# Patient Record
Sex: FEMALE | Race: WHITE | NOT HISPANIC OR LATINO | Employment: OTHER | URBAN - METROPOLITAN AREA
[De-identification: names, ages, dates, MRNs, and addresses within clinical notes are randomized per-mention and may not be internally consistent; named-entity substitution may affect disease eponyms.]

---

## 2017-11-13 ENCOUNTER — CONVERSION ENCOUNTER (OUTPATIENT)
Dept: GENERAL RADIOLOGY | Facility: HOSPITAL | Age: 47
End: 2017-11-13

## 2020-01-31 ENCOUNTER — HOSPITAL ENCOUNTER (OUTPATIENT)
Dept: FAMILY MEDICINE CLINIC | Facility: CLINIC | Age: 50
Discharge: HOME | End: 2020-01-31
Attending: NURSE PRACTITIONER

## 2020-01-31 ENCOUNTER — CONVERSION ENCOUNTER (OUTPATIENT)
Dept: FAMILY MEDICINE CLINIC | Facility: CLINIC | Age: 50
End: 2020-01-31

## 2020-01-31 ENCOUNTER — OFFICE VISIT CONVERTED (OUTPATIENT)
Dept: FAMILY MEDICINE CLINIC | Facility: CLINIC | Age: 50
End: 2020-01-31
Attending: NURSE PRACTITIONER

## 2020-01-31 LAB
25(OH)D3 SERPL-MCNC: 15.1 NG/ML (ref 30–100)
ALBUMIN SERPL-MCNC: 4.2 G/DL (ref 3.5–5)
ALBUMIN/GLOB SERPL: 1.2 {RATIO} (ref 1.4–2.6)
ALP SERPL-CCNC: 90 U/L (ref 53–141)
ALT SERPL-CCNC: 15 U/L (ref 10–40)
ANION GAP SERPL CALC-SCNC: 17 MMOL/L (ref 8–19)
AST SERPL-CCNC: 17 U/L (ref 15–50)
BASOPHILS # BLD AUTO: 0.06 10*3/UL (ref 0–0.2)
BASOPHILS NFR BLD AUTO: 0.7 % (ref 0–3)
BILIRUB SERPL-MCNC: 0.43 MG/DL (ref 0.2–1.3)
BUN SERPL-MCNC: 37 MG/DL (ref 5–25)
BUN/CREAT SERPL: 17 {RATIO} (ref 6–20)
CALCIUM SERPL-MCNC: 10.2 MG/DL (ref 8.7–10.4)
CHLORIDE SERPL-SCNC: 98 MMOL/L (ref 99–111)
CHOLEST SERPL-MCNC: 231 MG/DL (ref 107–200)
CHOLEST/HDLC SERPL: 4.6 {RATIO} (ref 3–6)
CONV ABS IMM GRAN: 0.04 10*3/UL (ref 0–0.2)
CONV CO2: 24 MMOL/L (ref 22–32)
CONV IMMATURE GRAN: 0.5 % (ref 0–1.8)
CONV TOTAL PROTEIN: 7.6 G/DL (ref 6.3–8.2)
CREAT UR-MCNC: 2.2 MG/DL (ref 0.5–0.9)
DEPRECATED RDW RBC AUTO: 43 FL (ref 36.4–46.3)
EOSINOPHIL # BLD AUTO: 0.14 10*3/UL (ref 0–0.7)
EOSINOPHIL # BLD AUTO: 1.7 % (ref 0–7)
ERYTHROCYTE [DISTWIDTH] IN BLOOD BY AUTOMATED COUNT: 13.2 % (ref 11.7–14.4)
EST. AVERAGE GLUCOSE BLD GHB EST-MCNC: 111 MG/DL
GFR SERPLBLD BASED ON 1.73 SQ M-ARVRAT: 25 ML/MIN/{1.73_M2}
GLOBULIN UR ELPH-MCNC: 3.4 G/DL (ref 2–3.5)
GLUCOSE SERPL-MCNC: 107 MG/DL (ref 65–99)
HBA1C MFR BLD: 5.5 % (ref 3.5–5.7)
HCT VFR BLD AUTO: 39 % (ref 37–47)
HDLC SERPL-MCNC: 50 MG/DL (ref 40–60)
HGB BLD-MCNC: 13.1 G/DL (ref 12–16)
LDLC SERPL CALC-MCNC: 153 MG/DL (ref 70–100)
LYMPHOCYTES # BLD AUTO: 1.34 10*3/UL (ref 1–5)
LYMPHOCYTES NFR BLD AUTO: 16 % (ref 20–45)
MCH RBC QN AUTO: 30.1 PG (ref 27–31)
MCHC RBC AUTO-ENTMCNC: 33.6 G/DL (ref 33–37)
MCV RBC AUTO: 89.7 FL (ref 81–99)
MONOCYTES # BLD AUTO: 0.47 10*3/UL (ref 0.2–1.2)
MONOCYTES NFR BLD AUTO: 5.6 % (ref 3–10)
NEUTROPHILS # BLD AUTO: 6.33 10*3/UL (ref 2–8)
NEUTROPHILS NFR BLD AUTO: 75.5 % (ref 30–85)
NRBC CBCN: 0 % (ref 0–0.7)
OSMOLALITY SERPL CALC.SUM OF ELEC: 289 MOSM/KG (ref 273–304)
PLATELET # BLD AUTO: 238 10*3/UL (ref 130–400)
PMV BLD AUTO: 8.4 FL (ref 9.4–12.3)
POTASSIUM SERPL-SCNC: 4.4 MMOL/L (ref 3.5–5.3)
RBC # BLD AUTO: 4.35 10*6/UL (ref 4.2–5.4)
SODIUM SERPL-SCNC: 135 MMOL/L (ref 135–147)
T4 FREE SERPL-MCNC: 1.1 NG/DL (ref 0.9–1.8)
TRIGL SERPL-MCNC: 142 MG/DL (ref 40–150)
TSH SERPL-ACNC: 1.39 M[IU]/L (ref 0.27–4.2)
VLDLC SERPL-MCNC: 28 MG/DL (ref 5–37)
WBC # BLD AUTO: 8.38 10*3/UL (ref 4.8–10.8)

## 2020-02-04 ENCOUNTER — HOSPITAL ENCOUNTER (OUTPATIENT)
Dept: GENERAL RADIOLOGY | Facility: HOSPITAL | Age: 50
Discharge: HOME | End: 2020-02-04
Attending: NURSE PRACTITIONER

## 2020-02-06 ENCOUNTER — HOSPITAL ENCOUNTER (OUTPATIENT)
Dept: GENERAL RADIOLOGY | Facility: HOSPITAL | Age: 50
Discharge: HOME | End: 2020-02-06
Attending: NURSE PRACTITIONER

## 2020-02-10 ENCOUNTER — CONVERSION ENCOUNTER (OUTPATIENT)
Dept: GASTROENTEROLOGY | Facility: CLINIC | Age: 50
End: 2020-02-10
Attending: INTERNAL MEDICINE

## 2020-02-21 ENCOUNTER — HOSPITAL ENCOUNTER (OUTPATIENT)
Dept: GENERAL RADIOLOGY | Facility: HOSPITAL | Age: 50
Discharge: HOME | End: 2020-02-21
Attending: NURSE PRACTITIONER

## 2020-02-28 ENCOUNTER — OFFICE VISIT CONVERTED (OUTPATIENT)
Dept: FAMILY MEDICINE CLINIC | Facility: CLINIC | Age: 50
End: 2020-02-28
Attending: NURSE PRACTITIONER

## 2020-03-04 ENCOUNTER — OFFICE VISIT CONVERTED (OUTPATIENT)
Dept: PULMONOLOGY | Facility: CLINIC | Age: 50
End: 2020-03-04
Attending: INTERNAL MEDICINE

## 2020-03-17 ENCOUNTER — OFFICE VISIT CONVERTED (OUTPATIENT)
Dept: FAMILY MEDICINE CLINIC | Facility: CLINIC | Age: 50
End: 2020-03-17
Attending: NURSE PRACTITIONER

## 2020-04-17 ENCOUNTER — TELEMEDICINE CONVERTED (OUTPATIENT)
Dept: FAMILY MEDICINE CLINIC | Facility: CLINIC | Age: 50
End: 2020-04-17
Attending: NURSE PRACTITIONER

## 2020-08-20 ENCOUNTER — OFFICE VISIT CONVERTED (OUTPATIENT)
Dept: FAMILY MEDICINE CLINIC | Facility: CLINIC | Age: 50
End: 2020-08-20
Attending: NURSE PRACTITIONER

## 2020-10-01 ENCOUNTER — HOSPITAL ENCOUNTER (OUTPATIENT)
Dept: LAB | Facility: HOSPITAL | Age: 50
Discharge: HOME | End: 2020-10-01
Attending: NURSE PRACTITIONER

## 2020-10-01 LAB
APPEARANCE UR: CLEAR
BASOPHILS # BLD AUTO: 0.06 10*3/UL (ref 0–0.2)
BASOPHILS NFR BLD AUTO: 0.8 % (ref 0–3)
BILIRUB UR QL: NEGATIVE
COLOR UR: YELLOW
CONV ABS IMM GRAN: 0.02 10*3/UL (ref 0–0.2)
CONV BACTERIA: NEGATIVE
CONV COLLECTION SOURCE (UA): ABNORMAL
CONV HYALINE CASTS IN URINE MICRO: ABNORMAL /[LPF]
CONV IMMATURE GRAN: 0.3 % (ref 0–1.8)
CONV UROBILINOGEN IN URINE BY AUTOMATED TEST STRIP: 0.2 {EHRLICHU}/DL (ref 0.1–1)
DEPRECATED RDW RBC AUTO: 44.9 FL (ref 36.4–46.3)
EOSINOPHIL # BLD AUTO: 0.19 10*3/UL (ref 0–0.7)
EOSINOPHIL # BLD AUTO: 2.6 % (ref 0–7)
ERYTHROCYTE [DISTWIDTH] IN BLOOD BY AUTOMATED COUNT: 12.9 % (ref 11.7–14.4)
GLUCOSE UR QL: NEGATIVE MG/DL
HCT VFR BLD AUTO: 39 % (ref 37–47)
HGB BLD-MCNC: 12.5 G/DL (ref 12–16)
HGB UR QL STRIP: ABNORMAL
KETONES UR QL STRIP: NEGATIVE MG/DL
LEUKOCYTE ESTERASE UR QL STRIP: NEGATIVE
LYMPHOCYTES # BLD AUTO: 1.62 10*3/UL (ref 1–5)
LYMPHOCYTES NFR BLD AUTO: 22.5 % (ref 20–45)
MCH RBC QN AUTO: 30.5 PG (ref 27–31)
MCHC RBC AUTO-ENTMCNC: 32.1 G/DL (ref 33–37)
MCV RBC AUTO: 95.1 FL (ref 81–99)
MONOCYTES # BLD AUTO: 0.43 10*3/UL (ref 0.2–1.2)
MONOCYTES NFR BLD AUTO: 6 % (ref 3–10)
NEUTROPHILS # BLD AUTO: 4.87 10*3/UL (ref 2–8)
NEUTROPHILS NFR BLD AUTO: 67.8 % (ref 30–85)
NITRITE UR QL STRIP: NEGATIVE
NRBC CBCN: 0 % (ref 0–0.7)
PH UR STRIP.AUTO: 5.5 [PH] (ref 5–8)
PLATELET # BLD AUTO: 233 10*3/UL (ref 130–400)
PMV BLD AUTO: 9.4 FL (ref 9.4–12.3)
PROT UR QL: 100 MG/DL
RBC # BLD AUTO: 4.1 10*6/UL (ref 4.2–5.4)
RBC #/AREA URNS HPF: ABNORMAL /[HPF]
SP GR UR: 1.01 (ref 1–1.03)
SQUAMOUS SPT QL MICRO: ABNORMAL /[HPF]
WBC # BLD AUTO: 7.19 10*3/UL (ref 4.8–10.8)
WBC #/AREA URNS HPF: ABNORMAL /[HPF]

## 2020-10-02 LAB
25(OH)D3 SERPL-MCNC: 28.2 NG/ML (ref 30–100)
ALBUMIN SERPL-MCNC: 4.3 G/DL (ref 3.5–5)
ANION GAP SERPL CALC-SCNC: 19 MMOL/L (ref 8–19)
BUN SERPL-MCNC: 43 MG/DL (ref 5–25)
BUN/CREAT SERPL: 18 {RATIO} (ref 6–20)
CALCIUM SERPL-MCNC: 9.6 MG/DL (ref 8.7–10.4)
CHLORIDE SERPL-SCNC: 102 MMOL/L (ref 99–111)
CONV CO2: 20 MMOL/L (ref 22–32)
CONV CREATININE URINE, RANDOM: 61.2 MG/DL (ref 10–300)
CREAT UR-MCNC: 2.38 MG/DL (ref 0.5–0.9)
GFR SERPLBLD BASED ON 1.73 SQ M-ARVRAT: 23 ML/MIN/{1.73_M2}
GLUCOSE SERPL-MCNC: 112 MG/DL (ref 65–99)
PHOSPHATE SERPL-MCNC: 3.4 MG/DL (ref 2.4–4.5)
POTASSIUM SERPL-SCNC: 4.6 MMOL/L (ref 3.5–5.3)
PROT UR-MCNC: 80.4 MG/DL
SODIUM SERPL-SCNC: 136 MMOL/L (ref 135–147)

## 2020-10-05 LAB — PTH-INTACT SERPL-MCNC: 44 PG/ML (ref 15–65)

## 2020-10-15 ENCOUNTER — HOSPITAL ENCOUNTER (OUTPATIENT)
Dept: URGENT CARE | Facility: CLINIC | Age: 50
Discharge: HOME | End: 2020-10-15
Attending: NURSE PRACTITIONER

## 2020-10-17 LAB — SARS-COV-2 RNA SPEC QL NAA+PROBE: DETECTED

## 2021-01-29 ENCOUNTER — CONVERSION ENCOUNTER (OUTPATIENT)
Dept: FAMILY MEDICINE CLINIC | Facility: CLINIC | Age: 51
End: 2021-01-29

## 2021-01-29 ENCOUNTER — HOSPITAL ENCOUNTER (OUTPATIENT)
Dept: FAMILY MEDICINE CLINIC | Facility: CLINIC | Age: 51
Discharge: HOME | End: 2021-01-29
Attending: NURSE PRACTITIONER

## 2021-01-29 ENCOUNTER — OFFICE VISIT CONVERTED (OUTPATIENT)
Dept: FAMILY MEDICINE CLINIC | Facility: CLINIC | Age: 51
End: 2021-01-29
Attending: NURSE PRACTITIONER

## 2021-01-29 ENCOUNTER — HOSPITAL ENCOUNTER (OUTPATIENT)
Dept: GENERAL RADIOLOGY | Facility: HOSPITAL | Age: 51
Discharge: HOME | End: 2021-01-29
Attending: NURSE PRACTITIONER

## 2021-01-29 LAB
BASOPHILS # BLD AUTO: 0.03 10*3/UL (ref 0–0.2)
BASOPHILS NFR BLD AUTO: 0.4 % (ref 0–3)
CONV ABS IMM GRAN: 0.02 10*3/UL (ref 0–0.2)
CONV IMMATURE GRAN: 0.3 % (ref 0–1.8)
DEPRECATED RDW RBC AUTO: 43.6 FL (ref 36.4–46.3)
EOSINOPHIL # BLD AUTO: 0.12 10*3/UL (ref 0–0.7)
EOSINOPHIL # BLD AUTO: 1.6 % (ref 0–7)
ERYTHROCYTE [DISTWIDTH] IN BLOOD BY AUTOMATED COUNT: 12.7 % (ref 11.7–14.4)
HCT VFR BLD AUTO: 35.4 % (ref 37–47)
HGB BLD-MCNC: 11.5 G/DL (ref 12–16)
LYMPHOCYTES # BLD AUTO: 1.31 10*3/UL (ref 1–5)
LYMPHOCYTES NFR BLD AUTO: 17.5 % (ref 20–45)
MCH RBC QN AUTO: 30.4 PG (ref 27–31)
MCHC RBC AUTO-ENTMCNC: 32.5 G/DL (ref 33–37)
MCV RBC AUTO: 93.7 FL (ref 81–99)
MONOCYTES # BLD AUTO: 0.46 10*3/UL (ref 0.2–1.2)
MONOCYTES NFR BLD AUTO: 6.1 % (ref 3–10)
NEUTROPHILS # BLD AUTO: 5.56 10*3/UL (ref 2–8)
NEUTROPHILS NFR BLD AUTO: 74.1 % (ref 30–85)
NRBC CBCN: 0 % (ref 0–0.7)
PLATELET # BLD AUTO: 220 10*3/UL (ref 130–400)
PMV BLD AUTO: 9.1 FL (ref 9.4–12.3)
RBC # BLD AUTO: 3.78 10*6/UL (ref 4.2–5.4)
WBC # BLD AUTO: 7.5 10*3/UL (ref 4.8–10.8)

## 2021-01-30 LAB
ALBUMIN SERPL-MCNC: 4.2 G/DL (ref 3.5–5)
ALBUMIN/GLOB SERPL: 1.3 {RATIO} (ref 1.4–2.6)
ALP SERPL-CCNC: 87 U/L (ref 53–141)
ALT SERPL-CCNC: 15 U/L (ref 10–40)
ANION GAP SERPL CALC-SCNC: 19 MMOL/L (ref 8–19)
AST SERPL-CCNC: 18 U/L (ref 15–50)
BILIRUB SERPL-MCNC: 0.38 MG/DL (ref 0.2–1.3)
BNP SERPL-MCNC: 280 PG/ML (ref 0–900)
BUN SERPL-MCNC: 35 MG/DL (ref 5–25)
BUN/CREAT SERPL: 13 {RATIO} (ref 6–20)
CALCIUM SERPL-MCNC: 9.8 MG/DL (ref 8.7–10.4)
CHLORIDE SERPL-SCNC: 106 MMOL/L (ref 99–111)
CHOLEST SERPL-MCNC: 119 MG/DL (ref 107–200)
CHOLEST/HDLC SERPL: 2.7 {RATIO} (ref 3–6)
CONV CO2: 19 MMOL/L (ref 22–32)
CONV TOTAL PROTEIN: 7.4 G/DL (ref 6.3–8.2)
CREAT UR-MCNC: 2.61 MG/DL (ref 0.5–0.9)
GFR SERPLBLD BASED ON 1.73 SQ M-ARVRAT: 20 ML/MIN/{1.73_M2}
GLOBULIN UR ELPH-MCNC: 3.2 G/DL (ref 2–3.5)
GLUCOSE SERPL-MCNC: 98 MG/DL (ref 65–99)
HDLC SERPL-MCNC: 44 MG/DL (ref 40–60)
LDLC SERPL CALC-MCNC: 52 MG/DL (ref 70–100)
OSMOLALITY SERPL CALC.SUM OF ELEC: 296 MOSM/KG (ref 273–304)
POTASSIUM SERPL-SCNC: 5 MMOL/L (ref 3.5–5.3)
SODIUM SERPL-SCNC: 139 MMOL/L (ref 135–147)
TRIGL SERPL-MCNC: 115 MG/DL (ref 40–150)
URATE SERPL-MCNC: 8.1 MG/DL (ref 2.5–7.5)
VLDLC SERPL-MCNC: 23 MG/DL (ref 5–37)

## 2021-02-23 ENCOUNTER — CONVERSION ENCOUNTER (OUTPATIENT)
Dept: FAMILY MEDICINE CLINIC | Facility: CLINIC | Age: 51
End: 2021-02-23

## 2021-02-23 ENCOUNTER — HOSPITAL ENCOUNTER (OUTPATIENT)
Dept: FAMILY MEDICINE CLINIC | Facility: CLINIC | Age: 51
Discharge: HOME | End: 2021-02-23
Attending: NURSE PRACTITIONER

## 2021-02-23 ENCOUNTER — OFFICE VISIT CONVERTED (OUTPATIENT)
Dept: FAMILY MEDICINE CLINIC | Facility: CLINIC | Age: 51
End: 2021-02-23
Attending: NURSE PRACTITIONER

## 2021-02-23 LAB
FERRITIN SERPL-MCNC: 92 NG/ML (ref 10–200)
IRON SATN MFR SERPL: 20 % (ref 20–55)
IRON SERPL-MCNC: 60 UG/DL (ref 60–170)
TIBC SERPL-MCNC: 297 UG/DL (ref 245–450)
TRANSFERRIN SERPL-MCNC: 208 MG/DL (ref 250–380)
URATE SERPL-MCNC: 6.4 MG/DL (ref 2.5–7.5)

## 2021-05-07 ENCOUNTER — HOSPITAL ENCOUNTER (OUTPATIENT)
Dept: LAB | Facility: HOSPITAL | Age: 51
Discharge: HOME | End: 2021-05-07
Attending: INTERNAL MEDICINE

## 2021-05-07 LAB
25(OH)D3 SERPL-MCNC: 30 NG/ML (ref 30–100)
ALBUMIN SERPL-MCNC: 4.3 G/DL (ref 3.5–5)
ANION GAP SERPL CALC-SCNC: 19 MMOL/L (ref 8–19)
APPEARANCE UR: CLEAR
BASOPHILS # BLD AUTO: 0.05 10*3/UL (ref 0–0.2)
BASOPHILS NFR BLD AUTO: 0.8 % (ref 0–3)
BILIRUB UR QL: NEGATIVE
BUN SERPL-MCNC: 52 MG/DL (ref 5–25)
BUN/CREAT SERPL: 20 {RATIO} (ref 6–20)
CALCIUM SERPL-MCNC: 9.6 MG/DL (ref 8.7–10.4)
CHLORIDE SERPL-SCNC: 108 MMOL/L (ref 99–111)
COLOR UR: YELLOW
CONV ABS IMM GRAN: 0.03 10*3/UL (ref 0–0.2)
CONV BACTERIA: NEGATIVE
CONV CO2: 18 MMOL/L (ref 22–32)
CONV COLLECTION SOURCE (UA): ABNORMAL
CONV CREATININE URINE, RANDOM: 58 MG/DL (ref 10–300)
CONV HYALINE CASTS IN URINE MICRO: ABNORMAL /[LPF]
CONV IMMATURE GRAN: 0.5 % (ref 0–1.8)
CONV UROBILINOGEN IN URINE BY AUTOMATED TEST STRIP: 0.2 {EHRLICHU}/DL (ref 0.1–1)
CREAT UR-MCNC: 2.59 MG/DL (ref 0.5–0.9)
DEPRECATED RDW RBC AUTO: 46.2 FL (ref 36.4–46.3)
EOSINOPHIL # BLD AUTO: 0.21 10*3/UL (ref 0–0.7)
EOSINOPHIL # BLD AUTO: 3.3 % (ref 0–7)
ERYTHROCYTE [DISTWIDTH] IN BLOOD BY AUTOMATED COUNT: 13.6 % (ref 11.7–14.4)
GFR SERPLBLD BASED ON 1.73 SQ M-ARVRAT: 20 ML/MIN/{1.73_M2}
GLUCOSE SERPL-MCNC: 91 MG/DL (ref 65–99)
GLUCOSE UR QL: NEGATIVE MG/DL
HCT VFR BLD AUTO: 35.4 % (ref 37–47)
HGB BLD-MCNC: 11.5 G/DL (ref 12–16)
HGB UR QL STRIP: NEGATIVE
KETONES UR QL STRIP: NEGATIVE MG/DL
LEUKOCYTE ESTERASE UR QL STRIP: NEGATIVE
LYMPHOCYTES # BLD AUTO: 1.72 10*3/UL (ref 1–5)
LYMPHOCYTES NFR BLD AUTO: 27.2 % (ref 20–45)
MCH RBC QN AUTO: 30.3 PG (ref 27–31)
MCHC RBC AUTO-ENTMCNC: 32.5 G/DL (ref 33–37)
MCV RBC AUTO: 93.2 FL (ref 81–99)
MONOCYTES # BLD AUTO: 0.47 10*3/UL (ref 0.2–1.2)
MONOCYTES NFR BLD AUTO: 7.4 % (ref 3–10)
NEUTROPHILS # BLD AUTO: 3.84 10*3/UL (ref 2–8)
NEUTROPHILS NFR BLD AUTO: 60.8 % (ref 30–85)
NITRITE UR QL STRIP: NEGATIVE
NRBC CBCN: 0 % (ref 0–0.7)
PH UR STRIP.AUTO: 5 [PH] (ref 5–8)
PHOSPHATE SERPL-MCNC: 4.8 MG/DL (ref 2.4–4.5)
PLATELET # BLD AUTO: 216 10*3/UL (ref 130–400)
PMV BLD AUTO: 9.2 FL (ref 9.4–12.3)
POTASSIUM SERPL-SCNC: 5.1 MMOL/L (ref 3.5–5.3)
PROT UR QL: 100 MG/DL
PROT UR-MCNC: 69.6 MG/DL
PTH-INTACT SERPL-MCNC: 20.8 PG/ML (ref 11.1–79.5)
RBC # BLD AUTO: 3.8 10*6/UL (ref 4.2–5.4)
RBC #/AREA URNS HPF: ABNORMAL /[HPF]
SODIUM SERPL-SCNC: 140 MMOL/L (ref 135–147)
SP GR UR: 1.01 (ref 1–1.03)
SQUAMOUS SPT QL MICRO: ABNORMAL /[HPF]
WBC # BLD AUTO: 6.32 10*3/UL (ref 4.8–10.8)
WBC #/AREA URNS HPF: ABNORMAL /[HPF]

## 2021-05-12 NOTE — PROGRESS NOTES
Progress Note      Patient Name: Claire Norris   Patient ID: [id number]   Sex: Female   YOB: 1970    Primary Care Provider: Brian ALCANTARA    Visit Date: April 17, 2020    Provider: MARICRUZ Infante   Location: [facility]   Location Address: [address] [place] [address]  [id number]   Location Phone: [phone]          Chief Complaint  · one month follow up anxiety, CKD, and HTN      History Of Present Illness  Video Conferencing Visit  Claire Norris is a 49 year old /White female who is presenting for evaluation via video conferencing. Verbal consent obtained before beginning visit.   The following staff were present during this visit: Sangeetha Elise MA   Claire Norris is a 49 year old /White female who presents for evaluation and treatment of:      1 month follow-up anxiety, CKD and HTN    Blood pressure reading at home was 180/100 while on phone   Stopped Cymbalta due to increased anxiety while taking   Has been trying meditation and relaxation methods which has been working  Would not like to try any thing else right now  states she is started hobbies such as painting at this time     pt did not keep appointment with nephrology   states she plans to rescheduled, she has not rescheduled at this time  also pt refuses renal US recommended per radiology       Past Medical History  Disease Name Date Onset Notes   Anxiety --  --    Hypertension --  --    Sinus problem --  --          Past Surgical History  Procedure Name Date Notes   Cesarian Section --  1988 and 1989   Cholecystecomy 2011 --    Colonoscopy 2009 --    EGD 2009 --    Tubal ligation 1989 --          Medication List  Name Date Started Instructions   clonidine HCl 0.1 mg oral tablet 02/03/2020 take 1 tablet (0.1 mg) by oral route 3 times per day for 90 days   Coreg 6.25 mg oral tablet 03/17/2020 take 1 tablet (6.25 mg) by oral route 2 times per day with food  for 90 days   Lipitor 20 mg oral tablet 03/17/2020 take 1 tablet (20 mg) by oral route once daily at bedtime for 90 days   losartan 100 mg oral tablet 02/28/2020 take 1 tablet (100 mg) by oral route once daily for 90 days         Allergy List  Allergen Name Date Reaction Notes   diltiazem HCl Feb 28 2020 12:00AM --  --          Family Medical History  Disease Name Relative/Age Notes   Stroke  --    Cancer, Unspecified Father/73   Throat cancer   Diabetes  --          Social History  Finding Status Start/Stop Quantity Notes   Alcohol Never --/-- --  --    Tobacco Never --/-- --  --          Review of Systems  · Constitutional  o Denies  o : fever  · Eyes  o Denies  o : blurred vision, changes in vision  · HENT  o Denies  o : headaches  · Cardiovascular  o Denies  o : chest pain, lower extremity edema  · Respiratory  o Denies  o : cough  · Gastrointestinal  o Denies  o : nausea, vomiting, diarrhea, constipation, abdominal pain  · Genitourinary  o Denies  o : dysuria  · Endocrine  o Admits  o : central obesity  o Denies  o : polyuria, polydipsia      Physical Examination  · Constitutional  o Appearance  o : well-nourished, in no acute distress  · Eyes  o Conjunctivae  o : conjunctivae normal  o Sclerae  o : sclerae white  o Eyelids/Ocular Adnexae  o : eyelid appearance normal, no exudates present  · Neck  o Inspection/Palpation  o : normal appearance, trachea midline  · Respiratory  o Respiratory Effort  o : breathing unlabored  · Cardiovascular  o Peripheral Vascular System  o :   § Extremities  § : no clubbing or edema  · Skin and Subcutaneous Tissue  o General Inspection  o : normal color   · Neurologic  o Mental Status Examination  o :   § Orientation  § : grossly oriented to person, place and time  o Gait and Station  o : normal gait, able to stand without difficulty  · Psychiatric  o Judgement and Insight  o : judgment and insight intact  o Mood and Affect  o : mood normal, affect  appropriate          Assessment  · Anxiety disorder     300.00/F41.9  stable at this time  · Essential hypertension     401.9/I10  uncontrolled will increase coreg to 12.5 mg po bid, follow up in office in 2 weeks, exercise, weight loss, decrease salt intake   · CKD (chronic kidney disease)     585.9/N18.9  refer to nephrologist. avoid all nsaids      Plan  · Orders  o HTN/Lipid Panel (CMP, Lipid) [facility] (97592, 74163) - 401.9/I10 - 04/17/2020  o CBC with Auto Diff [facility] (62592) - 401.9/I10 - 04/17/2020  o Urinalysis with Reflex Microscopy if abnormal ([facility]) (60827) - 401.9/I10 - 04/17/2020  o ACO-39: Current medications updated and reviewed () - - 04/17/2020  o ACO-14: Influenza immunization was not administered for reasons documented () - - 04/17/2020   declined  · Medications  o Coreg 12.5 mg oral tablet   SIG: take 1 tablet (12.5 mg) by oral route 2 times per day with food for 90 days   DISP: (180) tablets with 1 refills  Adjusted on 04/17/2020     · Instructions  o Patient was educated/instructed on their diagnosis, treatment and medications prior to discharge from the clinic today.  · Disposition  o follow up 2 weeks            Electronically Signed by: MARICRUZ Infante -Author on April 17, 2020 11:23:55 AM
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
## 2021-05-14 VITALS
DIASTOLIC BLOOD PRESSURE: 84 MMHG | BODY MASS INDEX: 44.01 KG/M2 | HEART RATE: 69 BPM | OXYGEN SATURATION: 97 % | HEIGHT: 63 IN | WEIGHT: 248.37 LBS | SYSTOLIC BLOOD PRESSURE: 140 MMHG | TEMPERATURE: 97 F

## 2021-05-14 VITALS
SYSTOLIC BLOOD PRESSURE: 138 MMHG | HEART RATE: 81 BPM | OXYGEN SATURATION: 99 % | DIASTOLIC BLOOD PRESSURE: 84 MMHG | HEIGHT: 63 IN | WEIGHT: 258 LBS | TEMPERATURE: 98.9 F | RESPIRATION RATE: 18 BRPM | BODY MASS INDEX: 45.71 KG/M2 | SYSTOLIC BLOOD PRESSURE: 151 MMHG | DIASTOLIC BLOOD PRESSURE: 95 MMHG

## 2021-05-14 VITALS
WEIGHT: 252 LBS | SYSTOLIC BLOOD PRESSURE: 142 MMHG | BODY MASS INDEX: 44.65 KG/M2 | TEMPERATURE: 99.8 F | HEART RATE: 86 BPM | HEIGHT: 63 IN | RESPIRATION RATE: 18 BRPM | OXYGEN SATURATION: 96 % | DIASTOLIC BLOOD PRESSURE: 76 MMHG | SYSTOLIC BLOOD PRESSURE: 145 MMHG | DIASTOLIC BLOOD PRESSURE: 80 MMHG

## 2021-05-14 NOTE — PROGRESS NOTES
Progress Note      Patient Name: Claire Norris   Patient ID: [id number]   Sex: Female   YOB: 1970    Primary Care Provider: Brian ALCANTARA    Visit Date: January 29, 2021    Provider: MARICRUZ Infante   Location: [facility]   Location Address: [address] [place] [address]  [id number]   Location Phone: [phone]          Chief Complaint  · acute visit for feet swelling, and redness      History Of Present Illness  Claire Norris is a 50 year old /White female who presents for evaluation and treatment of:      acute visit for feet swelling, and redness    c/o- bilateral feet swelling for 4 days. right foot only started swelling after she has been sitting long periods of time because her left foot is hurting   also states she started taking turmeric 2 weeks prior  Thought she was consuming too much salt, so she increased water and elevated them, but did not help.   States that the left foot is swollen more than the right. States that they are very painful   States that she has red blotchy areas on left foot, states that the area burns.  pain started in second and third toe first now most of the foot is hurting   states she was walking on hardwood floors barefooted before the pain started    nephrologist dr sheridan     also c/o some shortness of breath in the past 4 days       Past Medical History  Disease Name Date Onset Notes   Anxiety --  --    Hypertension --  --    Sinus problem --  --          Past Surgical History  Procedure Name Date Notes   Cesarian Section --  1988 and 1989   Cholecystecomy 2011 --    Colonoscopy 2009 --    EGD 2009 --    Tubal ligation 1989 --          Medication List  Name Date Started Instructions   clonidine HCl 0.1 mg oral tablet 08/20/2020 take 1 tablet (0.1 mg) by oral route 3 times per day for 90 days   Coreg 12.5 mg oral tablet 08/20/2020 take 1 tablet (12.5 mg) by oral route 2 times per day  "with food for 90 days   fluticasone propionate 50 mcg/actuation nasal spray,suspension 08/20/2020 inhale 2 sprays (100 mcg) in each nostril by intranasal route once daily   Lipitor 20 mg oral tablet 08/20/2020 take 1 tablet (20 mg) by oral route once daily at bedtime for 90 days   losartan 100 mg oral tablet 08/20/2020 take 1 tablet (100 mg) by oral route once daily for 90 days   Zyrtec 10 mg oral tablet 08/20/2020 take 1 tablet by oral route once a day (at bedtime) for 90 days         Allergy List  Allergen Name Date Reaction Notes   diltiazem HCl Feb 28 2020 12:00AM --  --          Family Medical History  Disease Name Relative/Age Notes   Stroke  --    Cancer, Unspecified Father/73   Throat cancer   Diabetes  --          Social History  Finding Status Start/Stop Quantity Notes   Alcohol Never --/-- --  --    Tobacco Never --/-- --  --          Review of Systems  · Constitutional  o Denies  o : fever, chills  · Cardiovascular  o Admits  o : edema (swelling)  o Denies  o : chest Pain  · Respiratory  o Admits  o : shortness of breath  o Denies  o : frequent cough  · Musculoskeletal  o Admits  o : foot pain      Vitals  Date Time BP Position Site L\R Cuff Size HR RR TEMP (F) WT  HT  BMI kg/m2 BSA m2 O2 Sat FR L/min FiO2 HC       02/28/2020 11:35 /100 Sitting    77 - R 19  247lbs 0oz 5'  3\" 43.75 2.23 97 %  21%    03/17/2020 11:39 /86 Sitting    90 - R 19  246lbs 0oz 5'  3\" 43.58 2.23 98 %  21%    08/20/2020 07:23 /84 Sitting    69 - R  97 248lbs 6oz 5'  3\" 44 2.24 97 %      01/29/2021 01:36 /76 Sitting    86 - R 18 99.8 251lbs 16oz 5'  3\" 44.64 2.25 96 %      01/29/2021 02:07 /80 Sitting                       Physical Examination  · Constitutional  o Appearance  o : well-nourished, in no acute distress  · Respiratory  o Respiratory Effort  o : breathing unlabored  o Auscultation of Lungs  o : normal breath sounds throughout inspiration and expiration  · Cardiovascular  o Heart  o : "   § Auscultation of Heart  § : regular rate and rhythm, no mumur  o Peripheral Vascular System  o :   § Extremities  § : trace RLE edema, +2 LLE edema  · Musculoskeletal  o Right Lower Extremity  o :   § Inspection/Palpation  § : second and third metatarsal tender to palpation with swelling and mild erythema  o Left Lower Extremity  o :   § Inspection/Palpation  § : no joint or limb tenderness to palpation, ROM normal  · Skin and Subcutaneous Tissue  o General Inspection  o : pink, warm, dry   · Neurologic  o Gait and Station  o : normal gait, able to stand without difficulty              Assessment  · Essential hypertension     401.9/I10  elevated will recheck manually   · Foot pain, left     729.5/M79.672  will obtain xray and uric acid elevated   · Edema     782.3/R60.9  elevate legs, increase water intake   · Shortness of breath     786.05/R06.02  will obtain BNP       Plan  · Orders  o HTN/Lipid Panel (CMP, Lipid) [facility] (43606, 24603) - 401.9/I10 - 01/29/2021  o CBC with Auto Diff [facility] (89965) - 401.9/I10 - 01/29/2021  o ACO-39: Current medications updated and reviewed (1159F, ) - - 01/29/2021  o BNP (brain natriuretic peptide measurement) (72757) - 782.3/R60.9, 786.05/R06.02 - 01/29/2021  o Uric Acid Serum [facility] (66637) - 729.5/M79.672, 782.3/R60.9 - 01/29/2021  o Xray foot left [facility] ([id number]-PI) - 729.5/M79.672 - 01/29/2021  · Medications  o prednisone 20 mg oral tablet   SIG: take 1 tablet (20 mg) by oral route once daily for 10 days   DISP: (10) Tablet with 0 refills  Prescribed on 01/29/2021     o turmeric 400 mg oral capsule   SIG: take 1 capsule by oral route daily   DISP: (0) Capsule with 0 refills  Discontinued on 01/29/2021     o Medications have been Reconciled  o Transition of Care or Provider Policy  · Instructions  o Patient was educated/instructed on their diagnosis, treatment and medications prior to discharge from the clinic today.  · Disposition  o will contact with diagnostics  results  o FOLLOW UP PENDING RESULTS            Electronically Signed by: MARICRUZ Infante -Author on January 29, 2021 02:07:37 PM

## 2021-05-14 NOTE — PROGRESS NOTES
Progress Note      Patient Name: Claire Norris   Patient ID: [id number]   Sex: Female   YOB: 1970    Primary Care Provider: Brian ALCANTARA    Visit Date: February 23, 2021    Provider: MARICRUZ Infante   Location: [facility]   Location Address: [address] [place] [address]  [id number]   Location Phone: [phone]          Chief Complaint  · 6 month Follow up allergies, CKD, HTN, hyperlipidemia, gout, and impaired fasting glucose       History Of Present Illness  Claire Norris is a 50 year old /White female who presents for evaluation and treatment of:      6 month Follow up allergies, CKD, HTN, hyperlipidemia, gout, and impaired fasting glucose   medication refills  labs due     c/o- states that her pain in her feet from gout has gotten better but her right foot is hurting now, cut out purines in her diet    mammo-2/21/2020  pap- Dr Nguyen 2018., states that she will self schedule with his office    colonoscopy- 2009. DUE, order placed for screening     pt c/o shortness of air with activity, pnd, ear fullness, nasal discharge uncontrolled  taking Flonase and Zyrtec daily as directed  no allergy testing  states she did suffer asthma as a child   BNP normal       Past Medical History  Disease Name Date Onset Notes   Anxiety --  --    Hypertension --  --    Sinus problem --  --          Past Surgical History  Procedure Name Date Notes   Cesarian Section --  1988 and 1989   Cholecystecomy 2011 --    Colonoscopy 2009 --    EGD 2009 --    Tubal ligation 1989 --          Medication List  Name Date Started Instructions   allopurinol 100 mg oral tablet 02/02/2021 take 1 tablet (100 mg) by oral route once daily for 30 days   clonidine HCl 0.1 mg oral tablet 02/22/2021 TAKE 1 TABLET BY MOUTH THREE TIMES DAILY   Coreg 12.5 mg oral tablet 02/23/2021 take 1 tablet (12.5 mg) by oral route 2 times per day with food for 90 days   fluticasone  "propionate 50 mcg/actuation nasal spray,suspension 08/20/2020 inhale 2 sprays (100 mcg) in each nostril by intranasal route once daily   Lipitor 20 mg oral tablet 02/23/2021 take 1 tablet (20 mg) by oral route once daily at bedtime for 90 days   losartan 100 mg oral tablet 02/23/2021 take 1 tablet (100 mg) by oral route once daily for 90 days         Allergy List  Allergen Name Date Reaction Notes   diltiazem HCl Feb 28 2020 12:00AM --  --          Family Medical History  Disease Name Relative/Age Notes   Stroke  --    Cancer, Unspecified Father/73   Throat cancer   Diabetes  --          Social History  Finding Status Start/Stop Quantity Notes   Alcohol Never --/-- --  --    Tobacco Never --/-- --  --          Review of Systems  · Constitutional  o Denies  o : fever, chills  · Eyes  o Denies  o : discharge from eye  · HENT  o Admits  o : nasal discharge, postnasal drainage, ear fullness  o Denies  o : ear pain, sore throat  · Cardiovascular  o Denies  o : chest Pain, palpitations, edema (swelling)  · Respiratory  o Admits  o : frequent cough, shortness of breath  · Gastrointestinal  o Denies  o : nausea, vomiting, changes in bowel habits  · Genitourinary  o Denies  o : dysuria  · Neurologic  o Denies  o : headache, dizziness  · Musculoskeletal  o Admits  o : joint pain  · Endocrine  o Denies  o : polydipsia, polyphagia  · Psychiatric  o Admits  o : anxiety  o Denies  o : SI/HI  · Allergic-Immunologic  o Admits  o : seasonal allergies      Vitals  Date Time BP Position Site L\R Cuff Size HR RR TEMP (F) WT  HT  BMI kg/m2 BSA m2 O2 Sat FR L/min FiO2 HC       03/17/2020 11:39 /86 Sitting    90 - R 19  246lbs 0oz 5'  3\" 43.58 2.23 98 %  21%    08/20/2020 07:23 /84 Sitting    69 - R  97 248lbs 6oz 5'  3\" 44 2.24 97 %      01/29/2021 01:36 /76 Sitting    86 - R 18 99.8 251lbs 16oz 5'  3\" 44.64 2.25 96 %      01/29/2021 02:07 /80 Sitting                 02/23/2021 07:27 /95 Sitting    81 - R 18 " "98.9 258lbs 0oz 5'  3\" 45.7 2.28 99 %      02/23/2021 08:07 /84 Sitting                       Physical Examination  · Constitutional  o Appearance  o : well-nourished, in no acute distress  · Eyes  o Conjunctivae  o : conjunctivae normal  o Sclerae  o : sclerae white  o Pupils and Irises  o : pupils equal and round  o Eyelids/Ocular Adnexae  o : eyelid appearance normal  · Ears, Nose, Mouth and Throat  o Ears  o :   § External Ears  § : external auditory canal appearance within normal limits  § Otoscopic Examination  § : tympanic membrane appearance injected clear fluid bilaterally  o Nose  o :   § External Nose  § : appearance normal  § Intranasal Exam  § : mucosa inflamed, sinuses non tender to palpation  § Nasopharynx  § : clear discharge present  o Throat  o :   § Oropharynx  § : no inflammation or lesions present, tonsils within normal limits, pnd  · Neck  o Inspection/Palpation  o : normal appearance, trachea midline  o Thyroid  o : gland size normal, nontender  · Respiratory  o Respiratory Effort  o : breathing unlabored  o Auscultation of Lungs  o : normal breath sounds throughout inspiration and expiration  · Cardiovascular  o Heart  o :   § Auscultation of Heart  § : regular rate and rhythm, no murmurs  o Peripheral Vascular System  o :   § Carotid Arteries  § : no bruits present  § Extremities  § : no clubbing or edema  · Gastrointestinal  o Abdominal Examination  o : abdomen nontender to palpation, bowel sounds present   · Lymphatic  o Neck  o : no lymphadenopathy present  · Musculoskeletal  o Right Lower Extremity  o :   § Inspection/Palpation  § : dorsal surface 1st 2nd and 3rd metatarsal head tender to palpation, mild swelling   o Left Lower Extremity  o :   § Inspection/Palpation  § : no joint or limb tenderness to palpation, ROM normal  · Skin and Subcutaneous Tissue  o General Inspection  o : no rashes or lesions present, no areas of discoloration  · Neurologic  o Mental Status " Examination  o :   § Orientation  § : grossly oriented to person, place and time  o Gait and Station  o : normal gait, able to stand without difficulty  · Psychiatric  o Judgement and Insight  o : judgment and insight intact  o Mood and Affect  o : mood normal, affect appropriate          Assessment  · Screening for depression     V79.0/Z13.89  · Screening for colon cancer     V76.51/Z12.11  · Allergic rhinitis due to allergen     477.9/J30.9  will add singulair and refer to allergist   · Essential hypertension     401.9/I10  currently controlled, manual recheck   · Hyperlipidemia     272.4/E78.5  stable on statin, LDL below goal, liver enzymes normal   · Impaired fasting glucose     790.21/R73.01  decrease carb intake, exercise daily, weight loss   · CKD (chronic kidney disease)     585.9/N18.9  continue to follow up with nephrology, avoid nsaids   · Gout     274.9/M10.9  will check uric acid level, continue to avoid purines in diet, continue allopurinol at this time       Plan  · Orders  o ACO-18: Positive screen for clinical depression using a standardized tool and a follow-up plan documented () - V79.0/Z13.89 - 02/23/2021  o COLONOSCOPY REFERRAL (COLON) - V76.51/Z12.11 - 02/23/2021  o ALLERGY CONSULTATION (ALLEG) - 477.9/J30.9 - 02/23/2021  o ACO-39: Current medications updated and reviewed (1159F, ) - - 02/23/2021  o Uric Acid Serum [facility] ([id number]) - 274.9/M10.9 - 02/23/2021  · Medications  o Zyrtec 10 mg oral tablet   SIG: take 1 tablet by oral route once a day (at bedtime) for 90 days   DISP: (90) Tablet with 1 refills  Prescribed on 02/23/2021     o prednisone 20 mg oral tablet   SIG: take 3 tabs x3 days then 2 tabs x3 days then take 1 tablet by oral route once daily x3 days   DISP: (18) Tablet with 0 refills  Prescribed on 02/23/2021     o Zyrtec 10 mg oral tablet   SIG: take 1 tablet by oral route once a day (at bedtime) for 90 days   DISP: (90) Tablet with 1 refills  Prescribed on 02/23/2021      o Singulair 10 mg Oral tablet   SIG: take 1 tablet (10 mg) by oral route once daily in the evening for 90 days   DISP: (90) Tablet with 1 refills  Prescribed on 02/23/2021     o Coreg 12.5 mg oral tablet   SIG: take 1 tablet (12.5 mg) by oral route 2 times per day with food for 90 days   DISP: (180) Tablet with 1 refills  Refilled on 02/23/2021     o fluticasone propionate 50 mcg/actuation nasal spray,suspension   SIG: inhale 2 sprays (100 mcg) in each nostril by intranasal route once daily   DISP: (1) Bottle with 5 refills  Refilled on 02/23/2021     o Lipitor 20 mg oral tablet   SIG: take 1 tablet (20 mg) by oral route once daily at bedtime for 90 days   DISP: (90) Tablet with 1 refills  Refilled on 02/23/2021     o losartan 100 mg oral tablet   SIG: take 1 tablet (100 mg) by oral route once daily for 90 days   DISP: (90) Tablet with 1 refills  Refilled on 02/23/2021     o Medications have been Reconciled  o Transition of Care or Provider Policy  · Instructions  o Depression Screen completed and scanned into the EMR under the designated folder within the patient's documents.  o Today's PHQ-9 result is 17  o Patient was educated/instructed on their diagnosis, treatment and medications prior to discharge from the clinic today.  · Disposition  o Follow Up in Office in 6 months or sooner if needed  o will contact with diagnostics results            Electronically Signed by: Brian Ellis APRN -Author on February 23, 2021 08:15:29 AM

## 2021-05-15 VITALS
HEIGHT: 63 IN | DIASTOLIC BLOOD PRESSURE: 100 MMHG | HEART RATE: 92 BPM | SYSTOLIC BLOOD PRESSURE: 173 MMHG | TEMPERATURE: 98.5 F | HEART RATE: 123 BPM | SYSTOLIC BLOOD PRESSURE: 150 MMHG | BODY MASS INDEX: 43.77 KG/M2 | OXYGEN SATURATION: 98 % | WEIGHT: 247 LBS | DIASTOLIC BLOOD PRESSURE: 109 MMHG

## 2021-05-15 VITALS
HEIGHT: 63 IN | SYSTOLIC BLOOD PRESSURE: 149 MMHG | DIASTOLIC BLOOD PRESSURE: 86 MMHG | WEIGHT: 246 LBS | OXYGEN SATURATION: 98 % | HEART RATE: 90 BPM | BODY MASS INDEX: 43.59 KG/M2 | RESPIRATION RATE: 19 BRPM

## 2021-05-15 VITALS
SYSTOLIC BLOOD PRESSURE: 160 MMHG | RESPIRATION RATE: 19 BRPM | DIASTOLIC BLOOD PRESSURE: 100 MMHG | OXYGEN SATURATION: 97 % | HEIGHT: 63 IN | HEART RATE: 77 BPM | BODY MASS INDEX: 43.77 KG/M2 | WEIGHT: 247 LBS

## 2021-05-28 VITALS
BODY MASS INDEX: 43.05 KG/M2 | DIASTOLIC BLOOD PRESSURE: 98 MMHG | OXYGEN SATURATION: 97 % | SYSTOLIC BLOOD PRESSURE: 180 MMHG | TEMPERATURE: 98.2 F | WEIGHT: 243 LBS | RESPIRATION RATE: 18 BRPM | HEART RATE: 117 BPM | HEIGHT: 63 IN
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
## 2021-06-08 ENCOUNTER — TELEPHONE (OUTPATIENT)
Dept: FAMILY MEDICINE CLINIC | Facility: CLINIC | Age: 51
End: 2021-06-08

## 2021-06-08 PROBLEM — I10 HYPERTENSION: Status: ACTIVE | Noted: 2021-06-08

## 2021-06-08 PROBLEM — J34.9 SINUS PROBLEM: Status: ACTIVE | Noted: 2021-06-08

## 2021-06-08 PROBLEM — M10.9 GOUT: Status: ACTIVE | Noted: 2021-02-23

## 2021-06-08 PROBLEM — F41.9 ANXIETY: Status: ACTIVE | Noted: 2021-06-08

## 2021-06-08 RX ORDER — ALLOPURINOL 100 MG/1
100 TABLET ORAL DAILY
COMMUNITY
End: 2021-06-08 | Stop reason: SDUPTHER

## 2021-06-08 RX ORDER — CLONIDINE HYDROCHLORIDE 0.1 MG/1
TABLET ORAL
COMMUNITY
Start: 2021-02-22 | End: 2021-06-08 | Stop reason: SDUPTHER

## 2021-06-08 RX ORDER — DILTIAZEM HYDROCHLORIDE 120 MG/1
CAPSULE, EXTENDED RELEASE ORAL
COMMUNITY
End: 2021-08-24

## 2021-06-08 RX ORDER — ALLOPURINOL 100 MG/1
100 TABLET ORAL DAILY
Qty: 30 TABLET | Refills: 1 | Status: SHIPPED | OUTPATIENT
Start: 2021-06-08 | End: 2021-06-09

## 2021-06-08 RX ORDER — CLONIDINE 0.1 MG/24H
PATCH, EXTENDED RELEASE TRANSDERMAL
COMMUNITY
End: 2021-08-24

## 2021-06-08 RX ORDER — CHLORTHALIDONE 25 MG/1
TABLET ORAL
COMMUNITY
End: 2021-08-24

## 2021-06-08 RX ORDER — TURMERIC 400 MG
CAPSULE ORAL
COMMUNITY
End: 2021-08-24

## 2021-06-08 NOTE — TELEPHONE ENCOUNTER
Caller: Katarzyna Norris    Relationship: Self    Best call back number: [phone]    What is the best time to reach you: ANY     Who are you requesting to speak with (clinical staff, provider,  specific staff member):CLINICAL     Do you know the name of the person who called:     What was the call regarding: PATIENT STATES THAT SHE NEEDS HER GOUT MEDICATION CALLED IN BUT NOT CERTAIN WHICH ONE. ALSO STATES THAT SHE WOULD LIKE A 90 DAY SUPPLY CALLED IN.     Do you require a callback: YES    PREFERRED [facility] [address] [place], [place] [address] - [address] [place] [address] [phone] [facility] [phone]

## 2021-06-09 RX ORDER — ALLOPURINOL 100 MG/1
TABLET ORAL
Qty: 30 TABLET | Refills: 1 | Status: SHIPPED | OUTPATIENT
Start: 2021-06-09 | End: 2021-08-12

## 2021-08-12 RX ORDER — ALLOPURINOL 100 MG/1
TABLET ORAL
Qty: 30 TABLET | Refills: 0 | Status: SHIPPED | OUTPATIENT
Start: 2021-08-12 | End: 2021-08-24 | Stop reason: SDUPTHER

## 2021-08-24 ENCOUNTER — OFFICE VISIT (OUTPATIENT)
Dept: FAMILY MEDICINE CLINIC | Facility: CLINIC | Age: 51
End: 2021-08-24

## 2021-08-24 VITALS
BODY MASS INDEX: 46.78 KG/M2 | OXYGEN SATURATION: 100 % | SYSTOLIC BLOOD PRESSURE: 136 MMHG | HEART RATE: 70 BPM | DIASTOLIC BLOOD PRESSURE: 72 MMHG | TEMPERATURE: 98.6 F | WEIGHT: 264 LBS | HEIGHT: 63 IN

## 2021-08-24 DIAGNOSIS — G89.29 CHRONIC PAIN OF LEFT KNEE: ICD-10-CM

## 2021-08-24 DIAGNOSIS — I10 HYPERTENSION, UNSPECIFIED TYPE: Primary | ICD-10-CM

## 2021-08-24 DIAGNOSIS — Z12.11 SCREENING FOR COLON CANCER: ICD-10-CM

## 2021-08-24 DIAGNOSIS — R73.01 IMPAIRED FASTING GLUCOSE: ICD-10-CM

## 2021-08-24 DIAGNOSIS — M25.562 CHRONIC PAIN OF LEFT KNEE: ICD-10-CM

## 2021-08-24 DIAGNOSIS — Z12.31 SCREENING MAMMOGRAM, ENCOUNTER FOR: ICD-10-CM

## 2021-08-24 DIAGNOSIS — M10.9 GOUT, UNSPECIFIED CAUSE, UNSPECIFIED CHRONICITY, UNSPECIFIED SITE: ICD-10-CM

## 2021-08-24 DIAGNOSIS — E78.5 HYPERLIPIDEMIA, UNSPECIFIED HYPERLIPIDEMIA TYPE: ICD-10-CM

## 2021-08-24 DIAGNOSIS — M17.12 PRIMARY OSTEOARTHRITIS OF LEFT KNEE: ICD-10-CM

## 2021-08-24 DIAGNOSIS — D64.9 ANEMIA, UNSPECIFIED TYPE: ICD-10-CM

## 2021-08-24 DIAGNOSIS — M65.311 TRIGGER THUMB OF RIGHT HAND: ICD-10-CM

## 2021-08-24 LAB
ALBUMIN SERPL-MCNC: 4.6 G/DL (ref 3.5–5.2)
ALBUMIN UR-MCNC: 59.5 MG/DL
ALBUMIN/GLOB SERPL: 1.8 G/DL
ALP SERPL-CCNC: 116 U/L (ref 39–117)
ALT SERPL W P-5'-P-CCNC: 13 U/L (ref 1–33)
ANION GAP SERPL CALCULATED.3IONS-SCNC: 12.8 MMOL/L (ref 5–15)
AST SERPL-CCNC: 15 U/L (ref 1–32)
BACTERIA UR QL AUTO: ABNORMAL /HPF
BASOPHILS # BLD AUTO: 0.06 10*3/MM3 (ref 0–0.2)
BASOPHILS NFR BLD AUTO: 0.9 % (ref 0–1.5)
BILIRUB SERPL-MCNC: 0.4 MG/DL (ref 0–1.2)
BILIRUB UR QL STRIP: NEGATIVE
BUN SERPL-MCNC: 51 MG/DL (ref 6–20)
BUN/CREAT SERPL: 21.2 (ref 7–25)
CALCIUM SPEC-SCNC: 10 MG/DL (ref 8.6–10.5)
CHLORIDE SERPL-SCNC: 107 MMOL/L (ref 98–107)
CHOLEST SERPL-MCNC: 131 MG/DL (ref 0–200)
CLARITY UR: CLEAR
CO2 SERPL-SCNC: 20.2 MMOL/L (ref 22–29)
COLOR UR: YELLOW
CREAT SERPL-MCNC: 2.41 MG/DL (ref 0.57–1)
CREAT UR-MCNC: 47 MG/DL
DEPRECATED RDW RBC AUTO: 47.2 FL (ref 37–54)
EOSINOPHIL # BLD AUTO: 0.22 10*3/MM3 (ref 0–0.4)
EOSINOPHIL NFR BLD AUTO: 3.1 % (ref 0.3–6.2)
ERYTHROCYTE [DISTWIDTH] IN BLOOD BY AUTOMATED COUNT: 13.2 % (ref 12.3–15.4)
FERRITIN SERPL-MCNC: 203.9 NG/ML (ref 13–150)
GFR SERPL CREATININE-BSD FRML MDRD: 21 ML/MIN/1.73
GLOBULIN UR ELPH-MCNC: 2.6 GM/DL
GLUCOSE SERPL-MCNC: 105 MG/DL (ref 65–99)
GLUCOSE UR STRIP-MCNC: NEGATIVE MG/DL
HBA1C MFR BLD: 5.3 % (ref 4.8–5.6)
HCT VFR BLD AUTO: 37.2 % (ref 34–46.6)
HDLC SERPL-MCNC: 43 MG/DL (ref 40–60)
HGB BLD-MCNC: 12 G/DL (ref 12–15.9)
HGB UR QL STRIP.AUTO: NEGATIVE
HYALINE CASTS UR QL AUTO: ABNORMAL /LPF
IMM GRANULOCYTES # BLD AUTO: 0.03 10*3/MM3 (ref 0–0.05)
IMM GRANULOCYTES NFR BLD AUTO: 0.4 % (ref 0–0.5)
IRON 24H UR-MRATE: 53 MCG/DL (ref 37–145)
IRON SATN MFR SERPL: 17 % (ref 20–50)
KETONES UR QL STRIP: NEGATIVE
LDLC SERPL CALC-MCNC: 68 MG/DL (ref 0–100)
LDLC/HDLC SERPL: 1.54 {RATIO}
LEUKOCYTE ESTERASE UR QL STRIP.AUTO: NEGATIVE
LYMPHOCYTES # BLD AUTO: 1.39 10*3/MM3 (ref 0.7–3.1)
LYMPHOCYTES NFR BLD AUTO: 19.8 % (ref 19.6–45.3)
MCH RBC QN AUTO: 31.5 PG (ref 26.6–33)
MCHC RBC AUTO-ENTMCNC: 32.3 G/DL (ref 31.5–35.7)
MCV RBC AUTO: 97.6 FL (ref 79–97)
MICROALBUMIN/CREAT UR: 1266 MG/G
MONOCYTES # BLD AUTO: 0.47 10*3/MM3 (ref 0.1–0.9)
MONOCYTES NFR BLD AUTO: 6.7 % (ref 5–12)
NEUTROPHILS NFR BLD AUTO: 4.85 10*3/MM3 (ref 1.7–7)
NEUTROPHILS NFR BLD AUTO: 69.1 % (ref 42.7–76)
NITRITE UR QL STRIP: NEGATIVE
NRBC BLD AUTO-RTO: 0 /100 WBC (ref 0–0.2)
PH UR STRIP.AUTO: 6 [PH] (ref 5–8)
PLATELET # BLD AUTO: 215 10*3/MM3 (ref 140–450)
PMV BLD AUTO: 9.6 FL (ref 6–12)
POTASSIUM SERPL-SCNC: 5.2 MMOL/L (ref 3.5–5.2)
PROT SERPL-MCNC: 7.2 G/DL (ref 6–8.5)
PROT UR QL STRIP: ABNORMAL
RBC # BLD AUTO: 3.81 10*6/MM3 (ref 3.77–5.28)
RBC # UR: ABNORMAL /HPF
REF LAB TEST METHOD: ABNORMAL
SODIUM SERPL-SCNC: 140 MMOL/L (ref 136–145)
SP GR UR STRIP: 1.01 (ref 1–1.03)
SQUAMOUS #/AREA URNS HPF: ABNORMAL /HPF
TIBC SERPL-MCNC: 313 MCG/DL (ref 298–536)
TRANSFERRIN SERPL-MCNC: 210 MG/DL (ref 200–360)
TRIGL SERPL-MCNC: 109 MG/DL (ref 0–150)
TSH SERPL DL<=0.05 MIU/L-ACNC: 1.34 UIU/ML (ref 0.27–4.2)
URATE SERPL-MCNC: 6.5 MG/DL (ref 2.4–5.7)
UROBILINOGEN UR QL STRIP: ABNORMAL
VLDLC SERPL-MCNC: 20 MG/DL (ref 5–40)
WBC # BLD AUTO: 7.02 10*3/MM3 (ref 3.4–10.8)
WBC UR QL AUTO: ABNORMAL /HPF

## 2021-08-24 PROCEDURE — 84443 ASSAY THYROID STIM HORMONE: CPT | Performed by: NURSE PRACTITIONER

## 2021-08-24 PROCEDURE — 81001 URINALYSIS AUTO W/SCOPE: CPT | Performed by: NURSE PRACTITIONER

## 2021-08-24 PROCEDURE — 99214 OFFICE O/P EST MOD 30 MIN: CPT | Performed by: NURSE PRACTITIONER

## 2021-08-24 PROCEDURE — 83540 ASSAY OF IRON: CPT | Performed by: NURSE PRACTITIONER

## 2021-08-24 PROCEDURE — 82570 ASSAY OF URINE CREATININE: CPT | Performed by: NURSE PRACTITIONER

## 2021-08-24 PROCEDURE — 82728 ASSAY OF FERRITIN: CPT | Performed by: NURSE PRACTITIONER

## 2021-08-24 PROCEDURE — 80061 LIPID PANEL: CPT | Performed by: NURSE PRACTITIONER

## 2021-08-24 PROCEDURE — 82043 UR ALBUMIN QUANTITATIVE: CPT | Performed by: NURSE PRACTITIONER

## 2021-08-24 PROCEDURE — 84550 ASSAY OF BLOOD/URIC ACID: CPT | Performed by: NURSE PRACTITIONER

## 2021-08-24 PROCEDURE — 85025 COMPLETE CBC W/AUTO DIFF WBC: CPT | Performed by: NURSE PRACTITIONER

## 2021-08-24 PROCEDURE — 83036 HEMOGLOBIN GLYCOSYLATED A1C: CPT | Performed by: NURSE PRACTITIONER

## 2021-08-24 PROCEDURE — 80053 COMPREHEN METABOLIC PANEL: CPT | Performed by: NURSE PRACTITIONER

## 2021-08-24 PROCEDURE — 84466 ASSAY OF TRANSFERRIN: CPT | Performed by: NURSE PRACTITIONER

## 2021-08-24 RX ORDER — CARVEDILOL 12.5 MG/1
12.5 TABLET ORAL 2 TIMES DAILY WITH MEALS
COMMUNITY
Start: 2021-06-18 | End: 2021-08-24 | Stop reason: SDUPTHER

## 2021-08-24 RX ORDER — ATORVASTATIN CALCIUM 20 MG/1
20 TABLET, FILM COATED ORAL DAILY
Qty: 90 TABLET | Refills: 2 | Status: SHIPPED | OUTPATIENT
Start: 2021-08-24 | End: 2022-01-18 | Stop reason: SDUPTHER

## 2021-08-24 RX ORDER — ATORVASTATIN CALCIUM 20 MG/1
TABLET, FILM COATED ORAL
COMMUNITY
Start: 2021-06-14 | End: 2021-08-24 | Stop reason: SDUPTHER

## 2021-08-24 RX ORDER — ALLOPURINOL 100 MG/1
100 TABLET ORAL DAILY
Qty: 90 TABLET | Refills: 2 | Status: SHIPPED | OUTPATIENT
Start: 2021-08-24 | End: 2021-08-25

## 2021-08-24 RX ORDER — CLONIDINE HYDROCHLORIDE 0.1 MG/1
0.1 TABLET ORAL 2 TIMES DAILY
Qty: 90 TABLET | Refills: 2 | Status: SHIPPED | OUTPATIENT
Start: 2021-08-24 | End: 2021-11-30

## 2021-08-24 RX ORDER — LOSARTAN POTASSIUM 100 MG/1
100 TABLET ORAL DAILY
COMMUNITY
Start: 2021-06-21 | End: 2021-08-24 | Stop reason: SDUPTHER

## 2021-08-24 RX ORDER — CARVEDILOL 12.5 MG/1
12.5 TABLET ORAL 2 TIMES DAILY WITH MEALS
Qty: 90 TABLET | Refills: 2 | Status: SHIPPED | OUTPATIENT
Start: 2021-08-24 | End: 2022-01-18 | Stop reason: SDUPTHER

## 2021-08-24 RX ORDER — LOSARTAN POTASSIUM 100 MG/1
100 TABLET ORAL DAILY
Qty: 90 TABLET | Refills: 2 | Status: SHIPPED | OUTPATIENT
Start: 2021-08-24 | End: 2021-12-23 | Stop reason: HOSPADM

## 2021-08-24 RX ORDER — CLONIDINE HYDROCHLORIDE 0.1 MG/1
0.1 TABLET ORAL
COMMUNITY
End: 2021-08-24 | Stop reason: SDUPTHER

## 2021-08-24 RX ORDER — SODIUM BICARBONATE 650 MG/1
650 TABLET ORAL 3 TIMES DAILY
COMMUNITY
Start: 2021-07-06 | End: 2021-11-30 | Stop reason: SDUPTHER

## 2021-08-24 NOTE — PROGRESS NOTES
Follow Up Office Visit      Patient Name: Katarzyna Norris  : 1970   MRN: [id number]     Chief Complaint:    Chief Complaint   Patient presents with   • Follow-up       History of Present Illness: Katarzyna Norris is a 50 y.o. female who is here today to follow up for   F/U-allergies,CKD,HTN,hyperlipidemia,GOUT,IFG  Mammogram   Pap-  dr lorenzana   Colonoscopy-cologuard   Pt c/o left knee pain, in a brace at this time, feels she needs a knee replacement, OA   C/O-right thumb popping sticking in place, not at this time   Subjective      Review of Systems:   Review of Systems   Constitutional: Positive for fatigue.   HENT: Negative for ear pain, sinus pain and sore throat.    Eyes: Negative for visual disturbance.   Respiratory: Negative for cough and shortness of breath.    Cardiovascular: Negative for chest pain.   Gastrointestinal: Negative for abdominal pain, diarrhea, nausea and vomiting.   Genitourinary: Negative for dysuria.   Musculoskeletal: Positive for arthralgias.   Skin: Negative for rash.   Neurological: Negative for dizziness and headaches.        Past Medical History:   Past Medical History:   Diagnosis Date   • Anxiety    • Gout 2021   • Hypertension    • Sinus problem        Past Surgical History:   Past Surgical History:   Procedure Laterality Date   •  SECTION   and    • CHOLECYSTECTOMY     • COLONOSCOPY     • ENDOSCOPY     • TUBAL ABDOMINAL LIGATION         Family History:   Family History   Problem Relation Age of Onset   • Throat cancer Father 73   • Stroke Other    • Diabetes Other        Social History:   Social History     Socioeconomic History   • Marital status:      Spouse name: Not on file   • Number of children: Not on file   • Years of education: Not on file   • Highest education level: Not on file   Tobacco Use   • Smoking status: Never Smoker   • Smokeless tobacco: Never Used   Substance and Sexual Activity   •  "Alcohol use: Never       Medications:     Current Outpatient Medications:   •  allopurinol (ZYLOPRIM) 100 MG tablet, Take 1 tablet by mouth Daily., Disp: 90 tablet, Rfl: 2  •  Aspirin Buf,CaCarb-MgCarb-MgO, 81 MG tablet, Take 81 mg by mouth Daily., Disp: , Rfl:   •  atorvastatin (LIPITOR) 20 MG tablet, Take 1 tablet by mouth Daily., Disp: 90 tablet, Rfl: 2  •  carvedilol (COREG) 12.5 MG tablet, Take 1 tablet by mouth 2 (Two) Times a Day With Meals., Disp: 90 tablet, Rfl: 2  •  cloNIDine (CATAPRES) 0.1 MG tablet, Take 1 tablet by mouth 2 (Two) Times a Day., Disp: 90 tablet, Rfl: 2  •  losartan (COZAAR) 100 MG tablet, Take 1 tablet by mouth Daily., Disp: 90 tablet, Rfl: 2  •  sodium bicarbonate 650 MG tablet, Take 650 mg by mouth 3 (Three) Times a Day., Disp: , Rfl:     Allergies:   Allergies   Allergen Reactions   • Diltiazem Hcl Unknown - High Severity           PHQ-2 Total Score: 0   PHQ-9 Total Score: 0     Objective     Physical Exam:  Vital Signs:   Vitals:    08/24/21 0753 08/24/21 0832   BP: 148/68 136/72   Pulse: 70    Temp: 98.6 °F (37 °C)    SpO2: 100%    Weight: 120 kg (264 lb)    Height: 160 cm (63\")      Body mass index is 46.77 kg/m².     Physical Exam  HENT:      Head: Normocephalic.      Right Ear: Tympanic membrane normal.      Left Ear: Tympanic membrane normal.      Nose: Nose normal.      Mouth/Throat:      Mouth: Mucous membranes are moist.      Pharynx: Oropharynx is clear.   Eyes:      Conjunctiva/sclera: Conjunctivae normal.      Pupils: Pupils are equal, round, and reactive to light.   Neck:      Vascular: No carotid bruit.   Cardiovascular:      Rate and Rhythm: Normal rate and regular rhythm.      Heart sounds: Normal heart sounds. No murmur heard.     Pulmonary:      Effort: Pulmonary effort is normal.      Breath sounds: Normal breath sounds.   Abdominal:      General: Bowel sounds are normal.      Palpations: Abdomen is soft.   Musculoskeletal:      Cervical back: Neck supple.      Right " lower leg: No edema.      Left lower leg: No edema.      Comments: LEFT KNEE BRACE IN PLACE, DECREASED ROM, CREPITUS    Skin:     General: Skin is warm and dry.      Capillary Refill: Capillary refill takes less than 2 seconds.   Neurological:      Mental Status: She is alert and oriented to person, place, and time.   Psychiatric:         Mood and Affect: Mood normal.         Behavior: Behavior normal.             Assessment / Plan      Assessment/Plan:   Diagnoses and all orders for this visit:    1. Hypertension, unspecified type (Primary)  -     Comprehensive Metabolic Panel  -     CBC Auto Differential  -     Hemoglobin A1c  -     TSH  -     Urinalysis With Culture If Indicated - Urine, Clean Catch  -     Microalbumin / Creatinine Urine Ratio - Urine, Clean Catch  -     Lipid Panel  -     Iron Profile  -     Ferritin  -     Uric Acid    2. Gout, unspecified cause, unspecified chronicity, unspecified site  -     Comprehensive Metabolic Panel  -     CBC Auto Differential  -     Hemoglobin A1c  -     TSH  -     Urinalysis With Culture If Indicated - Urine, Clean Catch  -     Microalbumin / Creatinine Urine Ratio - Urine, Clean Catch  -     Lipid Panel  -     Iron Profile  -     Ferritin  -     Uric Acid    3. Chronic pain of left knee  -     Comprehensive Metabolic Panel  -     CBC Auto Differential  -     Hemoglobin A1c  -     TSH  -     Urinalysis With Culture If Indicated - Urine, Clean Catch  -     Microalbumin / Creatinine Urine Ratio - Urine, Clean Catch  -     Lipid Panel  -     Iron Profile  -     Ferritin  -     Uric Acid  -     Ambulatory Referral to Orthopedic Surgery    4. Primary osteoarthritis of left knee  -     Comprehensive Metabolic Panel  -     CBC Auto Differential  -     Hemoglobin A1c  -     TSH  -     Urinalysis With Culture If Indicated - Urine, Clean Catch  -     Microalbumin / Creatinine Urine Ratio - Urine, Clean Catch  -     Lipid Panel  -     Iron Profile  -     Ferritin  -     Uric  Acid  -     Ambulatory Referral to Orthopedic Surgery    5. Hyperlipidemia, unspecified hyperlipidemia type  -     Comprehensive Metabolic Panel  -     CBC Auto Differential  -     Hemoglobin A1c  -     TSH  -     Urinalysis With Culture If Indicated - Urine, Clean Catch  -     Microalbumin / Creatinine Urine Ratio - Urine, Clean Catch  -     Lipid Panel  -     Iron Profile  -     Ferritin  -     Uric Acid    6. Impaired fasting glucose  -     Comprehensive Metabolic Panel  -     CBC Auto Differential  -     Hemoglobin A1c  -     TSH  -     Urinalysis With Culture If Indicated - Urine, Clean Catch  -     Microalbumin / Creatinine Urine Ratio - Urine, Clean Catch  -     Lipid Panel  -     Iron Profile  -     Ferritin  -     Uric Acid    7. Anemia, unspecified type  -     Comprehensive Metabolic Panel  -     CBC Auto Differential  -     Hemoglobin A1c  -     TSH  -     Urinalysis With Culture If Indicated - Urine, Clean Catch  -     Microalbumin / Creatinine Urine Ratio - Urine, Clean Catch  -     Lipid Panel  -     Iron Profile  -     Ferritin  -     Uric Acid    8. Screening mammogram, encounter for  -     Comprehensive Metabolic Panel  -     CBC Auto Differential  -     Hemoglobin A1c  -     TSH  -     Urinalysis With Culture If Indicated - Urine, Clean Catch  -     Microalbumin / Creatinine Urine Ratio - Urine, Clean Catch  -     Lipid Panel  -     Iron Profile  -     Ferritin  -     Uric Acid  -     Mammo Screening Digital Tomosynthesis Bilateral With CAD; Future    9. Screening for colon cancer  -     Comprehensive Metabolic Panel  -     CBC Auto Differential  -     Hemoglobin A1c  -     TSH  -     Urinalysis With Culture If Indicated - Urine, Clean Catch  -     Microalbumin / Creatinine Urine Ratio - Urine, Clean Catch  -     Lipid Panel  -     Iron Profile  -     Ferritin  -     Uric Acid  -     Cologuard - Stool, Per Rectum; Future    10. Trigger thumb of right hand    Other orders  -     atorvastatin  "(LIPITOR) 20 MG tablet; Take 1 tablet by mouth Daily.  Dispense: 90 tablet; Refill: 2  -     losartan (COZAAR) 100 MG tablet; Take 1 tablet by mouth Daily.  Dispense: 90 tablet; Refill: 2  -     carvedilol (COREG) 12.5 MG tablet; Take 1 tablet by mouth 2 (Two) Times a Day With Meals.  Dispense: 90 tablet; Refill: 2  -     cloNIDine (CATAPRES) 0.1 MG tablet; Take 1 tablet by mouth 2 (Two) Times a Day.  Dispense: 90 tablet; Refill: 2  -     allopurinol (ZYLOPRIM) 100 MG tablet; Take 1 tablet by mouth Daily.  Dispense: 90 tablet; Refill: 2       htn will recheck manually blood pressure, states she has not taken her medication this am manual recheck controlled at this time will provide refills of medication exercise daily as tolerated decrease salt intake work on weight loss  Hyperlipidemia we will obtain lipid panel to monitor recommend decrease cholesterol take increased fiber in diet again exercise daily  Gout no recent flares will obtain uric acid level to monitor current allopurinol dose and call with results and further instructions  Impaired fasting glucose obtain hemoglobin A1c to monitor recommend decrease carb intake again exercise and weight loss  Left knee pain will provide referral to orthopedic surgeon per patient request may take Tylenol over-the-counter and use Voltaren gel OTC  We will provide order for screening mammogram and Cologuard per patient request  Trigger finger of thumb no issues at present may use (gel if symptoms persist discussed injection versus referral to hand surgeon        Follow Up:   Return in about 6 months (around 2/24/2022).    Frandy Ellis, MARICRUZ    \"Please note that portions of this note were completed with a voice recognition program.\"    "

## 2021-08-25 RX ORDER — ALLOPURINOL 300 MG/1
300 TABLET ORAL DAILY
Qty: 90 TABLET | Refills: 1 | Status: SHIPPED | OUTPATIENT
Start: 2021-08-25 | End: 2021-12-06 | Stop reason: DRUGHIGH

## 2021-08-27 ENCOUNTER — PREP FOR SURGERY (OUTPATIENT)
Dept: OTHER | Facility: HOSPITAL | Age: 51
End: 2021-08-27

## 2021-08-27 ENCOUNTER — TELEPHONE (OUTPATIENT)
Dept: FAMILY MEDICINE CLINIC | Facility: CLINIC | Age: 51
End: 2021-08-27

## 2021-08-27 ENCOUNTER — OFFICE VISIT (OUTPATIENT)
Dept: ORTHOPEDIC SURGERY | Facility: CLINIC | Age: 51
End: 2021-08-27

## 2021-08-27 VITALS — HEIGHT: 63 IN | BODY MASS INDEX: 46.87 KG/M2 | WEIGHT: 264.5 LBS | RESPIRATION RATE: 14 BRPM

## 2021-08-27 DIAGNOSIS — R79.89 ELEVATED FERRITIN: ICD-10-CM

## 2021-08-27 DIAGNOSIS — M17.12 PRIMARY OSTEOARTHRITIS OF LEFT KNEE: ICD-10-CM

## 2021-08-27 DIAGNOSIS — E79.0 ELEVATED URIC ACID IN BLOOD: Primary | ICD-10-CM

## 2021-08-27 DIAGNOSIS — M17.12 PRIMARY OSTEOARTHRITIS OF LEFT KNEE: Primary | ICD-10-CM

## 2021-08-27 DIAGNOSIS — M25.562 LEFT KNEE PAIN, UNSPECIFIED CHRONICITY: Primary | ICD-10-CM

## 2021-08-27 DIAGNOSIS — R73.09 ELEVATED GLUCOSE: ICD-10-CM

## 2021-08-27 PROCEDURE — 99203 OFFICE O/P NEW LOW 30 MIN: CPT | Performed by: ORTHOPAEDIC SURGERY

## 2021-08-27 NOTE — TELEPHONE ENCOUNTER
----- Message from MARICRUZ Blackwood sent at 8/25/2021  4:35 PM EDT -----  Chronic kidney disease stable continue to follow-up with nephrology uric acid level elevated will increase allopurinol 300 mg daily prescription sent to pharmacy recheck uric acid level in 30 days blood sugar elevated obtain hemoglobin A1c not currently anemic ferritin elevated iron level normal stop iron supplementation at this time recheck ferritin level in 30 days increase water intake goal 10 to 12 glasses/day
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
## 2021-08-31 ENCOUNTER — PREP FOR SURGERY (OUTPATIENT)
Dept: OTHER | Facility: HOSPITAL | Age: 51
End: 2021-08-31

## 2021-08-31 DIAGNOSIS — M17.12 OSTEOARTHRITIS OF LEFT KNEE: Primary | ICD-10-CM

## 2021-08-31 RX ORDER — CEFAZOLIN SODIUM IN 0.9 % NACL 3 G/100 ML
3 INTRAVENOUS SOLUTION, PIGGYBACK (ML) INTRAVENOUS ONCE
Status: CANCELLED | OUTPATIENT
Start: 2021-08-31 | End: 2021-08-31

## 2021-08-31 RX ORDER — TRANEXAMIC ACID 10 MG/ML
1000 INJECTION, SOLUTION INTRAVENOUS ONCE
Status: CANCELLED | OUTPATIENT
Start: 2021-08-31 | End: 2021-08-31

## 2021-09-03 DIAGNOSIS — Z96.652 AFTERCARE FOLLOWING LEFT KNEE JOINT REPLACEMENT SURGERY: Primary | ICD-10-CM

## 2021-09-03 DIAGNOSIS — Z47.1 AFTERCARE FOLLOWING LEFT KNEE JOINT REPLACEMENT SURGERY: Primary | ICD-10-CM

## 2021-09-24 ENCOUNTER — OFFICE VISIT (OUTPATIENT)
Dept: FAMILY MEDICINE CLINIC | Facility: CLINIC | Age: 51
End: 2021-09-24

## 2021-09-24 ENCOUNTER — HOSPITAL ENCOUNTER (OUTPATIENT)
Dept: GENERAL RADIOLOGY | Facility: HOSPITAL | Age: 51
Discharge: HOME | End: 2021-09-24
Admitting: NURSE PRACTITIONER

## 2021-09-24 VITALS
HEART RATE: 87 BPM | TEMPERATURE: 99.2 F | DIASTOLIC BLOOD PRESSURE: 62 MMHG | SYSTOLIC BLOOD PRESSURE: 141 MMHG | BODY MASS INDEX: 46.42 KG/M2 | HEIGHT: 63 IN | OXYGEN SATURATION: 96 % | WEIGHT: 262 LBS

## 2021-09-24 DIAGNOSIS — M79.671 RIGHT FOOT PAIN: ICD-10-CM

## 2021-09-24 DIAGNOSIS — M79.671 RIGHT FOOT PAIN: Primary | ICD-10-CM

## 2021-09-24 PROCEDURE — 99213 OFFICE O/P EST LOW 20 MIN: CPT | Performed by: NURSE PRACTITIONER

## 2021-09-24 PROCEDURE — 73630 X-RAY EXAM OF FOOT: CPT

## 2021-09-24 NOTE — PROGRESS NOTES
Follow Up Office Visit      Patient Name: Katarzyna Norris  : 1970   MRN: [id number]     Chief Complaint:    Chief Complaint   Patient presents with   • Foot Pain     right       History of Present Illness: Katarzyna Norris is a 50 y.o. female who is here today for right foot pain    Patient dropped a car ramp on her foot 3-4 month ago    Great toe goes numb and foot is bruised and swollen     Subjective      Review of Systems:   Review of Systems   Constitutional: Negative for chills and fever.   Respiratory: Negative for cough.    Cardiovascular: Negative for chest pain.   Musculoskeletal: Positive for arthralgias.   Skin: Negative for rash.   Neurological: Positive for numbness.        Past Medical History:   Past Medical History:   Diagnosis Date   • Anxiety    • Gout 2021   • Hypertension    • Sinus problem        Past Surgical History:   Past Surgical History:   Procedure Laterality Date   •  SECTION   and    • CHOLECYSTECTOMY     • COLONOSCOPY     • ENDOSCOPY     • TUBAL ABDOMINAL LIGATION         Family History:   Family History   Problem Relation Age of Onset   • Throat cancer Father 73   • Stroke Other    • Diabetes Other        Social History:   Social History     Socioeconomic History   • Marital status:      Spouse name: Not on file   • Number of children: Not on file   • Years of education: Not on file   • Highest education level: Not on file   Tobacco Use   • Smoking status: Former Smoker   • Smokeless tobacco: Never Used   Substance and Sexual Activity   • Alcohol use: Never       Medications:     Current Outpatient Medications:   •  allopurinol (ZYLOPRIM) 300 MG tablet, Take 1 tablet by mouth Daily., Disp: 90 tablet, Rfl: 1  •  Aspirin Buf,CaCarb-MgCarb-MgO, 81 MG tablet, Take 81 mg by mouth Daily., Disp: , Rfl:   •  atorvastatin (LIPITOR) 20 MG tablet, Take 1 tablet by mouth Daily., Disp: 90 tablet, Rfl: 2  •  carvedilol (COREG) 12.5  "MG tablet, Take 1 tablet by mouth 2 (Two) Times a Day With Meals., Disp: 90 tablet, Rfl: 2  •  cloNIDine (CATAPRES) 0.1 MG tablet, Take 1 tablet by mouth 2 (Two) Times a Day., Disp: 90 tablet, Rfl: 2  •  losartan (COZAAR) 100 MG tablet, Take 1 tablet by mouth Daily., Disp: 90 tablet, Rfl: 2  •  sodium bicarbonate 650 MG tablet, Take 650 mg by mouth 3 (Three) Times a Day., Disp: , Rfl:     Allergies:   Allergies   Allergen Reactions   • Diltiazem Hcl Unknown - High Severity         Objective     Physical Exam:  Vital Signs:   Vitals:    09/24/21 1111   BP: 141/62   Pulse: 87   Temp: 99.2 °F (37.3 °C)   SpO2: 96%   Weight: 119 kg (262 lb)   Height: 160 cm (63\")     Body mass index is 46.41 kg/m².     Physical Exam  Cardiovascular:      Rate and Rhythm: Normal rate and regular rhythm.      Heart sounds: Normal heart sounds. No murmur heard.     Pulmonary:      Effort: Pulmonary effort is normal.      Breath sounds: Normal breath sounds.   Musculoskeletal:         General: Tenderness and signs of injury present.      Comments: Right lateral foot and dorsal surface swollen, tender to palpation, no erythema   Skin:     General: Skin is warm and dry.      Capillary Refill: Capillary refill takes less than 2 seconds.   Neurological:      Mental Status: She is alert.             Assessment / Plan      Assessment/Plan:   Diagnoses and all orders for this visit:    1. Right foot pain (Primary)  -     XR Foot 3+ View Right; Future         Will obtain xray will  call with results and further instructions  Ice, elevate and may use ace wrap for comfort    Follow Up:   Return if symptoms worsen or fail to improve.    MARICRUZ Ball      Please note that portions of this note may have been completed with a voice recognition program. Efforts were made to edit the dictations, but occasionally words are mistranscribed.    "

## 2021-09-27 DIAGNOSIS — M79.671 RIGHT FOOT PAIN: ICD-10-CM

## 2021-09-27 DIAGNOSIS — M19.171 POST-TRAUMATIC OSTEOARTHRITIS OF RIGHT FOOT: Primary | ICD-10-CM

## 2021-10-04 ENCOUNTER — APPOINTMENT (OUTPATIENT)
Dept: PREADMISSION TESTING | Facility: HOSPITAL | Age: 51
End: 2021-10-04

## 2021-10-15 ENCOUNTER — APPOINTMENT (OUTPATIENT)
Dept: LAB | Facility: HOSPITAL | Age: 51
End: 2021-10-15

## 2021-11-02 ENCOUNTER — OFFICE VISIT (OUTPATIENT)
Dept: FAMILY MEDICINE CLINIC | Facility: CLINIC | Age: 51
End: 2021-11-02

## 2021-11-02 VITALS
HEART RATE: 74 BPM | BODY MASS INDEX: 47.13 KG/M2 | OXYGEN SATURATION: 99 % | DIASTOLIC BLOOD PRESSURE: 90 MMHG | HEIGHT: 63 IN | SYSTOLIC BLOOD PRESSURE: 143 MMHG | WEIGHT: 266 LBS | TEMPERATURE: 98.3 F

## 2021-11-02 DIAGNOSIS — J01.00 ACUTE NON-RECURRENT MAXILLARY SINUSITIS: Primary | ICD-10-CM

## 2021-11-02 PROCEDURE — 99213 OFFICE O/P EST LOW 20 MIN: CPT | Performed by: FAMILY MEDICINE

## 2021-11-02 RX ORDER — CHLORTHALIDONE 25 MG/1
12.5 TABLET ORAL
COMMUNITY
End: 2021-12-23 | Stop reason: HOSPADM

## 2021-11-02 RX ORDER — AMOXICILLIN AND CLAVULANATE POTASSIUM 875; 125 MG/1; MG/1
1 TABLET, FILM COATED ORAL EVERY 12 HOURS
Qty: 20 TABLET | Refills: 0 | Status: SHIPPED | OUTPATIENT
Start: 2021-11-02 | End: 2021-11-12

## 2021-11-02 NOTE — ASSESSMENT & PLAN NOTE
This may be just an acute maxillary sinusitis versus dental issues.  If that is so she will need to follow back up with a dentist have dental x-rays and most likely a root canal.  In the meantime we will cover her sinuses with a course of Augmentin.

## 2021-11-02 NOTE — PROGRESS NOTES
Chief Complaint   Patient presents with   • Earache     pt is having right ear pain since last week         Subjective     Katarzyna Norris  has a past medical history of Anxiety, Gout (02/23/2021), Hypertension, and Sinus problem.    Earache-she states she started with a right earache about 2 weeks ago.  She knew she had some dental issues and went to the dentist and had that fixed.  Since then her right cheek has become swollen and painful.  Since she has had her dental work she has had a lot of dental sensitivity.      PHQ-2 Depression Screening  Little interest or pleasure in doing things?     Feeling down, depressed, or hopeless?     PHQ-2 Total Score     PHQ-9 Depression Screening  Little interest or pleasure in doing things?     Feeling down, depressed, or hopeless?     Trouble falling or staying asleep, or sleeping too much?     Feeling tired or having little energy?     Poor appetite or overeating?     Feeling bad about yourself - or that you are a failure or have let yourself or your family down?     Trouble concentrating on things, such as reading the newspaper or watching television?     Moving or speaking so slowly that other people could have noticed? Or the opposite - being so fidgety or restless that you have been moving around a lot more than usual?     Thoughts that you would be better off dead, or of hurting yourself in some way?     PHQ-9 Total Score     If you checked off any problems, how difficult have these problems made it for you to do your work, take care of things at home, or get along with other people?       Allergies   Allergen Reactions   • Diltiazem Hcl Unknown - High Severity       Prior to Admission medications    Medication Sig Start Date End Date Taking? Authorizing Provider   allopurinol (ZYLOPRIM) 300 MG tablet Take 1 tablet by mouth Daily. 8/25/21  Yes Brian Ellis APRN   Aspirin Buf,CaCarb-MgCarb-MgO, 81 MG tablet Take 81 mg by mouth Daily.   Yes Provider,  MD Al   atorvastatin (LIPITOR) 20 MG tablet Take 1 tablet by mouth Daily. 21  Yes Brian Ellis APRN   carvedilol (COREG) 12.5 MG tablet Take 1 tablet by mouth 2 (Two) Times a Day With Meals. 21  Yes Brian Ellis APRN   chlorthalidone (HYGROTON) 25 MG tablet Take 25 mg by mouth Daily.   Yes Provider, MD Al   cloNIDine (CATAPRES) 0.1 MG tablet Take 1 tablet by mouth 2 (Two) Times a Day. 21  Yes Brian Ellis APRN   losartan (COZAAR) 100 MG tablet Take 1 tablet by mouth Daily. 21  Yes Brian Ellis APRN   sodium bicarbonate 650 MG tablet Take 650 mg by mouth 3 (Three) Times a Day. 21  Yes Provider, MD Al        Patient Active Problem List   Diagnosis   • Gout   • Anxiety   • Hypertension   • Sinus problem   • Chronic pain of left knee   • Primary osteoarthritis of left knee   • Anemia   • Impaired fasting glucose   • Hyperlipidemia   • Screening mammogram, encounter for   • Osteoarthritis of left knee   • Acute non-recurrent maxillary sinusitis        Past Surgical History:   Procedure Laterality Date   •  SECTION   and    • CHOLECYSTECTOMY     • COLONOSCOPY     • ENDOSCOPY     • TUBAL ABDOMINAL LIGATION         Social History     Socioeconomic History   • Marital status:    Tobacco Use   • Smoking status: Former Smoker   • Smokeless tobacco: Never Used   Substance and Sexual Activity   • Alcohol use: Never       Family History   Problem Relation Age of Onset   • Throat cancer Father 73   • Stroke Other    • Diabetes Other        Family history, surgical history, past medical history, Allergies and med's reviewed with patient today and updated in [facility] EMR.     ROS:  Review of Systems   Constitutional: Negative for chills and fever.   HENT: Positive for ear pain and sinus pressure. Negative for congestion, postnasal drip and rhinorrhea.         (+) Dental sensitivity   Respiratory:  "Negative for cough.        OBJECTIVE:  Vitals:    11/02/21 1209   BP: 143/90   BP Location: Right arm   Patient Position: Sitting   Cuff Size: Large Adult   Pulse: 74   Temp: 98.3 °F (36.8 °C)   SpO2: 99%   Weight: 121 kg (266 lb)   Height: 160 cm (63\")     No exam data present   Body mass index is 47.12 kg/m².  No LMP recorded.    Physical Exam  Vitals and nursing note reviewed.   Constitutional:       General: She is not in acute distress.     Appearance: Normal appearance. She is obese.   HENT:      Head: Normocephalic.      Right Ear: Tympanic membrane, ear canal and external ear normal.      Left Ear: Tympanic membrane, ear canal and external ear normal.      Nose:      Right Sinus: Maxillary sinus tenderness present.      Mouth/Throat:      Mouth: Mucous membranes are moist.      Pharynx: Oropharynx is clear.   Eyes:      General: No scleral icterus.     Conjunctiva/sclera: Conjunctivae normal.      Pupils: Pupils are equal, round, and reactive to light.   Cardiovascular:      Rate and Rhythm: Normal rate and regular rhythm.      Pulses: Normal pulses.      Heart sounds: Normal heart sounds. No murmur heard.      Pulmonary:      Effort: Pulmonary effort is normal.      Breath sounds: Normal breath sounds. No wheezing, rhonchi or rales.   Musculoskeletal:      Cervical back: No rigidity or tenderness.   Lymphadenopathy:      Cervical: No cervical adenopathy.   Skin:     General: Skin is warm and dry.      Coloration: Skin is not jaundiced.      Findings: No rash.   Neurological:      General: No focal deficit present.      Mental Status: She is alert and oriented to person, place, and time.      Gait: Gait normal.   Psychiatric:         Mood and Affect: Mood normal.         Thought Content: Thought content normal.         Judgment: Judgment normal.         Procedures    No visits with results within 30 Day(s) from this visit.   Latest known visit with results is:   Office Visit on 08/24/2021   Component Date " Value Ref Range Status   • Glucose 08/24/2021 105* 65 - 99 mg/dL Final   • BUN 08/24/2021 51* 6 - 20 mg/dL Final   • Creatinine 08/24/2021 2.41* 0.57 - 1.00 mg/dL Final   • Sodium 08/24/2021 140  136 - 145 mmol/L Final   • Potassium 08/24/2021 5.2  3.5 - 5.2 mmol/L Final   • Chloride 08/24/2021 107  98 - 107 mmol/L Final   • CO2 08/24/2021 20.2* 22.0 - 29.0 mmol/L Final   • Calcium 08/24/2021 10.0  8.6 - 10.5 mg/dL Final   • Total Protein 08/24/2021 7.2  6.0 - 8.5 g/dL Final   • Albumin 08/24/2021 4.60  3.50 - 5.20 g/dL Final   • ALT (SGPT) 08/24/2021 13  1 - 33 U/L Final   • AST (SGOT) 08/24/2021 15  1 - 32 U/L Final   • Alkaline Phosphatase 08/24/2021 116  39 - 117 U/L Final   • Total Bilirubin 08/24/2021 0.4  0.0 - 1.2 mg/dL Final   • eGFR Non African Amer 08/24/2021 21* >60 mL/min/1.73 Final   • Globulin 08/24/2021 2.6  gm/dL Final   • A/G Ratio 08/24/2021 1.8  g/dL Final   • BUN/Creatinine Ratio 08/24/2021 21.2  7.0 - 25.0 Final   • Anion Gap 08/24/2021 12.8  5.0 - 15.0 mmol/L Final   • WBC 08/24/2021 7.02  3.40 - 10.80 10*3/mm3 Final   • RBC 08/24/2021 3.81  3.77 - 5.28 10*6/mm3 Final   • Hemoglobin 08/24/2021 12.0  12.0 - 15.9 g/dL Final   • Hematocrit 08/24/2021 37.2  34.0 - 46.6 % Final   • MCV 08/24/2021 97.6* 79.0 - 97.0 fL Final   • MCH 08/24/2021 31.5  26.6 - 33.0 pg Final   • MCHC 08/24/2021 32.3  31.5 - 35.7 g/dL Final   • RDW 08/24/2021 13.2  12.3 - 15.4 % Final   • RDW-SD 08/24/2021 47.2  37.0 - 54.0 fl Final   • MPV 08/24/2021 9.6  6.0 - 12.0 fL Final   • Platelets 08/24/2021 215  140 - 450 10*3/mm3 Final   • Neutrophil % 08/24/2021 69.1  42.7 - 76.0 % Final   • Lymphocyte % 08/24/2021 19.8  19.6 - 45.3 % Final   • Monocyte % 08/24/2021 6.7  5.0 - 12.0 % Final   • Eosinophil % 08/24/2021 3.1  0.3 - 6.2 % Final   • Basophil % 08/24/2021 0.9  0.0 - 1.5 % Final   • Immature Grans % 08/24/2021 0.4  0.0 - 0.5 % Final   • Neutrophils, Absolute 08/24/2021 4.85  1.70 - 7.00 10*3/mm3 Final   • Lymphocytes,  Absolute 08/24/2021 1.39  0.70 - 3.10 10*3/mm3 Final   • Monocytes, Absolute 08/24/2021 0.47  0.10 - 0.90 10*3/mm3 Final   • Eosinophils, Absolute 08/24/2021 0.22  0.00 - 0.40 10*3/mm3 Final   • Basophils, Absolute 08/24/2021 0.06  0.00 - 0.20 10*3/mm3 Final   • Immature Grans, Absolute 08/24/2021 0.03  0.00 - 0.05 10*3/mm3 Final   • nRBC 08/24/2021 0.0  0.0 - 0.2 /100 WBC Final   • Hemoglobin A1C 08/24/2021 5.30  4.80 - 5.60 % Final   • TSH 08/24/2021 1.340  0.270 - 4.200 uIU/mL Final   • Color, UA 08/24/2021 Yellow  Yellow, Straw Final   • Appearance, UA 08/24/2021 Clear  Clear Final   • pH, UA 08/24/2021 6.0  5.0 - 8.0 Final   • Specific Gravity, UA 08/24/2021 1.012  1.005 - 1.030 Final   • Glucose, UA 08/24/2021 Negative  Negative Final   • Ketones, UA 08/24/2021 Negative  Negative Final   • Bilirubin, UA 08/24/2021 Negative  Negative Final   • Blood, UA 08/24/2021 Negative  Negative Final   • Protein, UA 08/24/2021 100 mg/dL (2+)* Negative Final   • Leuk Esterase, UA 08/24/2021 Negative  Negative Final   • Nitrite, UA 08/24/2021 Negative  Negative Final   • Urobilinogen, UA 08/24/2021 0.2 E.U./dL  0.2 - 1.0 E.U./dL Final   • Microalbumin/Creatinine Ratio 08/24/2021 1,266.0  mg/g Final   • Creatinine, Urine 08/24/2021 47.0  mg/dL Final   • Microalbumin, Urine 08/24/2021 59.5  mg/dL Final   • Total Cholesterol 08/24/2021 131  0 - 200 mg/dL Final   • Triglycerides 08/24/2021 109  0 - 150 mg/dL Final   • HDL Cholesterol 08/24/2021 43  40 - 60 mg/dL Final   • LDL Cholesterol  08/24/2021 68  0 - 100 mg/dL Final   • VLDL Cholesterol 08/24/2021 20  5 - 40 mg/dL Final   • LDL/HDL Ratio 08/24/2021 1.54   Final   • Iron 08/24/2021 53  37 - 145 mcg/dL Final   • Iron Saturation 08/24/2021 17* 20 - 50 % Final   • Transferrin 08/24/2021 210  200 - 360 mg/dL Final   • TIBC 08/24/2021 313  298 - 536 mcg/dL Final   • Ferritin 08/24/2021 203.90* 13.00 - 150.00 ng/mL Final   • Uric Acid 08/24/2021 6.5* 2.4 - 5.7 mg/dL Final   •  RBC, UA 08/24/2021 0-2* None Seen /HPF Final   • WBC, UA 08/24/2021 0-2* None Seen /HPF Final   • Bacteria, UA 08/24/2021 None Seen  None Seen /HPF Final   • Squamous Epithelial Cells, UA 08/24/2021 3-6* None Seen, 0-2 /HPF Final   • Hyaline Casts, UA 08/24/2021 None Seen  None Seen /LPF Final   • Methodology 08/24/2021 Automated Microscopy   Final       ASSESSMENT/ PLAN:    There are no diagnoses linked to this encounter.    Orders Placed Today:     No orders of the defined types were placed in this encounter.       Management Plan:     An After Visit Summary was printed and given to the patient at discharge.    Follow-up: Return if symptoms worsen or fail to improve.    Harley Santana DO 11/2/2021 12:44 EDT  This note was electronically signed.

## 2021-11-08 ENCOUNTER — HOSPITAL ENCOUNTER (EMERGENCY)
Facility: HOSPITAL | Age: 51
Discharge: HOME | End: 2021-11-08
Attending: EMERGENCY MEDICINE | Admitting: EMERGENCY MEDICINE

## 2021-11-08 VITALS
DIASTOLIC BLOOD PRESSURE: 82 MMHG | HEIGHT: 63 IN | OXYGEN SATURATION: 99 % | BODY MASS INDEX: 47.42 KG/M2 | SYSTOLIC BLOOD PRESSURE: 148 MMHG | HEART RATE: 82 BPM | RESPIRATION RATE: 18 BRPM | TEMPERATURE: 97.9 F | WEIGHT: 267.64 LBS

## 2021-11-08 DIAGNOSIS — N28.9 RENAL INSUFFICIENCY: Primary | ICD-10-CM

## 2021-11-08 DIAGNOSIS — K02.9 DENTAL CARIES: ICD-10-CM

## 2021-11-08 DIAGNOSIS — I15.9 SECONDARY HYPERTENSION: ICD-10-CM

## 2021-11-08 LAB
ALBUMIN SERPL-MCNC: 4.4 G/DL (ref 3.5–5.2)
ALBUMIN/GLOB SERPL: 1.5 G/DL
ALP SERPL-CCNC: 87 U/L (ref 39–117)
ALT SERPL W P-5'-P-CCNC: 28 U/L (ref 1–33)
ANION GAP SERPL CALCULATED.3IONS-SCNC: 13.6 MMOL/L (ref 5–15)
AST SERPL-CCNC: 26 U/L (ref 1–32)
BASOPHILS # BLD AUTO: 0.05 10*3/MM3 (ref 0–0.2)
BASOPHILS NFR BLD AUTO: 0.6 % (ref 0–1.5)
BILIRUB SERPL-MCNC: 0.5 MG/DL (ref 0–1.2)
BUN SERPL-MCNC: 50 MG/DL (ref 6–20)
BUN/CREAT SERPL: 19.5 (ref 7–25)
CALCIUM SPEC-SCNC: 9.7 MG/DL (ref 8.6–10.5)
CHLORIDE SERPL-SCNC: 102 MMOL/L (ref 98–107)
CO2 SERPL-SCNC: 22.4 MMOL/L (ref 22–29)
CREAT SERPL-MCNC: 2.56 MG/DL (ref 0.57–1)
DEPRECATED RDW RBC AUTO: 47.5 FL (ref 37–54)
EOSINOPHIL # BLD AUTO: 0.39 10*3/MM3 (ref 0–0.4)
EOSINOPHIL NFR BLD AUTO: 4.5 % (ref 0.3–6.2)
ERYTHROCYTE [DISTWIDTH] IN BLOOD BY AUTOMATED COUNT: 13.2 % (ref 12.3–15.4)
GFR SERPL CREATININE-BSD FRML MDRD: 20 ML/MIN/1.73
GLOBULIN UR ELPH-MCNC: 2.9 GM/DL
GLUCOSE SERPL-MCNC: 117 MG/DL (ref 65–99)
HCT VFR BLD AUTO: 37.9 % (ref 34–46.6)
HGB BLD-MCNC: 12.5 G/DL (ref 12–15.9)
IMM GRANULOCYTES # BLD AUTO: 0.04 10*3/MM3 (ref 0–0.05)
IMM GRANULOCYTES NFR BLD AUTO: 0.5 % (ref 0–0.5)
LYMPHOCYTES # BLD AUTO: 1.97 10*3/MM3 (ref 0.7–3.1)
LYMPHOCYTES NFR BLD AUTO: 22.7 % (ref 19.6–45.3)
MCH RBC QN AUTO: 31.8 PG (ref 26.6–33)
MCHC RBC AUTO-ENTMCNC: 33 G/DL (ref 31.5–35.7)
MCV RBC AUTO: 96.4 FL (ref 79–97)
MONOCYTES # BLD AUTO: 0.56 10*3/MM3 (ref 0.1–0.9)
MONOCYTES NFR BLD AUTO: 6.4 % (ref 5–12)
NEUTROPHILS NFR BLD AUTO: 5.68 10*3/MM3 (ref 1.7–7)
NEUTROPHILS NFR BLD AUTO: 65.3 % (ref 42.7–76)
NRBC BLD AUTO-RTO: 0 /100 WBC (ref 0–0.2)
PLATELET # BLD AUTO: 200 10*3/MM3 (ref 140–450)
PMV BLD AUTO: 8.6 FL (ref 6–12)
POTASSIUM SERPL-SCNC: 4.7 MMOL/L (ref 3.5–5.2)
PROT SERPL-MCNC: 7.3 G/DL (ref 6–8.5)
RBC # BLD AUTO: 3.93 10*6/MM3 (ref 3.77–5.28)
SODIUM SERPL-SCNC: 138 MMOL/L (ref 136–145)
TROPONIN T SERPL-MCNC: <0.01 NG/ML (ref 0–0.03)
WBC # BLD AUTO: 8.69 10*3/MM3 (ref 3.4–10.8)

## 2021-11-08 PROCEDURE — 93005 ELECTROCARDIOGRAM TRACING: CPT | Performed by: EMERGENCY MEDICINE

## 2021-11-08 PROCEDURE — 85025 COMPLETE CBC W/AUTO DIFF WBC: CPT | Performed by: EMERGENCY MEDICINE

## 2021-11-08 PROCEDURE — 93010 ELECTROCARDIOGRAM REPORT: CPT | Performed by: INTERNAL MEDICINE

## 2021-11-08 PROCEDURE — 25010000002 ONDANSETRON PER 1 MG: Performed by: EMERGENCY MEDICINE

## 2021-11-08 PROCEDURE — 80053 COMPREHEN METABOLIC PANEL: CPT | Performed by: EMERGENCY MEDICINE

## 2021-11-08 PROCEDURE — 96375 TX/PRO/DX INJ NEW DRUG ADDON: CPT

## 2021-11-08 PROCEDURE — 99283 EMERGENCY DEPT VISIT LOW MDM: CPT

## 2021-11-08 PROCEDURE — 25010000002 DEXAMETHASONE PER 1 MG: Performed by: EMERGENCY MEDICINE

## 2021-11-08 PROCEDURE — 96374 THER/PROPH/DIAG INJ IV PUSH: CPT

## 2021-11-08 PROCEDURE — 84484 ASSAY OF TROPONIN QUANT: CPT | Performed by: EMERGENCY MEDICINE

## 2021-11-08 PROCEDURE — 25010000002 MORPHINE PER 10 MG: Performed by: EMERGENCY MEDICINE

## 2021-11-08 RX ORDER — ONDANSETRON 2 MG/ML
4 INJECTION INTRAMUSCULAR; INTRAVENOUS ONCE
Status: COMPLETED | OUTPATIENT
Start: 2021-11-08 | End: 2021-11-08

## 2021-11-08 RX ORDER — OXYCODONE HYDROCHLORIDE AND ACETAMINOPHEN 5; 325 MG/1; MG/1
1 TABLET ORAL EVERY 6 HOURS PRN
Qty: 8 TABLET | Refills: 0 | Status: SHIPPED | OUTPATIENT
Start: 2021-11-08 | End: 2021-12-23 | Stop reason: HOSPADM

## 2021-11-08 RX ORDER — CLINDAMYCIN HYDROCHLORIDE 300 MG/1
300 CAPSULE ORAL 3 TIMES DAILY
Qty: 30 CAPSULE | Refills: 0 | Status: SHIPPED | OUTPATIENT
Start: 2021-11-08 | End: 2021-12-23 | Stop reason: HOSPADM

## 2021-11-08 RX ORDER — DEXAMETHASONE SODIUM PHOSPHATE 10 MG/ML
10 INJECTION INTRAMUSCULAR; INTRAVENOUS ONCE
Status: COMPLETED | OUTPATIENT
Start: 2021-11-08 | End: 2021-11-08

## 2021-11-08 RX ORDER — PREDNISONE 50 MG/1
50 TABLET ORAL DAILY
Qty: 5 TABLET | Refills: 0 | Status: SHIPPED | OUTPATIENT
Start: 2021-11-08 | End: 2021-12-23 | Stop reason: HOSPADM

## 2021-11-08 RX ADMIN — ONDANSETRON 4 MG: 2 INJECTION INTRAMUSCULAR; INTRAVENOUS at 08:00

## 2021-11-08 RX ADMIN — MORPHINE SULFATE 4 MG: 4 INJECTION INTRAVENOUS at 06:45

## 2021-11-08 RX ADMIN — DEXAMETHASONE SODIUM PHOSPHATE 10 MG: 10 INJECTION INTRAMUSCULAR; INTRAVENOUS at 06:44

## 2021-11-08 NOTE — ED PROVIDER NOTES
"ED Provider Note    CHIEF COMPLAINT  Chief Complaint   Patient presents with   • Allergic Reaction     pt reports SOB, CP and itching to tongue starting at approx 1000. Pt had a raspberry drink at 0800       HPI  Cassandra Dasilva is a 12 y.o. female who presents for evaluation of scratchiness in the throat swelling in the throat.  The patient has a history of allergic reactions in the past.  She has an allergy to some fruit-based products including pineapple watermelon weekly.  She drinks some sort of strawberry drink earlier today at school and developed subjective swelling in the throat and some intermittent stridor.  She had been prescribed epinephrine in the past but it is not active.  No interventions were provided prehospital.    REVIEW OF SYSTEMS  See HPI for further details.  No high fevers rash night sweats weight loss numbness tingling weakness all other systems are negative.     PAST MEDICAL HISTORY  No past medical history on file.  History of t allergies  FAMILY HISTORY  Noncontributory    SOCIAL HISTORY  Social History     Social History Main Topics   • Smoking status: Never Smoker   • Smokeless tobacco: Never Used   • Alcohol use No   • Drug use: No   • Sexual activity: Not on file     Other Topics Concern   • Not on file     Social History Narrative   • No narrative on file   No report of drugs or alcohol    SURGICAL HISTORY  No past surgical history on file.  No surgeries  CURRENT MEDICATIONS  Home Medications     Reviewed by Harriet Sanford R.N. (Registered Nurse) on 11/28/18 at 1050  Med List Status: Complete   Medication Last Dose Status        Patient Kevyn Taking any Medications                       ALLERGIES  Allergies   Allergen Reactions   • Kiwi Extract Anaphylaxis   • Pineapple Anaphylaxis       PHYSICAL EXAM  VITAL SIGNS: /76   Pulse 70   Temp 37.1 °C (98.7 °F) (Temporal)   Resp (!) 24   Ht 1.549 m (5' 1\")   Wt 55.6 kg (122 lb 9.2 oz)   SpO2 100%   BMI " Time: 07:34 EST  Arrived by: POV  Chief Complaint: HTN  History provided by: pt  History is limited by: N/A    History of Present Illness:    Katarzyna Norris is a 51 y.o. female who presents to the emergency department today with complaints of hypertension. Pt states she had dental work done two weeks ago and has experienced intermittent HTN since. Upon arrival to ED she has a blood pressure of 179/108. She goes on to complain of a right sided headache as well as light-headedness. She denies fever, chills, diaphoresis, chest pain, abdominal pain, or any other pertinent sx or concerns.       History provided by:  Patient   used: No    Hypertension  Severity:  Mild  Onset quality:  Gradual  Duration:  1 day  Timing:  Constant  Progression:  Worsening  Context: stress    Relieved by:  Nothing  Worsened by:  Nothing  Associated symptoms: headaches    Associated symptoms: no abdominal pain, no chest pain, no fever, no nausea, no palpitations, no shortness of breath and not vomiting      Past Medical History:     Allergies   Allergen Reactions   • Diltiazem Hcl Unknown - High Severity     Past Medical History:   Diagnosis Date   • Anxiety    • Gout 2021   • Hypertension    • Sinus problem      Past Surgical History:   Procedure Laterality Date   •  SECTION   and    • CHOLECYSTECTOMY     • COLONOSCOPY     • ENDOSCOPY     • TUBAL ABDOMINAL LIGATION       Family History   Problem Relation Age of Onset   • Throat cancer Father 73   • Stroke Other    • Diabetes Other        Home Medications:  Prior to Admission medications    Medication Sig Start Date End Date Taking? Authorizing Provider   allopurinol (ZYLOPRIM) 300 MG tablet Take 1 tablet by mouth Daily. 21   Brian Ellis APRN   amoxicillin-clavulanate (Augmentin) 875-125 MG per tablet Take 1 tablet by mouth Every 12 (Twelve) Hours for 10 days. With food 21  Harley Santana,  "DO   Aspirin Burose,CaCarb-MgCarb-MgO, 81 MG tablet Take 81 mg by mouth Daily.    Provider, MD Al   atorvastatin (LIPITOR) 20 MG tablet Take 1 tablet by mouth Daily. 8/24/21   Brian Ellis APRN   carvedilol (COREG) 12.5 MG tablet Take 1 tablet by mouth 2 (Two) Times a Day With Meals. 8/24/21   Brian Ellis APRN   chlorthalidone (HYGROTON) 25 MG tablet Take 25 mg by mouth Daily.    Provider, MD Al   cloNIDine (CATAPRES) 0.1 MG tablet Take 1 tablet by mouth 2 (Two) Times a Day. 8/24/21   Brian Ellis APRN   losartan (COZAAR) 100 MG tablet Take 1 tablet by mouth Daily. 8/24/21   Brian Ellis APRN   sodium bicarbonate 650 MG tablet Take 650 mg by mouth 3 (Three) Times a Day. 7/6/21   Provider, MD Al        Social History:   PT  reports that she has quit smoking. She has never used smokeless tobacco. She reports that she does not drink alcohol. No history on file for drug use.    Record Review:  I have reviewed the patient's records in [facility].     Review of Systems  Review of Systems   Constitutional: Negative for chills and fever.   HENT: Negative for congestion, rhinorrhea and sore throat.    Eyes: Negative for pain and visual disturbance.   Respiratory: Negative for apnea, cough, chest tightness and shortness of breath.    Cardiovascular: Negative for chest pain and palpitations.   Gastrointestinal: Negative for abdominal pain, diarrhea, nausea and vomiting.   Genitourinary: Negative for difficulty urinating and dysuria.   Musculoskeletal: Negative for joint swelling and myalgias.   Skin: Negative for color change.   Neurological: Positive for light-headedness and headaches. Negative for seizures.   Psychiatric/Behavioral: Negative.    All other systems reviewed and are negative.       Physical Exam  /82   Pulse 82   Temp 97.9 °F (36.6 °C) (Oral)   Resp 18   Ht 160 cm (63\")   Wt 121 kg (267 lb 10.2 oz)   SpO2 99%   Breastfeeding No  " 23.16 kg/m²  Room air O2: 100    Constitutional: Anxious ill-appearing  HENT: Normocephalic, Atraumatic, Bilateral external ears normal, Oropharynx moist posterior pharynx is notable for glossiness and soft tissue palatal swelling with uvular edema  Eyes: PERRLA, EOMI, Conjunctiva normal, No discharge.   Neck: Normal range of motion, No tenderness, Supple, No stridor.   Lymphatic: No lymphadenopathy noted.   Cardiovascular: Normal heart rate, Normal rhythm, No murmurs, No rubs, No gallops.   Thorax & Lungs: Normal breath sounds, No respiratory distress, No wheezing, No chest tenderness.   Abdomen: Bowel sounds normal, Soft, No tenderness, No masses, No pulsatile masses.   Skin: Warm, Dry, No erythema, No rash.   Back: No tenderness, No CVA tenderness.   Extremities: Intact distal pulses, No edema, No tenderness, No cyanosis, No clubbing.   Neurologic: Alert & oriented x 3, Normal motor function, Normal sensory function, No focal deficits noted.   Psychiatric anxious        COURSE & MEDICAL DECISION MAKING  Pertinent Labs & Imaging studies reviewed. (See chart for details)  I was immediately called to the bedside for patient with possible upper airway swelling.  On exam she very clearly had palatal swelling and uvular edema.  An IV was immediately established.  I administered intramuscular epinephrine as well as Solu-Medrol, Pepcid and Benadryl as well as a liter of fluid.  The patient was observed for around 3 hours.  I checked on her every 15-20 minutes.  The uvular swelling substantially improved she developed no stridor or wheezing.  She had no evidence of rash or hemodynamic collapse.  I would consider this a anaphylactic reaction.  I counseled the mother on ongoing management which will include a 5-day course of prednisone, I have prescribed EpiPen x2 and she needs to continue taking Benadryl.  She will be referred back to her PCP for ongoing management and allergy testing once the dust clears from this current  " BMI 47.41 kg/m²     Physical Exam  Vitals and nursing note reviewed.   Constitutional:       General: She is not in acute distress.     Appearance: Normal appearance. She is not toxic-appearing.   HENT:      Head: Normocephalic and atraumatic.      Jaw: There is normal jaw occlusion.      Mouth/Throat:      Comments: Poor dentition with cavity to right mandibular molar   Eyes:      General: Lids are normal.      Extraocular Movements: Extraocular movements intact.      Conjunctiva/sclera: Conjunctivae normal.      Pupils: Pupils are equal, round, and reactive to light.   Cardiovascular:      Rate and Rhythm: Normal rate and regular rhythm.      Pulses: Normal pulses.      Heart sounds: Normal heart sounds.   Pulmonary:      Effort: Pulmonary effort is normal. No respiratory distress.      Breath sounds: Normal breath sounds. No wheezing or rhonchi.   Abdominal:      General: Abdomen is flat.      Palpations: Abdomen is soft.      Tenderness: There is no abdominal tenderness. There is no guarding or rebound.   Musculoskeletal:         General: Normal range of motion.      Cervical back: Normal range of motion and neck supple.      Right lower leg: No edema.      Left lower leg: No edema.   Skin:     General: Skin is warm and dry.   Neurological:      Mental Status: She is alert and oriented to person, place, and time. Mental status is at baseline.   Psychiatric:         Mood and Affect: Mood normal.                  ED Course  /82   Pulse 82   Temp 97.9 °F (36.6 °C) (Oral)   Resp 18   Ht 160 cm (63\")   Wt 121 kg (267 lb 10.2 oz)   SpO2 99%   Breastfeeding No   BMI 47.41 kg/m²   Results for orders placed or performed during the hospital encounter of 11/08/21   Comprehensive Metabolic Panel    Specimen: Arm, Left; Blood   Result Value Ref Range    Glucose 117 (H) 65 - 99 mg/dL    BUN 50 (H) 6 - 20 mg/dL    Creatinine 2.56 (H) 0.57 - 1.00 mg/dL    Sodium 138 136 - 145 mmol/L    Potassium 4.7 3.5 - 5.2 " episode.    Critical care time: 38 minutes.  This includes any consultation with specialists, discussion with the family, frequent checks.    FINAL IMPRESSION  1.  1. Anaphylaxis, initial encounter Active              Electronically signed by: Luis Carter, 11/28/2018 11:05 AM     mmol/L    Chloride 102 98 - 107 mmol/L    CO2 22.4 22.0 - 29.0 mmol/L    Calcium 9.7 8.6 - 10.5 mg/dL    Total Protein 7.3 6.0 - 8.5 g/dL    Albumin 4.40 3.50 - 5.20 g/dL    ALT (SGPT) 28 1 - 33 U/L    AST (SGOT) 26 1 - 32 U/L    Alkaline Phosphatase 87 39 - 117 U/L    Total Bilirubin 0.5 0.0 - 1.2 mg/dL    eGFR Non African Amer 20 (L) >60 mL/min/1.73    Globulin 2.9 gm/dL    A/G Ratio 1.5 g/dL    BUN/Creatinine Ratio 19.5 7.0 - 25.0    Anion Gap 13.6 5.0 - 15.0 mmol/L   Troponin    Specimen: Arm, Left; Blood   Result Value Ref Range    Troponin T <0.010 0.000 - 0.030 ng/mL   CBC Auto Differential    Specimen: Blood   Result Value Ref Range    WBC 8.69 3.40 - 10.80 10*3/mm3    RBC 3.93 3.77 - 5.28 10*6/mm3    Hemoglobin 12.5 12.0 - 15.9 g/dL    Hematocrit 37.9 34.0 - 46.6 %    MCV 96.4 79.0 - 97.0 fL    MCH 31.8 26.6 - 33.0 pg    MCHC 33.0 31.5 - 35.7 g/dL    RDW 13.2 12.3 - 15.4 %    RDW-SD 47.5 37.0 - 54.0 fl    MPV 8.6 6.0 - 12.0 fL    Platelets 200 140 - 450 10*3/mm3    Neutrophil % 65.3 42.7 - 76.0 %    Lymphocyte % 22.7 19.6 - 45.3 %    Monocyte % 6.4 5.0 - 12.0 %    Eosinophil % 4.5 0.3 - 6.2 %    Basophil % 0.6 0.0 - 1.5 %    Immature Grans % 0.5 0.0 - 0.5 %    Neutrophils, Absolute 5.68 1.70 - 7.00 10*3/mm3    Lymphocytes, Absolute 1.97 0.70 - 3.10 10*3/mm3    Monocytes, Absolute 0.56 0.10 - 0.90 10*3/mm3    Eosinophils, Absolute 0.39 0.00 - 0.40 10*3/mm3    Basophils, Absolute 0.05 0.00 - 0.20 10*3/mm3    Immature Grans, Absolute 0.04 0.00 - 0.05 10*3/mm3    nRBC 0.0 0.0 - 0.2 /100 WBC   ECG 12 Lead   Result Value Ref Range    QT Interval 370 ms     Medications   morphine injection 4 mg (4 mg Intravenous Given 11/8/21 0645)   dexamethasone (DECADRON) injection 10 mg (10 mg Intravenous Given 11/8/21 0644)     No results found.    Procedures/EKGs:  Procedures    Medical Decision Making:                         MDM  Number of Diagnoses or Management Options  Dental caries  Renal insufficiency  Secondary  hypertension  Diagnosis management comments: The patient presents to the ED with hypertension.  The patient´s CBC was reviewed and shows no abnormalities of critical concern. Of note, there is no anemia requiring a blood transfusion and the platelet count is acceptable.  The patient´s CMP was reviewed and shows no abnormalities of critical concern. Of note, the patient´s sodium and potassium are acceptable. The patient´s liver enzymes are unremarkable. The patient´s renal function (creatinine) is elevated at 2.56. The patient has a normal anion gap.  Troponin is negative.  The patient was placed on a monitor in the ED. The blood pressure is much improved with ED treatment. The patient denies chest pain. The patient has a normal neurological exam and has mental status at baseline. Upon re-examination, the patient has no signs or symptoms of acute end organ damage.  The patient was advised of the importance of medical compliance with an emphasis in awareness of their daily sodium intake.  Patient reports significant relief of her pain with ED treatment of morphine and Decadron.  The patient was counseled that elevated blood pressure is detrimental to their heart, eyes, kidneys, and may lead to a stroke. The patient was advised to check their blood pressure regularly and follow up as an outpatient regarding this matter. The patient was counseled to return to the ED for sudden or severe headache, numbness or tingling on one side of the body, facial droop, difficulty speaking, chest pain, or shortness of breath. The patient's condition is stable and appropriate for discharge. The patient will pursue further outpatient evaluation with the primary care physician or other designated or consulting physician as indicated in the discharge instructions.        Amount and/or Complexity of Data Reviewed  Clinical lab tests: reviewed  Tests in the medicine section of CPT®: reviewed  Independent visualization of images, tracings,  or specimens: yes    Risk of Complications, Morbidity, and/or Mortality  Presenting problems: moderate  Management options: moderate    Patient Progress  Patient progress: stable       Final diagnoses:   Renal insufficiency   Dental caries   Secondary hypertension          Disposition:  ED Disposition     ED Disposition Condition Comment    Discharge Stable           Documentation assistance provided by Cyndi Hamlin MD acting as scribe for Cyndi Hamlin MD. Information recorded by the scribe was done at my direction and has been verified and validated by me.        Chantale Shultz  11/08/21 0616       Cyndi Hamlin MD  11/08/21 0764

## 2021-11-08 NOTE — ED TRIAGE NOTES
Pt arrives to ED by POV with complaint of hypertension. Pt states that she has a history of and takes 3 different medications. Pt states that this morning she had a headache and felt she was hypertensive. Pt took BP meds at 0500. Pt arrives hypertensive. IV access initiated. Pt states that she had dental work done last week and has had problems controlling her BP ever since.

## 2021-11-11 LAB — QT INTERVAL: 370 MS

## 2021-11-23 ENCOUNTER — APPOINTMENT (OUTPATIENT)
Dept: MAMMOGRAPHY | Facility: HOSPITAL | Age: 51
End: 2021-11-23

## 2021-11-30 RX ORDER — SODIUM BICARBONATE 650 MG/1
650 TABLET ORAL 3 TIMES DAILY
Qty: 270 TABLET | Refills: 0 | Status: SHIPPED | OUTPATIENT
Start: 2021-11-30 | End: 2022-08-31 | Stop reason: SDUPTHER

## 2021-11-30 RX ORDER — CLONIDINE HYDROCHLORIDE 0.1 MG/1
TABLET ORAL
Qty: 90 TABLET | Refills: 0 | Status: SHIPPED | OUTPATIENT
Start: 2021-11-30 | End: 2021-12-03

## 2021-11-30 NOTE — TELEPHONE ENCOUNTER
Caller: NorrisKatarzyna woods    Relationship: Self    Best call back number: [phone]    Requested Prescriptions:   Requested Prescriptions     Pending Prescriptions Disp Refills   • cloNIDine (CATAPRES) 0.1 MG tablet [Pharmacy Med Name: CLONIDINE 0.1MG     TAB] 90 tablet 0     Sig: Take 1 tablet by mouth twice daily   • sodium bicarbonate 650 MG tablet       Sig: Take 1 tablet by mouth 3 (Three) Times a Day.        Pharmacy where request should be sent: [address] [phone] [facility] [phone]       Does the patient have less than a 3 day supply:  [x] Yes  [] No    Ngozi Bennett Rep   11/30/21 09:21 EST

## 2021-12-02 ENCOUNTER — TELEPHONE (OUTPATIENT)
Dept: FAMILY MEDICINE CLINIC | Facility: CLINIC | Age: 51
End: 2021-12-02

## 2021-12-02 NOTE — TELEPHONE ENCOUNTER
Caller: Katarzyna Norris    Relationship: Self    Best call back number: [phone]    What is the best time to reach you: ANYTIME    Who are you requesting to speak with (clinical staff, provider,  specific staff member): CLINICAL    What was the call regarding:     PATIENT STATED PCP HARI STATED SHE IS TO TAKE HER PILLS 3 TIMES A DAY AND THEY FILLED IT FOR 2 TIMES A DAY. SHE STATED IT ALSO HAS NO REFILLS. SHE SAID SHE HAS BEEN TAKING THIS MEDICATION FOR YEARS AND NEED REFILLS .   PATIENT IS REQUESTING A CALL TO DISCUSS.    Do you require a callback: YES

## 2021-12-03 DIAGNOSIS — I10 HYPERTENSION, UNSPECIFIED TYPE: Primary | ICD-10-CM

## 2021-12-03 RX ORDER — CLONIDINE HYDROCHLORIDE 0.1 MG/1
0.1 TABLET ORAL 3 TIMES DAILY
Qty: 90 TABLET | Refills: 1 | Status: SHIPPED | OUTPATIENT
Start: 2021-12-03 | End: 2022-02-16 | Stop reason: SDUPTHER

## 2021-12-06 ENCOUNTER — PRE-ADMISSION TESTING (OUTPATIENT)
Dept: PREADMISSION TESTING | Facility: HOSPITAL | Age: 51
End: 2021-12-06

## 2021-12-06 VITALS
HEIGHT: 61 IN | HEART RATE: 100 BPM | OXYGEN SATURATION: 95 % | RESPIRATION RATE: 18 BRPM | BODY MASS INDEX: 50.53 KG/M2 | WEIGHT: 267.64 LBS | TEMPERATURE: 98.8 F

## 2021-12-06 DIAGNOSIS — Z01.818 ENCOUNTER FOR PREADMISSION TESTING: Primary | ICD-10-CM

## 2021-12-06 DIAGNOSIS — Z01.818 ENCOUNTER FOR PREADMISSION TESTING: ICD-10-CM

## 2021-12-06 LAB
ALBUMIN SERPL-MCNC: 4.6 G/DL (ref 3.5–5.2)
ALBUMIN/GLOB SERPL: 1.6 G/DL
ALP SERPL-CCNC: 89 U/L (ref 39–117)
ALT SERPL W P-5'-P-CCNC: 17 U/L (ref 1–33)
ANION GAP SERPL CALCULATED.3IONS-SCNC: 13.7 MMOL/L (ref 5–15)
AST SERPL-CCNC: 16 U/L (ref 1–32)
BASOPHILS # BLD AUTO: 0.08 10*3/MM3 (ref 0–0.2)
BASOPHILS NFR BLD AUTO: 1.1 % (ref 0–1.5)
BILIRUB SERPL-MCNC: 0.5 MG/DL (ref 0–1.2)
BUN SERPL-MCNC: 55 MG/DL (ref 6–20)
BUN/CREAT SERPL: 21.1 (ref 7–25)
CALCIUM SPEC-SCNC: 10.8 MG/DL (ref 8.6–10.5)
CHLORIDE SERPL-SCNC: 103 MMOL/L (ref 98–107)
CO2 SERPL-SCNC: 22.3 MMOL/L (ref 22–29)
CREAT SERPL-MCNC: 2.61 MG/DL (ref 0.57–1)
DEPRECATED RDW RBC AUTO: 46.5 FL (ref 37–54)
EOSINOPHIL # BLD AUTO: 0.34 10*3/MM3 (ref 0–0.4)
EOSINOPHIL NFR BLD AUTO: 4.7 % (ref 0.3–6.2)
ERYTHROCYTE [DISTWIDTH] IN BLOOD BY AUTOMATED COUNT: 13.4 % (ref 12.3–15.4)
GFR SERPL CREATININE-BSD FRML MDRD: 19 ML/MIN/1.73
GLOBULIN UR ELPH-MCNC: 2.8 GM/DL
GLUCOSE SERPL-MCNC: 112 MG/DL (ref 65–99)
HBA1C MFR BLD: 5.74 % (ref 4.8–5.6)
HCT VFR BLD AUTO: 38.5 % (ref 34–46.6)
HGB BLD-MCNC: 13.1 G/DL (ref 12–15.9)
IMM GRANULOCYTES # BLD AUTO: 0.04 10*3/MM3 (ref 0–0.05)
IMM GRANULOCYTES NFR BLD AUTO: 0.6 % (ref 0–0.5)
INR PPP: 0.95 (ref 2–3)
LYMPHOCYTES # BLD AUTO: 1.81 10*3/MM3 (ref 0.7–3.1)
LYMPHOCYTES NFR BLD AUTO: 24.9 % (ref 19.6–45.3)
MCH RBC QN AUTO: 32.3 PG (ref 26.6–33)
MCHC RBC AUTO-ENTMCNC: 34 G/DL (ref 31.5–35.7)
MCV RBC AUTO: 95.1 FL (ref 79–97)
MONOCYTES # BLD AUTO: 0.48 10*3/MM3 (ref 0.1–0.9)
MONOCYTES NFR BLD AUTO: 6.6 % (ref 5–12)
NEUTROPHILS NFR BLD AUTO: 4.52 10*3/MM3 (ref 1.7–7)
NEUTROPHILS NFR BLD AUTO: 62.1 % (ref 42.7–76)
NRBC BLD AUTO-RTO: 0 /100 WBC (ref 0–0.2)
PLATELET # BLD AUTO: 276 10*3/MM3 (ref 140–450)
PMV BLD AUTO: 8.5 FL (ref 6–12)
POTASSIUM SERPL-SCNC: 5.1 MMOL/L (ref 3.5–5.2)
PROT SERPL-MCNC: 7.4 G/DL (ref 6–8.5)
PROTHROMBIN TIME: 10.1 SECONDS (ref 9.4–12)
RBC # BLD AUTO: 4.05 10*6/MM3 (ref 3.77–5.28)
SODIUM SERPL-SCNC: 139 MMOL/L (ref 136–145)
WBC NRBC COR # BLD: 7.27 10*3/MM3 (ref 3.4–10.8)

## 2021-12-06 PROCEDURE — 36415 COLL VENOUS BLD VENIPUNCTURE: CPT

## 2021-12-06 PROCEDURE — 83036 HEMOGLOBIN GLYCOSYLATED A1C: CPT | Performed by: ANESTHESIOLOGY

## 2021-12-06 PROCEDURE — 85025 COMPLETE CBC W/AUTO DIFF WBC: CPT

## 2021-12-06 PROCEDURE — 85610 PROTHROMBIN TIME: CPT

## 2021-12-06 PROCEDURE — 80053 COMPREHEN METABOLIC PANEL: CPT

## 2021-12-06 RX ORDER — ALLOPURINOL 300 MG/1
1 TABLET ORAL DAILY
COMMUNITY
Start: 2021-08-25 | End: 2022-01-18 | Stop reason: SDUPTHER

## 2021-12-06 RX ORDER — MULTIVIT WITH MINERALS/LUTEIN
250 TABLET ORAL DAILY
COMMUNITY
End: 2022-05-02

## 2021-12-06 RX ORDER — UBIDECARENONE 75 MG
50 CAPSULE ORAL DAILY
COMMUNITY

## 2021-12-06 ASSESSMENT — KOOS JR
KOOS JR SCORE: 22
KOOS JR SCORE: 31.307

## 2021-12-06 NOTE — SIGNIFICANT NOTE
PT PLANS TO DO OUT PT PHYSICAL THERAPY WITH [facility], HAS TRANSPORTATION. PT STATES SHE NEEDS A WALKER AND ANY OTHER DME NEEDED!

## 2021-12-06 NOTE — SIGNIFICANT NOTE
PT AND SPOUSE TO ATTEND TOTAL JOINT EDUCATION CLASS. MATERIALS PROVIDED, INSTRUCTIONS ON BATHING AND ERAS/GATORADE GIVEN, VERBALIZED UNDERSTANDING   Phone call to patient to convey results.  Left message for call back

## 2021-12-06 NOTE — DISCHARGE INSTRUCTIONS
IMPORTANT INSTRUCTIONS - PRE-ADMISSION TESTING  1. DO NOT EAT OR CHEW anything after midnight the night before your procedure.  DRINK GATORADE, NO RED, 20 OZ AS INSTRUCTED  2. You may have CLEAR liquids up to _3_____ hours prior to ARRIVAL time .   3. Take the following medications the morning of your procedure with JUST A SIP OF WATER:  __SODIUM BICARB, CLONIDINE, CARVEDILOL_____________________________________________________________________________________________________________________________________________________________________________________    4. DO NOT BRING your medications to the hospital with you, UNLESS something has changed since your PRE-Admission Testing appointment.  5. Hold all vitamins, supplements, and NSAIDS (Non- steroidal anti-inflammatory meds) for one week prior to surgery (you MAY take Tylenol or Acetaminophen). HOLD ALL VITAMINS, SUPPLEMENTS, ASPIRIN. LAST DOSE 12/14/21  6. If you are diabetic, check your blood sugar the morning of your procedure. If it is less than 70 or if you are feeling symptomatic, call the following number for further instructions: [phone] SAME DAY SURGERY WILL CALL ARRIVAL TIME_______.  7. Use your inhalers/nebulizers as usual, the morning of your procedure. BRING YOUR INHALERS with you. NA  8. Bring your CPAP or BIPAP to hospital, ONLY IF YOU WILL BE SPENDING THE NIGHT. NA  9. Make sure you have a ride home and have someone who will stay with you the day of your procedure after you go home.  10. If you have any questions, please call your Pre-Admission Testing Nurse, DUNG CHANDLER________________ at [phone]___________.   11. Per anesthesia request, do not smoke for 24 hours before your procedure or as instructed by your surgeon.  NA  12. SHOWERING INSTRUCTIONS AS DIRECTED IN TOTAL JOINT EDUCATION BOOKLET  13. BRING TOTAL JOINT EDUCATION BOOKLET BACK MORNING OF SURGERY

## 2021-12-07 DIAGNOSIS — Z96.652 AFTERCARE FOLLOWING LEFT KNEE JOINT REPLACEMENT SURGERY: Primary | ICD-10-CM

## 2021-12-07 DIAGNOSIS — M17.12 PRIMARY OSTEOARTHRITIS OF LEFT KNEE: Primary | ICD-10-CM

## 2021-12-07 DIAGNOSIS — Z47.1 AFTERCARE FOLLOWING LEFT KNEE JOINT REPLACEMENT SURGERY: Primary | ICD-10-CM

## 2021-12-09 ENCOUNTER — ANESTHESIA EVENT (OUTPATIENT)
Dept: PERIOP | Facility: HOSPITAL | Age: 51
End: 2021-12-09

## 2021-12-17 ENCOUNTER — LAB (OUTPATIENT)
Dept: LAB | Facility: HOSPITAL | Age: 51
End: 2021-12-17

## 2021-12-17 DIAGNOSIS — M17.12 OSTEOARTHRITIS OF LEFT KNEE: ICD-10-CM

## 2021-12-17 PROCEDURE — U0004 COV-19 TEST NON-CDC HGH THRU: HCPCS

## 2021-12-17 PROCEDURE — C9803 HOPD COVID-19 SPEC COLLECT: HCPCS

## 2021-12-20 LAB — SARS-COV-2 RNA PNL SPEC NAA+PROBE: NOT DETECTED

## 2021-12-22 ENCOUNTER — ANESTHESIA (OUTPATIENT)
Dept: PERIOP | Facility: HOSPITAL | Age: 51
End: 2021-12-22

## 2021-12-22 ENCOUNTER — HOSPITAL ENCOUNTER (OUTPATIENT)
Facility: HOSPITAL | Age: 51
Discharge: HOME HEALTH | End: 2021-12-23
Attending: ORTHOPAEDIC SURGERY | Admitting: ORTHOPAEDIC SURGERY

## 2021-12-22 ENCOUNTER — APPOINTMENT (OUTPATIENT)
Dept: GENERAL RADIOLOGY | Facility: HOSPITAL | Age: 51
End: 2021-12-22

## 2021-12-22 DIAGNOSIS — M17.12 OSTEOARTHRITIS OF LEFT KNEE: ICD-10-CM

## 2021-12-22 DIAGNOSIS — R26.2 DIFFICULTY IN WALKING: Primary | ICD-10-CM

## 2021-12-22 DIAGNOSIS — Z78.9 DECREASED ACTIVITIES OF DAILY LIVING (ADL): ICD-10-CM

## 2021-12-22 LAB
ALBUMIN SERPL-MCNC: 3.9 G/DL (ref 3.5–5.2)
ANION GAP SERPL CALCULATED.3IONS-SCNC: 12.4 MMOL/L (ref 5–15)
BUN SERPL-MCNC: 50 MG/DL (ref 6–20)
BUN/CREAT SERPL: 17.1 (ref 7–25)
CALCIUM SPEC-SCNC: 9.5 MG/DL (ref 8.6–10.5)
CHLORIDE SERPL-SCNC: 104 MMOL/L (ref 98–107)
CO2 SERPL-SCNC: 19.6 MMOL/L (ref 22–29)
CREAT SERPL-MCNC: 2.93 MG/DL (ref 0.57–1)
GFR SERPL CREATININE-BSD FRML MDRD: 17 ML/MIN/1.73
GLUCOSE SERPL-MCNC: 145 MG/DL (ref 65–99)
PHOSPHATE SERPL-MCNC: 4.8 MG/DL (ref 2.5–4.5)
POTASSIUM SERPL-SCNC: 5.6 MMOL/L (ref 3.5–5.2)
SODIUM SERPL-SCNC: 136 MMOL/L (ref 136–145)

## 2021-12-22 PROCEDURE — 94799 UNLISTED PULMONARY SVC/PX: CPT

## 2021-12-22 PROCEDURE — 25010000002 EPINEPHRINE 1 MG/ML SOLUTION

## 2021-12-22 PROCEDURE — 25010000002 FENTANYL CITRATE (PF) 50 MCG/ML SOLUTION: Performed by: NURSE ANESTHETIST, CERTIFIED REGISTERED

## 2021-12-22 PROCEDURE — 63710000001 ACETAMINOPHEN 500 MG TABLET: Performed by: ANESTHESIOLOGY

## 2021-12-22 PROCEDURE — 63710000001 CARVEDILOL 12.5 MG TABLET: Performed by: INTERNAL MEDICINE

## 2021-12-22 PROCEDURE — A9270 NON-COVERED ITEM OR SERVICE: HCPCS | Performed by: INTERNAL MEDICINE

## 2021-12-22 PROCEDURE — A9270 NON-COVERED ITEM OR SERVICE: HCPCS | Performed by: ANESTHESIOLOGY

## 2021-12-22 PROCEDURE — 25010000002 MIDAZOLAM PER 1 MG: Performed by: ANESTHESIOLOGY

## 2021-12-22 PROCEDURE — 63710000001 INSULIN REGULAR HUMAN PER 5 UNITS: Performed by: PHYSICIAN ASSISTANT

## 2021-12-22 PROCEDURE — 0 MEPERIDINE PER 100 MG: Performed by: NURSE ANESTHETIST, CERTIFIED REGISTERED

## 2021-12-22 PROCEDURE — C1776 JOINT DEVICE (IMPLANTABLE): HCPCS | Performed by: ORTHOPAEDIC SURGERY

## 2021-12-22 PROCEDURE — 25010000002 ONDANSETRON PER 1 MG: Performed by: NURSE ANESTHETIST, CERTIFIED REGISTERED

## 2021-12-22 PROCEDURE — 73560 X-RAY EXAM OF KNEE 1 OR 2: CPT

## 2021-12-22 PROCEDURE — 25010000002 ROPIVACAINE PER 1 MG: Performed by: ANESTHESIOLOGY

## 2021-12-22 PROCEDURE — 25010000002 ONDANSETRON PER 1 MG: Performed by: ORTHOPAEDIC SURGERY

## 2021-12-22 PROCEDURE — 25010000002 DEXAMETHASONE PER 1 MG: Performed by: NURSE ANESTHETIST, CERTIFIED REGISTERED

## 2021-12-22 PROCEDURE — 80069 RENAL FUNCTION PANEL: CPT | Performed by: INTERNAL MEDICINE

## 2021-12-22 PROCEDURE — 25010000002 KETOROLAC TROMETHAMINE PER 15 MG: Performed by: ORTHOPAEDIC SURGERY

## 2021-12-22 PROCEDURE — A9270 NON-COVERED ITEM OR SERVICE: HCPCS | Performed by: ORTHOPAEDIC SURGERY

## 2021-12-22 PROCEDURE — 99219 PR INITIAL OBSERVATION CARE/DAY 50 MINUTES: CPT | Performed by: INTERNAL MEDICINE

## 2021-12-22 PROCEDURE — A9270 NON-COVERED ITEM OR SERVICE: HCPCS | Performed by: PHYSICIAN ASSISTANT

## 2021-12-22 PROCEDURE — A9270 NON-COVERED ITEM OR SERVICE: HCPCS | Performed by: NURSE ANESTHETIST, CERTIFIED REGISTERED

## 2021-12-22 PROCEDURE — 25010000002 MORPHINE (PF) 10 MG/ML SOLUTION

## 2021-12-22 PROCEDURE — 63710000001 GABAPENTIN 300 MG CAPSULE: Performed by: ANESTHESIOLOGY

## 2021-12-22 PROCEDURE — C1713 ANCHOR/SCREW BN/BN,TIS/BN: HCPCS | Performed by: ORTHOPAEDIC SURGERY

## 2021-12-22 PROCEDURE — 25010000002 ROPIVACAINE PER 1 MG

## 2021-12-22 PROCEDURE — 63710000001 OXYCODONE 5 MG TABLET: Performed by: NURSE ANESTHETIST, CERTIFIED REGISTERED

## 2021-12-22 PROCEDURE — 27447 TOTAL KNEE ARTHROPLASTY: CPT | Performed by: PHYSICIAN ASSISTANT

## 2021-12-22 PROCEDURE — 76942 ECHO GUIDE FOR BIOPSY: CPT | Performed by: ORTHOPAEDIC SURGERY

## 2021-12-22 PROCEDURE — 63710000001 SODIUM BICARBONATE 650 MG TABLET: Performed by: INTERNAL MEDICINE

## 2021-12-22 PROCEDURE — 25010000002 PROPOFOL 10 MG/ML EMULSION: Performed by: NURSE ANESTHETIST, CERTIFIED REGISTERED

## 2021-12-22 PROCEDURE — 27447 TOTAL KNEE ARTHROPLASTY: CPT | Performed by: ORTHOPAEDIC SURGERY

## 2021-12-22 PROCEDURE — 63710000001 OXYCODONE-ACETAMINOPHEN 7.5-325 MG TABLET: Performed by: ORTHOPAEDIC SURGERY

## 2021-12-22 PROCEDURE — 25010000002 CEFAZOLIN PER 500 MG: Performed by: ORTHOPAEDIC SURGERY

## 2021-12-22 PROCEDURE — 25010000002 KETOROLAC TROMETHAMINE PER 15 MG

## 2021-12-22 DEVICE — CAP EXT STEM KN UPCHRG: Type: IMPLANTABLE DEVICE | Site: KNEE | Status: FUNCTIONAL

## 2021-12-22 DEVICE — ART/SRF KN PERSONA/VE PS CD/CR 6TO7 10MM LT: Type: IMPLANTABLE DEVICE | Site: KNEE | Status: FUNCTIONAL

## 2021-12-22 DEVICE — IMPLANTABLE DEVICE: Type: IMPLANTABLE DEVICE | Site: KNEE | Status: FUNCTIONAL

## 2021-12-22 DEVICE — CMT BONE PALACOS R HI/VISC 1X40: Type: IMPLANTABLE DEVICE | Site: KNEE | Status: FUNCTIONAL

## 2021-12-22 DEVICE — STEM TIB/KN PERSONA CMT 5D SZC LT: Type: IMPLANTABLE DEVICE | Site: KNEE | Status: FUNCTIONAL

## 2021-12-22 DEVICE — CAP TOTL KN CMT PRIMARY: Type: IMPLANTABLE DEVICE | Site: KNEE | Status: FUNCTIONAL

## 2021-12-22 RX ORDER — KETOROLAC TROMETHAMINE 30 MG/ML
15 INJECTION, SOLUTION INTRAMUSCULAR; INTRAVENOUS EVERY 6 HOURS
Status: DISCONTINUED | OUTPATIENT
Start: 2021-12-22 | End: 2021-12-22

## 2021-12-22 RX ORDER — OXYCODONE AND ACETAMINOPHEN 7.5; 325 MG/1; MG/1
1 TABLET ORAL EVERY 4 HOURS PRN
Status: DISCONTINUED | OUTPATIENT
Start: 2021-12-22 | End: 2021-12-23 | Stop reason: HOSPADM

## 2021-12-22 RX ORDER — SODIUM CHLORIDE 9 MG/ML
100 INJECTION, SOLUTION INTRAVENOUS CONTINUOUS
Status: DISCONTINUED | OUTPATIENT
Start: 2021-12-22 | End: 2021-12-23 | Stop reason: HOSPADM

## 2021-12-22 RX ORDER — DEXAMETHASONE SODIUM PHOSPHATE 4 MG/ML
INJECTION, SOLUTION INTRA-ARTICULAR; INTRALESIONAL; INTRAMUSCULAR; INTRAVENOUS; SOFT TISSUE AS NEEDED
Status: DISCONTINUED | OUTPATIENT
Start: 2021-12-22 | End: 2021-12-22 | Stop reason: SURG

## 2021-12-22 RX ORDER — ACETAMINOPHEN 500 MG
1000 TABLET ORAL EVERY 8 HOURS
Status: DISCONTINUED | OUTPATIENT
Start: 2021-12-22 | End: 2021-12-23 | Stop reason: HOSPADM

## 2021-12-22 RX ORDER — GABAPENTIN 300 MG/1
600 CAPSULE ORAL ONCE
Status: COMPLETED | OUTPATIENT
Start: 2021-12-22 | End: 2021-12-22

## 2021-12-22 RX ORDER — MEPERIDINE HYDROCHLORIDE 25 MG/ML
12.5 INJECTION INTRAMUSCULAR; INTRAVENOUS; SUBCUTANEOUS
Status: DISCONTINUED | OUTPATIENT
Start: 2021-12-22 | End: 2021-12-22 | Stop reason: HOSPADM

## 2021-12-22 RX ORDER — CEFAZOLIN SODIUM IN 0.9 % NACL 3 G/100 ML
3 INTRAVENOUS SOLUTION, PIGGYBACK (ML) INTRAVENOUS EVERY 8 HOURS
Status: COMPLETED | OUTPATIENT
Start: 2021-12-22 | End: 2021-12-23

## 2021-12-22 RX ORDER — ONDANSETRON 4 MG/1
4 TABLET, FILM COATED ORAL EVERY 6 HOURS PRN
Status: DISCONTINUED | OUTPATIENT
Start: 2021-12-22 | End: 2021-12-23 | Stop reason: HOSPADM

## 2021-12-22 RX ORDER — PROPOFOL 10 MG/ML
VIAL (ML) INTRAVENOUS AS NEEDED
Status: DISCONTINUED | OUTPATIENT
Start: 2021-12-22 | End: 2021-12-22 | Stop reason: SURG

## 2021-12-22 RX ORDER — ONDANSETRON 2 MG/ML
INJECTION INTRAMUSCULAR; INTRAVENOUS AS NEEDED
Status: DISCONTINUED | OUTPATIENT
Start: 2021-12-22 | End: 2021-12-22 | Stop reason: SURG

## 2021-12-22 RX ORDER — SODIUM CHLORIDE 0.9 % (FLUSH) 0.9 %
10 SYRINGE (ML) INJECTION AS NEEDED
Status: DISCONTINUED | OUTPATIENT
Start: 2021-12-22 | End: 2021-12-22 | Stop reason: HOSPADM

## 2021-12-22 RX ORDER — TRANEXAMIC ACID 10 MG/ML
2000 INJECTION, SOLUTION INTRAVENOUS ONCE
Status: DISCONTINUED | OUTPATIENT
Start: 2021-12-22 | End: 2021-12-22

## 2021-12-22 RX ORDER — PROMETHAZINE HYDROCHLORIDE 12.5 MG/1
25 TABLET ORAL ONCE AS NEEDED
Status: DISCONTINUED | OUTPATIENT
Start: 2021-12-22 | End: 2021-12-22 | Stop reason: HOSPADM

## 2021-12-22 RX ORDER — ACETAMINOPHEN 500 MG
1000 TABLET ORAL ONCE
Status: COMPLETED | OUTPATIENT
Start: 2021-12-22 | End: 2021-12-22

## 2021-12-22 RX ORDER — OXYCODONE AND ACETAMINOPHEN 7.5; 325 MG/1; MG/1
2 TABLET ORAL EVERY 4 HOURS PRN
Status: DISCONTINUED | OUTPATIENT
Start: 2021-12-22 | End: 2021-12-23 | Stop reason: HOSPADM

## 2021-12-22 RX ORDER — CARVEDILOL 12.5 MG/1
12.5 TABLET ORAL 2 TIMES DAILY WITH MEALS
Status: DISCONTINUED | OUTPATIENT
Start: 2021-12-22 | End: 2021-12-23 | Stop reason: HOSPADM

## 2021-12-22 RX ORDER — ROCURONIUM BROMIDE 10 MG/ML
INJECTION, SOLUTION INTRAVENOUS AS NEEDED
Status: DISCONTINUED | OUTPATIENT
Start: 2021-12-22 | End: 2021-12-22 | Stop reason: SURG

## 2021-12-22 RX ORDER — ONDANSETRON 2 MG/ML
4 INJECTION INTRAMUSCULAR; INTRAVENOUS ONCE AS NEEDED
Status: DISCONTINUED | OUTPATIENT
Start: 2021-12-22 | End: 2021-12-22 | Stop reason: HOSPADM

## 2021-12-22 RX ORDER — MIDAZOLAM HYDROCHLORIDE 1 MG/ML
2 INJECTION INTRAMUSCULAR; INTRAVENOUS ONCE
Status: COMPLETED | OUTPATIENT
Start: 2021-12-22 | End: 2021-12-22

## 2021-12-22 RX ORDER — NALOXONE HCL 0.4 MG/ML
0.4 VIAL (ML) INJECTION
Status: DISCONTINUED | OUTPATIENT
Start: 2021-12-22 | End: 2021-12-23 | Stop reason: HOSPADM

## 2021-12-22 RX ORDER — OXYCODONE HYDROCHLORIDE 5 MG/1
5 TABLET ORAL
Status: DISCONTINUED | OUTPATIENT
Start: 2021-12-22 | End: 2021-12-22 | Stop reason: HOSPADM

## 2021-12-22 RX ORDER — MAGNESIUM HYDROXIDE 1200 MG/15ML
LIQUID ORAL AS NEEDED
Status: DISCONTINUED | OUTPATIENT
Start: 2021-12-22 | End: 2021-12-22 | Stop reason: HOSPADM

## 2021-12-22 RX ORDER — FENTANYL CITRATE 50 UG/ML
INJECTION, SOLUTION INTRAMUSCULAR; INTRAVENOUS AS NEEDED
Status: DISCONTINUED | OUTPATIENT
Start: 2021-12-22 | End: 2021-12-22 | Stop reason: SURG

## 2021-12-22 RX ORDER — LIDOCAINE HYDROCHLORIDE 20 MG/ML
INJECTION, SOLUTION INFILTRATION; PERINEURAL AS NEEDED
Status: DISCONTINUED | OUTPATIENT
Start: 2021-12-22 | End: 2021-12-22 | Stop reason: SURG

## 2021-12-22 RX ORDER — CEFAZOLIN SODIUM IN 0.9 % NACL 3 G/100 ML
3 INTRAVENOUS SOLUTION, PIGGYBACK (ML) INTRAVENOUS ONCE
Status: COMPLETED | OUTPATIENT
Start: 2021-12-22 | End: 2021-12-22

## 2021-12-22 RX ORDER — ROPIVACAINE HYDROCHLORIDE 5 MG/ML
INJECTION, SOLUTION EPIDURAL; INFILTRATION; PERINEURAL
Status: COMPLETED | OUTPATIENT
Start: 2021-12-22 | End: 2021-12-22

## 2021-12-22 RX ORDER — ONDANSETRON 2 MG/ML
4 INJECTION INTRAMUSCULAR; INTRAVENOUS EVERY 6 HOURS PRN
Status: DISCONTINUED | OUTPATIENT
Start: 2021-12-22 | End: 2021-12-23 | Stop reason: HOSPADM

## 2021-12-22 RX ORDER — SODIUM BICARBONATE 650 MG/1
650 TABLET ORAL 2 TIMES DAILY
Status: DISCONTINUED | OUTPATIENT
Start: 2021-12-22 | End: 2021-12-23 | Stop reason: HOSPADM

## 2021-12-22 RX ORDER — DEXTROSE MONOHYDRATE 25 G/50ML
50 INJECTION, SOLUTION INTRAVENOUS
Status: DISCONTINUED | OUTPATIENT
Start: 2021-12-22 | End: 2021-12-23 | Stop reason: HOSPADM

## 2021-12-22 RX ORDER — PROMETHAZINE HYDROCHLORIDE 25 MG/1
25 SUPPOSITORY RECTAL ONCE AS NEEDED
Status: DISCONTINUED | OUTPATIENT
Start: 2021-12-22 | End: 2021-12-22 | Stop reason: HOSPADM

## 2021-12-22 RX ORDER — SODIUM CHLORIDE, SODIUM LACTATE, POTASSIUM CHLORIDE, CALCIUM CHLORIDE 600; 310; 30; 20 MG/100ML; MG/100ML; MG/100ML; MG/100ML
9 INJECTION, SOLUTION INTRAVENOUS CONTINUOUS PRN
Status: DISCONTINUED | OUTPATIENT
Start: 2021-12-22 | End: 2021-12-23

## 2021-12-22 RX ORDER — PHENYLEPHRINE HCL IN 0.9% NACL 1 MG/10 ML
SYRINGE (ML) INTRAVENOUS AS NEEDED
Status: DISCONTINUED | OUTPATIENT
Start: 2021-12-22 | End: 2021-12-22 | Stop reason: SURG

## 2021-12-22 RX ORDER — MIDAZOLAM HYDROCHLORIDE 1 MG/ML
INJECTION INTRAMUSCULAR; INTRAVENOUS
Status: DISPENSED
Start: 2021-12-22 | End: 2021-12-23

## 2021-12-22 RX ADMIN — MIDAZOLAM HYDROCHLORIDE 2 MG: 1 INJECTION, SOLUTION INTRAMUSCULAR; INTRAVENOUS at 12:27

## 2021-12-22 RX ADMIN — ROCURONIUM BROMIDE 30 MG: 10 INJECTION INTRAVENOUS at 14:45

## 2021-12-22 RX ADMIN — FENTANYL CITRATE 50 MCG: 50 INJECTION, SOLUTION INTRAMUSCULAR; INTRAVENOUS at 14:25

## 2021-12-22 RX ADMIN — KETOROLAC TROMETHAMINE 15 MG: 30 INJECTION, SOLUTION INTRAMUSCULAR; INTRAVENOUS at 17:23

## 2021-12-22 RX ADMIN — ONDANSETRON 4 MG: 2 INJECTION INTRAMUSCULAR; INTRAVENOUS at 23:00

## 2021-12-22 RX ADMIN — OXYCODONE HYDROCHLORIDE AND ACETAMINOPHEN 1 TABLET: 7.5; 325 TABLET ORAL at 17:28

## 2021-12-22 RX ADMIN — SUGAMMADEX 200 MG: 100 INJECTION, SOLUTION INTRAVENOUS at 15:43

## 2021-12-22 RX ADMIN — GABAPENTIN 600 MG: 300 CAPSULE ORAL at 10:59

## 2021-12-22 RX ADMIN — OXYCODONE HYDROCHLORIDE 5 MG: 5 TABLET ORAL at 16:24

## 2021-12-22 RX ADMIN — SODIUM BICARBONATE 650 MG: 650 TABLET ORAL at 21:03

## 2021-12-22 RX ADMIN — SODIUM CHLORIDE 100 ML/HR: 9 INJECTION, SOLUTION INTRAVENOUS at 17:22

## 2021-12-22 RX ADMIN — ONDANSETRON 4 MG: 2 INJECTION INTRAMUSCULAR; INTRAVENOUS at 14:00

## 2021-12-22 RX ADMIN — CARVEDILOL 12.5 MG: 12.5 TABLET, FILM COATED ORAL at 21:03

## 2021-12-22 RX ADMIN — ROCURONIUM BROMIDE 50 MG: 10 INJECTION INTRAVENOUS at 13:47

## 2021-12-22 RX ADMIN — CEFAZOLIN 3 G: 10 INJECTION, POWDER, FOR SOLUTION INTRAVENOUS; PARENTERAL at 21:00

## 2021-12-22 RX ADMIN — Medication 100 MCG: at 14:18

## 2021-12-22 RX ADMIN — FENTANYL CITRATE 100 MCG: 50 INJECTION, SOLUTION INTRAMUSCULAR; INTRAVENOUS at 15:52

## 2021-12-22 RX ADMIN — MIDAZOLAM HYDROCHLORIDE 2 MG: 1 INJECTION, SOLUTION INTRAMUSCULAR; INTRAVENOUS at 12:50

## 2021-12-22 RX ADMIN — MEPERIDINE HYDROCHLORIDE 12.5 MG: 25 INJECTION INTRAMUSCULAR; INTRAVENOUS; SUBCUTANEOUS at 16:01

## 2021-12-22 RX ADMIN — DEXAMETHASONE SODIUM PHOSPHATE 4 MG: 4 INJECTION INTRA-ARTICULAR; INTRALESIONAL; INTRAMUSCULAR; INTRAVENOUS; SOFT TISSUE at 14:00

## 2021-12-22 RX ADMIN — SODIUM CHLORIDE, POTASSIUM CHLORIDE, SODIUM LACTATE AND CALCIUM CHLORIDE: 600; 310; 30; 20 INJECTION, SOLUTION INTRAVENOUS at 15:22

## 2021-12-22 RX ADMIN — SODIUM CHLORIDE, POTASSIUM CHLORIDE, SODIUM LACTATE AND CALCIUM CHLORIDE 9 ML/HR: 600; 310; 30; 20 INJECTION, SOLUTION INTRAVENOUS at 11:01

## 2021-12-22 RX ADMIN — LIDOCAINE HYDROCHLORIDE 100 MG: 20 INJECTION, SOLUTION INFILTRATION; PERINEURAL at 13:47

## 2021-12-22 RX ADMIN — INSULIN HUMAN 10 UNITS: 100 INJECTION, SOLUTION PARENTERAL at 22:56

## 2021-12-22 RX ADMIN — FENTANYL CITRATE 50 MCG: 50 INJECTION, SOLUTION INTRAMUSCULAR; INTRAVENOUS at 13:47

## 2021-12-22 RX ADMIN — Medication 3 G: at 13:53

## 2021-12-22 RX ADMIN — Medication 100 MCG: at 14:11

## 2021-12-22 RX ADMIN — ACETAMINOPHEN 1000 MG: 500 TABLET ORAL at 10:59

## 2021-12-22 RX ADMIN — PROPOFOL 160 MG: 10 INJECTION, EMULSION INTRAVENOUS at 13:47

## 2021-12-22 RX ADMIN — ROPIVACAINE HYDROCHLORIDE 30 ML: 5 INJECTION, SOLUTION EPIDURAL; INFILTRATION; PERINEURAL at 12:58

## 2021-12-22 NOTE — ANESTHESIA PREPROCEDURE EVALUATION
Anesthesia Evaluation     Patient summary reviewed and Nursing notes reviewed   no history of anesthetic complications:  NPO Solid Status: > 8 hours  NPO Liquid Status: > 2 hours           Airway   Mallampati: III  TM distance: >3 FB  Neck ROM: full  No difficulty expected  Dental          Pulmonary - normal exam    breath sounds clear to auscultation  (+) asthma,  Cardiovascular - normal exam  Exercise tolerance: good (4-7 METS)    Rhythm: regular    (+) hypertension, hyperlipidemia,       Neuro/Psych  (+) psychiatric history Anxiety,     GI/Hepatic/Renal/Endo    (+)  hiatal hernia,  renal disease,     Musculoskeletal     Abdominal    Substance History - negative use     OB/GYN negative ob/gyn ROS         Other   arthritis,                      Anesthesia Plan    ASA 3     general with block   (Pt refuses block)    Anesthetic plan, all risks, benefits, and alternatives have been provided, discussed and informed consent has been obtained with: patient and spouse/significant other.

## 2021-12-22 NOTE — PLAN OF CARE
Goal Outcome Evaluation:           Progress: improving  Outcome Summary: pt had a left total knee today, cool temp in use, dressing dry and intact, gave pt pain meds once this shift, pt ambulated from stretcher to bed with gait belt and walker, pt tolerated well

## 2021-12-22 NOTE — ANESTHESIA PROCEDURE NOTES
Peripheral Block      Patient reassessed immediately prior to procedure    Patient location during procedure: pre-op  Reason for block: at surgeon's request and post-op pain management  Performed by  Anesthesiologist: Saad Armstrong MD  Preanesthetic Checklist  Completed: patient identified, IV checked, site marked, risks and benefits discussed, surgical consent, monitors and equipment checked, pre-op evaluation and timeout performed  Prep:  Pt Position: supine  Sterile barriers:alcohol skin prep, partial drape, cap, washed/disinfected hands, mask and gloves  Prep: ChloraPrep  Patient monitoring: blood pressure monitoring, continuous pulse oximetry and EKG  Procedure    Sedation: yes  Performed under: local infiltration  Guidance:ultrasound guided and nerve stimulator    ULTRASOUND INTERPRETATION. Using ultrasound guidance a 20 G gauge needle was placed in close proximity to the nerve, at which point, under ultrasound guidance anesthetic was injected in the area of the nerve and spread of the anesthesia was seen on ultrasound in close proximity thereto.  There were no abnormalities seen on ultrasound; a digital image was taken; and the patient tolerated the procedure with no complications. Images:still images obtained, printed/placed on chart    Laterality:left  Block Type:adductor canal block  Injection Technique:single-shot  Needle Type:echogenic  Needle Gauge:20 G (4in)  Resistance on Injection: none    Medications Used: ropivacaine (NAROPIN) 0.5 % injection, 30 mL      Post Assessment  Injection Assessment: negative aspiration for heme, no paresthesia on injection and incremental injection  Patient Tolerance:comfortable throughout block  Complications:no  Additional Notes  The block or continuous infusion is requested by the referring physician for management of postoperative pain, or pain related to a procedure. Ultrasound guidance (deemed medically necessary). Painless injection, pt was awake and conversant  during the procedure without complications. Needle and surrounding structures visualized throughout procedure. No adverse reactions or complications seen during this period. Post-procedure image showed no signs of complication, and anatomy was consistent with an uncomplicated nerve blockade.

## 2021-12-22 NOTE — H&P
[facility]   HISTORY AND PHYSICAL    Patient Name: Katarzyna Norris  : 1970  MRN: [id number]  Primary Care Physician:  Brian Ellis APRN  Date of admission: (Not on file)    Subjective   Subjective     Chief Complaint: Left knee osteoarthritis    History of Present Illness: The patient is a 51-year-old female for left knee pain.  She has failed conservative measures and wishes to undergo left knee replacement.  She reports knee pain, disability, decreased ability to do normal activities.  She has failed conservative measures.    Review of Systems : Negative except for those mentioned in the history of present illness    Personal History     Past Medical History:   Diagnosis Date   • Anxiety    • Arthritis     GILBERT KNEES   • Asthma     SEASONAL ALLERGIES   • Elevated cholesterol    • Gout 2021   • Hiatal hernia    • Hypertension    • Renal insufficiency     NOT ON DIALYSIS/NATALIIA   • Sinus problem     SEASONAL ALLERGIES       Past Surgical History:   Procedure Laterality Date   •  SECTION   and    • CHOLECYSTECTOMY     • COLONOSCOPY     • ENDOSCOPY     • TUBAL ABDOMINAL LIGATION         Family History: family history includes Diabetes in an other family member; Stroke in an other family member; Throat cancer (age of onset: 73) in her father. Otherwise pertinent FHx was reviewed and not pertinent to current issue.    Social History:  reports that she has quit smoking. She has never used smokeless tobacco. She reports that she does not drink alcohol and does not use drugs.    Home Medications:  Aspirin Buf(CaCarb-MgCarb-MgO), Vitamin D3, Zinc Sulfate, allopurinol, atorvastatin, carvedilol, chlorthalidone, clindamycin, cloNIDine, losartan, oxyCODONE-acetaminophen, predniSONE, sodium bicarbonate, vitamin B-12, vitamin C, and vitamin E    Allergies:  Allergies   Allergen Reactions   • Diltiazem Hcl Anxiety   • Metal Itching     PT STATES COSTUME EARRINGS  CAUSE ITCHING AND IRRITATION TO EARS.       Objective    Objective     Vitals:        Physical Exam   General: No apparent distress, alert and oriented x3  HEENT: Normocephalic/atraumatic  Neck: Supple  Cardiovascular: Regular heart rate  Chest: Unlabored breathing  Abdomen: Soft, nontender nondistended  Musculoskeletal: Decreased range of motion of the knee.  Tender to palpation knee.  Negative Jd.  Stability intact.  Ambulates with antalgic gait  Neurological: Grossly intact    Result Review    Result Review:  I have personally reviewed the results from the time of this admission to 12/21/2021 22:06 EST and agree with these findings:  []  Laboratory  []  Microbiology  [x]  Radiology  []  EKG/Telemetry   []  Cardiology/Vascular   []  Pathology  []  Old records  []  Other:  Most notable findings include: Left knee osteoarthritis    Assessment/Plan   Assessment / Plan     Brief Patient Summary:  Katarzyna Norris is a 51 y.o. female who has left knee osteoarthritis    Active Hospital Problems:  Active Hospital Problems    Diagnosis    • **Osteoarthritis of left knee      Plan: We discussed treatment options with patient.  Operative versus nonoperative treatment was discussed and she wished to proceed with operative treatment.  Informed consent was obtained she wished to proceed.  Risk and benefits were discussed.  Plan for left knee replacement      DVT prophylaxis:  No DVT prophylaxis order currently exists.    CODE STATUS:       Admission Status:  I believe this patient meets outpatient status.    Marco Nelson MD

## 2021-12-22 NOTE — OP NOTE
TOTAL KNEE ARTHROPLASTY  Procedure Report    Patient Name:  Katarzyna Norris  YOB: 1970    Date of Surgery:  12/22/2021     Indications: The patient has advanced left knee osteoarthritis.  She has failed conservative measures and wishes to undergo knee replacement.  We discussed risks and benefits of surgery with the patient.  Risk of bleeding, infection, damage to neurovascular structures, heart attack, stroke, DVT/PE, anesthesia complications including death, continued pain disability, instability, periprosthetic fracture, deep infection, among others were discussed.  Informed consent was obtained she wished to proceed.    Pre-op Diagnosis:   Osteoarthritis of left knee [M17.12]       Post-Op Diagnosis Codes:     * Osteoarthritis of left knee [M17.12]    Procedure/CPT® Codes:      Procedure(s):  LEFT TOTAL KNEE ARTHROPLASTY    Staff:  Surgeon(s):  Marco Nelson MD    Assistant: Dionte Portillo PA    Anesthesia: General    Estimated Blood Loss: 150 mL    Implants:    Implant Name Type Inv. Item Serial No.  Lot No. LRB No. Used Action   CMT BONE PALACOS R HI/VISC 1X40 - [id number] Implant CMT BONE PALACOS R HI/VISC 1X40  [facility] [id number] Left 2 Implanted   PAT PERSONA ALLPOLY CMT 8X29MM - [id number] Implant PAT PERSONA ALLPOLY CMT 8X29MM  [employer] [id number] Left 1 Implanted   STEM TIB PERSONA CMT 5D SZC LT - [id number] Implant STEM TIB PERSONA CMT 5D SZC LT  [employer] [id number] Left 1 Implanted   COMP FEM/HIP PERSONA TIVANIUM CR CMT NRW SZ6 LT - [id number] Implant COMP FEM/HIP PERSONA TIVANIUM CR CMT NRW SZ6 LT  [employer] [id number] Left 1 Implanted   ART/SRF KN PERSONA/VE PS CD/CR 6TO7 10MM LT - [id number] Implant ART/SRF KN PERSONA/VE PS CD/CR 6TO7 10MM LT  [employer] [id number] Left 1 Implanted   CAP TOTL KN CMT PRIMARY - [id number] Implant CAP TOTL KN CMT PRIMARY  [employer]  Left 1 Implanted   CAP EXT STEM KN UPCHRG - [id number] Implant CAP  EXT STEM KN UPCHRG  [employer] [facility]  Left 1 Implanted       Specimen:          None        Findings: Left knee osteoarthritis    Complications: None    Description of Procedure: The patient was brought to the operating room and placed supine on the OR table.  General anesthesia was given.  Preoperatively, the patient received an adductor canal nerve block. The patient was placed supine on the OR table.  All bony prominences were padded. The tourniquet was placed on the thigh. The affected lower extremity was prepped and draped in the usual sterile fashion. Preoperative antibiotic was given. Tranexamic acid intravenously was given at the beginning and the end of the surgery.  At this point, an Esmarch was used to exsanguinate the limb and the tourniquet was inflated. A longitudinal incision was made over the knee and a medial parapatellar arthrotomy was performed.  Appropriate releases were performed to the proximal medial tibia. The patellar fat pad was incised.  The patella was subluxed laterally and the knee was examined. There was severe tricompartmental wear and osteophyte formation.  The medial and lateral menisci were removed.  Osteophytes in the femur were debrided and intramedullary drill hole was made and a guide was placed in the femur.  A distal femoral cut was made at 5 degrees valgus angulation. At this point, we then sized the femur with posterior referencing with 3 degrees external rotation, pinning the cutting block into place.  The anterior, posterior, and chamfer cuts were made.  The bone was removed.  The tibia was then subluxed anteriorly and the extramedullary tibial guide was placed.  A 7 degree posterior slope was used.  This was pinned into place in alignment with the tibial crest and the second ray.  The proximal tibial cut was then made.  The spacer block was able to fit well in flexion and extension. Therefore, the tibial pins were removed. We then placed the knee in flexion where laminar  spreaders were used to open the flexion gaps.  The menisci were removed.  Osteophytes were removed from the posterior femur. The posterior capsule was injected with anesthetic cocktail.  At this point, the appropriately sized tibial tray was chosen.  This was pinned into place in line with the medial one third of the tibial tubercle. We then placed the trial femur. The trial insert was placed and the knee was brought to full extension. It was stable to varus and valgus stress.  We then everted the patella. It was resected and drilled, and a trial patella was placed. The patella tracked well with no evidence of instability.  The knee was then trialed with range of motion again. The femoral drill holes were made. The tibia was finished with the drill and the punch.  The components were removed. The knee was irrigated and dried. The cement was mixed and the tibia and femur were cemented into place.  The medial congruent spacer was then inserted.  The patella was cemented into place. The knee was irrigated with Irrisept and normal saline Pulsavac lavage.  The medial congruent polyethylene was inserted. The knee was stable to varus and valgus stress. There was good balance to the ligaments medially and laterally. There was good flexion with a stable patella. At this point, the tourniquet was released.  There was minimal bleeding controlled with electrocautery. The arthrotomy was closed with #1 Vicryl at 30 degrees of flexion, the subcutaneous tissues with 2-0 Vicryl, and the skin with staples.  An Aquacel dressing was placed and an Ace wrap was placed. Patient awoke from anesthesia in stable condition. There were no complications. All counts were correct.           Assistant: Dionte Portillo PA  was responsible for performing the following activities: Retraction, Suction, Irrigation and Placing Dressing and their skilled assistance was necessary for the success of this case.    Marco Nelson MD     Date:  12/22/2021  Time: 15:49 EST

## 2021-12-22 NOTE — ANESTHESIA POSTPROCEDURE EVALUATION
Patient: Katarzyna Norris    Procedure Summary     Date: 12/22/21 Room / Location: [facility] OR 03 / [facility] MAIN OR    Anesthesia Start: 1343 Anesthesia Stop: 1557    Procedure: TOTAL KNEE ARTHROPLASTY (Left Knee) Diagnosis:       Osteoarthritis of left knee      (Osteoarthritis of left knee [M17.12])    Surgeons: Marco Nelson MD Provider: Saad Armstrong MD    Anesthesia Type: general with block ASA Status: 3          Anesthesia Type: general with block    Vitals  Vitals Value Taken Time   /96 12/22/21 1612   Temp 36.8 °C (98.2 °F) 12/22/21 1557   Pulse 88 12/22/21 1613   Resp 18 12/22/21 1557   SpO2 96 % 12/22/21 1613   Vitals shown include unvalidated device data.        Post Anesthesia Care and Evaluation    Patient location during evaluation: bedside  Patient participation: complete - patient participated  Level of consciousness: sleepy but conscious, responsive to verbal stimuli and responsive to painful stimuli  Pain score: 2  Pain management: adequate  Airway patency: patent  Anesthetic complications: No anesthetic complications  PONV Status: none  Cardiovascular status: acceptable and stable  Respiratory status: acceptable and nasal cannula  Hydration status: acceptable    Comments: An Anesthesiologist personally participated in the most demanding procedures (including induction and emergence if applicable) in the anesthesia plan, monitored the course of anesthesia administration at frequent intervals and remained physically present and available for immediate diagnosis and treatment of emergencies.

## 2021-12-23 VITALS
OXYGEN SATURATION: 97 % | WEIGHT: 270.28 LBS | DIASTOLIC BLOOD PRESSURE: 83 MMHG | HEART RATE: 106 BPM | BODY MASS INDEX: 53.06 KG/M2 | RESPIRATION RATE: 22 BRPM | TEMPERATURE: 97.5 F | SYSTOLIC BLOOD PRESSURE: 164 MMHG | HEIGHT: 60 IN

## 2021-12-23 LAB
ANION GAP SERPL CALCULATED.3IONS-SCNC: 11.6 MMOL/L (ref 5–15)
ANION GAP SERPL CALCULATED.3IONS-SCNC: 16.9 MMOL/L (ref 5–15)
BUN SERPL-MCNC: 54 MG/DL (ref 6–20)
BUN SERPL-MCNC: 57 MG/DL (ref 6–20)
BUN/CREAT SERPL: 17.3 (ref 7–25)
BUN/CREAT SERPL: 17.5 (ref 7–25)
CALCIUM SPEC-SCNC: 9.5 MG/DL (ref 8.6–10.5)
CALCIUM SPEC-SCNC: 9.7 MG/DL (ref 8.6–10.5)
CHLORIDE SERPL-SCNC: 101 MMOL/L (ref 98–107)
CHLORIDE SERPL-SCNC: 105 MMOL/L (ref 98–107)
CO2 SERPL-SCNC: 19.1 MMOL/L (ref 22–29)
CO2 SERPL-SCNC: 23.4 MMOL/L (ref 22–29)
CREAT SERPL-MCNC: 3.09 MG/DL (ref 0.57–1)
CREAT SERPL-MCNC: 3.29 MG/DL (ref 0.57–1)
GFR SERPL CREATININE-BSD FRML MDRD: 15 ML/MIN/1.73
GFR SERPL CREATININE-BSD FRML MDRD: 16 ML/MIN/1.73
GLUCOSE BLDC GLUCOMTR-MCNC: 162 MG/DL (ref 70–99)
GLUCOSE SERPL-MCNC: 113 MG/DL (ref 65–99)
GLUCOSE SERPL-MCNC: 141 MG/DL (ref 65–99)
HCT VFR BLD AUTO: 31.5 % (ref 34–46.6)
HGB BLD-MCNC: 11.1 G/DL (ref 12–15.9)
POTASSIUM SERPL-SCNC: 4.9 MMOL/L (ref 3.5–5.2)
POTASSIUM SERPL-SCNC: 5.7 MMOL/L (ref 3.5–5.2)
SODIUM SERPL-SCNC: 137 MMOL/L (ref 136–145)
SODIUM SERPL-SCNC: 140 MMOL/L (ref 136–145)

## 2021-12-23 PROCEDURE — A9270 NON-COVERED ITEM OR SERVICE: HCPCS | Performed by: PHYSICIAN ASSISTANT

## 2021-12-23 PROCEDURE — 97165 OT EVAL LOW COMPLEX 30 MIN: CPT

## 2021-12-23 PROCEDURE — 99217 PR OBSERVATION CARE DISCHARGE MANAGEMENT: CPT | Performed by: INTERNAL MEDICINE

## 2021-12-23 PROCEDURE — 63710000001 SODIUM BICARBONATE 650 MG TABLET: Performed by: INTERNAL MEDICINE

## 2021-12-23 PROCEDURE — 80048 BASIC METABOLIC PNL TOTAL CA: CPT | Performed by: PHYSICIAN ASSISTANT

## 2021-12-23 PROCEDURE — 97116 GAIT TRAINING THERAPY: CPT

## 2021-12-23 PROCEDURE — 97161 PT EVAL LOW COMPLEX 20 MIN: CPT

## 2021-12-23 PROCEDURE — A9270 NON-COVERED ITEM OR SERVICE: HCPCS | Performed by: INTERNAL MEDICINE

## 2021-12-23 PROCEDURE — 82962 GLUCOSE BLOOD TEST: CPT

## 2021-12-23 PROCEDURE — 94799 UNLISTED PULMONARY SVC/PX: CPT

## 2021-12-23 PROCEDURE — 97535 SELF CARE MNGMENT TRAINING: CPT

## 2021-12-23 PROCEDURE — 94760 N-INVAS EAR/PLS OXIMETRY 1: CPT

## 2021-12-23 PROCEDURE — 85018 HEMOGLOBIN: CPT | Performed by: ORTHOPAEDIC SURGERY

## 2021-12-23 PROCEDURE — 63710000001 INSULIN REGULAR HUMAN PER 5 UNITS: Performed by: PHYSICIAN ASSISTANT

## 2021-12-23 PROCEDURE — 97150 GROUP THERAPEUTIC PROCEDURES: CPT

## 2021-12-23 PROCEDURE — 85014 HEMATOCRIT: CPT | Performed by: ORTHOPAEDIC SURGERY

## 2021-12-23 PROCEDURE — 63710000001 CARVEDILOL 12.5 MG TABLET: Performed by: INTERNAL MEDICINE

## 2021-12-23 PROCEDURE — 63710000001 SODIUM ZIRCONIUM CYCLOSILICATE 10 G PACK: Performed by: PHYSICIAN ASSISTANT

## 2021-12-23 PROCEDURE — 25010000002 CEFAZOLIN PER 500 MG: Performed by: ORTHOPAEDIC SURGERY

## 2021-12-23 PROCEDURE — 63710000001 OXYCODONE-ACETAMINOPHEN 7.5-325 MG TABLET: Performed by: ORTHOPAEDIC SURGERY

## 2021-12-23 PROCEDURE — A9270 NON-COVERED ITEM OR SERVICE: HCPCS | Performed by: ORTHOPAEDIC SURGERY

## 2021-12-23 RX ORDER — DEXTROSE MONOHYDRATE 25 G/50ML
50 INJECTION, SOLUTION INTRAVENOUS ONCE
Status: COMPLETED | OUTPATIENT
Start: 2021-12-23 | End: 2021-12-23

## 2021-12-23 RX ORDER — ASPIRIN 325 MG
325 TABLET, DELAYED RELEASE (ENTERIC COATED) ORAL DAILY
Qty: 14 TABLET | Refills: 0 | Status: SHIPPED | OUTPATIENT
Start: 2021-12-23 | End: 2022-05-02

## 2021-12-23 RX ORDER — OXYCODONE AND ACETAMINOPHEN 7.5; 325 MG/1; MG/1
1-2 TABLET ORAL EVERY 4 HOURS PRN
Qty: 40 TABLET | Refills: 0 | Status: SHIPPED | OUTPATIENT
Start: 2021-12-23 | End: 2022-01-04 | Stop reason: SDUPTHER

## 2021-12-23 RX ORDER — CEPHALEXIN 500 MG/1
500 CAPSULE ORAL EVERY 12 HOURS
Qty: 10 CAPSULE | Refills: 0 | Status: SHIPPED | OUTPATIENT
Start: 2021-12-23 | End: 2022-03-25 | Stop reason: SDUPTHER

## 2021-12-23 RX ADMIN — INSULIN HUMAN 10 UNITS: 100 INJECTION, SOLUTION PARENTERAL at 07:15

## 2021-12-23 RX ADMIN — OXYCODONE HYDROCHLORIDE AND ACETAMINOPHEN 2 TABLET: 7.5; 325 TABLET ORAL at 09:08

## 2021-12-23 RX ADMIN — CEFAZOLIN 3 G: 10 INJECTION, POWDER, FOR SOLUTION INTRAVENOUS; PARENTERAL at 05:27

## 2021-12-23 RX ADMIN — OXYCODONE HYDROCHLORIDE AND ACETAMINOPHEN 1 TABLET: 7.5; 325 TABLET ORAL at 01:59

## 2021-12-23 RX ADMIN — DEXTROSE MONOHYDRATE 50 ML: 25 INJECTION, SOLUTION INTRAVENOUS at 07:15

## 2021-12-23 RX ADMIN — SODIUM BICARBONATE 650 MG: 650 TABLET ORAL at 09:09

## 2021-12-23 RX ADMIN — CARVEDILOL 12.5 MG: 12.5 TABLET, FILM COATED ORAL at 09:08

## 2021-12-23 RX ADMIN — SODIUM ZIRCONIUM CYCLOSILICATE 10 G: 10 POWDER, FOR SUSPENSION ORAL at 07:15

## 2021-12-23 NOTE — DISCHARGE SUMMARY
[facility]         HOSPITALIST  DISCHARGE SUMMARY    Patient Name: Katarzyna Norris  : 1970  MRN: [id number]    Date of Admission: 2021  Date of Discharge: 2021  Primary Care Physician: Brian Ellis APRN  Reason for admission  Left knee pain    Final diagnosis:  · Left knee osteoarthritis status post left knee arthroplasty  · Hypertension  · Mild SUZANNE  · Hyperkalemia  · Chronic renal insufficiency  · Hypertension  · Hyperlipidemia  · Gout        Consults     Date and Time Order Name Status Description    2021  8:00 AM Inpatient Nephrology Consult      2021  4:46 PM Inpatient Hospitalist Consult            Active and Resolved Hospital Problems:  Active Hospital Problems    Diagnosis POA   • **Osteoarthritis of left knee [M17.12] Unknown      Resolved Hospital Problems   No resolved problems to display.       Hospital Course     Hospital Course:  Katarzyna Norris is a 51 y.o. female with advanced left knee osteoarthritis that failed conservative measures.  She presented today for an elective left total knee arthroplasty by Dr. Nelson.  Medicine service is consulted postoperatively to assist him medical management.   Patient with past medical history significant for chronic kidney disease hypertension hyperlipidemia.  Patient was given IV fluids overnight IV and oral analgesics provided started on DVT prophylaxis postoperative day 1.  Postoperative course complicated by mild SUZANNE and hyperkalemia hyperkalemia treated appropriately repeat chemistries show normalized potassium levels.  Nephrology consulted will hold losartan and start low potassium diet patient follow-up with repeat BMP in 1 week follow-up with nephrology 3 to 4 weeks.  Patient is currently doing well she will be discharged in stable condition with orders to follow-up with her PCP in a week follow-up with nephrology 3 to 4 weeks repeat BMP 1 week follow-up orthopedic surgery  postoperative day 14 for staple removal.      DISCHARGE Follow Up Recommendations for labs and diagnostics: PCP 1 week orthopedic surgery postoperative day 14 nephrology 3 to 4 weeks repeat BMP 1 week    Day of Discharge     Vital Signs:  Temp:  [97.5 °F (36.4 °C)-99.2 °F (37.3 °C)] 97.5 °F (36.4 °C)  Heart Rate:  [] 106  Resp:  [11-22] 22  BP: (135-179)/(64-97) 164/83  Physical Exam:    Constitutional: sleeping but awakens easily . nad.              Eyes: Pupils equal, sclerae anicteric, no conjunctival injection              HENT: NCAT, mucous membranes moist              Neck: Supple, no thyromegaly, no lymphadenopathy, trachea midline              Respiratory: Clear to auscultation bilaterally, nonlabored respirations               Cardiovascular: RRR, no murmurs              Gastrointestinal: Positive bowel sounds, soft, nontender, nondistended              Musculoskeletal: Left lower extremity neurovascularly intact              Psychiatric: Appropriate affect, cooperative              Neurologic: Oriented x 3, speech clear              Skin: No rashes          Discharge Details        Discharge Medications      New Medications      Instructions Start Date   apixaban 2.5 MG tablet tablet  Commonly known as: ELIQUIS   2.5 mg, Oral, 2 Times Daily      aspirin  MG tablet  Commonly known as: Ecotrin   325 mg, Oral, Daily      cephalexin 500 MG capsule  Commonly known as: KEFLEX   500 mg, Oral, Every 12 Hours      oxyCODONE-acetaminophen 7.5-325 MG per tablet  Commonly known as: PERCOCET  Replaces: oxyCODONE-acetaminophen 5-325 MG per tablet   1-2 tablets, Oral, Every 4 Hours PRN         Stop These Medications    oxyCODONE-acetaminophen 5-325 MG per tablet  Commonly known as: PERCOCET  Replaced by: oxyCODONE-acetaminophen 7.5-325 MG per tablet        ASK your doctor about these medications      Instructions Start Date   allopurinol 300 MG tablet  Commonly known as: ZYLOPRIM   1 tablet, Oral, Daily       Aspirin Buf(CaCarb-MgCarb-MgO) 81 MG tablet   81 mg, Oral, Daily, TAKES FOR PREVENTATIVE HEART HEALTH, LD 12/14/21      atorvastatin 20 MG tablet  Commonly known as: LIPITOR   20 mg, Oral, Daily      carvedilol 12.5 MG tablet  Commonly known as: COREG   12.5 mg, Oral, 2 Times Daily With Meals      chlorthalidone 25 MG tablet  Commonly known as: HYGROTON   12.5 mg, Oral, Every Early Morning      clindamycin 300 MG capsule  Commonly known as: CLEOCIN   300 mg, Oral, 3 Times Daily      cloNIDine 0.1 MG tablet  Commonly known as: CATAPRES   0.1 mg, Oral, 3 Times Daily      losartan 100 MG tablet  Commonly known as: COZAAR   100 mg, Oral, Daily      predniSONE 50 MG tablet  Commonly known as: DELTASONE   50 mg, Oral, Daily      sodium bicarbonate 650 MG tablet   650 mg, Oral, 3 Times Daily      vitamin B-12 100 MCG tablet  Commonly known as: CYANOCOBALAMIN   50 mcg, Oral, Daily      vitamin C 250 MG tablet  Commonly known as: ASCORBIC ACID   250 mg, Oral, Daily      Vitamin D3 1.25 MG (27644 UT) tablet   1 tablet, Oral, Every Morning      vitamin E 100 UNIT capsule   100 Units, Oral, Daily      ZINC 15 PO   Oral             Allergies   Allergen Reactions   • Diltiazem Hcl Anxiety   • Metal Itching     PT STATES COSTUME EARRINGS CAUSE ITCHING AND IRRITATION TO EARS.       Discharge Disposition:      Diet:  Hospital:  Diet Order   Procedures   • Diet Regular; Low Potassium       Discharge Activity:   Activity Instructions     Discharge Activity      Discharge home when medically able  Weightbearing as tolerated with walker  Physical therapy  Cold therapy  Postoperative day #2 please change to new Aquacel.  Then leave 5 to 7 days and change daily.  May shower with dressing in place.  Please start aspirin after Eliquis completed          CODE STATUS:  There are no questions and answers to display.         Future Appointments   Date Time Provider Department Center   12/27/2021  7:30 AM Oneyda Kennedy, PT [facility]  [facility]   1/6/2022 10:15 AM Marco Nelson MD [facility]S [facility]   2/24/2022  8:00 AM Brian Ellis APRN [facility]   2/24/2022  8:15 AM Brian Ellis APRN [facility]       Additional Instructions for the Follow-ups that You Need to Schedule     Discharge Follow-up with Specified Provider: Nelson; 2 Weeks   As directed      To: Omar    Follow Up: 2 Weeks               Pertinent  and/or Most Recent Results     PROCEDURES:   Left total knee arthroplasty    LAB RESULTS:      Lab 12/23/21  0442   HEMOGLOBIN 11.1*   HEMATOCRIT 31.5*         Lab 12/23/21  0924 12/23/21  0237 12/22/21  2151   SODIUM 137 140 136   POTASSIUM 4.9 5.7* 5.6*   CHLORIDE 101 105 104   CO2 19.1* 23.4 19.6*   ANION GAP 16.9* 11.6 12.4   BUN 57* 54* 50*   CREATININE 3.29* 3.09* 2.93*   GLUCOSE 113* 141* 145*   CALCIUM 9.5 9.7 9.5   PHOSPHORUS  --   --  4.8*         Lab 12/22/21  2151   ALBUMIN 3.90                     Brief Urine Lab Results  (Last result in the past 365 days)      Color   Clarity   Blood   Leuk Est   Nitrite   Protein   CREAT   Urine HCG        08/24/21 0855             47.0         08/24/21 0855 Yellow   Clear   Negative   Negative   Negative   100 mg/dL (2+)               Microbiology Results (last 10 days)     Procedure Component Value - Date/Time    COVID-19,APTIMA PANTHER(CHANDRA), [place]/ ARIAS, NP/OP SWAB IN UTM/VTM/SALINE TRANSPORT MEDIA,24 HR TAT - Swab, Nasopharynx [[id number]]  (Normal) Collected: 12/17/21 1511    Lab Status: Edited Result - FINAL Specimen: Swab from Nasopharynx Updated: 12/20/21 1409     COVID19 Not Detected    Narrative:      Fact sheet for providers: https://www.fda.gov/media/338097/download     Fact sheet for patients: https://www.fda.gov/media/850008/download    Test performed by RT PCR.                           Labs Pending at Discharge:        Time spent on Discharge including face to face service: 35 minutes    Electronically signed by SARAH Lobato,  12/23/21, 12:35 PM EST.    Condition at discharge: Stable for discharge    Attending Note:  I have seen and examined the patient on the day of discharge and agree with above per Mr. Bel PA-C.  Pleasant 51-year-old female with CKD presented for left knee arthroplasty.  Postoperatively she was hyperkalemic, had a slight increase in serum creatinine.  Nephrology was consulted.  Repeat chemistry showed an improved potassium, creatinine slightly up.  We will hold her losartan upon discharge.  Patient said she did not realize that oranges were high in potassium and she had been eating about half a dozen oranges a day.  Counseled her on potassium intake.  Said that she was taking vitamin D to help prevent getting Covid.  She is not vaccinated.  Encouraged her that if she does not want to get COVID that vaccine is the most proven means to accomplish that and strongly encouraged her to consider vaccination.  Otherwise stable for dc.  Patient to followup with primary care provider.  Pt to get BMP 1 week per nephro recs.  Electronically signed by Marco Caraballo MD, 12/23/21, 1:57 PM EST.

## 2021-12-23 NOTE — PROGRESS NOTES
[facility]     Progress Note    Patient Name: Katarzyna Norris  : 1970  MRN: [id number]  Primary Care Physician:  Brian Ellis APRN  Date of admission: 2021    Subjective   Subjective     HPI:  Patient Reports pain is controlled.  No complaints of chest pain or shortness of air.  No new events overnight    Review of Systems   See HPI    Objective   Objective     Vitals:   Temp:  [97.6 °F (36.4 °C)-99.2 °F (37.3 °C)] 97.9 °F (36.6 °C)  Heart Rate:  [] 86  Resp:  [11-22] 22  BP: (135-181)/() 149/74  Physical Exam    General: Alert, no acute distress   Chest: Unlabored breathing, cardiovascular: Regular heart rate  Musculoskeletal: Dressing intact.  Neurovascular intact.  Negative Jd     Result Review      Hemoglobin   Date Value Ref Range Status   2021 11.1 (L) 12.0 - 15.9 g/dL Final     Hematocrit   Date Value Ref Range Status   2021 31.5 (L) 34.0 - 46.6 % Final        Result Review:  I have personally reviewed the results from the time of this admission to 2021 08:04 EST and agree with these findings:  [x]  Laboratory  []  Microbiology  []  Radiology  []  EKG/Telemetry   []  Cardiology/Vascular   []  Pathology  []  Old records  []  Other:      Assessment/Plan   Assessment / Plan     Brief Patient Summary:  Katarzyna Norris is a 51 y.o. female who is status post left knee replacement    Active Hospital Problems:  Active Hospital Problems    Diagnosis    • **Osteoarthritis of left knee      Plan: Weightbearing as tolerated with walker.  Physical and Occupational Therapy.  Pain control.  DVT prophylaxis.  Plan for discharge home when medically able       DVT prophylaxis:  Medical and mechanical DVT prophylaxis orders are present.    CODE STATUS:      Disposition:  I expect patient to be discharged when medically able.    Electronically signed by Marco Nelson MD, 21, 8:04 AM EST.

## 2021-12-23 NOTE — PLAN OF CARE
Goal Outcome Evaluation:  Plan of Care Reviewed With: patient, spouse        Progress: no change (initial evaluation encounter)  Outcome Summary: Patient is POD 1 L total knee arthroplasty, WBAT LLE. Patient has experienced decline in function from baseline status, presenting with deficits related to functional balance, activity tolerance, safety awareness, ADL transfers, functional mobility, and self care management.  Patient will benefit from skilled occupational therapy intervention to address above-mentioned deficits and promote return to baseline.

## 2021-12-23 NOTE — SIGNIFICANT NOTE
12/23/21 1337   Plan   Final Discharge Disposition Code 01 - home or self-care   Final Note [facility] PT in [place]. Appt: 12/27/21 at 7:30am

## 2021-12-23 NOTE — CONSULTS
Patient Care Team:  Brian Ellis APRN as PCP - General (Nurse Practitioner)    Chief complaint: SUZANNE/CKD4/Hyperkalemia    Subjective     History of Present Illness  52yo with h/o CKD4 followed by Dr. Carvalho in our office.  She had left TKA yesterday.  Creatinine baseline appears to be around 2.8-3.0.  Her creatinine was elevated to 3.29 with K+ of 5.7 and we were asked to see her.  She is sitting in chair with no complaints at this time.    Review of Systems   Respiratory: Negative for cough and shortness of breath.    Cardiovascular: Negative for chest pain.   Gastrointestinal: Negative for abdominal pain.   Musculoskeletal: Negative for back pain.   Skin: Negative for rash.   Neurological: Negative for headaches.        Past Medical History:   Diagnosis Date   • Anxiety    • Arthritis     GILBERT KNEES   • Asthma     SEASONAL ALLERGIES   • Elevated cholesterol    • Gout 2021   • Hiatal hernia    • Hypertension    • Renal insufficiency     NOT ON DIALYSIS/NATALIIA   • Sinus problem     SEASONAL ALLERGIES   ,   Past Surgical History:   Procedure Laterality Date   •  SECTION   and    • CHOLECYSTECTOMY     • COLONOSCOPY     • ENDOSCOPY     • TOTAL KNEE ARTHROPLASTY Left 2021    Procedure: TOTAL KNEE ARTHROPLASTY;  Surgeon: Marco Nelson MD;  Location: [facility] MAIN OR;  Service: Orthopedics;  Laterality: Left;   • TUBAL ABDOMINAL LIGATION     ,   Family History   Problem Relation Age of Onset   • Throat cancer Father 73   • Stroke Other    • Diabetes Other    • Malig Hyperthermia Neg Hx    ,   Social History     Socioeconomic History   • Marital status:    Tobacco Use   • Smoking status: Former Smoker   • Smokeless tobacco: Never Used   Vaping Use   • Vaping Use: Never used   Substance and Sexual Activity   • Alcohol use: Never   • Drug use: Never     E-cigarette/Vaping   • E-cigarette/Vaping Use Never User      E-cigarette/Vaping Substances      E-cigarette/Vaping Devices       ,   Medications Prior to Admission   Medication Sig Dispense Refill Last Dose   • allopurinol (ZYLOPRIM) 300 MG tablet Take 1 tablet by mouth Daily.   12/21/2021 at Unknown time   • Aspirin Buf,CaCarb-MgCarb-MgO, 81 MG tablet Take 81 mg by mouth Daily. TAKES FOR PREVENTATIVE HEART HEALTH, LD 12/14/21   Past Week at Unknown time   • atorvastatin (LIPITOR) 20 MG tablet Take 1 tablet by mouth Daily. (Patient taking differently: Take 20 mg by mouth Every Night.) 90 tablet 2 12/21/2021 at Unknown time   • carvedilol (COREG) 12.5 MG tablet Take 1 tablet by mouth 2 (Two) Times a Day With Meals. (Patient taking differently: Take 12.5 mg by mouth Every Morning.) 90 tablet 2 12/22/2021 at 0800   • Cholecalciferol (Vitamin D3) 1.25 MG (06112 UT) tablet Take 1 tablet by mouth Every Morning.   Past Week at Unknown time   • cloNIDine (CATAPRES) 0.1 MG tablet Take 1 tablet by mouth 3 (Three) Times a Day. 90 tablet 1 12/22/2021 at Unknown time   • losartan (COZAAR) 100 MG tablet Take 1 tablet by mouth Daily. (Patient taking differently: Take 50 mg by mouth Every Night.) 90 tablet 2 12/21/2021 at Unknown time   • sodium bicarbonate 650 MG tablet Take 1 tablet by mouth 3 (Three) Times a Day. (Patient taking differently: Take 650 mg by mouth 2 (Two) Times a Day.) 270 tablet 0 12/21/2021 at Unknown time   • vitamin B-12 (CYANOCOBALAMIN) 100 MCG tablet Take 50 mcg by mouth Daily.   Past Week at Unknown time   • vitamin C (ASCORBIC ACID) 250 MG tablet Take 250 mg by mouth Daily.   Past Week at Unknown time   • Zinc Sulfate (ZINC 15 PO) Take  by mouth.   Past Week at Unknown time   • chlorthalidone (HYGROTON) 25 MG tablet Take 12.5 mg by mouth Every Morning.      • clindamycin (CLEOCIN) 300 MG capsule Take 1 capsule by mouth 3 (Three) Times a Day. 30 capsule 0    • oxyCODONE-acetaminophen (PERCOCET) 5-325 MG per tablet Take 1 tablet by mouth Every 6 (Six) Hours As Needed for Severe Pain . 8 tablet 0     • predniSONE (DELTASONE) 50 MG tablet Take 1 tablet by mouth Daily. 5 tablet 0    • vitamin E 100 UNIT capsule Take 100 Units by mouth Daily.      , Scheduled Meds:  acetaminophen, 1,000 mg, Oral, Q8H  carvedilol, 12.5 mg, Oral, BID With Meals  enoxaparin, 30 mg, Subcutaneous, Q24H  sodium bicarbonate, 650 mg, Oral, BID  tranexamic acid, 2,000 mg, Topical, Once    , Continuous Infusions:  sodium chloride, 100 mL/hr, Last Rate: 100 mL/hr (12/22/21 1722)    , PRN Meds:  dextrose  •  magnesium hydroxide  •  Morphine **AND** naloxone  •  ondansetron **OR** ondansetron  •  oxyCODONE-acetaminophen  •  oxyCODONE-acetaminophen and Allergies:  Diltiazem hcl and Metal    Objective     Vital Signs  Temp:  [97.5 °F (36.4 °C)-99.2 °F (37.3 °C)] 97.5 °F (36.4 °C)  Heart Rate:  [] 106  Resp:  [11-22] 22  BP: (135-179)/(64-97) 164/83    I/O this shift:  In: 300 [P.O.:300]  Out: -   I/O last 3 completed shifts:  In: 1420 [P.O.:120; I.V.:1200; IV Piggyback:100]  Out: 150 [Blood:150]    Physical Exam  Constitutional:       Appearance: Normal appearance.   HENT:      Head: Normocephalic.      Mouth/Throat:      Mouth: Mucous membranes are moist.   Eyes:      General: No scleral icterus.     Pupils: Pupils are equal, round, and reactive to light.   Cardiovascular:      Rate and Rhythm: Normal rate and regular rhythm.      Pulses: Normal pulses.      Heart sounds: Normal heart sounds.   Pulmonary:      Effort: Pulmonary effort is normal.      Breath sounds: Normal breath sounds.   Abdominal:      General: Abdomen is flat.   Musculoskeletal:         General: Normal range of motion.   Skin:     General: Skin is warm and dry.   Neurological:      General: No focal deficit present.      Mental Status: She is alert.         Results Review:    I reviewed the patient's new clinical results.    WBC No results found for: WBC   HGB Hemoglobin   Date Value Ref Range Status   12/23/2021 11.1 (L) 12.0 - 15.9 g/dL Final      HCT Hematocrit    Date Value Ref Range Status   12/23/2021 31.5 (L) 34.0 - 46.6 % Final      Platlets No results found for: LABPLAT   MCV No results found for: MCV       Sodium Sodium   Date Value Ref Range Status   12/23/2021 137 136 - 145 mmol/L Final   12/23/2021 140 136 - 145 mmol/L Final   12/22/2021 136 136 - 145 mmol/L Final      Potassium Potassium   Date Value Ref Range Status   12/23/2021 4.9 3.5 - 5.2 mmol/L Final   12/23/2021 5.7 (H) 3.5 - 5.2 mmol/L Final   12/22/2021 5.6 (H) 3.5 - 5.2 mmol/L Final      Chloride Chloride   Date Value Ref Range Status   12/23/2021 101 98 - 107 mmol/L Final   12/23/2021 105 98 - 107 mmol/L Final   12/22/2021 104 98 - 107 mmol/L Final      CO2 CO2   Date Value Ref Range Status   12/23/2021 19.1 (L) 22.0 - 29.0 mmol/L Final   12/23/2021 23.4 22.0 - 29.0 mmol/L Final   12/22/2021 19.6 (L) 22.0 - 29.0 mmol/L Final      BUN BUN   Date Value Ref Range Status   12/23/2021 57 (H) 6 - 20 mg/dL Final   12/23/2021 54 (H) 6 - 20 mg/dL Final   12/22/2021 50 (H) 6 - 20 mg/dL Final      Creatinine Creatinine   Date Value Ref Range Status   12/23/2021 3.29 (H) 0.57 - 1.00 mg/dL Final   12/23/2021 3.09 (H) 0.57 - 1.00 mg/dL Final   12/22/2021 2.93 (H) 0.57 - 1.00 mg/dL Final      Calcium Calcium   Date Value Ref Range Status   12/23/2021 9.5 8.6 - 10.5 mg/dL Final   12/23/2021 9.7 8.6 - 10.5 mg/dL Final   12/22/2021 9.5 8.6 - 10.5 mg/dL Final      PO4 No results found for: CAPO4   Albumin Albumin   Date Value Ref Range Status   12/22/2021 3.90 3.50 - 5.20 g/dL Final      Magnesium No results found for: MG   Uric Acid No results found for: URICACID         Assessment/Plan       Osteoarthritis of left knee      Assessment & Plan   SUZANNE/CKD4-  She appears to have mild increase in creatinine from previous baseline 2.8-3.0.  Slightly increased to 3.29.  Agree with holding ARB at this time.  Would be ok for d/c from renal with repeat bmp in 1 week and f/u in clinic 3-4 weeks.  She tells me that she has appt  with Dr. sheridan in 6 months.  Hyperkalemia-  Would hold ARB and give low K+ diet.  Improved today so would be ok for d/c.  S/p left TKA  Met Acidosis-  Continue po bicarb.  Anemia-  At goal for ckd.    I discussed the patients findings and my recommendations with patient    Joshua Larkin MD  12/23/21  11:06 EST

## 2021-12-23 NOTE — DISCHARGE INSTRUCTIONS
Please change dressing on 12/25. Leave new dressing in place 5-7 days. After the 5-7 days begin daily dressing changes.    eliquis is the blood thinner given. Please do not take any other blood thinner with the Eliquis.   Please refer to handout for further instructions.

## 2021-12-23 NOTE — THERAPY DISCHARGE NOTE
Acute Care - Occupational Therapy Discharge   [facility]     Patient Name: Katarzyna Norris  : 1970  MRN: [id number]  Today's Date: 2021               Admit Date: 2021       ICD-10-CM ICD-9-CM   1. Difficulty in walking  R26.2 719.7   2. Osteoarthritis of left knee  M17.12 715.96   3. Decreased activities of daily living (ADL)  Z78.9 V49.89     Patient Active Problem List   Diagnosis   • Gout   • Anxiety   • Hypertension   • Sinus problem   • Chronic pain of left knee   • Primary osteoarthritis of left knee   • Anemia   • Impaired fasting glucose   • Hyperlipidemia   • Screening mammogram, encounter for   • Osteoarthritis of left knee   • Acute non-recurrent maxillary sinusitis     Past Medical History:   Diagnosis Date   • Anxiety    • Arthritis     GILBERT KNEES   • Asthma     SEASONAL ALLERGIES   • Elevated cholesterol    • Gout 2021   • Hiatal hernia    • Hypertension    • Renal insufficiency     NOT ON DIALYSIS/NATALIIA   • Sinus problem     SEASONAL ALLERGIES     Past Surgical History:   Procedure Laterality Date   •  SECTION   and    • CHOLECYSTECTOMY     • COLONOSCOPY     • ENDOSCOPY     • TOTAL KNEE ARTHROPLASTY Left 2021    Procedure: TOTAL KNEE ARTHROPLASTY;  Surgeon: Marco Nelson MD;  Location: [facility] MAIN OR;  Service: Orthopedics;  Laterality: Left;   • TUBAL ABDOMINAL LIGATION         OT ASSESSMENT FLOWSHEET (last 12 hours)     OT Evaluation and Treatment    No documentation.                 Occupational Therapy Education                 Title: PT OT SLP Therapies (Done)     Topic: Occupational Therapy (Done)     Point: ADL training (Done)     Description:   Instruct learner(s) on proper safety adaptation and remediation techniques during self care or transfers.   Instruct in proper use of assistive devices.              Learning Progress Summary           Patient Acceptance, E,TB, VU by ES at 2021 1400                   Point:  Home exercise program (Done)     Description:   Instruct learner(s) on appropriate technique for monitoring, assisting and/or progressing therapeutic exercises/activities.              Learning Progress Summary           Patient Acceptance, E,TB, VU by LUCILLE at 12/23/2021 1400                   Point: Precautions (Done)     Description:   Instruct learner(s) on prescribed precautions during self-care and functional transfers.              Learning Progress Summary           Patient Acceptance, E,TB, VU by LUCILLE at 12/23/2021 1400                   Point: Body mechanics (Done)     Description:   Instruct learner(s) on proper positioning and spine alignment during self-care, functional mobility activities and/or exercises.              Learning Progress Summary           Patient Acceptance, E,TB, VU by LUCILLE at 12/23/2021 1400                               User Key     Initials Effective Dates Name Provider Type Discipline    LUCILLE 09/13/21 -  Meri Zheng OT Occupational Therapist OT                OT Recommendation and Plan  Planned Therapy Interventions (OT): activity tolerance training, BADL retraining, functional balance retraining, occupation/activity based interventions, patient/caregiver education/training, transfer/mobility retraining  Therapy Frequency (OT): 5 times/wk  Plan of Care Review  Plan of Care Reviewed With: patient, spouse  Progress: no change (initial evaluation encounter)  Outcome Summary: Patient educated and trained on adaptive lower body dressing and bathing strategies, transfer training, safety awareness, home modifications for increased patient independence at time of discharge. Patient receptive to all education provided.  Plan of Care Reviewed With: patient, spouse  Outcome Summary: Patient educated and trained on adaptive lower body dressing and bathing strategies, transfer training, safety awareness, home modifications for increased patient independence at time of discharge. Patient receptive to  all education provided.      OT Rehab Goals     Row Name 12/23/21 4732             Bed Mobility Goal 1 (OT)    Activity/Assistive Device (Bed Mobility Goal 1, OT) bed mobility activities, all  -ES      [place] Level/Cues Needed (Bed Mobility Goal 1, OT) modified independence  -ES      Time Frame (Bed Mobility Goal 1, OT) long term goal (LTG); 10 days  -ES              Transfer Goal 1 (OT)    Activity/Assistive Device (Transfer Goal 1, OT) transfers, all  -ES      [place] Level/Cues Needed (Transfer Goal 1, OT) modified independence  -ES      Time Frame (Transfer Goal 1, OT) long term goal (LTG); 10 days  -ES              Bathing Goal 1 (OT)    Activity/Device (Bathing Goal 1, OT) bathing skills, all  -ES      [place] Level/Cues Needed (Bathing Goal 1, OT) modified independence  -ES      Time Frame (Bathing Goal 1, OT) long term goal (LTG); 10 days  -ES              Dressing Goal 1 (OT)    Activity/Device (Dressing Goal 1, OT) dressing skills, all  -ES      [place]/Cues Needed (Dressing Goal 1, OT) modified independence  -ES      Time Frame (Dressing Goal 1, OT) long term goal (LTG); 10 days  -ES              Toileting Goal 1 (OT)    Activity/Device (Toileting Goal 1, OT) toileting skills, all  -ES      [place] Level/Cues Needed (Toileting Goal 1, OT) modified independence  -ES      Time Frame (Toileting Goal 1, OT) long term goal (LTG); 10 days  -ES              Grooming Goal 1 (OT)    Activity/Device (Grooming Goal 1, OT) grooming skills, all  -ES      [place] (Grooming Goal 1, OT) modified independence  -ES      Time Frame (Grooming Goal 1, OT) long term goal (LTG); 10 days  -ES              Strength Goal 1 (OT)    Strength Goal 1 (OT) Patient will tolerate 8 min standing ADL task requiring zero rest breaks for increased safety awareness and activity tolerance required for ADL routine at time of discharge  -ES      Time Frame (Strength Goal 1, OT) long term goal (LTG); 10 days  -ES             User Key  (r) = Recorded By, (t) = Taken By, (c) = Cosigned By    Initials Name Provider Type Discipline    Meri Waters, OT Occupational Therapist OT                 Outcome Measures     Row Name 12/23/21 1000             How much help from another person do you currently need...    Turning from your back to your side while in flat bed without using bedrails? 3  -BO      Moving from lying on back to sitting on the side of a flat bed without bedrails? 3  -BO      Moving to and from a bed to a chair (including a wheelchair)? 3  -BO      Standing up from a chair using your arms (e.g., wheelchair, bedside chair)? 3  -BO      Climbing 3-5 steps with a railing? 3  -BO      To walk in hospital room? 3  -BO      AM-PAC 6 Clicks Score (PT) 18  -BO              Functional Assessment    Outcome Measure Options AM-PAC 6 Clicks Basic Mobility (PT)  -BO            User Key  (r) = Recorded By, (t) = Taken By, (c) = Cosigned By    Initials Name Provider Type    John Sawyer, PT Physical Therapist                Time Calculation:    Time Calculation- OT     Row Name 12/23/21 1420 12/23/21 1406 12/23/21 1402       Time Calculation- OT    OT Received On -- -- 12/23/21  -ES    OT Goal Re-Cert Due Date -- -- 01/01/22  -ES       Timed Charges    45343 - Gait Training Minutes  -- 9  -RH --    83487 - OT Self Care/Mgmt Minutes 15  -ES -- --       Untimed Charges    OT Eval/Re-eval Minutes -- -- 20  -ES       Total Minutes    Timed Charges Total Minutes 15  -ES 9  -RH --    Untimed Charges Total Minutes -- -- 20  -ES     Total Minutes 15  -ES 9  -RH 20  -ES    Row Name 12/23/21 1018             Timed Charges    08288 - Gait Training Minutes  12  -BO              Total Minutes    Timed Charges Total Minutes 12  -BO       Total Minutes 12  -BO            User Key  (r) = Recorded By, (t) = Taken By, (c) = Cosigned By    Initials Name Provider Type     Joshua Haddad, PTA Physical Therapy Assistant    John Sawyer,  PT Physical Therapist    ES Meri Zheng OT Occupational Therapist              Timed Therapy Charges  Total Units: 1    Charges  Total Units: 1    Procedure Name Documented Minutes Units Code    HC OT SELF CARE/MGMT/TRAIN EA 15 MIN 15  1    45003 (CPT®)               Documented Minutes  Total Minutes: 15    Therapy Provided Minutes    34052 - OT Self Care/Mgmt Minutes 15              Therapy Charges for Today     Code Description Service Date Service Provider Modifiers Qty    [id number]  OT EVAL LOW COMPLEXITY 2 12/23/2021 Meri Zheng OT GO 1    [id number]  OT SELF CARE/MGMT/TRAIN EA 15 MIN 12/23/2021 Meri Zheng OT GO 1               OT Discharge Summary  Anticipated Discharge Disposition (OT): home with outpatient therapy services  Reason for Discharge: Discharge from facility, Per MD order  Outcomes Achieved: Discharge from facility occurred on same date as evluation  Discharge Destination: Home with outpatient services    Meri Zheng OT  12/23/2021

## 2021-12-23 NOTE — THERAPY EVALUATION
Occupational Therapy Evaluation and Treatment  Patient Name: Katarzyna Norris  : 1970    MRN: [id number]                              Today's Date: 2021       Admit Date: 2021    Visit Dx:     ICD-10-CM ICD-9-CM   1. Difficulty in walking  R26.2 719.7   2. Osteoarthritis of left knee  M17.12 715.96   3. Decreased activities of daily living (ADL)  Z78.9 V49.89     Patient Active Problem List   Diagnosis   • Gout   • Anxiety   • Hypertension   • Sinus problem   • Chronic pain of left knee   • Primary osteoarthritis of left knee   • Anemia   • Impaired fasting glucose   • Hyperlipidemia   • Screening mammogram, encounter for   • Osteoarthritis of left knee   • Acute non-recurrent maxillary sinusitis     Past Medical History:   Diagnosis Date   • Anxiety    • Arthritis     GILBERT KNEES   • Asthma     SEASONAL ALLERGIES   • Elevated cholesterol    • Gout 2021   • Hiatal hernia    • Hypertension    • Renal insufficiency     NOT ON DIALYSIS/NATALIIA   • Sinus problem     SEASONAL ALLERGIES     Past Surgical History:   Procedure Laterality Date   •  SECTION   and    • CHOLECYSTECTOMY     • COLONOSCOPY     • ENDOSCOPY     • TOTAL KNEE ARTHROPLASTY Left 2021    Procedure: TOTAL KNEE ARTHROPLASTY;  Surgeon: Marco Nelson MD;  Location: [facility] MAIN OR;  Service: Orthopedics;  Laterality: Left;   • TUBAL ABDOMINAL LIGATION        General Information     Row Name 21 1403 21 1346       OT Time and Intention    Document Type therapy note (daily note)  -ES evaluation  -ES    Mode of Treatment individual therapy; occupational therapy  -ES individual therapy; occupational therapy  -ES    Row Name 21 1403 21 1346       General Information    Patient Profile Reviewed yes  -ES yes  -ES    Prior Level of Function -- independent:  Patient reports independence with B and IADLs at baseline. Functional mobility with use of rollator. Walk in shower w/  built in [employer], [employer]. Patient has 3-in-1 commode. Denies home O2 use.  -ES    Existing Precautions/Restrictions fall; weight bearing  -ES fall; weight bearing  -ES    Row Name 12/23/21 1346          Occupational Profile    Reason for Services/Referral (Occupational Profile) Patient is 51 yr old female admitted to [facility] on 12/22/2021 after failed conservative management of L knee osteoarthritis. Patient is POD 1 L total knee arthroplasty, WBAT LLE. OT evaluation and treatment ordered d/t recent decline in ADLs/transfer ability. No previous OT services for current condition.  -ES     Row Name 12/23/21 1346          Living Environment    Lives With spouse  -ES     Row Name 12/23/21 1346          Home Main Entrance    Number of Stairs, Main Entrance three  -ES     Row Name 12/23/21 1346          Stairs Within Home, Primary    Number of Stairs, Within Home, Primary none  -ES     Row Name 12/23/21 1403 12/23/21 1346       Cognition    Orientation Status (Cognition) other (see comments)  Patient receptive to all education. No further questions.  -ES oriented x 3  -ES    Row Name 12/23/21 1346          Safety Issues, Functional Mobility    Impairments Affecting Function (Mobility) balance; endurance/activity tolerance; pain  -ES           User Key  (r) = Recorded By, (t) = Taken By, (c) = Cosigned By    Initials Name Provider Type    Meri Waters OT Occupational Therapist                 Mobility/ADL's     Row Name 12/23/21 1351          Bed Mobility    Comment (Bed Mobility) Patient seated in chair at therapy arrival  -ES     Row Name 12/23/21 1403 12/23/21 1351       Transfers    Transfers sit-stand transfer; toilet transfer  -ES sit-stand transfer; toilet transfer  -ES    Comment (Transfers) Patient provided education and training for hand placement with functional transfers to ensure safe ascent/descent. Patient requires verbal cues throughout session for hand placement to ensure patient  safety  -ES --    Sit-Stand [place] (Transfers) -- contact guard; 1 person assist; verbal cues  -ES    [place] Level (Toilet Transfer) -- contact guard; verbal cues; 1 person assist  -ES    Assistive Device (Toilet Transfer) -- commode, 3-in-1; walker, front-wheeled  -ES    Row Name 12/23/21 [facility]          Sit-Stand Transfer    Assistive Device (Sit-Stand Transfers) walker, front-wheeled  -ES     Row Name 12/23/21 [facility]          Toilet Transfer    Type (Toilet Transfer) sit-stand; stand-sit  -ES     Row Name 12/23/21 1403 12/23/21 [facility]       Functional Mobility    Functional Mobility- Ind. Level -- contact guard assist  -ES    Functional Mobility- Device -- rolling walker  -ES    Functional Mobility- Comment Patient performs functional mobility from recliner to bathroom and returns to recliner. Patient contact guard assist with use of RWX for functional mobility.  -ES --    Row Name 12/23/21 [facility] 12/23/21 [facility]       Activities of Daily Living    BADL Assessment/Intervention upper body dressing; lower body dressing; toileting  -ES --  I feeding. S/u UB dressing. Min A LB dressing. Min A bathing. CGA toileting. S/U grooming.  -ES    Row Name 12/23/21 [facility] 12/23/21 [facility]       Mobility    Extremity Weight-bearing Status left lower extremity  -ES left lower extremity  -ES    Left Lower Extremity (Weight-bearing Status) weight-bearing as tolerated (WBAT)  -ES weight-bearing as tolerated (WBAT)  -ES    Row Name 12/23/21 [facility]          Upper Body Dressing Assessment/Training    [place] Level (Upper Body Dressing) upper body dressing skills; don; pull-over garment; bra/undergarment; independent; set up  -ES     Position (Upper Body Dressing) unsupported sitting  -ES     Row Name 12/23/21 [facility]          Lower Body Dressing Assessment/Training    [place] Level (Lower Body Dressing) lower body dressing skills; don; pants/bottoms; socks; minimum assist (75% patient effort)  -ES     Position (Lower Body Dressing)  unsupported sitting  -ES     Comment (Lower Body Dressing) Patient provided education and training on adaptive lower body dressing techniques to increase patient independence at time of discharge.  -ES     Row Name 12/23/21 1403          Toileting Assessment/Training    [place] Level (Toileting) toileting skills; minimum assist (75% patient effort)  -ES     Position (Toileting) unsupported sitting; supported standing  -ES           User Key  (r) = Recorded By, (t) = Taken By, (c) = Cosigned By    Initials Name Provider Type    ES Meri Zheng OT Occupational Therapist               Obj/Interventions     Row Name 12/23/21 1352          Sensory Assessment (Somatosensory)    Sensory Assessment (Somatosensory) UE sensation intact  -ES     Row Name 12/23/21 1352          Range of Motion Comprehensive    General Range of Motion no range of motion deficits identified  -ES     Row Name 12/23/21 1352          Strength Comprehensive (MMT)    General Manual Muscle Testing (MMT) Assessment no strength deficits identified  -ES     Comment, General Manual Muscle Testing (MMT) Assessment BUEs assessed grossly 4+/5  -ES     Row Name 12/23/21 1352          Motor Skills    Motor Skills coordination; functional endurance  -ES     Coordination WFL  -ES     Functional Endurance fair for ADL completion  -ES     Row Name 12/23/21 1416 12/23/21 1352       Balance    Balance Assessment -- sitting dynamic balance; standing dynamic balance  -ES    Dynamic Sitting Balance -- WFL; unsupported; sitting, edge of bed  -ES    Dynamic Standing Balance -- mild impairment; standing; supported  -ES    Balance Interventions sitting; standing; sit to stand; dynamic; occupation based/functional task  -ES --    Comment, Balance Patient requires contact guard assist in stance for increased safety with dynamic standing tasks. Patient educated to complete all dressing and bathing in seated position for increased safety at return home  -ES --           User Key  (r) = Recorded By, (t) = Taken By, (c) = Cosigned By    Initials Name Provider Type    Meri Waters OT Occupational Therapist               Goals/Plan     Row Name 12/23/21 [facility]          Bed Mobility Goal 1 (OT)    Activity/Assistive Device (Bed Mobility Goal 1, OT) bed mobility activities, all  -ES     [place] Level/Cues Needed (Bed Mobility Goal 1, OT) modified independence  -ES     Time Frame (Bed Mobility Goal 1, OT) long term goal (LTG); 10 days  -ES     Row Name 12/23/21 [facility]          Transfer Goal 1 (OT)    Activity/Assistive Device (Transfer Goal 1, OT) transfers, all  -ES     [place] Level/Cues Needed (Transfer Goal 1, OT) modified independence  -ES     Time Frame (Transfer Goal 1, OT) long term goal (LTG); 10 days  -ES     Row Name 12/23/21 [facility]          Bathing Goal 1 (OT)    Activity/Device (Bathing Goal 1, OT) bathing skills, all  -ES     [place] Level/Cues Needed (Bathing Goal 1, OT) modified independence  -ES     Time Frame (Bathing Goal 1, OT) long term goal (LTG); 10 days  -ES     Row Name 12/23/21 [facility]          Dressing Goal 1 (OT)    Activity/Device (Dressing Goal 1, OT) dressing skills, all  -ES     [place]/Cues Needed (Dressing Goal 1, OT) modified independence  -ES     Time Frame (Dressing Goal 1, OT) long term goal (LTG); 10 days  -ES     Row Name 12/23/21 [facility]          Toileting Goal 1 (OT)    Activity/Device (Toileting Goal 1, OT) toileting skills, all  -ES     [place] Level/Cues Needed (Toileting Goal 1, OT) modified independence  -ES     Time Frame (Toileting Goal 1, OT) long term goal (LTG); 10 days  -ES     Row Name 12/23/21 135          Grooming Goal 1 (OT)    Activity/Device (Grooming Goal 1, OT) grooming skills, all  -ES     [place] (Grooming Goal 1, OT) modified independence  -ES     Time Frame (Grooming Goal 1, OT) long term goal (LTG); 10 days  -ES     Row Name 12/23/21 135          Strength Goal 1 (OT)    Strength Goal 1 (OT) Patient  will tolerate 8 min standing ADL task requiring zero rest breaks for increased safety awareness and activity tolerance required for ADL routine at time of discharge  -ES     Time Frame (Strength Goal 1, OT) long term goal (LTG); 10 days  -ES     Row Name 12/23/21 1566          Therapy Assessment/Plan (OT)    Planned Therapy Interventions (OT) activity tolerance training; BADL retraining; functional balance retraining; occupation/activity based interventions; patient/caregiver education/training; transfer/mobility retraining  -ES           User Key  (r) = Recorded By, (t) = Taken By, (c) = Cosigned By    Initials Name Provider Type    ES Meri Zheng, TERESE Occupational Therapist               Clinical Impression     Row Name 12/23/21 1413 12/23/21 7430       Plan of Care Review    Plan of Care Reviewed With -- patient; spouse  -ES    Progress -- no change  initial evaluation encounter  -ES    Outcome Summary Patient educated and trained on adaptive lower body dressing and bathing strategies, transfer training, safety awareness, home modifications for increased patient independence at time of discharge. Patient receptive to all education provided.  -ES Patient is POD 1 L total knee arthroplasty, WBAT LLE. Patient has experienced decline in function from baseline status, presenting with deficits related to functional balance, activity tolerance, safety awareness, ADL transfers, functional mobility, and self care management.  Patient will benefit from skilled occupational therapy intervention to address above-mentioned deficits and promote return to baseline.  -ES    Row Name 12/23/21 1425          Therapy Assessment/Plan (OT)    Patient/Family Therapy Goal Statement (OT) patient wants to walk better with less pain  -ES     Rehab Potential (OT) good, to achieve stated therapy goals  -ES     Criteria for Skilled Therapeutic Interventions Met (OT) yes; meets criteria; skilled treatment is necessary  -ES     Therapy  Frequency (OT) 5 times/wk  -ES     Row Name 12/23/21 1357          Therapy Plan Review/Discharge Plan (OT)    Equipment Needs Upon Discharge (OT) walker, rolling  -ES     Anticipated Discharge Disposition (OT) home with outpatient therapy services  -ES           User Key  (r) = Recorded By, (t) = Taken By, (c) = Cosigned By    Initials Name Provider Type    ES Meri Zheng, OT Occupational Therapist               Outcome Measures     Row Name 12/23/21 1359          How much help from another is currently needed...    Putting on and taking off regular lower body clothing? 3  -ES     Bathing (including washing, rinsing, and drying) 3  -ES     Toileting (which includes using toilet bed pan or urinal) 3  -ES     Putting on and taking off regular upper body clothing 4  -ES     Taking care of personal grooming (such as brushing teeth) 4  -ES     Eating meals 4  -ES     AM-PAC 6 Clicks Score (OT) 21  -ES     Row Name 12/23/21 1000 12/23/21 0755       How much help from another person do you currently need...    Turning from your back to your side while in flat bed without using bedrails? 3  -BO 3  -BB    Moving from lying on back to sitting on the side of a flat bed without bedrails? 3  -BO 3  -BB    Moving to and from a bed to a chair (including a wheelchair)? 3  -BO 3  -BB    Standing up from a chair using your arms (e.g., wheelchair, bedside chair)? 3  -BO 3  -BB    Climbing 3-5 steps with a railing? 3  -BO 2  -BB    To walk in hospital room? 3  -BO 3  -BB    AM-PAC 6 Clicks Score (PT) 18  -BO 17  -BB    Row Name 12/23/21 1359 12/23/21 1000       Functional Assessment    Outcome Measure Options AM-PAC 6 Clicks Daily Activity (OT); Optimal Instrument  -ES AM-PAC 6 Clicks Basic Mobility (PT)  -BO    Row Name 12/23/21 1359          Optimal Instrument    Optimal Instrument Optimal - 3  -ES     Bending/Stooping 2  -ES     Standing 2  -ES     Reaching 1  -ES     From the list, choose the 3 activities you would most like to  be able to do without any difficulty Bending/stooping; Standing; Reaching  -ES     Total Score Optimal - 3 5  -ES           User Key  (r) = Recorded By, (t) = Taken By, (c) = Cosigned By    Initials Name Provider Type    BB Steffi Ruiz, RN Registered Nurse    John Sawyer, PT Physical Therapist    Meri Waters OT Occupational Therapist                Occupational Therapy Education                 Title: PT OT SLP Therapies (Done)     Topic: Occupational Therapy (Done)     Point: ADL training (Done)     Description:   Instruct learner(s) on proper safety adaptation and remediation techniques during self care or transfers.   Instruct in proper use of assistive devices.              Learning Progress Summary           Patient Acceptance, E,TB, VU by  at 12/23/2021 1400                   Point: Home exercise program (Done)     Description:   Instruct learner(s) on appropriate technique for monitoring, assisting and/or progressing therapeutic exercises/activities.              Learning Progress Summary           Patient Acceptance, E,TB, VU by  at 12/23/2021 1400                   Point: Precautions (Done)     Description:   Instruct learner(s) on prescribed precautions during self-care and functional transfers.              Learning Progress Summary           Patient Acceptance, E,TB, VU by  at 12/23/2021 1400                   Point: Body mechanics (Done)     Description:   Instruct learner(s) on proper positioning and spine alignment during self-care, functional mobility activities and/or exercises.              Learning Progress Summary           Patient Acceptance, E,TB, VU by  at 12/23/2021 1400                               User Key     Initials Effective Dates Name Provider Type Discipline     09/13/21 -  Meri Zheng, TERESE Occupational Therapist OT              OT Recommendation and Plan  Planned Therapy Interventions (OT): activity tolerance training, BADL retraining, functional  balance retraining, occupation/activity based interventions, patient/caregiver education/training, transfer/mobility retraining  Therapy Frequency (OT): 5 times/wk  Plan of Care Review  Plan of Care Reviewed With: patient, spouse  Progress: no change (initial evaluation encounter)  Outcome Summary: Patient educated and trained on adaptive lower body dressing and bathing strategies, transfer training, safety awareness, home modifications for increased patient independence at time of discharge. Patient receptive to all education provided.     Time Calculation:    Time Calculation- OT     Row Name 12/23/21 1420 12/23/21 1406 12/23/21 1402       Time Calculation- OT    OT Received On -- -- 12/23/21  -ES    OT Goal Re-Cert Due Date -- -- 01/01/22  -ES       Timed Charges    80086 - Gait Training Minutes  -- 9  -RH --    44203 - OT Self Care/Mgmt Minutes 15  -ES -- --       Untimed Charges    OT Eval/Re-eval Minutes -- -- 20  -ES       Total Minutes    Timed Charges Total Minutes 15  -ES 9  -RH --    Untimed Charges Total Minutes -- -- 20  -ES     Total Minutes 15  -ES 9  -RH 20  -ES    Row Name 12/23/21 1018             Timed Charges    74694 - Gait Training Minutes  12  -BO              Total Minutes    Timed Charges Total Minutes 12  -BO       Total Minutes 12  -BO            User Key  (r) = Recorded By, (t) = Taken By, (c) = Cosigned By    Initials Name Provider Type    RH Joshua Haddad, PTA Physical Therapy Assistant    John Sawyer, PT Physical Therapist    ES Meri Zheng, OT Occupational Therapist              Therapy Charges for Today     Code Description Service Date Service Provider Modifiers Qty    25196586285 HC OT EVAL LOW COMPLEXITY 2 12/23/2021 Meri Zheng, OT GO 1    00602695993 HC OT SELF CARE/MGMT/TRAIN EA 15 MIN 12/23/2021 Meri Zheng OT GO 1               Meri Zheng OT  12/23/2021

## 2021-12-23 NOTE — H&P
[facility]  [facility]   Patient Name: Katarzyna Norris  : 1970  MRN: [id number]  Primary Care Physician:  Brian Ellis APRN  Date of admission: 2021    Subjective   Subjective     Chief Complaint: Left knee pain    HPI:    Katarzyna Norris is a 51 y.o. female with advanced left knee osteoarthritis that failed conservative measures.  She presented today for an elective left total knee arthroplasty by Dr. Nelson.  Medicine service is consulted postoperatively to assist him medical management.  She is sleeping but awakens easily and denies complaints.      Personal History     Past Medical History:  Past Medical History:   Diagnosis Date   • Anxiety    • Arthritis     GILBERT KNEES   • Asthma     SEASONAL ALLERGIES   • Elevated cholesterol    • Gout 2021   • Hiatal hernia    • Hypertension    • Renal insufficiency     NOT ON DIALYSIS/NATALIIA   • Sinus problem     SEASONAL ALLERGIES         Past Surgical History:  Past Surgical History:   Procedure Laterality Date   •  SECTION   and    • CHOLECYSTECTOMY     • COLONOSCOPY     • ENDOSCOPY     • TUBAL ABDOMINAL LIGATION           Family History:   Family History   Problem Relation Age of Onset   • Throat cancer Father 73   • Stroke Other    • Diabetes Other    • Malig Hyperthermia Neg Hx          Social History:   Social History     Socioeconomic History   • Marital status:    Tobacco Use   • Smoking status: Former Smoker   • Smokeless tobacco: Never Used   Vaping Use   • Vaping Use: Never used   Substance and Sexual Activity   • Alcohol use: Never   • Drug use: Never         Home Medications:  Aspirin Buf(CaCarb-MgCarb-MgO), Vitamin D3, Zinc Sulfate, allopurinol, atorvastatin, carvedilol, chlorthalidone, clindamycin, cloNIDine, losartan, oxyCODONE-acetaminophen, predniSONE, sodium bicarbonate, vitamin B-12, vitamin C, and vitamin  E    Allergies:  Allergies   Allergen Reactions   • Diltiazem Hcl Anxiety   • Metal Itching     PT STATES COSTUME EARRINGS CAUSE ITCHING AND IRRITATION TO EARS.       Review of Systems   All systems were reviewed and negative except for: knee pain    Objective   Objective     Vitals:   Temp:  [97.6 °F (36.4 °C)-99.2 °F (37.3 °C)] 97.9 °F (36.6 °C)  Heart Rate:  [74-98] 98  Resp:  [13-20] 16  BP: (135-181)/() 136/91    Physical Exam    Constitutional: sleeping but awakens easily . nad.   Eyes: Pupils equal, sclerae anicteric, no conjunctival injection   HENT: NCAT, mucous membranes moist   Neck: Supple, no thyromegaly, no lymphadenopathy, trachea midline   Respiratory: Clear to auscultation bilaterally, nonlabored respirations    Cardiovascular: RRR, no murmurs   Gastrointestinal: Positive bowel sounds, soft, nontender, nondistended   Musculoskeletal: knee braced   Psychiatric: Appropriate affect, cooperative   Neurologic: Oriented x 3, speech clear   Skin: No rashes     Result Review    Result Review:  I have personally reviewed the results from the time of this admission to 12/22/2021 20:32 EST and agree with these findings:  [x]  Laboratory  []  Microbiology  []  Radiology  []  EKG/Telemetry   []  Cardiology/Vascular   []  Pathology  [x]  Old records  []  Other:      Assessment/Plan   Assessment / Plan     Assessment:   • Left knee osteoarthritis status post left knee arthroplasty  • Hypertension  • Chronic renal insufficiency    Plan:  · Postoperative care per orthopedics  · Preoperative labs from 12/6/2021 reviewed.  Creatinine elevated to 2.6, seems to be around her baseline.  Potassium on the high end of normal at 5.1.  · Will repeat labs in the morning to ensure stability.  · Oral and IV analgesics as needed   · Discontinue Toradol in the setting of chronic kidney disease  · Resume her sodium bicarb  · Resume her Coreg tonight, may need her other antihypertensives tomorrow  · PT/OT  · Anticoagulation  per orthopedics    norbert rn      DVT prophylaxis:  Medical and mechanical DVT prophylaxis orders are present.    CODE STATUS:         Electronically signed by Marco Caraballo MD, 12/22/21, 8:32 PM EST.

## 2021-12-23 NOTE — PLAN OF CARE
Goal Outcome Evaluation:   Pt stable throughout the night. Medicated for pain x1. Pt ambulating as expected. No other concerns or complaints noted.

## 2021-12-23 NOTE — THERAPY EVALUATION
Acute Care - Physical Therapy Initial Evaluation   Iman     Patient Name: Katarzyna Norris  : 1970  MRN: [id number]  Today's Date: 2021     Admit date: 2021     Referring Physician: Marco Caraballo MD     Surgery Date:2021   Procedure(s) (LRB):  TOTAL KNEE ARTHROPLASTY (Left)            Visit Dx:     ICD-10-CM ICD-9-CM   1. Difficulty in walking  R26.2 719.7   2. Osteoarthritis of left knee  M17.12 715.96     Patient Active Problem List   Diagnosis   • Gout   • Anxiety   • Hypertension   • Sinus problem   • Chronic pain of left knee   • Primary osteoarthritis of left knee   • Anemia   • Impaired fasting glucose   • Hyperlipidemia   • Screening mammogram, encounter for   • Osteoarthritis of left knee   • Acute non-recurrent maxillary sinusitis     Past Medical History:   Diagnosis Date   • Anxiety    • Arthritis     GILBERT KNEES   • Asthma     SEASONAL ALLERGIES   • Elevated cholesterol    • Gout 2021   • Hiatal hernia    • Hypertension    • Renal insufficiency     NOT ON DIALYSIS/NATALIIA   • Sinus problem     SEASONAL ALLERGIES     Past Surgical History:   Procedure Laterality Date   •  SECTION   and    • CHOLECYSTECTOMY     • COLONOSCOPY     • ENDOSCOPY  2009   • TOTAL KNEE ARTHROPLASTY Left 2021    Procedure: TOTAL KNEE ARTHROPLASTY;  Surgeon: Marco Nelson MD;  Location: [facility];  Service: Orthopedics;  Laterality: Left;   • TUBAL ABDOMINAL LIGATION       PT Assessment (last 12 hours)     PT Evaluation and Treatment     Row Name 21 1000          Physical Therapy Time and Intention    Subjective Information no complaints  -BO     Document Type evaluation  -BO     Mode of Treatment individual therapy; physical therapy  -BO     Patient Effort good  -BO     Row Name 21 1000          General Information    Patient Observations alert; cooperative; agree to therapy  -BO     Prior Level of Function independent:; all household  mobility; community mobility  -BO     Existing Precautions/Restrictions fall; weight bearing  -BO     Barriers to Rehab none identified  -BO     Row Name 12/23/21 1000          Living Environment    Current Living Arrangements home/apartment/condo  -BO     Lives With spouse  -BO     Row Name 12/23/21 1000          Range of Motion (ROM)    Range of Motion left lower extremity  10-60°  -BO     Row Name 12/23/21 1000          Strength (Manual Muscle Testing)    Strength (Manual Muscle Testing) left lower extremity  3+/5  -BO     Row Name 12/23/21 1000          Mobility    Extremity Weight-bearing Status left lower extremity  -BO     Left Lower Extremity (Weight-bearing Status) weight-bearing as tolerated (WBAT)  -BO     Row Name 12/23/21 1000          Bed Mobility    Bed Mobility bed mobility (all) activities; supine-sit  -BO     All Activities, [place] (Bed Mobility) minimum assist (75% patient effort)  -BO     Supine-Sit [place] (Bed Mobility) minimum assist (75% patient effort)  -BO     Row Name 12/23/21 1000          Transfers    Transfers bed-chair transfer; sit-stand transfer  -BO     Bed-Chair [place] (Transfers) contact guard  -BO     Assistive Device (Bed-Chair Transfers) walker, front-wheeled  -BO     Sit-Stand [place] (Transfers) contact guard  -BO     Row Name 12/23/21 1000          Sit-Stand Transfer    Assistive Device (Sit-Stand Transfers) walker, front-wheeled  -BO     Row Name 12/23/21 1000          Gait/Stairs (Locomotion)    Gait/Stairs Locomotion gait/ambulation assistive device  -BO     [place] Level (Gait) contact guard  -BO     Assistive Device (Gait) walker, front-wheeled  -BO     Distance in Feet (Gait) 150  -BO     Pattern (Gait) step-to  -BO     Row Name 12/23/21 1000          Safety Issues, Functional Mobility    Impairments Affecting Function (Mobility) balance; endurance/activity tolerance; pain; strength; range of motion (ROM)  -BO     Row Name 12/23/21 1000           Balance    Balance Assessment standing dynamic balance  -BO     Dynamic Standing Balance mild impairment  -BO     Row Name             Wound 12/22/21 1421 Left anterior knee Incision    Wound - Properties Group Placement Date: 12/22/21  -ES Placement Time: 1421  -ES Present on Hospital Admission: N  -ES Side: Left  -ES Orientation: anterior  -ES Location: knee  -ES Primary Wound Type: Incision  -ES     Retired Wound - Properties Group Date first assessed: 12/22/21  -ES Time first assessed: 1421  -ES Present on Hospital Admission: N  -ES Side: Left  -ES Location: knee  -ES Primary Wound Type: Incision  -ES     Row Name 12/23/21 1000          Plan of Care Review    Plan of Care Reviewed With patient  -BO     Outcome Summary Patient presents with decreased strength, transfers and ambulation.  Skilled physical therapy services will be required to address these mobility deficits.  -BO     Row Name 12/23/21 1000          Physical Therapy Goals    Transfer Goal Selection (PT) transfer, PT goal 1  -BO     Gait Training Goal Selection (PT) gait training, PT goal 1  -BO     ROM Goal Selection (PT) ROM, PT goal 1  -BO     Row Name 12/23/21 1000          Transfer Goal 1 (PT)    Activity/Assistive Device (Transfer Goal 1, PT) transfers, all  -BO     [place] Level/Cues Needed (Transfer Goal 1, PT) independent  -BO     Time Frame (Transfer Goal 1, PT) long term goal (LTG); 10 days  -BO     Row Name 12/23/21 1000          Gait Training Goal 1 (PT)    Activity/Assistive Device (Gait Training Goal 1, PT) gait (walking locomotion); assistive device use; walker, rolling  -BO     [place] Level (Gait Training Goal 1, PT) independent  -BO     Distance (Gait Training Goal 1, PT) 300  -BO     Time Frame (Gait Training Goal 1, PT) long term goal (LTG); 10 days  -BO     Row Name 12/23/21 1000          ROM Goal 1 (PT)    ROM Goal 1 (PT) Left knee 0-100°  -BO     Time Frame (ROM Goal 1, PT) long-term goal (LTG); 2 weeks  -BO      Row Name 12/23/21 1000          Therapy Assessment/Plan (PT)    Patient/Family Therapy Goals Statement (PT) Patient wants to walk better with less pain  -BO     Rehab Potential (PT) good, to achieve stated therapy goals  -BO     Criteria for Skilled Interventions Met (PT) skilled treatment is necessary  -BO     Predicted Duration of Therapy Intervention (PT) 10 days  -BO     Problem List (PT) problems related to; balance; mobility; range of motion (ROM); strength; pain  -BO     Activity Limitations Related to Problem List (PT) unable to ambulate safely; unable to transfer safely  -BO     Row Name 12/23/21 1000          PT Evaluation Complexity    History, PT Evaluation Complexity no personal factors and/or comorbidities  -BO     Examination of Body Systems (PT Eval Complexity) total of 4 or more elements  -BO     Clinical Presentation (PT Evaluation Complexity) stable  -BO     Clinical Decision Making (PT Evaluation Complexity) low complexity  -BO     Overall Complexity (PT Evaluation Complexity) low complexity  -BO           User Key  (r) = Recorded By, (t) = Taken By, (c) = Cosigned By    Initials Name Provider Type    BOJohn Rai PT Physical Therapist    Meri Perez RN Registered Nurse                Physical Therapy Education                 Title: PT OT SLP Therapies (Done)     Topic: Physical Therapy (Done)     Point: Mobility training (Done)     Learning Progress Summary           Patient Acceptance, E,TB, VU by BO at 12/23/2021 1026                   Point: Precautions (Done)     Learning Progress Summary           Patient Acceptance, E,TB, VU by BO at 12/23/2021 1026                               User Key     Initials Effective Dates Name Provider Type Discipline    BO 06/03/21 -  John Ospina PT Physical Therapist PT              PT Recommendation and Plan  Anticipated Discharge Disposition (PT): home with outpatient therapy services  Planned Therapy Interventions (PT): balance  training, bed mobility training, gait training, home exercise program, stair training, strengthening, ROM (range of motion), transfer training  Therapy Frequency (PT): 2 times/day  Plan of Care Reviewed With: patient  Outcome Summary: Patient presents with decreased strength, transfers and ambulation.  Skilled physical therapy services will be required to address these mobility deficits.   Outcome Measures     Row Name 12/23/21 1000             How much help from another person do you currently need...    Turning from your back to your side while in flat bed without using bedrails? 3  -BO      Moving from lying on back to sitting on the side of a flat bed without bedrails? 3  -BO      Moving to and from a bed to a chair (including a wheelchair)? 3  -BO      Standing up from a chair using your arms (e.g., wheelchair, bedside chair)? 3  -BO      Climbing 3-5 steps with a railing? 3  -BO      To walk in hospital room? 3  -BO      AM-PAC 6 Clicks Score (PT) 18  -BO              Functional Assessment    Outcome Measure Options AM-PAC 6 Clicks Basic Mobility (PT)  -BO            User Key  (r) = Recorded By, (t) = Taken By, (c) = Cosigned By    Initials Name Provider Type    John Sawyer PT Physical Therapist                 Time Calculation:    PT Charges     Row Name 12/23/21 1018             Time Calculation    PT Received On 12/23/21  -BO      PT Goal Re-Cert Due Date 01/01/22  -BO              Timed Charges    97521 - PT Therapeutic Exercise Minutes 5  -BO      65861 - Gait Training Minutes  12  -BO              Untimed Charges    PT Eval/Re-eval Minutes 20  -BO              Total Minutes    Timed Charges Total Minutes 17  -BO      Untimed Charges Total Minutes 20  -BO       Total Minutes 37  -BO            User Key  (r) = Recorded By, (t) = Taken By, (c) = Cosigned By    Initials Name Provider Type    John Sawyer PT Physical Therapist              Therapy Charges for Today     Code Description  Service Date Service Provider Modifiers Qty    26171502113 HC GAIT TRAINING EA 15 MIN 12/23/2021 John Ospina, PT GP 1    25563034685 HC PT EVAL LOW COMPLEXITY 2 12/23/2021 John Ospina, PT GP 1          PT G-Codes  Outcome Measure Options: AM-PAC 6 Clicks Basic Mobility (PT)  AM-PAC 6 Clicks Score (PT): 18    John Ospina, PT  12/23/2021

## 2021-12-27 ENCOUNTER — TREATMENT (OUTPATIENT)
Dept: PHYSICAL THERAPY | Facility: CLINIC | Age: 51
End: 2021-12-27

## 2021-12-27 DIAGNOSIS — Z96.652 STATUS POST LEFT KNEE REPLACEMENT: Primary | ICD-10-CM

## 2021-12-27 DIAGNOSIS — R29.898 WEAKNESS OF LEFT LOWER EXTREMITY: ICD-10-CM

## 2021-12-27 DIAGNOSIS — M25.662 DECREASED RANGE OF MOTION (ROM) OF LEFT KNEE: ICD-10-CM

## 2021-12-27 PROCEDURE — 97161 PT EVAL LOW COMPLEX 20 MIN: CPT | Performed by: PHYSICAL THERAPIST

## 2021-12-27 PROCEDURE — 97140 MANUAL THERAPY 1/> REGIONS: CPT | Performed by: PHYSICAL THERAPIST

## 2021-12-27 NOTE — PROGRESS NOTES
Physical Therapy Initial Evaluation and Plan of Care    Patient: Katarzyna Norris   : 1970  Diagnosis/ICD-10 Code:  Status post left knee replacement [Z96.652]  Referring practitioner: Marco Nelson MD  Date of Initial Visit: 2021  Today's Date: 2021  Patient seen for 1 sessions           Subjective Questionnaire: LEFS: 41.25%      Subjective Evaluation    History of Present Illness  Date of surgery: 2021  Mechanism of injury: Pt is post-op L knee replacement on 2021. Pt stayed one night in the hospital. Since being home, patient has been performing HEP multiple times a day. Pt's  and kids are assisting patient at home. Pt reports her L knee ROM was very limited prior to surgery.      Patient Occupation: Disability  Quality of life: good    Pain  Current pain ratin  At best pain ratin  At worst pain ratin  Quality: tight  Relieving factors: ice and medications  Aggravating factors: ambulation, stairs, standing and squatting    Social Support  Lives in: trailer  Lives with: spouse and adult children    Hand dominance: right    Treatments  Discharged from (in last 30 days): inpatient hospitalization  Patient Goals  Patient goals for therapy: decreased edema, decreased pain, improved balance, increased motion, increased strength and independence with ADLs/IADLs  Patient goal: To be able to walk with minimal pain.           Objective          Observations   Left Knee   Positive for incision.     Additional Knee Observation Details  Incision is covered with dressing.    Active Range of Motion   Left Knee   Flexion: 45 degrees   Extension: 20 degrees     Right Knee   Flexion: 95 degrees   Extension: 5 degrees     Passive Range of Motion   Left Knee   Flexion: 70 degrees   Extension: 15 degrees     Patellar Mobility   Left Knee Hypomobile in the left medial, left lateral, left superior and left inferior patellar tendon(s).     Strength/Myotome Testing     Left  Knee   Flexion: 3-  Extension: 3-  Quadriceps contraction: fair    Right Knee   Flexion: 4+  Extension: 4+  Quadriceps contraction: good    Additional Strength Details  Pt unable to perform SLR on L LE. Requires mod assist to perform.    Swelling     Left Knee Girth Measurement (cm)   Joint line: 64 cm    Right Knee Girth Measurement (cm)   Joint line: 54 cm    Ambulation     Observational Gait     Additional Observational Gait Details  Pt ambulates with slow, step to gait mechanics with RW. Cued patient on step through gait mechanics, decreasing WB through UE's, keeping RW on the ground, and increasing stance time on L LE and patient able to correct with cues.        See Exercise, Manual, and Modality Logs for complete treatment.       Assessment & Plan     Assessment  Impairments: abnormal coordination, abnormal gait, abnormal muscle firing, abnormal or restricted ROM, activity intolerance, impaired balance, impaired physical strength, lacks appropriate home exercise program, pain with function and weight-bearing intolerance  Functional Limitations: walking, uncomfortable because of pain, standing and stooping  Assessment details: Pt presents s/p L knee replacement. Pt has limited ROM, strength, and abnormal gait mechanics. Will address patient's limitations to return patient back to normal functional activities. Educated patient on HEP and patient tolerated well.   Prognosis: good    Goals  Plan Goals:     LTG 1: 12 weeks: The patient will demonstrate 5 to 110 degrees of ROM for the left knee in order to allow patient to be able to ambulate and ascend/descend stairs.      STG 1a: 6 weeks: The patient will demonstrate 10 to 90 degrees of ROM for the left knee.      2. The patient has limited strength of the left knee.    LTG 2: 12 weeks: The patient will demonstrate 4+/5 strength for left knee flexion and extension in order to allow patient improved joint stability.      STG 2a: 6 weeks: The patient will  demonstrate 4/5 strength for left knee flexion and extension      3. The patient has gait dysfunction.    LTG 3: 12 weeks: The patient will ambulate without assistive device, independently, for community distances with minimal limp to the left lower extremity in order to improve mobility and allow patient to perform activities such as grocery shopping with greater ease.      LTG 4: 12 weeks: The patient will report a pain rating of 1/10 or better in order to improve tolerance to activities of daily living and improve sleep qu    STG 4: 6 weeks: The patient will report a pain rating of 3/10 or better.      LTG 5: 12 weeks: The patient will demonstrate 65% limitation on the Lower Extremity Functional Scale.      STG 5a: 6 weeks: The patient will demonstrate 55% limitation on the Lower Extremity Functional Scale.    Plan  Therapy options: will be seen for skilled therapy services  Planned modality interventions: cryotherapy and TENS  Planned therapy interventions: ADL retraining, manual therapy, neuromuscular re-education, motor coordination training, transfer training, therapeutic activities, stretching, strengthening, spinal/joint mobilization, soft tissue mobilization, joint mobilization, home exercise program, gait training, functional ROM exercises, flexibility and balance/weight-bearing training  Frequency: 2x week  Duration in weeks: 12        History # of Personal Factors and/or Comorbidities: LOW (0)  Examination of Body System(s): # of elements: LOW (1-2)  Clinical Presentation: STABLE   Clinical Decision Making: LOW       Timed:         Manual Therapy:    10     mins  92423;     Therapeutic Exercise:    10     mins  70611;     Neuromuscular Rosemarie:    0    mins  05376;    Therapeutic Activity:     0     mins  81433;     Gait Trainin     mins  33380;     Ultrasound:     0     mins  62602;    Ionto                               0    mins   77133  Self pay                         0     mins  [id number]    Un-Timed:  Electrical Stimulation:    0     mins  66127 (MC )  Traction     0     mins 74905  Low Eval     25     Mins  87117  Mod Eval     0     Mins  07624  High Eval                       0     Mins  97860  Self Pay Eval                 0     PTSP1   Re-Eval                           0    mins  92064        Timed Treatment:   45   mins   Total Treatment:     45   mins    PT SIGNATURE: Electronically signed by Oneyda Kennedy, PT  [place] LICENSE: [id number]    DATE TREATMENT INITIATED: 12/27/2021    Initial Certification  Certification Period: 12/27/2021 thru 3/26/2022  I certify that the therapy services are furnished while this patient is under my care.  The services outlined above are required by this patient, and will be reviewed every 90 days.     PHYSICIAN: Marco Nelson MD      DATE:     Please sign and return via fax to [phone] Thank you, [facility].

## 2022-01-03 ENCOUNTER — TREATMENT (OUTPATIENT)
Dept: PHYSICAL THERAPY | Facility: CLINIC | Age: 52
End: 2022-01-03

## 2022-01-03 ENCOUNTER — TELEPHONE (OUTPATIENT)
Dept: ORTHOPEDIC SURGERY | Facility: CLINIC | Age: 52
End: 2022-01-03

## 2022-01-03 DIAGNOSIS — M25.662 DECREASED RANGE OF MOTION (ROM) OF LEFT KNEE: ICD-10-CM

## 2022-01-03 DIAGNOSIS — M17.12 OSTEOARTHRITIS OF LEFT KNEE: ICD-10-CM

## 2022-01-03 DIAGNOSIS — Z96.652 STATUS POST LEFT KNEE REPLACEMENT: Primary | ICD-10-CM

## 2022-01-03 DIAGNOSIS — R29.898 WEAKNESS OF LEFT LOWER EXTREMITY: ICD-10-CM

## 2022-01-03 PROCEDURE — 97110 THERAPEUTIC EXERCISES: CPT | Performed by: PHYSICAL THERAPIST

## 2022-01-03 PROCEDURE — 97140 MANUAL THERAPY 1/> REGIONS: CPT | Performed by: PHYSICAL THERAPIST

## 2022-01-03 NOTE — TELEPHONE ENCOUNTER
Patient is requesting new pain medication.  She does not want the same pain medication that she is on now due to the side affects. Patient ran out of medication on 1/2/2022, her pharmacy is [facility] in [facility].     Patient would like a call before pain medication is sent over.      She has follow up appt this Thursday, the 6th.

## 2022-01-03 NOTE — PROGRESS NOTES
Physical Therapy Daily Treatment Note      Patient: Katarzyna Norris   : 1970  Referring practitioner: Marco Nelson MD  Date of Initial Visit: Type: THERAPY  Noted: 2021  Today's Date: 1/3/2022  Patient seen for 2 sessions           Subjective  Katarzyna Norris reports: pain at 5-6/10 She commented on soreness at posterior calf, and along the lateral thigh.     Objective   Knee Flexion: 69-70 deg passively hooklying  Knee Extension: 4 deg passively hooklying    See Exercise, Manual, and Modality Logs for complete treatment.       Assessment/Plan   Educated pt in use of towel roll at hip to block external rotation. Also to slide foot on floor and statically stretch flexion/extension. Extensive encouragement provided to pt to allow self stretching and mobility versus holding herself stiff. Gait practice also.    Visit Diagnoses:    ICD-10-CM ICD-9-CM   1. Status post left knee replacement  Z96.652 V43.65   2. Decreased range of motion (ROM) of left knee  M25.662 719.56   3. Weakness of left lower extremity  R29.898 729.89       Progress per Plan of Care and Progress strengthening /stabilization /functional activity           Timed:  Manual Therapy:    20     mins  59347;  Therapeutic Exercise:    9     mins  81705;     Neuromuscular Rosemarie:        mins  48339;    Therapeutic Activity:          mins  75746;     Gait Training:      3     mins  01805;     Ultrasound:          mins  96511;    Electrical Stimulation:         mins  96737 ( );  Aquatics  __   mins   46211    Untimed:  Electrical Stimulation:         mins  55172 ( );  Mechanical Traction:         mins  45693;     Timed Treatment:   32   mins   Total Treatment:     32   mins    Electronically signed  Serena Garza PTA  Physical Therapist Assistant  [facility] license  [id number]

## 2022-01-03 NOTE — TELEPHONE ENCOUNTER
POST OP PHONE CALL.  I SPOKE WITH PATIENT AND SHE WANTS TO STAY ON OXYCODONE.  SHE WAS HAVING CONSTIPATION, WHICH IS TAKEN CARE OF NOW.  [facility] IN [place]

## 2022-01-04 DIAGNOSIS — M17.12 OSTEOARTHRITIS OF LEFT KNEE: ICD-10-CM

## 2022-01-04 RX ORDER — OXYCODONE AND ACETAMINOPHEN 7.5; 325 MG/1; MG/1
1-2 TABLET ORAL EVERY 4 HOURS PRN
Qty: 40 TABLET | Refills: 0 | Status: SHIPPED | OUTPATIENT
Start: 2022-01-04 | End: 2022-01-04 | Stop reason: SDUPTHER

## 2022-01-04 RX ORDER — OXYCODONE AND ACETAMINOPHEN 7.5; 325 MG/1; MG/1
1-2 TABLET ORAL EVERY 4 HOURS PRN
Qty: 40 TABLET | Refills: 0 | Status: SHIPPED | OUTPATIENT
Start: 2022-01-04 | End: 2022-01-17 | Stop reason: SDUPTHER

## 2022-01-05 ENCOUNTER — TREATMENT (OUTPATIENT)
Dept: PHYSICAL THERAPY | Facility: CLINIC | Age: 52
End: 2022-01-05

## 2022-01-05 DIAGNOSIS — R29.898 WEAKNESS OF LEFT LOWER EXTREMITY: ICD-10-CM

## 2022-01-05 DIAGNOSIS — Z96.652 STATUS POST LEFT KNEE REPLACEMENT: Primary | ICD-10-CM

## 2022-01-05 DIAGNOSIS — M25.662 DECREASED RANGE OF MOTION (ROM) OF LEFT KNEE: ICD-10-CM

## 2022-01-05 PROCEDURE — 97110 THERAPEUTIC EXERCISES: CPT | Performed by: PHYSICAL THERAPIST

## 2022-01-05 PROCEDURE — 97140 MANUAL THERAPY 1/> REGIONS: CPT | Performed by: PHYSICAL THERAPIST

## 2022-01-05 NOTE — PROGRESS NOTES
Physical Therapy Daily Progress Note        Patient: Katarzyna Norris   : 1970  Diagnosis/ICD-10 Code:  Status post left knee replacement [Z96.652]  Referring practitioner: Marco Nelson MD  Date of Initial Visit: Type: THERAPY  Noted: 2021  Today's Date: 2022  Patient seen for 3 sessions             Subjective   Katarzyna Norris reports: having pain on the outside of her knee, has been trying to keep her toe pointed forward even in a relaxed position.     Objective   Knee Flexion: 90deg c overpressure  Knee Extension: -4deg c heel prop    See Exercise, Manual, and Modality Logs for complete treatment.       Assessment/Plan  Katarzyna still experiencing increased L knee pain, especially out of the knee. Pt had some increased discomfort with end range Knee Flexion. Pt able to achieve -4 deg of knee extension and 90 deg of knee flexion. Pt would benefit from skilled PT to address Range of Motion  and Strength deficits, pain management and any concerns with ADLs.     Progress per Plan of Care           Timed:  Manual Therapy:    10     mins  71631;  Therapeutic Exercise:    20     mins  99161;     Neuromuscular Rosemarie:        mins  34636;    Therapeutic Activity:          mins  32595;     Gait Training:           mins  07793;    Aquatic Therapy:          mins  42344;       Untimed:  Electrical Stimulation:         mins  74511 ( );  Mechanical Traction:         mins  96383;       Timed Treatment:   30   mins   Total Treatment:     30   mins      Electronically signed:   Joya Turcios PTA  Physical Therapist Assistant  [facility] License #: [id number]

## 2022-01-06 ENCOUNTER — OFFICE VISIT (OUTPATIENT)
Dept: ORTHOPEDIC SURGERY | Facility: CLINIC | Age: 52
End: 2022-01-06

## 2022-01-06 VITALS — HEIGHT: 72 IN | OXYGEN SATURATION: 96 % | WEIGHT: 270 LBS | HEART RATE: 133 BPM | BODY MASS INDEX: 36.57 KG/M2

## 2022-01-06 DIAGNOSIS — M25.562 LEFT KNEE PAIN, UNSPECIFIED CHRONICITY: Primary | ICD-10-CM

## 2022-01-06 DIAGNOSIS — Z96.659 STATUS POST REPLACEMENT OF KNEE JOINT: ICD-10-CM

## 2022-01-06 PROCEDURE — 99024 POSTOP FOLLOW-UP VISIT: CPT | Performed by: ORTHOPAEDIC SURGERY

## 2022-01-06 RX ORDER — ASPIRIN 325 MG
325 TABLET, DELAYED RELEASE (ENTERIC COATED) ORAL EVERY 6 HOURS PRN
COMMUNITY
End: 2022-05-02

## 2022-01-06 NOTE — PROGRESS NOTES
"Chief Complaint  Follow-up of the Left Knee     Subjective      Katarzyna Norris presents to [facility] for follow-up of left knee replacement, status-post two weeks ago, performed on 12.22.2021. She has been in therapy and doing well overall. She is using a walker today for assistance. No new complaints.     Allergies   Allergen Reactions   • Diltiazem Hcl Anxiety   • Metal Itching     PT STATES COSTUME EARRINGS CAUSE ITCHING AND IRRITATION TO EARS.        Social History     Socioeconomic History   • Marital status:    Tobacco Use   • Smoking status: Former Smoker   • Smokeless tobacco: Never Used   Vaping Use   • Vaping Use: Never used   Substance and Sexual Activity   • Alcohol use: Never   • Drug use: Never        Review of Systems     Objective   Vital Signs:   Pulse (!) 133   Ht 182.9 cm (72\")   Wt 122 kg (270 lb)   SpO2 96%   BMI 36.62 kg/m²       Physical Exam  Constitutional:       Appearance: Normal appearance. The patient is well-developed and normal weight.   HENT:      Head: Normocephalic.      Right Ear: Hearing and external ear normal.      Left Ear: Hearing and external ear normal.      Nose: Nose normal.   Eyes:      Conjunctiva/sclera: Conjunctivae normal.   Cardiovascular:      Rate and Rhythm: Normal rate.   Pulmonary:      Effort: Pulmonary effort is normal.      Breath sounds: No wheezing or rales.   Abdominal:      Palpations: Abdomen is soft.      Tenderness: There is no abdominal tenderness.   Musculoskeletal:      Cervical back: Normal range of motion.   Skin:     Findings: No rash.   Neurological:      Mental Status: The patient is alert and oriented to person, place, and time.   Psychiatric:         Mood and Affect: Mood and affect normal.         Judgment: Judgment normal.       Ortho Exam      0 degrees of extension, flexion 80 degrees, incision well-healing, staples removed, no signs of infection, stable to varus and valgus stress, stable to " anterior and posterior drawer, neuro intact, sensation intact, positive pulses, walker for ambulation assistance    Procedures  X-Ray Report:  Left knee(s) X-Ray  Indication: Evaluation of left knee  AP, Lateral and Sunrise view(s)  Findings: reveals an intact appearing left knee replacement, no evidence of complications, no evidence of subsidence, loosening or periprosthetic fracture,  retained staples on xray  Prior studies available for comparison: no        Imaging Results (Most Recent)     Procedure Component Value Units Date/Time    XR Knee 3 View Left [[id number]] Resulted: 01/06/22 1014     Updated: 01/06/22 1014           Result Review :         Assessment and Plan     DX: left knee replacement, status-post         Follow Up     Patient will continue with therapy and with their guidance which to cane. She will follow-up 4 weeks to recheck. We recommended she continue with flexion, goal at next visit is over 100 degrees. She will continue with Eliquis and was recently given refill of pain medication.       Patient was given instructions and counseling regarding her condition or for health maintenance advice. Please see specific information pulled into the AVS if appropriate.     Scribed for Marco Nelson MD by Althea Leos.  01/06/22   10:21 EST    I have personally performed the services described in this document as scribed by the above individual and it is both accurate and complete. Marco Nelson MD 01/08/22

## 2022-01-11 ENCOUNTER — OFFICE VISIT (OUTPATIENT)
Dept: ORTHOPEDIC SURGERY | Facility: CLINIC | Age: 52
End: 2022-01-11

## 2022-01-11 VITALS — WEIGHT: 270 LBS | BODY MASS INDEX: 53.01 KG/M2 | HEIGHT: 60 IN

## 2022-01-11 DIAGNOSIS — Z96.659 STATUS POST REPLACEMENT OF KNEE JOINT: Primary | ICD-10-CM

## 2022-01-11 DIAGNOSIS — M79.89 SWELLING OF CALF: ICD-10-CM

## 2022-01-11 DIAGNOSIS — M79.662 PAIN OF LEFT CALF: ICD-10-CM

## 2022-01-11 PROCEDURE — 99024 POSTOP FOLLOW-UP VISIT: CPT | Performed by: ORTHOPAEDIC SURGERY

## 2022-01-12 ENCOUNTER — TREATMENT (OUTPATIENT)
Dept: PHYSICAL THERAPY | Facility: CLINIC | Age: 52
End: 2022-01-12

## 2022-01-12 ENCOUNTER — HOSPITAL ENCOUNTER (OUTPATIENT)
Dept: CARDIOLOGY | Facility: HOSPITAL | Age: 52
Discharge: HOME | End: 2022-01-12
Admitting: ORTHOPAEDIC SURGERY

## 2022-01-12 DIAGNOSIS — Z96.652 STATUS POST LEFT KNEE REPLACEMENT: Primary | ICD-10-CM

## 2022-01-12 DIAGNOSIS — R29.898 WEAKNESS OF LEFT LOWER EXTREMITY: ICD-10-CM

## 2022-01-12 DIAGNOSIS — M25.662 DECREASED RANGE OF MOTION (ROM) OF LEFT KNEE: ICD-10-CM

## 2022-01-12 PROBLEM — M79.662 PAIN OF LEFT CALF: Status: ACTIVE | Noted: 2022-01-12

## 2022-01-12 PROBLEM — M79.89 SWELLING OF CALF: Status: ACTIVE | Noted: 2022-01-12

## 2022-01-12 LAB
BH CV LOWER VASCULAR LEFT COMMON FEMORAL AUGMENT: NORMAL
BH CV LOWER VASCULAR LEFT COMMON FEMORAL COMPETENT: NORMAL
BH CV LOWER VASCULAR LEFT COMMON FEMORAL COMPRESS: NORMAL
BH CV LOWER VASCULAR LEFT COMMON FEMORAL PHASIC: NORMAL
BH CV LOWER VASCULAR LEFT COMMON FEMORAL SPONT: NORMAL
BH CV LOWER VASCULAR LEFT DISTAL FEMORAL COMPRESS: NORMAL
BH CV LOWER VASCULAR LEFT GREATER SAPH AK COMPRESS: NORMAL
BH CV LOWER VASCULAR LEFT MID FEMORAL AUGMENT: NORMAL
BH CV LOWER VASCULAR LEFT MID FEMORAL COMPETENT: NORMAL
BH CV LOWER VASCULAR LEFT MID FEMORAL COMPRESS: NORMAL
BH CV LOWER VASCULAR LEFT MID FEMORAL PHASIC: NORMAL
BH CV LOWER VASCULAR LEFT MID FEMORAL SPONT: NORMAL
BH CV LOWER VASCULAR LEFT PERONEAL COMPRESS: NORMAL
BH CV LOWER VASCULAR LEFT POPLITEAL AUGMENT: NORMAL
BH CV LOWER VASCULAR LEFT POPLITEAL COMPETENT: NORMAL
BH CV LOWER VASCULAR LEFT POPLITEAL COMPRESS: NORMAL
BH CV LOWER VASCULAR LEFT POPLITEAL PHASIC: NORMAL
BH CV LOWER VASCULAR LEFT POPLITEAL SPONT: NORMAL
BH CV LOWER VASCULAR LEFT POSTERIOR TIBIAL COMPRESS: NORMAL
BH CV LOWER VASCULAR LEFT PROXIMAL FEMORAL COMPRESS: NORMAL
BH CV LOWER VASCULAR LEFT SAPHENOFEMORAL JUNCTION COMPRESS: NORMAL
BH CV LOWER VASCULAR RIGHT COMMON FEMORAL AUGMENT: NORMAL
BH CV LOWER VASCULAR RIGHT COMMON FEMORAL COMPETENT: NORMAL
BH CV LOWER VASCULAR RIGHT COMMON FEMORAL COMPRESS: NORMAL
BH CV LOWER VASCULAR RIGHT COMMON FEMORAL PHASIC: NORMAL
BH CV LOWER VASCULAR RIGHT COMMON FEMORAL SPONT: NORMAL
MAXIMAL PREDICTED HEART RATE: 167 BPM
STRESS TARGET HR: 142 BPM

## 2022-01-12 PROCEDURE — 97110 THERAPEUTIC EXERCISES: CPT | Performed by: PHYSICAL THERAPIST

## 2022-01-12 PROCEDURE — 93971 EXTREMITY STUDY: CPT

## 2022-01-12 PROCEDURE — 93971 EXTREMITY STUDY: CPT | Performed by: SURGERY

## 2022-01-12 PROCEDURE — 97140 MANUAL THERAPY 1/> REGIONS: CPT | Performed by: PHYSICAL THERAPIST

## 2022-01-12 NOTE — PROGRESS NOTES
"Chief Complaint  Pain of the Left Knee     Subjective      Katarzyna Norris presents to [facility] for follow-up of left knee. She was seen last week for removal of staples. She had been doing well but then noticed some swelling and palpable firmness to the posterior calf. She was evaluated by therapy and underwent some manual treatment for this. She believes this has improved some but still feels a \"knot in the back of her calf\". There is no chest pain or SOB, otherwise, she is doing well.     Allergies   Allergen Reactions   • Diltiazem Hcl Anxiety   • Metal Itching     PT STATES COSTUME EARRINGS CAUSE ITCHING AND IRRITATION TO EARS.        Social History     Socioeconomic History   • Marital status:    Tobacco Use   • Smoking status: Former Smoker   • Smokeless tobacco: Never Used   Vaping Use   • Vaping Use: Never used   Substance and Sexual Activity   • Alcohol use: Never   • Drug use: Never        Review of Systems     Objective   Vital Signs:   Ht 152.4 cm (60\")   Wt 122 kg (270 lb)   BMI 52.73 kg/m²       Physical Exam  Constitutional:       Appearance: Normal appearance. The patient is well-developed and normal weight.   HENT:      Head: Normocephalic.      Right Ear: Hearing and external ear normal.      Left Ear: Hearing and external ear normal.      Nose: Nose normal.   Eyes:      Conjunctiva/sclera: Conjunctivae normal.   Cardiovascular:      Rate and Rhythm: Normal rate.   Pulmonary:      Effort: Pulmonary effort is normal.      Breath sounds: No wheezing or rales.   Abdominal:      Palpations: Abdomen is soft.      Tenderness: There is no abdominal tenderness.   Musculoskeletal:      Cervical back: Normal range of motion.   Skin:     Findings: No rash.   Neurological:      Mental Status: The patient is alert and oriented to person, place, and time.   Psychiatric:         Mood and Affect: Mood and affect normal.         Judgment: Judgment normal.       Ortho " Exam      Swelling and palpable firmness over the posterolateral calf, negative Jd, ROM 0 to 100, stability intact, incision well-healing, no significant redness to calf    Procedures      Imaging Results (Most Recent)     None           Result Review :           Assessment and Plan     DX: Status-post left knee arthroplasty  Left calf swelling    Call or return if worsening symptoms.    Follow Up     We recommended ultrasound to rule out DVT of the left lower extremity. She expressed understanding and wished to proceed. She states she is unable to have this today per our recommendation, she will have the ultrasound tomorrow. She will continue with aspirin for anticoagulant.     Patient was given instructions and counseling regarding her condition or for health maintenance advice. Please see specific information pulled into the AVS if appropriate.     Scribed for Marco Nelson MD by Althea Leos.  01/12/22   13:56 EST    I have personally performed the services described in this document as scribed by the above individual and it is both accurate and complete. Marco Nelson MD 01/13/22

## 2022-01-12 NOTE — PROGRESS NOTES
Physical Therapy Daily Progress Note        Patient: Katarzyna Norris   : 1970  Diagnosis/ICD-10 Code:  Status post left knee replacement [Z96.652]  Referring practitioner: Marco Nelson MD  Date of Initial Visit: Type: THERAPY  Noted: 2021  Today's Date: 2022  Patient seen for 4 sessions             Subjective   Katarzyna Norris reports: having ultrasound on her leg this morning to check for blood clots, states that her leg is super sore following that procedure.     Objective   Knee Flexion: 88deg c overpressure.     See Exercise, Manual, and Modality Logs for complete treatment.       Assessment/Plan  Katarzyna still experiencing increased L knee pain. Pt had some increased discomfort with step up activities. Pt able to achieve and 88 deg of knee flexion. Pt would benefit from skilled PT to address Range of Motion  and Strength deficits, pain management and any concerns with ADLs.     Progress per Plan of Care           Timed:  Manual Therapy:    10     mins  60068;  Therapeutic Exercise:    20     mins  68640;     Neuromuscular Rosemarie:        mins  89565;    Therapeutic Activity:          mins  23162;     Gait Training:           mins  60601;    Aquatic Therapy:          mins  88590;       Untimed:  Electrical Stimulation:         mins  63241 ( );  Mechanical Traction:         mins  35737;       Timed Treatment:   30   mins   Total Treatment:     30   mins      Electronically signed:   Joya Turcios PTA  Physical Therapist Assistant  [facility] License #: [id number]

## 2022-01-14 ENCOUNTER — TELEPHONE (OUTPATIENT)
Dept: PHYSICAL THERAPY | Facility: CLINIC | Age: 52
End: 2022-01-14

## 2022-01-17 ENCOUNTER — TELEPHONE (OUTPATIENT)
Dept: FAMILY MEDICINE CLINIC | Facility: CLINIC | Age: 52
End: 2022-01-17

## 2022-01-17 DIAGNOSIS — M17.12 OSTEOARTHRITIS OF LEFT KNEE: ICD-10-CM

## 2022-01-17 RX ORDER — OXYCODONE AND ACETAMINOPHEN 7.5; 325 MG/1; MG/1
1-2 TABLET ORAL EVERY 4 HOURS PRN
Qty: 40 TABLET | Refills: 0 | Status: SHIPPED | OUTPATIENT
Start: 2022-01-17 | End: 2022-01-18 | Stop reason: SDUPTHER

## 2022-01-17 NOTE — TELEPHONE ENCOUNTER
Caller: Katarzyna Norris    Relationship: Self    Best call back number: [phone]    What is the best time to reach you: ANY    Who are you requesting to speak with (clinical staff, provider,  specific staff member): CLINICAL STAFF       What was the call regarding: PATIENT REPORTS PUTTING AN ICE PACK ON CALVE WITHOUT COVER, AND NOW IT HAS LEFT LITTLE BLISTERS.  PATIENT REQUESTS CALL BACK TO FURTHER DISCUSS WHETHER SHE NEEDS PENICILLIN OR NOT, SHE READ SOMEWHERE THAT SHE MIGHT NEED IT.    Do you require a callback: YES

## 2022-01-18 ENCOUNTER — OFFICE VISIT (OUTPATIENT)
Dept: FAMILY MEDICINE CLINIC | Facility: CLINIC | Age: 52
End: 2022-01-18

## 2022-01-18 ENCOUNTER — TREATMENT (OUTPATIENT)
Dept: PHYSICAL THERAPY | Facility: CLINIC | Age: 52
End: 2022-01-18

## 2022-01-18 VITALS
TEMPERATURE: 99 F | WEIGHT: 270 LBS | BODY MASS INDEX: 53.01 KG/M2 | SYSTOLIC BLOOD PRESSURE: 138 MMHG | HEART RATE: 96 BPM | OXYGEN SATURATION: 97 % | DIASTOLIC BLOOD PRESSURE: 80 MMHG | HEIGHT: 60 IN

## 2022-01-18 DIAGNOSIS — Z96.652 STATUS POST LEFT KNEE REPLACEMENT: Primary | ICD-10-CM

## 2022-01-18 DIAGNOSIS — M25.662 DECREASED RANGE OF MOTION (ROM) OF LEFT KNEE: ICD-10-CM

## 2022-01-18 DIAGNOSIS — E78.2 MIXED HYPERLIPIDEMIA: ICD-10-CM

## 2022-01-18 DIAGNOSIS — M10.9 GOUT, UNSPECIFIED CAUSE, UNSPECIFIED CHRONICITY, UNSPECIFIED SITE: ICD-10-CM

## 2022-01-18 DIAGNOSIS — R29.898 WEAKNESS OF LEFT LOWER EXTREMITY: ICD-10-CM

## 2022-01-18 DIAGNOSIS — T30.0 BURN: ICD-10-CM

## 2022-01-18 DIAGNOSIS — N18.30 STAGE 3 CHRONIC KIDNEY DISEASE, UNSPECIFIED WHETHER STAGE 3A OR 3B CKD: ICD-10-CM

## 2022-01-18 DIAGNOSIS — M17.12 OSTEOARTHRITIS OF LEFT KNEE: ICD-10-CM

## 2022-01-18 DIAGNOSIS — Z13.29 SCREENING FOR THYROID DISORDER: ICD-10-CM

## 2022-01-18 DIAGNOSIS — I10 PRIMARY HYPERTENSION: ICD-10-CM

## 2022-01-18 DIAGNOSIS — R79.89 ELEVATED FERRITIN: ICD-10-CM

## 2022-01-18 DIAGNOSIS — R73.01 IMPAIRED FASTING GLUCOSE: ICD-10-CM

## 2022-01-18 DIAGNOSIS — E87.5 HYPERKALEMIA: ICD-10-CM

## 2022-01-18 PROBLEM — R10.9 ABDOMINAL PAIN: Status: ACTIVE | Noted: 2021-11-11

## 2022-01-18 PROBLEM — N83.209 CYST OF OVARY: Status: ACTIVE | Noted: 2021-11-11

## 2022-01-18 PROBLEM — E55.9 VITAMIN D DEFICIENCY: Status: ACTIVE | Noted: 2021-11-11

## 2022-01-18 PROBLEM — N05.9 NEPHRITIC SYNDROME: Status: ACTIVE | Noted: 2021-11-11

## 2022-01-18 PROBLEM — E87.20 METABOLIC ACIDOSIS: Status: ACTIVE | Noted: 2021-11-11

## 2022-01-18 PROBLEM — N39.0 URINARY TRACT INFECTIOUS DISEASE: Status: ACTIVE | Noted: 2021-11-11

## 2022-01-18 PROBLEM — E66.9 OBESITY: Status: ACTIVE | Noted: 2021-11-11

## 2022-01-18 PROBLEM — J45.909 ASTHMA: Status: ACTIVE | Noted: 2021-11-11

## 2022-01-18 LAB
ALBUMIN SERPL-MCNC: 4.1 G/DL (ref 3.5–5.2)
ALBUMIN/GLOB SERPL: 1.5 G/DL
ALP SERPL-CCNC: 87 U/L (ref 39–117)
ALT SERPL W P-5'-P-CCNC: 10 U/L (ref 1–33)
ANION GAP SERPL CALCULATED.3IONS-SCNC: 10.8 MMOL/L (ref 5–15)
AST SERPL-CCNC: 15 U/L (ref 1–32)
BASOPHILS # BLD AUTO: 0.03 10*3/MM3 (ref 0–0.2)
BASOPHILS NFR BLD AUTO: 0.5 % (ref 0–1.5)
BILIRUB SERPL-MCNC: 0.3 MG/DL (ref 0–1.2)
BUN SERPL-MCNC: 42 MG/DL (ref 6–20)
BUN/CREAT SERPL: 17.8 (ref 7–25)
CALCIUM SPEC-SCNC: 9.9 MG/DL (ref 8.6–10.5)
CHLORIDE SERPL-SCNC: 104 MMOL/L (ref 98–107)
CHOLEST SERPL-MCNC: 127 MG/DL (ref 0–200)
CO2 SERPL-SCNC: 24.2 MMOL/L (ref 22–29)
CREAT SERPL-MCNC: 2.36 MG/DL (ref 0.57–1)
DEPRECATED RDW RBC AUTO: 48.9 FL (ref 37–54)
EOSINOPHIL # BLD AUTO: 0.29 10*3/MM3 (ref 0–0.4)
EOSINOPHIL NFR BLD AUTO: 4.8 % (ref 0.3–6.2)
ERYTHROCYTE [DISTWIDTH] IN BLOOD BY AUTOMATED COUNT: 14.4 % (ref 12.3–15.4)
FERRITIN SERPL-MCNC: 268 NG/ML (ref 13–150)
GFR SERPL CREATININE-BSD FRML MDRD: 22 ML/MIN/1.73
GLOBULIN UR ELPH-MCNC: 2.8 GM/DL
GLUCOSE SERPL-MCNC: 105 MG/DL (ref 65–99)
HBA1C MFR BLD: 5.85 % (ref 4.8–5.6)
HCT VFR BLD AUTO: 32.3 % (ref 34–46.6)
HDLC SERPL-MCNC: 37 MG/DL (ref 40–60)
HGB BLD-MCNC: 10.4 G/DL (ref 12–15.9)
IMM GRANULOCYTES # BLD AUTO: 0.03 10*3/MM3 (ref 0–0.05)
IMM GRANULOCYTES NFR BLD AUTO: 0.5 % (ref 0–0.5)
LDLC SERPL CALC-MCNC: 65 MG/DL (ref 0–100)
LDLC/HDLC SERPL: 1.65 {RATIO}
LYMPHOCYTES # BLD AUTO: 1.7 10*3/MM3 (ref 0.7–3.1)
LYMPHOCYTES NFR BLD AUTO: 28.1 % (ref 19.6–45.3)
MCH RBC QN AUTO: 30.6 PG (ref 26.6–33)
MCHC RBC AUTO-ENTMCNC: 32.2 G/DL (ref 31.5–35.7)
MCV RBC AUTO: 95 FL (ref 79–97)
MONOCYTES # BLD AUTO: 0.43 10*3/MM3 (ref 0.1–0.9)
MONOCYTES NFR BLD AUTO: 7.1 % (ref 5–12)
NEUTROPHILS NFR BLD AUTO: 3.57 10*3/MM3 (ref 1.7–7)
NEUTROPHILS NFR BLD AUTO: 59 % (ref 42.7–76)
NRBC BLD AUTO-RTO: 0 /100 WBC (ref 0–0.2)
PLATELET # BLD AUTO: 227 10*3/MM3 (ref 140–450)
PMV BLD AUTO: 9 FL (ref 6–12)
POTASSIUM SERPL-SCNC: 4.9 MMOL/L (ref 3.5–5.2)
PROT SERPL-MCNC: 6.9 G/DL (ref 6–8.5)
RBC # BLD AUTO: 3.4 10*6/MM3 (ref 3.77–5.28)
SODIUM SERPL-SCNC: 139 MMOL/L (ref 136–145)
TRIGL SERPL-MCNC: 145 MG/DL (ref 0–150)
TSH SERPL DL<=0.05 MIU/L-ACNC: 1.74 UIU/ML (ref 0.27–4.2)
URATE SERPL-MCNC: 6.4 MG/DL (ref 2.4–5.7)
VLDLC SERPL-MCNC: 25 MG/DL (ref 5–40)
WBC NRBC COR # BLD: 6.05 10*3/MM3 (ref 3.4–10.8)

## 2022-01-18 PROCEDURE — 80053 COMPREHEN METABOLIC PANEL: CPT | Performed by: NURSE PRACTITIONER

## 2022-01-18 PROCEDURE — 97140 MANUAL THERAPY 1/> REGIONS: CPT | Performed by: PHYSICAL THERAPIST

## 2022-01-18 PROCEDURE — 99214 OFFICE O/P EST MOD 30 MIN: CPT | Performed by: NURSE PRACTITIONER

## 2022-01-18 PROCEDURE — 85025 COMPLETE CBC W/AUTO DIFF WBC: CPT | Performed by: NURSE PRACTITIONER

## 2022-01-18 PROCEDURE — 82728 ASSAY OF FERRITIN: CPT | Performed by: NURSE PRACTITIONER

## 2022-01-18 PROCEDURE — 97110 THERAPEUTIC EXERCISES: CPT | Performed by: PHYSICAL THERAPIST

## 2022-01-18 PROCEDURE — 83036 HEMOGLOBIN GLYCOSYLATED A1C: CPT | Performed by: NURSE PRACTITIONER

## 2022-01-18 PROCEDURE — 80061 LIPID PANEL: CPT | Performed by: NURSE PRACTITIONER

## 2022-01-18 PROCEDURE — 84550 ASSAY OF BLOOD/URIC ACID: CPT | Performed by: NURSE PRACTITIONER

## 2022-01-18 PROCEDURE — 84443 ASSAY THYROID STIM HORMONE: CPT | Performed by: NURSE PRACTITIONER

## 2022-01-18 RX ORDER — ALLOPURINOL 300 MG/1
300 TABLET ORAL DAILY
Qty: 90 TABLET | Refills: 0 | Status: SHIPPED | OUTPATIENT
Start: 2022-01-18 | End: 2022-02-25 | Stop reason: SDUPTHER

## 2022-01-18 RX ORDER — CARVEDILOL 12.5 MG/1
12.5 TABLET ORAL
Qty: 90 TABLET | Refills: 0 | Status: SHIPPED | OUTPATIENT
Start: 2022-01-18 | End: 2022-04-21 | Stop reason: SDUPTHER

## 2022-01-18 RX ORDER — OXYCODONE AND ACETAMINOPHEN 7.5; 325 MG/1; MG/1
1-2 TABLET ORAL EVERY 4 HOURS PRN
Qty: 40 TABLET | Refills: 0 | Status: SHIPPED | OUTPATIENT
Start: 2022-01-18 | End: 2022-05-02

## 2022-01-18 RX ORDER — ATORVASTATIN CALCIUM 20 MG/1
20 TABLET, FILM COATED ORAL NIGHTLY
Qty: 90 TABLET | Refills: 0 | Status: SHIPPED | OUTPATIENT
Start: 2022-01-18 | End: 2022-06-17

## 2022-01-18 NOTE — PROGRESS NOTES
Physical Therapy Daily Progress Note    VISIT#: 5    Subjective   Katarzyna Norris reports she has been compliant with HEP but is still having difficult with knee flexion. Pt reports she has burned the back of her knee with an ice pack. States she fell asleep with ice on the back of her leg with nothing between her and the ice. Pt reports she is following up with MD to assess.      Objective     See Exercise, Manual, and Modality Logs for complete treatment.     Assessment/Plan     Continued with strengthening and ROM exercises. Pt had 84 degrees of passive knee flexion at end of session. Educated patient to continue performing HEP at home.      Progress per Plan of Care and Progress strengthening /stabilization /functional activity            Timed:                 Manual Therapy:    15     mins  52831;     Therapeutic Exercise:    15    mins  05354;     Neuromuscular Rosemarie:    0    mins  67478;    Therapeutic Activity:     0     mins  55877;     Gait Trainin     mins  05232;     Ultrasound:     0     mins  48024;    Ionto                               0    mins   10764  Self pay                         0     mins [email or web address]    Un-Timed:  Electrical Stimulation:    0     mins  91037 ( )  Canalith Repos    0     mins 91460  Dry Needling     0     mins self-pay  Traction     0     mins 44246    Timed Treatment:   30   mins   Total Treatment:     30   mins    Oneyda Kennedy PT  Physical Therapist    PT SIGNATURE: Electronically signed by Oneyda Kennedy PT  [place] LICENSE: [id number]

## 2022-01-18 NOTE — PROGRESS NOTES
Follow Up Office Visit      Patient Name: Katarzyna Norris  : 1970   MRN: [id number]     Chief Complaint:    Chief Complaint   Patient presents with   • Gout   • Hypertension   • Hyperlipidemia   • Chronic Kidney Disease       History of Present Illness: Katarzyna Norris is a 51 y.o. female who is here today for follow up for allergic rhinitis,CKD,HTN,hyperlipidemia,GOUT, impaired fasting glucose   Total knee replacement left knee, 2021 instructed to follow up with PCP and nephrology dr currie pt did not follow up as instructed  Pt had potassium elevation during hospitalization losartan held, patient has not restarted at this time    C/O-left calf blister due to ice without using a barrier on skin   Subjective      Review of Systems:   Review of Systems   Constitutional: Negative for diaphoresis and fever.   Respiratory: Negative for cough.    Cardiovascular: Positive for leg swelling. Negative for chest pain.   Gastrointestinal: Negative for abdominal pain, diarrhea, nausea and vomiting.   Endocrine: Negative for polyuria.   Genitourinary: Negative for dysuria.   Musculoskeletal: Positive for arthralgias.   Neurological: Negative for headaches.        Past Medical History:   Past Medical History:   Diagnosis Date   • Anxiety    • Arthritis     GILBERT KNEES   • Asthma     SEASONAL ALLERGIES   • Elevated cholesterol    • Gout 2021   • Hiatal hernia    • Hypertension    • Renal insufficiency     NOT ON DIALYSIS/NATALIIA   • Sinus problem     SEASONAL ALLERGIES       Past Surgical History:   Past Surgical History:   Procedure Laterality Date   •  SECTION   and    • CHOLECYSTECTOMY     • COLONOSCOPY     • ENDOSCOPY     • TOTAL KNEE ARTHROPLASTY Left 2021    Procedure: TOTAL KNEE ARTHROPLASTY;  Surgeon: Marco Nelson MD;  Location: [facility];  Service: Orthopedics;  Laterality: Left;   • TUBAL ABDOMINAL LIGATION         Family History:   Family  History   Problem Relation Age of Onset   • Throat cancer Father 73   • Stroke Other    • Diabetes Other    • Malig Hyperthermia Neg Hx        Social History:   Social History     Socioeconomic History   • Marital status:    Tobacco Use   • Smoking status: Former Smoker   • Smokeless tobacco: Never Used   Vaping Use   • Vaping Use: Never used   Substance and Sexual Activity   • Alcohol use: Never   • Drug use: Never       Medications:     Current Outpatient Medications:   •  allopurinol (ZYLOPRIM) 300 MG tablet, Take 1 tablet by mouth Daily., Disp: 90 tablet, Rfl: 0  •  Aspirin Buf,CaCarb-MgCarb-MgO, 81 MG tablet, Take 81 mg by mouth Daily. TAKES FOR PREVENTATIVE HEART HEALTH, LD 12/14/21, Disp: , Rfl:   •  aspirin EC (Ecotrin) 325 MG tablet, Take 1 tablet by mouth Daily (start after finishing eliquis), Disp: 14 tablet, Rfl: 0  •  aspirin  MG tablet, Take 325 mg by mouth Every 6 (Six) Hours As Needed., Disp: , Rfl:   •  atorvastatin (LIPITOR) 20 MG tablet, Take 1 tablet by mouth Every Night., Disp: 90 tablet, Rfl: 0  •  carvedilol (COREG) 12.5 MG tablet, Take 1 tablet by mouth Every Morning., Disp: 90 tablet, Rfl: 0  •  cephalexin (KEFLEX) 500 MG capsule, Take 1 capsule by mouth Every 12 (Twelve) Hours., Disp: 10 capsule, Rfl: 0  •  Cholecalciferol (Vitamin D3) 1.25 MG (70877 UT) tablet, Take 1 tablet by mouth Every Morning., Disp: , Rfl:   •  cloNIDine (CATAPRES) 0.1 MG tablet, Take 1 tablet by mouth 3 (Three) Times a Day., Disp: 90 tablet, Rfl: 1  •  oxyCODONE-acetaminophen (PERCOCET) 7.5-325 MG per tablet, Take 1-2 tablets by mouth Every 4 (Four) Hours As Needed for Moderate Pain ., Disp: 40 tablet, Rfl: 0  •  silver sulfadiazine (Silvadene) 1 % cream, Apply 1 application topically to the appropriate area as directed 2 (Two) Times a Day., Disp: 400 g, Rfl: 2  •  sodium bicarbonate 650 MG tablet, Take 1 tablet by mouth 3 (Three) Times a Day. (Patient taking differently: Take 650 mg by mouth 2 (Two)  "Times a Day.), Disp: 270 tablet, Rfl: 0  •  vitamin B-12 (CYANOCOBALAMIN) 100 MCG tablet, Take 50 mcg by mouth Daily., Disp: , Rfl:   •  vitamin C (ASCORBIC ACID) 250 MG tablet, Take 250 mg by mouth Daily., Disp: , Rfl:   •  Zinc Sulfate (ZINC 15 PO), Take  by mouth., Disp: , Rfl:     Allergies:   Allergies   Allergen Reactions   • Diltiazem Hcl Anxiety   • Metal Itching     PT STATES COSTUME EARRINGS CAUSE ITCHING AND IRRITATION TO EARS.         Objective     Physical Exam:  Vital Signs:   Vitals:    01/18/22 1447   BP: 138/80   Pulse: 96   Temp: 99 °F (37.2 °C)   SpO2: 97%   Weight: 122 kg (270 lb)   Height: 152.4 cm (60\")     Body mass index is 52.73 kg/m².     Physical Exam  Cardiovascular:      Rate and Rhythm: Normal rate and regular rhythm.      Heart sounds: Normal heart sounds. No murmur heard.      Pulmonary:      Effort: Pulmonary effort is normal.      Breath sounds: Normal breath sounds.   Abdominal:      General: Bowel sounds are normal.      Palpations: Abdomen is soft.   Musculoskeletal:      Right lower leg: No edema.      Left lower leg: Edema present.   Skin:     General: Skin is warm and dry.      Comments: 2 open wounds posterior calf no erythema no purulent drainage   Neurological:      Mental Status: She is alert.      Gait: Gait abnormal.      Comments: Ambulation with walker     Psychiatric:         Mood and Affect: Mood normal.         Behavior: Behavior normal.             Assessment / Plan      Assessment/Plan:   Diagnoses and all orders for this visit:    1. Hyperkalemia  -     Comprehensive Metabolic Panel    2. Stage 3 chronic kidney disease, unspecified whether stage 3a or 3b CKD (HCC)  -     Comprehensive Metabolic Panel    3. Impaired fasting glucose  -     Hemoglobin A1c    4. Primary hypertension  -     CBC Auto Differential    5. Mixed hyperlipidemia  -     Comprehensive Metabolic Panel  -     Lipid Panel    6. Gout, unspecified cause, unspecified chronicity, unspecified site  -  "    Uric Acid    7. Screening for thyroid disorder  -     TSH    8. Elevated ferritin  -     Ferritin    9. Burn    Other orders  -     allopurinol (ZYLOPRIM) 300 MG tablet; Take 1 tablet by mouth Daily.  Dispense: 90 tablet; Refill: 0  -     atorvastatin (LIPITOR) 20 MG tablet; Take 1 tablet by mouth Every Night.  Dispense: 90 tablet; Refill: 0  -     carvedilol (COREG) 12.5 MG tablet; Take 1 tablet by mouth Every Morning.  Dispense: 90 tablet; Refill: 0  -     silver sulfadiazine (Silvadene) 1 % cream; Apply 1 application topically to the appropriate area as directed 2 (Two) Times a Day.  Dispense: 400 g; Refill: 2    Hyperkalemia will obtain CMP to monitor continue to hold losartan we will call with results and further instructions  Chronic kidney disease stage III we will obtain CMP in office to monitor patient did not keep follow-up appoint with nephrology as scheduled  Impaired fasting glucose obtain hemoglobin A1c recommend decrease carb intake exercise 30 minutes daily weight loss of 10% of her body weight  Hypertension stable at this time we will continue Coreg and clonidine  Hyperlipidemia we will obtain lipid panel and CMP to monitor current statin dose Lipitor 20 mg at nighttime  Gout will obtain uric acid level to monitor current allopurinol dose 300 mg daily patient denies any recent gout flares  Will obtain TSH to screen for thyroid disorder  Elevated ferritin level we will recheck ferritin in office  Burn we will treat with Silvadene clean area with soap and water twice a day and apply dressing      Follow Up:   Return in about 2 weeks (around 2/1/2022).    MARICRUZ Ball      Please note that portions of this note were completed with a voice recognition program.

## 2022-01-20 ENCOUNTER — TELEPHONE (OUTPATIENT)
Dept: FAMILY MEDICINE CLINIC | Facility: CLINIC | Age: 52
End: 2022-01-20

## 2022-01-20 NOTE — TELEPHONE ENCOUNTER
Notified patient of results , patient is taking allopurinol 300mg, and will be sure she is fasting when she does her 90 day labs

## 2022-01-20 NOTE — TELEPHONE ENCOUNTER
----- Message from MARICRUZ Blackwood sent at 1/19/2022 11:23 AM EST -----  Renal insufficiency remains patient needs appointment with nephrology she reports seeing Dr. sheridan in the pastGlucose fasting and average glucose elevated will need to repeat all labs in 90 days and will need to be fasting at the time at risk for diabetes diagnosis of diabetes is a fasting glucose of 126 or higher on 2 separate occasions

## 2022-01-21 ENCOUNTER — TREATMENT (OUTPATIENT)
Dept: PHYSICAL THERAPY | Facility: CLINIC | Age: 52
End: 2022-01-21

## 2022-01-21 DIAGNOSIS — M25.662 DECREASED RANGE OF MOTION (ROM) OF LEFT KNEE: ICD-10-CM

## 2022-01-21 DIAGNOSIS — Z96.652 STATUS POST LEFT KNEE REPLACEMENT: Primary | ICD-10-CM

## 2022-01-21 DIAGNOSIS — R29.898 WEAKNESS OF LEFT LOWER EXTREMITY: ICD-10-CM

## 2022-01-21 PROCEDURE — 97110 THERAPEUTIC EXERCISES: CPT | Performed by: PHYSICAL THERAPIST

## 2022-01-21 PROCEDURE — 97140 MANUAL THERAPY 1/> REGIONS: CPT | Performed by: PHYSICAL THERAPIST

## 2022-01-21 NOTE — PROGRESS NOTES
Physical Therapy Daily Progress Note    VISIT#: 6    Subjective   Katarzyna Norris reports 7/10 pain L knee today. Pt reports her knee is more sore today as she has been walking more.      Objective     See Exercise, Manual, and Modality Logs for complete treatment.     Assessment/Plan    Focused primarily on knee flexion today with exercises and manual therapy. Pt reported a decrease in pain with knee flexion today but continues to be limited with knee flexion. Will continue to focus on improving patient's ROM. Pt reported 5/10 pain in L knee at end of therapy.     Progress per Plan of Care and Progress strengthening /stabilization /functional activity            Timed:                 Manual Therapy:    15     mins  15670;     Therapeutic Exercise:    15     mins  13018;     Neuromuscular Rosemarie:    0    mins  76389;    Therapeutic Activity:     0     mins  24694;     Gait Trainin     mins  40836;     Ultrasound:     0     mins  22702;    Ionto                               0    mins   58334  Self pay                         0     mins [email or web address]    Un-Timed:  Electrical Stimulation:    0     mins  14120 (MC )  Canalith Repos    0     mins 34393  Dry Needling     0     mins self-pay  Traction     0     mins 11185    Timed Treatment:   30   mins   Total Treatment:     30   mins    Oneyda Kennedy PT  Physical Therapist    PT SIGNATURE: Electronically signed by Oneyda Kennedy PT  [place] LICENSE: [id number]

## 2022-01-24 ENCOUNTER — TELEPHONE (OUTPATIENT)
Dept: FAMILY MEDICINE CLINIC | Facility: CLINIC | Age: 52
End: 2022-01-24

## 2022-01-24 RX ORDER — LOSARTAN POTASSIUM 100 MG/1
100 TABLET ORAL DAILY
Start: 2022-01-24 | End: 2022-06-20 | Stop reason: SDUPTHER

## 2022-01-24 RX ORDER — LOSARTAN POTASSIUM 100 MG/1
100 TABLET ORAL DAILY
COMMUNITY
End: 2022-01-24 | Stop reason: SDUPTHER

## 2022-01-24 NOTE — TELEPHONE ENCOUNTER
Patient was asking about Losartan and Oxycodone taken together. It is ok. Losartan is taken for blood pressure.

## 2022-01-24 NOTE — TELEPHONE ENCOUNTER
Caller: Katarzyna Norris    Relationship: Self    Best call back number: [phone]     Which medication are you concerned about: Losartan    Who prescribed you this medication:     What are your concerns: PATIENT WOULD LIKE TO KNOW IF IT WILL BE OKAY TO TAKE Losartan AND PAIN MEDICATION PLEASE ADVISE

## 2022-01-25 ENCOUNTER — TREATMENT (OUTPATIENT)
Dept: PHYSICAL THERAPY | Facility: CLINIC | Age: 52
End: 2022-01-25

## 2022-01-25 DIAGNOSIS — M25.662 DECREASED RANGE OF MOTION (ROM) OF LEFT KNEE: ICD-10-CM

## 2022-01-25 DIAGNOSIS — R29.898 WEAKNESS OF LEFT LOWER EXTREMITY: ICD-10-CM

## 2022-01-25 DIAGNOSIS — Z96.652 STATUS POST LEFT KNEE REPLACEMENT: Primary | ICD-10-CM

## 2022-01-25 PROCEDURE — 97140 MANUAL THERAPY 1/> REGIONS: CPT | Performed by: PHYSICAL THERAPIST

## 2022-01-25 PROCEDURE — 97150 GROUP THERAPEUTIC PROCEDURES: CPT | Performed by: PHYSICAL THERAPIST

## 2022-01-25 NOTE — PROGRESS NOTES
Physical Therapy Daily Progress Note    VISIT#: 7    Subjective   Katarzyna Norris reports she has been performing her exercises all weekend trying to improve her knee flexion.      Objective     See Exercise, Manual, and Modality Logs for complete treatment.     Assessment/Plan     Continued with exercises to focus on improving her knee ROM. Pt tolerated all exercises with no reports of increase in pain. Pt continues to have difficulty with performing full cycle on bike due to limited range. May begin to add strengthening exercises back into PT routine next visit. Will reassess patient's goals next session as well.      Progress per Plan of Care and Progress strengthening /stabilization /functional activity            Timed:                 Manual Therapy:    15     mins  49012;     Therapeutic Exercise:    0     mins  22460;     Neuromuscular Rosemarie:    0    mins  13316;    Therapeutic Activity:     0     mins  84113;     Gait Trainin     mins  97797;     Ultrasound:     0     mins  63631;    Ionto                               0    mins   90037  Self pay                         0     mins [email or web address]  Group   __15__ mins 75256     Un-Timed:  Electrical Stimulation:    0     mins  61636 ( )  Canalith Repos    0     mins 79743  Dry Needling     0     mins self-pay  Traction     0     mins 67155    Timed Treatment:   30   mins   Total Treatment:     30   mins    Oneyda Kennedy PT  Physical Therapist    PT SIGNATURE: Electronically signed by Oneyda Kennedy PT  [place] LICENSE: [id number]

## 2022-01-28 ENCOUNTER — TREATMENT (OUTPATIENT)
Dept: PHYSICAL THERAPY | Facility: CLINIC | Age: 52
End: 2022-01-28

## 2022-01-28 DIAGNOSIS — M25.662 DECREASED RANGE OF MOTION (ROM) OF LEFT KNEE: ICD-10-CM

## 2022-01-28 DIAGNOSIS — Z96.652 STATUS POST LEFT KNEE REPLACEMENT: Primary | ICD-10-CM

## 2022-01-28 DIAGNOSIS — R29.898 WEAKNESS OF LEFT LOWER EXTREMITY: ICD-10-CM

## 2022-01-28 PROCEDURE — 97110 THERAPEUTIC EXERCISES: CPT | Performed by: PHYSICAL THERAPIST

## 2022-01-28 PROCEDURE — 97140 MANUAL THERAPY 1/> REGIONS: CPT | Performed by: PHYSICAL THERAPIST

## 2022-01-28 NOTE — PROGRESS NOTES
Progress Assessment        Patient: Katarzyna Norris   : 1970  Diagnosis/ICD-10 Code:  Status post left knee replacement [Z96.652]  Referring practitioner: Marco Nelson MD  Date of Initial Visit: Type: THERAPY  Noted: 2021  Today's Date: 2022  Patient seen for 8 sessions      Subjective:   Katarzyna Norris reports: She feels like she is making great progress.  Subjective Questionnaire: LEFS: Will perform next time  Clinical Progress: improved  Home Program Compliance: Yes  Treatment has included: therapeutic exercise, neuromuscular re-education, manual therapy, therapeutic activity and gait training    Subjective Pt reports she has been compliant with HEP.   Objective     L knee AROM: 7-90 degrees    L knee extension: 4/5  L knee flexion: 4/5    Assessment/Plan       Pt is making great progress with improving strength and ROM but does continue to be limited with both. Pt is compliant with HEP. Will continue to focus on improving patient's ROM and strength.     LTG 1: 12 weeks: The patient will demonstrate 5 to 110 degrees of ROM for the left knee in order to allow patient to be able to ambulate and ascend/descend stairs.    PARTIALLY MET    STG 1a: 6 weeks: The patient will demonstrate 10 to 90 degrees of ROM for the left knee.    MET    2. The patient has limited strength of the left knee.    LTG 2: 12 weeks: The patient will demonstrate 4+/5 strength for left knee flexion and extension in order to allow patient improved joint stability.    PARTIALLY MET    STG 2a: 6 weeks: The patient will demonstrate 4/5 strength for left knee flexion and extension    MET    3. The patient has gait dysfunction.    LTG 3: 12 weeks: The patient will ambulate without assistive device, independently, for community distances with minimal limp to the left lower extremity in order to improve mobility and allow patient to perform activities such as grocery shopping with greater ease.    PARTIALLY MET    LTG 4:  12 weeks: The patient will report a pain rating of 1/10 or better in order to improve tolerance to activities of daily living and improve sleep quality.    PARTIALLY MET    STG 4: 6 weeks: The patient will report a pain rating of 3/10 or better.    PARTIALLY MET      LTG 5: 12 weeks: The patient will demonstrate 65% limitation on the Lower Extremity Functional Scale.    PARTIALLY MET      STG 5a: 6 weeks: The patient will demonstrate 55% limitation on the Lower Extremity Functional Scale.    PARTIALLY MET      Progress toward previous goals: Partially Met    See Exercise, Manual, and Modality Logs for complete treatment.         Recommendations: Continue as planned  Timeframe: 6 weeks  Prognosis to achieve goals: good    PT SIGNATURE: Electronically signed by Oneyda Kennedy, MICHELINE  [place] LICENSE: [id number]    Based upon review of the patient's progress and continued therapy plan, it is my medical opinion that Katarzyna Norris should continue physical therapy treatment at [facility]  [address]   [place] [address] [phone].      Timed:                 Manual Therapy:    15     mins  75429;     Therapeutic Exercise:    15     mins  93981;     Neuromuscular Rosemarie:    0    mins  37639;    Therapeutic Activity:     0     mins  77751;     Gait Trainin     mins  85450;     Ultrasound:     0     mins  00047;    Ionto                               0    mins   88869  Self pay                         0     mins [id number]    Un-Timed:  Electrical Stimulation:    0     mins  88309 ( )  Canalith Repos    0     mins 56189  Dry Needling     0     mins self-pay  Traction     0     mins 04693    Timed Treatment:   30   mins   Total Treatment:     30   mins      I certify that the therapy services are furnished while this patient is under my care.  The services outlined above are required by this patient, and will be reviewed every 90 days.

## 2022-01-31 ENCOUNTER — TELEPHONE (OUTPATIENT)
Dept: PHYSICAL THERAPY | Facility: CLINIC | Age: 52
End: 2022-01-31

## 2022-02-07 PROBLEM — Z47.89 AFTERCARE FOLLOWING SURGERY OF THE MUSCULOSKELETAL SYSTEM: Status: ACTIVE | Noted: 2022-02-07

## 2022-02-08 ENCOUNTER — TREATMENT (OUTPATIENT)
Dept: PHYSICAL THERAPY | Facility: CLINIC | Age: 52
End: 2022-02-08

## 2022-02-08 DIAGNOSIS — R29.898 WEAKNESS OF LEFT LOWER EXTREMITY: ICD-10-CM

## 2022-02-08 DIAGNOSIS — M25.662 DECREASED RANGE OF MOTION (ROM) OF LEFT KNEE: ICD-10-CM

## 2022-02-08 DIAGNOSIS — Z96.652 STATUS POST LEFT KNEE REPLACEMENT: Primary | ICD-10-CM

## 2022-02-08 PROCEDURE — 97530 THERAPEUTIC ACTIVITIES: CPT | Performed by: PHYSICAL THERAPIST

## 2022-02-08 PROCEDURE — 97110 THERAPEUTIC EXERCISES: CPT | Performed by: PHYSICAL THERAPIST

## 2022-02-08 NOTE — PROGRESS NOTES
Physical Therapy Daily Treatment Note      Patient: Katarzyna Norris   : 1970  Referring practitioner: Marco Nelson MD  Date of Initial Visit: Type: THERAPY  Noted: 2021  Today's Date: 2022  Patient seen for 9 sessions           Subjective  Katarzyna Norris reports: rated her pain at 2/10.       Objective   Seated on half wall active 93 flexion, passive 97.    See Exercise, Manual, and Modality Logs for complete treatment.       Assessment/Plan  Pt tolerated advancement in her program, with added task difficulty, no c/o. Gait with STC for advancement also. Katarzyna is progressing as demonstrated with measurable increase in AROM and strength gains with added resistance today.    Visit Diagnoses:    ICD-10-CM ICD-9-CM   1. Status post left knee replacement  Z96.652 V43.65   2. Decreased range of motion (ROM) of left knee  M25.662 719.56   3. Weakness of left lower extremity  R29.898 729.89       Progress per Plan of Care and Progress strengthening /stabilization /functional activity           Timed:  Manual Therapy:         mins  11693;  Therapeutic Exercise:    12     mins  85841;     Neuromuscular Rosemarie:        mins  23556;    Therapeutic Activity:     14     mins  65752;     Gait Trainin     mins  98091;     Ultrasound:          mins  12335;    Electrical Stimulation:         mins  89092 ( );  Aquatics  __   mins   39750    Untimed:  Electrical Stimulation:         mins  03463 ( );  Mechanical Traction:         mins  89057;     Timed Treatment:  30    mins   Total Treatment:     30   mins    Electronically Signed:  Serena Garza PTA  Physical Therapist Assistant    [facility] license [id number]

## 2022-02-10 ENCOUNTER — TREATMENT (OUTPATIENT)
Dept: PHYSICAL THERAPY | Facility: CLINIC | Age: 52
End: 2022-02-10

## 2022-02-10 ENCOUNTER — OFFICE VISIT (OUTPATIENT)
Dept: ORTHOPEDIC SURGERY | Facility: CLINIC | Age: 52
End: 2022-02-10

## 2022-02-10 VITALS — BODY MASS INDEX: 53.01 KG/M2 | HEIGHT: 60 IN | OXYGEN SATURATION: 93 % | WEIGHT: 270 LBS | HEART RATE: 63 BPM

## 2022-02-10 DIAGNOSIS — Z47.89 AFTERCARE FOLLOWING SURGERY OF THE MUSCULOSKELETAL SYSTEM: Primary | ICD-10-CM

## 2022-02-10 DIAGNOSIS — M25.662 DECREASED RANGE OF MOTION (ROM) OF LEFT KNEE: ICD-10-CM

## 2022-02-10 DIAGNOSIS — R29.898 WEAKNESS OF LEFT LOWER EXTREMITY: ICD-10-CM

## 2022-02-10 DIAGNOSIS — Z96.652 STATUS POST LEFT KNEE REPLACEMENT: Primary | ICD-10-CM

## 2022-02-10 PROCEDURE — 99024 POSTOP FOLLOW-UP VISIT: CPT | Performed by: PHYSICIAN ASSISTANT

## 2022-02-10 PROCEDURE — 97110 THERAPEUTIC EXERCISES: CPT | Performed by: PHYSICAL THERAPIST

## 2022-02-10 PROCEDURE — 97530 THERAPEUTIC ACTIVITIES: CPT | Performed by: PHYSICAL THERAPIST

## 2022-02-10 NOTE — PROGRESS NOTES
"Chief Complaint  Pain of the Left Knee    Subjective          Katarzyna Norris is a 51 y.o. female  presents to [facility] for   History of Present Illness    Patient presents with her  for follow-up evaluation of left total knee arthroplasty, 12/22/2021.  Patient and  states that she has been doing well she states she has good pain control she states she stopped the pain medication about 2 weeks ago.  Patient states she is attending physical therapy with good results.  Patient states she had an ultrasound recently to rule out DVT which was negative.  Patient states her pain and proved.  She states she is driving again.  Patient states that therapy they are able to get her to about 97 degrees flexion and full extension.  She does home exercise when not at therapy.  Patient finished DVT prophylaxis.  Patient states there is a small part of her incision that feels like there is a suture sticking out, she denies fever chills drainage or dehiscence.  Allergies   Allergen Reactions   • Diltiazem Hcl Anxiety   • Metal Itching     PT STATES COSTUME EARRINGS CAUSE ITCHING AND IRRITATION TO EARS.        Social History     Socioeconomic History   • Marital status:    Tobacco Use   • Smoking status: Former Smoker   • Smokeless tobacco: Never Used   Vaping Use   • Vaping Use: Never used   Substance and Sexual Activity   • Alcohol use: Never   • Drug use: Never        REVIEW OF SYSTEMS    Constitutional: Denies fevers, chills, weight loss  Cardiovascular: Denies chest pain, shortness of breath  Skin: Denies rashes, acute skin changes  Neurologic: Denies headache, loss of consciousness  MSK: Left knee pain      Objective   Vital Signs:   Pulse 63   Ht 152.4 cm (60\")   Wt 122 kg (270 lb)   SpO2 93%   BMI 52.73 kg/m²     Body mass index is 52.73 kg/m².    Physical Exam    Left knee: Well-healed incision, no erythema, no ecchymosis, no swelling, no effusion, no signs of " infection.  Nontender calf, negative Homans' sign.  Full extension, flexion 100, stable to varus/valgus stress, stable anterior/posterior drawer, no pain with range of motion, patient ambulates with nonantalgic gait.    Procedures    Imaging Results (Most Recent)     None           Result Review :   The following data was reviewed by: SARAH Arora on 02/10/2022:               Assessment and Plan    Diagnoses and all orders for this visit:    1. Aftercare following surgery of left total knee arthroplasty, 12/22/2021 (Primary)        Discussed diagnosis and treatment options with the patient, patient was advised to continue physical therapy, she has visits up until March 16 she states if she would like to continue she will call to have this refilled prior to her next visit.  Continue weightbearing and aggressive therapy for flexion and extension, follow-up in 6 weeks for reevaluation, patient agreed.    Call or return if worsening symptoms.    Follow Up   Return in about 6 weeks (around 3/24/2022) for Recheck.  Patient was given instructions and counseling regarding her condition or for health maintenance advice. Please see specific information pulled into the AVS if appropriate.

## 2022-02-10 NOTE — PROGRESS NOTES
Physical Therapy Daily Treatment Note      Patient: Katarzyna Norris   : 1970  Referring practitioner: Marco Nelson MD  Date of Initial Visit: Type: THERAPY  Noted: 2021  Today's Date: 2/10/2022  Patient seen for 10 sessions           Subjective  Katarzyna Norris reports: she has been doing a lot of walking and is sore. She advised that she felt better after session, limbering up.       Objective   Katarzyna arrived ambulating with her STC. Good sequencing. What appears to be a scab or even a potential internal suture working out is noted mid incision scar at skin fold, pt to have doctor take a look as she returns today for follow up visit.    See Exercise, Manual, and Modality Logs for complete treatment.       Assessment/Plan  Further advancement in strengthening tolerated well. She is making good progress with mobility, strength and function.    Visit Diagnoses:    ICD-10-CM ICD-9-CM   1. Status post left knee replacement  Z96.652 V43.65   2. Decreased range of motion (ROM) of left knee  M25.662 719.56   3. Weakness of left lower extremity  R29.898 729.89       Progress per Plan of Care and Progress strengthening /stabilization /functional activity           Timed:  Manual Therapy:         mins  47743;  Therapeutic Exercise:   17      mins  43383;     Neuromuscular Rosemarie:        mins  77696;    Therapeutic Activity:     10     mins  10657;     Gait Trainin     mins  83082;     Ultrasound:          mins  54009;    Electrical Stimulation:         mins  01642 ( );  Aquatics  __   mins   13650    Untimed:  Electrical Stimulation:         mins  94901 ( );  Mechanical Traction:         mins  62546;     Timed Treatment:   31   mins   Total Treatment:     31   mins    Electronically Signed:  Serena Garza PTA  Physical Therapist Assistant    [place] PTA license [id number]

## 2022-02-14 DIAGNOSIS — I10 HYPERTENSION, UNSPECIFIED TYPE: ICD-10-CM

## 2022-02-15 ENCOUNTER — TREATMENT (OUTPATIENT)
Dept: PHYSICAL THERAPY | Facility: CLINIC | Age: 52
End: 2022-02-15

## 2022-02-15 DIAGNOSIS — Z96.652 STATUS POST LEFT KNEE REPLACEMENT: Primary | ICD-10-CM

## 2022-02-15 DIAGNOSIS — M25.662 DECREASED RANGE OF MOTION (ROM) OF LEFT KNEE: ICD-10-CM

## 2022-02-15 DIAGNOSIS — R29.898 WEAKNESS OF LEFT LOWER EXTREMITY: ICD-10-CM

## 2022-02-15 PROCEDURE — 97110 THERAPEUTIC EXERCISES: CPT | Performed by: PHYSICAL THERAPIST

## 2022-02-15 NOTE — PROGRESS NOTES
"Physical Therapy Daily Treatment Note      Patient: Katarzyna Norris   : 1970  Referring practitioner: Marco Nelson MD  Date of Initial Visit: Type: THERAPY  Noted: 2021  Today's Date: 2/15/2022  Patient seen for 11 sessions           Subjective  Katarzyna Norris reports: \"saw the doctor and he asked me if I wanted to stop therapy, told him no I want it better. He did  Check out that spot and said that it is a hair, not a stitch.\"    Objective   See Exercise, Manual, and Modality Logs for complete treatment.       Assessment/Plan  Claire related she is happy with therapy and pleased to see the improvements she is making. Extended time to include education in/performance of HEP using green band, performing all B. Claire is to add these to her HEP and then will resume her program here next time.     Visit Diagnoses:    ICD-10-CM ICD-9-CM   1. Status post left knee replacement  Z96.652 V43.65   2. Decreased range of motion (ROM) of left knee  M25.662 719.56   3. Weakness of left lower extremity  R29.898 729.89       Progress per Plan of Care and Progress strengthening /stabilization /functional activity           Timed:  Manual Therapy:         mins  67653;  Therapeutic Exercise:    24     mins  37614;     Neuromuscular Rosemarie:        mins  28104;    Therapeutic Activity:         mins  76507;     Gait Trainin     mins  28883;     Ultrasound:          mins  89450;    Electrical Stimulation:         mins  01471 ( );  Aquatics  __   mins   98284    Untimed:  Electrical Stimulation:         mins  29817 ( );  Mechanical Traction:         mins  68368;     Timed Treatment:   28   mins   Total Treatment:     28   mins    Electronically Signed:  Serena Garza PTA  Physical Therapist Assistant    [place] PTA license [id number]            "

## 2022-02-16 DIAGNOSIS — I10 HYPERTENSION, UNSPECIFIED TYPE: ICD-10-CM

## 2022-02-16 RX ORDER — CLONIDINE HYDROCHLORIDE 0.1 MG/1
0.1 TABLET ORAL 3 TIMES DAILY
Qty: 90 TABLET | Refills: 0 | Status: SHIPPED | OUTPATIENT
Start: 2022-02-16 | End: 2022-03-29

## 2022-02-21 RX ORDER — CLONIDINE HYDROCHLORIDE 0.1 MG/1
TABLET ORAL
Qty: 90 TABLET | Refills: 0 | OUTPATIENT
Start: 2022-02-21

## 2022-02-25 ENCOUNTER — TELEPHONE (OUTPATIENT)
Dept: FAMILY MEDICINE CLINIC | Facility: CLINIC | Age: 52
End: 2022-02-25

## 2022-02-25 ENCOUNTER — OFFICE VISIT (OUTPATIENT)
Dept: FAMILY MEDICINE CLINIC | Facility: CLINIC | Age: 52
End: 2022-02-25

## 2022-02-25 VITALS
OXYGEN SATURATION: 98 % | BODY MASS INDEX: 53.01 KG/M2 | TEMPERATURE: 98.6 F | SYSTOLIC BLOOD PRESSURE: 141 MMHG | HEIGHT: 60 IN | WEIGHT: 270 LBS | DIASTOLIC BLOOD PRESSURE: 90 MMHG | HEART RATE: 103 BPM

## 2022-02-25 VITALS
TEMPERATURE: 98.3 F | DIASTOLIC BLOOD PRESSURE: 90 MMHG | SYSTOLIC BLOOD PRESSURE: 146 MMHG | HEART RATE: 94 BPM | HEIGHT: 60 IN | OXYGEN SATURATION: 98 % | BODY MASS INDEX: 53.01 KG/M2 | WEIGHT: 270 LBS

## 2022-02-25 DIAGNOSIS — Z12.4 SCREENING FOR CERVICAL CANCER: ICD-10-CM

## 2022-02-25 DIAGNOSIS — D64.9 ANEMIA, UNSPECIFIED TYPE: ICD-10-CM

## 2022-02-25 DIAGNOSIS — N18.30 STAGE 3 CHRONIC KIDNEY DISEASE, UNSPECIFIED WHETHER STAGE 3A OR 3B CKD: ICD-10-CM

## 2022-02-25 DIAGNOSIS — M10.9 GOUT, UNSPECIFIED CAUSE, UNSPECIFIED CHRONICITY, UNSPECIFIED SITE: ICD-10-CM

## 2022-02-25 DIAGNOSIS — R73.01 IMPAIRED FASTING GLUCOSE: ICD-10-CM

## 2022-02-25 DIAGNOSIS — E78.2 MIXED HYPERLIPIDEMIA: ICD-10-CM

## 2022-02-25 DIAGNOSIS — Z01.419 ENCOUNTER FOR WELL WOMAN EXAM WITH ROUTINE GYNECOLOGICAL EXAM: Primary | ICD-10-CM

## 2022-02-25 DIAGNOSIS — I10 PRIMARY HYPERTENSION: Primary | ICD-10-CM

## 2022-02-25 DIAGNOSIS — N89.8 VAGINAL DISCHARGE: ICD-10-CM

## 2022-02-25 DIAGNOSIS — E53.8 B12 DEFICIENCY: ICD-10-CM

## 2022-02-25 DIAGNOSIS — Z13.29 SCREENING FOR THYROID DISORDER: ICD-10-CM

## 2022-02-25 LAB
CANDIDA SPECIES: NEGATIVE
GARDNERELLA VAGINALIS: NEGATIVE
T VAGINALIS DNA VAG QL PROBE+SIG AMP: NEGATIVE

## 2022-02-25 PROCEDURE — 87660 TRICHOMONAS VAGIN DIR PROBE: CPT | Performed by: NURSE PRACTITIONER

## 2022-02-25 PROCEDURE — 87480 CANDIDA DNA DIR PROBE: CPT | Performed by: NURSE PRACTITIONER

## 2022-02-25 PROCEDURE — 87510 GARDNER VAG DNA DIR PROBE: CPT | Performed by: NURSE PRACTITIONER

## 2022-02-25 PROCEDURE — 3008F BODY MASS INDEX DOCD: CPT | Performed by: NURSE PRACTITIONER

## 2022-02-25 PROCEDURE — 99396 PREV VISIT EST AGE 40-64: CPT | Performed by: NURSE PRACTITIONER

## 2022-02-25 PROCEDURE — 2014F MENTAL STATUS ASSESS: CPT | Performed by: NURSE PRACTITIONER

## 2022-02-25 PROCEDURE — 99214 OFFICE O/P EST MOD 30 MIN: CPT | Performed by: NURSE PRACTITIONER

## 2022-02-25 PROCEDURE — G0123 SCREEN CERV/VAG THIN LAYER: HCPCS | Performed by: NURSE PRACTITIONER

## 2022-02-25 RX ORDER — NYSTATIN AND TRIAMCINOLONE ACETONIDE 100000; 1 [USP'U]/G; MG/G
1 OINTMENT TOPICAL 2 TIMES DAILY
Qty: 60 G | Refills: 1 | Status: SHIPPED | OUTPATIENT
Start: 2022-02-25 | End: 2022-03-25 | Stop reason: SDUPTHER

## 2022-02-25 RX ORDER — ALLOPURINOL 300 MG/1
300 TABLET ORAL 2 TIMES DAILY
Qty: 180 TABLET | Refills: 1 | Status: SHIPPED | OUTPATIENT
Start: 2022-02-25 | End: 2022-08-31 | Stop reason: SDUPTHER

## 2022-02-25 RX ORDER — FLUCONAZOLE 150 MG/1
150 TABLET ORAL
Qty: 3 TABLET | Refills: 0 | Status: SHIPPED | OUTPATIENT
Start: 2022-02-25 | End: 2022-03-04

## 2022-02-25 NOTE — PROGRESS NOTES
Female Physical / PAP Note      Patient Name: Katarzyna Norris  : 1970   MRN: [id number]     Chief Complaint:    Chief Complaint   Patient presents with   • Gynecologic Exam       History of Present Illness: Katarzyna Norris is a 51 y.o. female who is here today for her annual health maintenance and physical.   Mammogram   Cologuard  will scan results to chart   Pap-unknown  LMP age 49          Subjective      Review of Systems:   Review of Systems   Constitutional: Negative for chills and fever.   Respiratory: Negative for cough.    Cardiovascular: Negative for chest pain.   Gastrointestinal: Negative for abdominal pain, diarrhea, nausea and vomiting.   Genitourinary: Negative for dysuria.   Skin: Negative for rash.      Breast - No tenderness, lumps, discharge, or blood from nipples.      Past Medical History, Social History, Family History and Care Team were all reviewed with patient and updated as appropriate.     Medications:     Current Outpatient Medications:   •  allopurinol (ZYLOPRIM) 300 MG tablet, Take 1 tablet by mouth 2 (Two) Times a Day., Disp: 180 tablet, Rfl: 1  •  Aspirin Buf,CaCarb-MgCarb-MgO, 81 MG tablet, Take 81 mg by mouth Daily. TAKES FOR PREVENTATIVE HEART HEALTH,  21, Disp: , Rfl:   •  aspirin EC (Ecotrin) 325 MG tablet, Take 1 tablet by mouth Daily (start after finishing eliquis), Disp: 14 tablet, Rfl: 0  •  aspirin  MG tablet, Take 325 mg by mouth Every 6 (Six) Hours As Needed., Disp: , Rfl:   •  atorvastatin (LIPITOR) 20 MG tablet, Take 1 tablet by mouth Every Night., Disp: 90 tablet, Rfl: 0  •  carvedilol (COREG) 12.5 MG tablet, Take 1 tablet by mouth Every Morning., Disp: 90 tablet, Rfl: 0  •  cephalexin (KEFLEX) 500 MG capsule, Take 1 capsule by mouth Every 12 (Twelve) Hours., Disp: 10 capsule, Rfl: 0  •  Cholecalciferol (Vitamin D3) 1.25 MG (01408 UT) tablet, Take 1 tablet by mouth Every Morning., Disp: , Rfl:   •  cloNIDine (CATAPRES) 0.1  "MG tablet, Take 1 tablet by mouth 3 (Three) Times a Day., Disp: 90 tablet, Rfl: 0  •  losartan (COZAAR) 100 MG tablet, Take 1 tablet by mouth Daily., Disp: , Rfl:   •  oxyCODONE-acetaminophen (PERCOCET) 7.5-325 MG per tablet, Take 1-2 tablets by mouth Every 4 (Four) Hours As Needed for Moderate Pain ., Disp: 40 tablet, Rfl: 0  •  silver sulfadiazine (Silvadene) 1 % cream, Apply 1 application topically to the appropriate area as directed 2 (Two) Times a Day., Disp: 400 g, Rfl: 2  •  sodium bicarbonate 650 MG tablet, Take 1 tablet by mouth 3 (Three) Times a Day. (Patient taking differently: Take 650 mg by mouth 2 (Two) Times a Day.), Disp: 270 tablet, Rfl: 0  •  vitamin B-12 (CYANOCOBALAMIN) 100 MCG tablet, Take 50 mcg by mouth Daily., Disp: , Rfl:   •  vitamin C (ASCORBIC ACID) 250 MG tablet, Take 250 mg by mouth Daily., Disp: , Rfl:   •  Zinc Sulfate (ZINC 15 PO), Take  by mouth., Disp: , Rfl:     Allergies:   Allergies   Allergen Reactions   • Diltiazem Hcl Anxiety   • Metal Itching     PT STATES COSTUME EARRINGS CAUSE ITCHING AND IRRITATION TO EARS.         Objective     Physical Exam:  Vital Signs:   Vitals:    02/25/22 1029   BP: 141/90   Pulse: 103   Temp: 98.6 °F (37 °C)   SpO2: 98%   Weight: 122 kg (270 lb)   Height: 152.4 cm (60\")     Body mass index is 52.73 kg/m².     Physical Exam  Cardiovascular:      Rate and Rhythm: Normal rate and regular rhythm.      Heart sounds: Normal heart sounds. No murmur heard.      Pulmonary:      Effort: Pulmonary effort is normal.      Breath sounds: Normal breath sounds.   Abdominal:      General: Bowel sounds are normal.      Palpations: Abdomen is soft.   Genitourinary:     General: Normal vulva.      Vagina: Vaginal discharge present.      Uterus: Normal.       Adnexa: Right adnexa normal and left adnexa normal.   Musculoskeletal:      Right lower leg: No edema.      Left lower leg: No edema.   Skin:     General: Skin is warm and dry.   Neurological:      Mental Status: " "She is alert.   Psychiatric:         Mood and Affect: Mood normal.         Behavior: Behavior normal.             Assessment / Plan      Assessment/Plan:   Diagnoses and all orders for this visit:    1. Encounter for well woman exam with routine gynecological exam (Primary)  -     Gardnerella vaginalis, Trichomonas vaginalis, Candida albicans, DNA - Swab, Vagina    2. Screening for cervical cancer  -     PAP, Liquid Based ([facility] Only)    3. Vaginal discharge       Vaginal discharge vag screen obtained will call with results         Follow Up:   Return in about 1 year (around 2/25/2023).    Healthcare Maintenance:   Counseling provided on screening mammogram, screening pap, screening colon cancer, diet and exercise   Katarzyna Norris voices understanding and acceptance of this advice and will call back with any further questions or concerns. AVS with preventive healthcare tips printed for patient.     Brian Ellis, MARICRUZ    \"Please note that portions of this note were completed with a voice recognition program.\"    "

## 2022-02-25 NOTE — TELEPHONE ENCOUNTER
Caller: Katarzyna Norris    Relationship: Self    Best call back number: [phone]    What medication are you requesting: PENICILLIN    What are your current symptoms: PREVENTATIVE     Have you had these symptoms before:    [x] Yes  [] No    Have you been treated for these symptoms before:   [x] Yes  [] No    If a prescription is needed, what is your preferred pharmacy and phone number:  [facility] [address] [place] [address] [place] - [address] [facility] [phone] [facility] [phone]         Additional notes: PATIENT STATED THAT AFTER APPOINTMENT TODAY, SHE WAS SUPPOSED TO RECEIVE 2 MEDICATIONS.     SHE DID NOT RECEIVE THE PENICILLIN SHE WAS SUPPOSED TO GET.

## 2022-02-25 NOTE — PROGRESS NOTES
Follow Up Office Visit      Patient Name: Katarzyna Norris  : 1970   MRN: [id number]     Chief Complaint:    Chief Complaint   Patient presents with   • Gout   • Hypertension   • Hyperlipidemia   • Allergic Rhinitis   • Chronic Kidney Disease       History of Present Illness: Katarzyna Norris is a 51 y.o. female who is here today to follow up for GOUT, HTN, hyperlipidemia, CKD, impaired fasting glucose   Review lab results       Mammogram   Cologuard  will scan results to chart   Pap-unknown  LMP age 49    Subjective      Review of Systems:   Review of Systems   Constitutional: Negative for chills and fever.   HENT: Negative for sinus pain and sore throat.    Respiratory: Negative for cough.    Cardiovascular: Negative for chest pain.   Gastrointestinal: Negative for abdominal pain, diarrhea, nausea and vomiting.   Genitourinary: Negative for dysuria.   Musculoskeletal: Negative for arthralgias.   Skin: Negative for rash.   Neurological: Negative for headaches.        Past Medical History:   Past Medical History:   Diagnosis Date   • Anxiety    • Arthritis     GILBERT KNEES   • Asthma     SEASONAL ALLERGIES   • Elevated cholesterol    • Gout 2021   • Hiatal hernia    • Hypertension    • Renal insufficiency     NOT ON DIALYSIS/NATALIIA   • Sinus problem     SEASONAL ALLERGIES       Past Surgical History:   Past Surgical History:   Procedure Laterality Date   •  SECTION   and    • CHOLECYSTECTOMY     • COLONOSCOPY     • ENDOSCOPY     • TOTAL KNEE ARTHROPLASTY Left 2021    Procedure: TOTAL KNEE ARTHROPLASTY;  Surgeon: Marco Nelson MD;  Location: [facility] OR;  Service: Orthopedics;  Laterality: Left;   • TUBAL ABDOMINAL LIGATION         Family History:   Family History   Problem Relation Age of Onset   • Throat cancer Father 73   • Stroke Other    • Diabetes Other    • Malig Hyperthermia Neg Hx        Social History:   Social History      Socioeconomic History   • Marital status:    Tobacco Use   • Smoking status: Former Smoker   • Smokeless tobacco: Never Used   Vaping Use   • Vaping Use: Never used   Substance and Sexual Activity   • Alcohol use: Never   • Drug use: Never       Medications:     Current Outpatient Medications:   •  allopurinol (ZYLOPRIM) 300 MG tablet, Take 1 tablet by mouth 2 (Two) Times a Day., Disp: 180 tablet, Rfl: 1  •  Aspirin Buf,CaCarb-MgCarb-MgO, 81 MG tablet, Take 81 mg by mouth Daily. TAKES FOR PREVENTATIVE HEART HEALTH, LD 12/14/21, Disp: , Rfl:   •  aspirin EC (Ecotrin) 325 MG tablet, Take 1 tablet by mouth Daily (start after finishing eliquis), Disp: 14 tablet, Rfl: 0  •  aspirin  MG tablet, Take 325 mg by mouth Every 6 (Six) Hours As Needed., Disp: , Rfl:   •  atorvastatin (LIPITOR) 20 MG tablet, Take 1 tablet by mouth Every Night., Disp: 90 tablet, Rfl: 0  •  carvedilol (COREG) 12.5 MG tablet, Take 1 tablet by mouth Every Morning., Disp: 90 tablet, Rfl: 0  •  cephalexin (KEFLEX) 500 MG capsule, Take 1 capsule by mouth Every 12 (Twelve) Hours., Disp: 10 capsule, Rfl: 0  •  Cholecalciferol (Vitamin D3) 1.25 MG (45778 UT) tablet, Take 1 tablet by mouth Every Morning., Disp: , Rfl:   •  cloNIDine (CATAPRES) 0.1 MG tablet, Take 1 tablet by mouth 3 (Three) Times a Day., Disp: 90 tablet, Rfl: 0  •  losartan (COZAAR) 100 MG tablet, Take 1 tablet by mouth Daily., Disp: , Rfl:   •  oxyCODONE-acetaminophen (PERCOCET) 7.5-325 MG per tablet, Take 1-2 tablets by mouth Every 4 (Four) Hours As Needed for Moderate Pain ., Disp: 40 tablet, Rfl: 0  •  silver sulfadiazine (Silvadene) 1 % cream, Apply 1 application topically to the appropriate area as directed 2 (Two) Times a Day., Disp: 400 g, Rfl: 2  •  sodium bicarbonate 650 MG tablet, Take 1 tablet by mouth 3 (Three) Times a Day. (Patient taking differently: Take 650 mg by mouth 2 (Two) Times a Day.), Disp: 270 tablet, Rfl: 0  •  vitamin B-12 (CYANOCOBALAMIN) 100 MCG  "tablet, Take 50 mcg by mouth Daily., Disp: , Rfl:   •  vitamin C (ASCORBIC ACID) 250 MG tablet, Take 250 mg by mouth Daily., Disp: , Rfl:   •  Zinc Sulfate (ZINC 15 PO), Take  by mouth., Disp: , Rfl:     Allergies:   Allergies   Allergen Reactions   • Diltiazem Hcl Anxiety   • Metal Itching     PT STATES COSTUME EARRINGS CAUSE ITCHING AND IRRITATION TO EARS.         Objective     Physical Exam:  Vital Signs:   Vitals:    02/25/22 1026 02/25/22 1057   BP: 141/90 146/90   Pulse: 100 94   Temp: 98.3 °F (36.8 °C)    SpO2: 98%    Weight: 122 kg (270 lb)    Height: 152.4 cm (60\")      Body mass index is 52.73 kg/m².     Physical Exam  HENT:      Right Ear: Tympanic membrane normal.      Left Ear: Tympanic membrane normal.      Nose: Nose normal.      Mouth/Throat:      Mouth: Mucous membranes are moist.   Eyes:      Conjunctiva/sclera: Conjunctivae normal.      Pupils: Pupils are equal, round, and reactive to light.   Neck:      Vascular: No carotid bruit.   Cardiovascular:      Rate and Rhythm: Normal rate and regular rhythm.      Heart sounds: Normal heart sounds. No murmur heard.      Pulmonary:      Effort: Pulmonary effort is normal.      Breath sounds: Normal breath sounds.   Abdominal:      General: Bowel sounds are normal.      Palpations: Abdomen is soft.   Musculoskeletal:      Right lower leg: No edema.      Left lower leg: No edema.   Skin:     General: Skin is warm and dry.   Neurological:      Mental Status: She is alert.   Psychiatric:         Mood and Affect: Mood normal.         Behavior: Behavior normal.             Assessment / Plan      Assessment/Plan:   Diagnoses and all orders for this visit:    1. Primary hypertension (Primary)    2. Mixed hyperlipidemia  -     Comprehensive Metabolic Panel; Future  -     Lipid Panel; Future    3. Impaired fasting glucose  -     Comprehensive Metabolic Panel; Future  -     Microalbumin / Creatinine Urine Ratio - Urine, Clean Catch; Future  -     Hemoglobin A1c; " "Future    4. Stage 3 chronic kidney disease, unspecified whether stage 3a or 3b CKD (HCC)    5. Gout, unspecified cause, unspecified chronicity, unspecified site  -     Uric Acid; Future    6. Anemia, unspecified type  -     CBC Auto Differential; Future  -     Iron Profile; Future  -     Ferritin; Future    7. Screening for thyroid disorder  -     TSH; Future    8. B12 deficiency  -     Vitamin B12; Future    Other orders  -     allopurinol (ZYLOPRIM) 300 MG tablet; Take 1 tablet by mouth 2 (Two) Times a Day.  Dispense: 180 tablet; Refill: 1       Hypertension elevated upon arrival to clinic recommend recheck in 2 weeks manual blood pressure recheck was elevated patient reports she is anxious nervous about the Pap smear  Hyperlipidemia LDL below goal liver enzymes normal on statin Lipitor 20 mg at nighttime we will continue  Gout no recent flares uric acid level is elevated will increase allopurinol dose to 300 mg twice daily  Anemia continue iron supplementation and vitamin C we will reassess in 2 months with iron profile and CBC  B12 deficiency will monitor on next follow-up with a B12 level          Follow Up:   Return in about 8 weeks (around 4/22/2022).    Frandy Ellis, MARICRUZ    \"Please note that portions of this note were completed with a voice recognition program.\"    "

## 2022-03-02 RX ORDER — AMOXICILLIN 500 MG/1
2000 CAPSULE ORAL ONCE
Qty: 4 CAPSULE | Refills: 0 | Status: SHIPPED | OUTPATIENT
Start: 2022-03-02 | End: 2022-03-02

## 2022-03-03 LAB
CYTOLOGIST CVX/VAG CYTO: NORMAL
CYTOLOGY CVX/VAG DOC CYTO: NORMAL
DX ICD CODE: NORMAL
HIV 1 & 2 AB SER-IMP: NORMAL
OTHER STN SPEC: NORMAL
STAT OF ADQ CVX/VAG CYTO-IMP: NORMAL

## 2022-03-09 ENCOUNTER — CLINICAL SUPPORT (OUTPATIENT)
Dept: FAMILY MEDICINE CLINIC | Facility: CLINIC | Age: 52
End: 2022-03-09

## 2022-03-09 DIAGNOSIS — N39.0 URINARY TRACT INFECTION WITHOUT HEMATURIA, SITE UNSPECIFIED: ICD-10-CM

## 2022-03-09 DIAGNOSIS — N39.0 URINARY TRACT INFECTION WITHOUT HEMATURIA, SITE UNSPECIFIED: Primary | ICD-10-CM

## 2022-03-09 LAB
BACTERIA UR QL AUTO: ABNORMAL /HPF
BILIRUB UR QL STRIP: NEGATIVE
CLARITY UR: ABNORMAL
COLOR UR: ABNORMAL
GLUCOSE UR STRIP-MCNC: NEGATIVE MG/DL
HGB UR QL STRIP.AUTO: ABNORMAL
HYALINE CASTS UR QL AUTO: ABNORMAL /LPF
KETONES UR QL STRIP: NEGATIVE
LEUKOCYTE ESTERASE UR QL STRIP.AUTO: ABNORMAL
NITRITE UR QL STRIP: NEGATIVE
PH UR STRIP.AUTO: 6 [PH] (ref 5–8)
PROT UR QL STRIP: ABNORMAL
RBC # UR STRIP: ABNORMAL /HPF
REF LAB TEST METHOD: ABNORMAL
SP GR UR STRIP: 1.01 (ref 1–1.03)
SQUAMOUS #/AREA URNS HPF: ABNORMAL /HPF
UROBILINOGEN UR QL STRIP: ABNORMAL
WBC # UR STRIP: ABNORMAL /HPF
WBC CLUMPS # UR AUTO: ABNORMAL /HPF
YEAST URNS QL MICRO: ABNORMAL /HPF

## 2022-03-09 PROCEDURE — 87086 URINE CULTURE/COLONY COUNT: CPT | Performed by: INTERNAL MEDICINE

## 2022-03-09 PROCEDURE — 81001 URINALYSIS AUTO W/SCOPE: CPT | Performed by: INTERNAL MEDICINE

## 2022-03-10 LAB — BACTERIA SPEC AEROBE CULT: NO GROWTH

## 2022-03-25 ENCOUNTER — OFFICE VISIT (OUTPATIENT)
Dept: ORTHOPEDIC SURGERY | Facility: CLINIC | Age: 52
End: 2022-03-25

## 2022-03-25 VITALS — OXYGEN SATURATION: 97 % | BODY MASS INDEX: 54.81 KG/M2 | WEIGHT: 279.2 LBS | HEIGHT: 60 IN | HEART RATE: 99 BPM

## 2022-03-25 DIAGNOSIS — Z47.89 AFTERCARE FOLLOWING SURGERY OF THE MUSCULOSKELETAL SYSTEM: ICD-10-CM

## 2022-03-25 DIAGNOSIS — Z47.89 AFTERCARE FOLLOWING SURGERY OF THE MUSCULOSKELETAL SYSTEM: Primary | ICD-10-CM

## 2022-03-25 PROCEDURE — 99212 OFFICE O/P EST SF 10 MIN: CPT | Performed by: PHYSICIAN ASSISTANT

## 2022-03-25 RX ORDER — PENICILLIN V POTASSIUM 500 MG/1
TABLET ORAL
COMMUNITY
Start: 2022-03-10 | End: 2022-05-02

## 2022-03-25 NOTE — PROGRESS NOTES
"Chief Complaint  Pain and Follow-up of the Left Knee    Subjective          Katarzyna Norris is a 51 y.o. female  presents to [facility] for   History of Present Illness    Patient presents for follow-up evaluation of left total knee arthroplasty, 12/22/2021.  Patient states she has been doing well she finished physical therapy since her last visit.  Patient states she has occasional pain she is trying to be more active with walking outside she states that she has continued home exercises.  She denies need for pain medication, states her incision has healed well, she states with activity she gets some swelling in her ankles bilaterally otherwise no new complaints.  Allergies   Allergen Reactions   • Diltiazem Hcl Anxiety   • Metal Itching     PT STATES COSTUME EARRINGS CAUSE ITCHING AND IRRITATION TO EARS.        Social History     Socioeconomic History   • Marital status:    Tobacco Use   • Smoking status: Former Smoker   • Smokeless tobacco: Never Used   Vaping Use   • Vaping Use: Never used   Substance and Sexual Activity   • Alcohol use: Never   • Drug use: Never        REVIEW OF SYSTEMS    Constitutional: Denies fevers, chills, weight loss  Cardiovascular: Denies chest pain, shortness of breath  Skin: Denies rashes, acute skin changes  Neurologic: Denies headache, loss of consciousness  MSK: Left knee pain      Objective   Vital Signs:   Pulse 99   Ht 152.4 cm (60\")   Wt 127 kg (279 lb 3.2 oz)   SpO2 97%   BMI 54.53 kg/m²     Body mass index is 54.53 kg/m².    Physical Exam    Left knee: Incision is well-healed, no erythema, no ecchymosis, no swelling, no signs of infection, full extension, flexion 110, stable to varus/valgus stress, stable anterior/posterior drawer, nontender calf, negative Homans' sign.    Procedures    Imaging Results (Most Recent)     Procedure Component Value Units Date/Time    XR Knee 3 View Left [[id number]] Resulted: 03/25/22 0834     Updated: " 03/25/22 0835    Narrative:      • View:AP, Lateral and Sunrise view(s)  • Site: Left knee  • Indication: Left knee pain  • Study: X-rays ordered, taken in the office, and reviewed today  • X-ray: Intact appearing left total knee arthroplasty, no signs of   hardware failure loosening, no signs of periprosthetic fracture, good   alignment.  • Comparative data: No comparative data found             Result Review :   The following data was reviewed by: SARAH Arora on 03/25/2022:  Data reviewed: Radiologic studies Reviewed by me with the patient             Assessment and Plan    Diagnoses and all orders for this visit:    1. Aftercare following surgery of left total knee arthroplasty, 12/22/2021 (Primary)  -     XR Knee 3 View Left    2. Aftercare following surgery of the musculoskeletal system  -     XR Knee 3 View Left        Reviewed x-ray with the patient, discussed diagnosis and treatment options with her she was advised to continue home exercises, continue activity and weightbearing as tolerated if any new or concerning symptoms occur follow-up sooner otherwise follow-up in 3 months with x-rays.  Patient agreed.    Call or return if worsening symptoms.    Follow Up   Return in about 3 months (around 6/25/2022) for Recheck.  Patient was given instructions and counseling regarding her condition or for health maintenance advice. Please see specific information pulled into the AVS if appropriate.

## 2022-03-28 DIAGNOSIS — I10 HYPERTENSION, UNSPECIFIED TYPE: ICD-10-CM

## 2022-03-29 RX ORDER — CLONIDINE HYDROCHLORIDE 0.1 MG/1
TABLET ORAL
Qty: 90 TABLET | Refills: 0 | Status: SHIPPED | OUTPATIENT
Start: 2022-03-29 | End: 2022-05-02 | Stop reason: SDUPTHER

## 2022-03-31 ENCOUNTER — TELEPHONE (OUTPATIENT)
Dept: FAMILY MEDICINE CLINIC | Facility: CLINIC | Age: 52
End: 2022-03-31

## 2022-03-31 NOTE — TELEPHONE ENCOUNTER
Patient is requesting a letter for Jury Duty. She said she had recent knee surgery in December and would have to climb stairs to get into the building. And drive to [place], which she said she still has trouble driving long distances or sitting/standing for long periods of time. Please advise.

## 2022-04-07 ENCOUNTER — TELEPHONE (OUTPATIENT)
Dept: ORTHOPEDIC SURGERY | Facility: CLINIC | Age: 52
End: 2022-04-07

## 2022-04-07 NOTE — TELEPHONE ENCOUNTER
CALLED PATIENT, SHE STATES THAT THE KNEE HAS NOT INCREASED IN PAIN AND THERE IS NO INCREASED SWELLING TO THE KNEE. SHE DID STATE SHE HAD CALF PAIN RIGHT BEHIND HER KNEE BUT NO TIGHTNESS OR HEAT TO THE AREA, ATTRIBUTES IT TO OVER-DOING IT AND POSSIBLY STRAINING THE MUSCLE. I ADVISED PATIENT TO GIVE HER PCP A CALL ABOUT THE RED SPOTS ON HER FEET AND SEE WHAT THEY RECOMMEND AND TO CALL ME BACK TO LET ME KNOW WHAT THEY SAY AND HOW SHE FEELS TOMORROW. PATIENT UNDERSTOOD.

## 2022-04-07 NOTE — TELEPHONE ENCOUNTER
Provider:  DR. DOMONIQUE GUEVARA  Caller:  HORTENSIA LOPEZ  Relationship to Patient: SELF  Pharmacy:  [facility] [phone]  Phone Number:  [phone]  Reason for Call:  PATIENT HAD LEFT TKA DONE ON 12/22/22. PATIENT HAD A TOOTH/JAW INFECTION AND TOOK ANTIBIOTICS PRIOR TO HAVING TOOTH EXTRACTION (WITH PREMEDICATION) ON 4/4/22. PATIENT SAYS SEVERAL DAYS LATER SHE NOTICED RED BLOTCHES ON HER ARM THAT WENT AWAY. NOW SHE HAS RED BLOTCHES ON BOTH FEET. NOT WARM OR HOT. NO FEVERS/CHILLS. PLEASE ADVISE.

## 2022-04-20 ENCOUNTER — TRANSCRIBE ORDERS (OUTPATIENT)
Dept: LAB | Facility: HOSPITAL | Age: 52
End: 2022-04-20

## 2022-04-20 ENCOUNTER — LAB (OUTPATIENT)
Dept: LAB | Facility: HOSPITAL | Age: 52
End: 2022-04-20

## 2022-04-20 DIAGNOSIS — E78.2 MIXED HYPERLIPIDEMIA: ICD-10-CM

## 2022-04-20 DIAGNOSIS — Z13.29 SCREENING FOR THYROID DISORDER: ICD-10-CM

## 2022-04-20 DIAGNOSIS — D64.9 ANEMIA, UNSPECIFIED TYPE: ICD-10-CM

## 2022-04-20 DIAGNOSIS — N18.30 STAGE 3 CHRONIC KIDNEY DISEASE, UNSPECIFIED WHETHER STAGE 3A OR 3B CKD: Primary | ICD-10-CM

## 2022-04-20 DIAGNOSIS — M10.9 GOUT, UNSPECIFIED CAUSE, UNSPECIFIED CHRONICITY, UNSPECIFIED SITE: ICD-10-CM

## 2022-04-20 DIAGNOSIS — N18.30 STAGE 3 CHRONIC KIDNEY DISEASE, UNSPECIFIED WHETHER STAGE 3A OR 3B CKD: ICD-10-CM

## 2022-04-20 DIAGNOSIS — R73.01 IMPAIRED FASTING GLUCOSE: ICD-10-CM

## 2022-04-20 DIAGNOSIS — E53.8 B12 DEFICIENCY: ICD-10-CM

## 2022-04-20 LAB
25(OH)D3 SERPL-MCNC: 40.7 NG/ML (ref 30–100)
ALBUMIN SERPL-MCNC: 4.8 G/DL (ref 3.5–5.2)
ALBUMIN UR-MCNC: 57.6 MG/DL
ALBUMIN/GLOB SERPL: 1.7 G/DL
ALP SERPL-CCNC: 98 U/L (ref 39–117)
ALT SERPL W P-5'-P-CCNC: 15 U/L (ref 1–33)
ANION GAP SERPL CALCULATED.3IONS-SCNC: 13.6 MMOL/L (ref 5–15)
AST SERPL-CCNC: 17 U/L (ref 1–32)
BACTERIA UR QL AUTO: ABNORMAL /HPF
BASOPHILS # BLD AUTO: 0.07 10*3/MM3 (ref 0–0.2)
BASOPHILS NFR BLD AUTO: 1.1 % (ref 0–1.5)
BILIRUB SERPL-MCNC: 0.5 MG/DL (ref 0–1.2)
BILIRUB UR QL STRIP: NEGATIVE
BUN SERPL-MCNC: 56 MG/DL (ref 6–20)
BUN/CREAT SERPL: 19.9 (ref 7–25)
CALCIUM SPEC-SCNC: 10.8 MG/DL (ref 8.6–10.5)
CHLORIDE SERPL-SCNC: 102 MMOL/L (ref 98–107)
CHOLEST SERPL-MCNC: 143 MG/DL (ref 0–200)
CLARITY UR: CLEAR
CO2 SERPL-SCNC: 22.4 MMOL/L (ref 22–29)
COLOR UR: YELLOW
CREAT SERPL-MCNC: 2.82 MG/DL (ref 0.57–1)
CREAT UR-MCNC: 47.3 MG/DL
CREAT UR-MCNC: 52.2 MG/DL
DEPRECATED RDW RBC AUTO: 48.3 FL (ref 37–54)
EGFRCR SERPLBLD CKD-EPI 2021: 19.5 ML/MIN/1.73
EOSINOPHIL # BLD AUTO: 0.19 10*3/MM3 (ref 0–0.4)
EOSINOPHIL NFR BLD AUTO: 2.9 % (ref 0.3–6.2)
ERYTHROCYTE [DISTWIDTH] IN BLOOD BY AUTOMATED COUNT: 15 % (ref 12.3–15.4)
FERRITIN SERPL-MCNC: 153 NG/ML (ref 13–150)
GLOBULIN UR ELPH-MCNC: 2.9 GM/DL
GLUCOSE SERPL-MCNC: 110 MG/DL (ref 65–99)
GLUCOSE UR STRIP-MCNC: NEGATIVE MG/DL
HBA1C MFR BLD: 6.1 % (ref 4.8–5.6)
HCT VFR BLD AUTO: 36.9 % (ref 34–46.6)
HDLC SERPL-MCNC: 42 MG/DL (ref 40–60)
HGB BLD-MCNC: 12.4 G/DL (ref 12–15.9)
HGB UR QL STRIP.AUTO: NEGATIVE
HYALINE CASTS UR QL AUTO: ABNORMAL /LPF
IMM GRANULOCYTES # BLD AUTO: 0.02 10*3/MM3 (ref 0–0.05)
IMM GRANULOCYTES NFR BLD AUTO: 0.3 % (ref 0–0.5)
IRON 24H UR-MRATE: 69 MCG/DL (ref 37–145)
IRON SATN MFR SERPL: 18 % (ref 20–50)
KETONES UR QL STRIP: NEGATIVE
LDLC SERPL CALC-MCNC: 75 MG/DL (ref 0–100)
LDLC/HDLC SERPL: 1.7 {RATIO}
LEUKOCYTE ESTERASE UR QL STRIP.AUTO: NEGATIVE
LYMPHOCYTES # BLD AUTO: 1.41 10*3/MM3 (ref 0.7–3.1)
LYMPHOCYTES NFR BLD AUTO: 21.3 % (ref 19.6–45.3)
MCH RBC QN AUTO: 30.2 PG (ref 26.6–33)
MCHC RBC AUTO-ENTMCNC: 33.6 G/DL (ref 31.5–35.7)
MCV RBC AUTO: 90 FL (ref 79–97)
MICROALBUMIN/CREAT UR: 1217.8 MG/G
MONOCYTES # BLD AUTO: 0.39 10*3/MM3 (ref 0.1–0.9)
MONOCYTES NFR BLD AUTO: 5.9 % (ref 5–12)
NEUTROPHILS NFR BLD AUTO: 4.55 10*3/MM3 (ref 1.7–7)
NEUTROPHILS NFR BLD AUTO: 68.5 % (ref 42.7–76)
NITRITE UR QL STRIP: NEGATIVE
NRBC BLD AUTO-RTO: 0 /100 WBC (ref 0–0.2)
PH UR STRIP.AUTO: 6.5 [PH] (ref 5–8)
PHOSPHATE SERPL-MCNC: 4.8 MG/DL (ref 2.5–4.5)
PLATELET # BLD AUTO: 221 10*3/MM3 (ref 140–450)
PMV BLD AUTO: 8.8 FL (ref 6–12)
POTASSIUM SERPL-SCNC: 5.5 MMOL/L (ref 3.5–5.2)
PROT ?TM UR-MCNC: 91.8 MG/DL
PROT SERPL-MCNC: 7.7 G/DL (ref 6–8.5)
PROT UR QL STRIP: ABNORMAL
PROT/CREAT UR: 1.76 MG/G{CREAT}
PTH-INTACT SERPL-MCNC: 18.1 PG/ML (ref 15–65)
RBC # BLD AUTO: 4.1 10*6/MM3 (ref 3.77–5.28)
RBC # UR STRIP: ABNORMAL /HPF
REF LAB TEST METHOD: ABNORMAL
SODIUM SERPL-SCNC: 138 MMOL/L (ref 136–145)
SP GR UR STRIP: 1.01 (ref 1–1.03)
SQUAMOUS #/AREA URNS HPF: ABNORMAL /HPF
TIBC SERPL-MCNC: 389 MCG/DL (ref 298–536)
TRANSFERRIN SERPL-MCNC: 261 MG/DL (ref 200–360)
TRIGL SERPL-MCNC: 149 MG/DL (ref 0–150)
TSH SERPL DL<=0.05 MIU/L-ACNC: 2.6 UIU/ML (ref 0.27–4.2)
URATE SERPL-MCNC: 3.2 MG/DL (ref 2.4–5.7)
UROBILINOGEN UR QL STRIP: ABNORMAL
VIT B12 BLD-MCNC: 1938 PG/ML (ref 211–946)
VLDLC SERPL-MCNC: 26 MG/DL (ref 5–40)
WBC # UR STRIP: ABNORMAL /HPF
WBC NRBC COR # BLD: 6.63 10*3/MM3 (ref 3.4–10.8)

## 2022-04-20 PROCEDURE — 82728 ASSAY OF FERRITIN: CPT

## 2022-04-20 PROCEDURE — 82306 VITAMIN D 25 HYDROXY: CPT

## 2022-04-20 PROCEDURE — 84466 ASSAY OF TRANSFERRIN: CPT

## 2022-04-20 PROCEDURE — 82570 ASSAY OF URINE CREATININE: CPT

## 2022-04-20 PROCEDURE — 83036 HEMOGLOBIN GLYCOSYLATED A1C: CPT

## 2022-04-20 PROCEDURE — 82043 UR ALBUMIN QUANTITATIVE: CPT

## 2022-04-20 PROCEDURE — 84443 ASSAY THYROID STIM HORMONE: CPT

## 2022-04-20 PROCEDURE — 36415 COLL VENOUS BLD VENIPUNCTURE: CPT

## 2022-04-20 PROCEDURE — 80061 LIPID PANEL: CPT

## 2022-04-20 PROCEDURE — 82607 VITAMIN B-12: CPT

## 2022-04-20 PROCEDURE — 81001 URINALYSIS AUTO W/SCOPE: CPT

## 2022-04-20 PROCEDURE — 83970 ASSAY OF PARATHORMONE: CPT

## 2022-04-20 PROCEDURE — 84156 ASSAY OF PROTEIN URINE: CPT

## 2022-04-20 PROCEDURE — 80053 COMPREHEN METABOLIC PANEL: CPT

## 2022-04-20 PROCEDURE — 84100 ASSAY OF PHOSPHORUS: CPT

## 2022-04-20 PROCEDURE — 84550 ASSAY OF BLOOD/URIC ACID: CPT

## 2022-04-20 PROCEDURE — 83540 ASSAY OF IRON: CPT

## 2022-04-20 PROCEDURE — 85025 COMPLETE CBC W/AUTO DIFF WBC: CPT

## 2022-04-21 ENCOUNTER — OFFICE VISIT (OUTPATIENT)
Dept: FAMILY MEDICINE CLINIC | Facility: CLINIC | Age: 52
End: 2022-04-21

## 2022-04-21 VITALS
WEIGHT: 275 LBS | HEIGHT: 60 IN | OXYGEN SATURATION: 97 % | SYSTOLIC BLOOD PRESSURE: 153 MMHG | TEMPERATURE: 98.2 F | HEART RATE: 105 BPM | DIASTOLIC BLOOD PRESSURE: 92 MMHG | BODY MASS INDEX: 53.99 KG/M2

## 2022-04-21 DIAGNOSIS — R73.01 IMPAIRED FASTING GLUCOSE: ICD-10-CM

## 2022-04-21 DIAGNOSIS — I10 PRIMARY HYPERTENSION: ICD-10-CM

## 2022-04-21 DIAGNOSIS — E87.5 HYPERKALEMIA: ICD-10-CM

## 2022-04-21 DIAGNOSIS — R21 RASH: ICD-10-CM

## 2022-04-21 DIAGNOSIS — Z13.29 SCREENING FOR THYROID DISORDER: ICD-10-CM

## 2022-04-21 DIAGNOSIS — E78.2 MIXED HYPERLIPIDEMIA: ICD-10-CM

## 2022-04-21 DIAGNOSIS — E83.52 HYPERCALCEMIA: ICD-10-CM

## 2022-04-21 DIAGNOSIS — M10.9 GOUT, UNSPECIFIED CAUSE, UNSPECIFIED CHRONICITY, UNSPECIFIED SITE: ICD-10-CM

## 2022-04-21 DIAGNOSIS — N18.30 STAGE 3 CHRONIC KIDNEY DISEASE, UNSPECIFIED WHETHER STAGE 3A OR 3B CKD: ICD-10-CM

## 2022-04-21 PROCEDURE — 99214 OFFICE O/P EST MOD 30 MIN: CPT | Performed by: NURSE PRACTITIONER

## 2022-04-21 RX ORDER — CARVEDILOL 12.5 MG/1
12.5 TABLET ORAL 2 TIMES DAILY WITH MEALS
Qty: 180 TABLET | Refills: 1 | Status: SHIPPED | OUTPATIENT
Start: 2022-04-21 | End: 2022-05-02

## 2022-04-21 RX ORDER — CLOBETASOL PROPIONATE 0.5 MG/G
1 CREAM TOPICAL 2 TIMES DAILY
Qty: 60 G | Refills: 1 | Status: SHIPPED | OUTPATIENT
Start: 2022-04-21 | End: 2022-05-02

## 2022-04-21 NOTE — PROGRESS NOTES
Patient Name: Katarzyna Norris  : 1970   MRN: [id number]     Chief Complaint:    Chief Complaint   Patient presents with   • Rash     Bilateral feet, burning    • Hypertension   • Hyperlipidemia   • Gout   • Chronic Kidney Disease       History of Present Illness: Katarzyna Norris is a 51 y.o. female who is here today for   Review lab results   C/O-rash on feet 3 weeks, improving at this time, treat with otc lotions, hydrocortisone and otc antihistamine   Rash on right foot totally gone, still on left foot after walking in grass barefoot after  weed killer sprayed   Subjective      Review of Systems:   Review of Systems   Constitutional: Negative for fever.   Respiratory: Negative for shortness of breath.    Cardiovascular: Negative for chest pain.   Gastrointestinal: Negative for abdominal pain, diarrhea, nausea and vomiting.   Genitourinary: Negative for dysuria.   Musculoskeletal: Negative for myalgias.   Skin: Positive for rash.   Neurological: Negative for headaches.        Past Medical History:   Past Medical History:   Diagnosis Date   • Anxiety    • Arthritis     GILBERT KNEES   • Asthma     SEASONAL ALLERGIES   • Elevated cholesterol    • Gout 2021   • Hiatal hernia    • Hypertension    • Renal insufficiency     NOT ON DIALYSIS/NATALIIA   • Sinus problem     SEASONAL ALLERGIES       Past Surgical History:   Past Surgical History:   Procedure Laterality Date   •  SECTION   and    • CHOLECYSTECTOMY     • COLONOSCOPY     • ENDOSCOPY     • TOTAL KNEE ARTHROPLASTY Left 2021    Procedure: TOTAL KNEE ARTHROPLASTY;  Surgeon: Marco Nelson MD;  Location: [facility];  Service: Orthopedics;  Laterality: Left;   • TUBAL ABDOMINAL LIGATION         Family History:   Family History   Problem Relation Age of Onset   • Throat cancer Father 73   • Stroke Other    • Diabetes Other    • Malig Hyperthermia Neg Hx        Social History:   Social History      Socioeconomic History   • Marital status:    Tobacco Use   • Smoking status: Former Smoker   • Smokeless tobacco: Never Used   Vaping Use   • Vaping Use: Never used   Substance and Sexual Activity   • Alcohol use: Never   • Drug use: Never       Medications:     Current Outpatient Medications:   •  carvedilol (COREG) 12.5 MG tablet, Take 1 tablet by mouth 2 (Two) Times a Day With Meals., Disp: 180 tablet, Rfl: 1  •  allopurinol (ZYLOPRIM) 300 MG tablet, Take 1 tablet by mouth 2 (Two) Times a Day., Disp: 180 tablet, Rfl: 1  •  Aspirin Buf,CaCarb-MgCarb-MgO, 81 MG tablet, Take 81 mg by mouth Daily. TAKES FOR PREVENTATIVE HEART HEALTH, LD 12/14/21, Disp: , Rfl:   •  aspirin EC (Ecotrin) 325 MG tablet, Take 1 tablet by mouth Daily (start after finishing eliquis), Disp: 14 tablet, Rfl: 0  •  aspirin  MG tablet, Take 325 mg by mouth Every 6 (Six) Hours As Needed., Disp: , Rfl:   •  atorvastatin (LIPITOR) 20 MG tablet, Take 1 tablet by mouth Every Night., Disp: 90 tablet, Rfl: 0  •  Cholecalciferol (Vitamin D3) 1.25 MG (08589 UT) tablet, Take 1 tablet by mouth Every Morning., Disp: , Rfl:   •  clobetasol (TEMOVATE) 0.05 % cream, Apply 1 application topically to the appropriate area as directed 2 (Two) Times a Day., Disp: 60 g, Rfl: 1  •  cloNIDine (CATAPRES) 0.1 MG tablet, TAKE 1 TABLET BY MOUTH THREE TIMES DAILY, Disp: 90 tablet, Rfl: 0  •  losartan (COZAAR) 100 MG tablet, Take 1 tablet by mouth Daily., Disp: , Rfl:   •  oxyCODONE-acetaminophen (PERCOCET) 7.5-325 MG per tablet, Take 1-2 tablets by mouth Every 4 (Four) Hours As Needed for Moderate Pain ., Disp: 40 tablet, Rfl: 0  •  penicillin v potassium (VEETID) 500 MG tablet, TAKE 1 TABLET BY MOUTH EVERY 6 HOURS UNTIL ALL TAKEN, Disp: , Rfl:   •  silver sulfadiazine (Silvadene) 1 % cream, Apply 1 application topically to the appropriate area as directed 2 (Two) Times a Day., Disp: 400 g, Rfl: 2  •  sodium bicarbonate 650 MG tablet, Take 1 tablet by  "mouth 3 (Three) Times a Day. (Patient taking differently: Take 650 mg by mouth 2 (Two) Times a Day.), Disp: 270 tablet, Rfl: 0  •  vitamin B-12 (CYANOCOBALAMIN) 100 MCG tablet, Take 50 mcg by mouth Daily., Disp: , Rfl:   •  vitamin C (ASCORBIC ACID) 250 MG tablet, Take 250 mg by mouth Daily., Disp: , Rfl:   •  Zinc Sulfate (ZINC 15 PO), Take  by mouth., Disp: , Rfl:     Allergies:   Allergies   Allergen Reactions   • Diltiazem Hcl Anxiety   • Metal Itching     PT STATES COSTUME EARRINGS CAUSE ITCHING AND IRRITATION TO EARS.         Objective     Physical Exam:  Vital Signs:   Vitals:    04/21/22 0900   BP: 153/92   Pulse: 105   Temp: 98.2 °F (36.8 °C)   SpO2: 97%   Weight: 125 kg (275 lb)   Height: 152.4 cm (60\")     Body mass index is 53.71 kg/m².     Physical Exam  Neck:      Vascular: No carotid bruit.   Cardiovascular:      Rate and Rhythm: Normal rate and regular rhythm.      Heart sounds: Normal heart sounds. No murmur heard.  Pulmonary:      Effort: Pulmonary effort is normal.      Breath sounds: Normal breath sounds.   Abdominal:      General: Bowel sounds are normal.      Palpations: Abdomen is soft.   Musculoskeletal:      Right lower leg: No edema.      Left lower leg: No edema.   Skin:     General: Skin is warm and dry.      Findings: Rash present.      Comments: Scaly erythema dorsal surface left foot, no surrounding erythema or edema    Neurological:      Mental Status: She is alert.   Psychiatric:         Mood and Affect: Mood normal.         Behavior: Behavior normal.             Assessment / Plan      Assessment/Plan:   Diagnoses and all orders for this visit:    1. Primary hypertension  -     CBC Auto Differential; Future    2. Rash    3. Stage 3 chronic kidney disease, unspecified whether stage 3a or 3b CKD (HCC)  -     Basic metabolic panel; Future    4. Hyperkalemia  -     Basic metabolic panel; Future    5. Gout, unspecified cause, unspecified chronicity, unspecified site  -     Uric Acid; " Future    6. Mixed hyperlipidemia  -     Comprehensive Metabolic Panel; Future  -     Lipid Panel; Future    7. Hypercalcemia  -     Basic metabolic panel; Future    8. Impaired fasting glucose  -     Comprehensive Metabolic Panel; Future  -     Hemoglobin A1c; Future  -     Microalbumin / Creatinine Urine Ratio - Urine, Clean Catch; Future  -     Urinalysis With Culture If Indicated -; Future    9. Screening for thyroid disorder  -     TSH; Future    Other orders  -     carvedilol (COREG) 12.5 MG tablet; Take 1 tablet by mouth 2 (Two) Times a Day With Meals.  Dispense: 180 tablet; Refill: 1  -     clobetasol (TEMOVATE) 0.05 % cream; Apply 1 application topically to the appropriate area as directed 2 (Two) Times a Day.  Dispense: 60 g; Refill: 1       Hypertension uncontrolled will increase Coreg 12.5 mg twice daily recommend blood pressure check in 2 weeks  Rash we will treat with clobetasol recommend patient not going barefoot in the grass  Chronic kidney disease stage III avoid NSAIDs increase water intake  Hyperkalemia patient reports she is eating multiple potatoes we will recheck on Monday if remains elevated will do Lasix for 3 days recommend avoiding potatoes bananas increasing water intake  Gout uric acid level normal on 300 mg we will continue to monitor at 6-month follow-up  Hyperlipidemia LDL below goal liver enzymes normal we will continue current statin dose Lipitor 20 mg at nighttime denies myalgias  Hypercalcemia increase fluids stop calcium supplementation OTC will recheck on Monday  Impaired fasting glucose recommend decrease carb intake exercise 30 minutes daily and weight loss recommend nutrition counseling patient declined          Follow Up:   Return in about 6 months (around 10/21/2022).    MARICRUZ Ball      Please note that portions of this note were completed with a voice recognition program.

## 2022-05-02 ENCOUNTER — OFFICE VISIT (OUTPATIENT)
Dept: FAMILY MEDICINE CLINIC | Facility: CLINIC | Age: 52
End: 2022-05-02

## 2022-05-02 VITALS
TEMPERATURE: 99.1 F | HEART RATE: 97 BPM | WEIGHT: 276 LBS | SYSTOLIC BLOOD PRESSURE: 147 MMHG | OXYGEN SATURATION: 98 % | BODY MASS INDEX: 54.19 KG/M2 | DIASTOLIC BLOOD PRESSURE: 91 MMHG | HEIGHT: 60 IN

## 2022-05-02 DIAGNOSIS — E66.01 CLASS 3 SEVERE OBESITY DUE TO EXCESS CALORIES WITHOUT SERIOUS COMORBIDITY WITH BODY MASS INDEX (BMI) OF 50.0 TO 59.9 IN ADULT: ICD-10-CM

## 2022-05-02 DIAGNOSIS — Z11.59 NEED FOR HEPATITIS C SCREENING TEST: ICD-10-CM

## 2022-05-02 DIAGNOSIS — I10 HYPERTENSION, UNSPECIFIED TYPE: ICD-10-CM

## 2022-05-02 DIAGNOSIS — M25.512 ACUTE PAIN OF LEFT SHOULDER: ICD-10-CM

## 2022-05-02 DIAGNOSIS — E83.52 HYPERCALCEMIA: ICD-10-CM

## 2022-05-02 DIAGNOSIS — N18.30 STAGE 3 CHRONIC KIDNEY DISEASE, UNSPECIFIED WHETHER STAGE 3A OR 3B CKD: ICD-10-CM

## 2022-05-02 DIAGNOSIS — E87.5 HYPERKALEMIA: ICD-10-CM

## 2022-05-02 DIAGNOSIS — Z12.31 SCREENING MAMMOGRAM FOR BREAST CANCER: ICD-10-CM

## 2022-05-02 DIAGNOSIS — Z00.00 MEDICARE ANNUAL WELLNESS VISIT, SUBSEQUENT: Primary | ICD-10-CM

## 2022-05-02 LAB
ANION GAP SERPL CALCULATED.3IONS-SCNC: 11.2 MMOL/L (ref 5–15)
BUN SERPL-MCNC: 50 MG/DL (ref 6–20)
BUN/CREAT SERPL: 17.9 (ref 7–25)
CALCIUM SPEC-SCNC: 9.8 MG/DL (ref 8.6–10.5)
CHLORIDE SERPL-SCNC: 103 MMOL/L (ref 98–107)
CO2 SERPL-SCNC: 22.8 MMOL/L (ref 22–29)
CREAT SERPL-MCNC: 2.79 MG/DL (ref 0.57–1)
EGFRCR SERPLBLD CKD-EPI 2021: 19.7 ML/MIN/1.73
GLUCOSE SERPL-MCNC: 138 MG/DL (ref 65–99)
HCV AB SER DONR QL: NORMAL
POTASSIUM SERPL-SCNC: 5.1 MMOL/L (ref 3.5–5.2)
SODIUM SERPL-SCNC: 137 MMOL/L (ref 136–145)

## 2022-05-02 PROCEDURE — G0439 PPPS, SUBSEQ VISIT: HCPCS | Performed by: NURSE PRACTITIONER

## 2022-05-02 PROCEDURE — 99213 OFFICE O/P EST LOW 20 MIN: CPT | Performed by: NURSE PRACTITIONER

## 2022-05-02 PROCEDURE — 1159F MED LIST DOCD IN RCRD: CPT | Performed by: NURSE PRACTITIONER

## 2022-05-02 PROCEDURE — 20610 DRAIN/INJ JOINT/BURSA W/O US: CPT | Performed by: NURSE PRACTITIONER

## 2022-05-02 PROCEDURE — 86803 HEPATITIS C AB TEST: CPT | Performed by: NURSE PRACTITIONER

## 2022-05-02 PROCEDURE — 80048 BASIC METABOLIC PNL TOTAL CA: CPT | Performed by: NURSE PRACTITIONER

## 2022-05-02 PROCEDURE — 1170F FXNL STATUS ASSESSED: CPT | Performed by: NURSE PRACTITIONER

## 2022-05-02 RX ORDER — CARVEDILOL 25 MG/1
25 TABLET ORAL 2 TIMES DAILY WITH MEALS
Qty: 180 TABLET | Refills: 1 | Status: SHIPPED | OUTPATIENT
Start: 2022-05-02 | End: 2022-08-31 | Stop reason: SDUPTHER

## 2022-05-02 RX ORDER — METHYLPREDNISOLONE ACETATE 80 MG/ML
40 INJECTION, SUSPENSION INTRA-ARTICULAR; INTRALESIONAL; INTRAMUSCULAR; SOFT TISSUE ONCE
Status: COMPLETED | OUTPATIENT
Start: 2022-05-02 | End: 2022-05-13

## 2022-05-02 RX ORDER — CLONIDINE HYDROCHLORIDE 0.1 MG/1
0.1 TABLET ORAL 3 TIMES DAILY
Qty: 180 TABLET | Refills: 1 | Status: SHIPPED | OUTPATIENT
Start: 2022-05-02 | End: 2022-07-15 | Stop reason: SDUPTHER

## 2022-05-02 NOTE — PROGRESS NOTES
The ABCs of the Annual Wellness Visit  Subsequent Medicare Wellness Visit    Chief Complaint   Patient presents with   • Medicare Wellness-subsequent      Hep c Screening is due  Patient refuses pneumo vaccine, tdap and shingles, also COVID  At todays visit  Colonoscopy 10/2021  Last pap 02/2022  mammogram 02/2020    Follow up HTN  And hyperkalemia   Taking clonidine, lostartan and coreg as directed     Pt c/o left shoulder pain decreased ROM requests injection     Subjective    History of Present Illness:  Katarzyna Norris is a 51 y.o. female who presents for a Subsequent Medicare Wellness Visit.    The following portions of the patient's history were reviewed and   updated as appropriate: .    Compared to one year ago, the patient feels her physical   health is worse.    Compared to one year ago, the patient feels her mental   health is same.    Recent Hospitalizations:  Knee replacement left 12/2021      Current Medical Providers:  Patient Care Team:  Brian Ellis APRN as PCP - General (Nurse Practitioner)    Outpatient Medications Prior to Visit   Medication Sig Dispense Refill   • allopurinol (ZYLOPRIM) 300 MG tablet Take 1 tablet by mouth 2 (Two) Times a Day. 180 tablet 1   • Aspirin Buf,CaCarb-MgCarb-MgO, 81 MG tablet Take 81 mg by mouth Daily. TAKES FOR PREVENTATIVE HEART HEALTH, LD 12/14/21     • atorvastatin (LIPITOR) 20 MG tablet Take 1 tablet by mouth Every Night. 90 tablet 0   • losartan (COZAAR) 100 MG tablet Take 1 tablet by mouth Daily.     • sodium bicarbonate 650 MG tablet Take 1 tablet by mouth 3 (Three) Times a Day. (Patient taking differently: Take 650 mg by mouth 2 (Two) Times a Day.) 270 tablet 0   • vitamin B-12 (CYANOCOBALAMIN) 100 MCG tablet Take 50 mcg by mouth Daily.     • Zinc Sulfate (ZINC 15 PO) Take  by mouth.     • aspirin EC (Ecotrin) 325 MG tablet Take 1 tablet by mouth Daily (start after finishing eliquis) 14 tablet 0   • aspirin  MG tablet Take 325 mg by  mouth Every 6 (Six) Hours As Needed.     • carvedilol (COREG) 12.5 MG tablet Take 1 tablet by mouth 2 (Two) Times a Day With Meals. 180 tablet 1   • Cholecalciferol (Vitamin D3) 1.25 MG (73858 UT) tablet Take 1 tablet by mouth Every Morning.     • clobetasol (TEMOVATE) 0.05 % cream Apply 1 application topically to the appropriate area as directed 2 (Two) Times a Day. 60 g 1   • cloNIDine (CATAPRES) 0.1 MG tablet TAKE 1 TABLET BY MOUTH THREE TIMES DAILY 90 tablet 0   • oxyCODONE-acetaminophen (PERCOCET) 7.5-325 MG per tablet Take 1-2 tablets by mouth Every 4 (Four) Hours As Needed for Moderate Pain . 40 tablet 0   • penicillin v potassium (VEETID) 500 MG tablet TAKE 1 TABLET BY MOUTH EVERY 6 HOURS UNTIL ALL TAKEN     • silver sulfadiazine (Silvadene) 1 % cream Apply 1 application topically to the appropriate area as directed 2 (Two) Times a Day. 400 g 2   • vitamin C (ASCORBIC ACID) 250 MG tablet Take 250 mg by mouth Daily.       No facility-administered medications prior to visit.       No opioid medication identified on active medication list. I have reviewed chart for other potential  high risk medication/s and harmful drug interactions in the elderly.          Aspirin is on active medication list. .      Patient Active Problem List   Diagnosis   • Gout   • Anxiety   • Hypertension   • Sinus problem   • Left knee pain   • Primary osteoarthritis of left knee   • Anemia   • Impaired fasting glucose   • Hyperlipidemia   • Screening mammogram, encounter for   • Osteoarthritis of left knee   • Acute non-recurrent maxillary sinusitis   • Status post replacement of knee joint   • Pain of left calf   • Swelling of calf   • Cyst of ovary   • Asthma   • Abdominal pain   • Metabolic acidosis   • Nephritic syndrome   • Obesity   • Stage 3 chronic kidney disease (HCC)   • Urinary tract infectious disease   • Vitamin D deficiency   • Hyperkalemia   • Elevated ferritin   • Burn   • Aftercare following surgery of left total knee  "arthroplasty, 12/22/2021   • Rash   • Hypercalcemia   • Acute pain of left shoulder     Advance Care Planning  Advance Directive is not on file.  Patient does not have one    Review of Systems   Constitutional: Negative for fever.   HENT: Negative for ear pain and sore throat.    Eyes: Negative for visual disturbance.   Respiratory: Negative for shortness of breath.    Cardiovascular: Negative for chest pain.   Gastrointestinal: Negative for abdominal pain, diarrhea, nausea and vomiting.   Genitourinary: Negative for dysuria.   Musculoskeletal: Positive for arthralgias and joint swelling.   Skin: Negative for rash.   Neurological: Negative for dizziness and numbness.   Psychiatric/Behavioral: The patient is not nervous/anxious.         Objective    Vitals:    05/02/22 0853   BP: 147/91   Pulse: 97   Temp: 99.1 °F (37.3 °C)   SpO2: 98%   Weight: 125 kg (276 lb)   Height: 152.4 cm (60\")     BMI Readings from Last 1 Encounters:   05/02/22 53.90 kg/m²   BMI is above normal parameters. Recommendations include: referral to a nutritionist    Does the patient have evidence of cognitive impairment? no    Physical Exam  HENT:      Right Ear: Tympanic membrane normal.      Left Ear: Tympanic membrane normal.      Nose: Nose normal.      Mouth/Throat:      Mouth: Mucous membranes are moist.   Eyes:      Conjunctiva/sclera: Conjunctivae normal.   Neck:      Vascular: No carotid bruit.   Cardiovascular:      Rate and Rhythm: Normal rate and regular rhythm.      Heart sounds: Normal heart sounds. No murmur heard.  Pulmonary:      Effort: Pulmonary effort is normal.      Breath sounds: Normal breath sounds.   Abdominal:      General: Bowel sounds are normal.      Palpations: Abdomen is soft.   Musculoskeletal:         General: Swelling and tenderness present.      Right lower leg: No edema.      Left lower leg: No edema.      Comments: Unable to reach center of back or outwards with right arm    Skin:     General: Skin is warm and " dry.   Neurological:      Mental Status: She is alert and oriented to person, place, and time.   Psychiatric:         Mood and Affect: Mood normal.         Behavior: Behavior normal.       Lab Results   Component Value Date    TRIG 149 04/20/2022    HDL 42 04/20/2022    LDL 75 04/20/2022    VLDL 26 04/20/2022    HGBA1C 6.10 (H) 04/20/2022       Procedure:   Left Shoulder Joint Injection    Procedure Information:   Informed consent obtained. Patient was informed of possible adverse effects including but not limited to bleeding, damage to nerve, tendon or artery, increased blood sugar and increased blood pressure. Time out was performed. Patient was placed with shoulder passively hanging from side of body at 0 degrees. Lateral edge of scapular spine was palpated and site was marked just inferior to this, to proceed with injection per typical posterior approach. Betadine was swabbed at injection site per typical technique. 27 gauge, 1.5 inch needle injected into bursa, directed slightly medially, aspiration was performed and then Depo-Medrol 40 mg and 2 mL 1% lidocaine without epinephrine was slowly injected into joint space, fluid was free flowing. Needle was removed, betadine wiped off and band-aid placed to injection site.     The patient was instructed to call our office if they had any questions or concerns.            HEALTH RISK ASSESSMENT    Smoking Status:  Social History     Tobacco Use   Smoking Status Former Smoker   Smokeless Tobacco Never Used     Alcohol Consumption:  Social History     Substance and Sexual Activity   Alcohol Use Never     Fall Risk Screen:    STEADI Fall Risk Assessment has not been completed.    Depression Screening:  PHQ-2/PHQ-9 Depression Screening 5/2/2022   Retired PHQ-9 Total Score -   Retired Total Score -   Little Interest or Pleasure in Doing Things 0-->not at all   Feeling Down, Depressed or Hopeless 1-->several days   PHQ-9: Brief Depression Severity Measure Score 1        Health Habits and Functional and Cognitive Screening:  Functional & Cognitive Status 5/2/2022   Do you have difficulty preparing food and eating? No   Do you have difficulty bathing yourself, getting dressed or grooming yourself? No   Do you have difficulty using the toilet? No   Do you have difficulty moving around from place to place? No   Do you have trouble with steps or getting out of a bed or a chair? No   Current Diet Diabetic Diet   Dental Exam Up to date        Dental Exam Comment Bright smiles   Eye Exam Up to date        Eye Exam Comment vision works   Exercise (times per week) 2 times per week   Current Exercises Include Walking   Do you need help using the phone?  No   Are you deaf or do you have serious difficulty hearing?  No   Do you need help with transportation? No   Do you need help shopping? No   Do you need help preparing meals?  No   Do you need help with housework?  No   Do you need help with laundry? No   Do you need help taking your medications? No   Do you need help managing money? No   Do you ever drive or ride in a car without wearing a seat belt? No       Age-appropriate Screening Schedule:  Refer to the list below for future screening recommendations based on patient's age, sex and/or medical conditions. Orders for these recommended tests are listed in the plan section. The patient has been provided with a written plan.    Health Maintenance   Topic Date Due   • MAMMOGRAM  02/21/2022   • ZOSTER VACCINE (1 of 2) 05/02/2022 (Originally 9/26/2018)   • TDAP/TD VACCINES (1 - Tdap) 05/02/2023 (Originally 9/26/1987)   • INFLUENZA VACCINE  08/01/2022   • LIPID PANEL  04/20/2023   • PAP SMEAR  02/25/2025              Assessment/Plan   CMS Preventative Services Quick Reference  Risk Factors Identified During Encounter  Immunizations Discussed/Encouraged (specific Immunizations; Tdap, Pneumococcal 23 and COVID19  Obesity/Overweight   The above risks/problems have been discussed with the  patient.  Follow up actions/plans if indicated are seen below in the Assessment/Plan Section.  Pertinent information has been shared with the patient in the After Visit Summary.    Diagnoses and all orders for this visit:    1. Medicare annual wellness visit, subsequent (Primary)    2. Hypertension, unspecified type  -     cloNIDine (CATAPRES) 0.1 MG tablet; Take 1 tablet by mouth 3 (Three) Times a Day.  Dispense: 180 tablet; Refill: 1  -     Ambulatory Referral to Nutrition Services    3. Acute pain of left shoulder  -     methylPREDNISolone acetate (DEPO-medrol) injection 40 mg  -     XR Shoulder 2+ View Left; Future    4. Hyperkalemia    5. Class 3 severe obesity due to excess calories without serious comorbidity with body mass index (BMI) of 50.0 to 59.9 in adult (HCC)  -     Ambulatory Referral to Nutrition Services    6. Stage 3 chronic kidney disease, unspecified whether stage 3a or 3b CKD ([facility])  -     Ambulatory Referral to Nutrition Services    7. Need for hepatitis C screening test  -     Hepatitis C Antibody    8. Screening mammogram for breast cancer  -     Mammo Screening Digital Tomosynthesis Bilateral With CAD; Future    Other orders  -     carvedilol (COREG) 25 MG tablet; Take 1 tablet by mouth 2 (Two) Times a Day With Meals.  Dispense: 180 tablet; Refill: 1    medicare annual  wellness exam reviewed immunizations and screenings mammogram order provided  Hypertension uncontrolled we will increase Coreg to 25 mg twice a day recommend blood pressure and heart rate check in 2 weeks  Hyperkalemia will obtain BMP in office today call with results and further recommendations patient reports she cut potatoes out of her diet  Acute pain of the left shoulder will provide injection today recommend icing and will obtain x-ray will call with results and further recommendations  Chronic kidney disease avoid all NSAIDs we will obtain BMP to monitor today we will refer to nutritionist for obesity chronic kidney  disease hypertension for recommendations for diet changes  Injection was provided to the left shoulder patient tolerated well aftercare instructions provided    Follow Up:   Return in about 4 weeks (around 5/30/2022).     An After Visit Summary and PPPS were made available to the patient.

## 2022-05-09 ENCOUNTER — APPOINTMENT (OUTPATIENT)
Dept: NUTRITION | Facility: HOSPITAL | Age: 52
End: 2022-05-09

## 2022-05-13 RX ADMIN — METHYLPREDNISOLONE ACETATE 40 MG: 80 INJECTION, SUSPENSION INTRA-ARTICULAR; INTRALESIONAL; INTRAMUSCULAR; SOFT TISSUE at 13:31

## 2022-06-10 ENCOUNTER — OFFICE VISIT (OUTPATIENT)
Dept: FAMILY MEDICINE CLINIC | Facility: CLINIC | Age: 52
End: 2022-06-10

## 2022-06-10 VITALS
WEIGHT: 276 LBS | BODY MASS INDEX: 54.19 KG/M2 | OXYGEN SATURATION: 97 % | HEART RATE: 91 BPM | SYSTOLIC BLOOD PRESSURE: 151 MMHG | HEIGHT: 60 IN | TEMPERATURE: 99.6 F | DIASTOLIC BLOOD PRESSURE: 105 MMHG

## 2022-06-10 DIAGNOSIS — R21 RASH AND NONSPECIFIC SKIN ERUPTION: ICD-10-CM

## 2022-06-10 DIAGNOSIS — I10 PRIMARY HYPERTENSION: Primary | ICD-10-CM

## 2022-06-10 PROCEDURE — 99213 OFFICE O/P EST LOW 20 MIN: CPT | Performed by: NURSE PRACTITIONER

## 2022-06-10 PROCEDURE — 96372 THER/PROPH/DIAG INJ SC/IM: CPT | Performed by: NURSE PRACTITIONER

## 2022-06-10 RX ORDER — LOSARTAN POTASSIUM 50 MG/1
100 TABLET ORAL
COMMUNITY
End: 2022-06-10

## 2022-06-10 RX ORDER — CLOBETASOL PROPIONATE 0.5 MG/G
1 CREAM TOPICAL 2 TIMES DAILY
Qty: 60 G | Refills: 1 | Status: SHIPPED | OUTPATIENT
Start: 2022-06-10 | End: 2022-09-30

## 2022-06-10 RX ORDER — METHYLPREDNISOLONE ACETATE 80 MG/ML
80 INJECTION, SUSPENSION INTRA-ARTICULAR; INTRALESIONAL; INTRAMUSCULAR; SOFT TISSUE ONCE
Status: COMPLETED | OUTPATIENT
Start: 2022-06-10 | End: 2022-06-10

## 2022-06-10 RX ORDER — AMLODIPINE BESYLATE 5 MG/1
5 TABLET ORAL
Qty: 30 TABLET | Refills: 1 | Status: SHIPPED | OUTPATIENT
Start: 2022-06-10 | End: 2022-07-15

## 2022-06-10 RX ADMIN — METHYLPREDNISOLONE ACETATE 80 MG: 80 INJECTION, SUSPENSION INTRA-ARTICULAR; INTRALESIONAL; INTRAMUSCULAR; SOFT TISSUE at 16:10

## 2022-06-10 NOTE — PROGRESS NOTES
ACUTE VISIT     Patient Name: Katarzyna Norris  : 1970   MRN: [id number]     Chief Complaint:    Chief Complaint   Patient presents with   • Rash       History of Present Illness: Katarzyna Norris is a 51 y.o. female who is here today for rash.    Patient has little red bumps on both arms. 2 weeks ago she went and got some chairs from her [employer] and cleaned them.   A week ago at camp she was around poison ivy. No discharge from bumps, just really itchy.   No changes in patient's soap, detergent, lotions, ect.   Patient has tried putting alcohol on bumps, with no relief.    Subjective      Review of Systems:   Review of Systems   Respiratory: Negative for shortness of breath.    Cardiovascular: Negative for chest pain.   Skin: Positive for rash.        Past Medical History:   Past Medical History:   Diagnosis Date   • Anxiety    • Arthritis     GILBERT KNEES   • Asthma     SEASONAL ALLERGIES   • Elevated cholesterol    • Gout 2021   • Hiatal hernia    • Hypertension    • Renal insufficiency     NOT ON DIALYSIS/NATALIIA   • Sinus problem     SEASONAL ALLERGIES       Past Surgical History:   Past Surgical History:   Procedure Laterality Date   •  SECTION   and    • CHOLECYSTECTOMY     • COLONOSCOPY     • ENDOSCOPY     • TOTAL KNEE ARTHROPLASTY Left 2021    Procedure: TOTAL KNEE ARTHROPLASTY;  Surgeon: Marco Nelson MD;  Location: [facility];  Service: Orthopedics;  Laterality: Left;   • TUBAL ABDOMINAL LIGATION         Family History:   Family History   Problem Relation Age of Onset   • Throat cancer Father 73   • Stroke Other    • Diabetes Other    • Malig Hyperthermia Neg Hx        Social History:   Social History     Socioeconomic History   • Marital status:    Tobacco Use   • Smoking status: Former Smoker   • Smokeless tobacco: Never Used   Vaping Use   • Vaping Use: Never used   Substance and Sexual Activity   • Alcohol use: Never   • Drug  "use: Never       Medications:     Current Outpatient Medications:   •  allopurinol (ZYLOPRIM) 300 MG tablet, Take 1 tablet by mouth 2 (Two) Times a Day., Disp: 180 tablet, Rfl: 1  •  Aspirin Buf,CaCarb-MgCarb-MgO, 81 MG tablet, Take 81 mg by mouth Daily. TAKES FOR PREVENTATIVE HEART HEALTH, LD 12/14/21, Disp: , Rfl:   •  atorvastatin (LIPITOR) 20 MG tablet, Take 1 tablet by mouth Every Night., Disp: 90 tablet, Rfl: 0  •  carvedilol (COREG) 25 MG tablet, Take 1 tablet by mouth 2 (Two) Times a Day With Meals., Disp: 180 tablet, Rfl: 1  •  cloNIDine (CATAPRES) 0.1 MG tablet, Take 1 tablet by mouth 3 (Three) Times a Day., Disp: 180 tablet, Rfl: 1  •  losartan (COZAAR) 100 MG tablet, Take 1 tablet by mouth Daily., Disp: , Rfl:   •  sodium bicarbonate 650 MG tablet, Take 1 tablet by mouth 3 (Three) Times a Day. (Patient taking differently: Take 650 mg by mouth 2 (Two) Times a Day.), Disp: 270 tablet, Rfl: 0  •  vitamin B-12 (CYANOCOBALAMIN) 100 MCG tablet, Take 50 mcg by mouth Daily., Disp: , Rfl:   •  Zinc Sulfate (ZINC 15 PO), Take  by mouth., Disp: , Rfl:   •  amLODIPine (NORVASC) 5 MG tablet, Take 1 tablet by mouth every night at bedtime., Disp: 30 tablet, Rfl: 1  •  clobetasol (TEMOVATE) 0.05 % cream, Apply 1 application topically to the appropriate area as directed 2 (Two) Times a Day., Disp: 60 g, Rfl: 1    Allergies:   Allergies   Allergen Reactions   • Diltiazem Hcl Anxiety   • Metal Itching     PT STATES COSTUME EARRINGS CAUSE ITCHING AND IRRITATION TO EARS.         Objective     Physical Exam:  Vital Signs:   Vitals:    06/10/22 1527   BP: (!) 151/105   Pulse: 91   Temp: 99.6 °F (37.6 °C)   SpO2: 97%   Weight: 125 kg (276 lb)   Height: 152.4 cm (60\")     Body mass index is 53.9 kg/m².     Physical Exam  Cardiovascular:      Rate and Rhythm: Normal rate and regular rhythm.      Heart sounds: Normal heart sounds. No murmur heard.  Pulmonary:      Effort: Pulmonary effort is normal.      Breath sounds: Normal " breath sounds.   Skin:     General: Skin is warm and dry.      Findings: Rash present.      Comments: Erythematous vesicles bilateral forearms and hands    Neurological:      Mental Status: She is alert.             Assessment / Plan      Assessment/Plan:   Diagnoses and all orders for this visit:    1. Primary hypertension (Primary)    2. Rash and nonspecific skin eruption  -     methylPREDNISolone acetate (DEPO-medrol) injection 80 mg    Other orders  -     amLODIPine (NORVASC) 5 MG tablet; Take 1 tablet by mouth every night at bedtime.  Dispense: 30 tablet; Refill: 1  -     clobetasol (TEMOVATE) 0.05 % cream; Apply 1 application topically to the appropriate area as directed 2 (Two) Times a Day.  Dispense: 60 g; Refill: 1       Hypertension uncontrolled we will add amlodipine 5 mg at nighttime blood pressure check in 2 weeks decrease salt intake increase water intake exercise 30 minutes daily follow-up in 2 months  Rash exposure to poison ivy will treat with clobetasol cream      Follow Up:   Return in about 2 months (around 8/10/2022).    MARICRUZ Ball      Please note that portions of this note were completed with a voice recognition program.

## 2022-06-17 RX ORDER — ATORVASTATIN CALCIUM 20 MG/1
TABLET, FILM COATED ORAL
Qty: 9 TABLET | Refills: 0 | Status: SHIPPED | OUTPATIENT
Start: 2022-06-17 | End: 2022-06-20 | Stop reason: SDUPTHER

## 2022-06-20 RX ORDER — ATORVASTATIN CALCIUM 20 MG/1
20 TABLET, FILM COATED ORAL DAILY
Qty: 90 TABLET | Refills: 1 | Status: SHIPPED | OUTPATIENT
Start: 2022-06-20 | End: 2022-06-22

## 2022-06-20 RX ORDER — LOSARTAN POTASSIUM 100 MG/1
100 TABLET ORAL DAILY
Qty: 90 TABLET | Refills: 1
Start: 2022-06-20 | End: 2022-08-31 | Stop reason: SDUPTHER

## 2022-06-22 RX ORDER — ATORVASTATIN CALCIUM 20 MG/1
TABLET, FILM COATED ORAL
Qty: 90 TABLET | Refills: 0 | Status: SHIPPED | OUTPATIENT
Start: 2022-06-22 | End: 2022-08-31 | Stop reason: SDUPTHER

## 2022-06-30 ENCOUNTER — OFFICE VISIT (OUTPATIENT)
Dept: ORTHOPEDIC SURGERY | Facility: CLINIC | Age: 52
End: 2022-06-30

## 2022-06-30 VITALS — WEIGHT: 280 LBS | OXYGEN SATURATION: 96 % | HEIGHT: 60 IN | HEART RATE: 96 BPM | BODY MASS INDEX: 54.97 KG/M2

## 2022-06-30 DIAGNOSIS — M17.11 ARTHRITIS OF KNEE, RIGHT: ICD-10-CM

## 2022-06-30 DIAGNOSIS — Z47.89 AFTERCARE FOLLOWING SURGERY OF THE MUSCULOSKELETAL SYSTEM: Primary | ICD-10-CM

## 2022-06-30 DIAGNOSIS — M25.562 PAIN IN BOTH KNEES, UNSPECIFIED CHRONICITY: ICD-10-CM

## 2022-06-30 DIAGNOSIS — M25.561 PAIN IN BOTH KNEES, UNSPECIFIED CHRONICITY: ICD-10-CM

## 2022-06-30 PROCEDURE — 99214 OFFICE O/P EST MOD 30 MIN: CPT | Performed by: PHYSICIAN ASSISTANT

## 2022-06-30 NOTE — PROGRESS NOTES
"Chief Complaint  Pain and Follow-up of the Left Knee and Initial Evaluation of the Right Knee    Subjective          Katarzyna Norris is a 51 y.o. female  presents to [facility] for   History of Present Illness      Patient presents for follow-up evaluation of left total knee arthroplasty, 12/22/2021 and also complaining of right knee pain.  She states her left knee is good she is happy with her surgery results and would like to discuss possible right knee surgery/knee replacement in sometime in October.  Patient states the left knee has no pain she states the right knee causes her pain in the anterior medial knee and the area of her joint space.  Patient states that her left and right knees move well but the right knee causes her pain and she feels that she favors the right knee and this might be causing some pain in the left knee.  She denies buckling locking or catching of bilateral knees      Allergies   Allergen Reactions   • Diltiazem Hcl Anxiety   • Metal Itching     PT STATES COSTUME EARRINGS CAUSE ITCHING AND IRRITATION TO EARS.        Social History     Socioeconomic History   • Marital status:    Tobacco Use   • Smoking status: Former Smoker   • Smokeless tobacco: Never Used   Vaping Use   • Vaping Use: Never used   Substance and Sexual Activity   • Alcohol use: Never   • Drug use: Never        REVIEW OF SYSTEMS    Constitutional: Denies fevers, chills, weight loss  Cardiovascular: Denies chest pain, shortness of breath  Skin: Denies rashes, acute skin changes  Neurologic: Denies headache, loss of consciousness  MSK: Left knee pain, right knee pain      Objective   Vital Signs:   Pulse 96   Ht 152.4 cm (60\")   Wt 127 kg (280 lb)   SpO2 96%   BMI 54.68 kg/m²     Body mass index is 54.68 kg/m².    Physical Exam    Left knee: Incision is well-healed, no erythema, no ecchymosis, no swelling, no signs of infection, nontender to palpation, full extension, flexion 110, " stable to varus/valgus stress, stable anterior/posterior drawer.    Right knee: Tender palpation anterior medial knee along the joint line, full extension, flexion 95, stable to varus/valgus stress, stable anterior/posterior drawer.    Procedures    Imaging Results (Most Recent)     Procedure Component Value Units Date/Time    XR Knee 3 View Bilateral [[id number]] Resulted: 06/30/22 1137     Updated: 06/30/22 1139    Narrative:      • View:AP, Lateral and Sunrise view(s)  • Site: Bilateral knee  • Indication: Bilateral knee pain  • Study: X-rays ordered, taken in the office, and reviewed today  • X-ray: Left knee: Intact appearing left total knee arthroplasty, no   signs of hardware failure or loosening, no signs of periprosthetic   fracture, good alignment.  Right knee: Advanced tricompartmental   degenerative changes with bone-on-bone medial joint space narrowing and   osteophyte formation, no fracture, no dislocation, no acute osseous   abnormality  • Comparative data: No comparative data found             Result Review :   The following data was reviewed by: SARAH Arora on 06/30/2022:  Data reviewed: Radiologic studies Reviewed by me with the patient             Assessment and Plan    Diagnoses and all orders for this visit:    1. Aftercare following surgery of left total knee arthroplasty, 12/22/2021 (Primary)    2. Pain in both knees, unspecified chronicity  -     XR Knee 3 View Bilateral    3. Arthritis of knee, right        Reviewed x-rays with the patient, she was advised to continue activity as tolerated for the left knee follow-up in 6 months for the left knee with x-rays, reviewed x-rays of the right knee she would like to discuss total knee replacement sometime in October timeframe she will follow-up at the end of August to set up surgery for sometime in October.  We discussed possible steroid injection for the right knee she refused.    Call or return if worsening symptoms.    Follow Up    Return in about 2 months (around 8/30/2022).  Patient was given instructions and counseling regarding her condition or for health maintenance advice. Please see specific information pulled into the AVS if appropriate.

## 2022-07-01 ENCOUNTER — HOSPITAL ENCOUNTER (EMERGENCY)
Facility: HOSPITAL | Age: 52
Discharge: HOME | End: 2022-07-01
Attending: EMERGENCY MEDICINE | Admitting: EMERGENCY MEDICINE

## 2022-07-01 ENCOUNTER — APPOINTMENT (OUTPATIENT)
Dept: GENERAL RADIOLOGY | Facility: HOSPITAL | Age: 52
End: 2022-07-01

## 2022-07-01 VITALS
HEART RATE: 74 BPM | DIASTOLIC BLOOD PRESSURE: 69 MMHG | BODY MASS INDEX: 50.63 KG/M2 | HEIGHT: 62 IN | RESPIRATION RATE: 18 BRPM | OXYGEN SATURATION: 98 % | SYSTOLIC BLOOD PRESSURE: 125 MMHG | TEMPERATURE: 97.9 F | WEIGHT: 275.13 LBS

## 2022-07-01 DIAGNOSIS — T78.40XA ALLERGIC REACTION, INITIAL ENCOUNTER: Primary | ICD-10-CM

## 2022-07-01 LAB
ALBUMIN SERPL-MCNC: 5 G/DL (ref 3.5–5.2)
ALBUMIN/GLOB SERPL: 2.1 G/DL
ALP SERPL-CCNC: 99 U/L (ref 39–117)
ALT SERPL W P-5'-P-CCNC: 17 U/L (ref 1–33)
ANION GAP SERPL CALCULATED.3IONS-SCNC: 13.7 MMOL/L (ref 5–15)
AST SERPL-CCNC: 16 U/L (ref 1–32)
BASOPHILS # BLD AUTO: 0.06 10*3/MM3 (ref 0–0.2)
BASOPHILS NFR BLD AUTO: 1 % (ref 0–1.5)
BILIRUB SERPL-MCNC: 0.7 MG/DL (ref 0–1.2)
BUN SERPL-MCNC: 65 MG/DL (ref 6–20)
BUN/CREAT SERPL: 21.5 (ref 7–25)
CALCIUM SPEC-SCNC: 10.2 MG/DL (ref 8.6–10.5)
CHLORIDE SERPL-SCNC: 102 MMOL/L (ref 98–107)
CO2 SERPL-SCNC: 22.3 MMOL/L (ref 22–29)
CREAT SERPL-MCNC: 3.03 MG/DL (ref 0.57–1)
DEPRECATED RDW RBC AUTO: 50.8 FL (ref 37–54)
EGFRCR SERPLBLD CKD-EPI 2021: 17.8 ML/MIN/1.73
EOSINOPHIL # BLD AUTO: 0.15 10*3/MM3 (ref 0–0.4)
EOSINOPHIL NFR BLD AUTO: 2.5 % (ref 0.3–6.2)
ERYTHROCYTE [DISTWIDTH] IN BLOOD BY AUTOMATED COUNT: 14.4 % (ref 12.3–15.4)
GLOBULIN UR ELPH-MCNC: 2.4 GM/DL
GLUCOSE SERPL-MCNC: 113 MG/DL (ref 65–99)
HCT VFR BLD AUTO: 38.5 % (ref 34–46.6)
HGB BLD-MCNC: 12.7 G/DL (ref 12–15.9)
HOLD SPECIMEN: NORMAL
HOLD SPECIMEN: NORMAL
IMM GRANULOCYTES # BLD AUTO: 0.02 10*3/MM3 (ref 0–0.05)
IMM GRANULOCYTES NFR BLD AUTO: 0.3 % (ref 0–0.5)
LYMPHOCYTES # BLD AUTO: 1.69 10*3/MM3 (ref 0.7–3.1)
LYMPHOCYTES NFR BLD AUTO: 28.5 % (ref 19.6–45.3)
MCH RBC QN AUTO: 32.2 PG (ref 26.6–33)
MCHC RBC AUTO-ENTMCNC: 33 G/DL (ref 31.5–35.7)
MCV RBC AUTO: 97.7 FL (ref 79–97)
MONOCYTES # BLD AUTO: 0.45 10*3/MM3 (ref 0.1–0.9)
MONOCYTES NFR BLD AUTO: 7.6 % (ref 5–12)
NEUTROPHILS NFR BLD AUTO: 3.55 10*3/MM3 (ref 1.7–7)
NEUTROPHILS NFR BLD AUTO: 60.1 % (ref 42.7–76)
NRBC BLD AUTO-RTO: 0 /100 WBC (ref 0–0.2)
NT-PROBNP SERPL-MCNC: 140.6 PG/ML (ref 0–900)
PLATELET # BLD AUTO: 165 10*3/MM3 (ref 140–450)
PMV BLD AUTO: 8.6 FL (ref 6–12)
POTASSIUM SERPL-SCNC: 4.9 MMOL/L (ref 3.5–5.2)
PROT SERPL-MCNC: 7.4 G/DL (ref 6–8.5)
QT INTERVAL: 353 MS
RBC # BLD AUTO: 3.94 10*6/MM3 (ref 3.77–5.28)
SODIUM SERPL-SCNC: 138 MMOL/L (ref 136–145)
TROPONIN T SERPL-MCNC: <0.01 NG/ML (ref 0–0.03)
WBC NRBC COR # BLD: 5.92 10*3/MM3 (ref 3.4–10.8)
WHOLE BLOOD HOLD COAG: NORMAL
WHOLE BLOOD HOLD SPECIMEN: NORMAL

## 2022-07-01 PROCEDURE — 99284 EMERGENCY DEPT VISIT MOD MDM: CPT

## 2022-07-01 PROCEDURE — 83880 ASSAY OF NATRIURETIC PEPTIDE: CPT | Performed by: EMERGENCY MEDICINE

## 2022-07-01 PROCEDURE — 96375 TX/PRO/DX INJ NEW DRUG ADDON: CPT

## 2022-07-01 PROCEDURE — 25010000002 ONDANSETRON PER 1 MG: Performed by: EMERGENCY MEDICINE

## 2022-07-01 PROCEDURE — 84484 ASSAY OF TROPONIN QUANT: CPT | Performed by: EMERGENCY MEDICINE

## 2022-07-01 PROCEDURE — 93005 ELECTROCARDIOGRAM TRACING: CPT

## 2022-07-01 PROCEDURE — 71045 X-RAY EXAM CHEST 1 VIEW: CPT

## 2022-07-01 PROCEDURE — 85025 COMPLETE CBC W/AUTO DIFF WBC: CPT | Performed by: EMERGENCY MEDICINE

## 2022-07-01 PROCEDURE — 25010000002 DIPHENHYDRAMINE PER 50 MG: Performed by: EMERGENCY MEDICINE

## 2022-07-01 PROCEDURE — 25010000002 METHYLPREDNISOLONE PER 125 MG: Performed by: EMERGENCY MEDICINE

## 2022-07-01 PROCEDURE — 99283 EMERGENCY DEPT VISIT LOW MDM: CPT

## 2022-07-01 PROCEDURE — 80053 COMPREHEN METABOLIC PANEL: CPT | Performed by: EMERGENCY MEDICINE

## 2022-07-01 PROCEDURE — 96374 THER/PROPH/DIAG INJ IV PUSH: CPT

## 2022-07-01 PROCEDURE — 93005 ELECTROCARDIOGRAM TRACING: CPT | Performed by: EMERGENCY MEDICINE

## 2022-07-01 RX ORDER — METHYLPREDNISOLONE SODIUM SUCCINATE 125 MG/2ML
125 INJECTION, POWDER, LYOPHILIZED, FOR SOLUTION INTRAMUSCULAR; INTRAVENOUS ONCE
Status: COMPLETED | OUTPATIENT
Start: 2022-07-01 | End: 2022-07-01

## 2022-07-01 RX ORDER — ONDANSETRON 2 MG/ML
4 INJECTION INTRAMUSCULAR; INTRAVENOUS ONCE
Status: COMPLETED | OUTPATIENT
Start: 2022-07-01 | End: 2022-07-01

## 2022-07-01 RX ORDER — DIPHENHYDRAMINE HYDROCHLORIDE 50 MG/ML
50 INJECTION INTRAMUSCULAR; INTRAVENOUS ONCE
Status: COMPLETED | OUTPATIENT
Start: 2022-07-01 | End: 2022-07-01

## 2022-07-01 RX ORDER — FAMOTIDINE 10 MG/ML
20 INJECTION, SOLUTION INTRAVENOUS ONCE
Status: COMPLETED | OUTPATIENT
Start: 2022-07-01 | End: 2022-07-01

## 2022-07-01 RX ORDER — SODIUM CHLORIDE 0.9 % (FLUSH) 0.9 %
10 SYRINGE (ML) INJECTION AS NEEDED
Status: DISCONTINUED | OUTPATIENT
Start: 2022-07-01 | End: 2022-07-01 | Stop reason: HOSPADM

## 2022-07-01 RX ADMIN — METHYLPREDNISOLONE SODIUM SUCCINATE 125 MG: 125 INJECTION, POWDER, FOR SOLUTION INTRAMUSCULAR; INTRAVENOUS at 08:10

## 2022-07-01 RX ADMIN — FAMOTIDINE 20 MG: 10 INJECTION INTRAVENOUS at 08:10

## 2022-07-01 RX ADMIN — DIPHENHYDRAMINE HYDROCHLORIDE 50 MG: 50 INJECTION INTRAMUSCULAR; INTRAVENOUS at 08:11

## 2022-07-01 RX ADMIN — ONDANSETRON 4 MG: 2 INJECTION INTRAMUSCULAR; INTRAVENOUS at 08:10

## 2022-07-01 NOTE — ED PROVIDER NOTES
Time: 6:53 AM EDT  Arrived by: private car  Chief Complaint: Allergic Reaction  History provided by: pt  History is limited by: N/A     History of Present Illness:  Patient is a 51 y.o. year old female that presents to the emergency department with allergic reaction. The pt reports she is having her reaction to amlodipine which she was started on for the past 1.5 weeks. She states at first she just had some nausea whenever she was taking the medication, but today she developed SOA, chest tightness, numbness, nausea, diarrhea, and facial swelling. Denies any throat swelling or trouble swallowing. At time of evaluation, she feels much improved but reports mild chest tightness/heaviness. The pt's symptoms are moderate in severity and have no other reported modifying factors.       History provided by:  Patient   used: No        Similar Symptoms Previously: N/a  Recently seen: Seen by ortho yesterday      Patient Care Team  Primary Care Provider: Brian Ellis APRN    Past Medical History:     Allergies   Allergen Reactions   • Amlodipine Shortness Of Breath   • Diltiazem Hcl Anxiety     Past Medical History:   Diagnosis Date   • Anxiety    • Arthritis     GILBERT KNEES   • Asthma     SEASONAL ALLERGIES   • Elevated cholesterol    • Gout 2021   • Hiatal hernia    • Hypertension    • Renal insufficiency     NOT ON DIALYSIS/NATALIIA   • Sinus problem     SEASONAL ALLERGIES     Past Surgical History:   Procedure Laterality Date   •  SECTION   and    • CHOLECYSTECTOMY     • COLONOSCOPY     • ENDOSCOPY     • TOTAL KNEE ARTHROPLASTY Left 2021    Procedure: TOTAL KNEE ARTHROPLASTY;  Surgeon: Marco Nelson MD;  Location: [facility] OR;  Service: Orthopedics;  Laterality: Left;   • TUBAL ABDOMINAL LIGATION       Family History   Problem Relation Age of Onset   • Throat cancer Father 73   • Stroke Other    • Diabetes Other    • Malig Hyperthermia Neg Hx         Home Medications:  Prior to Admission medications    Medication Sig Start Date End Date Taking? Authorizing Provider   allopurinol (ZYLOPRIM) 300 MG tablet Take 1 tablet by mouth 2 (Two) Times a Day. 2/25/22   Brian Ellis APRN   amLODIPine (NORVASC) 5 MG tablet Take 1 tablet by mouth every night at bedtime. 6/10/22   Brian Ellis APRN   Aspirin Buf,CaCarb-MgCarb-MgO, 81 MG tablet Take 81 mg by mouth Daily. TAKES FOR PREVENTATIVE HEART HEALTH, LD 12/14/21    Al Ceballos MD   atorvastatin (LIPITOR) 20 MG tablet Take 1 tablet by mouth once daily 6/22/22   Brian Ellis APRN   carvedilol (COREG) 25 MG tablet Take 1 tablet by mouth 2 (Two) Times a Day With Meals. 5/2/22   Brian Ellis APRN   clobetasol (TEMOVATE) 0.05 % cream Apply 1 application topically to the appropriate area as directed 2 (Two) Times a Day. 6/10/22   Brian Ellis APRN   cloNIDine (CATAPRES) 0.1 MG tablet Take 1 tablet by mouth 3 (Three) Times a Day. 5/2/22   Brian Ellis APRN   losartan (COZAAR) 100 MG tablet Take 1 tablet by mouth Daily. 6/20/22   Brian Ellis APRN   sodium bicarbonate 650 MG tablet Take 1 tablet by mouth 3 (Three) Times a Day.  Patient taking differently: Take 650 mg by mouth 2 (Two) Times a Day. 11/30/21   Brian Ellis APRN   vitamin B-12 (CYANOCOBALAMIN) 100 MCG tablet Take 50 mcg by mouth Daily.    ProviderAl MD   Zinc Sulfate (ZINC 15 PO) Take  by mouth.    ProviderAl MD        Social History:   Social History     Tobacco Use   • Smoking status: Former Smoker   • Smokeless tobacco: Never Used   Vaping Use   • Vaping Use: Never used   Substance Use Topics   • Alcohol use: Never   • Drug use: Never       Review of Systems:  Review of Systems   Constitutional: Negative for chills and fever.   HENT: Positive for facial swelling. Negative for congestion, nosebleeds, rhinorrhea, sore throat and  "trouble swallowing.    Eyes: Negative for visual disturbance.   Respiratory: Positive for chest tightness and shortness of breath. Negative for cough.    Cardiovascular: Negative for chest pain and leg swelling.   Gastrointestinal: Positive for nausea and vomiting. Negative for abdominal pain and diarrhea.   Genitourinary: Negative for dysuria and hematuria.   Musculoskeletal: Negative for back pain.   Skin: Negative for pallor and rash.   Neurological: Positive for numbness. Negative for weakness and headaches.        Physical Exam:  /75   Pulse 73   Temp 97.9 °F (36.6 °C) (Oral)   Resp 18   Ht 157.5 cm (62\")   Wt 125 kg (275 lb 2.2 oz)   SpO2 99%   BMI 50.32 kg/m²     Physical Exam  Vitals and nursing note reviewed.   Constitutional:       General: She is not in acute distress.     Appearance: Normal appearance.   HENT:      Head: Normocephalic and atraumatic.      Mouth/Throat:      Mouth: Mucous membranes are moist.      Pharynx: Oropharynx is clear. Uvula midline.   Eyes:      General: No scleral icterus.     Extraocular Movements: Extraocular movements intact.      Pupils: Pupils are equal, round, and reactive to light.   Cardiovascular:      Rate and Rhythm: Normal rate and regular rhythm.      Pulses: Normal pulses.      Heart sounds: Normal heart sounds. No murmur heard.  Pulmonary:      Effort: Pulmonary effort is normal. No respiratory distress.      Breath sounds: Normal breath sounds. No wheezing.   Abdominal:      General: There is no distension.      Palpations: Abdomen is soft.      Tenderness: There is no abdominal tenderness.   Musculoskeletal:         General: Normal range of motion.      Cervical back: Neck supple.      Right lower leg: No edema.      Left lower leg: No edema.   Skin:     Coloration: Skin is not pale.   Neurological:      General: No focal deficit present.      Mental Status: She is alert and oriented to person, place, and time.      Cranial Nerves: No cranial nerve " deficit.      Sensory: No sensory deficit.                Medications in the Emergency Department:  Medications   sodium chloride 0.9 % flush 10 mL (has no administration in time range)   methylPREDNISolone sodium succinate (SOLU-Medrol) injection 125 mg (125 mg Intravenous Given 7/1/22 0810)   famotidine (PEPCID) injection 20 mg (20 mg Intravenous Given 7/1/22 0810)   diphenhydrAMINE (BENADRYL) injection 50 mg (50 mg Intravenous Given 7/1/22 0811)   ondansetron (ZOFRAN) injection 4 mg (4 mg Intravenous Given 7/1/22 0810)        Labs  Lab Results (last 24 hours)     Procedure Component Value Units Date/Time    CBC & Differential [[id number]]  (Abnormal) Collected: 07/01/22 0704    Specimen: Blood Updated: 07/01/22 0714    Narrative:      The following orders were created for panel order CBC & Differential.  Procedure                               Abnormality         Status                     ---------                               -----------         ------                     CBC Auto Differential[[id number]]        Abnormal            Final result                 Please view results for these tests on the individual orders.    Comprehensive Metabolic Panel [[id number]]  (Abnormal) Collected: 07/01/22 0704    Specimen: Blood Updated: 07/01/22 0729     Glucose 113 mg/dL      BUN 65 mg/dL      Creatinine 3.03 mg/dL      Sodium 138 mmol/L      Potassium 4.9 mmol/L      Chloride 102 mmol/L      CO2 22.3 mmol/L      Calcium 10.2 mg/dL      Total Protein 7.4 g/dL      Albumin 5.00 g/dL      ALT (SGPT) 17 U/L      AST (SGOT) 16 U/L      Alkaline Phosphatase 99 U/L      Total Bilirubin 0.7 mg/dL      Globulin 2.4 gm/dL      A/G Ratio 2.1 g/dL      BUN/Creatinine Ratio 21.5     Anion Gap 13.7 mmol/L      eGFR 17.8 mL/min/1.73      Comment: National Kidney Foundation and American Society of Nephrology (ASN) Task Force recommended calculation based on the Chronic Kidney Disease Epidemiology Collaboration (CKD-EPI) equation  refit without adjustment for race.       Narrative:      GFR Normal >60  Chronic Kidney Disease <60  Kidney Failure <15      BNP [[id number]]  (Normal) Collected: 07/01/22 0704    Specimen: Blood Updated: 07/01/22 0727     proBNP 140.6 pg/mL     Narrative:      Among patients with dyspnea, NT-proBNP is highly sensitive for the detection of acute congestive heart failure. In addition NT-proBNP of <300 pg/ml effectively rules out acute congestive heart failure with 99% negative predictive value.    Results may be falsely decreased if patient taking Biotin.      Troponin [[id number]]  (Normal) Collected: 07/01/22 0704    Specimen: Blood Updated: 07/01/22 0729     Troponin T <0.010 ng/mL     Narrative:      Troponin T Reference Range:  <= 0.03 ng/mL-   Negative for AMI  >0.03 ng/mL-     Abnormal for myocardial necrosis.  Clinicians would have to utilize clinical acumen, EKG, Troponin and serial changes to determine if it is an Acute Myocardial Infarction or myocardial injury due to an underlying chronic condition.       Results may be falsely decreased if patient taking Biotin.      CBC Auto Differential [[id number]]  (Abnormal) Collected: 07/01/22 0704    Specimen: Blood Updated: 07/01/22 0714     WBC 5.92 10*3/mm3      RBC 3.94 10*6/mm3      Hemoglobin 12.7 g/dL      Hematocrit 38.5 %      MCV 97.7 fL      MCH 32.2 pg      MCHC 33.0 g/dL      RDW 14.4 %      RDW-SD 50.8 fl      MPV 8.6 fL      Platelets 165 10*3/mm3      Neutrophil % 60.1 %      Lymphocyte % 28.5 %      Monocyte % 7.6 %      Eosinophil % 2.5 %      Basophil % 1.0 %      Immature Grans % 0.3 %      Neutrophils, Absolute 3.55 10*3/mm3      Lymphocytes, Absolute 1.69 10*3/mm3      Monocytes, Absolute 0.45 10*3/mm3      Eosinophils, Absolute 0.15 10*3/mm3      Basophils, Absolute 0.06 10*3/mm3      Immature Grans, Absolute 0.02 10*3/mm3      nRBC 0.0 /100 WBC            Imaging:  XR Chest 1 View    Result Date: 7/1/2022  PROCEDURE: XR CHEST 1 VW   COMPARISON: [facility], CR, CHEST AP/PA 1 VIEW, 2/10/2020, 12:06.  INDICATIONS: ALLERGIC REACTION TO NEW PRESCRIPTION, SHORTNESS OF BREATH, FACIAL SWELLING  FINDINGS:  Heart size and pulmonary vasculature within normal limits.  Lungs clear, with haziness of the lungs due the patient's body habitus.  Costophrenic angles sharp       No active cardiopulmonary disease       CHRISTI CARBALLO MD       Electronically Signed and Approved By: CHRISTI CARBALLO MD on 7/01/2022 at 7:16               Procedures:  Procedures    Progress                            Medical Decision Making:  MDM     51-year-old female patient presented with concern for allergic reaction, possibly to her amlodipine which is been started in the past week.  The patient reported having some chest tightness and shortness of breath with nausea, diarrhea and facial swelling.  Patient was given Solu-Medrol, Pepcid and Benadryl.  Patient's symptoms resolved.  There was no obvious rash or signs of swelling.  Her oropharynx was normal.  Patient stable for discharge.                  Final diagnoses:   Allergic reaction, initial encounter        Disposition:  ED Disposition     ED Disposition   Discharge    Condition   Stable    Comment   --             Documentation assistance provided by Len Parry acting as scribe for Jasmeet Turcios DO. Information recorded by the scribe was done at my direction and has been verified and validated by me.        Len Parry  07/01/22 0713       Jasmeet Turcios DO  07/01/22 1116

## 2022-07-15 ENCOUNTER — OFFICE VISIT (OUTPATIENT)
Dept: FAMILY MEDICINE CLINIC | Facility: CLINIC | Age: 52
End: 2022-07-15

## 2022-07-15 VITALS
HEIGHT: 62 IN | DIASTOLIC BLOOD PRESSURE: 81 MMHG | WEIGHT: 273 LBS | SYSTOLIC BLOOD PRESSURE: 146 MMHG | HEART RATE: 89 BPM | BODY MASS INDEX: 50.24 KG/M2 | TEMPERATURE: 98 F | OXYGEN SATURATION: 99 %

## 2022-07-15 DIAGNOSIS — N18.30 STAGE 3 CHRONIC KIDNEY DISEASE, UNSPECIFIED WHETHER STAGE 3A OR 3B CKD: ICD-10-CM

## 2022-07-15 DIAGNOSIS — G44.319 ACUTE POST-TRAUMATIC HEADACHE, NOT INTRACTABLE: Primary | ICD-10-CM

## 2022-07-15 DIAGNOSIS — I10 HYPERTENSION, UNSPECIFIED TYPE: ICD-10-CM

## 2022-07-15 DIAGNOSIS — G57.11 LATERAL CUTANEOUS FEMORAL NERVE OF THIGH COMPRESSION OR SYNDROME, RIGHT: ICD-10-CM

## 2022-07-15 DIAGNOSIS — M25.552 BILATERAL HIP PAIN: ICD-10-CM

## 2022-07-15 DIAGNOSIS — M25.551 BILATERAL HIP PAIN: ICD-10-CM

## 2022-07-15 PROCEDURE — 99214 OFFICE O/P EST MOD 30 MIN: CPT | Performed by: NURSE PRACTITIONER

## 2022-07-15 RX ORDER — CLONIDINE HYDROCHLORIDE 0.2 MG/1
0.2 TABLET ORAL 3 TIMES DAILY
Qty: 90 TABLET | Refills: 1 | Status: SHIPPED | OUTPATIENT
Start: 2022-07-15 | End: 2022-08-31 | Stop reason: SDUPTHER
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
## 2022-07-29 ENCOUNTER — TELEPHONE (OUTPATIENT)
Dept: FAMILY MEDICINE CLINIC | Facility: CLINIC | Age: 52
End: 2022-07-29

## 2022-07-29 NOTE — TELEPHONE ENCOUNTER
Patient said that she went to another office yesterday & her blood pressure was 130/80. She cancelled her nurse visit that was for today since she said that it was done yesterday elsewhere.

## 2022-08-09 ENCOUNTER — TELEPHONE (OUTPATIENT)
Dept: FAMILY MEDICINE CLINIC | Facility: CLINIC | Age: 52
End: 2022-08-09

## 2022-08-09 NOTE — TELEPHONE ENCOUNTER
Caller: YADI    Relationship: INSURANCE    Best call back number: [phone]  REF#[id number]    What medications are you currently taking:   Current Outpatient Medications on File Prior to Visit   Medication Sig Dispense Refill   • allopurinol (ZYLOPRIM) 300 MG tablet Take 1 tablet by mouth 2 (Two) Times a Day. 180 tablet 1   • Aspirin Buf,CaCarb-MgCarb-MgO, 81 MG tablet Take 81 mg by mouth Daily. TAKES FOR [facility],  12/14/21     • atorvastatin (LIPITOR) 20 MG tablet Take 1 tablet by mouth once daily 90 tablet 0   • carvedilol (COREG) 25 MG tablet Take 1 tablet by mouth 2 (Two) Times a Day With Meals. 180 tablet 1   • clobetasol (TEMOVATE) 0.05 % cream Apply 1 application topically to the appropriate area as directed 2 (Two) Times a Day. 60 g 1   • cloNIDine (CATAPRES) 0.2 MG tablet Take 1 tablet by mouth 3 (Three) Times a Day. 90 tablet 1   • losartan (COZAAR) 100 MG tablet Take 1 tablet by mouth Daily. 90 tablet 1   • Rimegepant Sulfate (NURTEC) 75 MG tablet dispersible tablet Take 1 tablet by mouth 1 (One) Time As Needed (MIGRAIENS). 8 tablet 5   • sodium bicarbonate 650 MG tablet Take 1 tablet by mouth 3 (Three) Times a Day. (Patient taking differently: Take 650 mg by mouth 2 (Two) Times a Day.) 270 tablet 0   • vitamin B-12 (CYANOCOBALAMIN) 100 MCG tablet Take 50 mcg by mouth Daily.     • Zinc Sulfate (ZINC 15 PO) Take  by mouth.       No current facility-administered medications on file prior to visit.        Which medication are you concerned about:atorvastatin (LIPITOR) 20 MG tablet        What are your concerns: YADI REPORTS THAT PATIENT IS IN NON COMPLIANT WITH THIS MEDICATION AND WANTED TO REPORT THIS TO MARICRUZ NORIEGA.

## 2022-08-11 ENCOUNTER — HOSPITAL ENCOUNTER (OUTPATIENT)
Dept: CT IMAGING | Facility: HOSPITAL | Age: 52
Discharge: HOME | End: 2022-08-11
Admitting: NURSE PRACTITIONER

## 2022-08-11 DIAGNOSIS — G44.319 ACUTE POST-TRAUMATIC HEADACHE, NOT INTRACTABLE: ICD-10-CM

## 2022-08-11 PROCEDURE — 70450 CT HEAD/BRAIN W/O DYE: CPT

## 2022-08-15 ENCOUNTER — TELEPHONE (OUTPATIENT)
Dept: FAMILY MEDICINE CLINIC | Facility: CLINIC | Age: 52
End: 2022-08-15

## 2022-08-15 DIAGNOSIS — R55 NEUROCARDIOGENIC SYNCOPE: ICD-10-CM

## 2022-08-15 DIAGNOSIS — G44.319 ACUTE POST-TRAUMATIC HEADACHE, NOT INTRACTABLE: Primary | ICD-10-CM

## 2022-08-15 NOTE — TELEPHONE ENCOUNTER
Patient is saying that she is still having pain in her head and felt like passing out one day. Her CT of the head was normal. So she is asking what happens next?

## 2022-08-16 ENCOUNTER — OFFICE VISIT (OUTPATIENT)
Dept: CARDIOLOGY | Facility: CLINIC | Age: 52
End: 2022-08-16

## 2022-08-16 VITALS
SYSTOLIC BLOOD PRESSURE: 184 MMHG | BODY MASS INDEX: 49.5 KG/M2 | HEART RATE: 80 BPM | DIASTOLIC BLOOD PRESSURE: 100 MMHG | HEIGHT: 62 IN | WEIGHT: 269 LBS

## 2022-08-16 DIAGNOSIS — R42 DIZZINESS: ICD-10-CM

## 2022-08-16 DIAGNOSIS — E66.01 MORBID OBESITY: ICD-10-CM

## 2022-08-16 DIAGNOSIS — E78.2 HYPERLIPEMIA, MIXED: ICD-10-CM

## 2022-08-16 DIAGNOSIS — I10 HYPERTENSION, ESSENTIAL: Primary | ICD-10-CM

## 2022-08-16 DIAGNOSIS — R01.1 CARDIAC MURMUR, UNSPECIFIED: ICD-10-CM

## 2022-08-16 PROCEDURE — 99204 OFFICE O/P NEW MOD 45 MIN: CPT | Performed by: SPECIALIST
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
## 2022-08-18 ENCOUNTER — PATIENT ROUNDING (BHMG ONLY) (OUTPATIENT)
Dept: CARDIOLOGY | Facility: CLINIC | Age: 52
End: 2022-08-18

## 2022-08-18 NOTE — PROGRESS NOTES
August 18, 2022    Hello, may I speak with Katarzyna Norris?    My name is Melba     I am  with [facility]  [address] [place] [address] [phone].    Before we get started may I verify your date of birth? 1970    I am calling to officially welcome you to our practice and ask about your recent visit. Is this a good time to talk? yes    Tell me about your visit with us. What things went well?  I was trying to see what was wrong with my head and the dr said there was something wrong with my heart but there was nothing        We're always looking for ways to make our patients' experiences even better. Do you have recommendations on ways we may improve?  yes    Overall were you satisfied with your first visit to our practice? yes       I appreciate you taking the time to speak with me today. Is there anything else I can do for you? no      Thank you, and have a great day.

## 2022-08-19 ENCOUNTER — TREATMENT (OUTPATIENT)
Dept: PHYSICAL THERAPY | Facility: CLINIC | Age: 52
End: 2022-08-19

## 2022-08-19 ENCOUNTER — HOSPITAL ENCOUNTER (OUTPATIENT)
Dept: GENERAL RADIOLOGY | Facility: HOSPITAL | Age: 52
Discharge: HOME | End: 2022-08-19

## 2022-08-19 DIAGNOSIS — G57.11 LATERAL FEMORAL CUTANEOUS ENTRAPMENT SYNDROME, RIGHT: ICD-10-CM

## 2022-08-19 DIAGNOSIS — M25.551 BILATERAL HIP PAIN: ICD-10-CM

## 2022-08-19 DIAGNOSIS — R26.2 DIFFICULTY WALKING: ICD-10-CM

## 2022-08-19 DIAGNOSIS — M25.552 BILATERAL HIP PAIN: ICD-10-CM

## 2022-08-19 DIAGNOSIS — M25.552 BILATERAL HIP PAIN: Primary | ICD-10-CM

## 2022-08-19 DIAGNOSIS — M25.551 BILATERAL HIP PAIN: Primary | ICD-10-CM

## 2022-08-19 PROCEDURE — 97161 PT EVAL LOW COMPLEX 20 MIN: CPT | Performed by: PHYSICAL THERAPIST

## 2022-08-19 PROCEDURE — 73522 X-RAY EXAM HIPS BI 3-4 VIEWS: CPT

## 2022-08-19 PROCEDURE — 97110 THERAPEUTIC EXERCISES: CPT | Performed by: PHYSICAL THERAPIST
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
## 2022-08-26 ENCOUNTER — PREP FOR SURGERY (OUTPATIENT)
Dept: OTHER | Facility: HOSPITAL | Age: 52
End: 2022-08-26

## 2022-08-26 ENCOUNTER — OFFICE VISIT (OUTPATIENT)
Dept: ORTHOPEDIC SURGERY | Facility: CLINIC | Age: 52
End: 2022-08-26

## 2022-08-26 VITALS — HEIGHT: 63 IN | WEIGHT: 274.4 LBS | HEART RATE: 102 BPM | OXYGEN SATURATION: 99 % | BODY MASS INDEX: 48.62 KG/M2

## 2022-08-26 DIAGNOSIS — M17.11 ARTHRITIS OF KNEE, RIGHT: Primary | ICD-10-CM

## 2022-08-26 DIAGNOSIS — M25.561 RIGHT KNEE PAIN, UNSPECIFIED CHRONICITY: ICD-10-CM

## 2022-08-26 PROCEDURE — 99214 OFFICE O/P EST MOD 30 MIN: CPT | Performed by: PHYSICIAN ASSISTANT

## 2022-08-26 RX ORDER — CEFAZOLIN SODIUM 2 G/100ML
2 INJECTION, SOLUTION INTRAVENOUS ONCE
Status: CANCELLED | OUTPATIENT
Start: 2022-08-26 | End: 2022-08-26

## 2022-08-26 RX ORDER — POVIDONE-IODINE 10 MG/ML
SOLUTION TOPICAL ONCE
Status: CANCELLED | OUTPATIENT
Start: 2022-08-26 | End: 2022-08-26

## 2022-08-26 RX ORDER — CEFAZOLIN SODIUM IN 0.9 % NACL 3 G/100 ML
3 INTRAVENOUS SOLUTION, PIGGYBACK (ML) INTRAVENOUS ONCE
Status: CANCELLED | OUTPATIENT
Start: 2022-08-26 | End: 2022-08-26

## 2022-08-26 RX ORDER — TRANEXAMIC ACID 10 MG/ML
1000 INJECTION, SOLUTION INTRAVENOUS ONCE
Status: CANCELLED | OUTPATIENT
Start: 2022-08-26 | End: 2022-08-26

## 2022-08-26 NOTE — PROGRESS NOTES
"Chief Complaint  Pain and Follow-up of the Right Knee    Subjective          Katarzyna Norris is a 51 y.o. female  presents to [facility] for   History of Present Illness      Patient presents for follow-up evaluation of right knee pain, right knee osteoarthritis.  She was last seen on 6/30/2022 for evaluation of right knee pain and had right knee x-rays at that visit.  She has a history of left total knee arthroplasty, 12/22/2021.  She is so happy with her left knee replacement she is wanting to schedule right knee replacement sometime in October.  She denies pain in the left knee states she has good range of motion and strength.  She states her right knee causes her pain in the anterior medial knee and states that she has good movement but states the pain causes her knee to sometimes feel weak.  She has been working on range of motion of both knees.      Allergies   Allergen Reactions   • Amlodipine Shortness Of Breath   • Diltiazem Hcl Anxiety        Social History     Socioeconomic History   • Marital status:    Tobacco Use   • Smoking status: Former Smoker   • Smokeless tobacco: Never Used   Vaping Use   • Vaping Use: Never used   Substance and Sexual Activity   • Alcohol use: Never   • Drug use: Never   • Sexual activity: Defer        REVIEW OF SYSTEMS    Constitutional: Denies fevers, chills, weight loss  Cardiovascular: Denies chest pain, shortness of breath  Skin: Denies rashes, acute skin changes  Neurologic: Denies headache, loss of consciousness  MSK: Right knee pain      Objective   Vital Signs:   Pulse 102   Ht 160 cm (63\")   Wt 124 kg (274 lb 6.4 oz)   SpO2 99%   BMI 48.61 kg/m²     Body mass index is 48.61 kg/m².    Physical Exam    Right knee: Tender to palpation anterior medial knee along the joint line, full extension, flexion 95, stable to varus/valgus stress, stable anterior/posterior drawer, nontender calf, negative Jd " testing.    Procedures    Imaging Results (Most Recent)     None           Result Review :   The following data was reviewed by: SARAH Arora on 08/26/2022:               Assessment and Plan    Diagnoses and all orders for this visit:    1. Arthritis of knee, right (Primary)        Discussed diagnosis and treatment options with the patient, reviewed past x-ray from 630 with the patient and Dr. Nelson, patient met with Dr. Nelson to discuss risk benefits procedure and recovery of right total knee arthroplasty, she agreed to have surgery scheduled follow-up 2 weeks postop.    Discussed surgery., Risks/benefits discussed with patient including, but not limited to: infection, bleeding, neurovascular damage, malunion, nonunion, aesthetic deformity, need for further surgery, and death., Discussed with patient the implant type being used during surgery and patient understands and desires to proceed., Surgery pamphlet given. and Call or return if worsening symptoms.    Follow Up   Return for 2 WEEKS POST OP.  Patient was given instructions and counseling regarding her condition or for health maintenance advice. Please see specific information pulled into the AVS if appropriate.

## 2022-08-30 ENCOUNTER — TELEPHONE (OUTPATIENT)
Dept: CARDIOLOGY | Facility: CLINIC | Age: 52
End: 2022-08-30

## 2022-08-30 NOTE — TELEPHONE ENCOUNTER
Procedure: Rt total knee replacement    Med Directive: Aspirin     PMH: HTN, HLD    Last Seen: 08/16/2022

## 2022-08-31 DIAGNOSIS — I10 HYPERTENSION, UNSPECIFIED TYPE: ICD-10-CM

## 2022-08-31 RX ORDER — CARVEDILOL 25 MG/1
25 TABLET ORAL 2 TIMES DAILY WITH MEALS
Qty: 180 TABLET | Refills: 1 | Status: ON HOLD | OUTPATIENT
Start: 2022-08-31 | End: 2022-10-20 | Stop reason: SDUPTHER

## 2022-08-31 RX ORDER — SODIUM BICARBONATE 650 MG/1
650 TABLET ORAL 2 TIMES DAILY
Qty: 180 TABLET | Refills: 0 | Status: SHIPPED | OUTPATIENT
Start: 2022-08-31 | End: 2022-12-07 | Stop reason: SDUPTHER

## 2022-08-31 RX ORDER — ALLOPURINOL 300 MG/1
300 TABLET ORAL 2 TIMES DAILY
Qty: 180 TABLET | Refills: 1 | Status: SHIPPED | OUTPATIENT
Start: 2022-08-31 | End: 2022-12-07 | Stop reason: SDUPTHER

## 2022-08-31 RX ORDER — CLONIDINE HYDROCHLORIDE 0.2 MG/1
0.2 TABLET ORAL 3 TIMES DAILY
Qty: 90 TABLET | Refills: 1 | Status: SHIPPED | OUTPATIENT
Start: 2022-08-31 | End: 2022-12-07 | Stop reason: SDUPTHER

## 2022-08-31 RX ORDER — LOSARTAN POTASSIUM 100 MG/1
100 TABLET ORAL DAILY
Qty: 90 TABLET | Refills: 1
Start: 2022-08-31 | End: 2022-09-20

## 2022-08-31 RX ORDER — ATORVASTATIN CALCIUM 20 MG/1
20 TABLET, FILM COATED ORAL DAILY
Qty: 90 TABLET | Refills: 0 | Status: SHIPPED | OUTPATIENT
Start: 2022-08-31 | End: 2022-12-07 | Stop reason: SDUPTHER

## 2022-09-13 ENCOUNTER — TELEPHONE (OUTPATIENT)
Dept: FAMILY MEDICINE CLINIC | Facility: CLINIC | Age: 52
End: 2022-09-13

## 2022-09-13 RX ORDER — ATORVASTATIN CALCIUM 20 MG/1
20 TABLET, FILM COATED ORAL DAILY
Qty: 90 TABLET | Refills: 0 | OUTPATIENT
Start: 2022-09-13

## 2022-09-13 RX ORDER — SODIUM BICARBONATE 650 MG/1
650 TABLET ORAL 2 TIMES DAILY
Qty: 180 TABLET | Refills: 0 | OUTPATIENT
Start: 2022-09-13

## 2022-09-13 RX ORDER — CARVEDILOL 25 MG/1
25 TABLET ORAL 2 TIMES DAILY WITH MEALS
Qty: 180 TABLET | Refills: 1 | OUTPATIENT
Start: 2022-09-13

## 2022-09-13 NOTE — TELEPHONE ENCOUNTER
Caller: Katarzyna Norris    Relationship: Self    Best call back number: [phone]     Requested Prescriptions:   Requested Prescriptions     Pending Prescriptions Disp Refills   • sodium bicarbonate 650 MG tablet 180 tablet 0     Sig: Take 1 tablet by mouth 2 (Two) Times a Day.   • atorvastatin (LIPITOR) 20 MG tablet 90 tablet 0     Sig: Take 1 tablet by mouth Daily.   • carvedilol (COREG) 25 MG tablet 180 tablet 1     Sig: Take 1 tablet by mouth 2 (Two) Times a Day With Meals.        Pharmacy where request should be sent: [address] - [phone] [facility] [phone]      Additional details provided by patient:     Does the patient have less than a 3 day supply:  [] Yes  [x] No    Ngozi LOZADA Rep   09/13/22 08:48 EDT

## 2022-09-13 NOTE — TELEPHONE ENCOUNTER
Caller: Katarzyna Norris    Relationship: Self    Best call back number: [phone]    What was the call regarding: PATIENT WOULD LIKE A CALL BACK TO FIND OUT WHY HER MED REFILL REQUESTS WERE DENIED.    Do you require a callback: YES

## 2022-09-20 RX ORDER — LOSARTAN POTASSIUM 100 MG/1
TABLET ORAL
Qty: 90 TABLET | Refills: 0 | Status: SHIPPED | OUTPATIENT
Start: 2022-09-20 | End: 2022-10-20 | Stop reason: HOSPADM

## 2022-09-20 NOTE — TELEPHONE ENCOUNTER
Patient said she has a f/u with ob/gyn on 09/27/22 for her fibroids. They directed her to ask the following question: how can she tell the difference between prolapse and Hemorrhoids ? And should she just use the Hemorrhoid cream and tylenol for pain? I had already told her that ibuprofen would not be good if there's any bleeding.

## 2022-09-22 ENCOUNTER — TELEPHONE (OUTPATIENT)
Dept: ORTHOPEDIC SURGERY | Facility: CLINIC | Age: 52
End: 2022-09-22

## 2022-09-22 NOTE — TELEPHONE ENCOUNTER
CALLED AND TALKED TO PATIENT AND INFORMED HER TO CALL [facility] FOR APPOINTMENT FOR TESTING THAT SHE HAD CANCELED SO CARDIAC CLEARANCE CAN BE OBTAINED.  PATIENT VOICES UNDERSTANDING.

## 2022-09-30 ENCOUNTER — TELEPHONE (OUTPATIENT)
Dept: ORTHOPEDIC SURGERY | Facility: CLINIC | Age: 52
End: 2022-09-30

## 2022-09-30 ENCOUNTER — OFFICE VISIT (OUTPATIENT)
Dept: FAMILY MEDICINE CLINIC | Facility: CLINIC | Age: 52
End: 2022-09-30

## 2022-09-30 VITALS
TEMPERATURE: 98.7 F | BODY MASS INDEX: 48.73 KG/M2 | HEART RATE: 87 BPM | SYSTOLIC BLOOD PRESSURE: 140 MMHG | WEIGHT: 275 LBS | OXYGEN SATURATION: 97 % | HEIGHT: 63 IN | DIASTOLIC BLOOD PRESSURE: 80 MMHG

## 2022-09-30 DIAGNOSIS — K59.00 CONSTIPATION, UNSPECIFIED CONSTIPATION TYPE: ICD-10-CM

## 2022-09-30 DIAGNOSIS — R10.2 PELVIC PAIN: ICD-10-CM

## 2022-09-30 DIAGNOSIS — R31.9 HEMATURIA, UNSPECIFIED TYPE: ICD-10-CM

## 2022-09-30 DIAGNOSIS — I10 PRIMARY HYPERTENSION: ICD-10-CM

## 2022-09-30 DIAGNOSIS — D25.9 UTERINE LEIOMYOMA, UNSPECIFIED LOCATION: Primary | ICD-10-CM

## 2022-09-30 LAB
BILIRUB BLD-MCNC: NEGATIVE MG/DL
CLARITY, POC: ABNORMAL
COLOR UR: YELLOW
EXPIRATION DATE: ABNORMAL
GLUCOSE UR STRIP-MCNC: NEGATIVE MG/DL
KETONES UR QL: NEGATIVE
LEUKOCYTE EST, POC: NEGATIVE
Lab: ABNORMAL
NITRITE UR-MCNC: NEGATIVE MG/ML
PH UR: 7 [PH] (ref 5–8)
PROT UR STRIP-MCNC: ABNORMAL MG/DL
RBC # UR STRIP: ABNORMAL /UL
SP GR UR: 1.02 (ref 1–1.03)
UROBILINOGEN UR QL: ABNORMAL

## 2022-09-30 PROCEDURE — 87086 URINE CULTURE/COLONY COUNT: CPT | Performed by: NURSE PRACTITIONER

## 2022-09-30 PROCEDURE — 81003 URINALYSIS AUTO W/O SCOPE: CPT | Performed by: NURSE PRACTITIONER

## 2022-09-30 PROCEDURE — 99213 OFFICE O/P EST LOW 20 MIN: CPT | Performed by: NURSE PRACTITIONER

## 2022-09-30 RX ORDER — HYDROCODONE BITARTRATE AND ACETAMINOPHEN 5; 325 MG/1; MG/1
1 TABLET ORAL EVERY 6 HOURS PRN
COMMUNITY
Start: 2022-08-29 | End: 2022-10-12

## 2022-09-30 RX ORDER — SODIUM BICARBONATE 650 MG/1
650 TABLET ORAL 3 TIMES DAILY
COMMUNITY
Start: 2022-09-21 | End: 2022-09-30

## 2022-09-30 RX ORDER — IBUPROFEN 800 MG/1
800 TABLET ORAL EVERY 6 HOURS PRN
COMMUNITY
Start: 2022-08-29 | End: 2022-09-30

## 2022-09-30 NOTE — PROGRESS NOTES
ACUTE VISIT     Patient Name: Katarzyna Norris  : 1970   MRN: [id number]     Chief Complaint:    Chief Complaint   Patient presents with   • Pelvic Pain     Left side more than the right   • Constipation   • Abdominal Pain       History of Present Illness: Katarzyna Norris is a 52 y.o. female who is here today for abdominal pain pelvic pain  Patient feels she has ovarian cyst is having more pain on the left than the right    Had a pelvic US    She has an appt with GYN 2022      PAP   Also c/o lower abdominal pain and painful intercourse worse in the past few months        CONCLUSION:     1. The ovaries are very difficult to visualize due to body habitus and bowel gas.  The right ovary     is not visualized.  The left ovary is barely visualized and demonstrates a 3.2 centimeter possibly     cystic lesion corresponding to the CT finding.  This lesion is not well characterized but not     particularly suspicious.  Follow-up ultrasound might be considered in several months to ensure     resolution.    2. Suspect fibroid in the uterus.          Patient is having constipation issues, Stated she ate vegetables and drank Miralax she finally had a good BM a couple days ago  States since she had an increase in clonidine her constipation has gotten worse  miralax only using miralax 1x per week still suffering           Subjective      Review of Systems:   Review of Systems   Constitutional: Negative for fever.   Respiratory: Negative for cough.    Cardiovascular: Negative for chest pain.   Gastrointestinal: Positive for abdominal pain and constipation. Negative for diarrhea, nausea and vomiting.   Genitourinary: Positive for pelvic pain. Negative for dysuria.        Past Medical History:   Past Medical History:   Diagnosis Date   • Anxiety    • Arthritis     GILBERT KNEES   • Asthma     SEASONAL ALLERGIES   • Elevated cholesterol    • Gout 2021   • Hiatal hernia    • Hypertension    • Renal  insufficiency     NOT ON DIALYSIS/NATALIIA   • Sinus problem     SEASONAL ALLERGIES       Past Surgical History:   Past Surgical History:   Procedure Laterality Date   •  SECTION   and    • CHOLECYSTECTOMY     • COLONOSCOPY     • ENDOSCOPY     • TOTAL KNEE ARTHROPLASTY Left 2021    Procedure: TOTAL KNEE ARTHROPLASTY;  Surgeon: Marco Nelson MD;  Location: [facility] MAIN OR;  Service: Orthopedics;  Laterality: Left;   • TUBAL ABDOMINAL LIGATION         Family History:   Family History   Problem Relation Age of Onset   • Throat cancer Father 73   • Stroke Other    • Diabetes Other    • Malig Hyperthermia Neg Hx        Social History:   Social History     Socioeconomic History   • Marital status:    Tobacco Use   • Smoking status: Former Smoker   • Smokeless tobacco: Never Used   Vaping Use   • Vaping Use: Never used   Substance and Sexual Activity   • Alcohol use: Never   • Drug use: Never   • Sexual activity: Defer       Medications:     Current Outpatient Medications:   •  allopurinol (ZYLOPRIM) 300 MG tablet, Take 1 tablet by mouth 2 (Two) Times a Day., Disp: 180 tablet, Rfl: 1  •  Aspirin Buf,CaCarb-MgCarb-MgO, 81 MG tablet, Take 81 mg by mouth Daily. TAKES FOR PREVENTATIVE HEART HEALTH,  21, Disp: 90 tablet, Rfl: 0  •  atorvastatin (LIPITOR) 20 MG tablet, Take 1 tablet by mouth Daily., Disp: 90 tablet, Rfl: 0  •  carvedilol (COREG) 25 MG tablet, Take 1 tablet by mouth 2 (Two) Times a Day With Meals., Disp: 180 tablet, Rfl: 1  •  cloNIDine (CATAPRES) 0.2 MG tablet, Take 1 tablet by mouth 3 (Three) Times a Day., Disp: 90 tablet, Rfl: 1  •  losartan (COZAAR) 100 MG tablet, Take 1 tablet by mouth once daily, Disp: 90 tablet, Rfl: 0  •  sodium bicarbonate 650 MG tablet, Take 1 tablet by mouth 2 (Two) Times a Day., Disp: 180 tablet, Rfl: 0  •  vitamin B-12 (CYANOCOBALAMIN) 100 MCG tablet, Take 50 mcg by mouth Daily., Disp: , Rfl:   •  Zinc Sulfate (ZINC 15 PO), Take  " by mouth., Disp: , Rfl:   •  HYDROcodone-acetaminophen (NORCO) 5-325 MG per tablet, Take 1 tablet by mouth Every 6 (Six) Hours As Needed. for pain, Disp: , Rfl:     Allergies:   Allergies   Allergen Reactions   • Amlodipine Shortness Of Breath   • Diltiazem Hcl Anxiety         Objective     Physical Exam:  Vital Signs:   Vitals:    09/30/22 1212 09/30/22 1241   BP: 143/81 140/80   Pulse: 87    Temp: 98.7 °F (37.1 °C)    SpO2: 97%    Weight: 125 kg (275 lb)    Height: 160 cm (63\")      Body mass index is 48.71 kg/m².     Physical Exam  Cardiovascular:      Rate and Rhythm: Normal rate and regular rhythm.      Heart sounds: Normal heart sounds. No murmur heard.  Pulmonary:      Effort: Pulmonary effort is normal.      Breath sounds: Normal breath sounds.   Abdominal:      General: Bowel sounds are normal.      Palpations: Abdomen is soft.      Tenderness: There is abdominal tenderness.   Genitourinary:     Comments: Declines pelvic US   Neurological:      Mental Status: She is alert.           Assessment / Plan      Assessment/Plan:   Diagnoses and all orders for this visit:    1. Uterine leiomyoma, unspecified location (Primary)  -     US Non-ob Transvaginal; Future    2. Pelvic pain  -     US Non-ob Transvaginal; Future  -     XR Abdomen KUB; Future  -     POC Urinalysis Dipstick, Automated    3. Constipation, unspecified constipation type  -     XR Abdomen KUB; Future    4. Hematuria, unspecified type  -     Urine Culture - Urine, Urine, Clean Catch; Future  -     Urine Culture - Urine, Urine, Clean Catch  -     POC Urinalysis Dipstick, Automated    5. Primary hypertension       Uterine myeloma will monitor with pelvic ultrasound as patient is currently having pelvic pain UA in office hematuria present will send for culture  Constipation recommend MiraLAX daily with increasing water and fiber and exercise 30 minutes daily  Hypertension elevated upon arrival to clinic manual recheck improved stable on current dose " of clonidine        Follow Up:   Return if symptoms worsen or fail to improve.    Frandy Ellis, MARICRUZ      Please note that portions of this note were completed with a voice recognition program.

## 2022-09-30 NOTE — TELEPHONE ENCOUNTER
PATIENT CONTACTED AND SURGERY IS SCHEDULED FOR 10/19/2022. PATIENT WAS INFORMED WE HAVE NOT YET RECEIVED A CLEARANCE FROM DR. COLBERT'S OFFICE YET AND FARHEEN SWAN (NURSE NAVIGATOR) WILL CHECK WITH ALEX AT [facility] ON Monday 10/3/2022 TO CHECK ON STATUS OF CLEARANCE.

## 2022-09-30 NOTE — TELEPHONE ENCOUNTER
Provider: PAOLA   Caller: HORTENSIA  Phone Number: [phone]  Reason for Call:    PT CHECKING TO MAKE SURE YOU GOT THE MCKENNA FROM HER CARDIOLOGIST AND WANTS TO RESCHEDULE HER SX.

## 2022-10-01 LAB — BACTERIA SPEC AEROBE CULT: NO GROWTH

## 2022-10-03 ENCOUNTER — HOSPITAL ENCOUNTER (OUTPATIENT)
Dept: GENERAL RADIOLOGY | Facility: HOSPITAL | Age: 52
Discharge: HOME | End: 2022-10-03
Admitting: NURSE PRACTITIONER

## 2022-10-03 DIAGNOSIS — R10.2 PELVIC PAIN: ICD-10-CM

## 2022-10-03 DIAGNOSIS — K59.00 CONSTIPATION, UNSPECIFIED CONSTIPATION TYPE: ICD-10-CM

## 2022-10-03 PROCEDURE — 74018 RADEX ABDOMEN 1 VIEW: CPT

## 2022-10-04 ENCOUNTER — TELEPHONE (OUTPATIENT)
Dept: ORTHOPEDIC SURGERY | Facility: CLINIC | Age: 52
End: 2022-10-04

## 2022-10-04 NOTE — TELEPHONE ENCOUNTER
Caller: Katarzyna Norris    Relationship: Self    Best call back number: [phone]    What is the best time to reach you: ANY    Who are you requesting to speak with (clinical staff, provider,  specific staff member): FARHEEN    What was the call regarding: PT WANTS TO MAKE SURE THAT THE OFFICE HAS EVERYTHING THAT IS NEEDED TO MOVE FORWARD WITH HER 10/19/2022 RIGHT TOTAL KNEE REPLACEMENT.     Do you require a callback: YES

## 2022-10-04 NOTE — TELEPHONE ENCOUNTER
CALLED AND TALKED TO PATIENT AND INFORMED HER CARDIAC CLEARANCE IS IN THE CHART AND THAT SHE SHOULD STOP/HOLD ASPIRIN FOR 7 DAYS PRIOR TO SURGERY.  PATIENT VOICES UNDERSTANDING.

## 2022-10-07 DIAGNOSIS — Z01.818 PREOP TESTING: Primary | ICD-10-CM

## 2022-10-12 ENCOUNTER — PRE-ADMISSION TESTING (OUTPATIENT)
Dept: PREADMISSION TESTING | Facility: HOSPITAL | Age: 52
End: 2022-10-12

## 2022-10-12 ENCOUNTER — TELEPHONE (OUTPATIENT)
Dept: ORTHOPEDIC SURGERY | Facility: CLINIC | Age: 52
End: 2022-10-12

## 2022-10-12 ENCOUNTER — PREP FOR SURGERY (OUTPATIENT)
Dept: OTHER | Facility: HOSPITAL | Age: 52
End: 2022-10-12

## 2022-10-12 VITALS
BODY MASS INDEX: 48.12 KG/M2 | RESPIRATION RATE: 18 BRPM | SYSTOLIC BLOOD PRESSURE: 142 MMHG | HEIGHT: 63 IN | HEART RATE: 84 BPM | DIASTOLIC BLOOD PRESSURE: 82 MMHG | OXYGEN SATURATION: 98 % | WEIGHT: 271.61 LBS | TEMPERATURE: 99.6 F

## 2022-10-12 DIAGNOSIS — M17.11 ARTHRITIS OF KNEE, RIGHT: Primary | ICD-10-CM

## 2022-10-12 DIAGNOSIS — R73.01 IMPAIRED FASTING GLUCOSE: ICD-10-CM

## 2022-10-12 DIAGNOSIS — Z01.818 PREOP TESTING: ICD-10-CM

## 2022-10-12 LAB
ALBUMIN SERPL-MCNC: 4.2 G/DL (ref 3.5–5.2)
ALBUMIN/GLOB SERPL: 1.6 G/DL
ALP SERPL-CCNC: 84 U/L (ref 39–117)
ALT SERPL W P-5'-P-CCNC: 24 U/L (ref 1–33)
ANION GAP SERPL CALCULATED.3IONS-SCNC: 11.9 MMOL/L (ref 5–15)
AST SERPL-CCNC: 24 U/L (ref 1–32)
BASOPHILS # BLD AUTO: 0.05 10*3/MM3 (ref 0–0.2)
BASOPHILS NFR BLD AUTO: 1.1 % (ref 0–1.5)
BILIRUB SERPL-MCNC: 0.7 MG/DL (ref 0–1.2)
BUN SERPL-MCNC: 46 MG/DL (ref 6–20)
BUN/CREAT SERPL: 13.5 (ref 7–25)
CALCIUM SPEC-SCNC: 10.3 MG/DL (ref 8.6–10.5)
CHLORIDE SERPL-SCNC: 101 MMOL/L (ref 98–107)
CO2 SERPL-SCNC: 24.1 MMOL/L (ref 22–29)
CREAT SERPL-MCNC: 3.4 MG/DL (ref 0.57–1)
DEPRECATED RDW RBC AUTO: 53.2 FL (ref 37–54)
EGFRCR SERPLBLD CKD-EPI 2021: 15.4 ML/MIN/1.73
EOSINOPHIL # BLD AUTO: 0.13 10*3/MM3 (ref 0–0.4)
EOSINOPHIL NFR BLD AUTO: 2.8 % (ref 0.3–6.2)
ERYTHROCYTE [DISTWIDTH] IN BLOOD BY AUTOMATED COUNT: 14.4 % (ref 12.3–15.4)
GLOBULIN UR ELPH-MCNC: 2.6 GM/DL
GLUCOSE SERPL-MCNC: 112 MG/DL (ref 65–99)
HBA1C MFR BLD: 5.8 % (ref 4.8–5.6)
HCT VFR BLD AUTO: 37.3 % (ref 34–46.6)
HGB BLD-MCNC: 12.6 G/DL (ref 12–15.9)
IMM GRANULOCYTES # BLD AUTO: 0.02 10*3/MM3 (ref 0–0.05)
IMM GRANULOCYTES NFR BLD AUTO: 0.4 % (ref 0–0.5)
INR PPP: 0.96 (ref 0.86–1.15)
LYMPHOCYTES # BLD AUTO: 1.43 10*3/MM3 (ref 0.7–3.1)
LYMPHOCYTES NFR BLD AUTO: 31.3 % (ref 19.6–45.3)
MCH RBC QN AUTO: 34.3 PG (ref 26.6–33)
MCHC RBC AUTO-ENTMCNC: 33.8 G/DL (ref 31.5–35.7)
MCV RBC AUTO: 101.6 FL (ref 79–97)
MONOCYTES # BLD AUTO: 0.32 10*3/MM3 (ref 0.1–0.9)
MONOCYTES NFR BLD AUTO: 7 % (ref 5–12)
NEUTROPHILS NFR BLD AUTO: 2.62 10*3/MM3 (ref 1.7–7)
NEUTROPHILS NFR BLD AUTO: 57.4 % (ref 42.7–76)
NRBC BLD AUTO-RTO: 0 /100 WBC (ref 0–0.2)
PLATELET # BLD AUTO: 153 10*3/MM3 (ref 140–450)
PMV BLD AUTO: 8.7 FL (ref 6–12)
POTASSIUM SERPL-SCNC: 5.2 MMOL/L (ref 3.5–5.2)
PROT SERPL-MCNC: 6.8 G/DL (ref 6–8.5)
PROTHROMBIN TIME: 12.9 SECONDS (ref 11.8–14.9)
RBC # BLD AUTO: 3.67 10*6/MM3 (ref 3.77–5.28)
SODIUM SERPL-SCNC: 137 MMOL/L (ref 136–145)
WBC NRBC COR # BLD: 4.57 10*3/MM3 (ref 3.4–10.8)

## 2022-10-12 PROCEDURE — 83036 HEMOGLOBIN GLYCOSYLATED A1C: CPT

## 2022-10-12 PROCEDURE — 80053 COMPREHEN METABOLIC PANEL: CPT

## 2022-10-12 PROCEDURE — 85610 PROTHROMBIN TIME: CPT

## 2022-10-12 PROCEDURE — 85025 COMPLETE CBC W/AUTO DIFF WBC: CPT

## 2022-10-12 RX ORDER — TRANEXAMIC ACID 10 MG/ML
1000 INJECTION, SOLUTION INTRAVENOUS ONCE
Status: CANCELLED | OUTPATIENT
Start: 2022-10-12 | End: 2022-10-12

## 2022-10-12 RX ORDER — PENICILLIN V POTASSIUM 500 MG/1
500 TABLET ORAL 3 TIMES DAILY
Status: ON HOLD | COMMUNITY
End: 2022-10-19

## 2022-10-12 RX ORDER — POVIDONE-IODINE 10 MG/ML
SOLUTION TOPICAL ONCE
Status: CANCELLED | OUTPATIENT
Start: 2022-10-12 | End: 2022-10-12

## 2022-10-12 RX ORDER — CEFAZOLIN SODIUM IN 0.9 % NACL 3 G/100 ML
3 INTRAVENOUS SOLUTION, PIGGYBACK (ML) INTRAVENOUS ONCE
Status: CANCELLED | OUTPATIENT
Start: 2022-10-12 | End: 2022-10-12

## 2022-10-12 NOTE — TELEPHONE ENCOUNTER
RECEIVED A CALL FROM [facility] THAT PATIENT HAD TEETH PULLED ON Monday 10-10  AND WAS PLACED ON ANTIBIOTICS.  SHE WILL COMPLETE MEDS ON 10-18-22, THE DAY BEFORE SURGERY.  PER DR GUEVARA HE IS AWARE AND IS OK TO PROCEED WITH SURGERY.

## 2022-10-12 NOTE — DISCHARGE INSTRUCTIONS
IMPORTANT INSTRUCTIONS - PRE-ADMISSION TESTING  DO NOT EAT OR CHEW anything after midnight the night before your procedure.    You may have CLEAR liquids up to 3 hours prior to ARRIVAL time.   Take the following medications the morning of your procedure with JUST A SIP OF WATER:  Carvedilol, Allopurinol, clonidine, Sodium Bicar    DO NOT BRING your medications to the hospital with you, UNLESS something has changed since your PRE-Admission Testing appointment.  Hold all vitamins, supplements, and NSAIDS (Non- steroidal anti-inflammatory meds) for one week prior to surgery (you MAY take Tylenol or Acetaminophen).  Make sure you have a ride home and have someone who will stay with you the day of your procedure after you go home.  If you have any questions, please call your Pre-Admission Testing Nurse, Kristi at [phone].   Per anesthesia request, do not smoke for 24 hours before your procedure or as instructed by your surgeon.         PREOPERATIVE (BEFORE SURGERY)              BATHING INSTRUCTIONS  Instructions:    You will need to shower 3 separate times utilizing the soap provided; at the times indicated   below:     - 10-17-22 PM   - 10-18-22 AM   - 10-18-22 PM      Wash your hair and face with normal shampoo and soap, rinse it well before using the surgical soap.      In the shower, wet the skin completely with water from your neck to your feet. Apply the cleanser to your   body ONLY FROM THE NECK TO YOUR FEET.     Do NOT USE THE CLEANSER ON YOUR FACE, HEAD, OR GENITAL (PRIVATE) AREAS.   Keep it out of your eyes, ears, and mouth because of the risk of injury to those areas.      Scrub with a clean washcloth for each bath utilizing the soap provided from the top of your body to the   bottom starting at the neck area.      Pay close attention to your armpits, groin area, and the site of surgery.      Wash your body gently for 5 minutes. Stand outside the stream or turn off the water while scrubbing your    body. Do NOT wash with your regular soap after the surgical cleanser is used.      RINSE THE CLEANSER OFF COMPLETELY with plenty of water. Rinse the area again thoroughly.      Dry off with a clean towel. The surgical soap can cause dryness; however do NOT APPLY LOTION,   CREAM, POWDER, and/or DEODORANT AFTER SHOWERING.     Be sure to where clean clothes after showering.      Ensure CLEAN BED LINENS AFTER FIRST wash with the surgical soap.      NO PETS ALLOWED IN THE BED with you after utilizing the surgical soap.     Clear Liquid Diet        Find out when you need to start a clear liquid diet.   Think of “clear liquids” as anything you could read a newspaper through. This includes things like water, broth, sports drinks, or tea WITHOUT any kind of milk or cream.           Once you are told to start a clear liquid diet, only drink these things until 3 hours before arrival to the hospital or when the hospital says to stop. Total volume limitation: 8 oz.       Clear liquids you CAN drink:   Water   Clear broth: beef, chicken, vegetable, or bone broth with nothing in it   Gatorade   Lemonade or Flaquito-aid   Soda   Tea, coffee (NO cream or honey)   Jell-O (without fruit)   Popsicles (without fruit or cream)   Italian ices   Juice without pulp: apple, white, grape   You may use salt, pepper, and sugar    Do NOT drink:   Milk or cream   Soy milk, almond milk, coconut milk, or other non-dairy drinks and   creamers   Milkshakes or smoothies   Tomato juice   Orange juice   Grapefruit juice   Cream soups or any other than broth         Clear Liquid Diet:  Do NOT eat any solid food.  Do NOT eat or suck on mints or candy.  Do NOT chew gum.  Do NOT drink thick liquids like milk or juice with pulp in it.  Do NOT add milk, cream, or anything like soy milk or almond milk to coffee or tea.

## 2022-10-13 ENCOUNTER — HOSPITAL ENCOUNTER (OUTPATIENT)
Dept: ULTRASOUND IMAGING | Facility: HOSPITAL | Age: 52
Discharge: HOME | End: 2022-10-13
Admitting: NURSE PRACTITIONER

## 2022-10-13 ENCOUNTER — APPOINTMENT (OUTPATIENT)
Dept: ULTRASOUND IMAGING | Facility: HOSPITAL | Age: 52
End: 2022-10-13

## 2022-10-13 ENCOUNTER — ANESTHESIA EVENT (OUTPATIENT)
Dept: PERIOP | Facility: HOSPITAL | Age: 52
End: 2022-10-13

## 2022-10-13 DIAGNOSIS — Z96.651 AFTERCARE FOLLOWING RIGHT KNEE JOINT REPLACEMENT SURGERY: Primary | ICD-10-CM

## 2022-10-13 DIAGNOSIS — Z47.1 AFTERCARE FOLLOWING RIGHT KNEE JOINT REPLACEMENT SURGERY: Primary | ICD-10-CM

## 2022-10-13 DIAGNOSIS — R10.2 PELVIC PAIN: ICD-10-CM

## 2022-10-13 DIAGNOSIS — D25.9 UTERINE LEIOMYOMA, UNSPECIFIED LOCATION: ICD-10-CM

## 2022-10-13 PROCEDURE — 76830 TRANSVAGINAL US NON-OB: CPT

## 2022-10-13 RX ORDER — TRANEXAMIC ACID 10 MG/ML
2000 INJECTION, SOLUTION INTRAVENOUS ONCE
Status: CANCELLED | OUTPATIENT
Start: 2022-10-13 | End: 2022-10-13

## 2022-10-17 ENCOUNTER — OFFICE VISIT (OUTPATIENT)
Dept: FAMILY MEDICINE CLINIC | Facility: CLINIC | Age: 52
End: 2022-10-17

## 2022-10-17 VITALS
DIASTOLIC BLOOD PRESSURE: 88 MMHG | HEART RATE: 89 BPM | TEMPERATURE: 98.8 F | SYSTOLIC BLOOD PRESSURE: 138 MMHG | OXYGEN SATURATION: 99 % | BODY MASS INDEX: 48.02 KG/M2 | WEIGHT: 271 LBS | HEIGHT: 63 IN

## 2022-10-17 DIAGNOSIS — L50.9 URTICARIA: Primary | ICD-10-CM

## 2022-10-17 DIAGNOSIS — Z12.31 SCREENING MAMMOGRAM FOR BREAST CANCER: ICD-10-CM

## 2022-10-17 PROCEDURE — 96372 THER/PROPH/DIAG INJ SC/IM: CPT | Performed by: NURSE PRACTITIONER

## 2022-10-17 PROCEDURE — 99213 OFFICE O/P EST LOW 20 MIN: CPT | Performed by: NURSE PRACTITIONER

## 2022-10-17 RX ORDER — METHYLPREDNISOLONE ACETATE 80 MG/ML
80 INJECTION, SUSPENSION INTRA-ARTICULAR; INTRALESIONAL; INTRAMUSCULAR; SOFT TISSUE ONCE
Status: COMPLETED | OUTPATIENT
Start: 2022-10-17 | End: 2022-10-17

## 2022-10-17 RX ORDER — DIPHENHYDRAMINE HYDROCHLORIDE 50 MG/ML
25 INJECTION INTRAMUSCULAR; INTRAVENOUS ONCE
Status: COMPLETED | OUTPATIENT
Start: 2022-10-17 | End: 2022-10-17

## 2022-10-17 RX ORDER — CETIRIZINE HYDROCHLORIDE 10 MG/1
10 TABLET ORAL NIGHTLY
Qty: 30 TABLET | Refills: 0 | Status: SHIPPED | OUTPATIENT
Start: 2022-10-17 | End: 2022-12-07 | Stop reason: SDUPTHER

## 2022-10-17 RX ORDER — FAMOTIDINE 40 MG/1
40 TABLET, FILM COATED ORAL NIGHTLY
Qty: 30 TABLET | Refills: 0 | Status: ON HOLD | OUTPATIENT
Start: 2022-10-17 | End: 2022-10-20 | Stop reason: SDUPTHER

## 2022-10-17 RX ADMIN — METHYLPREDNISOLONE ACETATE 80 MG: 80 INJECTION, SUSPENSION INTRA-ARTICULAR; INTRALESIONAL; INTRAMUSCULAR; SOFT TISSUE at 15:31

## 2022-10-17 RX ADMIN — DIPHENHYDRAMINE HYDROCHLORIDE 25 MG: 50 INJECTION INTRAMUSCULAR; INTRAVENOUS at 15:29

## 2022-10-17 NOTE — PROGRESS NOTES
ACUTE VISIT     Patient Name: Katarzyna Norris  : 1970   MRN: [id number]     Chief Complaint:    Chief Complaint   Patient presents with   • Urticaria       History of Present Illness: Katarzyna Norris is a 52 y.o. female who is here today for hives.   Patient states she was stressed Saturday.   woke up this morning at 3 am with hives on her neck, arms (large red areas)  Now spreading to her  Chest, back and abdomen    She denies any changes in medications, lotions, soap, detergent.   States itching     Subjective      Review of Systems:   Review of Systems   Constitutional: Negative for fever.   Respiratory: Negative for shortness of breath.    Cardiovascular: Negative for chest pain.   Skin: Positive for rash.        Past Medical History:   Past Medical History:   Diagnosis Date   • Anxiety    • Arthritis     GILBERT KNEES   • Asthma     SEASONAL ALLERGIES   • CKD (chronic kidney disease)     Stage 3, Follows with [facility]   • Elevated cholesterol    • Gout 2021   • Hernia     Upper   • Hiatal hernia    • Hypertension    • Renal insufficiency     follows with [facility]   • Sinus problem     SEASONAL ALLERGIES       Past Surgical History:   Past Surgical History:   Procedure Laterality Date   • BONY PELVIS SURGERY      Plate   •  SECTION   and    • CHOLECYSTECTOMY     • COLONOSCOPY     • ENDOSCOPY     • TOTAL KNEE ARTHROPLASTY Left 2021    Procedure: TOTAL KNEE ARTHROPLASTY;  Surgeon: Marco Nelson MD;  Location: [facility];  Service: Orthopedics;  Laterality: Left;   • TUBAL ABDOMINAL LIGATION         Family History:   Family History   Problem Relation Age of Onset   • Throat cancer Father 73   • Stroke Other    • Diabetes Other    • Malig Hyperthermia Neg Hx        Social History:   Social History     Socioeconomic History   • Marital status:    Tobacco Use   • Smoking status: Former   • Smokeless tobacco: Never   Vaping Use   • Vaping Use: Never  used   Substance and Sexual Activity   • Alcohol use: Never   • Drug use: Never   • Sexual activity: Defer       Medications:     Current Outpatient Medications:   •  allopurinol (ZYLOPRIM) 300 MG tablet, Take 1 tablet by mouth 2 (Two) Times a Day., Disp: 180 tablet, Rfl: 1  •  Aspirin Buf,CaCarb-MgCarb-MgO, 81 MG tablet, Take 81 mg by mouth Daily. TAKES FOR PREVENTATIVE HEART HEALTH, LD 12/14/21 (Patient taking differently: Take 81 mg by mouth Daily. Per Dr. Aldrich last dose 10-12-22, Patient stopped 10-10-22), Disp: 90 tablet, Rfl: 0  •  atorvastatin (LIPITOR) 20 MG tablet, Take 1 tablet by mouth Daily. (Patient taking differently: Take 1 tablet by mouth Every Night.), Disp: 90 tablet, Rfl: 0  •  carvedilol (COREG) 25 MG tablet, Take 1 tablet by mouth 2 (Two) Times a Day With Meals. (Patient taking differently: Take 12.5 mg by mouth 2 (Two) Times a Day With Meals.), Disp: 180 tablet, Rfl: 1  •  cetirizine (zyrTEC) 10 MG tablet, Take 1 tablet by mouth Every Night., Disp: 30 tablet, Rfl: 0  •  cloNIDine (CATAPRES) 0.2 MG tablet, Take 1 tablet by mouth 3 (Three) Times a Day., Disp: 90 tablet, Rfl: 1  •  famotidine (Pepcid) 40 MG tablet, Take 1 tablet by mouth Every Night., Disp: 30 tablet, Rfl: 0  •  losartan (COZAAR) 100 MG tablet, Take 1 tablet by mouth once daily (Patient taking differently: Take 1 tablet by mouth Every Night.), Disp: 90 tablet, Rfl: 0  •  penicillin v potassium (VEETID) 500 MG tablet, Take 1 tablet by mouth 3 (Three) Times a Day. Last dose 10-25-22, Disp: , Rfl:   •  sodium bicarbonate 650 MG tablet, Take 1 tablet by mouth 2 (Two) Times a Day., Disp: 180 tablet, Rfl: 0  •  vitamin B-12 (CYANOCOBALAMIN) 100 MCG tablet, Take 50 mcg by mouth Daily., Disp: , Rfl:   •  Zinc Sulfate (ZINC 15 PO), Take  by mouth., Disp: , Rfl:   No current facility-administered medications for this visit.    Allergies:   Allergies   Allergen Reactions   • Amlodipine Shortness Of Breath   • Diltiazem Hcl Anxiety  "        Objective     Physical Exam:  Vital Signs:   Vitals:    10/17/22 1410   BP: 138/88   Pulse: 89   Temp: 98.8 °F (37.1 °C)   SpO2: 99%   Weight: 123 kg (271 lb)   Height: 160 cm (63\")     Body mass index is 48.01 kg/m².     Physical Exam  HENT:      Mouth/Throat:      Pharynx: Oropharynx is clear.   Eyes:      Conjunctiva/sclera: Conjunctivae normal.   Cardiovascular:      Rate and Rhythm: Normal rate and regular rhythm.      Heart sounds: Normal heart sounds. No murmur heard.  Musculoskeletal:      Cervical back: No tenderness.      Right lower leg: No edema.      Left lower leg: No edema.   Lymphadenopathy:      Cervical: No cervical adenopathy.   Skin:     General: Skin is warm and dry.      Findings: Rash present.   Neurological:      Mental Status: She is alert.             Assessment / Plan      Assessment/Plan:   Diagnoses and all orders for this visit:    1. Urticaria (Primary)  -     methylPREDNISolone acetate (DEPO-medrol) injection 80 mg  -     diphenhydrAMINE (BENADRYL) injection 25 mg    2. Screening mammogram for breast cancer  -     Mammo Screening Digital Tomosynthesis Bilateral With CAD; Future    Other orders  -     cetirizine (zyrTEC) 10 MG tablet; Take 1 tablet by mouth Every Night.  Dispense: 30 tablet; Refill: 0  -     famotidine (Pepcid) 40 MG tablet; Take 1 tablet by mouth Every Night.  Dispense: 30 tablet; Refill: 0       Urticaria will provide Depo-Medrol 80 injection in office and Benadryl injection we will start Zyrtec and Pepcid at nighttime May use OTC hydrocortisone cream for itching  We will provide order for screening mammogram denies lumps mass tenderness blood or discharge from the nipple        Follow Up:   Return if symptoms worsen or fail to improve.    MARICRUZ Ball      Please note that portions of this note were completed with a voice recognition program.  "

## 2022-10-19 ENCOUNTER — HOSPITAL ENCOUNTER (OUTPATIENT)
Facility: HOSPITAL | Age: 52
Discharge: HOME | End: 2022-10-20
Attending: ORTHOPAEDIC SURGERY | Admitting: ORTHOPAEDIC SURGERY

## 2022-10-19 ENCOUNTER — ANESTHESIA (OUTPATIENT)
Dept: PERIOP | Facility: HOSPITAL | Age: 52
End: 2022-10-19

## 2022-10-19 ENCOUNTER — TELEPHONE (OUTPATIENT)
Dept: FAMILY MEDICINE CLINIC | Facility: CLINIC | Age: 52
End: 2022-10-19

## 2022-10-19 ENCOUNTER — APPOINTMENT (OUTPATIENT)
Dept: GENERAL RADIOLOGY | Facility: HOSPITAL | Age: 52
End: 2022-10-19

## 2022-10-19 DIAGNOSIS — M17.11 PRIMARY LOCALIZED OSTEOARTHRITIS OF RIGHT KNEE: ICD-10-CM

## 2022-10-19 DIAGNOSIS — R26.2 DIFFICULTY IN WALKING: Primary | ICD-10-CM

## 2022-10-19 DIAGNOSIS — M17.11 ARTHRITIS OF KNEE, RIGHT: ICD-10-CM

## 2022-10-19 DIAGNOSIS — Z78.9 DECREASED ACTIVITIES OF DAILY LIVING (ADL): ICD-10-CM

## 2022-10-19 LAB — POTASSIUM SERPL-SCNC: 4.6 MMOL/L (ref 3.5–5.2)

## 2022-10-19 PROCEDURE — 63710000001 VITAMIN B-12 100 MCG TABLET: Performed by: INTERNAL MEDICINE

## 2022-10-19 PROCEDURE — 25010000002 MORPHINE SULFATE (PF) 10 MG/ML SOLUTION: Performed by: ORTHOPAEDIC SURGERY

## 2022-10-19 PROCEDURE — 63710000001 ATORVASTATIN 10 MG TABLET: Performed by: INTERNAL MEDICINE

## 2022-10-19 PROCEDURE — 76942 ECHO GUIDE FOR BIOPSY: CPT | Performed by: ORTHOPAEDIC SURGERY

## 2022-10-19 PROCEDURE — 25010000002 MIDAZOLAM PER 1 MG: Performed by: ANESTHESIOLOGY

## 2022-10-19 PROCEDURE — A9270 NON-COVERED ITEM OR SERVICE: HCPCS | Performed by: ORTHOPAEDIC SURGERY

## 2022-10-19 PROCEDURE — 27447 TOTAL KNEE ARTHROPLASTY: CPT | Performed by: ORTHOPAEDIC SURGERY

## 2022-10-19 PROCEDURE — C1776 JOINT DEVICE (IMPLANTABLE): HCPCS | Performed by: ORTHOPAEDIC SURGERY

## 2022-10-19 PROCEDURE — 97161 PT EVAL LOW COMPLEX 20 MIN: CPT

## 2022-10-19 PROCEDURE — 63710000001 CARVEDILOL 12.5 MG TABLET: Performed by: INTERNAL MEDICINE

## 2022-10-19 PROCEDURE — A9270 NON-COVERED ITEM OR SERVICE: HCPCS | Performed by: INTERNAL MEDICINE

## 2022-10-19 PROCEDURE — C1713 ANCHOR/SCREW BN/BN,TIS/BN: HCPCS | Performed by: ORTHOPAEDIC SURGERY

## 2022-10-19 PROCEDURE — 25010000002 KETOROLAC TROMETHAMINE PER 15 MG: Performed by: ORTHOPAEDIC SURGERY

## 2022-10-19 PROCEDURE — 25010000002 KETOROLAC TROMETHAMINE PER 15 MG: Performed by: NURSE ANESTHETIST, CERTIFIED REGISTERED

## 2022-10-19 PROCEDURE — 63710000001 SODIUM BICARBONATE 650 MG TABLET: Performed by: INTERNAL MEDICINE

## 2022-10-19 PROCEDURE — 0 HYDROMORPHONE 1 MG/ML SOLUTION: Performed by: NURSE ANESTHETIST, CERTIFIED REGISTERED

## 2022-10-19 PROCEDURE — 94761 N-INVAS EAR/PLS OXIMETRY MLT: CPT

## 2022-10-19 PROCEDURE — 25010000002 CEFAZOLIN PER 500 MG: Performed by: PHYSICIAN ASSISTANT

## 2022-10-19 PROCEDURE — 73560 X-RAY EXAM OF KNEE 1 OR 2: CPT

## 2022-10-19 PROCEDURE — 25010000002 ROPIVACAINE PER 1 MG: Performed by: ORTHOPAEDIC SURGERY

## 2022-10-19 PROCEDURE — 25010000002 ONDANSETRON PER 1 MG: Performed by: NURSE ANESTHETIST, CERTIFIED REGISTERED

## 2022-10-19 PROCEDURE — 63710000001 ACETAMINOPHEN 500 MG TABLET: Performed by: ORTHOPAEDIC SURGERY

## 2022-10-19 PROCEDURE — 94799 UNLISTED PULMONARY SVC/PX: CPT

## 2022-10-19 PROCEDURE — 99204 OFFICE O/P NEW MOD 45 MIN: CPT | Performed by: INTERNAL MEDICINE

## 2022-10-19 PROCEDURE — 25010000002 CEFAZOLIN PER 500 MG: Performed by: ORTHOPAEDIC SURGERY

## 2022-10-19 PROCEDURE — 63710000001 CLONIDINE 0.2 MG TABLET: Performed by: INTERNAL MEDICINE

## 2022-10-19 PROCEDURE — 63710000001 ACETAMINOPHEN 500 MG TABLET: Performed by: ANESTHESIOLOGY

## 2022-10-19 PROCEDURE — A9270 NON-COVERED ITEM OR SERVICE: HCPCS | Performed by: ANESTHESIOLOGY

## 2022-10-19 PROCEDURE — 27447 TOTAL KNEE ARTHROPLASTY: CPT | Performed by: PHYSICIAN ASSISTANT

## 2022-10-19 PROCEDURE — 25010000002 FENTANYL CITRATE (PF) 50 MCG/ML SOLUTION: Performed by: NURSE ANESTHETIST, CERTIFIED REGISTERED

## 2022-10-19 PROCEDURE — 84132 ASSAY OF SERUM POTASSIUM: CPT | Performed by: ANESTHESIOLOGY

## 2022-10-19 PROCEDURE — 25010000002 EPINEPHRINE 1 MG/ML SOLUTION: Performed by: ORTHOPAEDIC SURGERY

## 2022-10-19 PROCEDURE — 25010000002 DEXAMETHASONE PER 1 MG: Performed by: NURSE ANESTHETIST, CERTIFIED REGISTERED

## 2022-10-19 PROCEDURE — 25010000002 PROPOFOL 10 MG/ML EMULSION: Performed by: NURSE ANESTHETIST, CERTIFIED REGISTERED

## 2022-10-19 DEVICE — ART/SRF KN PERSONA/VE MC EF 6TO7 12MM RT: Type: IMPLANTABLE DEVICE | Site: KNEE | Status: FUNCTIONAL

## 2022-10-19 DEVICE — STEM TIB/KN PERSONA CMT 5D SZC RT: Type: IMPLANTABLE DEVICE | Site: KNEE | Status: FUNCTIONAL

## 2022-10-19 DEVICE — CAP TOTL KN CMT PRIMARY: Type: IMPLANTABLE DEVICE | Site: KNEE | Status: FUNCTIONAL

## 2022-10-19 DEVICE — CMT BONE PALACOS R HI/VISC 1X40: Type: IMPLANTABLE DEVICE | Site: KNEE | Status: FUNCTIONAL

## 2022-10-19 DEVICE — COMP FEM/KN PERSONA CR CMT NRW SZ6 RT: Type: IMPLANTABLE DEVICE | Site: KNEE | Status: FUNCTIONAL

## 2022-10-19 DEVICE — IMPLANTABLE DEVICE: Type: IMPLANTABLE DEVICE | Site: KNEE | Status: FUNCTIONAL

## 2022-10-19 RX ORDER — PROMETHAZINE HYDROCHLORIDE 12.5 MG/1
25 TABLET ORAL ONCE AS NEEDED
Status: DISCONTINUED | OUTPATIENT
Start: 2022-10-19 | End: 2022-10-19 | Stop reason: HOSPADM

## 2022-10-19 RX ORDER — TRANEXAMIC ACID 10 MG/ML
1000 INJECTION, SOLUTION INTRAVENOUS ONCE
Status: DISCONTINUED | OUTPATIENT
Start: 2022-10-19 | End: 2022-10-19

## 2022-10-19 RX ORDER — NALOXONE HCL 0.4 MG/ML
0.4 VIAL (ML) INJECTION
Status: DISCONTINUED | OUTPATIENT
Start: 2022-10-19 | End: 2022-10-20 | Stop reason: HOSPADM

## 2022-10-19 RX ORDER — UBIDECARENONE 75 MG
50 CAPSULE ORAL DAILY
Status: DISCONTINUED | OUTPATIENT
Start: 2022-10-19 | End: 2022-10-20 | Stop reason: HOSPADM

## 2022-10-19 RX ORDER — ACETAMINOPHEN 500 MG
1000 TABLET ORAL EVERY 8 HOURS
Status: DISCONTINUED | OUTPATIENT
Start: 2022-10-19 | End: 2022-10-20 | Stop reason: HOSPADM

## 2022-10-19 RX ORDER — PROPOFOL 10 MG/ML
VIAL (ML) INTRAVENOUS AS NEEDED
Status: DISCONTINUED | OUTPATIENT
Start: 2022-10-19 | End: 2022-10-19 | Stop reason: SURG

## 2022-10-19 RX ORDER — CARVEDILOL 12.5 MG/1
25 TABLET ORAL 2 TIMES DAILY WITH MEALS
Status: DISCONTINUED | OUTPATIENT
Start: 2022-10-19 | End: 2022-10-20 | Stop reason: HOSPADM

## 2022-10-19 RX ORDER — EPHEDRINE SULFATE 50 MG/ML
INJECTION, SOLUTION INTRAVENOUS AS NEEDED
Status: DISCONTINUED | OUTPATIENT
Start: 2022-10-19 | End: 2022-10-19

## 2022-10-19 RX ORDER — SODIUM CHLORIDE 9 MG/ML
100 INJECTION, SOLUTION INTRAVENOUS CONTINUOUS
Status: ACTIVE | OUTPATIENT
Start: 2022-10-19 | End: 2022-10-20

## 2022-10-19 RX ORDER — CLONIDINE HYDROCHLORIDE 0.2 MG/1
0.2 TABLET ORAL EVERY 8 HOURS SCHEDULED
Status: DISCONTINUED | OUTPATIENT
Start: 2022-10-19 | End: 2022-10-20 | Stop reason: HOSPADM

## 2022-10-19 RX ORDER — FAMOTIDINE 20 MG/1
40 TABLET, FILM COATED ORAL NIGHTLY
Status: DISCONTINUED | OUTPATIENT
Start: 2022-10-19 | End: 2022-10-20 | Stop reason: HOSPADM

## 2022-10-19 RX ORDER — PROMETHAZINE HYDROCHLORIDE 25 MG/1
25 SUPPOSITORY RECTAL ONCE AS NEEDED
Status: DISCONTINUED | OUTPATIENT
Start: 2022-10-19 | End: 2022-10-19 | Stop reason: HOSPADM

## 2022-10-19 RX ORDER — MAGNESIUM HYDROXIDE 1200 MG/15ML
LIQUID ORAL AS NEEDED
Status: DISCONTINUED | OUTPATIENT
Start: 2022-10-19 | End: 2022-10-19 | Stop reason: HOSPADM

## 2022-10-19 RX ORDER — KETOROLAC TROMETHAMINE 30 MG/ML
INJECTION, SOLUTION INTRAMUSCULAR; INTRAVENOUS AS NEEDED
Status: DISCONTINUED | OUTPATIENT
Start: 2022-10-19 | End: 2022-10-19 | Stop reason: SURG

## 2022-10-19 RX ORDER — CEFAZOLIN SODIUM IN 0.9 % NACL 3 G/100 ML
3 INTRAVENOUS SOLUTION, PIGGYBACK (ML) INTRAVENOUS ONCE
Status: COMPLETED | OUTPATIENT
Start: 2022-10-19 | End: 2022-10-19

## 2022-10-19 RX ORDER — CEFAZOLIN SODIUM IN 0.9 % NACL 3 G/100 ML
3 INTRAVENOUS SOLUTION, PIGGYBACK (ML) INTRAVENOUS EVERY 8 HOURS
Status: COMPLETED | OUTPATIENT
Start: 2022-10-19 | End: 2022-10-20

## 2022-10-19 RX ORDER — CEFAZOLIN SODIUM 2 G/100ML
2 INJECTION, SOLUTION INTRAVENOUS ONCE
Status: DISCONTINUED | OUTPATIENT
Start: 2022-10-19 | End: 2022-10-19 | Stop reason: DRUGHIGH

## 2022-10-19 RX ORDER — DEXAMETHASONE SODIUM PHOSPHATE 4 MG/ML
INJECTION, SOLUTION INTRA-ARTICULAR; INTRALESIONAL; INTRAMUSCULAR; INTRAVENOUS; SOFT TISSUE AS NEEDED
Status: DISCONTINUED | OUTPATIENT
Start: 2022-10-19 | End: 2022-10-19 | Stop reason: SURG

## 2022-10-19 RX ORDER — ONDANSETRON 2 MG/ML
4 INJECTION INTRAMUSCULAR; INTRAVENOUS EVERY 6 HOURS PRN
Status: DISCONTINUED | OUTPATIENT
Start: 2022-10-19 | End: 2022-10-20 | Stop reason: HOSPADM

## 2022-10-19 RX ORDER — LABETALOL HYDROCHLORIDE 5 MG/ML
INJECTION, SOLUTION INTRAVENOUS AS NEEDED
Status: DISCONTINUED | OUTPATIENT
Start: 2022-10-19 | End: 2022-10-19 | Stop reason: SURG

## 2022-10-19 RX ORDER — MIDAZOLAM HYDROCHLORIDE 1 MG/ML
2 INJECTION INTRAMUSCULAR; INTRAVENOUS ONCE
Status: COMPLETED | OUTPATIENT
Start: 2022-10-19 | End: 2022-10-19

## 2022-10-19 RX ORDER — POVIDONE-IODINE 10 MG/ML
SOLUTION TOPICAL ONCE
Status: COMPLETED | OUTPATIENT
Start: 2022-10-19 | End: 2022-10-19

## 2022-10-19 RX ORDER — ONDANSETRON 4 MG/1
4 TABLET, FILM COATED ORAL EVERY 6 HOURS PRN
Status: DISCONTINUED | OUTPATIENT
Start: 2022-10-19 | End: 2022-10-20 | Stop reason: HOSPADM

## 2022-10-19 RX ORDER — ACETAMINOPHEN 500 MG
1000 TABLET ORAL ONCE
Status: COMPLETED | OUTPATIENT
Start: 2022-10-19 | End: 2022-10-19

## 2022-10-19 RX ORDER — ONDANSETRON 2 MG/ML
4 INJECTION INTRAMUSCULAR; INTRAVENOUS ONCE AS NEEDED
Status: DISCONTINUED | OUTPATIENT
Start: 2022-10-19 | End: 2022-10-19 | Stop reason: HOSPADM

## 2022-10-19 RX ORDER — OXYCODONE HYDROCHLORIDE 5 MG/1
5 TABLET ORAL
Status: DISCONTINUED | OUTPATIENT
Start: 2022-10-19 | End: 2022-10-19 | Stop reason: HOSPADM

## 2022-10-19 RX ORDER — SODIUM BICARBONATE 650 MG/1
650 TABLET ORAL 2 TIMES DAILY
Status: DISCONTINUED | OUTPATIENT
Start: 2022-10-19 | End: 2022-10-20 | Stop reason: HOSPADM

## 2022-10-19 RX ORDER — FENTANYL CITRATE 50 UG/ML
INJECTION, SOLUTION INTRAMUSCULAR; INTRAVENOUS AS NEEDED
Status: DISCONTINUED | OUTPATIENT
Start: 2022-10-19 | End: 2022-10-19 | Stop reason: SURG

## 2022-10-19 RX ORDER — ONDANSETRON 2 MG/ML
INJECTION INTRAMUSCULAR; INTRAVENOUS AS NEEDED
Status: DISCONTINUED | OUTPATIENT
Start: 2022-10-19 | End: 2022-10-19 | Stop reason: SURG

## 2022-10-19 RX ORDER — HYDROCODONE BITARTRATE AND ACETAMINOPHEN 7.5; 325 MG/1; MG/1
1 TABLET ORAL EVERY 4 HOURS PRN
Status: DISCONTINUED | OUTPATIENT
Start: 2022-10-19 | End: 2022-10-20 | Stop reason: HOSPADM

## 2022-10-19 RX ORDER — SODIUM CHLORIDE, SODIUM LACTATE, POTASSIUM CHLORIDE, CALCIUM CHLORIDE 600; 310; 30; 20 MG/100ML; MG/100ML; MG/100ML; MG/100ML
9 INJECTION, SOLUTION INTRAVENOUS CONTINUOUS PRN
Status: DISCONTINUED | OUTPATIENT
Start: 2022-10-19 | End: 2022-10-20 | Stop reason: HOSPADM

## 2022-10-19 RX ORDER — TRANEXAMIC ACID 10 MG/ML
2000 INJECTION, SOLUTION INTRAVENOUS ONCE
Status: DISCONTINUED | OUTPATIENT
Start: 2022-10-19 | End: 2022-10-19 | Stop reason: CLARIF

## 2022-10-19 RX ORDER — KETOROLAC TROMETHAMINE 30 MG/ML
15 INJECTION, SOLUTION INTRAMUSCULAR; INTRAVENOUS EVERY 6 HOURS
Status: COMPLETED | OUTPATIENT
Start: 2022-10-19 | End: 2022-10-20

## 2022-10-19 RX ORDER — ROCURONIUM BROMIDE 10 MG/ML
INJECTION, SOLUTION INTRAVENOUS AS NEEDED
Status: DISCONTINUED | OUTPATIENT
Start: 2022-10-19 | End: 2022-10-19 | Stop reason: SURG

## 2022-10-19 RX ORDER — ATORVASTATIN CALCIUM 10 MG/1
20 TABLET, FILM COATED ORAL NIGHTLY
Status: DISCONTINUED | OUTPATIENT
Start: 2022-10-19 | End: 2022-10-20 | Stop reason: HOSPADM

## 2022-10-19 RX ORDER — BUPIVACAINE HYDROCHLORIDE AND EPINEPHRINE 5; 5 MG/ML; UG/ML
INJECTION, SOLUTION EPIDURAL; INTRACAUDAL; PERINEURAL
Status: COMPLETED | OUTPATIENT
Start: 2022-10-19 | End: 2022-10-19

## 2022-10-19 RX ORDER — GLYCOPYRROLATE 0.2 MG/ML
0.2 INJECTION INTRAMUSCULAR; INTRAVENOUS
Status: COMPLETED | OUTPATIENT
Start: 2022-10-19 | End: 2022-10-19

## 2022-10-19 RX ORDER — HYDROCODONE BITARTRATE AND ACETAMINOPHEN 7.5; 325 MG/1; MG/1
2 TABLET ORAL EVERY 4 HOURS PRN
Status: DISCONTINUED | OUTPATIENT
Start: 2022-10-19 | End: 2022-10-20 | Stop reason: HOSPADM

## 2022-10-19 RX ORDER — LIDOCAINE HYDROCHLORIDE 20 MG/ML
INJECTION, SOLUTION EPIDURAL; INFILTRATION; INTRACAUDAL; PERINEURAL AS NEEDED
Status: DISCONTINUED | OUTPATIENT
Start: 2022-10-19 | End: 2022-10-19 | Stop reason: SURG

## 2022-10-19 RX ADMIN — SODIUM CHLORIDE 100 ML/HR: 9 INJECTION, SOLUTION INTRAVENOUS at 22:36

## 2022-10-19 RX ADMIN — ACETAMINOPHEN 1000 MG: 500 TABLET, FILM COATED ORAL at 14:22

## 2022-10-19 RX ADMIN — LIDOCAINE HYDROCHLORIDE 100 MG: 20 INJECTION, SOLUTION EPIDURAL; INFILTRATION; INTRACAUDAL; PERINEURAL at 08:25

## 2022-10-19 RX ADMIN — SODIUM CHLORIDE 100 ML/HR: 9 INJECTION, SOLUTION INTRAVENOUS at 11:31

## 2022-10-19 RX ADMIN — ROCURONIUM BROMIDE 50 MG: 10 INJECTION INTRAVENOUS at 08:25

## 2022-10-19 RX ADMIN — DEXAMETHASONE SODIUM PHOSPHATE 4 MG: 4 INJECTION, SOLUTION INTRA-ARTICULAR; INTRALESIONAL; INTRAMUSCULAR; INTRAVENOUS; SOFT TISSUE at 08:45

## 2022-10-19 RX ADMIN — LABETALOL 20 MG/4 ML (5 MG/ML) INTRAVENOUS SYRINGE 10 MG: at 08:51

## 2022-10-19 RX ADMIN — PROPOFOL 150 MG: 10 INJECTION, EMULSION INTRAVENOUS at 08:25

## 2022-10-19 RX ADMIN — CARVEDILOL 25 MG: 12.5 TABLET, FILM COATED ORAL at 17:23

## 2022-10-19 RX ADMIN — KETOROLAC TROMETHAMINE 30 MG: 30 INJECTION, SOLUTION INTRAMUSCULAR; INTRAVENOUS at 08:45

## 2022-10-19 RX ADMIN — CEFAZOLIN SODIUM 3 G: 10 INJECTION, POWDER, FOR SOLUTION INTRAVENOUS at 23:32

## 2022-10-19 RX ADMIN — SODIUM BICARBONATE 650 MG TABLET 650 MG: at 21:09

## 2022-10-19 RX ADMIN — Medication 3 G: at 08:23

## 2022-10-19 RX ADMIN — ONDANSETRON 4 MG: 2 INJECTION INTRAMUSCULAR; INTRAVENOUS at 08:45

## 2022-10-19 RX ADMIN — KETOROLAC TROMETHAMINE 15 MG: 30 INJECTION, SOLUTION INTRAMUSCULAR; INTRAVENOUS at 21:08

## 2022-10-19 RX ADMIN — MIDAZOLAM 2 MG: 1 INJECTION INTRAMUSCULAR; INTRAVENOUS at 07:15

## 2022-10-19 RX ADMIN — SODIUM BICARBONATE 650 MG TABLET 650 MG: at 13:27

## 2022-10-19 RX ADMIN — GLYCOPYRROLATE 0.2 MG: 0.2 INJECTION INTRAMUSCULAR; INTRAVENOUS at 07:15

## 2022-10-19 RX ADMIN — HYDROMORPHONE HYDROCHLORIDE 1 MG: 1 INJECTION, SOLUTION INTRAMUSCULAR; INTRAVENOUS; SUBCUTANEOUS at 10:10

## 2022-10-19 RX ADMIN — ACETAMINOPHEN 1000 MG: 500 TABLET, FILM COATED ORAL at 23:32

## 2022-10-19 RX ADMIN — CEFAZOLIN SODIUM 3 G: 10 INJECTION, POWDER, FOR SOLUTION INTRAVENOUS at 16:00

## 2022-10-19 RX ADMIN — KETOROLAC TROMETHAMINE 15 MG: 30 INJECTION, SOLUTION INTRAMUSCULAR; INTRAVENOUS at 14:23

## 2022-10-19 RX ADMIN — FENTANYL CITRATE 100 MCG: 50 INJECTION, SOLUTION INTRAMUSCULAR; INTRAVENOUS at 08:25

## 2022-10-19 RX ADMIN — POVIDONE-IODINE: 10 SOLUTION TOPICAL at 07:08

## 2022-10-19 RX ADMIN — CLONIDINE HYDROCHLORIDE 0.2 MG: 0.2 TABLET ORAL at 21:09

## 2022-10-19 RX ADMIN — SODIUM CHLORIDE, POTASSIUM CHLORIDE, SODIUM LACTATE AND CALCIUM CHLORIDE 9 ML/HR: 600; 310; 30; 20 INJECTION, SOLUTION INTRAVENOUS at 07:12

## 2022-10-19 RX ADMIN — BUPIVACAINE HYDROCHLORIDE AND EPINEPHRINE BITARTRATE 40 ML: 5; .005 INJECTION, SOLUTION EPIDURAL; INTRACAUDAL; PERINEURAL at 07:39

## 2022-10-19 RX ADMIN — LABETALOL 20 MG/4 ML (5 MG/ML) INTRAVENOUS SYRINGE 10 MG: at 08:37

## 2022-10-19 RX ADMIN — ACETAMINOPHEN 1000 MG: 500 TABLET, FILM COATED ORAL at 07:12

## 2022-10-19 RX ADMIN — ATORVASTATIN CALCIUM 20 MG: 10 TABLET, FILM COATED ORAL at 21:09

## 2022-10-19 RX ADMIN — SUGAMMADEX 200 MG: 100 INJECTION, SOLUTION INTRAVENOUS at 09:59

## 2022-10-19 RX ADMIN — VITAM B12 50 MCG: 100 TAB at 13:27

## 2022-10-19 NOTE — ANESTHESIA PREPROCEDURE EVALUATION
Anesthesia Evaluation     Patient summary reviewed and Nursing notes reviewed   no history of anesthetic complications:  NPO Solid Status: > 8 hours  NPO Liquid Status: > 2 hours           Airway   Mallampati: II  TM distance: >3 FB  Neck ROM: full  Possible difficult intubation  Comment: Short neck  Dental - normal exam     Pulmonary - normal exam    breath sounds clear to auscultation  (+) asthma,  Cardiovascular - normal exam  Exercise tolerance: good (4-7 METS)    ECG reviewed  Patient on routine beta blocker and Beta blocker given within 24 hours of surgery  Rhythm: regular  Rate: normal    (+) hypertension, hyperlipidemia,     ROS comment: EF 66%    Sinus rhythm  Low voltage, precordial leads  Consider anterior infarct    Neuro/Psych  (+) numbness (lat femoral cutaneous n compression syndrome), psychiatric history Anxiety and Depression,    GI/Hepatic/Renal/Endo    (+) morbid obesity, hiatal hernia,  renal disease CRI,     Musculoskeletal     Abdominal    Substance History - negative use     OB/GYN negative ob/gyn ROS         Other   arthritis,      ROS/Med Hx Other: HX OF HTN, CKD (LAST GFR 15.4, CREATINE 3.4). O.V. DR. COLBERT 8/16/22. ECHO 8/16/22 EF 60%,HOLTER 8/16/22 OCCASIONAL PAC'S & PVC'S. EKG 7/1/22 SINUS RHTHYM, CONSIDER ANTERIOR INFARCT. CARDIAC CLEARED BY DR. COLBERT. METS<4. NO CP/SOA.   ELM                 Anesthesia Plan    ASA 3     general     (Patient understands anesthesia not responsible for dental damage.)  intravenous induction     Anesthetic plan, risks, benefits, and alternatives have been provided, discussed and informed consent has been obtained with: patient.  Pre-procedure education provided  Use of blood products discussed with patient .   Plan discussed with CRNA.        CODE STATUS:

## 2022-10-19 NOTE — THERAPY EVALUATION
Acute Care - Physical Therapy Initial Evaluation   [facility]     Patient Name: Katarzyna Norris  : 1970  MRN: [id number]  Today's Date: 10/19/2022     Admit date: 10/19/2022     Referring Physician: Anthony Chris, *     Surgery Date:10/19/2022   Procedure(s) (LRB):  RIGHT TOTAL KNEE ARTHROPLASTY - NO SHELBY (Right)        Visit Dx:     ICD-10-CM ICD-9-CM   1. Difficulty in walking  R26.2 719.7   2. Arthritis of knee, right  M17.11 716.96     Patient Active Problem List   Diagnosis   • Gout   • Anxiety   • Hypertension   • Sinus problem   • Pain in both knees   • Primary osteoarthritis of left knee   • Anemia   • Impaired fasting glucose   • Hyperlipidemia   • Screening mammogram, encounter for   • Osteoarthritis of left knee   • Acute non-recurrent maxillary sinusitis   • Status post replacement of knee joint   • Pain of left calf   • Swelling of calf   • Cyst of ovary   • Asthma   • Abdominal pain   • Metabolic acidosis   • Nephritic syndrome   • Obesity   • Stage 3 chronic kidney disease (HCC)   • Urinary tract infectious disease   • Vitamin D deficiency   • Hyperkalemia   • Elevated ferritin   • Burn   • Aftercare following surgery of left total knee arthroplasty, 2021   • Rash   • Hypercalcemia   • Acute pain of left shoulder   • Arthritis of knee, right   • Lateral cutaneous femoral nerve of thigh compression or syndrome, right   • Bilateral hip pain   • Primary localized osteoarthritis of right knee     Past Medical History:   Diagnosis Date   • Anxiety    • Arthritis     GILBERT KNEES   • Asthma     SEASONAL ALLERGIES   • CKD (chronic kidney disease)     Stage 3, Follows with [facility]   • Elevated cholesterol    • Gout 2021   • Hernia     Upper   • Hiatal hernia    • Hypertension    • Renal insufficiency     follows with [facility]   • Sinus problem     SEASONAL ALLERGIES     Past Surgical History:   Procedure Laterality Date   • BONY PELVIS SURGERY      Plate   •  SECTION   and  1989   • CHOLECYSTECTOMY  2011   • COLONOSCOPY  2009   • ENDOSCOPY  2009   • TOTAL KNEE ARTHROPLASTY Left 12/22/2021    Procedure: TOTAL KNEE ARTHROPLASTY;  Surgeon: Marco Nelson MD;  Location: [facility] MAIN OR;  Service: Orthopedics;  Laterality: Left;   • TUBAL ABDOMINAL LIGATION  1989     PT Assessment (last 12 hours)     PT Evaluation and Treatment     Row Name 10/19/22 1314          Physical Therapy Time and Intention    Subjective Information no complaints  -OB     Document Type evaluation  -BO     Mode of Treatment individual therapy;physical therapy  -BO     Patient Effort good  -BO     Row Name 10/19/22 1314          General Information    Patient Observations alert;cooperative;agree to therapy  -BO     Prior Level of Function independent:;all household mobility;community mobility  -BO     Equipment Currently Used at Home rollator  -BO     Existing Precautions/Restrictions fall;weight bearing  -BO     Barriers to Rehab none identified  -BO     Row Name 10/19/22 1314          Living Environment    Current Living Arrangements home  -BO     People in Home spouse  -BO     Row Name 10/19/22 1314          Range of Motion (ROM)    Range of Motion right lower extremity  6-82° knee  -BO     Row Name 10/19/22 1314          Strength (Manual Muscle Testing)    Strength (Manual Muscle Testing) right lower extremity  3+/5  -BO     Row Name 10/19/22 1314          Mobility    Extremity Weight-bearing Status right lower extremity  -BO     Right Lower Extremity (Weight-bearing Status) weight-bearing as tolerated (WBAT)  -BO     Row Name 10/19/22 1314          Bed Mobility    Bed Mobility bed mobility (all) activities;supine-sit  -BO     All Activities, [place] (Bed Mobility) minimum assist (75% patient effort)  -BO     Supine-Sit [place] (Bed Mobility) minimum assist (75% patient effort)  -BO     Row Name 10/19/22 1314          Transfers    Transfers bed-chair transfer;sit-stand transfer  -BO     Row Name  10/19/22 1314          Bed-Chair Transfer    Bed-Chair [place] (Transfers) contact guard  -BO     Row Name 10/19/22 1314          Sit-Stand Transfer    Sit-Stand [place] (Transfers) contact guard  -BO     Assistive Device (Sit-Stand Transfers) walker, front-wheeled  -BO     Row Name 10/19/22 1314          Gait/Stairs (Locomotion)    Gait/Stairs Locomotion gait/ambulation assistive device  -BO     [place] Level (Gait) contact guard  -BO     Assistive Device (Gait) walker, front-wheeled  -BO     Distance in Feet (Gait) 25  -BO     Pattern (Gait) step-to  -BO     Row Name 10/19/22 1314          Safety Issues, Functional Mobility    Impairments Affecting Function (Mobility) balance;range of motion (ROM);strength  -BO     Row Name 10/19/22 1314          Balance    Balance Assessment standing dynamic balance  -BO     Dynamic Standing Balance contact guard  -BO     Position/Device Used, Standing Balance walker, front-wheeled  -BO     Row Name             Wound 10/19/22 0852 Right anterior knee Incision    Wound - Properties Group Placement Date: 10/19/22  -BW Placement Time: 0852  -BW Present on Hospital Admission: N  -BW Side: Right  -BW Orientation: anterior  -BW Location: knee  -BW Primary Wound Type: Incision  -BW    Retired Wound - Properties Group Placement Date: 10/19/22  -BW Placement Time: 0852  -BW Present on Hospital Admission: N  -BW Side: Right  -BW Orientation: anterior  -BW Location: knee  -BW Primary Wound Type: Incision  -BW    Retired Wound - Properties Group Date first assessed: 10/19/22  -BW Time first assessed: 0852  -BW Present on Hospital Admission: N  -BW Side: Right  -BW Location: knee  -BW Primary Wound Type: Incision  -BW    Row Name 10/19/22 1314          Plan of Care Review    Plan of Care Reviewed With patient  -BO     Outcome Evaluation Patient presents with decreased strength, transfers and ambulation.  Skilled physical therapy services will be required to address these  mobility deficits.  Recommend follow-up with outpatient physical therapy services  -BO     Row Name 10/19/22 1314          Therapy Assessment/Plan (PT)    Patient/Family Therapy Goals Statement (PT) Patient wants to go on long walks without a walker  -BO     Rehab Potential (PT) good, to achieve stated therapy goals  -BO     Criteria for Skilled Interventions Met (PT) skilled treatment is necessary  -BO     Therapy Frequency (PT) 2 times/day  -BO     Predicted Duration of Therapy Intervention (PT) 10 days  -BO     Problem List (PT) problems related to;balance;mobility;strength;range of motion (ROM);pain  -BO     Activity Limitations Related to Problem List (PT) unable to ambulate safely;unable to transfer safely  -BO     Row Name 10/19/22 1314          PT Evaluation Complexity    History, PT Evaluation Complexity no personal factors and/or comorbidities  -BO     Examination of Body Systems (PT Eval Complexity) total of 4 or more elements  -BO     Clinical Presentation (PT Evaluation Complexity) stable  -BO     Clinical Decision Making (PT Evaluation Complexity) low complexity  -BO     Overall Complexity (PT Evaluation Complexity) low complexity  -BO     Row Name 10/19/22 1314          Therapy Plan Review/Discharge Plan (PT)    Therapy Plan Review (PT) evaluation/treatment results reviewed;participants voiced agreement with care plan;participants included;patient  -BO     Row Name 10/19/22 1314          Physical Therapy Goals    Transfer Goal Selection (PT) transfer, PT goal 1  -BO     Gait Training Goal Selection (PT) gait training, PT goal 1  -BO     Row Name 10/19/22 1314          Transfer Goal 1 (PT)    Activity/Assistive Device (Transfer Goal 1, PT) transfers, all  -BO     [place] Level/Cues Needed (Transfer Goal 1, PT) independent  -BO     Time Frame (Transfer Goal 1, PT) long term goal (LTG);10 days  -BO     Row Name 10/19/22 1314          Gait Training Goal 1 (PT)    Activity/Assistive Device (Gait  Training Goal 1, PT) gait (walking locomotion);assistive device use;walker, rolling  -BO     [place] Level (Gait Training Goal 1, PT) independent  -BO     Distance (Gait Training Goal 1, PT) 300  -BO     Time Frame (Gait Training Goal 1, PT) long term goal (LTG);10 days  -BO           User Key  (r) = Recorded By, (t) = Taken By, (c) = Cosigned By    Initials Name Provider Type    Julian Rushing RN Registered Nurse    John Sawyer PT Physical Therapist                Physical Therapy Education     Title: PT OT SLP Therapies (In Progress)     Topic: Physical Therapy (Done)     Point: Mobility training (Done)     Learning Progress Summary           Patient Acceptance, E,TB, VU by BO at 10/19/2022 1331                   Point: Precautions (Done)     Learning Progress Summary           Patient Acceptance, E,TB, VU by BO at 10/19/2022 1331                               User Key     Initials Effective Dates Name Provider Type Discipline    BO 06/03/21 -  John Ospina PT Physical Therapist PT              PT Recommendation and Plan  Anticipated Discharge Disposition (PT): home with outpatient therapy services  Planned Therapy Interventions (PT): balance training, bed mobility training, gait training, home exercise program, stair training, strengthening, transfer training  Therapy Frequency (PT): 2 times/day  Plan of Care Reviewed With: patient  Outcome Evaluation: Patient presents with decreased strength, transfers and ambulation.  Skilled physical therapy services will be required to address these mobility deficits.  Recommend follow-up with outpatient physical therapy services   Outcome Measures     Row Name 10/19/22 1331             How much help from another person do you currently need...    Turning from your back to your side while in flat bed without using bedrails? 3  -BO      Moving from lying on back to sitting on the side of a flat bed without bedrails? 3  -BO      Moving to and from a  bed to a chair (including a wheelchair)? 3  -BO      Standing up from a chair using your arms (e.g., wheelchair, bedside chair)? 3  -BO      Climbing 3-5 steps with a railing? 3  -BO      To walk in hospital room? 3  -BO      AM-PAC 6 Clicks Score (PT) 18  -BO         Functional Assessment    Outcome Measure Options AM-PAC 6 Clicks Basic Mobility (PT)  -BO            User Key  (r) = Recorded By, (t) = Taken By, (c) = Cosigned By    Initials Name Provider Type    John Sawyer PT Physical Therapist                 Time Calculation:    PT Charges     Row Name 10/19/22 1323             Time Calculation    PT Received On 10/19/22  -BO      PT Goal Re-Cert Due Date 10/28/22  -BO         Untimed Charges    PT Eval/Re-eval Minutes 31  -BO         Total Minutes    Untimed Charges Total Minutes 31  -BO       Total Minutes 31  -BO            User Key  (r) = Recorded By, (t) = Taken By, (c) = Cosigned By    Initials Name Provider Type    John Sawyer PT Physical Therapist              Therapy Charges for Today     Code Description Service Date Service Provider Modifiers Qty    [id number] HC PT EVAL LOW COMPLEXITY 3 10/19/2022 John Ospina, PT GP 1          PT G-Codes  Outcome Measure Options: AM-PAC 6 Clicks Basic Mobility (PT)  AM-PAC 6 Clicks Score (PT): 18    John Ospina PT  10/19/2022

## 2022-10-19 NOTE — ANESTHESIA PROCEDURE NOTES
Peripheral Block    Pre-sedation assessment completed: 10/19/2022 7:39 AM    Patient reassessed immediately prior to procedure    Patient location during procedure: pre-op  Start time: 10/19/2022 7:39 AM  Stop time: 10/19/2022 7:44 AM  Reason for block: at surgeon's request and post-op pain management  Performed by  Anesthesiologist: Sloan Lowry MD  Preanesthetic Checklist  Completed: patient identified, IV checked, site marked, risks and benefits discussed, surgical consent, monitors and equipment checked, pre-op evaluation and timeout performed  Prep:  Pt Position: supine  Sterile barriers:alcohol skin prep, partial drape, cap, washed/disinfected hands, mask and gloves  Prep: ChloraPrep  Patient monitoring: blood pressure monitoring, continuous pulse oximetry and EKG  Procedure    Sedation: yes  Performed under: local infiltration  Guidance:ultrasound guided and nerve stimulator    ULTRASOUND INTERPRETATION.  Using ultrasound guidance a 20 G gauge needle was placed in close proximity to the nerve, at which point, under ultrasound guidance anesthetic was injected in the area of the nerve and spread of the anesthesia was seen on ultrasound in close proximity thereto.  There were no abnormalities seen on ultrasound; a digital image was taken; and the patient tolerated the procedure with no complications. Images:still images obtained, printed/placed on chart    Block Type:adductor canal block  Injection Technique:single-shot  Needle Type:echogenic  Needle Gauge:20 G (4in)  Resistance on Injection: none    Medications Used: bupivacaine-EPINEPHrine PF (MARCAINE w/EPI) 0.5% -[id number] injection - Injection, Adductor Canal   40 mL - 10/19/2022 7:39:00 AM      Medications  Comment:Precedex 50mcg added to above solution    Post Assessment  Injection Assessment: negative aspiration for heme, no paresthesia on injection and incremental injection  Patient Tolerance:comfortable throughout block  Complications:no  Additional  Notes  The block or continuous infusion is requested by the referring physician for management of postoperative pain, or pain related to a procedure. Ultrasound guidance (deemed medically necessary). Painless injection, pt was awake and conversant during the procedure without complications. Needle and surrounding structures visualized throughout procedure. No adverse reactions or complications seen during this period. Post-procedure image showed no signs of complication, and anatomy was consistent with an uncomplicated nerve blockade.

## 2022-10-19 NOTE — BRIEF OP NOTE
TOTAL KNEE ARTHROPLASTY  Progress Note    Katarzyna VALLEJO Jr  10/19/2022    Pre-op Diagnosis:   Arthritis of knee, right [M17.11]  Right knee pain, unspecified chronicity [M25.561]       Post-Op Diagnosis Codes:     * Arthritis of knee, right [M17.11]     * Right knee pain, unspecified chronicity [M25.561]    Procedure/CPT® Codes:        Procedure(s):  RIGHT TOTAL KNEE ARTHROPLASTY - NO SHELBY    Surgical Approach: Knee Medial Parapatellar      Surgeon(s):  Marco Nelson MD    Anesthesia: General with Block    Staff:   Circulator: Julian Schmid RN  Scrub Person: Raul Wetzel  Assistant: Honey Gama RN; Dionte Portillo PA  Other: Hubert Shannon RN  Assistant: Honey Gama RN; Dionte Portillo PA      Estimated Blood Loss: 100 mL    Urine Voided: * No values recorded between 10/19/2022  8:19 AM and 10/19/2022 10:02 AM *    Specimens:                None          Drains: * No LDAs found *    Findings: Right knee osteoarthritis        Complications: None    Assistant: Honey Gama RN; Dionte Portillo PA  was responsible for performing the following activities: Retraction, Suction, Irrigation and Placing Dressing and their skilled assistance was necessary for the success of this case.    Marco Nelson MD     Date: 10/19/2022  Time: 10:12 EDT

## 2022-10-19 NOTE — TELEPHONE ENCOUNTER
----- Message from MARICRUZ Blackwood sent at 10/10/2022  7:24 AM EDT -----  Recommend referral for colonoscopy for abd pain/constipation  ----- Message -----  From: Enedelia Mendoza  Sent: 10/3/2022   4:07 PM EDT  To: MARICRUZ Blackwood    Patient last Cologuard was 10/25/2021

## 2022-10-19 NOTE — PLAN OF CARE
Goal Outcome Evaluation:  Plan of Care Reviewed With: patient           Outcome Evaluation: Patient presents with decreased strength, transfers and ambulation.  Skilled physical therapy services will be required to address these mobility deficits.  Recommend follow-up with outpatient physical therapy services

## 2022-10-19 NOTE — ANESTHESIA POSTPROCEDURE EVALUATION
Patient: Katarzyna Norris    Procedure Summary     Date: 10/19/22 Room / Location: [facility] OR 06 / [facility] MAIN OR    Anesthesia Start: 0823 Anesthesia Stop: 1016    Procedure: RIGHT TOTAL KNEE ARTHROPLASTY - NO SHELBY (Right: Knee) Diagnosis:       Arthritis of knee, right      Right knee pain, unspecified chronicity      (Arthritis of knee, right [M17.11])      (Right knee pain, unspecified chronicity [M25.561])    Surgeons: Marco Nelson MD Provider: Reyes, Mirabelle, DO    Anesthesia Type: general ASA Status: 3          Anesthesia Type: general    Vitals  Vitals Value Taken Time   /63 10/19/22 1100   Temp 36.3 °C (97.3 °F) 10/19/22 1100   Pulse 86 10/19/22 1100   Resp 18 10/19/22 1100   SpO2 92 % 10/19/22 1100           Post Anesthesia Care and Evaluation    Patient location during evaluation: bedside  Patient participation: complete - patient participated  Level of consciousness: awake  Pain management: adequate    Airway patency: patent  Anesthetic complications: No anesthetic complications  PONV Status: none  Cardiovascular status: acceptable and stable  Respiratory status: acceptable  Hydration status: acceptable    Comments: An Anesthesiologist personally participated in the most demanding procedures (including induction and emergence if applicable) in the anesthesia plan, monitored the course of anesthesia administration at frequent intervals and remained physically present and available for immediate diagnosis and treatment of emergencies.

## 2022-10-19 NOTE — PLAN OF CARE
Goal Outcome Evaluation:    Patient ambulates with one person assist. Pain has been under control since coming from PACU. Anticipating discharge tomorrow.

## 2022-10-19 NOTE — H&P
Hospitalist H&P Note    Patient Identification:  Name: Katarzyna Norris  Age: 52 y.o.  MRN: [id number]                          Chief Complaint:  R TKA    History of Present Illness:  51 y/o female with a h/o HTN, HPLD, CKD4, R knee OA who presents for elective R TKA after failing conservative outpatient treatments. She has a h/o HTN nephropathy and follows with Dr Carvalho. No history of DM2 or CAD or any lung problems. Underwent surgery this am and is doing well post-op.     Review of Systems:  Constitutional:  No fever or chills, no weight loss  Eyes:  No blurry or double vision  HENT:  No nasal congestion or sore throat   Respiratory:  No cough or shortness of breath   Cardiovascular:  No chest pain, palpitations, or edema   GI:  No abdominal pain, nausea, vomiting, bloody stools or diarrhea   :  No dysuria or hematuria  Musculoskeletal:  No back pain or joint pain   Skin:  No rash, no lumps  Neurologic:  No headache, focal weakness or dizziness  Endocrine:  No polyuria or polydipsia   Lymphatic:  No swollen glands   Psychiatric:  No depression or anxiety     Past Medical History:   Diagnosis Date   • Anxiety    • Arthritis     GILBERT KNEES   • Asthma     SEASONAL ALLERGIES   • CKD (chronic kidney disease)     Stage 3, Follows with [facility]   • Elevated cholesterol    • Gout 2021   • Hernia     Upper   • Hiatal hernia    • Hypertension    • Renal insufficiency     follows with [facility]   • Sinus problem     SEASONAL ALLERGIES       Past Surgical History:   Procedure Laterality Date   • BONY PELVIS SURGERY      Plate   •  SECTION   and    • CHOLECYSTECTOMY     • COLONOSCOPY     • ENDOSCOPY     • TOTAL KNEE ARTHROPLASTY Left 2021    Procedure: TOTAL KNEE ARTHROPLASTY;  Surgeon: Marco Nelson MD;  Location: [facility] OR;  Service: Orthopedics;  Laterality: Left;   • TUBAL ABDOMINAL LIGATION          Family History   Problem Relation Age of Onset   • Throat cancer Father  73   • Stroke Other    • Diabetes Other    • Malig Hyperthermia Neg Hx         Social History     Tobacco Use   • Smoking status: Former   • Smokeless tobacco: Never   Substance Use Topics   • Alcohol use: Never        Allergies   Allergen Reactions   • Amlodipine Shortness Of Breath   • Diltiazem Hcl Anxiety       Medications Prior to Admission   Medication Sig Dispense Refill Last Dose   • allopurinol (ZYLOPRIM) 300 MG tablet Take 1 tablet by mouth 2 (Two) Times a Day. 180 tablet 1 10/19/2022 at 0530   • atorvastatin (LIPITOR) 20 MG tablet Take 1 tablet by mouth Daily. (Patient taking differently: Take 1 tablet by mouth Every Night.) 90 tablet 0 10/18/2022   • carvedilol (COREG) 25 MG tablet Take 1 tablet by mouth 2 (Two) Times a Day With Meals. (Patient taking differently: Take 12.5 mg by mouth 2 (Two) Times a Day With Meals.) 180 tablet 1 10/19/2022 at 0530   • cetirizine (zyrTEC) 10 MG tablet Take 1 tablet by mouth Every Night. 30 tablet 0 10/18/2022   • cloNIDine (CATAPRES) 0.2 MG tablet Take 1 tablet by mouth 3 (Three) Times a Day. 90 tablet 1 10/19/2022 at 0530   • famotidine (Pepcid) 40 MG tablet Take 1 tablet by mouth Every Night. (Patient taking differently: Take 1 tablet by mouth Every Night. States she hasn't started yet) 30 tablet 0 Patient Taking Differently   • losartan (COZAAR) 100 MG tablet Take 1 tablet by mouth once daily (Patient taking differently: Take 1 tablet by mouth Every Night.) 90 tablet 0 10/18/2022   • sodium bicarbonate 650 MG tablet Take 1 tablet by mouth 2 (Two) Times a Day. 180 tablet 0 10/18/2022   • vitamin B-12 (CYANOCOBALAMIN) 100 MCG tablet Take 50 mcg by mouth Daily.   Past Week   • Zinc Sulfate (ZINC 15 PO) Take  by mouth.   Past Week   • Aspirin Buf,CaCarb-MgCarb-MgO, 81 MG tablet Take 81 mg by mouth Daily. TAKES FOR PREVENTATIVE HEART HEALTH, LD 12/14/21 (Patient taking differently: Take 81 mg by mouth Daily. Per Dr. Aldrich last dose 10-12-22, Patient stopped 10-10-22)  90 tablet 0 10/5/2022       Objective:  Vitals:   Temp:  [97.2 °F (36.2 °C)-97.9 °F (36.6 °C)] 97.3 °F (36.3 °C)  Heart Rate:  [50-96] 86  Resp:  [14-20] 18  BP: (120-190)/() 127/63    Physical Exam:  Gen: NAD, conversant.   Eyes: conjunctiva clear, moist mucous membranes  HENT: Atraumatic, oropharynx clear with moist mucous membranes  Neck: No lymphadenopathy, no thyromegaly  Resp: normal respiratory effort, CTAB  CV: RRR, no MRGs, no peripheral edema  GI: Soft, nontender, nondistended, (+) BS.  Psych: Alert and Oriented x 3, Mood and affect appropriate to situation  Skin: warm and dry on palpation. No rash or ulcers on inspection.  M/S: bilateral UE and bilateral LE muscle mass and tone WNL for age. R knee wrapped  Neuro: normal motor function in all 4 ext, CN 2-12 intact    Data Review:  I have personally reviewed and interpreted all labs and imaging as well as all outside records    Labs:  Lab Results (last 24 hours)     Procedure Component Value Units Date/Time    Potassium [[id number]]  (Normal) Collected: 10/19/22 0629    Specimen: Blood Updated: 10/19/22 0657     Potassium 4.6 mmol/L           Imaging:  Imaging Results (Last 24 Hours)     Procedure Component Value Units Date/Time    XR Knee 1 or 2 View Right [[id number]] Collected: 10/19/22 1110     Updated: 10/19/22 1113    Narrative:      PROCEDURE: XR KNEE 1 OR 2 VW RIGHT     COMPARISON: [facility], CR, KNEE 3 VIEWS RT, 8/04/2017, 8:55.     INDICATIONS: Post-Op Knee Arthoplasty     FINDINGS:   Patient is status post total knee arthroplasty with patellar resurfacing.  The orthopedic hardware   appears intact.  Air and fluid projecting within the joint space and soft tissues and overlying   skin staples noted.       Impression:       Expected postoperative changes from total knee arthroplasty with patellar resurfacing               MICHELLE CRUZ MD         Electronically Signed and Approved By: MICHELLE CRUZ MD on 10/19/2022  at 11:10                           Assessment:    Primary localized osteoarthritis of right knee    Pain in both knees    Arthritis of knee, right      Plan:  R knee OA  - s/p TKA 10/19  - pain control and post-op management per ortho    CKD4  - baseline Cr difficult to tell -- has been anywhere from 2.36 to 3.4 about a week ago  - recheck in am  - cont IVF for today and stop in am  - resume home oral bicarb     HTN  - resume home clonidine and coreg with hold parameters  - resume losartan in am if BP allows and renal fxn stable    HPLD  - resume statin      Keon Chris MD  Hospitalist Group  10/19/2022  11:22 EDT

## 2022-10-19 NOTE — H&P
[facility]   HISTORY AND PHYSICAL    Patient Name: Katarzyna Norris  : 1970  MRN: [id number]  Primary Care Physician:  Brian Ellis APRN  Date of admission: (Not on file)    Subjective   Subjective     Chief Complaint: Right knee pain    History of Present Illness: The patient is a 52-year-old female with right knee pain.  The patient has previous left knee replacement she has done well with.  She reports pain and disability to the right knee.  There is stiffness and pain.  The patient has tried conservative measures without relief and wishes to undergo knee replacement.    Review of Systems : Negative except for those mentioned in the history of present illness    Personal History     Past Medical History:   Diagnosis Date   • Anxiety    • Arthritis     GILBERT KNEES   • Asthma     SEASONAL ALLERGIES   • CKD (chronic kidney disease)     Stage 3, Follows with [facility]   • Elevated cholesterol    • Gout 2021   • Hernia     Upper   • Hiatal hernia    • Hypertension    • Renal insufficiency     follows with [facility]   • Sinus problem     SEASONAL ALLERGIES       Past Surgical History:   Procedure Laterality Date   • BONY PELVIS SURGERY      Plate   •  SECTION   and    • CHOLECYSTECTOMY     • COLONOSCOPY     • ENDOSCOPY     • TOTAL KNEE ARTHROPLASTY Left 2021    Procedure: TOTAL KNEE ARTHROPLASTY;  Surgeon: Marco Nelson MD;  Location: [facility]];  Service: Orthopedics;  Laterality: Left;   • TUBAL ABDOMINAL LIGATION         Family History: family history includes Diabetes in an other family member; Stroke in an other family member; Throat cancer (age of onset: 73) in her father. Otherwise pertinent FHx was reviewed and not pertinent to current issue.    Social History:  reports that she has quit smoking. She has never used smokeless tobacco. She reports that she does not drink alcohol and does not use drugs.    Home Medications:  Aspirin  Buf(CaCarb-MgCarb-MgO), Zinc Sulfate, allopurinol, atorvastatin, carvedilol, cetirizine, cloNIDine, famotidine, losartan, penicillin v potassium, sodium bicarbonate, and vitamin B-12    Allergies:  Allergies   Allergen Reactions   • Amlodipine Shortness Of Breath   • Diltiazem Hcl Anxiety       Objective    Objective     Vitals:        Physical Exam   General: No apparent distress, alert and oriented x3  HEENT: Normocephalic/atraumatic  Neck: Supple cardiovascular: Regular heart rate  Chest: Unlabored breathing  Abdomen: Soft, nontender nondistended  Musculoskeletal: Neurovascular intact to extremity.  Decreased range of motion of the knee.  Tender to palpation to the knee.  Positive pulses.  Negative Jd.  Decreased range of motion.  Neurological: Grossly intact    Result Review    Result Review:  I have personally reviewed the results from the time of this admission to 10/18/2022 20:23 EDT and agree with these findings:  []  Laboratory list / accordion  []  Microbiology  [x]  Radiology  []  EKG/Telemetry   []  Cardiology/Vascular   []  Pathology  []  Old records  []  Other:  Most notable findings include: Right knee osteoarthritis      Assessment & Plan   Assessment / Plan     Brief Patient Summary:  Katarzyna Norris is a 52 y.o. female who has right knee osteoarthritis    Active Hospital Problems:  Active Hospital Problems    Diagnosis    • Arthritis of knee, right    • Pain in both knees      Plan: We discussed treatment options with the patient.  Risk and benefits of surgery were discussed.  Informed consent was obtained and she wished to proceed.  Plan to proceed with right total knee replacement.      DVT prophylaxis:  No DVT prophylaxis order currently exists.    CODE STATUS:       Admission Status:  I believe this patient meets outpatient status.    Marco Nelson MD

## 2022-10-20 VITALS
OXYGEN SATURATION: 93 % | RESPIRATION RATE: 16 BRPM | SYSTOLIC BLOOD PRESSURE: 107 MMHG | WEIGHT: 271.83 LBS | HEART RATE: 92 BPM | BODY MASS INDEX: 50.02 KG/M2 | HEIGHT: 62 IN | TEMPERATURE: 98.5 F | DIASTOLIC BLOOD PRESSURE: 63 MMHG

## 2022-10-20 LAB
ANION GAP SERPL CALCULATED.3IONS-SCNC: 10.4 MMOL/L (ref 5–15)
BUN SERPL-MCNC: 38 MG/DL (ref 6–20)
BUN/CREAT SERPL: 10.9 (ref 7–25)
CALCIUM SPEC-SCNC: 9.4 MG/DL (ref 8.6–10.5)
CHLORIDE SERPL-SCNC: 98 MMOL/L (ref 98–107)
CO2 SERPL-SCNC: 24.6 MMOL/L (ref 22–29)
CREAT SERPL-MCNC: 3.48 MG/DL (ref 0.57–1)
EGFRCR SERPLBLD CKD-EPI 2021: 15 ML/MIN/1.73
GLUCOSE SERPL-MCNC: 163 MG/DL (ref 65–99)
HCT VFR BLD AUTO: 27 % (ref 34–46.6)
HGB BLD-MCNC: 9 G/DL (ref 12–15.9)
MAGNESIUM SERPL-MCNC: 1.6 MG/DL (ref 1.6–2.6)
PHOSPHATE SERPL-MCNC: 3.3 MG/DL (ref 2.5–4.5)
POTASSIUM SERPL-SCNC: 5.3 MMOL/L (ref 3.5–5.2)
SODIUM SERPL-SCNC: 133 MMOL/L (ref 136–145)

## 2022-10-20 PROCEDURE — 63710000001 ACETAMINOPHEN 500 MG TABLET: Performed by: ORTHOPAEDIC SURGERY

## 2022-10-20 PROCEDURE — 63710000001 CLONIDINE 0.2 MG TABLET: Performed by: INTERNAL MEDICINE

## 2022-10-20 PROCEDURE — 97165 OT EVAL LOW COMPLEX 30 MIN: CPT

## 2022-10-20 PROCEDURE — 80048 BASIC METABOLIC PNL TOTAL CA: CPT | Performed by: INTERNAL MEDICINE

## 2022-10-20 PROCEDURE — 99213 OFFICE O/P EST LOW 20 MIN: CPT | Performed by: INTERNAL MEDICINE

## 2022-10-20 PROCEDURE — 83735 ASSAY OF MAGNESIUM: CPT | Performed by: INTERNAL MEDICINE

## 2022-10-20 PROCEDURE — 25010000002 KETOROLAC TROMETHAMINE PER 15 MG: Performed by: ORTHOPAEDIC SURGERY

## 2022-10-20 PROCEDURE — A9270 NON-COVERED ITEM OR SERVICE: HCPCS | Performed by: INTERNAL MEDICINE

## 2022-10-20 PROCEDURE — 63710000001 APIXABAN 2.5 MG TABLET: Performed by: ORTHOPAEDIC SURGERY

## 2022-10-20 PROCEDURE — 85014 HEMATOCRIT: CPT | Performed by: ORTHOPAEDIC SURGERY

## 2022-10-20 PROCEDURE — 97150 GROUP THERAPEUTIC PROCEDURES: CPT

## 2022-10-20 PROCEDURE — 63710000001 CARVEDILOL 12.5 MG TABLET: Performed by: INTERNAL MEDICINE

## 2022-10-20 PROCEDURE — A9270 NON-COVERED ITEM OR SERVICE: HCPCS | Performed by: ORTHOPAEDIC SURGERY

## 2022-10-20 PROCEDURE — 84100 ASSAY OF PHOSPHORUS: CPT | Performed by: INTERNAL MEDICINE

## 2022-10-20 PROCEDURE — 94799 UNLISTED PULMONARY SVC/PX: CPT

## 2022-10-20 PROCEDURE — 97116 GAIT TRAINING THERAPY: CPT

## 2022-10-20 PROCEDURE — 63710000001 SODIUM BICARBONATE 650 MG TABLET: Performed by: INTERNAL MEDICINE

## 2022-10-20 PROCEDURE — 63710000001 VITAMIN B-12 100 MCG TABLET: Performed by: INTERNAL MEDICINE

## 2022-10-20 PROCEDURE — 97535 SELF CARE MNGMENT TRAINING: CPT

## 2022-10-20 PROCEDURE — 85018 HEMOGLOBIN: CPT | Performed by: ORTHOPAEDIC SURGERY

## 2022-10-20 RX ORDER — ASPIRIN 325 MG
325 TABLET, DELAYED RELEASE (ENTERIC COATED) ORAL DAILY
Qty: 21 TABLET | Refills: 0 | Status: SHIPPED | OUTPATIENT
Start: 2022-10-28 | End: 2023-03-10

## 2022-10-20 RX ORDER — HYDROCODONE BITARTRATE AND ACETAMINOPHEN 7.5; 325 MG/1; MG/1
1-2 TABLET ORAL EVERY 6 HOURS PRN
Qty: 40 TABLET | Refills: 0 | Status: SHIPPED | OUTPATIENT
Start: 2022-10-20 | End: 2022-11-04 | Stop reason: SDUPTHER

## 2022-10-20 RX ORDER — CARVEDILOL 25 MG/1
12.5 TABLET ORAL 2 TIMES DAILY WITH MEALS
Start: 2022-10-20 | End: 2023-01-18

## 2022-10-20 RX ORDER — FAMOTIDINE 40 MG/1
40 TABLET, FILM COATED ORAL NIGHTLY
Start: 2022-10-20 | End: 2023-03-10

## 2022-10-20 RX ADMIN — KETOROLAC TROMETHAMINE 15 MG: 30 INJECTION, SOLUTION INTRAMUSCULAR; INTRAVENOUS at 03:09

## 2022-10-20 RX ADMIN — ACETAMINOPHEN 1000 MG: 500 TABLET, FILM COATED ORAL at 06:09

## 2022-10-20 RX ADMIN — APIXABAN 2.5 MG: 2.5 TABLET, FILM COATED ORAL at 08:55

## 2022-10-20 RX ADMIN — KETOROLAC TROMETHAMINE 15 MG: 30 INJECTION, SOLUTION INTRAMUSCULAR; INTRAVENOUS at 08:56

## 2022-10-20 RX ADMIN — VITAM B12 50 MCG: 100 TAB at 08:55

## 2022-10-20 RX ADMIN — CARVEDILOL 25 MG: 12.5 TABLET, FILM COATED ORAL at 08:54

## 2022-10-20 RX ADMIN — SODIUM BICARBONATE 650 MG TABLET 650 MG: at 08:54

## 2022-10-20 RX ADMIN — CLONIDINE HYDROCHLORIDE 0.2 MG: 0.2 TABLET ORAL at 06:09

## 2022-10-20 NOTE — DISCHARGE SUMMARY
[facility]         HOSPITALIST  DISCHARGE SUMMARY/Progress note     Patient Name: Katarzyna Norris  : 1970  MRN: [id number]    Date of Admission: 10/19/2022  Date of Discharge:  10/20/2022    Primary Care Physician: Brian Ellis APRN    Consults     Date and Time Order Name Status Description    10/19/2022 10:16 AM Inpatient Hospitalist Consult            Active and Resolved Hospital Problems:  Active Hospital Problems    Diagnosis POA   • **Primary localized osteoarthritis of right knee [M17.11] Yes   • Arthritis of knee, right [M17.11] Unknown   • Pain in both knees [M25.561, M25.562] Unknown      Resolved Hospital Problems   No resolved problems to display.       Hospital Course     Hospital Course:  Katarzyna Norris is a 52 y.o. female with a h/o HTN, HPLD, CKD4, R knee OA who presents for elective R TKA after failing conservative outpatient treatments. She has a h/o HTN nephropathy and follows with Dr Carvalho. No history of DM2 or CAD or any lung problems. Underwent surgery this am and is doing well post-op. Patient to go home today     Please note that patient has chronic kidney disease at baseline her creatinine here is 3.4 difficult to tell what her baseline is.  Her potassium is also borderline high at 5.2.  At this time I have discontinued her losartan until she follows up with her kidney doctor next week to discuss restarting it.              Day of Discharge     Vital Signs:  Temp:  [97.3 °F (36.3 °C)-98.6 °F (37 °C)] 98.5 °F (36.9 °C)  Heart Rate:  [69-92] 92  Resp:  [16] 16  BP: (100-148)/(53-97) 107/63  Flow (L/min):  [1-2] 1  Physical Exam:   Gen: NAD, conversant. Sitting in bedside chair   Eyes: conjunctiva clear, moist mucous membranes  HENT: Atraumatic, oropharynx clear with moist mucous membranes  Neck: supple   Resp: normal respiratory effort, CTAB  CV: RRR, no MRGs no edema  GI: Soft, nontender, nondistended, (+) BS.  Psych: Alert and Oriented x 3, Mood  and affect appropriate to situation  Skin: warm and dry on palpation. No rash or ulcers on inspection.  M/S: full  Range of motion. Right knee incision is clean dry and intact bandage over   Neuro: normal motor function in all 4 ext, no focal deficits       Discharge Details        Discharge Medications      New Medications      Instructions Start Date   apixaban 2.5 MG tablet tablet  Commonly known as: ELIQUIS   2.5 mg, Oral, 2 Times Daily      aspirin  MG tablet  Commonly known as: Ecotrin   325 mg, Oral, Daily   Start Date: October 28, 2022     HYDROcodone-acetaminophen 7.5-325 MG per tablet  Commonly known as: Norco   1-2 tablets, Oral, Every 6 Hours PRN         Stop These Medications    Aspirin Buf(CaCarb-MgCarb-MgO) 81 MG tablet        ASK your doctor about these medications      Instructions Start Date   allopurinol 300 MG tablet  Commonly known as: ZYLOPRIM   300 mg, Oral, 2 Times Daily      atorvastatin 20 MG tablet  Commonly known as: LIPITOR   20 mg, Oral, Daily      carvedilol 25 MG tablet  Commonly known as: COREG   25 mg, Oral, 2 Times Daily With Meals      cetirizine 10 MG tablet  Commonly known as: zyrTEC   10 mg, Oral, Nightly      cloNIDine 0.2 MG tablet  Commonly known as: CATAPRES   0.2 mg, Oral, 3 Times Daily      famotidine 40 MG tablet  Commonly known as: Pepcid   40 mg, Oral, Nightly      losartan 100 MG tablet  Commonly known as: COZAAR   Take 1 tablet by mouth once daily      sodium bicarbonate 650 MG tablet   650 mg, Oral, 2 Times Daily      vitamin B-12 100 MCG tablet  Commonly known as: CYANOCOBALAMIN   50 mcg, Oral, Daily      ZINC 15 PO   15 mg, Oral, Daily         NOTE LOSARTAN HAS BEEN STOPPED FOR NOW UNTIL FOLLOW UP WITH NEPHROLOGY     Allergies   Allergen Reactions   • Amlodipine Shortness Of Breath   • Diltiazem Hcl Anxiety       Discharge Disposition:      Diet:  Hospital:  Diet Order   Procedures   • Diet Regular       Discharge Activity:   Activity Instructions      Discharge Activity      Weightbearing as tolerated with walker  Outpatient physical therapy  Dressing change likely will need postoperative day #2 to new Aquacel.  Then leave for 5 to 7 days.  May shower with Aquacel in place  Call with any problems  Start Ecotrin after Eliquis completed  Follow-up 2 weeks  Norco prescription  Ice and elevate extremity          CODE STATUS:  There are no questions and answers to display.         Future Appointments   Date Time Provider Department Center   10/21/2022  2:30 PM Efe Turcios, MICHELINE M[facility] PT [place] [facility]   11/1/2022  3:30 PM Dionte Portillo PA [facility] ORS [place] [facility]   11/3/2022  3:00 PM Denise James APRN [facility] MEGAN [facility] [facility]   11/29/2022  1:30 PM Raul Tripp MD [facility] OBG [place] [facility]   12/30/2022 10:00 AM Marco Nelson MD [facility] ORS R[place] [facility]       Additional Instructions for the Follow-ups that You Need to Schedule     Discharge Follow-up with Specified Provider: Dionte Portillo PA-C; 2 Weeks   As directed      To: Dionte Portillo PA-C    Follow Up: 2 Weeks               Pertinent  and/or Most Recent Results     PROCEDURES:   RIGHT TOTAL KNEE ARTHROPLASTY   LAB RESULTS:      Lab 10/20/22  0418   HEMOGLOBIN 9.0*   HEMATOCRIT 27.0*         Lab 10/20/22  0418 10/19/22  0629   SODIUM 133*  --    POTASSIUM 5.3* 4.6   CHLORIDE 98  --    CO2 24.6  --    ANION GAP 10.4  --    BUN 38*  --    CREATININE 3.48*  --    EGFR 15.0*  --    GLUCOSE 163*  --    CALCIUM 9.4  --    MAGNESIUM 1.6  --    PHOSPHORUS 3.3  --                          Brief Urine Lab Results  (Last result in the past 365 days)      Color   Clarity   Blood   Leuk Est   Nitrite   Protein   CREAT   Urine HCG        09/30/22 1251 Yellow   Cloudy   Trace   Negative   Negative   100 mg/dL               Microbiology Results (last 10 days)     ** No results found for the last 240 hours. **          XR Knee 1 or 2 View Right    Result Date: 10/19/2022  Impression:  Expected postoperative changes from  total knee arthroplasty with patellar resurfacing      MICHELLE CRUZ MD       Electronically Signed and Approved By: MICHELLE CRUZ MD on 10/19/2022 at 11:10             US Non-ob Transvaginal    Result Date: 10/13/2022  Impression:   1. Uterine fibroid. 2. The endometrial stripe thickness is normal. 3. The ovaries were not visualized.     DARIN AKERS MD       Electronically Signed and Approved By: DARIN AKERS MD on 10/13/2022 at 16:46             XR Abdomen KUB    Result Date: 10/3/2022  Impression:   1. Moderate amount of stool in the ascending and transverse colon 2. Previous cholecystectomy     Malik Arthur M.D.       Electronically Signed and Approved By: Malik Arthur M.D. on 10/03/2022 at 9:52               Results for orders placed in visit on 01/11/22    Duplex Venous Lower Extremity - Left CAR    Interpretation Summary  · Normal left lower extremity venous duplex scan.      Results for orders placed in visit on 01/11/22    Duplex Venous Lower Extremity - Left CAR    Interpretation Summary  · Normal left lower extremity venous duplex scan.      Results for orders placed in visit on 09/23/22    Adult Transthoracic Echo Complete W/ Cont if Necessary Per Protocol    Interpretation Summary  Normal left ventricular systolic function.  Trace tricuspid regurgitation.  No significant valve abnormalities noted.            Electronically signed by Ish Weston DO, 10/20/22, 12:23 PM EDT.

## 2022-10-20 NOTE — PROGRESS NOTES
[facility]     Progress Note    Patient Name: Katarzyna Norris  : 1970  MRN: [id number]  Primary Care Physician:  Brian Ellis APRN  Date of admission: 10/19/2022    Subjective   Subjective     HPI:  Patient Reports doing well this morning.  She has done well with therapy.  Her pain is controlled.  She denies any chest pain or shortness of air.    Review of Systems   See HPI    Objective   Objective     Vitals:   Temp:  [97.2 °F (36.2 °C)-98.6 °F (37 °C)] 98.6 °F (37 °C)  Heart Rate:  [50-96] 80  Resp:  [14-20] 16  BP: (100-190)/() 132/53  Flow (L/min):  [1-3] 1  Physical Exam    General: Alert, no acute distress   Chest: Unlabored breathing, cardiovascular: Regular heart rate   Musculoskeletal: Neurovascular intact extremity.  Dressing intact with moderate bloody drainage.  Positive pulses.  Negative Jd    Result Review      Hemoglobin   Date Value Ref Range Status   10/20/2022 9.0 (L) 12.0 - 15.9 g/dL Final     Hematocrit   Date Value Ref Range Status   10/20/2022 27.0 (L) 34.0 - 46.6 % Final        Result Review:  I have personally reviewed the results from the time of this admission to 10/20/2022 07:47 EDT and agree with these findings:  [x]  Laboratory  []  Microbiology  [x]  Radiology  []  EKG/Telemetry   []  Cardiology/Vascular   []  Pathology  []  Old records  []  Other:      Assessment & Plan   Assessment / Plan     Brief Patient Summary:  Katarzyna Norris is a 52 y.o. female who is postoperative day #1 status post right total knee replacement    Active Hospital Problems:  Active Hospital Problems    Diagnosis    • **Primary localized osteoarthritis of right knee    • Arthritis of knee, right    • Pain in both knees      Plan: Weightbearing as tolerated with walker  Physical and Occupational Therapy  Pain control  DVT prophylaxis  Discharge planning: Likely discharge home today with outpatient therapy and follow-up       DVT prophylaxis:  Medical and mechanical DVT  prophylaxis orders are present.    CODE STATUS:      Disposition:  I expect patient to be discharged likely later today.    Electronically signed by Marco Nelson MD, 10/20/22, 7:47 AM EDT.

## 2022-10-20 NOTE — PLAN OF CARE
Goal Outcome Evaluation:  Plan of Care Reviewed With: patient, spouse        Progress: improving (Through skilled OT services, patient is now able to perform LB bathing min assist and LB dressing mod assist with setup and cues for safe positioning and adaptive techniques.)  Outcome Evaluation: Patient presents with limitations of balance and standing endurance/activity tolerance which are impacting ADL and transfer independence. Skilled OT services are needed to remediate/compensate for deficits to maximize independence and safety.    DC recommendation: outpatient PT

## 2022-10-20 NOTE — DISCHARGE INSTRUCTIONS
Aquacell dressing changed on Post op day 3, 10/22  Clean incision with alcohol pads and apply new aquacell bandage. Leave on until 10/25  Then start daily dressing changes until follow up ortho appointment. Take off old banadage, clean site with alcohol pads with every dressing change and apply ABD pad, tape down.    With extra incision from Angelica robot, start daily dressing changes on post op day 3. Clean with alcohol pads. Cover with gauze and tape.

## 2022-10-20 NOTE — THERAPY TREATMENT NOTE
Acute Care - Physical Therapy Treatment Note  CHRISTIAN Valerio     Patient Name: Katarzyna Norris  : 1970  MRN: [id number]  Today's Date: 10/20/2022 Gait- individual; ther- ex -group setting; 2 participants         Visit Dx:     ICD-10-CM ICD-9-CM   1. Difficulty in walking  R26.2 719.7   2. Arthritis of knee, right  M17.11 716.96   3. Primary localized osteoarthritis of right knee  M17.11 715.16   4. Decreased activities of daily living (ADL)  Z78.9 V49.89     Patient Active Problem List   Diagnosis   • Gout   • Anxiety   • Hypertension   • Sinus problem   • Pain in both knees   • Primary osteoarthritis of left knee   • Anemia   • Impaired fasting glucose   • Hyperlipidemia   • Screening mammogram, encounter for   • Osteoarthritis of left knee   • Acute non-recurrent maxillary sinusitis   • Status post replacement of knee joint   • Pain of left calf   • Swelling of calf   • Cyst of ovary   • Asthma   • Abdominal pain   • Metabolic acidosis   • Nephritic syndrome   • Obesity   • Stage 3 chronic kidney disease (HCC)   • Urinary tract infectious disease   • Vitamin D deficiency   • Hyperkalemia   • Elevated ferritin   • Burn   • Aftercare following surgery of left total knee arthroplasty, 2021   • Rash   • Hypercalcemia   • Acute pain of left shoulder   • Arthritis of knee, right   • Lateral cutaneous femoral nerve of thigh compression or syndrome, right   • Bilateral hip pain   • Primary localized osteoarthritis of right knee     Past Medical History:   Diagnosis Date   • Anxiety    • Arthritis     GILBERT KNEES   • Asthma     SEASONAL ALLERGIES   • CKD (chronic kidney disease)     Stage 3, Follows with [facility]   • Elevated cholesterol    • Gout 2021   • Hernia     Upper   • Hiatal hernia    • Hypertension    • Renal insufficiency     follows with [facility]   • Sinus problem     SEASONAL ALLERGIES     Past Surgical History:   Procedure Laterality Date   • BONY PELVIS SURGERY      Plate   •  SECTION    and 1989   • CHOLECYSTECTOMY  2011   • COLONOSCOPY  2009   • ENDOSCOPY  2009   • TOTAL KNEE ARTHROPLASTY Left 12/22/2021    Procedure: TOTAL KNEE ARTHROPLASTY;  Surgeon: Marco Nelson MD;  Location: [facility] MAIN OR;  Service: Orthopedics;  Laterality: Left;   • TUBAL ABDOMINAL LIGATION  1989     PT Assessment (last 12 hours)     PT Evaluation and Treatment     Row Name 10/20/22 1154          Physical Therapy Time and Intention    Subjective Information complains of;pain;weakness  -     Document Type therapy note (daily note)  -     Mode of Treatment physical therapy;group therapy;individual therapy  -     Patient Effort good  -     Row Name 10/20/22 1154          Pain Scale: FACES Pre/Post-Treatment    Pain: FACES Scale, Pretreatment 2-->hurts little bit  -RH     Posttreatment Pain Rating 2-->hurts little bit  -RH     Pain Location - Side/Orientation Right  -     Pain Location - knee  -     Row Name 10/20/22 1154          Range of Motion (ROM)    Range of Motion --  Pt R knee AAROM at 90 degrees flex and 5 degrees ext.  -     Row Name 10/20/22 1154          Strength (Manual Muscle Testing)    Strength (Manual Muscle Testing) --  Pt R knee ext strength at 3/5.  -     Row Name 10/20/22 1154          Transfers    Transfers sit-stand transfer;stand-sit transfer  -[facility] Name 10/20/22 1154          Sit-Stand Transfer    Sit-Stand [place] (Transfers) contact [facility]  -     Assistive Device (Sit-Stand Transfers) walker, front-wheeled  -[facility] Name 10/20/22 1154          Stand-Sit Transfer    Stand-Sit [place] (Transfers) contact [facility]  -     Assistive Device (Stand-Sit Transfers) walker, front-wheeled  -[facility] Name 10/20/22 1154          Gait/Stairs (Locomotion)    Gait/Stairs Locomotion gait/ambulation independence;gait/ambulation assistive device;distance ambulated;gait pattern;gait deviations  -     [place] Level (Gait) contact guard  -     Assistive Device (Gait)  walker, front-wheeled  -RH     Distance in Feet (Gait) 160  -RH     Pattern (Gait) 3-point;step-through  -RH     Deviations/Abnormal Patterns (Gait) antalgic;base of support, narrow;gait speed decreased;stride length decreased  -RH     Bilateral Gait Deviations heel strike decreased  -RH     Negotiation (Stairs) stairs independence;stairs assistive device;handrail location;number of steps;ascending technique;descending technique  -RH     [place] Level (Stairs) contact guard  -RH     Handrail Location (Stairs) both sides  -RH     Number of Steps (Stairs) 5 x 2  -RH     Ascending Technique (Stairs) step-to-step  -RH     Descending Technique (Stairs) step-to-step  -RH     Row Name 10/20/22 1154          Balance    Dynamic Standing Balance contact guard  -RH     Position/Device Used, Standing Balance walker, front-wheeled  -RH     Row Name             Wound 10/19/22 0852 Right anterior knee Incision    Wound - Properties Group Placement Date: 10/19/22  -BW Placement Time: 0852  -BW Present on Hospital Admission: N  -BW Side: Right  -BW Orientation: anterior  -BW Location: knee  -BW Primary Wound Type: Incision  -BW    Retired Wound - Properties Group Placement Date: 10/19/22  -BW Placement Time: 0852  -BW Present on Hospital Admission: N  -BW Side: Right  -BW Orientation: anterior  -BW Location: knee  -BW Primary Wound Type: Incision  -BW    Retired Wound - Properties Group Date first assessed: 10/19/22  -BW Time first assessed: 0852  -BW Present on Hospital Admission: N  -BW Side: Right  -BW Location: knee  -BW Primary Wound Type: Incision  -BW    Row Name 10/20/22 1154          Vital Signs    O2 Delivery Intra Treatment room air  -RH     Row Name 10/20/22 1154          Progress Summary (PT)    Progress Toward Functional Goals (PT) progress toward functional goals is good  -RH           User Key  (r) = Recorded By, (t) = Taken By, (c) = Cosigned By    Initials Name Provider Type    Julian Rushing RN  Registered Nurse    Joshua Orosco PTA Physical Therapist Assistant                Right Knee Ther-ex   Exercise  Reps  Sets    Long arc Quads   10 2   Short arc Quads  10 2   Heel Slides  10 2   Ankle Pumps  10 2   Quad sets  10 2   Glut sets  10 2   Straight leg raise  10 2          Physical Therapy Education     Title: PT OT SLP Therapies (In Progress)     Topic: Physical Therapy (Done)     Point: Mobility training (Done)     Learning Progress Summary           Patient Acceptance, E,TB, VU by BO at 10/19/2022 1331                   Point: Precautions (Done)     Learning Progress Summary           Patient Acceptance, E,TB, VU by BO at 10/19/2022 1331                               User Key     Initials Effective Dates Name Provider Type Discipline    BO 06/03/21 -  John Ospina PT Physical Therapist PT              PT Recommendation and Plan     Progress Summary (PT)  Progress Toward Functional Goals (PT): progress toward functional goals is good   Outcome Measures     Row Name 10/20/22 1200 10/19/22 1331          How much help from another person do you currently need...    Turning from your back to your side while in flat bed without using bedrails? 4  -RH 3  -BO     Moving from lying on back to sitting on the side of a flat bed without bedrails? 3  -RH 3  -BO     Moving to and from a bed to a chair (including a wheelchair)? 3  -RH 3  -BO     Standing up from a chair using your arms (e.g., wheelchair, bedside chair)? 3  -RH 3  -BO     Climbing 3-5 steps with a railing? 3  -RH 3  -BO     To walk in hospital room? 4  -RH 3  -BO     AM-PAC 6 Clicks Score (PT) 20  -RH 18  -BO        Functional Assessment    Outcome Measure Options -- AM-PAC 6 Clicks Basic Mobility (PT)  -BO           User Key  (r) = Recorded By, (t) = Taken By, (c) = Cosigned By    Initials Name Provider Type    Joshua Orosco PTA Physical Therapist Assistant    John Sawyer, PT Physical Therapist                 Time Calculation:     PT Charges     Row Name 10/20/22 1153             Time Calculation    PT Received On 10/20/22  -RH         Timed Charges    64347 - Gait Training Minutes  10  -RH      96797 - PT Therapeutic Activity Minutes 3  -RH         Untimed Charges    PT Group Therapy Minutes 30  -RH         Total Minutes    Timed Charges Total Minutes 13  -RH      Untimed Charges Total Minutes 30  -RH       Total Minutes 43  -RH            User Key  (r) = Recorded By, (t) = Taken By, (c) = Cosigned By    Initials Name Provider Type     Joshua Haddad PTA Physical Therapist Assistant              Therapy Charges for Today     Code Description Service Date Service Provider Modifiers Qty    84802675985 HC GAIT TRAINING EA 15 MIN 10/20/2022 Joshua Haddad PTA GP 1    48805969186 HC PT THER PROC GROUP 10/20/2022 Joshua Haddad PTA GP 1          PT G-Codes  Outcome Measure Options: AM-PAC 6 Clicks Daily Activity (OT), Optimal Instrument  AM-PAC 6 Clicks Score (PT): 20  AM-PAC 6 Clicks Score (OT): 20    Joshua Haddad PTA  10/20/2022

## 2022-10-20 NOTE — OP NOTE
TOTAL KNEE ARTHROPLASTY  Procedure Report    Patient Name:  Katarzyna Norris  YOB: 1970    Date of Surgery:  10/19/2022     Indications:  The patient is a 53yo female with advanced right knee osteoarthritis. She has failed conservative treatment and wishes to undergo knee replacement. Risks and benefits of the surgery were discussed. Informed consent was obtained. Risk of bleeding, infection, DVT/PE, anesthesia complications including death, stiffness, instability, periprosthetic fracture, among others.     Pre-op Diagnosis:   Arthritis of knee, right [M17.11]  Right knee pain, unspecified chronicity [M25.561]       Post-Op Diagnosis Codes:     * Arthritis of knee, right [M17.11]     * Right knee pain, unspecified chronicity [M25.561]    Procedure/CPT® Codes:      Procedure(s):  RIGHT TOTAL KNEE ARTHROPLASTY - NO SHELBY    Staff:  Surgeon(s):  Marco Nelson MD    Assistant: Honey Gama RN; Dionte Portillo PA    Surgical Approach: Knee Medial Parapatellar        Anesthesia: General with Block    Estimated Blood Loss: 100 mL    Implants:    Implant Name Type Inv. Item Serial No.  Lot No. LRB No. Used Action   CMT BONE PALACOS R HI/VISC 1X40 - [id number] Implant CMT BONE PALACOS R HI/VISC 1X40  [facility] [id number] Right 2 Implanted   COMP FEM/KN PERSONA CR CMT NRW SZ6 RT - [id number] Implant COMP FEM/KN PERSONA CR CMT NRW SZ6 RT  [employer] [id number] Right 1 Implanted   STEM TIB PERSONA CMT 5D SZC RT - [id number] Implant STEM TIB PERSONA CMT 5D SZC RT  [employer] [id number] Right 1 Implanted   PAT PERSONA ALLPOLY CMT 8X29MM - [id number] Implant PAT PERSONA ALLPOLY CMT 8X29MM  [employer] [id number] Right 1 Implanted   ART/SRF KN PERSONA/VE MC EF 6TO7 12MM RT - [id number] Implant ART/SRF KN PERSONA/VE MC EF 6TO7 12MM RT  [employer] [id number] Right 1 Implanted       Specimen:          None        Findings: Right knee osteoarthritis    Complications:  None    Description of Procedure: The patient was brought to the operating room and placed supine on the OR table.  General anesthesia was given.  Preoperatively, the patient received an adductor canal nerve block. The patient was placed supine on the OR table.  All bony prominences were padded. The tourniquet was placed on the thigh. The affected lower extremity was prepped and draped in the usual sterile fashion. Preoperative antibiotic was given. Tranexamic acid intravenously was given at the beginning and the end of the surgery.  At this point, an Esmarch was used to exsanguinate the limb and the tourniquet was inflated. A longitudinal incision was made over the knee and a medial parapatellar arthrotomy was performed.  Appropriate releases were performed to the proximal medial tibia. The patellar fat pad was incised.  The patella was subluxed laterally and the knee was examined. There was severe tricompartmental wear and osteophyte formation.  The medial and lateral menisci were removed.  Osteophytes in the femur were debrided and intramedullary drill hole was made and a guide was placed in the femur.  A distal femoral cut was made at 5 degrees valgus angulation. At this point, we then sized the femur with posterior referencing with 3 degrees external rotation, pinning the cutting block into place.  The anterior, posterior, and chamfer cuts were made.  The bone was removed.  The tibia was then subluxed anteriorly and the extramedullary tibial guide was placed.  A 7 degree posterior slope was used.  This was pinned into place in alignment with the tibial crest and the second ray.  The proximal tibial cut was then made.  The spacer block was able to fit well in flexion and extension. Therefore, the tibial pins were removed. We then placed the knee in flexion where laminar spreaders were used to open the flexion gaps.  The menisci were removed.  Osteophytes were removed from the posterior femur. The posterior  capsule was injected with anesthetic cocktail.  At this point, the appropriately sized tibial tray was chosen.  This was pinned into place in line with the medial one third of the tibial tubercle. We then placed the trial femur. The trial insert was placed and the knee was brought to full extension. It was stable to varus and valgus stress.  We then everted the patella. It was resected and drilled, and a trial patella was placed. The patella tracked well with no evidence of instability.  The knee was then trialed with range of motion again. The femoral drill holes were made. The tibia was finished with the drill and the punch.  The components were removed. The knee was irrigated and dried. The cement was mixed and the tibia and femur were cemented into place.  The medial congruent spacer was then inserted.  The patella was cemented into place. The knee was irrigated with Irrisept and normal saline Pulsavac lavage.  The medial congruent polyethylene was inserted. The knee was stable to varus and valgus stress. There was good balance to the ligaments medially and laterally. There was good flexion with a stable patella. At this point, the tourniquet was released.  There was minimal bleeding controlled with electrocautery. The arthrotomy was closed with #1 Vicryl at 30 degrees of flexion, the subcutaneous tissues with 2-0 Vicryl, and the skin with staples.  An Aquacel dressing was placed and an Ace wrap was placed. Patient awoke from anesthesia in stable condition. There were no complications. All counts were correct.           Assistant: Honey Gama RN; Dionte Portillo PA  was responsible for performing the following activities: Retraction, Suction, Irrigation and Placing Dressing and their skilled assistance was necessary for the success of this case.    Marco Nelson MD     Date: 10/19/2022  Time: 20:23 EDT

## 2022-10-20 NOTE — SIGNIFICANT NOTE
10/20/22 0739   Plan   Final Discharge Disposition Code 01 - home or self-care   Final Note Home with Outpatient Therapy at  PT in [place]. Appt. 10/21/22 at 2:30pm

## 2022-10-20 NOTE — PLAN OF CARE
Goal Outcome Evaluation:  Plan of Care Reviewed With: patient        Progress: no change  Outcome Evaluation: pt. ambulated 250 feet with one person assist with walker.  pt. medicated with scheduled pain medication with relief noted.  upon discharge pt will be going to outpt PT.

## 2022-10-20 NOTE — THERAPY EVALUATION
Patient Name: Katarzyna Norris  : 1970    MRN: [id number]                              Today's Date: 10/20/2022       Admit Date: 10/19/2022    Visit Dx:     ICD-10-CM ICD-9-CM   1. Difficulty in walking  R26.2 719.7   2. Arthritis of knee, right  M17.11 716.96   3. Primary localized osteoarthritis of right knee  M17.11 715.16   4. Decreased activities of daily living (ADL)  Z78.9 V49.89     Patient Active Problem List   Diagnosis   • Gout   • Anxiety   • Hypertension   • Sinus problem   • Pain in both knees   • Primary osteoarthritis of left knee   • Anemia   • Impaired fasting glucose   • Hyperlipidemia   • Screening mammogram, encounter for   • Osteoarthritis of left knee   • Acute non-recurrent maxillary sinusitis   • Status post replacement of knee joint   • Pain of left calf   • Swelling of calf   • Cyst of ovary   • Asthma   • Abdominal pain   • Metabolic acidosis   • Nephritic syndrome   • Obesity   • Stage 3 chronic kidney disease (HCC)   • Urinary tract infectious disease   • Vitamin D deficiency   • Hyperkalemia   • Elevated ferritin   • Burn   • Aftercare following surgery of left total knee arthroplasty, 2021   • Rash   • Hypercalcemia   • Acute pain of left shoulder   • Arthritis of knee, right   • Lateral cutaneous femoral nerve of thigh compression or syndrome, right   • Bilateral hip pain   • Primary localized osteoarthritis of right knee     Past Medical History:   Diagnosis Date   • Anxiety    • Arthritis     GILBERT KNEES   • Asthma     SEASONAL ALLERGIES   • CKD (chronic kidney disease)     Stage 3, Follows with [facility]   • Elevated cholesterol    • Gout 2021   • Hernia     Upper   • Hiatal hernia    • Hypertension    • Renal insufficiency     follows with [facility]   • Sinus problem     SEASONAL ALLERGIES     Past Surgical History:   Procedure Laterality Date   • BONY PELVIS SURGERY      Plate   •  SECTION   and    • CHOLECYSTECTOMY     • COLONOSCOPY     •  ENDOSCOPY  2009   • TOTAL KNEE ARTHROPLASTY Left 12/22/2021    Procedure: TOTAL KNEE ARTHROPLASTY;  Surgeon: Marco Nelson MD;  Location: [facility] MAIN OR;  Service: Orthopedics;  Laterality: Left;   • TUBAL ABDOMINAL LIGATION  1989      General Information     Row Name 10/20/22 0953 10/20/22 0941       OT Time and Intention    Document Type therapy note (daily note)  -AV evaluation  -AV    Mode of Treatment individual therapy;occupational therapy  -AV individual therapy;occupational therapy  -AV    Row Name 10/20/22 0953 10/20/22 0941       General Information    Patient Profile Reviewed -- yes  -AV    Prior Level of Function -- independent:;ADL's;all household mobility  Independent with ADLs at home. Stands to shower (walk-in w seat available). Stands at sink to groom. regular commode. ambulates with rollator. no home O2. She does not perform IADLs at baseline.  -AV    Existing Precautions/Restrictions fall  WBAT  -AV fall  WBAT  -AV    Barriers to Rehab -- none identified  -AV    Row Name 10/20/22 0941          Occupational Profile    Reason for Services/Referral (Occupational Profile) Patient is a 52 year old female admitted to [facility] on 10/29/2022 with right knee pain. She is currently post-op day 1, on 2N/ room air. OT consulted due to recent decline in ADL/transfer independence. No previous OT services for current condition.  -AV     Row Name 10/20/22 0941          Living Environment    People in Home spouse;child(janna), adult   and sons  -AV     Row Name 10/20/22 0941          Home Main Entrance    Number of Stairs, Main Entrance three  -AV     Row Name 10/20/22 0941          Stairs Within Home, Primary    Number of Stairs, Within Home, Primary none  -AV     Row Name 10/20/22 0953 10/20/22 0941       Cognition    Orientation Status (Cognition) --  receptive to cues for safe positioning and adaptive techniques throughout session  -AV --  alert, pleasant and cooperative. able to  retain information and follow commands.  -AV    Row Name 10/20/22 0953 10/20/22 0941       Safety Issues, Functional Mobility    Impairments Affecting Function (Mobility) balance;endurance/activity tolerance  -AV balance;endurance/activity tolerance  -AV          User Key  (r) = Recorded By, (t) = Taken By, (c) = Cosigned By    Initials Name Provider Type    AV Sean Shah OT Occupational Therapist                 Mobility/ADL's     Row Name 10/20/22 0943          Bed Mobility    Comment, (Bed Mobility) seated in recliner upon arrival  -AV     Row Name 10/20/22 0943          Transfers    Comment, (Transfers) CGA/RW and minimal cues for safe technique  -AV     Row Name 10/20/22 0953          Sit-Stand Transfer    Sit-Stand [place] (Transfers) contact guard  -AV     Assistive Device (Sit-Stand Transfers) walker, front-wheeled  -AV     Comment, (Sit-Stand Transfer) x3 during functional ADL session  -AV     Row Name 10/20/22 0953 10/20/22 0943       Activities of Daily Living    BADL Assessment/Intervention --  (I) UB bathing/dressing w setup while seated. Min assist to wash feet w cues for safe positioning. Mod assist LB dressing: doff pants/socks mod (I), don pants CGA, Don socks max assist and lace up shoes mod assist. Cues for adaptive tech during session.  -AV --  Patient is able to feed self, groom and complete UB bathing/dressing  (I) with setup while seated. Min assist LB bathing. Mod assist LB dressing. Toilet hygiene CGA using elevated commode.  -AV    Row Name 10/20/22 0943          Mobility    Extremity Weight-bearing Status right lower extremity  -AV     Right Lower Extremity (Weight-bearing Status) weight-bearing as tolerated (WBAT)  -AV           User Key  (r) = Recorded By, (t) = Taken By, (c) = Cosigned By    Initials Name Provider Type    Sean Bentley OT Occupational Therapist               Obj/Interventions     Row Name 10/20/22 0945          Sensory Assessment (Somatosensory)     Sensory Assessment (Somatosensory) UE sensation intact  -AV     Row Name 10/20/22 0945          Vision Assessment/Intervention    Visual Impairment/Limitations WFL;corrective lenses full-time  -AV     Row Name 10/20/22 0945          Range of Motion Comprehensive    General Range of Motion bilateral upper extremity ROM WFL  AROM  -AV     Row Name 10/20/22 0945          Strength Comprehensive (MMT)    Comment, General Manual Muscle Testing (MMT) Assessment 5/5 bilateral upper extremities  -AV     Row Name 10/20/22 0945          Motor Skills    Motor Skills coordination;functional endurance  -AV     Coordination WFL  right dominant  -AV     Row Name 10/20/22 0956 10/20/22 0945       Balance    Balance Assessment sitting dynamic balance;standing dynamic balance  -AV sitting dynamic balance;standing dynamic balance  -AV    Dynamic Sitting Balance independent  -AV independent  -AV    Dynamic Standing Balance contact guard;verbal cues  -AV contact guard;verbal cues  -AV    Position/Device Used, Standing Balance walker, front-wheeled  -AV walker, front-wheeled  -AV    Balance Interventions sitting;standing;sit to stand;supported;minimal challenge;occupation based/functional task  -AV --          User Key  (r) = Recorded By, (t) = Taken By, (c) = Cosigned By    Initials Name Provider Type    AV Sean Shah OT Occupational Therapist               Goals/Plan     Row Name 10/20/22 0948          Transfer Goal 1 (OT)    Activity/Assistive Device (Transfer Goal 1, OT) transfers, all;walker, rolling  -AV     [place] Level/Cues Needed (Transfer Goal 1, OT) modified independence  -AV     Time Frame (Transfer Goal 1, OT) long term goal (LTG);10 days  -AV     Row Name 10/20/22 0948          Bathing Goal 1 (OT)    Activity/Device (Bathing Goal 1, OT) bathing skills, all;shower chair  -AV     [place] Level/Cues Needed (Bathing Goal 1, OT) modified independence  -AV     Time Frame (Bathing Goal 1, OT) long term goal (LTG);10  days  -AV     Row Name 10/20/22 0948          Dressing Goal 1 (OT)    Activity/Device (Dressing Goal 1, OT) dressing skills, all  -AV     [place]/Cues Needed (Dressing Goal 1, OT) modified independence  -AV     Time Frame (Dressing Goal 1, OT) long term goal (LTG);10 days  -AV     Row Name 10/20/22 0948          Toileting Goal 1 (OT)    Activity/Device (Toileting Goal 1, OT) toileting skills, all;raised toilet seat  -AV     [place] Level/Cues Needed (Toileting Goal 1, OT) modified independence  -AV     Time Frame (Toileting Goal 1, OT) long term goal (LTG);10 days  -AV     Row Name 10/20/22 0948          Grooming Goal 1 (OT)    Activity/Device (Grooming Goal 1, OT) grooming skills, all  -AV     [place] (Grooming Goal 1, OT) modified independence  standing at sink  -AV     Time Frame (Grooming Goal 1, OT) long term goal (LTG);10 days  -AV     Row Name 10/20/22 0948          Problem Specific Goal 1 (OT)    Problem Specific Goal 1 (OT) Patient will demonstrate fair plus/good endurance/ activity tolerance needed to support ADLs.  -AV     Time Frame (Problem Specific Goal 1, OT) long term goal (LTG);10 days  -AV     Row Name 10/20/22 0948          Therapy Assessment/Plan (OT)    Planned Therapy Interventions (OT) activity tolerance training;BADL retraining;functional balance retraining;occupation/activity based interventions;patient/caregiver education/training;transfer/mobility retraining  -AV           User Key  (r) = Recorded By, (t) = Taken By, (c) = Cosigned By    Initials Name Provider Type    AV Sean Shah, OT Occupational Therapist               Clinical Impression     Row Name 10/20/22 0946          Pain Assessment    Additional Documentation Pain Scale: FACES Pre/Post-Treatment (Group)  -AV     Row Name 10/20/22 0946          Pain Scale: FACES Pre/Post-Treatment    Pain: FACES Scale, Pretreatment 2-->hurts little bit  -AV     Posttreatment Pain Rating 2-->hurts little bit  -AV     Pain  Location - Side/Orientation Right  -AV     Pain Location - knee  -AV     Row Name 10/20/22 0946          Plan of Care Review    Plan of Care Reviewed With patient;spouse  -AV     Progress improving  Through skilled OT services, patient is now able to perform LB bathing min assist and LB dressing mod assist with setup and cues for safe positioning and adaptive techniques.  -AV     Outcome Evaluation Patient presents with limitations of balance and standing endurance/activity tolerance which are impacting ADL and transfer independence. Skilled OT services are needed to remediate/compensate for deficits to maximize independence and safety.  -AV     Row Name 10/20/22 0946          Therapy Assessment/Plan (OT)    Patient/Family Therapy Goal Statement (OT) Long walks without walker or cane  -AV     Rehab Potential (OT) good, to achieve stated therapy goals  -AV     Criteria for Skilled Therapeutic Interventions Met (OT) yes;meets criteria;skilled treatment is necessary  -AV     Therapy Frequency (OT) 5 times/wk  -AV     Row Name 10/20/22 0946          Therapy Plan Review/Discharge Plan (OT)    Equipment Needs Upon Discharge (OT) walker, rolling;commode chair  has shower chair  -AV     Anticipated Discharge Disposition (OT) home with assist;home with outpatient therapy services  outpatient PT  -AV     Row Name 10/20/22 0946          Vital Signs    O2 Delivery Pre Treatment room air  -AV     O2 Delivery Intra Treatment room air  -AV     O2 Delivery Post Treatment room air  -AV           User Key  (r) = Recorded By, (t) = Taken By, (c) = Cosigned By    Initials Name Provider Type    AV Sean Shah, OT Occupational Therapist               Outcome Measures     Row Name 10/20/22 0984          How much help from another is currently needed...    Putting on and taking off regular lower body clothing? 2  -AV     Bathing (including washing, rinsing, and drying) 3  -AV     Toileting (which includes using toilet bed pan or urinal)  3  -AV     Putting on and taking off regular upper body clothing 4  -AV     Taking care of personal grooming (such as brushing teeth) 4  -AV     Eating meals 4  -AV     AM-PAC 6 Clicks Score (OT) 20  -AV     Row Name 10/20/22 0735          How much help from another person do you currently need...    Turning from your back to your side while in flat bed without using bedrails? 4  -DB     Moving from lying on back to sitting on the side of a flat bed without bedrails? 3  -DB     Moving to and from a bed to a chair (including a wheelchair)? 3  -DB     Standing up from a chair using your arms (e.g., wheelchair, bedside chair)? 3  -DB     Climbing 3-5 steps with a railing? 3  -DB     To walk in hospital room? 3  -DB     AM-PAC 6 Clicks Score (PT) 19  -DB     Highest level of mobility 6 --> Walked 10 steps or more  -DB     Row Name 10/20/22 0950          Functional Assessment    Outcome Measure Options AM-PAC 6 Clicks Daily Activity (OT);Optimal Instrument  -AV     Row Name 10/20/22 0950          Optimal Instrument    Optimal Instrument Optimal - 3  -AV     Bending/Stooping 3  -AV     Standing 2  -AV     Reaching 1  -AV     From the list, choose the 3 activities you would most like to be able to do without any difficulty Bending/stooping;Standing;Reaching  -AV     Total Score Optimal - 3 6  -AV           User Key  (r) = Recorded By, (t) = Taken By, (c) = Cosigned By    Initials Name Provider Type    Sean Bentley OT Occupational Therapist    Isa Kirkland, RN Registered Nurse                Occupational Therapy Education     Title: PT OT SLP Therapies (In Progress)     Topic: Occupational Therapy (In Progress)     Point: ADL training (Done)     Description:   Instruct learner(s) on proper safety adaptation and remediation techniques during self care or transfers.   Instruct in proper use of assistive devices.              Learning Progress Summary           Patient Acceptance, E, VU by [facility]I at 10/20/2022 0973     Comment: WBAT  need for staff assistance for all standing ADL/transfers  safe positioning during ADL tasks  adaptive techniques LB ADLs  safe transfer techniques  adaptive equipment recommendations   Significant Other Acceptance, E, VU by AV at 10/20/2022 0950    Comment: WBAT  need for staff assistance for all standing ADL/transfers  safe positioning during ADL tasks  adaptive techniques LB ADLs  safe transfer techniques  adaptive equipment recommendations                   Point: Home exercise program (Not Started)     Description:   Instruct learner(s) on appropriate technique for monitoring, assisting and/or progressing therapeutic exercises/activities.              Learner Progress:  Not documented in this visit.          Point: Precautions (Done)     Description:   Instruct learner(s) on prescribed precautions during self-care and functional transfers.              Learning Progress Summary           Patient Acceptance, E, VU by AV at 10/20/2022 0950    Comment: WBAT  need for staff assistance for all standing ADL/transfers  safe positioning during ADL tasks  adaptive techniques LB ADLs  safe transfer techniques  adaptive equipment recommendations   Significant Other Acceptance, E, VU by AV at 10/20/2022 0950    Comment: WBAT  need for staff assistance for all standing ADL/transfers  safe positioning during ADL tasks  adaptive techniques LB ADLs  safe transfer techniques  adaptive equipment recommendations                   Point: Body mechanics (Not Started)     Description:   Instruct learner(s) on proper positioning and spine alignment during self-care, functional mobility activities and/or exercises.              Learner Progress:  Not documented in this visit.                      User Key     Initials Effective Dates Name Provider Type Discipline     06/16/21 -  Sean Shah OT Occupational Therapist OT              OT Recommendation and Plan  Planned Therapy Interventions (OT): activity tolerance  training, BADL retraining, functional balance retraining, occupation/activity based interventions, patient/caregiver education/training, transfer/mobility retraining  Therapy Frequency (OT): 5 times/wk  Plan of Care Review  Plan of Care Reviewed With: patient, spouse  Progress: improving (Through skilled OT services, patient is now able to perform LB bathing min assist and LB dressing mod assist with setup and cues for safe positioning and adaptive techniques.)  Outcome Evaluation: Patient presents with limitations of balance and standing endurance/activity tolerance which are impacting ADL and transfer independence. Skilled OT services are needed to remediate/compensate for deficits to maximize independence and safety.     Time Calculation:    Time Calculation- OT     Row Name 10/20/22 0952             Time Calculation- OT    OT Received On 10/20/22  -AV      OT Goal Re-Cert Due Date 10/29/22  -AV         Timed Charges    84280 - OT Self Care/Mgmt Minutes 25  -AV         Untimed Charges    OT Eval/Re-eval Minutes 35  -AV         Total Minutes    Timed Charges Total Minutes 25  -AV      Untimed Charges Total Minutes 35  -AV       Total Minutes 60  -AV            User Key  (r) = Recorded By, (t) = Taken By, (c) = Cosigned By    Initials Name Provider Type    AV Sean Shah OT Occupational Therapist              Therapy Charges for Today     Code Description Service Date Service Provider Modifiers Qty    41554467168 HC OT SELF CARE/MGMT/TRAIN EA 15 MIN 10/20/2022 Sean Shah OT GO 2    [id number] HC OT EVAL LOW COMPLEXITY 3 10/20/2022 Sean Shah OT GO 1               Sean Shah OT  10/20/2022

## 2022-10-21 ENCOUNTER — TREATMENT (OUTPATIENT)
Dept: PHYSICAL THERAPY | Facility: CLINIC | Age: 52
End: 2022-10-21

## 2022-10-21 DIAGNOSIS — M25.561 RIGHT KNEE PAIN, UNSPECIFIED CHRONICITY: ICD-10-CM

## 2022-10-21 DIAGNOSIS — R26.2 DIFFICULTY WALKING: ICD-10-CM

## 2022-10-21 DIAGNOSIS — Z96.651 S/P TOTAL KNEE ARTHROPLASTY, RIGHT: Primary | ICD-10-CM

## 2022-10-21 DIAGNOSIS — M25.661 DECREASED RANGE OF MOTION (ROM) OF RIGHT KNEE: ICD-10-CM

## 2022-10-21 DIAGNOSIS — R29.898 WEAKNESS OF RIGHT LOWER EXTREMITY: ICD-10-CM

## 2022-10-21 PROCEDURE — 97161 PT EVAL LOW COMPLEX 20 MIN: CPT | Performed by: PHYSICAL THERAPIST

## 2022-10-21 PROCEDURE — 97110 THERAPEUTIC EXERCISES: CPT | Performed by: PHYSICAL THERAPIST

## 2022-10-21 NOTE — PROGRESS NOTES
Physical Therapy Initial Evaluation and Plan of Care    Patient: Katarzyna Norris   : 1970  Diagnosis/ICD-10 Code:  S/P total knee arthroplasty, right [Z96.651]  Referring practitioner: Marco Nelson MD  Date of Initial Visit: 10/21/2022  Today's Date: 10/21/2022  Patient seen for 1 sessions           Subjective Questionnaire: LEFS:  = 60-79% limitation      Subjective Evaluation    History of Present Illness  Mechanism of injury: Pt presents to therapy s/p Right TKA 10/19/22, ambulating with front wheeled walker, incision covered with bandage. She reports having 3 steps to enter her home with her . She has previously had the left total knee surgery and feels she is doing better so far. She is having a lot of bruising, but pain under control.     Pain  Current pain rating: 3  At best pain rating: 3  At worst pain ratin  Quality: tight  Aggravating factors: ambulation and standing    Patient Goals  Patient goals for therapy: decreased edema, decreased pain, improved balance, increased motion, independence with ADLs/IADLs and increased strength             Objective          Observations     Additional Knee Observation Details  Bruising noted surrounding the knee especially medial and posterior, into the calf, also extends in the medial thigh    Neurological Testing     Sensation     Knee   Left Knee   Intact: light touch    Right Knee   Intact: light touch     Active Range of Motion     Right Knee   Flexion: 65 degrees   Extension: Right knee active extension: -5.     Passive Range of Motion     Right Knee   Flexion: 82 degrees   Extension: Right knee passive extension: -2.     Strength/Myotome Testing     Right Knee   Flexion: 4-  Extension: 4-  Quadriceps contraction: fair    Ambulation     Comments   Ambulates with FWW, decreased weight bearing and knee extension through RLE      See Exercise, Manual, and Modality Logs for complete treatment.     Assessment & Plan      Assessment  Impairments: abnormal gait, abnormal muscle firing, abnormal or restricted ROM, activity intolerance, impaired balance, impaired physical strength, pain with function and weight-bearing intolerance  Functional Limitations: walking, standing and stooping  Assessment details: The patient presents to physical therapy s/p Right TKA 10/19/22. The patient presents with associated knee weakness, stiffness, antalgic gait, and functional deficits (LEFS). Nursing assessed the bruising and discussed care and monitoring at home. Pt demonstrates understanding. The patient would benefit from skilled PT intervention to address the above mentioned functional limitations.     Prognosis: good    Goals  Plan Goals: KNEE PROBLEMS    1. The patient has limited ROM of the right knee.   LTG 1:12 weeks:  The patient will demonstrate 0 to 115 degrees of ROM for the right knee in order to allow patient to ambulate.    STATUS:  New   STG 1a: 6 weeks:  The patient will demonstrate 5 to 100 degrees of ROM for the right knee.    STATUS:  New   TREATMENT: Manual therapy, therapeutic exercise, home exercise instruction, and modalities as needed to include:  moist heat, electrical stimulation, ultrasound, and ice.    2. The patient has limited strength of the right knee.   LTG 2: 12 weeks: The patient will demonstrate 4+ /5 strength for knee flexion and extension in order to allow patient improved joint stability    STATUS:  New   STG 2a: 6 weeks: The patient will demonstrate 4 /5 strength for knee flexion and extension    STATUS:  New    TREATMENT: Manual therapy, therapeutic exercise, home exercise instruction, aquatic therapy, and modalities as needed to include:  moist heat, electrical stimulation, ultrasound, and ice.     3. The patient has gait dysfunction.   LTG 3: 12 weeks:  The patient will ambulate without assistive device, independently, for community distances with normal biomechanics of the right lower extremity in  order to improve mobility and allow patient to perform activities such as grocery shopping with greater ease.    STATUS:  New   STG 3a: 4 weeks: The patient will ambulate with a single tipped cane with appropriate gait mechanics.    STATUS:  New   TREATMENT: Gait training, aquatic therapy, therapeutic exercise, and home exercise instruction.    4. Mobility: Walking/Moving Around Functional Limitation     LTG 4: 12 weeks:  The patient will demonstrate 20-39% limitation by achieving a score of 49-64/80 on the LEFS.    STATUS:  New   STG 4 a: 6 weeks:  The patient will demonstrate 40-59% limitation by achieving a score of 33-48/80 on the LEFS.      STATUS:  New   TREATMENT:  Manual therapy, therapeutic exercise, home exercise instruction, and modalities as needed to include: moist heat, electrical stimulation, and ultrasound.       Plan  Therapy options: will be seen for skilled therapy services  Planned modality interventions: TENS, cryotherapy and thermotherapy (hydrocollator packs)  Planned therapy interventions: functional ROM exercises, gait training, home exercise program, manual therapy, strengthening, stretching, therapeutic activities, soft tissue mobilization, joint mobilization, neuromuscular re-education, balance/weight-bearing training and transfer training  Other planned therapy interventions: aquatic therapy  Frequency: 3x week  Duration in weeks: 12  Treatment plan discussed with: patient        Visit Diagnoses:    ICD-10-CM ICD-9-CM   1. S/P total knee arthroplasty, right  Z96.651 V43.65   2. Decreased range of motion (ROM) of right knee  M25.661 719.56   3. Right knee pain, unspecified chronicity  M25.561 719.46   4. Weakness of right lower extremity  R29.898 729.89   5. Difficulty walking  R26.2 719.7       History # of Personal Factors and/or Comorbidities: MODERATE (1-2)  Examination of Body System(s): # of elements: MODERATE (3)  Clinical Presentation: STABLE   Clinical Decision Making: LOW        Timed:         Manual Therapy:    0     mins  64884;     Therapeutic Exercise:    15     mins  11140;     Neuromuscular Rosemarie:    0    mins  24622;    Therapeutic Activity:     0     mins  21171;     Gait Trainin     mins  11811;     Ultrasound:     0     mins  31005;    Ionto                               0    mins   25287  Self Care                       0     mins   86845  Canalith Repos    0     mins 27963      Un-Timed:  Electrical Stimulation:    0     mins  47236 (MC );  Dry Needling     0     mins self-pay  Traction     0     mins 10835  Low Eval     20     Mins  07798  Mod Eval     0     Mins  05572  High Eval                       0     Mins  89702  Re-Eval                           0    mins  28690    Timed Treatment:   15   mins   Total Treatment:     35   mins    PT SIGNATURE: Efe Turcios PT     Electronically signed 10/21/2022    [place] License: [id number] - [id number]     Initial Certification  Certification Period: 10/21/2022 thru 2023  I certify that the therapy services are furnished while this patient is under my care.  The services outlined above are required by this patient, and will be reviewed every 90 days.     PHYSICIAN: Marco Nelson MD   NPI: [id number]                                        DATE:     Please sign and return via fax to [phone]. Thank you, [facility].

## 2022-10-21 NOTE — THERAPY DISCHARGE NOTE
Acute Care - Occupational Therapy Discharge   [facility]    Patient Name: Katarzyna Norris  : 1970    MRN: [id number]                              Today's Date: 10/21/2022       Admit Date: 10/19/2022    Visit Dx:     ICD-10-CM ICD-9-CM   1. Difficulty in walking  R26.2 719.7   2. Arthritis of knee, right  M17.11 716.96   3. Primary localized osteoarthritis of right knee  M17.11 715.16   4. Decreased activities of daily living (ADL)  Z78.9 V49.89     Patient Active Problem List   Diagnosis   • Gout   • Anxiety   • Hypertension   • Sinus problem   • Pain in both knees   • Primary osteoarthritis of left knee   • Anemia   • Impaired fasting glucose   • Hyperlipidemia   • Screening mammogram, encounter for   • Osteoarthritis of left knee   • Acute non-recurrent maxillary sinusitis   • Status post replacement of knee joint   • Pain of left calf   • Swelling of calf   • Cyst of ovary   • Asthma   • Abdominal pain   • Metabolic acidosis   • Nephritic syndrome   • Obesity   • Stage 3 chronic kidney disease (HCC)   • Urinary tract infectious disease   • Vitamin D deficiency   • Hyperkalemia   • Elevated ferritin   • Burn   • Aftercare following surgery of left total knee arthroplasty, 2021   • Rash   • Hypercalcemia   • Acute pain of left shoulder   • Arthritis of knee, right   • Lateral cutaneous femoral nerve of thigh compression or syndrome, right   • Bilateral hip pain   • Primary localized osteoarthritis of right knee     Past Medical History:   Diagnosis Date   • Anxiety    • Arthritis     GILBERT KNEES   • Asthma     SEASONAL ALLERGIES   • CKD (chronic kidney disease)     Stage 3, Follows with [facility]   • Elevated cholesterol    • Gout 2021   • Hernia     Upper   • Hiatal hernia    • Hypertension    • Renal insufficiency     follows with [facility]   • Sinus problem     SEASONAL ALLERGIES     Past Surgical History:   Procedure Laterality Date   • BONY PELVIS SURGERY      Plate   •  SECTION   and  1989   • CHOLECYSTECTOMY  2011   • COLONOSCOPY  2009   • ENDOSCOPY  2009   • TOTAL KNEE ARTHROPLASTY Left 12/22/2021    Procedure: TOTAL KNEE ARTHROPLASTY;  Surgeon: Marco Nelson MD;  Location: [facility] MAIN OR;  Service: Orthopedics;  Laterality: Left;   • TOTAL KNEE ARTHROPLASTY Right 10/19/2022    Procedure: RIGHT TOTAL KNEE ARTHROPLASTY - NO SHELBY;  Surgeon: Marco Nelson MD;  Location: [facility] MAIN OR;  Service: Orthopedics;  Laterality: Right;   • TUBAL ABDOMINAL LIGATION  1989      General Information    No documentation.                Mobility/ADL's    No documentation.                Obj/Interventions    No documentation.                Goals/Plan     Row Name 10/21/22 1004          Transfer Goal 1 (OT)    Activity/Assistive Device (Transfer Goal 1, OT) transfers, all;walker, rolling  -AV     [place] Level/Cues Needed (Transfer Goal 1, OT) modified independence  -AV     Time Frame (Transfer Goal 1, OT) long term goal (LTG);10 days  -AV     Progress/Outcome (Transfer Goal 1, OT) good progress toward goal;discharged from facility  -AV     Row Name 10/21/22 1004          Bathing Goal 1 (OT)    Activity/Device (Bathing Goal 1, OT) bathing skills, all;shower chair  -AV     [place] Level/Cues Needed (Bathing Goal 1, OT) modified independence  -AV     Time Frame (Bathing Goal 1, OT) long term goal (LTG);10 days  -AV     Progress/Outcomes (Bathing Goal 1, OT) good progress toward goal;discharged from facility  -AV     Row Name 10/21/22 1004          Dressing Goal 1 (OT)    Activity/Device (Dressing Goal 1, OT) dressing skills, all  -AV     [place]/Cues Needed (Dressing Goal 1, OT) modified independence  -AV     Time Frame (Dressing Goal 1, OT) long term goal (LTG);10 days  -AV     Progress/Outcome (Dressing Goal 1, OT) good progress toward goal;discharged from facility  -AV     Row Name 10/21/22 1004          Toileting Goal 1 (OT)    Activity/Device (Toileting Goal 1, OT) toileting  skills, all;raised toilet seat  -AV     [place] Level/Cues Needed (Toileting Goal 1, OT) modified independence  -AV     Time Frame (Toileting Goal 1, OT) long term goal (LTG);10 days  -AV     Progress/Outcome (Toileting Goal 1, OT) good progress toward goal;discharged from facility  -AV     Row Name 10/21/22 1004          Grooming Goal 1 (OT)    Activity/Device (Grooming Goal 1, OT) grooming skills, all  -AV     [place] (Grooming Goal 1, OT) modified independence  -AV     Time Frame (Grooming Goal 1, OT) long term goal (LTG);10 days  -AV     Progress/Outcome (Grooming Goal 1, OT) good progress toward goal;discharged from facility  -AV     Row Name 10/21/22 1004          Problem Specific Goal 1 (OT)    Problem Specific Goal 1 (OT) Patient will demonstrate fair plus/good endurance/ activity tolerance needed to support ADLs.  -AV     Time Frame (Problem Specific Goal 1, OT) long term goal (LTG);10 days  -AV     Progress/Outcome (Problem Specific Goal 1, OT) good progress toward goal;discharged from facility  -AV           User Key  (r) = Recorded By, (t) = Taken By, (c) = Cosigned By    Initials Name Provider Type    Sean Bentley OT Occupational Therapist               Clinical Impression    No documentation.                Outcome Measures    No documentation.               Occupational Therapy Education     Title: PT OT SLP Therapies (Resolved)     Topic: Occupational Therapy (Resolved)     Point: ADL training (Resolved)     Description:   Instruct learner(s) on proper safety adaptation and remediation techniques during self care or transfers.   Instruct in proper use of assistive devices.              Learning Progress Summary           Patient Acceptance, E, VU by AV at 10/20/2022 0950    Comment: WBAT  need for staff assistance for all standing ADL/transfers  safe positioning during ADL tasks  adaptive techniques LB ADLs  safe transfer techniques  adaptive equipment recommendations   Significant  Other Acceptance, E, VU by AV at 10/20/2022 0950    Comment: WBAT  need for staff assistance for all standing ADL/transfers  safe positioning during ADL tasks  adaptive techniques LB ADLs  safe transfer techniques  adaptive equipment recommendations                   Point: Home exercise program (Resolved)     Description:   Instruct learner(s) on appropriate technique for monitoring, assisting and/or progressing therapeutic exercises/activities.              Learner Progress:  Not documented in this visit.          Point: Precautions (Resolved)     Description:   Instruct learner(s) on prescribed precautions during self-care and functional transfers.              Learning Progress Summary           Patient Acceptance, E, VU by AV at 10/20/2022 0950    Comment: WBAT  need for staff assistance for all standing ADL/transfers  safe positioning during ADL tasks  adaptive techniques LB ADLs  safe transfer techniques  adaptive equipment recommendations   Significant Other Acceptance, E, VU by AV at 10/20/2022 0950    Comment: WBAT  need for staff assistance for all standing ADL/transfers  safe positioning during ADL tasks  adaptive techniques LB ADLs  safe transfer techniques  adaptive equipment recommendations                   Point: Body mechanics (Resolved)     Description:   Instruct learner(s) on proper positioning and spine alignment during self-care, functional mobility activities and/or exercises.              Learner Progress:  Not documented in this visit.                      User Key     Initials Effective Dates Name Provider Type Discipline    AV 06/16/21 -  Sean Shah OT Occupational Therapist OT              OT Recommendation and Plan  Planned Therapy Interventions (OT): activity tolerance training, BADL retraining, functional balance retraining, occupation/activity based interventions, patient/caregiver education/training, transfer/mobility retraining  Therapy Frequency (OT): 5 times/wk  Plan of Care  Review  Plan of Care Reviewed With: patient, spouse  Progress: improving (Through skilled OT services, patient is now able to perform LB bathing min assist and LB dressing mod assist with setup and cues for safe positioning and adaptive techniques.)  Outcome Evaluation: Patient presents with limitations of balance and standing endurance/activity tolerance which are impacting ADL and transfer independence. Skilled OT services are needed to remediate/compensate for deficits to maximize independence and safety.  Plan of Care Reviewed With: patient, spouse  Outcome Evaluation: Patient presents with limitations of balance and standing endurance/activity tolerance which are impacting ADL and transfer independence. Skilled OT services are needed to remediate/compensate for deficits to maximize independence and safety.     Time Calculation:     Therapy Charges for Today     Code Description Service Date Service Provider Modifiers Qty    [id number]  OT SELF CARE/MGMT/TRAIN EA 15 MIN 10/20/2022 Sean Shah OT GO 2    [id number]  OT EVAL LOW COMPLEXITY 3 10/20/2022 Sean Shah OT GO 1             OT Discharge Summary  Anticipated Discharge Disposition (OT): home with assist, home with outpatient therapy services  Reason for Discharge: Discharge from facility    Sean Shah OT  10/21/2022

## 2022-10-24 ENCOUNTER — TELEPHONE (OUTPATIENT)
Dept: FAMILY MEDICINE CLINIC | Facility: CLINIC | Age: 52
End: 2022-10-24

## 2022-10-24 ENCOUNTER — TREATMENT (OUTPATIENT)
Dept: PHYSICAL THERAPY | Facility: CLINIC | Age: 52
End: 2022-10-24

## 2022-10-24 ENCOUNTER — TELEPHONE (OUTPATIENT)
Dept: ORTHOPEDIC SURGERY | Facility: CLINIC | Age: 52
End: 2022-10-24

## 2022-10-24 DIAGNOSIS — M25.661 DECREASED RANGE OF MOTION (ROM) OF RIGHT KNEE: ICD-10-CM

## 2022-10-24 DIAGNOSIS — M25.561 RIGHT KNEE PAIN, UNSPECIFIED CHRONICITY: ICD-10-CM

## 2022-10-24 DIAGNOSIS — Z96.651 S/P TOTAL KNEE ARTHROPLASTY, RIGHT: Primary | ICD-10-CM

## 2022-10-24 DIAGNOSIS — R29.898 WEAKNESS OF RIGHT LOWER EXTREMITY: ICD-10-CM

## 2022-10-24 DIAGNOSIS — R26.2 DIFFICULTY WALKING: ICD-10-CM

## 2022-10-24 PROCEDURE — 97530 THERAPEUTIC ACTIVITIES: CPT | Performed by: PHYSICAL THERAPIST

## 2022-10-24 PROCEDURE — 97140 MANUAL THERAPY 1/> REGIONS: CPT | Performed by: PHYSICAL THERAPIST

## 2022-10-24 NOTE — PROGRESS NOTES
Physical Therapy Daily Note  [address] [place] [address]    VISIT#: 2    Subjective   Katarzyna Norris reports her knee feels great but the swelling and significant bruising in her R LE is what is causing her discomfort.       Objective     See Exercise, Manual, and Modality Logs for complete treatment.     Assessment/Plan    Pt attempted LAQ but unable to perform at this time and continues to require assist with SLR and SAQ due to weakness in quadriceps. Pt does have increase in bruising to R LE today. Pt had questions about blood pressure medicine, contacted nurse navigator from orthopedics who told patient to contact PCP in regards to starting her BP medicine again. Pt reports she will call after therapy session. Will continue to progress patient as able.     Progress per Plan of Care and Progress strengthening /stabilization /functional activity            Timed:                 Manual Therapy:    12     mins  66014;     Therapeutic Exercise:    6     mins  39004;     Neuromuscular Rosemarie:    0    mins  97533;    Therapeutic Activity:     12     mins  72575;     Gait Trainin     mins  80518;     Ultrasound:     0     mins  18872;    Ionto                               0    mins   16956  Self pay                         0     mins [id number]    Un-Timed:  Electrical Stimulation:    0     mins  83863 ( )  Canalith Repos    0     mins 47200  Dry Needling     0     mins self-pay  Traction     0     mins 34227    Timed Treatment:   30   mins   Total Treatment:     30   mins    Bruce Kennedy PT  Physical Therapist    PT SIGNATURE: Electronically signed by Bruce Kennedy PT  [place] LICENSE: [id number]

## 2022-10-24 NOTE — TELEPHONE ENCOUNTER
PATIENT CALLED TO SEE WHEN SHE COULD RE START HER LOSARTIN.  I COULD SEE WHERE SHE WAS TOLD NOT TO RESTART AFTER SURGERY.  I INSTRUCTED THE PATIENT TO CALL HER PCP FOR THE ANSWER ON WHEN TO RESTART.

## 2022-10-24 NOTE — TELEPHONE ENCOUNTER
Caller: Katarzyna Norris    Relationship: Self    Best call back number: [phone]    What medications are you currently taking:   Current Outpatient Medications on File Prior to Visit   Medication Sig Dispense Refill   • allopurinol (ZYLOPRIM) 300 MG tablet Take 1 tablet by mouth 2 (Two) Times a Day. 180 tablet 1   • apixaban (ELIQUIS) 2.5 MG tablet tablet Take 1 tablet by mouth 2 (Two) Times a Day. 14 tablet 0   • [START ON 10/28/2022] aspirin EC (Ecotrin) 325 MG tablet Take 1 tablet by mouth Daily. 21 tablet 0   • atorvastatin (LIPITOR) 20 MG tablet Take 1 tablet by mouth Daily. (Patient taking differently: Take 1 tablet by mouth Every Night.) 90 tablet 0   • carvedilol (COREG) 25 MG tablet Take 0.5 tablets by mouth 2 (Two) Times a Day With Meals.     • cetirizine (zyrTEC) 10 MG tablet Take 1 tablet by mouth Every Night. 30 tablet 0   • cloNIDine (CATAPRES) 0.2 MG tablet Take 1 tablet by mouth 3 (Three) Times a Day. 90 tablet 1   • famotidine (Pepcid) 40 MG tablet Take 1 tablet by mouth Every Night. States she hasn't started yet     • HYDROcodone-acetaminophen (Norco) 7.5-325 MG per tablet Take 1-2 tablets by mouth Every 6 (Six) Hours As Needed for Moderate Pain. 40 tablet 0   • sodium bicarbonate 650 MG tablet Take 1 tablet by mouth 2 (Two) Times a Day. 180 tablet 0   • vitamin B-12 (CYANOCOBALAMIN) 100 MCG tablet Take 50 mcg by mouth Daily.     • Zinc Sulfate (ZINC 15 PO) Take 15 mg by mouth Daily.       No current facility-administered medications on file prior to visit.        Which medication are you concerned about: LOSARTAN 100MG    What are your concerns: PATIENT STATES THAT SHE HAD KNEE SURGERY ON THE 19TH AND WAS INSTEAD TO STOP TAKING LOSARTAN. PATIENT IS WANTING TO KNOW WHEN IT WILL BE SAFE TO START TAKING THIS MEDICATION AGAIN.

## 2022-10-27 ENCOUNTER — TREATMENT (OUTPATIENT)
Dept: PHYSICAL THERAPY | Facility: CLINIC | Age: 52
End: 2022-10-27

## 2022-10-27 ENCOUNTER — TELEPHONE (OUTPATIENT)
Dept: FAMILY MEDICINE CLINIC | Facility: CLINIC | Age: 52
End: 2022-10-27

## 2022-10-27 DIAGNOSIS — M25.661 DECREASED RANGE OF MOTION (ROM) OF RIGHT KNEE: ICD-10-CM

## 2022-10-27 DIAGNOSIS — R26.2 DIFFICULTY WALKING: ICD-10-CM

## 2022-10-27 DIAGNOSIS — R29.898 WEAKNESS OF RIGHT LOWER EXTREMITY: ICD-10-CM

## 2022-10-27 DIAGNOSIS — Z96.651 S/P TOTAL KNEE ARTHROPLASTY, RIGHT: Primary | ICD-10-CM

## 2022-10-27 DIAGNOSIS — M25.561 RIGHT KNEE PAIN, UNSPECIFIED CHRONICITY: ICD-10-CM

## 2022-10-27 PROCEDURE — 97110 THERAPEUTIC EXERCISES: CPT | Performed by: PHYSICAL THERAPIST

## 2022-10-27 PROCEDURE — 97140 MANUAL THERAPY 1/> REGIONS: CPT | Performed by: PHYSICAL THERAPIST

## 2022-10-27 NOTE — TELEPHONE ENCOUNTER
Caller: Katarzyna Norris    Relationship: Self    Best call back number: [phone]    What orders are you requesting (i.e. lab or imaging): LABS    In what timeframe would the patient need to come in: AS SOON AS POSSIBLE    Where will you receive your lab/imaging services: [facility]    Additional notes: PATIENT CALLED AND STATED THAT SHE NEEDED TO HAVE LABS DRAWN FOR HER KIDNEY DOCTOR. PATIENT WANTS TO COME HERE TO HAVE THEM DONE AND HAVE SHEILA NORIEGA SEND THEM TO DR. WILLIAM.

## 2022-10-27 NOTE — PROGRESS NOTES
Physical Therapy Daily Treatment Note      Patient: Katarzyna Norris   : 1970  Referring practitioner: Marco Nelson MD  Date of Initial Visit: Type: THERAPY  Noted: 10/21/2022  Today's Date: 10/27/2022  Patient seen for 3 sessions           Subjective Questionnaire:       Subjective Evaluation    History of Present Illness    Subjective comment: Pt reported not having much pain currently.  Pt reported this morning she accidentally pulled the tape off her R knee bandage.  Pt inquired if a red spot below the bandage was OK not knowing if it was from the pulled bandage or something else.  Red area on anterior tibialis did not present as an infection.         Objective   See Exercise, Manual, and Modality Logs for complete treatment.       Assessment & Plan     Assessment    Assessment details: Pt was unable to make full revolution on recumbent bike. Pt ambulates with RW.  Pt was slow with performing therapeutic exercise. Pt's passive flexion improved with repetition and extension is near neutral.  Pt will benefit from continue PT to achieve functional R knee strength and ROM.          Visit Diagnoses:    ICD-10-CM ICD-9-CM   1. S/P total knee arthroplasty, right  Z96.651 V43.65   2. Decreased range of motion (ROM) of right knee  M25.661 719.56   3. Right knee pain, unspecified chronicity  M25.561 719.46   4. Weakness of right lower extremity  R29.898 729.89   5. Difficulty walking  R26.2 719.7       Progress per Plan of Care and Progress strengthening /stabilization /functional activity           Timed:  Manual Therapy:    11     mins  64774;  Therapeutic Exercise:    18     mins  67579;     Neuromuscular Rosemarie:        mins  79392;    Therapeutic Activity:          mins  88429;     Gait Training:           mins  31759;     Ultrasound:          mins  73287;    Electrical Stimulation:         mins  47804 ( );  Aquatic Therapy          mins  33787    Untimed:  Electrical Stimulation:         mins   36800 ( );  Mechanical Traction:         mins  46910;     Timed Treatment:   29   mins   Total Treatment:     29   mins    Electronically signed    Laura Lua PTA  Physical Therapist Assistant    [place] license: [id number]

## 2022-10-28 ENCOUNTER — CLINICAL SUPPORT (OUTPATIENT)
Dept: FAMILY MEDICINE CLINIC | Facility: CLINIC | Age: 52
End: 2022-10-28

## 2022-10-28 ENCOUNTER — TRANSCRIBE ORDERS (OUTPATIENT)
Dept: FAMILY MEDICINE CLINIC | Facility: CLINIC | Age: 52
End: 2022-10-28

## 2022-10-28 DIAGNOSIS — N18.30 STAGE 3 CHRONIC KIDNEY DISEASE, UNSPECIFIED WHETHER STAGE 3A OR 3B CKD: Primary | ICD-10-CM

## 2022-10-28 DIAGNOSIS — N18.30 STAGE 3 CHRONIC KIDNEY DISEASE, UNSPECIFIED WHETHER STAGE 3A OR 3B CKD: ICD-10-CM

## 2022-10-28 DIAGNOSIS — M10.9 GOUT, UNSPECIFIED CAUSE, UNSPECIFIED CHRONICITY, UNSPECIFIED SITE: ICD-10-CM

## 2022-10-28 DIAGNOSIS — Z13.29 SCREENING FOR THYROID DISORDER: ICD-10-CM

## 2022-10-28 DIAGNOSIS — E78.2 MIXED HYPERLIPIDEMIA: ICD-10-CM

## 2022-10-28 DIAGNOSIS — R73.01 IMPAIRED FASTING GLUCOSE: ICD-10-CM

## 2022-10-28 DIAGNOSIS — I10 PRIMARY HYPERTENSION: ICD-10-CM

## 2022-10-28 LAB
ALBUMIN SERPL-MCNC: 4 G/DL (ref 3.5–5.2)
ALBUMIN UR-MCNC: 22.1 MG/DL
ALBUMIN/GLOB SERPL: 1.7 G/DL
ALP SERPL-CCNC: 70 U/L (ref 39–117)
ALT SERPL W P-5'-P-CCNC: <5 U/L (ref 1–33)
ANION GAP SERPL CALCULATED.3IONS-SCNC: 9.7 MMOL/L (ref 5–15)
AST SERPL-CCNC: 19 U/L (ref 1–32)
BACTERIA UR QL AUTO: ABNORMAL /HPF
BASOPHILS # BLD AUTO: 0.04 10*3/MM3 (ref 0–0.2)
BASOPHILS NFR BLD AUTO: 0.5 % (ref 0–1.5)
BILIRUB SERPL-MCNC: 0.6 MG/DL (ref 0–1.2)
BILIRUB UR QL STRIP: NEGATIVE
BUN SERPL-MCNC: 44 MG/DL (ref 6–20)
BUN/CREAT SERPL: 14.3 (ref 7–25)
CALCIUM SPEC-SCNC: 10.1 MG/DL (ref 8.6–10.5)
CHLORIDE SERPL-SCNC: 101 MMOL/L (ref 98–107)
CHOLEST SERPL-MCNC: 134 MG/DL (ref 0–200)
CLARITY UR: CLEAR
CO2 SERPL-SCNC: 26.3 MMOL/L (ref 22–29)
COLOR UR: YELLOW
CREAT SERPL-MCNC: 3.07 MG/DL (ref 0.57–1)
CREAT UR-MCNC: 32.1 MG/DL
DEPRECATED RDW RBC AUTO: 52.2 FL (ref 37–54)
EGFRCR SERPLBLD CKD-EPI 2021: 17.5 ML/MIN/1.73
EOSINOPHIL # BLD AUTO: 0.11 10*3/MM3 (ref 0–0.4)
EOSINOPHIL NFR BLD AUTO: 1.4 % (ref 0.3–6.2)
ERYTHROCYTE [DISTWIDTH] IN BLOOD BY AUTOMATED COUNT: 14.3 % (ref 12.3–15.4)
GLOBULIN UR ELPH-MCNC: 2.3 GM/DL
GLUCOSE SERPL-MCNC: 92 MG/DL (ref 65–99)
GLUCOSE UR STRIP-MCNC: NEGATIVE MG/DL
HCT VFR BLD AUTO: 25.3 % (ref 34–46.6)
HDLC SERPL-MCNC: 42 MG/DL (ref 40–60)
HGB BLD-MCNC: 8.4 G/DL (ref 12–15.9)
HGB UR QL STRIP.AUTO: NEGATIVE
HYALINE CASTS UR QL AUTO: ABNORMAL /LPF
IMM GRANULOCYTES # BLD AUTO: 0.28 10*3/MM3 (ref 0–0.05)
IMM GRANULOCYTES NFR BLD AUTO: 3.6 % (ref 0–0.5)
KETONES UR QL STRIP: NEGATIVE
LDLC SERPL CALC-MCNC: 74 MG/DL (ref 0–100)
LDLC/HDLC SERPL: 1.73 {RATIO}
LEUKOCYTE ESTERASE UR QL STRIP.AUTO: NEGATIVE
LYMPHOCYTES # BLD AUTO: 1.9 10*3/MM3 (ref 0.7–3.1)
LYMPHOCYTES NFR BLD AUTO: 24.5 % (ref 19.6–45.3)
MCH RBC QN AUTO: 33.9 PG (ref 26.6–33)
MCHC RBC AUTO-ENTMCNC: 33.2 G/DL (ref 31.5–35.7)
MCV RBC AUTO: 102 FL (ref 79–97)
MICROALBUMIN/CREAT UR: 688.5 MG/G
MONOCYTES # BLD AUTO: 0.67 10*3/MM3 (ref 0.1–0.9)
MONOCYTES NFR BLD AUTO: 8.6 % (ref 5–12)
NEUTROPHILS NFR BLD AUTO: 4.76 10*3/MM3 (ref 1.7–7)
NEUTROPHILS NFR BLD AUTO: 61.4 % (ref 42.7–76)
NITRITE UR QL STRIP: NEGATIVE
NRBC BLD AUTO-RTO: 0.5 /100 WBC (ref 0–0.2)
PH UR STRIP.AUTO: 7 [PH] (ref 5–8)
PHOSPHATE SERPL-MCNC: 4.3 MG/DL (ref 2.5–4.5)
PLATELET # BLD AUTO: 237 10*3/MM3 (ref 140–450)
PMV BLD AUTO: 8.3 FL (ref 6–12)
POTASSIUM SERPL-SCNC: 5.3 MMOL/L (ref 3.5–5.2)
PROT SERPL-MCNC: 6.3 G/DL (ref 6–8.5)
PROT UR QL STRIP: ABNORMAL
RBC # BLD AUTO: 2.48 10*6/MM3 (ref 3.77–5.28)
RBC # UR STRIP: ABNORMAL /HPF
REF LAB TEST METHOD: ABNORMAL
SODIUM SERPL-SCNC: 137 MMOL/L (ref 136–145)
SP GR UR STRIP: 1.01 (ref 1–1.03)
SQUAMOUS #/AREA URNS HPF: ABNORMAL /HPF
TRIGL SERPL-MCNC: 96 MG/DL (ref 0–150)
TSH SERPL DL<=0.05 MIU/L-ACNC: 3.34 UIU/ML (ref 0.27–4.2)
URATE SERPL-MCNC: 2.5 MG/DL (ref 2.4–5.7)
UROBILINOGEN UR QL STRIP: ABNORMAL
VLDLC SERPL-MCNC: 18 MG/DL (ref 5–40)
WBC # UR STRIP: ABNORMAL /HPF
WBC NRBC COR # BLD: 7.76 10*3/MM3 (ref 3.4–10.8)

## 2022-10-28 PROCEDURE — 84550 ASSAY OF BLOOD/URIC ACID: CPT | Performed by: NURSE PRACTITIONER

## 2022-10-28 PROCEDURE — 84100 ASSAY OF PHOSPHORUS: CPT | Performed by: INTERNAL MEDICINE

## 2022-10-28 PROCEDURE — 81001 URINALYSIS AUTO W/SCOPE: CPT | Performed by: NURSE PRACTITIONER

## 2022-10-28 PROCEDURE — 82043 UR ALBUMIN QUANTITATIVE: CPT | Performed by: NURSE PRACTITIONER

## 2022-10-28 PROCEDURE — 85025 COMPLETE CBC W/AUTO DIFF WBC: CPT | Performed by: INTERNAL MEDICINE

## 2022-10-28 PROCEDURE — 82570 ASSAY OF URINE CREATININE: CPT | Performed by: NURSE PRACTITIONER

## 2022-10-28 PROCEDURE — 80053 COMPREHEN METABOLIC PANEL: CPT | Performed by: NURSE PRACTITIONER

## 2022-10-28 PROCEDURE — 80061 LIPID PANEL: CPT | Performed by: NURSE PRACTITIONER

## 2022-10-28 PROCEDURE — 84443 ASSAY THYROID STIM HORMONE: CPT | Performed by: NURSE PRACTITIONER

## 2022-10-28 PROCEDURE — 36415 COLL VENOUS BLD VENIPUNCTURE: CPT | Performed by: NURSE PRACTITIONER

## 2022-11-01 ENCOUNTER — OFFICE VISIT (OUTPATIENT)
Dept: ORTHOPEDIC SURGERY | Facility: CLINIC | Age: 52
End: 2022-11-01

## 2022-11-01 VITALS — HEART RATE: 122 BPM | BODY MASS INDEX: 50.97 KG/M2 | WEIGHT: 277 LBS | OXYGEN SATURATION: 99 % | HEIGHT: 62 IN

## 2022-11-01 DIAGNOSIS — Z47.1 AFTERCARE FOLLOWING RIGHT KNEE JOINT REPLACEMENT SURGERY: ICD-10-CM

## 2022-11-01 DIAGNOSIS — Z47.1 AFTERCARE FOLLOWING RIGHT KNEE JOINT REPLACEMENT SURGERY: Primary | ICD-10-CM

## 2022-11-01 DIAGNOSIS — Z96.651 AFTERCARE FOLLOWING RIGHT KNEE JOINT REPLACEMENT SURGERY: Primary | ICD-10-CM

## 2022-11-01 DIAGNOSIS — Z96.651 AFTERCARE FOLLOWING RIGHT KNEE JOINT REPLACEMENT SURGERY: ICD-10-CM

## 2022-11-01 PROCEDURE — 99024 POSTOP FOLLOW-UP VISIT: CPT | Performed by: PHYSICIAN ASSISTANT

## 2022-11-01 NOTE — PROGRESS NOTES
Chief Complaint  Pain and Follow-up of the Right Knee and Suture / Staple Removal    Subjective          Katarzyna Norris is a 52 y.o. female  presents to [facility] for   History of Present Illness      Patient presents for follow-up evaluation of right total knee arthroplasty, 10/19/2022, she presents with her .  Patient states her recovery has been a little more difficult than her left knee replacement.  She states that she has had some bruising to the right lower extremity, she also admits to some pain to the left lateral posterior knee.  She denies calf pain.  She states she has been taking pain medication only as needed.  She presents with a walker, staples were removed today, reviewed x-ray with her and a retained staple was found on x-ray, this was found and removed from the patient incision.  Patient denies drainage redness or swelling from the incision, she does admit to some skin irritation she states the adhesive of her bandages did cause some skin irritation as well as she states that her son pulled her dressing off of her incision and this pulled some of the skin to the distal shin off revealing a small linear skin tear.  Patient states that she is attending physical therapy at the [facility] office.  She states she has some limited active extension of her knee.  She states she can lift it some off the floor but still has some weakness.  She denies numbness and tingling.  Denies fever/chills.  Allergies   Allergen Reactions   • Amlodipine Shortness Of Breath   • Diltiazem Hcl Anxiety        Social History     Socioeconomic History   • Marital status:    Tobacco Use   • Smoking status: Former   • Smokeless tobacco: Never   Vaping Use   • Vaping Use: Never used   Substance and Sexual Activity   • Alcohol use: Never   • Drug use: Never   • Sexual activity: Defer        REVIEW OF SYSTEMS    Constitutional: Denies fevers, chills, weight  "loss  Cardiovascular: Denies chest pain, shortness of breath  Skin: Denies rashes, acute skin changes  Neurologic: Denies headache, loss of consciousness  MSK: Right knee pain      Objective   Vital Signs:   Pulse (!) 122   Ht 157.5 cm (62\")   Wt 126 kg (277 lb)   SpO2 99%   BMI 50.66 kg/m²     Body mass index is 50.66 kg/m².    Physical Exam    Right knee: Incision is healing well, no erythema, no drainage, no signs of infection.  Mild skin irritation surrounding the incision site in the shape of the Aquacel dressing, distal shin has a linear less than 3 cm in length skin tear, no signs of infection.  Patient has mildly limited active extension of her right lower extremity, she can raise her foot off the floor but has weakness with doing this, she has full extension of the knee, flexion 90 degrees, resolving ecchymosis to the medial and lateral knee into the medial lateral and posterior calf, nontender calf, patient denies calf pain and expresses comfort/relief as her calf is palpated.  Neurovascularly intact.  Patient has been compliant with DVT prophylaxis.  She presents ambulating with a walker.    Procedures    Imaging Results (Most Recent)     Procedure Component Value Units Date/Time    XR Knee 3 View Right [[id number]] Resulted: 11/01/22 1736     Updated: 11/01/22 1736    Narrative:      • View:AP/Lateral and [place] view(s)  • Site: Right knee  • Indication: Right knee pain  • Study: X-rays ordered, taken in the office, and reviewed today  • X-ray: Intact appearing right total knee arthroplasty, no signs of   hardware failure or loosening, no subsidence or periprosthetic fracture,   good alignment  • Comparative data: No comparative data found             Result Review :   The following data was reviewed by: SARAH Arora on 11/01/2022:  Data reviewed: Radiologic studies Reviewed by me with the patient and her              Assessment and Plan    Diagnoses and all orders for this " visit:    1. Aftercare following right knee joint replacement surgery (Primary)  -     XR Knee 3 View Right        Reviewed x-rays with the patient and her , they were advised of the retained staple, this was found and removed by medical assistant Merari.  Discussed incision care, she will keep her skin tear covered/dressed and we placed Steri-Strips on the incision today, we discussed incision hygiene, if any new or concerning symptoms occur regarding incision appearance they were advised to call us right away.  Due to patient stating she has posterior knee/lateral knee pain and resolving ecchymosis she was advised of concern for DVT, she will finish DVT prophylaxis, we discussed ordering a stat ultrasound she stated that she would like to hold off on this, she will discuss with her therapy team and she was advised if any new or concerning symptoms occur regarding calf pain, swelling to the right lower extremity that she should go straight to the ER for ultrasound to rule out DVT, patient and spouse expressed understanding.  She will continue pain medication we discussed working on active extension of her knee, she will follow-up in 2 weeks for recheck of range of motion and incision/pain, follow-up in 2 weeks.    Call or return if worsening symptoms.    Follow Up   Return in about 2 weeks (around 11/15/2022) for Recheck.  Patient was given instructions and counseling regarding her condition or for health maintenance advice. Please see specific information pulled into the AVS if appropriate.

## 2022-11-02 ENCOUNTER — TREATMENT (OUTPATIENT)
Dept: PHYSICAL THERAPY | Facility: CLINIC | Age: 52
End: 2022-11-02

## 2022-11-02 DIAGNOSIS — R26.2 DIFFICULTY WALKING: ICD-10-CM

## 2022-11-02 DIAGNOSIS — M25.561 RIGHT KNEE PAIN, UNSPECIFIED CHRONICITY: ICD-10-CM

## 2022-11-02 DIAGNOSIS — R29.898 WEAKNESS OF RIGHT LOWER EXTREMITY: ICD-10-CM

## 2022-11-02 DIAGNOSIS — M25.661 DECREASED RANGE OF MOTION (ROM) OF RIGHT KNEE: ICD-10-CM

## 2022-11-02 DIAGNOSIS — Z96.651 S/P TOTAL KNEE ARTHROPLASTY, RIGHT: Primary | ICD-10-CM

## 2022-11-02 PROCEDURE — 97530 THERAPEUTIC ACTIVITIES: CPT | Performed by: PHYSICAL THERAPIST

## 2022-11-02 PROCEDURE — 97110 THERAPEUTIC EXERCISES: CPT | Performed by: PHYSICAL THERAPIST

## 2022-11-02 PROCEDURE — 97140 MANUAL THERAPY 1/> REGIONS: CPT | Performed by: PHYSICAL THERAPIST

## 2022-11-02 NOTE — PROGRESS NOTES
Physical Therapy Daily Treatment Note    Patient: Katarzyna Norris   : 1970  Diagnosis/ICD-10 Code:  S/P total knee arthroplasty, right [Z96.651]  Referring practitioner: Marco Nelson MD  Date of Initial Visit: Type: THERAPY  Noted: 10/21/2022  Today's Date: 2022  Patient seen for 4 sessions           Subjective: Pt reporting she is still very sore, her bruising is improving, but still has a lot.    Objective   Bruising from the thigh to inferior calf.   Wound from bandage removal distal incision.  Requires assistance for SLR, but good quad contraction.      See Exercise, Manual, and Modality Logs for complete treatment.       Assessment/Plan: Pt tolerated today's session well, continues to have increased bruising, but she is able to tolerate flexion stretching well. Also having difficulty with performing SLR, so progressed with assistance along with quad sets and SAQ exercises. Pt would benefit from continued skilled therapy to address deficits. Progress per plan of care.             Manual Therapy:    16     mins  70536;  Therapeutic Exercise:    16     mins  04311;     Neuromuscular Rosemarie:    0    mins  16909;    Therapeutic Activity:     8     mins  00620;     Gait Trainin     mins  44874;     Ultrasound:     0     mins  61346;    Electrical Stimulation:    0     mins  29824 ( );  Dry Needling     0     mins self-pay;  Aquatic Therapy    0     mins  01063;  Mechanical Traction    0     mins  46459  Moist Heat     0     mins  No charge    Timed Treatment:   40   mins   Total Treatment:     40   mins    Efe Turcios PT  Physical Therapist    Electronically signed 2022    [place] License: PT - [id number]

## 2022-11-04 ENCOUNTER — TELEPHONE (OUTPATIENT)
Dept: ORTHOPEDIC SURGERY | Facility: CLINIC | Age: 52
End: 2022-11-04

## 2022-11-04 ENCOUNTER — TELEPHONE (OUTPATIENT)
Dept: PHYSICAL THERAPY | Facility: CLINIC | Age: 52
End: 2022-11-04

## 2022-11-04 DIAGNOSIS — M17.11 PRIMARY LOCALIZED OSTEOARTHRITIS OF RIGHT KNEE: ICD-10-CM

## 2022-11-04 RX ORDER — HYDROCODONE BITARTRATE AND ACETAMINOPHEN 7.5; 325 MG/1; MG/1
1 TABLET ORAL EVERY 4 HOURS PRN
Qty: 40 TABLET | Refills: 0 | Status: SHIPPED | OUTPATIENT
Start: 2022-11-04 | End: 2023-03-10

## 2022-11-04 RX ORDER — CEPHALEXIN 500 MG/1
500 CAPSULE ORAL 3 TIMES DAILY
Qty: 21 CAPSULE | Refills: 0 | Status: SHIPPED | OUTPATIENT
Start: 2022-11-04 | End: 2022-11-11

## 2022-11-04 NOTE — TELEPHONE ENCOUNTER
PATIENT CALLED TO REPORT THAT THE SKIN IRRITATION ON HER LEG NEAR THE SMALL SKIN TEAR ON HER SHIN SEEMS TO BE GETTING WORSE. PATIENT BELIEVES SHE IS ALLERGIC TO THE ADHESIVE IN THE POST OPERATIVE BANDAGE AND TAPE. SHE ADMITTED TO PUTTING NEOSPORIN ON THE SKIN TEAR AND RASH WHICH SEEMS TO HAVE MADE IT SPREAD. SHE IS CONCERNED ABOUT THIS IRRITATION SPREADING TO HER INCISION.   SPOKE WITH CATRACHITO VO PA-C WHO RECOMMENDS A COURSE OF KEFLEX 500MG TID TO HELP CLEAR UP SKIN ISSUE. PATIENT ALSO ADVISED TO DISCONTINUE USE OF NEOSPORIN AND KEEP THE AREA OPEN TO AIR AS LONG AS IT IS NOT DRAINING OR BLEEDING. PATIENT VOICED UNDERSTANDING AND WILL CALL WITH ANY FURTHER ISSUES OR CONCERS.

## 2022-11-07 ENCOUNTER — TRANSCRIBE ORDERS (OUTPATIENT)
Dept: FAMILY MEDICINE CLINIC | Facility: CLINIC | Age: 52
End: 2022-11-07

## 2022-11-07 ENCOUNTER — OFFICE VISIT (OUTPATIENT)
Dept: FAMILY MEDICINE CLINIC | Facility: CLINIC | Age: 52
End: 2022-11-07

## 2022-11-07 ENCOUNTER — TREATMENT (OUTPATIENT)
Dept: PHYSICAL THERAPY | Facility: CLINIC | Age: 52
End: 2022-11-07

## 2022-11-07 ENCOUNTER — TELEPHONE (OUTPATIENT)
Dept: FAMILY MEDICINE CLINIC | Facility: CLINIC | Age: 52
End: 2022-11-07

## 2022-11-07 VITALS
WEIGHT: 273 LBS | DIASTOLIC BLOOD PRESSURE: 80 MMHG | HEART RATE: 80 BPM | OXYGEN SATURATION: 100 % | SYSTOLIC BLOOD PRESSURE: 150 MMHG | HEIGHT: 62 IN | TEMPERATURE: 98.5 F | BODY MASS INDEX: 50.24 KG/M2

## 2022-11-07 DIAGNOSIS — L23.9 CONTACT ALLERGIC REACTION: ICD-10-CM

## 2022-11-07 DIAGNOSIS — R29.898 WEAKNESS OF RIGHT LOWER EXTREMITY: ICD-10-CM

## 2022-11-07 DIAGNOSIS — Z47.1 AFTERCARE FOLLOWING RIGHT KNEE JOINT REPLACEMENT SURGERY: ICD-10-CM

## 2022-11-07 DIAGNOSIS — Z96.651 AFTERCARE FOLLOWING RIGHT KNEE JOINT REPLACEMENT SURGERY: ICD-10-CM

## 2022-11-07 DIAGNOSIS — M25.661 DECREASED RANGE OF MOTION (ROM) OF RIGHT KNEE: ICD-10-CM

## 2022-11-07 DIAGNOSIS — R26.2 DIFFICULTY WALKING: ICD-10-CM

## 2022-11-07 DIAGNOSIS — N18.32 STAGE 3B CHRONIC KIDNEY DISEASE: ICD-10-CM

## 2022-11-07 DIAGNOSIS — K59.03 DRUG-INDUCED CONSTIPATION: Primary | ICD-10-CM

## 2022-11-07 DIAGNOSIS — N18.4 CHRONIC KIDNEY DISEASE, STAGE IV (SEVERE): Primary | ICD-10-CM

## 2022-11-07 DIAGNOSIS — I10 PRIMARY HYPERTENSION: ICD-10-CM

## 2022-11-07 DIAGNOSIS — M25.561 RIGHT KNEE PAIN, UNSPECIFIED CHRONICITY: ICD-10-CM

## 2022-11-07 DIAGNOSIS — N18.4 CHRONIC KIDNEY DISEASE, STAGE IV (SEVERE): ICD-10-CM

## 2022-11-07 DIAGNOSIS — Z96.651 S/P TOTAL KNEE ARTHROPLASTY, RIGHT: Primary | ICD-10-CM

## 2022-11-07 PROBLEM — K59.00 CONSTIPATION: Status: ACTIVE | Noted: 2022-11-07

## 2022-11-07 PROBLEM — M19.90 OSTEOARTHRITIS: Status: ACTIVE | Noted: 2022-07-28

## 2022-11-07 PROCEDURE — 97140 MANUAL THERAPY 1/> REGIONS: CPT | Performed by: PHYSICAL THERAPIST

## 2022-11-07 PROCEDURE — 97530 THERAPEUTIC ACTIVITIES: CPT | Performed by: PHYSICAL THERAPIST

## 2022-11-07 PROCEDURE — 99213 OFFICE O/P EST LOW 20 MIN: CPT | Performed by: NURSE PRACTITIONER

## 2022-11-07 PROCEDURE — 80048 BASIC METABOLIC PNL TOTAL CA: CPT | Performed by: INTERNAL MEDICINE

## 2022-11-07 RX ORDER — CLOBETASOL PROPIONATE 0.5 MG/G
1 CREAM TOPICAL 2 TIMES DAILY
Qty: 60 G | Refills: 1 | Status: SHIPPED | OUTPATIENT
Start: 2022-11-07 | End: 2022-12-07 | Stop reason: SDUPTHER

## 2022-11-07 NOTE — PROGRESS NOTES
Physical Therapy Daily Treatment Note                      [place] [facility] [address] [facility] [place] [address]    Patient: Katarzyna Norris   : 1970  Diagnosis/ICD-10 Code:  S/P total knee arthroplasty, right [Z96.651]  Referring practitioner: Marco Nelson MD  Date of Initial Visit: Type: THERAPY  Noted: 10/21/2022  Today's Date: 2022  Patient seen for 5 sessions           Subjective   The patient reported that her legs are worn out and feel very swollen today. She has already had a visit with her PCP and kidney doctor today. They are considering starting dialysis due to her kidney function.    Objective   See Exercise, Manual, and Modality Logs for complete treatment.     Assessment/Plan  The patient demonstrates fair tolerance to strengthening interventions today. She was unable to perform a seated long arc quad. She demonstrates good right knee extension ROM, but is still fairly limited in knee flexion ROM.       Timed:  Manual Therapy:    11     mins  08747;  Therapeutic Exercise:    3     mins  93577;     Neuromuscular Rosemarie:   0    mins  86161;    Therapeutic Activity:     10     mins  41797;     Gait Trainin     mins  57063;     Aquatics                         0      mins  70147    Un-timed:  Mechanical Traction      0     mins  80086  Dry Needling     0     mins self-pay  Electrical Stimulation:    0     mins  67946 ( );      Timed Treatment:   24   mins   Total Treatment:     32   mins    Jeremias De Paz PT  Physical Therapist    Electronically signed 2022    [place] License: PT - [id number]

## 2022-11-07 NOTE — PROGRESS NOTES
ACUTE VISIT     Patient Name: Katarzyna Norris  : 1970   MRN: [id number]     Chief Complaint:    Chief Complaint   Patient presents with   • Rash       History of Present Illness: Katarzyna Norris is a 52 y.o. female who is here today for a rash.  Patient recently had right knee surgery (10/19/22) and she started using a random bandage on it from the [employer] store after cleaning the area on 10/20/22 . Her skin reacted to the adhesive of the bandage and caused a red irritated area over her incision. She tried using Neosporin and leaving the bandage off.    Also c/o constipation, hard marbles yesterday, taking colace and miralax daily no relief     She has PT today and sees [facility]      Subjective      Review of Systems:   Review of Systems   Constitutional: Negative for fever.   Respiratory: Negative for shortness of breath.    Cardiovascular: Negative for chest pain.   Gastrointestinal: Positive for constipation. Negative for abdominal pain, nausea and vomiting.   Genitourinary: Negative for dysuria.   Musculoskeletal: Positive for arthralgias and joint swelling.   Skin: Positive for rash.        Past Medical History:   Past Medical History:   Diagnosis Date   • Anxiety    • Arthritis     GILBERT KNEES   • Asthma     SEASONAL ALLERGIES   • CKD (chronic kidney disease)     Stage 3, Follows with [facility]   • Elevated cholesterol    • Gout 2021   • Hernia     Upper   • Hiatal hernia    • Hypertension    • Renal insufficiency     follows with [facility]   • Sinus problem     SEASONAL ALLERGIES       Past Surgical History:   Past Surgical History:   Procedure Laterality Date   • BONY PELVIS SURGERY      Plate   •  SECTION   and    • CHOLECYSTECTOMY     • COLONOSCOPY     • ENDOSCOPY     • TOTAL KNEE ARTHROPLASTY Left 2021    Procedure: TOTAL KNEE ARTHROPLASTY;  Surgeon: Marco Nelson MD;  Location: [facility] MAIN OR;  Service: Orthopedics;  Laterality: Left;   •  TOTAL KNEE ARTHROPLASTY Right 10/19/2022    Procedure: RIGHT TOTAL KNEE ARTHROPLASTY - NO SHELBY;  Surgeon: Marco Nelson MD;  Location: [facility] MAIN OR;  Service: Orthopedics;  Laterality: Right;   • TUBAL ABDOMINAL LIGATION  1989       Family History:   Family History   Problem Relation Age of Onset   • Throat cancer Father 73   • Stroke Other    • Diabetes Other    • Malig Hyperthermia Neg Hx        Social History:   Social History     Socioeconomic History   • Marital status:    Tobacco Use   • Smoking status: Former   • Smokeless tobacco: Never   Vaping Use   • Vaping Use: Never used   Substance and Sexual Activity   • Alcohol use: Never   • Drug use: Never   • Sexual activity: Defer       Medications:     Current Outpatient Medications:   •  allopurinol (ZYLOPRIM) 300 MG tablet, Take 1 tablet by mouth 2 (Two) Times a Day., Disp: 180 tablet, Rfl: 1  •  apixaban (ELIQUIS) 2.5 MG tablet tablet, Take 1 tablet by mouth 2 (Two) Times a Day., Disp: 14 tablet, Rfl: 0  •  aspirin EC (Ecotrin) 325 MG tablet, Take 1 tablet by mouth Daily., Disp: 21 tablet, Rfl: 0  •  atorvastatin (LIPITOR) 20 MG tablet, Take 1 tablet by mouth Daily. (Patient taking differently: Take 1 tablet by mouth Every Night.), Disp: 90 tablet, Rfl: 0  •  carvedilol (COREG) 25 MG tablet, Take 0.5 tablets by mouth 2 (Two) Times a Day With Meals., Disp: , Rfl:   •  cephalexin (KEFLEX) 500 MG capsule, Take 1 capsule by mouth 3 (Three) Times a Day for 7 days., Disp: 21 capsule, Rfl: 0  •  cetirizine (zyrTEC) 10 MG tablet, Take 1 tablet by mouth Every Night., Disp: 30 tablet, Rfl: 0  •  cloNIDine (CATAPRES) 0.2 MG tablet, Take 1 tablet by mouth 3 (Three) Times a Day., Disp: 90 tablet, Rfl: 1  •  famotidine (Pepcid) 40 MG tablet, Take 1 tablet by mouth Every Night. States she hasn't started yet, Disp: , Rfl:   •  HYDROcodone-acetaminophen (Norco) 7.5-325 MG per tablet, Take 1 tablet by mouth Every 4 (Four) Hours As Needed for Moderate Pain.,  "Disp: 40 tablet, Rfl: 0  •  sodium bicarbonate 650 MG tablet, Take 1 tablet by mouth 2 (Two) Times a Day., Disp: 180 tablet, Rfl: 0  •  vitamin B-12 (CYANOCOBALAMIN) 100 MCG tablet, Take 50 mcg by mouth Daily., Disp: , Rfl:   •  Zinc Sulfate (ZINC 15 PO), Take 15 mg by mouth Daily., Disp: , Rfl:   •  clobetasol (TEMOVATE) 0.05 % cream, Apply 1 application topically to the appropriate area as directed 2 (Two) Times a Day., Disp: 60 g, Rfl: 1  •  linaclotide (Linzess) 145 MCG capsule capsule, Take 1 capsule by mouth Every Morning Before Breakfast., Disp: 30 capsule, Rfl: 5    Allergies:   Allergies   Allergen Reactions   • Amlodipine Shortness Of Breath   • Diltiazem Hcl Anxiety         Objective     Physical Exam:  Vital Signs:   Vitals:    11/07/22 1329 11/07/22 1359   BP: 153/77 150/80   Pulse: 80    Temp: 98.5 °F (36.9 °C)    SpO2: 100%    Weight: 124 kg (273 lb)    Height: 157.5 cm (62\")      Body mass index is 49.93 kg/m².     Physical Exam  Cardiovascular:      Rate and Rhythm: Normal rate and regular rhythm.      Heart sounds: Normal heart sounds. No murmur heard.  Pulmonary:      Effort: Pulmonary effort is normal.      Breath sounds: Normal breath sounds.   Abdominal:      General: Bowel sounds are normal.      Palpations: Abdomen is soft.   Musculoskeletal:         General: Swelling and tenderness present.      Right lower leg: No edema.   Skin:     General: Skin is warm and dry.      Findings: Rash present.      Comments: Erythematous urticaria surrounding knee incision, shape of bandage   Neurological:      Mental Status: She is alert.             Assessment / Plan      Assessment/Plan:   Diagnoses and all orders for this visit:    1. Drug-induced constipation (Primary)    2. Contact allergic reaction    3. Aftercare following right knee joint replacement surgery    4. Primary hypertension    5. Chronic kidney disease, stage IV (severe) ([facility])  -     Basic metabolic panel    6. Stage 3b chronic kidney " disease (HCC)    Other orders  -     clobetasol (TEMOVATE) 0.05 % cream; Apply 1 application topically to the appropriate area as directed 2 (Two) Times a Day.  Dispense: 60 g; Refill: 1  -     linaclotide (Linzess) 145 MCG capsule capsule; Take 1 capsule by mouth Every Morning Before Breakfast.  Dispense: 30 capsule; Refill: 5       Drug-induced constipation taking stool softeners and relieved we will start Linzess increase water and fiber in diet activity 30 minutes daily  Contact allergic reaction we will treat with clobetasol avoid adhesives in the area secondary after right knee joint replacement surgery keep follow-up appoint with orthopedic surgery as directed  Hypertension elevated upon arrival to clinic manual recheck improved Saim patient is no longer on ARB due to chronic kidney disease follow-up with nephrologist as instructed        Follow Up:   Return if symptoms worsen or fail to improve.    MARICRUZ Ball      Please note that portions of this note were completed with a voice recognition program.

## 2022-11-08 LAB
ANION GAP SERPL CALCULATED.3IONS-SCNC: 12 MMOL/L (ref 5–15)
BUN SERPL-MCNC: 36 MG/DL (ref 6–20)
BUN/CREAT SERPL: 14.2 (ref 7–25)
CALCIUM SPEC-SCNC: 10 MG/DL (ref 8.6–10.5)
CHLORIDE SERPL-SCNC: 100 MMOL/L (ref 98–107)
CO2 SERPL-SCNC: 25 MMOL/L (ref 22–29)
CREAT SERPL-MCNC: 2.54 MG/DL (ref 0.57–1)
EGFRCR SERPLBLD CKD-EPI 2021: 21.9 ML/MIN/1.73
GLUCOSE SERPL-MCNC: 99 MG/DL (ref 65–99)
POTASSIUM SERPL-SCNC: 4.7 MMOL/L (ref 3.5–5.2)
SODIUM SERPL-SCNC: 137 MMOL/L (ref 136–145)

## 2022-11-09 ENCOUNTER — TREATMENT (OUTPATIENT)
Dept: PHYSICAL THERAPY | Facility: CLINIC | Age: 52
End: 2022-11-09

## 2022-11-09 DIAGNOSIS — M25.561 RIGHT KNEE PAIN, UNSPECIFIED CHRONICITY: ICD-10-CM

## 2022-11-09 DIAGNOSIS — M25.661 DECREASED RANGE OF MOTION (ROM) OF RIGHT KNEE: ICD-10-CM

## 2022-11-09 DIAGNOSIS — Z96.651 S/P TOTAL KNEE ARTHROPLASTY, RIGHT: Primary | ICD-10-CM

## 2022-11-09 DIAGNOSIS — R26.2 DIFFICULTY WALKING: ICD-10-CM

## 2022-11-09 DIAGNOSIS — R29.898 WEAKNESS OF RIGHT LOWER EXTREMITY: ICD-10-CM

## 2022-11-09 PROCEDURE — 97140 MANUAL THERAPY 1/> REGIONS: CPT | Performed by: PHYSICAL THERAPIST

## 2022-11-09 PROCEDURE — 97110 THERAPEUTIC EXERCISES: CPT | Performed by: PHYSICAL THERAPIST

## 2022-11-09 PROCEDURE — 97530 THERAPEUTIC ACTIVITIES: CPT | Performed by: PHYSICAL THERAPIST

## 2022-11-09 NOTE — PROGRESS NOTES
Physical Therapy Daily Treatment Note  [address] [place] [id number]    Patient: Katarzyna Norris   : 1970  Diagnosis/ICD-10 Code:  S/P total knee arthroplasty, right [Z96.651]  Referring practitioner: Marco Nelson MD  Date of Initial Visit: Type: THERAPY  Noted: 10/21/2022  Today's Date: 2022  Patient seen for 6 sessions           Subjective: Pt reporting she is sore today, but feels like she is less swollen. Her bruising seems to be improving as well. Pain 5/10.    Objective   - Unable to perform LAQ or SLR without assist.  - Bruising improving, though still notable.    See Exercise, Manual, and Modality Logs for complete treatment.       Assessment/Plan: Pt tolerated today's session fair, her bruising is improved. Difficulty with LAQ and SLR (needs assist). But able to perform good quad set and SAQ.  Pt would benefit from continued skilled therapy to address deficits. Progress per plan of care.           Manual Therapy:    10     mins  40544;  Therapeutic Exercise:    12     mins  01718;     Neuromuscular Rosemarie:    0    mins  92387;    Therapeutic Activity:     10     mins  82314;     Gait Trainin     mins  60858;     Ultrasound:     0     mins  10334;    Electrical Stimulation:    0     mins  17303 ( );  Dry Needling     0     mins self-pay;  Aquatic Therapy    0     mins  41627;  Mechanical Traction    0     mins  87560  Moist Heat     0     mins  No charge    Timed Treatment:   32   mins   Total Treatment:     32   mins    Efe Turcios PT  Physical Therapist    Electronically signed 2022    [place] License: PT - [id number]

## 2022-11-14 ENCOUNTER — TELEPHONE (OUTPATIENT)
Dept: PHYSICAL THERAPY | Facility: CLINIC | Age: 52
End: 2022-11-14

## 2022-11-14 ENCOUNTER — TELEPHONE (OUTPATIENT)
Dept: ORTHOPEDIC SURGERY | Facility: CLINIC | Age: 52
End: 2022-11-14

## 2022-11-14 NOTE — TELEPHONE ENCOUNTER
Caller: HORTENSIA  Relationship to Patient: SELF     Phone Number: [phone]  Reason for Call: PATIENT CALLING ASKING IF SHE IS ALLOWED TO TAKE TYLENOL OR NOT

## 2022-11-15 NOTE — TELEPHONE ENCOUNTER
Spoke with patient and gave her provider response and patient agrees. Has been off pain medication for 2 days. Will use Tylenol as needed.

## 2022-11-16 ENCOUNTER — TREATMENT (OUTPATIENT)
Dept: PHYSICAL THERAPY | Facility: CLINIC | Age: 52
End: 2022-11-16

## 2022-11-16 DIAGNOSIS — Z96.651 S/P TOTAL KNEE ARTHROPLASTY, RIGHT: Primary | ICD-10-CM

## 2022-11-16 DIAGNOSIS — R29.898 WEAKNESS OF RIGHT LOWER EXTREMITY: ICD-10-CM

## 2022-11-16 DIAGNOSIS — R26.2 DIFFICULTY WALKING: ICD-10-CM

## 2022-11-16 DIAGNOSIS — M25.661 DECREASED RANGE OF MOTION (ROM) OF RIGHT KNEE: ICD-10-CM

## 2022-11-16 DIAGNOSIS — M25.561 RIGHT KNEE PAIN, UNSPECIFIED CHRONICITY: ICD-10-CM

## 2022-11-16 PROCEDURE — 97110 THERAPEUTIC EXERCISES: CPT | Performed by: PHYSICAL THERAPIST

## 2022-11-16 PROCEDURE — 97116 GAIT TRAINING THERAPY: CPT | Performed by: PHYSICAL THERAPIST

## 2022-11-16 PROCEDURE — 97530 THERAPEUTIC ACTIVITIES: CPT | Performed by: PHYSICAL THERAPIST

## 2022-11-16 NOTE — PROGRESS NOTES
Physical Therapy Daily Treatment Note  [address] [place] [id number]    Patient: Katarzyna Norris   : 1970  Diagnosis/ICD-10 Code:  S/P total knee arthroplasty, right [Z96.651]  Referring practitioner: Marco Nelson MD  Date of Initial Visit: Type: THERAPY  Noted: 10/21/2022  Today's Date: 2022  Patient seen for 7 sessions           Subjective: Pt reporting she is starting to feel better, though still having some soreness in the right leg. She feels her swelling is doing better. Pain 5/10.    Objective   - wide based gait with SPC, requires cueing for sequence and use of cane in left hand.    See Exercise, Manual, and Modality Logs for complete treatment.       Assessment/Plan: Pt tolerated today's session well, re-evaluation next visit. Her skin quality is improving, incision healing well. Fatigued by the end of the session. Pt would benefit from continued skilled therapy to address deficits. Progress per plan of care.             Manual Therapy:    0     mins  13014;  Therapeutic Exercise:    16     mins  25448;     Neuromuscular Rosemarie:    0    mins  31940;    Therapeutic Activity:     12     mins  62052;     Gait Training:      10     mins  00538;     Ultrasound:     0     mins  89654;    Electrical Stimulation:    0     mins  84971 ( );  Dry Needling     0     mins self-pay;  Aquatic Therapy    0     mins  27091;  Mechanical Traction    0     mins  73484  Moist Heat     0     mins  No charge    Timed Treatment:   38   mins   Total Treatment:     38   mins    Efe Turcios PT  Physical Therapist    Electronically signed 2022    [place] License: PT - [id number]

## 2022-11-18 ENCOUNTER — OFFICE VISIT (OUTPATIENT)
Dept: ORTHOPEDIC SURGERY | Facility: CLINIC | Age: 52
End: 2022-11-18

## 2022-11-18 ENCOUNTER — TREATMENT (OUTPATIENT)
Dept: PHYSICAL THERAPY | Facility: CLINIC | Age: 52
End: 2022-11-18

## 2022-11-18 VITALS — HEART RATE: 105 BPM | BODY MASS INDEX: 49.21 KG/M2 | OXYGEN SATURATION: 98 % | WEIGHT: 267.4 LBS | HEIGHT: 62 IN

## 2022-11-18 DIAGNOSIS — M25.561 RIGHT KNEE PAIN, UNSPECIFIED CHRONICITY: ICD-10-CM

## 2022-11-18 DIAGNOSIS — Z47.1 AFTERCARE FOLLOWING RIGHT KNEE JOINT REPLACEMENT SURGERY: Primary | ICD-10-CM

## 2022-11-18 DIAGNOSIS — Z96.651 S/P TOTAL KNEE ARTHROPLASTY, RIGHT: Primary | ICD-10-CM

## 2022-11-18 DIAGNOSIS — R26.2 DIFFICULTY WALKING: ICD-10-CM

## 2022-11-18 DIAGNOSIS — M25.661 DECREASED RANGE OF MOTION (ROM) OF RIGHT KNEE: ICD-10-CM

## 2022-11-18 DIAGNOSIS — Z96.651 AFTERCARE FOLLOWING RIGHT KNEE JOINT REPLACEMENT SURGERY: Primary | ICD-10-CM

## 2022-11-18 DIAGNOSIS — R29.898 WEAKNESS OF RIGHT LOWER EXTREMITY: ICD-10-CM

## 2022-11-18 PROCEDURE — 97140 MANUAL THERAPY 1/> REGIONS: CPT | Performed by: PHYSICAL THERAPIST

## 2022-11-18 PROCEDURE — 97110 THERAPEUTIC EXERCISES: CPT | Performed by: PHYSICAL THERAPIST

## 2022-11-18 PROCEDURE — 99024 POSTOP FOLLOW-UP VISIT: CPT | Performed by: PHYSICIAN ASSISTANT

## 2022-11-18 NOTE — PROGRESS NOTES
Progress Assessment  [address] [place] [id number]        Patient: Katarzyna Norris   : 1970  Diagnosis/ICD-10 Code:  S/P total knee arthroplasty, right [Z96.651]  Referring practitioner: Marco Nelson MD  Date of Initial Visit: Type: THERAPY  Noted: 10/21/2022  Today's Date: 2022  Patient seen for 8 sessions      Subjective:     Subjective Questionnaire: LEFS: 60/80 = 25% limitation  Clinical Progress: improved  Home Program Compliance: Yes  Treatment has included: therapeutic exercise, manual therapy, therapeutic activity and gait training    Subjective Evaluation    History of Present Illness  Mechanism of injury: Pt reporting she has been doing better, still has some bruising, but her swelling seems to be improving and she is now having less pain. She did hit her shin on something and has a small open spot down toward the bottom of her incisional scar, but its healing ok. She does have a follow up with her physician after today's appointment.    Pain  Current pain rating: 3         Objective     Observations     Additional Knee Observation Details  Bruising noted surrounding the knee especially medial and posterior, into the calf, also extends in the medial thigh     Neurological Testing      Sensation      Knee   Left Knee   Intact: light touch     Right Knee   Intact: light touch      Active Range of Motion      Right Knee   Flexion: 95 degrees   Extension: Right knee active extension: 0 degrees      Passive Range of Motion      Right Knee   Flexion: 108 degrees   Extension: Right knee passive extension: 0 degrees      Strength/Myotome Testing      Right Knee   Flexion: 4+  Extension: 4  Quadriceps contraction: fair to good     Ambulation      Comments   Ambulates with FWW, though she is able to use a single point cane, decreased weight bearing and knee extension through RLE       Assessment & Plan     Assessment  Impairments: abnormal gait, abnormal muscle firing, abnormal or  restricted ROM, activity intolerance, impaired balance, impaired physical strength, pain with function and weight-bearing intolerance  Functional Limitations: walking, standing and stooping  Assessment details: The patient presents to physical therapy s/p Right TKA 10/19/22. The patient presents with associated knee weakness, stiffness, antalgic gait, and functional deficits (LEFS). She has met several of her goals set at the evaluation, and seems to be improving with her ROM and strength. Functionally, she is able to ambulate with a single point cane, though she uses her walker when she is tired. Her bruising and skin irritation from the  Bandage is improving as well. The patient would benefit from ongoing skilled PT intervention to address the above mentioned functional limitations.     Prognosis: good    Goals  Plan Goals: KNEE PROBLEMS    1. The patient has limited ROM of the right knee.   LTG 1:12 weeks:  The patient will demonstrate 0 to 115 degrees of ROM for the right knee in order to allow patient to ambulate.    STATUS: Not met, progressing   STG 1a: 6 weeks:  The patient will demonstrate 5 to 100 degrees of ROM for the right knee.    STATUS:  MET for passive   TREATMENT: Manual therapy, therapeutic exercise, home exercise instruction, and modalities as needed to include:  moist heat, electrical stimulation, ultrasound, and ice.    2. The patient has limited strength of the right knee.   LTG 2: 12 weeks: The patient will demonstrate 4+ /5 strength for knee flexion and extension in order to allow patient improved joint stability    STATUS:  Not met, progressing   STG 2a: 6 weeks: The patient will demonstrate 4 /5 strength for knee flexion and extension    STATUS: MET   TREATMENT: Manual therapy, therapeutic exercise, home exercise instruction, aquatic therapy, and modalities as needed to include:  moist heat, electrical stimulation, ultrasound, and ice.     3. The patient has gait dysfunction.   LTG 3: 12  weeks:  The patient will ambulate without assistive device, independently, for community distances with normal biomechanics of the right lower extremity in order to improve mobility and allow patient to perform activities such as grocery shopping with greater ease.    STATUS:  Not met, progressing   STG 3a: 4 weeks: The patient will ambulate with a single tipped cane with appropriate gait mechanics.    STATUS:  Met, but doesn't use all the time.   TREATMENT: Gait training, aquatic therapy, therapeutic exercise, and home exercise instruction.    4. Mobility: Walking/Moving Around Functional Limitation     LTG 4: 12 weeks:  The patient will demonstrate 20-39% limitation by achieving a score of 49-64/80 on the LEFS.    STATUS:  MET   STG 4 a: 6 weeks:  The patient will demonstrate 40-59% limitation by achieving a score of 33-48/80 on the LEFS.      STATUS:  MET   TREATMENT:  Manual therapy, therapeutic exercise, home exercise instruction, and modalities as needed to include: moist heat, electrical stimulation, and ultrasound.       Plan  Therapy options: will be seen for skilled therapy services  Planned modality interventions: TENS, cryotherapy and thermotherapy (hydrocollator packs)  Planned therapy interventions: functional ROM exercises, gait training, home exercise program, manual therapy, strengthening, stretching, therapeutic activities, soft tissue mobilization, joint mobilization, neuromuscular re-education, balance/weight-bearing training and transfer training  Other planned therapy interventions: aquatic therapy  Frequency: 3x week  Duration in weeks: 12  Treatment plan discussed with: patient      Progress toward previous goals: Partially Met    See Exercise, Manual, and Modality Logs for complete treatment.         Recommendations: Continue as planned  Timeframe: 2 months  Prognosis to achieve goals: good    PT Signature: Efe Turcios PT    Electronically signed 11/18/2022    [place] License: PT -  [id number]     Based upon review of the patient's progress and continued therapy plan, it is my medical opinion that Katarzyna Norris should continue physical therapy treatment at [facility]  [address] [place] [address] [phone].      Timed:         Manual Therapy:    10     mins  19563;     Therapeutic Exercise:    20     mins  04307;     Neuromuscular Rosemarie:    0    mins  79679;    Therapeutic Activity:     0     mins  61243;     Gait Trainin     mins  38843;     Ultrasound:     0     mins  70665;    Ionto                               0    mins   01071  Self Care                       0     mins   70367  Aquatic                          0     mins 07251            Timed Treatment:   30   mins   Total Treatment:     30   mins      I certify that the therapy services are furnished while this patient is under my care.  The services outlined above are required by this patient, and will be reviewed every 90 days.

## 2022-11-18 NOTE — PROGRESS NOTES
"Chief Complaint  Follow-up and Pain of the Right Knee    Subjective          Katarzyna Norris is a 52 y.o. female  presents to [facility] for   History of Present Illness      Patient presents for follow-up evaluation of right total knee arthroplasty 10/19/2022.  She presents with her .  Patient states that her knee is doing better she states the pain she had in her posterior knee has resolved with some massage from the therapy team.  She states the incision is healing well she has been caring for this and finished her antibiotic she denies signs of infection with redness swelling or drainage.  She states she has good range of motion she states she stopped the pain medication.  She presents with a walker.  She states the bruising that we had concerned about to the right lower extremity has been resolving.  No new complaints today.      Allergies   Allergen Reactions   • Amlodipine Shortness Of Breath   • Diltiazem Hcl Anxiety        Social History     Socioeconomic History   • Marital status:    Tobacco Use   • Smoking status: Former   • Smokeless tobacco: Never   Vaping Use   • Vaping Use: Never used   Substance and Sexual Activity   • Alcohol use: Never   • Drug use: Never   • Sexual activity: Defer        REVIEW OF SYSTEMS    Constitutional: Denies fevers, chills, weight loss  Cardiovascular: Denies chest pain, shortness of breath  Skin: Denies rashes, acute skin changes  Neurologic: Denies headache, loss of consciousness  MSK: Right knee pain      Objective   Vital Signs:   Pulse 105   Ht 157.5 cm (62\")   Wt 121 kg (267 lb 6.4 oz)   SpO2 98%   BMI 48.91 kg/m²     Body mass index is 48.91 kg/m².    Physical Exam    Right knee: Incision is healing well, mild resolving skin irritation surrounding the incision site in the shape of the Aquacel dressings, resolving ecchymosis to the shin, calf and ankle/foot, full extension, flexion 100, stable to varus/valgus stress, " stable anterior/posterior, nontender posterior knee, nontender calf, neurovascular intact, strength appropriate    Procedures    Imaging Results (Most Recent)     None           Result Review :   The following data was reviewed by: SARAH Arora on 11/18/2022:               Assessment and Plan    Diagnoses and all orders for this visit:    1. Aftercare following surgery of right total knee arthroplasty, 10/19/2022 (Primary)        Discussed diagnosis and treatment options with the patient and her  she will continue physical therapy, call with any new or concerning symptoms or concerns that she may have otherwise follow-up in 4 weeks for recheck with x-rays.    Call or return if worsening symptoms.    Follow Up   Return in about 4 weeks (around 12/16/2022).  Patient was given instructions and counseling regarding her condition or for health maintenance advice. Please see specific information pulled into the AVS if appropriate.

## 2022-11-21 ENCOUNTER — TREATMENT (OUTPATIENT)
Dept: PHYSICAL THERAPY | Facility: CLINIC | Age: 52
End: 2022-11-21

## 2022-11-21 DIAGNOSIS — M25.561 RIGHT KNEE PAIN, UNSPECIFIED CHRONICITY: ICD-10-CM

## 2022-11-21 DIAGNOSIS — M25.661 DECREASED RANGE OF MOTION (ROM) OF RIGHT KNEE: ICD-10-CM

## 2022-11-21 DIAGNOSIS — Z96.651 S/P TOTAL KNEE ARTHROPLASTY, RIGHT: Primary | ICD-10-CM

## 2022-11-21 DIAGNOSIS — R29.898 WEAKNESS OF RIGHT LOWER EXTREMITY: ICD-10-CM

## 2022-11-21 DIAGNOSIS — R26.2 DIFFICULTY WALKING: ICD-10-CM

## 2022-11-21 PROCEDURE — 97140 MANUAL THERAPY 1/> REGIONS: CPT | Performed by: PHYSICAL THERAPIST

## 2022-11-21 PROCEDURE — 97110 THERAPEUTIC EXERCISES: CPT | Performed by: PHYSICAL THERAPIST

## 2022-11-21 PROCEDURE — 97530 THERAPEUTIC ACTIVITIES: CPT | Performed by: PHYSICAL THERAPIST

## 2022-11-21 NOTE — PROGRESS NOTES
Physical Therapy Daily Note  [address] [place] [address]    VISIT#: 9    Subjective   Katarzyna Norris reports 2/10 pain today. Pt reports she is no longer using an AD in the house but still using a walker in the community. Pt reports she would like to practice steps today because that is still a limiting factor at home.       Objective     See Exercise, Manual, and Modality Logs for complete treatment.     Assessment/Plan    Attempted stairs in stairwell today and patient became very fatigued and required break after. Pt did require cues with sequencing with steps. Pt was able to perform step ups on 4 inch step without fatiguing. Continued with strengthening and ROM exercises. Pt able to achieve 105 degrees of passive R knee flexion today. Will continue to progress patient as able.     Progress per Plan of Care and Progress strengthening /stabilization /functional activity            Timed:                 Manual Therapy:    10     mins  67491;     Therapeutic Exercise:    12     mins  66153;     Neuromuscular Rosemarie:    0    mins  35231;    Therapeutic Activity:     18     mins  80159;     Gait Trainin     mins  92596;     Ultrasound:     0     mins  46684;    Ionto                               0    mins   29580  Self pay                         0     mins [id number]    Un-Timed:  Electrical Stimulation:    0     mins  07974 ( )  Canalith Repos    0     mins 23529  Dry Needling     0     mins self-pay  Traction     0     mins 31747    Timed Treatment:   40   mins   Total Treatment:     40   mins    Bruce Kennedy PT  Physical Therapist    PT SIGNATURE: Electronically signed by Bruce Kennedy PT  [place] LICENSE: [id number]

## 2022-11-23 ENCOUNTER — TREATMENT (OUTPATIENT)
Dept: PHYSICAL THERAPY | Facility: CLINIC | Age: 52
End: 2022-11-23

## 2022-11-23 DIAGNOSIS — R26.2 DIFFICULTY WALKING: ICD-10-CM

## 2022-11-23 DIAGNOSIS — Z96.651 S/P TOTAL KNEE ARTHROPLASTY, RIGHT: Primary | ICD-10-CM

## 2022-11-23 DIAGNOSIS — M25.561 RIGHT KNEE PAIN, UNSPECIFIED CHRONICITY: ICD-10-CM

## 2022-11-23 DIAGNOSIS — R29.898 WEAKNESS OF RIGHT LOWER EXTREMITY: ICD-10-CM

## 2022-11-23 DIAGNOSIS — M25.661 DECREASED RANGE OF MOTION (ROM) OF RIGHT KNEE: ICD-10-CM

## 2022-11-23 PROCEDURE — 97530 THERAPEUTIC ACTIVITIES: CPT | Performed by: PHYSICAL THERAPIST

## 2022-11-23 PROCEDURE — 97110 THERAPEUTIC EXERCISES: CPT | Performed by: PHYSICAL THERAPIST

## 2022-11-23 PROCEDURE — 97140 MANUAL THERAPY 1/> REGIONS: CPT | Performed by: PHYSICAL THERAPIST

## 2022-11-23 NOTE — PROGRESS NOTES
Physical Therapy Daily Treatment Note  [address] [place] [id number]    Patient: Katarzyna Norris   : 1970  Diagnosis/ICD-10 Code:  S/P total knee arthroplasty, right [Z96.651]  Referring practitioner: Marco Nelson MD  Date of Initial Visit: Type: THERAPY  Noted: 10/21/2022  Today's Date: 2022  Patient seen for 10 sessions           Subjective: Pt reporting she is feeling better overall, she has been using her cane, but primarily in her right hand despite going over this last visit. Pain 3/10.     Objective   - 95 degrees flexion at stairs  - 104 degrees flexion sitting passive stretch    See Exercise, Manual, and Modality Logs for complete treatment.       Assessment/Plan: Pt tolerated today's session well, progressed with ROM and strengthening routine, focusing on step ups and knee flexion. Pt would benefit from continued skilled therapy to address deficits. Progress per plan of care.             Manual Therapy:    10     mins  11843;  Therapeutic Exercise:    16     mins  57255;     Neuromuscular Rosemarie:    0    mins  92166;    Therapeutic Activity:     10     mins  83872;     Gait Trainin     mins  82541;     Ultrasound:     0     mins  12149;    Electrical Stimulation:    0     mins  20758 ( );  Dry Needling     0     mins self-pay;  Aquatic Therapy    0     mins  89361;  Mechanical Traction    0     mins  40937  Moist Heat     0     mins  No charge    Timed Treatment:   36   mins   Total Treatment:     36   mins    Efe Turcios PT  Physical Therapist    Electronically signed 2022    [place] License: PT - [id number]

## 2022-11-30 ENCOUNTER — TREATMENT (OUTPATIENT)
Dept: PHYSICAL THERAPY | Facility: CLINIC | Age: 52
End: 2022-11-30

## 2022-11-30 DIAGNOSIS — R29.898 WEAKNESS OF RIGHT LOWER EXTREMITY: ICD-10-CM

## 2022-11-30 DIAGNOSIS — M25.561 RIGHT KNEE PAIN, UNSPECIFIED CHRONICITY: ICD-10-CM

## 2022-11-30 DIAGNOSIS — M25.661 DECREASED RANGE OF MOTION (ROM) OF RIGHT KNEE: ICD-10-CM

## 2022-11-30 DIAGNOSIS — R26.2 DIFFICULTY WALKING: ICD-10-CM

## 2022-11-30 DIAGNOSIS — Z96.651 S/P TOTAL KNEE ARTHROPLASTY, RIGHT: Primary | ICD-10-CM

## 2022-11-30 PROCEDURE — 97110 THERAPEUTIC EXERCISES: CPT | Performed by: PHYSICAL THERAPIST

## 2022-11-30 PROCEDURE — 97116 GAIT TRAINING THERAPY: CPT | Performed by: PHYSICAL THERAPIST

## 2022-12-05 ENCOUNTER — TRANSCRIBE ORDERS (OUTPATIENT)
Dept: FAMILY MEDICINE CLINIC | Facility: CLINIC | Age: 52
End: 2022-12-05

## 2022-12-05 ENCOUNTER — CLINICAL SUPPORT (OUTPATIENT)
Dept: FAMILY MEDICINE CLINIC | Facility: CLINIC | Age: 52
End: 2022-12-05

## 2022-12-05 VITALS — SYSTOLIC BLOOD PRESSURE: 142 MMHG | DIASTOLIC BLOOD PRESSURE: 80 MMHG

## 2022-12-05 DIAGNOSIS — N18.4 CHRONIC KIDNEY DISEASE, STAGE IV (SEVERE): Primary | ICD-10-CM

## 2022-12-05 DIAGNOSIS — I10 PRIMARY HYPERTENSION: ICD-10-CM

## 2022-12-05 DIAGNOSIS — N18.4 CHRONIC KIDNEY DISEASE, STAGE IV (SEVERE): ICD-10-CM

## 2022-12-05 LAB
25(OH)D3 SERPL-MCNC: 53.3 NG/ML (ref 30–100)
ALBUMIN SERPL-MCNC: 4.3 G/DL (ref 3.5–5.2)
ANION GAP SERPL CALCULATED.3IONS-SCNC: 13.3 MMOL/L (ref 5–15)
BASOPHILS # BLD AUTO: 0.03 10*3/MM3 (ref 0–0.2)
BASOPHILS NFR BLD AUTO: 0.7 % (ref 0–1.5)
BUN SERPL-MCNC: 42 MG/DL (ref 6–20)
BUN/CREAT SERPL: 14.6 (ref 7–25)
CALCIUM SPEC-SCNC: 10.6 MG/DL (ref 8.6–10.5)
CHLORIDE SERPL-SCNC: 102 MMOL/L (ref 98–107)
CO2 SERPL-SCNC: 25.7 MMOL/L (ref 22–29)
CREAT SERPL-MCNC: 2.87 MG/DL (ref 0.57–1)
CREAT UR-MCNC: 70 MG/DL
DEPRECATED RDW RBC AUTO: 49.2 FL (ref 37–54)
EGFRCR SERPLBLD CKD-EPI 2021: 18.9 ML/MIN/1.73
EOSINOPHIL # BLD AUTO: 0.1 10*3/MM3 (ref 0–0.4)
EOSINOPHIL NFR BLD AUTO: 2.5 % (ref 0.3–6.2)
ERYTHROCYTE [DISTWIDTH] IN BLOOD BY AUTOMATED COUNT: 13.6 % (ref 12.3–15.4)
GLUCOSE SERPL-MCNC: 114 MG/DL (ref 65–99)
HCT VFR BLD AUTO: 34.3 % (ref 34–46.6)
HGB BLD-MCNC: 11.6 G/DL (ref 12–15.9)
IMM GRANULOCYTES # BLD AUTO: 0.01 10*3/MM3 (ref 0–0.05)
IMM GRANULOCYTES NFR BLD AUTO: 0.2 % (ref 0–0.5)
LYMPHOCYTES # BLD AUTO: 1.3 10*3/MM3 (ref 0.7–3.1)
LYMPHOCYTES NFR BLD AUTO: 31.9 % (ref 19.6–45.3)
MCH RBC QN AUTO: 33.9 PG (ref 26.6–33)
MCHC RBC AUTO-ENTMCNC: 33.8 G/DL (ref 31.5–35.7)
MCV RBC AUTO: 100.3 FL (ref 79–97)
MONOCYTES # BLD AUTO: 0.34 10*3/MM3 (ref 0.1–0.9)
MONOCYTES NFR BLD AUTO: 8.4 % (ref 5–12)
NEUTROPHILS NFR BLD AUTO: 2.29 10*3/MM3 (ref 1.7–7)
NEUTROPHILS NFR BLD AUTO: 56.3 % (ref 42.7–76)
NRBC BLD AUTO-RTO: 0 /100 WBC (ref 0–0.2)
PHOSPHATE SERPL-MCNC: 4.2 MG/DL (ref 2.5–4.5)
PLATELET # BLD AUTO: 162 10*3/MM3 (ref 140–450)
PMV BLD AUTO: 8.9 FL (ref 6–12)
POTASSIUM SERPL-SCNC: 4.4 MMOL/L (ref 3.5–5.2)
PROT ?TM UR-MCNC: 74.5 MG/DL
PROT/CREAT UR: 1.06 MG/G{CREAT}
PTH-INTACT SERPL-MCNC: 16.4 PG/ML (ref 15–65)
RBC # BLD AUTO: 3.42 10*6/MM3 (ref 3.77–5.28)
SODIUM SERPL-SCNC: 141 MMOL/L (ref 136–145)
WBC NRBC COR # BLD: 4.07 10*3/MM3 (ref 3.4–10.8)

## 2022-12-05 PROCEDURE — 82570 ASSAY OF URINE CREATININE: CPT | Performed by: INTERNAL MEDICINE

## 2022-12-05 PROCEDURE — 36415 COLL VENOUS BLD VENIPUNCTURE: CPT | Performed by: NURSE PRACTITIONER

## 2022-12-05 PROCEDURE — 80069 RENAL FUNCTION PANEL: CPT | Performed by: INTERNAL MEDICINE

## 2022-12-05 PROCEDURE — 84156 ASSAY OF PROTEIN URINE: CPT | Performed by: INTERNAL MEDICINE

## 2022-12-05 PROCEDURE — 83970 ASSAY OF PARATHORMONE: CPT | Performed by: INTERNAL MEDICINE

## 2022-12-05 PROCEDURE — 85025 COMPLETE CBC W/AUTO DIFF WBC: CPT | Performed by: INTERNAL MEDICINE

## 2022-12-05 PROCEDURE — 82306 VITAMIN D 25 HYDROXY: CPT | Performed by: INTERNAL MEDICINE

## 2022-12-05 NOTE — PROGRESS NOTES
dena moe  is here for bp check its 148/88, said she took losartan 50mg this morning, its suppose to be twice daily and lokelma , she said she felt like she had a panic attack this morning, with some pressure in chest where her arms are weak, lasted 30-40 mins until she took her betablocker and clonidine and also her head felt funny.     Rechecked /80    She stated she did have a lot of salt yesterday

## 2022-12-07 ENCOUNTER — OFFICE VISIT (OUTPATIENT)
Dept: FAMILY MEDICINE CLINIC | Facility: CLINIC | Age: 52
End: 2022-12-07

## 2022-12-07 VITALS
OXYGEN SATURATION: 100 % | WEIGHT: 265 LBS | TEMPERATURE: 98.7 F | DIASTOLIC BLOOD PRESSURE: 88 MMHG | HEART RATE: 77 BPM | SYSTOLIC BLOOD PRESSURE: 132 MMHG | BODY MASS INDEX: 48.76 KG/M2 | HEIGHT: 62 IN

## 2022-12-07 DIAGNOSIS — Z13.29 SCREENING FOR THYROID DISORDER: ICD-10-CM

## 2022-12-07 DIAGNOSIS — M10.9 GOUT, UNSPECIFIED CAUSE, UNSPECIFIED CHRONICITY, UNSPECIFIED SITE: ICD-10-CM

## 2022-12-07 DIAGNOSIS — K04.7 TOOTH ABSCESS: ICD-10-CM

## 2022-12-07 DIAGNOSIS — I10 PRIMARY HYPERTENSION: Primary | ICD-10-CM

## 2022-12-07 DIAGNOSIS — Z12.31 SCREENING MAMMOGRAM FOR BREAST CANCER: ICD-10-CM

## 2022-12-07 DIAGNOSIS — E78.2 MIXED HYPERLIPIDEMIA: ICD-10-CM

## 2022-12-07 DIAGNOSIS — N18.32 STAGE 3B CHRONIC KIDNEY DISEASE: ICD-10-CM

## 2022-12-07 DIAGNOSIS — R73.01 IMPAIRED FASTING GLUCOSE: ICD-10-CM

## 2022-12-07 PROCEDURE — 99214 OFFICE O/P EST MOD 30 MIN: CPT | Performed by: NURSE PRACTITIONER

## 2022-12-07 RX ORDER — SODIUM ZIRCONIUM CYCLOSILICATE 10 G/10G
POWDER, FOR SUSPENSION ORAL
COMMUNITY
Start: 2022-12-01

## 2022-12-07 RX ORDER — SODIUM BICARBONATE 650 MG/1
650 TABLET ORAL 2 TIMES DAILY
Qty: 180 TABLET | Refills: 1 | Status: SHIPPED | OUTPATIENT
Start: 2022-12-07 | End: 2023-03-10

## 2022-12-07 RX ORDER — VITAMIN B COMPLEX
1 TABLET ORAL
COMMUNITY
End: 2023-02-06

## 2022-12-07 RX ORDER — ALLOPURINOL 300 MG/1
300 TABLET ORAL 2 TIMES DAILY
Qty: 180 TABLET | Refills: 1 | Status: SHIPPED | OUTPATIENT
Start: 2022-12-07

## 2022-12-07 RX ORDER — CLONIDINE HYDROCHLORIDE 0.2 MG/1
0.2 TABLET ORAL 3 TIMES DAILY
Qty: 90 TABLET | Refills: 1 | Status: SHIPPED | OUTPATIENT
Start: 2022-12-07 | End: 2023-03-03 | Stop reason: SDUPTHER

## 2022-12-07 RX ORDER — ZINC GLUCONATE 50 MG
50 TABLET ORAL DAILY
COMMUNITY

## 2022-12-07 RX ORDER — ATORVASTATIN CALCIUM 20 MG/1
20 TABLET, FILM COATED ORAL NIGHTLY
Qty: 90 TABLET | Refills: 1 | Status: SHIPPED | OUTPATIENT
Start: 2022-12-07

## 2022-12-07 RX ORDER — LOSARTAN POTASSIUM 50 MG/1
TABLET ORAL
COMMUNITY
Start: 2022-11-29 | End: 2022-12-20

## 2022-12-07 RX ORDER — AMOXICILLIN 500 MG/1
500 TABLET, FILM COATED ORAL 2 TIMES DAILY
Qty: 20 TABLET | Refills: 0 | Status: SHIPPED | OUTPATIENT
Start: 2022-12-07 | End: 2022-12-17

## 2022-12-07 RX ORDER — CLOBETASOL PROPIONATE 0.5 MG/G
1 CREAM TOPICAL 2 TIMES DAILY
Qty: 60 G | Refills: 1 | Status: SHIPPED | OUTPATIENT
Start: 2022-12-07

## 2022-12-07 RX ORDER — CETIRIZINE HYDROCHLORIDE 10 MG/1
10 TABLET ORAL NIGHTLY
Qty: 90 TABLET | Refills: 1 | Status: SHIPPED | OUTPATIENT
Start: 2022-12-07 | End: 2023-03-10

## 2022-12-07 NOTE — PROGRESS NOTES
Follow Up Office Visit      Patient Name: Katarzyna Norris  : 1970   MRN: [id number]     Chief Complaint:    Chief Complaint   Patient presents with   • Hyperlipidemia   • Hypertension   • impaired fasting glucose   • Anxiety   • Anemia       History of Present Illness: Katarzyna Norirs is a 52 y.o. female who is here today to follow up for HTN, hyperlipidemia, IFG, anxiety, anemia, CKD     Review lab results   Mammogram - 2020  Pap-2022  Colonoguard 2021  Labs- 2022    C/o Patient says she can't go to the dentist until January, states she has a tooth infection on her top left side that has been bothering her for about 2 weeks.   She is wanting an antibiotic. Pt reports her insurance informed her she has met her limit for payout for this year     Subjective      Review of Systems:   Review of Systems   Constitutional: Negative for fever.   HENT: Positive for dental problem. Negative for ear pain and sore throat.    Respiratory: Negative for cough.    Cardiovascular: Negative for chest pain.   Gastrointestinal: Negative for abdominal pain, diarrhea, nausea and vomiting.   Genitourinary: Negative for dysuria.        Past Medical History:   Past Medical History:   Diagnosis Date   • Anxiety    • Arthritis     GILBERT KNEES   • Asthma     SEASONAL ALLERGIES   • CKD (chronic kidney disease)     Stage 3, Follows with [facility]   • Elevated cholesterol    • Gout 2021   • Hernia     Upper   • Hiatal hernia    • Hypertension    • Renal insufficiency     follows with [facility]   • Sinus problem     SEASONAL ALLERGIES       Past Surgical History:   Past Surgical History:   Procedure Laterality Date   • BONY PELVIS SURGERY      Plate   •  SECTION   and    • CHOLECYSTECTOMY     • COLONOSCOPY     • ENDOSCOPY     • TOTAL KNEE ARTHROPLASTY Left 2021    Procedure: TOTAL KNEE ARTHROPLASTY;  Surgeon: Marco Nelson MD;  Location: [facility] MAIN OR;  Service: Orthopedics;   Laterality: Left;   • TOTAL KNEE ARTHROPLASTY Right 10/19/2022    Procedure: RIGHT TOTAL KNEE ARTHROPLASTY - NO SHELBY;  Surgeon: Marco Nelson MD;  Location: [facility] MAIN OR;  Service: Orthopedics;  Laterality: Right;   • TUBAL ABDOMINAL LIGATION  1989       Family History:   Family History   Problem Relation Age of Onset   • Throat cancer Father 73   • Stroke Other    • Diabetes Other    • Malig Hyperthermia Neg Hx        Social History:   Social History     Socioeconomic History   • Marital status:    Tobacco Use   • Smoking status: Former   • Smokeless tobacco: Never   Vaping Use   • Vaping Use: Never used   Substance and Sexual Activity   • Alcohol use: Never   • Drug use: Never   • Sexual activity: Defer       Medications:     Current Outpatient Medications:   •  allopurinol (ZYLOPRIM) 300 MG tablet, Take 1 tablet by mouth 2 (Two) Times a Day., Disp: 180 tablet, Rfl: 1  •  apixaban (ELIQUIS) 2.5 MG tablet tablet, Take 1 tablet by mouth 2 (Two) Times a Day., Disp: 14 tablet, Rfl: 0  •  aspirin EC (Ecotrin) 325 MG tablet, Take 1 tablet by mouth Daily., Disp: 21 tablet, Rfl: 0  •  atorvastatin (LIPITOR) 20 MG tablet, Take 1 tablet by mouth Every Night., Disp: 90 tablet, Rfl: 1  •  carvedilol (COREG) 25 MG tablet, Take 0.5 tablets by mouth 2 (Two) Times a Day With Meals., Disp: , Rfl:   •  cetirizine (zyrTEC) 10 MG tablet, Take 1 tablet by mouth Every Night., Disp: 90 tablet, Rfl: 1  •  clobetasol (TEMOVATE) 0.05 % cream, Apply 1 application topically to the appropriate area as directed 2 (Two) Times a Day., Disp: 60 g, Rfl: 1  •  cloNIDine (CATAPRES) 0.2 MG tablet, Take 1 tablet by mouth 3 (Three) Times a Day., Disp: 90 tablet, Rfl: 1  •  Cyanocobalamin 2500 MCG sublingual tablet, Place 1 tablet under the tongue., Disp: , Rfl:   •  famotidine (Pepcid) 40 MG tablet, Take 1 tablet by mouth Every Night. States she hasn't started yet, Disp: , Rfl:   •  HYDROcodone-acetaminophen (Norco) 7.5-325 MG per  "tablet, Take 1 tablet by mouth Every 4 (Four) Hours As Needed for Moderate Pain., Disp: 40 tablet, Rfl: 0  •  linaclotide (Linzess) 145 MCG capsule capsule, Take 1 capsule by mouth Every Morning Before Breakfast., Disp: 30 capsule, Rfl: 5  •  Lokelma 10 g pack, TAKE 10 GRAM BY MOUTH 1 TIME EACH DAY, Disp: , Rfl:   •  losartan (COZAAR) 50 MG tablet, TAKE 1 TABLET BY MOUTH IN THE MORNING AND 1 TABLET IN THE EVENING, Disp: , Rfl:   •  sodium bicarbonate 650 MG tablet, Take 1 tablet by mouth 2 (Two) Times a Day., Disp: 180 tablet, Rfl: 1  •  vitamin B-12 (CYANOCOBALAMIN) 100 MCG tablet, Take 50 mcg by mouth Daily., Disp: , Rfl:   •  zinc gluconate 50 MG tablet, Take 50 mg by mouth., Disp: , Rfl:   •  Zinc Sulfate (ZINC 15 PO), Take 15 mg by mouth Daily., Disp: , Rfl:   •  amoxicillin (AMOXIL) 500 MG tablet, Take 1 tablet by mouth 2 (Two) Times a Day for 10 days., Disp: 20 tablet, Rfl: 0    Allergies:   Allergies   Allergen Reactions   • Amlodipine Shortness Of Breath   • Diltiazem Hcl Anxiety           Objective     Physical Exam:  Vital Signs:   Vitals:    12/07/22 1404   BP: (!) 137/109   Pulse: 77   Temp: 98.7 °F (37.1 °C)   SpO2: 100%   Weight: 120 kg (265 lb)   Height: 157.5 cm (62\")     Body mass index is 48.47 kg/m².     Physical Exam  HENT:      Right Ear: Tympanic membrane normal.      Left Ear: Tympanic membrane normal.      Nose: Nose normal.      Mouth/Throat:      Mouth: Mucous membranes are moist.     Eyes:      Conjunctiva/sclera: Conjunctivae normal.   Cardiovascular:      Rate and Rhythm: Normal rate and regular rhythm.      Heart sounds: Normal heart sounds. No murmur heard.  Pulmonary:      Effort: Pulmonary effort is normal.      Breath sounds: Normal breath sounds.   Abdominal:      General: Bowel sounds are normal.      Palpations: Abdomen is soft.   Musculoskeletal:      Right lower leg: No edema.      Left lower leg: No edema.   Lymphadenopathy:      Cervical: No cervical adenopathy.   Skin:     " General: Skin is warm and dry.   Neurological:      Mental Status: She is alert.   Psychiatric:         Mood and Affect: Mood normal.         Behavior: Behavior normal.             Assessment / Plan      Assessment/Plan:   Diagnoses and all orders for this visit:    1. Primary hypertension (Primary)  -     cloNIDine (CATAPRES) 0.2 MG tablet; Take 1 tablet by mouth 3 (Three) Times a Day.  Dispense: 90 tablet; Refill: 1  -     CBC Auto Differential; Future    2. Mixed hyperlipidemia  -     Lipid Panel; Future    3. Impaired fasting glucose  -     Comprehensive Metabolic Panel; Future  -     Hemoglobin A1c; Future    4. Stage 3b chronic kidney disease (HCC)    5. Gout, unspecified cause, unspecified chronicity, unspecified site  -     Uric Acid; Future    6. Tooth abscess    7. Screening for thyroid disorder  -     TSH; Future    8. Screening mammogram for breast cancer  -     Mammo Screening Digital Tomosynthesis Bilateral With CAD; Future    Other orders  -     allopurinol (ZYLOPRIM) 300 MG tablet; Take 1 tablet by mouth 2 (Two) Times a Day.  Dispense: 180 tablet; Refill: 1  -     atorvastatin (LIPITOR) 20 MG tablet; Take 1 tablet by mouth Every Night.  Dispense: 90 tablet; Refill: 1  -     cetirizine (zyrTEC) 10 MG tablet; Take 1 tablet by mouth Every Night.  Dispense: 90 tablet; Refill: 1  -     clobetasol (TEMOVATE) 0.05 % cream; Apply 1 application topically to the appropriate area as directed 2 (Two) Times a Day.  Dispense: 60 g; Refill: 1  -     sodium bicarbonate 650 MG tablet; Take 1 tablet by mouth 2 (Two) Times a Day.  Dispense: 180 tablet; Refill: 1  -     amoxicillin (AMOXIL) 500 MG tablet; Take 1 tablet by mouth 2 (Two) Times a Day for 10 days.  Dispense: 20 tablet; Refill: 0       Hypertension currently controlled clonidine losartan  Hyperlipidemia we will obtain lipid panel and CMP to monitor current statin dose Lipitor 20 mg at nighttime denies myalgias  Impaired fasting glucose obtain hemoglobin A1c  "to monitor recommend his carb intake active 30 minutes daily patient has lost 8 pounds in the past 30 days  Chronic kidney disease avoid all NSAIDs increase water intake keep follow-up  Nephrology as scheduled  Gout no recent flares will obtain uric acid level to monitor current allopurinol dose 300 mg daily  Tooth abscess we will treat recommend seeing the dentist as soon as possible  We will provide order for screening mammogram denies lumps mass tenderness blood or discharge from the nipple        Follow Up:   Return in about 4 months (around 4/7/2023).    Frandy Ellis, APRN    \"Please note that portions of this note were completed with a voice recognition program.\"    "

## 2022-12-16 ENCOUNTER — OFFICE VISIT (OUTPATIENT)
Dept: ORTHOPEDIC SURGERY | Facility: CLINIC | Age: 52
End: 2022-12-16

## 2022-12-16 VITALS — WEIGHT: 265 LBS | HEART RATE: 88 BPM | BODY MASS INDEX: 48.76 KG/M2 | OXYGEN SATURATION: 98 % | HEIGHT: 62 IN

## 2022-12-16 DIAGNOSIS — Z96.651 AFTERCARE FOLLOWING RIGHT KNEE JOINT REPLACEMENT SURGERY: ICD-10-CM

## 2022-12-16 DIAGNOSIS — Z47.89 AFTERCARE FOLLOWING SURGERY OF THE MUSCULOSKELETAL SYSTEM: ICD-10-CM

## 2022-12-16 DIAGNOSIS — Z47.1 AFTERCARE FOLLOWING RIGHT KNEE JOINT REPLACEMENT SURGERY: Primary | ICD-10-CM

## 2022-12-16 DIAGNOSIS — Z47.1 AFTERCARE FOLLOWING RIGHT KNEE JOINT REPLACEMENT SURGERY: ICD-10-CM

## 2022-12-16 DIAGNOSIS — Z96.651 AFTERCARE FOLLOWING RIGHT KNEE JOINT REPLACEMENT SURGERY: Primary | ICD-10-CM

## 2022-12-16 PROCEDURE — 99024 POSTOP FOLLOW-UP VISIT: CPT | Performed by: PHYSICIAN ASSISTANT

## 2022-12-16 NOTE — PROGRESS NOTES
"Chief Complaint  Pain and Follow-up of the Left Knee and Pain and Follow-up of the Right Knee    Subjective          Katarzyna Norris is a 52 y.o. female  presents to [facility] for   History of Present Illness      Patient presents for follow-up evaluation of right total knee arthroplasty and left total knee arthroplasty.  Patient states her right knee is good she finished pain medication finished physical therapy she has occasional soreness which she treats with ice, she denies pain or need for pain medication occasionally she might need some Tylenol otherwise no new complaints of the right knee.  She states the left knee has been doing well she states her left knee has no pain, no difficulty with range of motion she states her weightbearing has improved and she is exercising more due to her knee replacements helping her knees not to have pain.  No new complaints of either knee      Allergies   Allergen Reactions   • Amlodipine Shortness Of Breath   • Diltiazem Hcl Anxiety        Social History     Socioeconomic History   • Marital status:    Tobacco Use   • Smoking status: Former   • Smokeless tobacco: Never   Vaping Use   • Vaping Use: Never used   Substance and Sexual Activity   • Alcohol use: Never   • Drug use: Never   • Sexual activity: Defer        REVIEW OF SYSTEMS    Constitutional: Denies fevers, chills, weight loss  Cardiovascular: Denies chest pain, shortness of breath  Skin: Denies rashes, acute skin changes  Neurologic: Denies headache, loss of consciousness  MSK: Bilateral knee pain      Objective   Vital Signs:   Pulse 88   Ht 157.5 cm (62\")   Wt 120 kg (265 lb)   SpO2 98%   BMI 48.47 kg/m²     Body mass index is 48.47 kg/m².    Physical Exam    Bilateral knees: Right knee: Incision is well-healed, no erythema, ecchymosis, no swelling, no signs of infection, no effusion, full extension, flexion 110, stable stable to varus/valgus stress, stable " anterior/posterior drawer, nontender calf, negative Jd testing, neurovascular intact, no pain with resisted range of motion, 5 out of 5 strength.  Left knee:Incision is well-healed, no erythema, ecchymosis, no swelling, no signs of infection, no effusion, full extension, flexion 110, stable stable to varus/valgus stress, stable anterior/posterior drawer, nontender calf, negative Jd testing, neurovascular intact, no pain with resisted range of motion, 5 out of 5 strength.    Procedures    Imaging Results (Most Recent)     Procedure Component Value Units Date/Time    XR Knee 3 View Left [[id number]] Resulted: 12/16/22 1512     Updated: 12/16/22 1512    Narrative:      • View:AP/Lateral and [place] view(s)  • Site: Right knee/left knee  • Indication: Right knee pain/left knee pain  • Study: X-rays ordered, taken in the office, and reviewed today  • X-ray: Right knee: Intact appearing right total knee arthroplasty, no   signs of hardware failure or loosening, no subsidence or periprosthetic   fracture, good alignment.  Left knee: Intact appearing left total knee   arthroplasty, no signs of hardware failure or loosening, no subsidence or   periprosthetic fracture, good alignment  • Comparative data: No comparative data found      XR Knee 3 View Right [[id number]] Resulted: 12/16/22 1512     Updated: 12/16/22 1512    Narrative:      • View:AP/Lateral and [place] view(s)  • Site: Right knee/left knee  • Indication: Right knee pain/left knee pain  • Study: X-rays ordered, taken in the office, and reviewed today  • X-ray: Right knee: Intact appearing right total knee arthroplasty, no   signs of hardware failure or loosening, no subsidence or periprosthetic   fracture, good alignment.  Left knee: Intact appearing left total knee   arthroplasty, no signs of hardware failure or loosening, no subsidence or   periprosthetic fracture, good alignment  • Comparative data: No comparative data found             Result Review :    The following data was reviewed by: SARAH Arora on 12/16/2022:  Data reviewed: Radiologic studies Reviewed by me with the patient              Assessment and Plan    Diagnoses and all orders for this visit:    1. Aftercare following surgery of right total knee arthroplasty, 10/19/2022 (Primary)  -     XR Knee 3 View Right    2. Aftercare following surgery of left total knee arthroplasty, 12/22/2021  -     XR Knee 3 View Left    3. Aftercare following right knee joint replacement surgery  -     XR Knee 3 View Right        Reviewed x-rays with the patient discussed diagnosis and treatment options with her she will continue activity and weightbearing as tolerated she will continue home exercise if any new or concerning symptoms occur follow-up sooner otherwise follow-up in 1 year for the left knee follow-up in 3 months for the right knee with x-ray    Call or return if worsening symptoms.    Follow Up   Return in about 3 months (around 3/16/2023) for Recheck.  Patient was given instructions and counseling regarding her condition or for health maintenance advice. Please see specific information pulled into the AVS if appropriate.

## 2022-12-20 RX ORDER — LOSARTAN POTASSIUM 100 MG/1
TABLET ORAL
Qty: 90 TABLET | Refills: 0 | Status: SHIPPED | OUTPATIENT
Start: 2022-12-20 | End: 2023-03-20

## 2023-01-10 ENCOUNTER — DOCUMENTATION (OUTPATIENT)
Dept: PHYSICAL THERAPY | Facility: CLINIC | Age: 53
End: 2023-01-10
Payer: MEDICARE

## 2023-01-10 NOTE — PROGRESS NOTES
Discharge Summary  Discharge Summary from Physical Therapy Report  [address] [place] [address]      Dates  PT visit: 10/11/22 - -11/30/22  Number of Visits: 11     1. The patient has limited ROM of the right knee.              LTG 1:12 weeks:  The patient will demonstrate 0 to 115 degrees of ROM for the right knee in order to allow patient to ambulate.                          STATUS: Not met, progressing              STG 1a: 6 weeks:  The patient will demonstrate 5 to 100 degrees of ROM for the right knee.                          STATUS:  MET for passive              TREATMENT: Manual therapy, therapeutic exercise, home exercise instruction, and modalities as needed to include:  moist heat, electrical stimulation, ultrasound, and ice.    2. The patient has limited strength of the right knee.              LTG 2: 12 weeks: The patient will demonstrate 4+ /5 strength for knee flexion and extension in order to allow patient improved joint stability                          STATUS:  Not met, progressing              STG 2a: 6 weeks: The patient will demonstrate 4 /5 strength for knee flexion and extension                          STATUS: MET              TREATMENT: Manual therapy, therapeutic exercise, home exercise instruction, aquatic therapy, and modalities as needed to include:  moist heat, electrical stimulation, ultrasound, and ice.                3. The patient has gait dysfunction.              LTG 3: 12 weeks:  The patient will ambulate without assistive device, independently, for community distances with normal biomechanics of the right lower extremity in order to improve mobility and allow patient to perform activities such as grocery shopping with greater ease.                          STATUS:  Not met, progressing              STG 3a: 4 weeks: The patient will ambulate with a single tipped cane with appropriate gait mechanics.                          STATUS:  Met, but doesn't use all the  time.              TREATMENT: Gait training, aquatic therapy, therapeutic exercise, and home exercise instruction.    4. Mobility: Walking/Moving Around Functional Limitation                               LTG 4: 12 weeks:  The patient will demonstrate 20-39% limitation by achieving a score of 49-64/80 on the LEFS.                          STATUS:  MET              STG 4 a: 6 weeks:  The patient will demonstrate 40-59% limitation by achieving a score of 33-48/80 on the LEFS.                            STATUS:  MET              TREATMENT:  Manual therapy, therapeutic exercise, home exercise instruction, and modalities as needed to include: moist heat, electrical stimulation, and ultrasound.    Goals: Partially Met    Discharge Plan: Continue with current home exercise program as instructed    Comments From Treatment Note 11/30/22:    Subjective: Pt reporting she is having more right hip pain with walking, she is wanting to talk about walking with the cane. She is also wanting to be done with therapy today. She feels like she can continue with stretching and exercises independently.      Objective   - pt demonstrates proper technique with ambulating using a SPC in the left hand.   - Pt demonstrates 115 degrees of active right knee flexion in hooklying position.        See Exercise, Manual, and Modality Logs for complete treatment.         Assessment/Plan: Pt tolerated today's session well. Pt would benefit from continued skilled therapy to address deficits, however, she is wanting to trial her independent program. Will return if needed.    Date of Discharge 1/10/23        Efe Turcios PT  Physical Therapist    Electronically signed 1/10/2023    [place] License: PT - [id number]

## 2023-01-12 ENCOUNTER — TELEPHONE (OUTPATIENT)
Dept: ORTHOPEDIC SURGERY | Facility: CLINIC | Age: 53
End: 2023-01-12

## 2023-01-12 DIAGNOSIS — Z96.651 AFTERCARE FOLLOWING RIGHT KNEE JOINT REPLACEMENT SURGERY: Primary | ICD-10-CM

## 2023-01-12 DIAGNOSIS — Z47.1 AFTERCARE FOLLOWING RIGHT KNEE JOINT REPLACEMENT SURGERY: Primary | ICD-10-CM

## 2023-01-12 RX ORDER — AMOXICILLIN 500 MG/1
500 CAPSULE ORAL 2 TIMES DAILY
Qty: 4 CAPSULE | Refills: 2 | Status: SHIPPED | OUTPATIENT
Start: 2023-01-12 | End: 2023-01-12

## 2023-01-12 RX ORDER — AMOXICILLIN 500 MG/1
CAPSULE ORAL
Qty: 4 CAPSULE | Refills: 2 | Status: SHIPPED | OUTPATIENT
Start: 2023-01-12 | End: 2023-01-19

## 2023-01-12 NOTE — TELEPHONE ENCOUNTER
Provider:  DR. BRINDA GUEVARA  Caller:  HORTENSIA LOPEZ  Relationship to Patient: SELF  Pharmacy: [facility] [phone]  Phone Number: [phone]  Reason for Call:  PATIENT HAS DENTAL APPT. ON 1/17/23 AND IS CALLING FOR TOTAL JOINT PRE-MEDICATION,  When was the patient last seen: 12/16/22

## 2023-01-12 NOTE — TELEPHONE ENCOUNTER
Spoke with patient, she states she will have multiple dental appointments as she is having dental issues right now. She is not allergic to any antibiotics, Amoxicillin 500mg QTY 4 with 2 refills sent to [facility] in [facility]. Patient instructed to take all 4 capsules 1 hour prior to dental procedure. Patient voiced understanding

## 2023-01-18 RX ORDER — CARVEDILOL 25 MG/1
TABLET ORAL
Qty: 180 TABLET | Refills: 0 | Status: SHIPPED | OUTPATIENT
Start: 2023-01-18

## 2023-01-19 ENCOUNTER — OFFICE VISIT (OUTPATIENT)
Dept: FAMILY MEDICINE CLINIC | Facility: CLINIC | Age: 53
End: 2023-01-19
Payer: MEDICARE

## 2023-01-19 VITALS
HEIGHT: 62 IN | TEMPERATURE: 99 F | HEART RATE: 89 BPM | BODY MASS INDEX: 48.95 KG/M2 | OXYGEN SATURATION: 99 % | DIASTOLIC BLOOD PRESSURE: 81 MMHG | WEIGHT: 266 LBS | SYSTOLIC BLOOD PRESSURE: 127 MMHG

## 2023-01-19 DIAGNOSIS — J30.9 ALLERGIC RHINITIS, UNSPECIFIED SEASONALITY, UNSPECIFIED TRIGGER: Primary | ICD-10-CM

## 2023-01-19 DIAGNOSIS — H92.03 EARACHE SYMPTOMS, BILATERAL: ICD-10-CM

## 2023-01-19 DIAGNOSIS — H65.01 NON-RECURRENT ACUTE SEROUS OTITIS MEDIA OF RIGHT EAR: ICD-10-CM

## 2023-01-19 DIAGNOSIS — J01.00 ACUTE NON-RECURRENT MAXILLARY SINUSITIS: ICD-10-CM

## 2023-01-19 PROCEDURE — 96372 THER/PROPH/DIAG INJ SC/IM: CPT | Performed by: NURSE PRACTITIONER

## 2023-01-19 PROCEDURE — 99213 OFFICE O/P EST LOW 20 MIN: CPT | Performed by: NURSE PRACTITIONER

## 2023-01-19 RX ORDER — FLUTICASONE PROPIONATE 50 MCG
2 SPRAY, SUSPENSION (ML) NASAL DAILY
Qty: 18.2 ML | Refills: 1 | Status: SHIPPED | OUTPATIENT
Start: 2023-01-19

## 2023-01-19 RX ORDER — METHYLPREDNISOLONE ACETATE 40 MG/ML
40 INJECTION, SUSPENSION INTRA-ARTICULAR; INTRALESIONAL; INTRAMUSCULAR; SOFT TISSUE ONCE
Status: DISCONTINUED | OUTPATIENT
Start: 2023-01-19 | End: 2023-01-19

## 2023-01-19 RX ORDER — CEFDINIR 300 MG/1
300 CAPSULE ORAL 2 TIMES DAILY
Qty: 20 CAPSULE | Refills: 0 | Status: SHIPPED | OUTPATIENT
Start: 2023-01-19 | End: 2023-01-29

## 2023-01-19 RX ORDER — METHYLPREDNISOLONE ACETATE 40 MG/ML
80 INJECTION, SUSPENSION INTRA-ARTICULAR; INTRALESIONAL; INTRAMUSCULAR; SOFT TISSUE ONCE
Status: COMPLETED | OUTPATIENT
Start: 2023-01-19 | End: 2023-01-19

## 2023-01-19 RX ADMIN — METHYLPREDNISOLONE ACETATE 80 MG: 40 INJECTION, SUSPENSION INTRA-ARTICULAR; INTRALESIONAL; INTRAMUSCULAR; SOFT TISSUE at 12:20

## 2023-01-19 NOTE — PROGRESS NOTES
ACUTE VISIT     Patient Name: Katarzyna Norris  : 1970   MRN: [id number]     Chief Complaint:    Chief Complaint   Patient presents with   • Earache       History of Present Illness: Katarzyna Norris is a 52 y.o. female who is here today for bilateral ear pain, worse on the right and muffled hearing.  She's had the pain for about a month.   She had a tooth pulled in Dec. She went to the dentist earlier this month and they can't find any issues with her teeth causing the pain.  She said her ears feel stopped up as well and it's causing headaches.  Her right cheek under her eye is also puffy.  Also sinus drainage, sinus pain for a few weeks   Using zyrtec daily no relief     Subjective      Review of Systems:   Review of Systems   Constitutional: Positive for fever.   HENT: Positive for ear pain, postnasal drip, rhinorrhea and sinus pain. Negative for sore throat.    Eyes: Negative for itching.   Respiratory: Negative for cough.    Gastrointestinal: Positive for nausea. Negative for vomiting.   Allergic/Immunologic: Positive for environmental allergies.   Neurological: Positive for headaches.        Past Medical History:   Past Medical History:   Diagnosis Date   • Anxiety    • Arthritis     GILBERT KNEES   • Asthma     SEASONAL ALLERGIES   • CKD (chronic kidney disease)     Stage 3, Follows with [facility]   • Elevated cholesterol    • Gout 2021   • Hernia     Upper   • Hiatal hernia    • Hypertension    • Renal insufficiency     follows with [facility]   • Sinus problem     SEASONAL ALLERGIES       Past Surgical History:   Past Surgical History:   Procedure Laterality Date   • BONY PELVIS SURGERY      Plate   •  SECTION   and    • CHOLECYSTECTOMY     • COLONOSCOPY     • ENDOSCOPY     • TOTAL KNEE ARTHROPLASTY Left 2021    Procedure: TOTAL KNEE ARTHROPLASTY;  Surgeon: Marco Nelson MD;  Location: [facility] MAIN OR;  Service: Orthopedics;  Laterality: Left;   • TOTAL KNEE  ARTHROPLASTY Right 10/19/2022    Procedure: RIGHT TOTAL KNEE ARTHROPLASTY - NO SHELBY;  Surgeon: Marco Nelson MD;  Location: [facility] MAIN OR;  Service: Orthopedics;  Laterality: Right;   • TUBAL ABDOMINAL LIGATION  1989       Family History:   Family History   Problem Relation Age of Onset   • Throat cancer Father 73   • Stroke Other    • Diabetes Other    • Malig Hyperthermia Neg Hx        Social History:   Social History     Socioeconomic History   • Marital status:    Tobacco Use   • Smoking status: Former   • Smokeless tobacco: Never   Vaping Use   • Vaping Use: Never used   Substance and Sexual Activity   • Alcohol use: Never   • Drug use: Never   • Sexual activity: Defer       Medications:     Current Outpatient Medications:   •  allopurinol (ZYLOPRIM) 300 MG tablet, Take 1 tablet by mouth 2 (Two) Times a Day., Disp: 180 tablet, Rfl: 1  •  apixaban (ELIQUIS) 2.5 MG tablet tablet, Take 1 tablet by mouth 2 (Two) Times a Day., Disp: 14 tablet, Rfl: 0  •  aspirin EC (Ecotrin) 325 MG tablet, Take 1 tablet by mouth Daily., Disp: 21 tablet, Rfl: 0  •  atorvastatin (LIPITOR) 20 MG tablet, Take 1 tablet by mouth Every Night., Disp: 90 tablet, Rfl: 1  •  carvedilol (COREG) 25 MG tablet, TAKE 1 TABLET BY MOUTH TWICE DAILY WITH MEALS, Disp: 180 tablet, Rfl: 0  •  cetirizine (zyrTEC) 10 MG tablet, Take 1 tablet by mouth Every Night., Disp: 90 tablet, Rfl: 1  •  clobetasol (TEMOVATE) 0.05 % cream, Apply 1 application topically to the appropriate area as directed 2 (Two) Times a Day., Disp: 60 g, Rfl: 1  •  cloNIDine (CATAPRES) 0.2 MG tablet, Take 1 tablet by mouth 3 (Three) Times a Day., Disp: 90 tablet, Rfl: 1  •  Cyanocobalamin 2500 MCG sublingual tablet, Place 1 tablet under the tongue., Disp: , Rfl:   •  famotidine (Pepcid) 40 MG tablet, Take 1 tablet by mouth Every Night. States she hasn't started yet, Disp: , Rfl:   •  HYDROcodone-acetaminophen (Norco) 7.5-325 MG per tablet, Take 1 tablet by mouth Every 4  "(Four) Hours As Needed for Moderate Pain., Disp: 40 tablet, Rfl: 0  •  linaclotide (Linzess) 145 MCG capsule capsule, Take 1 capsule by mouth Every Morning Before Breakfast., Disp: 30 capsule, Rfl: 5  •  Lokelma 10 g pack, TAKE 10 GRAM BY MOUTH 1 TIME EACH DAY, Disp: , Rfl:   •  losartan (COZAAR) 100 MG tablet, Take 1 tablet by mouth once daily, Disp: 90 tablet, Rfl: 0  •  sodium bicarbonate 650 MG tablet, Take 1 tablet by mouth 2 (Two) Times a Day., Disp: 180 tablet, Rfl: 1  •  vitamin B-12 (CYANOCOBALAMIN) 100 MCG tablet, Take 50 mcg by mouth Daily., Disp: , Rfl:   •  zinc gluconate 50 MG tablet, Take 50 mg by mouth., Disp: , Rfl:   •  Zinc Sulfate (ZINC 15 PO), Take 15 mg by mouth Daily., Disp: , Rfl:   •  cefdinir (OMNICEF) 300 MG capsule, Take 1 capsule by mouth 2 (Two) Times a Day for 10 days., Disp: 20 capsule, Rfl: 0  •  fluticasone (FLONASE) 50 MCG/ACT nasal spray, 2 sprays into the nostril(s) as directed by provider Daily. 2 sprays each nostril daily, Disp: 18.2 mL, Rfl: 1    Current Facility-Administered Medications:   •  methylPREDNISolone acetate (DEPO-medrol) injection 40 mg, 40 mg, Intra-articular, Once, Evelyni, Almazalis Lindsey, APRN    Allergies:   Allergies   Allergen Reactions   • Amlodipine Shortness Of Breath   • Diltiazem Hcl Anxiety         Objective     Physical Exam:  Vital Signs:   Vitals:    01/19/23 1145   BP: 127/81   Pulse: 89   Temp: 99 °F (37.2 °C)   SpO2: 99%   Weight: 121 kg (266 lb)   Height: 157.5 cm (62\")     Body mass index is 48.65 kg/m².     Physical Exam  HENT:      Right Ear: A middle ear effusion is present. Tympanic membrane is erythematous.      Left Ear: Tympanic membrane is injected.      Nose: Congestion and rhinorrhea present.      Mouth/Throat:      Mouth: Mucous membranes are moist.   Eyes:      Conjunctiva/sclera:      Right eye: Right conjunctiva is injected.      Left eye: Left conjunctiva is injected.   Cardiovascular:      Rate and Rhythm: Regular rhythm.      " Heart sounds: Normal heart sounds. No murmur heard.  Pulmonary:      Effort: Pulmonary effort is normal.      Breath sounds: Normal breath sounds.   Lymphadenopathy:      Cervical: No cervical adenopathy.   Skin:     General: Skin is warm and dry.   Neurological:      Mental Status: She is alert.             Assessment / Plan      Assessment/Plan:   Diagnoses and all orders for this visit:    1. Allergic rhinitis, unspecified seasonality, unspecified trigger (Primary)  -     methylPREDNISolone acetate (DEPO-medrol) injection 40 mg    2. Earache symptoms, bilateral  -     methylPREDNISolone acetate (DEPO-medrol) injection 40 mg    3. Acute non-recurrent maxillary sinusitis  -     methylPREDNISolone acetate (DEPO-medrol) injection 40 mg    4. Non-recurrent acute serous otitis media of right ear    Other orders  -     cefdinir (OMNICEF) 300 MG capsule; Take 1 capsule by mouth 2 (Two) Times a Day for 10 days.  Dispense: 20 capsule; Refill: 0  -     fluticasone (FLONASE) 50 MCG/ACT nasal spray; 2 sprays into the nostril(s) as directed by provider Daily. 2 sprays each nostril daily  Dispense: 18.2 mL; Refill: 1       Allergic rhinitis uncontrolled we will start Flonase continue Zyrtec  Earache acute sinusitis acute otitis media of ear we will treat with cefdinir and provide Depo-Medrol 80 injection in office if symptoms persist would obtain sinus x-ray        Follow Up:   Return if symptoms worsen or fail to improve.    MARICRUZ Ball      Please note that portions of this note were completed with a voice recognition program.

## 2023-02-03 ENCOUNTER — TRANSCRIBE ORDERS (OUTPATIENT)
Dept: FAMILY MEDICINE CLINIC | Facility: CLINIC | Age: 53
End: 2023-02-03
Payer: MEDICARE

## 2023-02-03 ENCOUNTER — TELEPHONE (OUTPATIENT)
Dept: FAMILY MEDICINE CLINIC | Facility: CLINIC | Age: 53
End: 2023-02-03
Payer: MEDICARE

## 2023-02-03 ENCOUNTER — CLINICAL SUPPORT (OUTPATIENT)
Dept: FAMILY MEDICINE CLINIC | Facility: CLINIC | Age: 53
End: 2023-02-03
Payer: MEDICARE

## 2023-02-03 DIAGNOSIS — N18.4 CHRONIC KIDNEY DISEASE, STAGE IV (SEVERE): Primary | ICD-10-CM

## 2023-02-03 DIAGNOSIS — D63.1 ANEMIA OF CHRONIC RENAL FAILURE, UNSPECIFIED CKD STAGE: ICD-10-CM

## 2023-02-03 DIAGNOSIS — N18.9 ANEMIA OF CHRONIC RENAL FAILURE, UNSPECIFIED CKD STAGE: ICD-10-CM

## 2023-02-03 DIAGNOSIS — N18.4 CHRONIC KIDNEY DISEASE, STAGE IV (SEVERE): ICD-10-CM

## 2023-02-03 LAB
ALBUMIN SERPL-MCNC: 4.5 G/DL (ref 3.5–5.2)
ANION GAP SERPL CALCULATED.3IONS-SCNC: 12.3 MMOL/L (ref 5–15)
BASOPHILS # BLD AUTO: 0.06 10*3/MM3 (ref 0–0.2)
BASOPHILS NFR BLD AUTO: 1.1 % (ref 0–1.5)
BUN SERPL-MCNC: 38 MG/DL (ref 6–20)
BUN/CREAT SERPL: 12.3 (ref 7–25)
CALCIUM SPEC-SCNC: 10.3 MG/DL (ref 8.6–10.5)
CHLORIDE SERPL-SCNC: 101 MMOL/L (ref 98–107)
CO2 SERPL-SCNC: 26.7 MMOL/L (ref 22–29)
CREAT SERPL-MCNC: 3.09 MG/DL (ref 0.57–1)
DEPRECATED RDW RBC AUTO: 46.7 FL (ref 37–54)
EGFRCR SERPLBLD CKD-EPI 2021: 17.3 ML/MIN/1.73
EOSINOPHIL # BLD AUTO: 0.09 10*3/MM3 (ref 0–0.4)
EOSINOPHIL NFR BLD AUTO: 1.6 % (ref 0.3–6.2)
ERYTHROCYTE [DISTWIDTH] IN BLOOD BY AUTOMATED COUNT: 13.6 % (ref 12.3–15.4)
FERRITIN SERPL-MCNC: 186 NG/ML (ref 13–150)
GLUCOSE SERPL-MCNC: 108 MG/DL (ref 65–99)
HCT VFR BLD AUTO: 36.2 % (ref 34–46.6)
HGB BLD-MCNC: 12.2 G/DL (ref 12–15.9)
IMM GRANULOCYTES # BLD AUTO: 0.03 10*3/MM3 (ref 0–0.05)
IMM GRANULOCYTES NFR BLD AUTO: 0.5 % (ref 0–0.5)
IRON 24H UR-MRATE: 92 MCG/DL (ref 37–145)
IRON SATN MFR SERPL: 25 % (ref 20–50)
LYMPHOCYTES # BLD AUTO: 2.05 10*3/MM3 (ref 0.7–3.1)
LYMPHOCYTES NFR BLD AUTO: 35.9 % (ref 19.6–45.3)
MCH RBC QN AUTO: 32.3 PG (ref 26.6–33)
MCHC RBC AUTO-ENTMCNC: 33.7 G/DL (ref 31.5–35.7)
MCV RBC AUTO: 95.8 FL (ref 79–97)
MONOCYTES # BLD AUTO: 0.47 10*3/MM3 (ref 0.1–0.9)
MONOCYTES NFR BLD AUTO: 8.2 % (ref 5–12)
NEUTROPHILS NFR BLD AUTO: 3.01 10*3/MM3 (ref 1.7–7)
NEUTROPHILS NFR BLD AUTO: 52.7 % (ref 42.7–76)
NRBC BLD AUTO-RTO: 0 /100 WBC (ref 0–0.2)
PHOSPHATE SERPL-MCNC: 3.9 MG/DL (ref 2.5–4.5)
PLATELET # BLD AUTO: 160 10*3/MM3 (ref 140–450)
PMV BLD AUTO: 8.6 FL (ref 6–12)
POTASSIUM SERPL-SCNC: 4.8 MMOL/L (ref 3.5–5.2)
RBC # BLD AUTO: 3.78 10*6/MM3 (ref 3.77–5.28)
SODIUM SERPL-SCNC: 140 MMOL/L (ref 136–145)
TIBC SERPL-MCNC: 371 MCG/DL (ref 298–536)
TRANSFERRIN SERPL-MCNC: 249 MG/DL (ref 200–360)
WBC NRBC COR # BLD: 5.71 10*3/MM3 (ref 3.4–10.8)

## 2023-02-03 PROCEDURE — 83540 ASSAY OF IRON: CPT | Performed by: INTERNAL MEDICINE

## 2023-02-03 PROCEDURE — 85025 COMPLETE CBC W/AUTO DIFF WBC: CPT | Performed by: INTERNAL MEDICINE

## 2023-02-03 PROCEDURE — 36415 COLL VENOUS BLD VENIPUNCTURE: CPT | Performed by: NURSE PRACTITIONER

## 2023-02-03 PROCEDURE — 84466 ASSAY OF TRANSFERRIN: CPT | Performed by: INTERNAL MEDICINE

## 2023-02-03 PROCEDURE — 80069 RENAL FUNCTION PANEL: CPT | Performed by: INTERNAL MEDICINE

## 2023-02-03 PROCEDURE — 82728 ASSAY OF FERRITIN: CPT | Performed by: INTERNAL MEDICINE

## 2023-02-06 ENCOUNTER — OFFICE VISIT (OUTPATIENT)
Dept: FAMILY MEDICINE CLINIC | Facility: CLINIC | Age: 53
End: 2023-02-06
Payer: MEDICARE

## 2023-02-06 VITALS
SYSTOLIC BLOOD PRESSURE: 142 MMHG | OXYGEN SATURATION: 99 % | WEIGHT: 266.6 LBS | HEIGHT: 62 IN | DIASTOLIC BLOOD PRESSURE: 76 MMHG | BODY MASS INDEX: 49.06 KG/M2 | TEMPERATURE: 97.5 F | HEART RATE: 72 BPM

## 2023-02-06 DIAGNOSIS — J30.9 ALLERGIC RHINITIS, UNSPECIFIED SEASONALITY, UNSPECIFIED TRIGGER: Primary | ICD-10-CM

## 2023-02-06 DIAGNOSIS — K21.9 GASTROESOPHAGEAL REFLUX DISEASE, UNSPECIFIED WHETHER ESOPHAGITIS PRESENT: ICD-10-CM

## 2023-02-06 DIAGNOSIS — K59.00 CONSTIPATION, UNSPECIFIED CONSTIPATION TYPE: ICD-10-CM

## 2023-02-06 DIAGNOSIS — H69.93 EUSTACHIAN TUBE DISORDER, BILATERAL: ICD-10-CM

## 2023-02-06 DIAGNOSIS — J32.9 CHRONIC SINUSITIS, UNSPECIFIED LOCATION: ICD-10-CM

## 2023-02-06 DIAGNOSIS — K44.9 HIATAL HERNIA: ICD-10-CM

## 2023-02-06 PROCEDURE — 99213 OFFICE O/P EST LOW 20 MIN: CPT | Performed by: NURSE PRACTITIONER

## 2023-02-06 RX ORDER — MONTELUKAST SODIUM 10 MG/1
10 TABLET ORAL NIGHTLY
Qty: 90 TABLET | Refills: 1 | Status: SHIPPED | OUTPATIENT
Start: 2023-02-06 | End: 2023-03-10

## 2023-02-06 NOTE — PROGRESS NOTES
Acute Office Visit      Patient Name: Katarzyna Norris  : 1970   MRN: [id number]     Chief Complaint:    Chief Complaint   Patient presents with   • Earache     bilateral   • Sinusitis       History of Present Illness: Katarzyna Norris is a 52 y.o. female who is here today for bilateral ear pain, sinus drainage, sinus pressure    Patient came in on 2023 for the same thing, she has used all the Cefdinir  States she is using flonase and zyrtec daily  Previously seen dr schmidt ENT, would like a referral to his office, 7 years prior   oprevi    Pt c/o epigastric pain, history of hiatel hernia, pepcid for reflux    along with hemorrhoids from constipation, states bowels moving now daily linzess     Gallbladder removed       Subjective      Review of Systems:   Review of Systems   Constitutional: Negative for fever.   HENT: Positive for ear pain, postnasal drip, rhinorrhea, sinus pressure and sinus pain. Negative for sore throat.    Eyes: Negative for discharge.   Respiratory: Negative for cough.    Cardiovascular: Negative for chest pain.   Allergic/Immunologic: Positive for environmental allergies.   Neurological: Negative for headaches.        Past Medical History:   Past Medical History:   Diagnosis Date   • Anxiety    • Arthritis     GILBERT KNEES   • Asthma     SEASONAL ALLERGIES   • CKD (chronic kidney disease)     Stage 3, Follows with [facility]   • Elevated cholesterol    • Gout 2021   • Hernia     Upper   • Hiatal hernia    • Hypertension    • Renal insufficiency     follows with [facility]   • Sinus problem     SEASONAL ALLERGIES       Past Surgical History:   Past Surgical History:   Procedure Laterality Date   • BONY PELVIS SURGERY      Plate   •  SECTION   and    • CHOLECYSTECTOMY     • COLONOSCOPY     • ENDOSCOPY     • TOTAL KNEE ARTHROPLASTY Left 2021    Procedure: TOTAL KNEE ARTHROPLASTY;  Surgeon: Marco Nelson MD;  Location: [facility] MAIN OR;   Service: Orthopedics;  Laterality: Left;   • TOTAL KNEE ARTHROPLASTY Right 10/19/2022    Procedure: RIGHT TOTAL KNEE ARTHROPLASTY - NO SHELBY;  Surgeon: Marco Nelson MD;  Location: [facility] MAIN OR;  Service: Orthopedics;  Laterality: Right;   • TUBAL ABDOMINAL LIGATION  1989       Family History:   Family History   Problem Relation Age of Onset   • Throat cancer Father 73   • Stroke Other    • Diabetes Other    • Malig Hyperthermia Neg Hx        Social History:   Social History     Socioeconomic History   • Marital status:    Tobacco Use   • Smoking status: Former   • Smokeless tobacco: Never   Vaping Use   • Vaping Use: Never used   Substance and Sexual Activity   • Alcohol use: Never   • Drug use: Never   • Sexual activity: Defer       Medications:     Current Outpatient Medications:   •  allopurinol (ZYLOPRIM) 300 MG tablet, Take 1 tablet by mouth 2 (Two) Times a Day., Disp: 180 tablet, Rfl: 1  •  apixaban (ELIQUIS) 2.5 MG tablet tablet, Take 1 tablet by mouth 2 (Two) Times a Day., Disp: 14 tablet, Rfl: 0  •  aspirin EC (Ecotrin) 325 MG tablet, Take 1 tablet by mouth Daily., Disp: 21 tablet, Rfl: 0  •  atorvastatin (LIPITOR) 20 MG tablet, Take 1 tablet by mouth Every Night., Disp: 90 tablet, Rfl: 1  •  carvedilol (COREG) 25 MG tablet, TAKE 1 TABLET BY MOUTH TWICE DAILY WITH MEALS, Disp: 180 tablet, Rfl: 0  •  cetirizine (zyrTEC) 10 MG tablet, Take 1 tablet by mouth Every Night., Disp: 90 tablet, Rfl: 1  •  cloNIDine (CATAPRES) 0.2 MG tablet, Take 1 tablet by mouth 3 (Three) Times a Day., Disp: 90 tablet, Rfl: 1  •  fluticasone (FLONASE) 50 MCG/ACT nasal spray, 2 sprays into the nostril(s) as directed by provider Daily. 2 sprays each nostril daily, Disp: 18.2 mL, Rfl: 1  •  linaclotide (Linzess) 145 MCG capsule capsule, Take 1 capsule by mouth Every Morning Before Breakfast., Disp: 30 capsule, Rfl: 5  •  Lokelma 10 g pack, TAKE 10 GRAM BY MOUTH 1 TIME EACH DAY, Disp: , Rfl:   •  losartan (COZAAR) 100  "MG tablet, Take 1 tablet by mouth once daily, Disp: 90 tablet, Rfl: 0  •  sodium bicarbonate 650 MG tablet, Take 1 tablet by mouth 2 (Two) Times a Day., Disp: 180 tablet, Rfl: 1  •  vitamin B-12 (CYANOCOBALAMIN) 100 MCG tablet, Take 50 mcg by mouth Daily., Disp: , Rfl:   •  zinc gluconate 50 MG tablet, Take 50 mg by mouth., Disp: , Rfl:   •  clobetasol (TEMOVATE) 0.05 % cream, Apply 1 application topically to the appropriate area as directed 2 (Two) Times a Day., Disp: 60 g, Rfl: 1  •  famotidine (Pepcid) 40 MG tablet, Take 1 tablet by mouth Every Night. States she hasn't started yet, Disp: , Rfl:   •  HYDROcodone-acetaminophen (Norco) 7.5-325 MG per tablet, Take 1 tablet by mouth Every 4 (Four) Hours As Needed for Moderate Pain., Disp: 40 tablet, Rfl: 0  •  montelukast (SINGULAIR) 10 MG tablet, Take 1 tablet by mouth Every Night., Disp: 90 tablet, Rfl: 1    Allergies:   Allergies   Allergen Reactions   • Amlodipine Shortness Of Breath   • Diltiazem Hcl Anxiety         Objective     Physical Exam:  Vital Signs:   Vitals:    02/06/23 1211   BP: 142/76   Pulse: 72   Temp: 97.5 °F (36.4 °C)   SpO2: 99%   Weight: 121 kg (266 lb 9.6 oz)   Height: 157.5 cm (62\")     Body mass index is 48.76 kg/m².     Physical Exam  HENT:      Right Ear: A middle ear effusion is present.      Left Ear: A middle ear effusion is present.      Nose: Congestion and rhinorrhea present.      Mouth/Throat:      Mouth: Mucous membranes are moist.   Eyes:      Conjunctiva/sclera:      Right eye: Right conjunctiva is injected.      Left eye: Left conjunctiva is injected.   Cardiovascular:      Rate and Rhythm: Normal rate and regular rhythm.      Heart sounds: Normal heart sounds. No murmur heard.  Pulmonary:      Effort: Pulmonary effort is normal.      Breath sounds: Normal breath sounds.   Abdominal:      General: Bowel sounds are normal.      Palpations: Abdomen is soft.      Tenderness: There is abdominal tenderness.      Comments: Epigastric " and LUQ    Musculoskeletal:      Right lower leg: No edema.      Left lower leg: No edema.   Skin:     General: Skin is warm and dry.             Assessment / Plan      Assessment/Plan:   Diagnoses and all orders for this visit:    1. Allergic rhinitis, unspecified seasonality, unspecified trigger (Primary)    2. Eustachian tube disorder, bilateral  -     Ambulatory Referral to ENT (Otolaryngology)    3. Chronic sinusitis, unspecified location  -     XR Sinuses 3+ View; Future  -     Ambulatory Referral to ENT (Otolaryngology)    4. Gastroesophageal reflux disease, unspecified whether esophagitis present  -     Ambulatory Referral to General Surgery    5. Hiatal hernia  -     Ambulatory Referral to General Surgery    6. Constipation, unspecified constipation type  -     Ambulatory Referral to General Surgery    Other orders  -     montelukast (SINGULAIR) 10 MG tablet; Take 1 tablet by mouth Every Night.  Dispense: 90 tablet; Refill: 1       Heidrick rhinitis uncontrolled we will add Singulair  Eustachian tube disorder requests referral to ear nose and throat  Chronic sinusitis we will obtain x-ray prior to treatment of antibiotics  Reflux hiatal hernia with constipation we will provide a referral to general surgery for endoscopy colonoscopy patient to continue Linzess and omeprazole and Pepcid as needed increase water fiber in diet        Follow Up:   Return if symptoms worsen or fail to improve.    MARICRUZ Ball

## 2023-02-08 ENCOUNTER — HOSPITAL ENCOUNTER (OUTPATIENT)
Dept: GENERAL RADIOLOGY | Facility: HOSPITAL | Age: 53
Discharge: HOME | End: 2023-02-08
Admitting: NURSE PRACTITIONER
Payer: MEDICARE

## 2023-02-08 DIAGNOSIS — J32.9 CHRONIC SINUSITIS, UNSPECIFIED LOCATION: ICD-10-CM

## 2023-02-08 PROCEDURE — 70220 X-RAY EXAM OF SINUSES: CPT

## 2023-02-15 ENCOUNTER — TELEPHONE (OUTPATIENT)
Dept: FAMILY MEDICINE CLINIC | Facility: CLINIC | Age: 53
End: 2023-02-15

## 2023-02-15 NOTE — TELEPHONE ENCOUNTER
Caller: Katarzyna Norris    Relationship: Self    Best call back number: [phone]    What test was performed: X RAY OF SINUS     When was the test performed: 2.8.23     Where was the test performed: [facility]

## 2023-02-28 ENCOUNTER — OFFICE VISIT (OUTPATIENT)
Dept: SURGERY | Facility: CLINIC | Age: 53
End: 2023-02-28
Payer: MEDICARE

## 2023-02-28 ENCOUNTER — PREP FOR SURGERY (OUTPATIENT)
Dept: OTHER | Facility: HOSPITAL | Age: 53
End: 2023-02-28
Payer: MEDICARE

## 2023-02-28 VITALS — WEIGHT: 264 LBS | BODY MASS INDEX: 48.58 KG/M2 | RESPIRATION RATE: 14 BRPM | HEIGHT: 62 IN

## 2023-02-28 DIAGNOSIS — K44.9 HIATAL HERNIA WITH GASTROESOPHAGEAL REFLUX: Primary | ICD-10-CM

## 2023-02-28 DIAGNOSIS — K44.9 HIATAL HERNIA: Primary | ICD-10-CM

## 2023-02-28 DIAGNOSIS — Z12.11 SCREENING FOR COLORECTAL CANCER: ICD-10-CM

## 2023-02-28 DIAGNOSIS — K21.9 HIATAL HERNIA WITH GASTROESOPHAGEAL REFLUX: Primary | ICD-10-CM

## 2023-02-28 DIAGNOSIS — Z12.12 SCREENING FOR COLORECTAL CANCER: ICD-10-CM

## 2023-02-28 PROCEDURE — 99203 OFFICE O/P NEW LOW 30 MIN: CPT | Performed by: SURGERY

## 2023-02-28 RX ORDER — SODIUM, POTASSIUM,MAG SULFATES 17.5-3.13G
SOLUTION, RECONSTITUTED, ORAL ORAL
Qty: 177 ML | Refills: 0 | Status: SHIPPED | OUTPATIENT
Start: 2023-02-28 | End: 2023-03-10

## 2023-03-02 ENCOUNTER — TELEPHONE (OUTPATIENT)
Dept: SURGERY | Facility: CLINIC | Age: 53
End: 2023-03-02
Payer: MEDICARE

## 2023-03-02 NOTE — TELEPHONE ENCOUNTER
Spoke with patient and stated she was not due for a colonoscopy yet so she wanted to cancel and only proceed with the EGD. Spoke with ady in surgery scheduling to cancel.

## 2023-03-02 NOTE — TELEPHONE ENCOUNTER
Provider: DR. BOURNE    Caller: HORTENSIA LOPEZ  Relationship to Patient: SELF   Phone Number: [phone]    Reason for Call: PT IS SCHEDULED FOR EGD AND COLONOSCOPY ON 03/14. SHE STATED SHE WOULD LIKE TO CANCEL COLONOSCOPY AND CONTINUE W/ EGD PROCEDURE ONLY.    IF NECESSARY, OKAY TO CB

## 2023-03-03 ENCOUNTER — TELEPHONE (OUTPATIENT)
Dept: FAMILY MEDICINE CLINIC | Facility: CLINIC | Age: 53
End: 2023-03-03
Payer: MEDICARE

## 2023-03-03 ENCOUNTER — TELEPHONE (OUTPATIENT)
Dept: FAMILY MEDICINE CLINIC | Facility: CLINIC | Age: 53
End: 2023-03-03

## 2023-03-03 DIAGNOSIS — I10 PRIMARY HYPERTENSION: ICD-10-CM

## 2023-03-03 RX ORDER — CLONIDINE HYDROCHLORIDE 0.2 MG/1
0.2 TABLET ORAL 3 TIMES DAILY
Qty: 180 TABLET | Refills: 0 | Status: SHIPPED | OUTPATIENT
Start: 2023-03-03

## 2023-03-03 NOTE — TELEPHONE ENCOUNTER
Caller: Katarzyna Norris    Relationship to patient: Self    Best call back number: [phone]    Chief complaint: STEROID SHOT IN RIGHT SHOULDER, PAIN    Type of visit: NURSE/MA    Requested date: ASAP

## 2023-03-03 NOTE — TELEPHONE ENCOUNTER
Caller: Katarzyna Norris    Relationship: Self    Best call back number: [phone]    What is the best time to reach you: ANY    Who are you requesting to speak with (clinical staff, provider,  specific staff member): KRISTEN HINOJOSA    What was the call regarding: PATIENT CALLED TO REPORT SHE BELIEVES SHE WAS SHORTED MEDICATION FROM HER PHARMACY IN November OR December AND IT HAS MESSED UP HER REFILL SCHEDULE. THE MEDICATION SHE CALLED ABOUT IS cloNIDine (CATAPRES) 0.2 MG tablet. PLEASE CALL AND ADVISE.     Do you require a callback: YES

## 2023-03-06 NOTE — PROGRESS NOTES
General Surgery/Colorectal Surgery Note    Patient Name:  Katarzyna Norris  YOB: 1970  [id number]    Referring Provider: Brian Ellis*      Patient Care Team:  Brian Ellis APRN as PCP - General (Nurse Practitioner)  Tam Badillo MD as Consulting Physician (General Surgery)    Chief complaint hiatal hernia    Subjective .     History of present illness:    She has a known history of a hiatal hernia with recently worsened reflux with minimal control with Tums.  No previous upper endoscopy.  She has epigastric abdominal pain.  No nausea vomiting.  No dysphagia or painful swallowing.  No recent chest pain.  No recent chest pain.  She says she is due a colonoscopy with the last one in .  No history of polyps.  No family history of colorectal cancer.      History:  Past Medical History:   Diagnosis Date   • Anxiety    • Arthritis     GILBERT KNEES   • Asthma     SEASONAL ALLERGIES   • CKD (chronic kidney disease)     Stage 3, Follows with [facility]   • Elevated cholesterol    • Gout 2021   • Hernia     Upper   • Hiatal hernia    • Hypertension    • Renal insufficiency     follows with [facility]   • Sinus problem     SEASONAL ALLERGIES       Past Surgical History:   Procedure Laterality Date   • BONY PELVIS SURGERY      Plate   •  SECTION   and    • CHOLECYSTECTOMY     • COLONOSCOPY     • ENDOSCOPY     • TOTAL KNEE ARTHROPLASTY Left 2021    Procedure: TOTAL KNEE ARTHROPLASTY;  Surgeon: Marco Nelson MD;  Location: [facility] MAIN OR;  Service: Orthopedics;  Laterality: Left;   • TOTAL KNEE ARTHROPLASTY Right 10/19/2022    Procedure: RIGHT TOTAL KNEE ARTHROPLASTY - NO SHELBY;  Surgeon: Marco Nelson MD;  Location: [facility] MAIN OR;  Service: Orthopedics;  Laterality: Right;   • TUBAL ABDOMINAL LIGATION         Family History   Problem Relation Age of Onset   • Throat cancer Father 73   • Stroke Other    • Diabetes Other    • Malig  Hyperthermia Neg Hx        Social History     Tobacco Use   • Smoking status: Former   • Smokeless tobacco: Never   Vaping Use   • Vaping Use: Never used   Substance Use Topics   • Alcohol use: Never   • Drug use: Never       Review of Systems  All systems were reviewed and negative except for:   Review of Systems   Constitutional: Negative for chills, fever and unexpected weight loss.   HENT: Negative for congestion, nosebleeds and voice change.    Eyes: Negative for blurred vision, double vision and discharge.   Respiratory: Negative for apnea, chest tightness and shortness of breath.    Cardiovascular: Negative for chest pain and leg swelling.   Gastrointestinal:        See HPI   Endocrine: Negative for cold intolerance and heat intolerance.   Genitourinary: Negative for dysuria, hematuria and urgency.   Musculoskeletal: Negative for back pain, joint swelling and neck pain.   Skin: Negative for color change and dry skin.   Neurological: Negative for dizziness and confusion.   Hematological: Negative for adenopathy.   Psychiatric/Behavioral: Negative for agitation and behavioral problems.     MEDS:  Prior to Admission medications    Medication Sig Start Date End Date Taking? Authorizing Provider   allopurinol (ZYLOPRIM) 300 MG tablet Take 1 tablet by mouth 2 (Two) Times a Day. 12/7/22  Yes Brian Ellis APRN   apixaban (ELIQUIS) 2.5 MG tablet tablet Take 1 tablet by mouth 2 (Two) Times a Day. 10/20/22  Yes Marco Nelson MD   aspirin EC (Ecotrin) 325 MG tablet Take 1 tablet by mouth Daily. 10/28/22  Yes Marco Nelson MD   atorvastatin (LIPITOR) 20 MG tablet Take 1 tablet by mouth Every Night. 12/7/22  Yes Brian Ellis APRN   carvedilol (COREG) 25 MG tablet TAKE 1 TABLET BY MOUTH TWICE DAILY WITH MEALS 1/18/23  Yes Brian Ellis APRN   cetirizine (zyrTEC) 10 MG tablet Take 1 tablet by mouth Every Night. 12/7/22  Yes Brian Ellis APRN   clobetasol  (TEMOVATE) 0.05 % cream Apply 1 application topically to the appropriate area as directed 2 (Two) Times a Day. 12/7/22  Yes Brian Ellis APRN   famotidine (Pepcid) 40 MG tablet Take 1 tablet by mouth Every Night. States she hasn't started yet 10/20/22  Yes Ish Weston,    fluticasone (FLONASE) 50 MCG/ACT nasal spray 2 sprays into the nostril(s) as directed by provider Daily. 2 sprays each nostril daily 1/19/23  Yes Brian Ellis APRN   linaclotide (Linzess) 145 MCG capsule capsule Take 1 capsule by mouth Every Morning Before Breakfast. 11/7/22  Yes Brian Ellis APRN   Lokelma 10 g pack TAKE 10 GRAM BY MOUTH 1 TIME EACH DAY 12/1/22  Yes Al Ceballos MD   losartan (COZAAR) 100 MG tablet Take 1 tablet by mouth once daily 12/20/22  Yes Brian Ellis APRN   montelukast (SINGULAIR) 10 MG tablet Take 1 tablet by mouth Every Night. 2/6/23  Yes Brian Ellis APRN   sodium bicarbonate 650 MG tablet Take 1 tablet by mouth 2 (Two) Times a Day. 12/7/22  Yes Brian Ellis APRN   vitamin B-12 (CYANOCOBALAMIN) 100 MCG tablet Take 50 mcg by mouth Daily.   Yes Al Ceballos MD   zinc gluconate 50 MG tablet Take 1 tablet by mouth.   Yes Al Ceballos MD   cloNIDine (CATAPRES) 0.2 MG tablet Take 1 tablet by mouth 3 (Three) Times a Day. 3/3/23   Brian Ellis APRN   HYDROcodone-acetaminophen (Norco) 7.5-325 MG per tablet Take 1 tablet by mouth Every 4 (Four) Hours As Needed for Moderate Pain. 11/4/22   Marco Nelson MD   sodium-potassium-magnesium sulfates (SUPREP) 17.5-3.13-1.6 GM/177ML solution oral solution Take as directed 2/28/23   Tam Badillo MD        Allergies:  Amlodipine and Diltiazem hcl    Objective     Vital Signs        Physical Exam:     General Appearance:    Alert, cooperative, in no acute distress   Head:    Normocephalic, without obvious abnormality, atraumatic   Eyes:           "Conjunctivae and sclerae normal, no icterus,     Ears:    Ears appear intact with no abnormalities noted   Throat:   No oral lesions, no thrush, oral mucosa moist   Neck:   No adenopathy, supple, trachea midline, no thyromegaly   Back:     No kyphosis present, no scoliosis present, no skin lesions,      erythema or scars, no tenderness to percussion or                   palpation,   range of motion normal   Lungs:     Clear to auscultation,respirations regular, even and                  unlabored    Heart:    Regular rhythm and normal rate, normal S1 and S2, no            murmur, no gallop, no rub, no click   Chest Wall:    No abnormalities observed   Abdomen:     Normal bowel sounds, no masses, no organomegaly, soft        non-tender, non-distended, no guarding, no rebound                tenderness   Rectal:        Extremities:   Moves all extremities well, no edema, no cyanosis, no             redness   Pulses:   Pulses palpable and equal bilaterally   Skin:   No bleeding, bruising or rash   Lymph nodes:   No palpable adenopathy   Neurologic:   A/o x 4 with no deficits       Results Review:   {Results Review:53535::\"I reviewed the patient's new clinical results.\"    LABS/IMAGING:  Results for orders placed or performed in visit on 02/03/23   Renal Function Panel    Specimen: Arm, Left; Blood   Result Value Ref Range    Glucose 108 (H) 65 - 99 mg/dL    BUN 38 (H) 6 - 20 mg/dL    Creatinine 3.09 (H) 0.57 - 1.00 mg/dL    Sodium 140 136 - 145 mmol/L    Potassium 4.8 3.5 - 5.2 mmol/L    Chloride 101 98 - 107 mmol/L    CO2 26.7 22.0 - 29.0 mmol/L    Calcium 10.3 8.6 - 10.5 mg/dL    Albumin 4.5 3.5 - 5.2 g/dL    Phosphorus 3.9 2.5 - 4.5 mg/dL    Anion Gap 12.3 5.0 - 15.0 mmol/L    BUN/Creatinine Ratio 12.3 7.0 - 25.0    eGFR 17.3 (L) >60.0 mL/min/1.73   Iron and TIBC    Specimen: Arm, Left; Blood   Result Value Ref Range    Iron 92 37 - 145 mcg/dL    Iron Saturation 25 20 - 50 %    Transferrin 249 200 - 360 mg/dL    TIBC " 371 298 - 536 mcg/dL   Ferritin    Specimen: Arm, Left; Blood   Result Value Ref Range    Ferritin 186.00 (H) 13.00 - 150.00 ng/mL   CBC Auto Differential    Specimen: Arm, Left; Blood   Result Value Ref Range    WBC 5.71 3.40 - 10.80 10*3/mm3    RBC 3.78 3.77 - 5.28 10*6/mm3    Hemoglobin 12.2 12.0 - 15.9 g/dL    Hematocrit 36.2 34.0 - 46.6 %    MCV 95.8 79.0 - 97.0 fL    MCH 32.3 26.6 - 33.0 pg    MCHC 33.7 31.5 - 35.7 g/dL    RDW 13.6 12.3 - 15.4 %    RDW-SD 46.7 37.0 - 54.0 fl    MPV 8.6 6.0 - 12.0 fL    Platelets 160 140 - 450 10*3/mm3    Neutrophil % 52.7 42.7 - 76.0 %    Lymphocyte % 35.9 19.6 - 45.3 %    Monocyte % 8.2 5.0 - 12.0 %    Eosinophil % 1.6 0.3 - 6.2 %    Basophil % 1.1 0.0 - 1.5 %    Immature Grans % 0.5 0.0 - 0.5 %    Neutrophils, Absolute 3.01 1.70 - 7.00 10*3/mm3    Lymphocytes, Absolute 2.05 0.70 - 3.10 10*3/mm3    Monocytes, Absolute 0.47 0.10 - 0.90 10*3/mm3    Eosinophils, Absolute 0.09 0.00 - 0.40 10*3/mm3    Basophils, Absolute 0.06 0.00 - 0.20 10*3/mm3    Immature Grans, Absolute 0.03 0.00 - 0.05 10*3/mm3    nRBC 0.0 0.0 - 0.2 /100 WBC        Result Review :     Assessment & Plan     Hiatal hernia with GERD  Asymptomatic screening for colorectal cancer     Discussion with patient.  I recommended upper and lower endoscopy for further evaluation.  Benefits and alternatives discussed.  Risk procedure including bleeding and perforation discussed.  All questions answered.  She agrees with the plan.  I instructed her to discuss her bowel prep with her nephrologist.  Orders placed.  Thank for the consult.        This document has been electronically signed by Tam Badillo MD  March 6, 2023 07:43 EST

## 2023-03-06 NOTE — H&P (VIEW-ONLY)
General Surgery/Colorectal Surgery Note    Patient Name:  Katarzyna Norris  YOB: 1970  [id number]    Referring Provider: Brian Ellis*      Patient Care Team:  Brian Ellis APRN as PCP - General (Nurse Practitioner)  Tam Badillo MD as Consulting Physician (General Surgery)    Chief complaint hiatal hernia    Subjective .     History of present illness:    She has a known history of a hiatal hernia with recently worsened reflux with minimal control with Tums.  No previous upper endoscopy.  She has epigastric abdominal pain.  No nausea vomiting.  No dysphagia or painful swallowing.  No recent chest pain.  No recent chest pain.  She says she is due a colonoscopy with the last one in .  No history of polyps.  No family history of colorectal cancer.      History:  Past Medical History:   Diagnosis Date   • Anxiety    • Arthritis     GILBERT KNEES   • Asthma     SEASONAL ALLERGIES   • CKD (chronic kidney disease)     Stage 3, Follows with [facility]   • Elevated cholesterol    • Gout 2021   • Hernia     Upper   • Hiatal hernia    • Hypertension    • Renal insufficiency     follows with [facility]   • Sinus problem     SEASONAL ALLERGIES       Past Surgical History:   Procedure Laterality Date   • BONY PELVIS SURGERY      Plate   •  SECTION   and    • CHOLECYSTECTOMY     • COLONOSCOPY     • ENDOSCOPY     • TOTAL KNEE ARTHROPLASTY Left 2021    Procedure: TOTAL KNEE ARTHROPLASTY;  Surgeon: Marco Nelson MD;  Location: [facility] MAIN OR;  Service: Orthopedics;  Laterality: Left;   • TOTAL KNEE ARTHROPLASTY Right 10/19/2022    Procedure: RIGHT TOTAL KNEE ARTHROPLASTY - NO SHELBY;  Surgeon: Marco Nelson MD;  Location: [facility] MAIN OR;  Service: Orthopedics;  Laterality: Right;   • TUBAL ABDOMINAL LIGATION         Family History   Problem Relation Age of Onset   • Throat cancer Father 73   • Stroke Other    • Diabetes Other    • Malig  Hyperthermia Neg Hx        Social History     Tobacco Use   • Smoking status: Former   • Smokeless tobacco: Never   Vaping Use   • Vaping Use: Never used   Substance Use Topics   • Alcohol use: Never   • Drug use: Never       Review of Systems  All systems were reviewed and negative except for:   Review of Systems   Constitutional: Negative for chills, fever and unexpected weight loss.   HENT: Negative for congestion, nosebleeds and voice change.    Eyes: Negative for blurred vision, double vision and discharge.   Respiratory: Negative for apnea, chest tightness and shortness of breath.    Cardiovascular: Negative for chest pain and leg swelling.   Gastrointestinal:        See HPI   Endocrine: Negative for cold intolerance and heat intolerance.   Genitourinary: Negative for dysuria, hematuria and urgency.   Musculoskeletal: Negative for back pain, joint swelling and neck pain.   Skin: Negative for color change and dry skin.   Neurological: Negative for dizziness and confusion.   Hematological: Negative for adenopathy.   Psychiatric/Behavioral: Negative for agitation and behavioral problems.     MEDS:  Prior to Admission medications    Medication Sig Start Date End Date Taking? Authorizing Provider   allopurinol (ZYLOPRIM) 300 MG tablet Take 1 tablet by mouth 2 (Two) Times a Day. 12/7/22  Yes Brian Ellis APRN   apixaban (ELIQUIS) 2.5 MG tablet tablet Take 1 tablet by mouth 2 (Two) Times a Day. 10/20/22  Yes Marco Nelson MD   aspirin EC (Ecotrin) 325 MG tablet Take 1 tablet by mouth Daily. 10/28/22  Yes Marco Nelson MD   atorvastatin (LIPITOR) 20 MG tablet Take 1 tablet by mouth Every Night. 12/7/22  Yes Brian Ellis APRN   carvedilol (COREG) 25 MG tablet TAKE 1 TABLET BY MOUTH TWICE DAILY WITH MEALS 1/18/23  Yes Brian Ellis APRN   cetirizine (zyrTEC) 10 MG tablet Take 1 tablet by mouth Every Night. 12/7/22  Yes Brian Ellis APRN   clobetasol  (TEMOVATE) 0.05 % cream Apply 1 application topically to the appropriate area as directed 2 (Two) Times a Day. 12/7/22  Yes Brian Ellis APRN   famotidine (Pepcid) 40 MG tablet Take 1 tablet by mouth Every Night. States she hasn't started yet 10/20/22  Yes Ish Weston,    fluticasone (FLONASE) 50 MCG/ACT nasal spray 2 sprays into the nostril(s) as directed by provider Daily. 2 sprays each nostril daily 1/19/23  Yes Brian Ellis APRN   linaclotide (Linzess) 145 MCG capsule capsule Take 1 capsule by mouth Every Morning Before Breakfast. 11/7/22  Yes Brian Ellis APRN   Lokelma 10 g pack TAKE 10 GRAM BY MOUTH 1 TIME EACH DAY 12/1/22  Yes Al Ceballos MD   losartan (COZAAR) 100 MG tablet Take 1 tablet by mouth once daily 12/20/22  Yes Brian Ellis APRN   montelukast (SINGULAIR) 10 MG tablet Take 1 tablet by mouth Every Night. 2/6/23  Yes Brian Ellis APRN   sodium bicarbonate 650 MG tablet Take 1 tablet by mouth 2 (Two) Times a Day. 12/7/22  Yes Brian Ellis APRN   vitamin B-12 (CYANOCOBALAMIN) 100 MCG tablet Take 50 mcg by mouth Daily.   Yes Al Ceballos MD   zinc gluconate 50 MG tablet Take 1 tablet by mouth.   Yes Al Ceballos MD   cloNIDine (CATAPRES) 0.2 MG tablet Take 1 tablet by mouth 3 (Three) Times a Day. 3/3/23   Brian Ellis APRN   HYDROcodone-acetaminophen (Norco) 7.5-325 MG per tablet Take 1 tablet by mouth Every 4 (Four) Hours As Needed for Moderate Pain. 11/4/22   Marco Nelson MD   sodium-potassium-magnesium sulfates (SUPREP) 17.5-3.13-1.6 GM/177ML solution oral solution Take as directed 2/28/23   Tam Badillo MD        Allergies:  Amlodipine and Diltiazem hcl    Objective     Vital Signs        Physical Exam:     General Appearance:    Alert, cooperative, in no acute distress   Head:    Normocephalic, without obvious abnormality, atraumatic   Eyes:           "Conjunctivae and sclerae normal, no icterus,     Ears:    Ears appear intact with no abnormalities noted   Throat:   No oral lesions, no thrush, oral mucosa moist   Neck:   No adenopathy, supple, trachea midline, no thyromegaly   Back:     No kyphosis present, no scoliosis present, no skin lesions,      erythema or scars, no tenderness to percussion or                   palpation,   range of motion normal   Lungs:     Clear to auscultation,respirations regular, even and                  unlabored    Heart:    Regular rhythm and normal rate, normal S1 and S2, no            murmur, no gallop, no rub, no click   Chest Wall:    No abnormalities observed   Abdomen:     Normal bowel sounds, no masses, no organomegaly, soft        non-tender, non-distended, no guarding, no rebound                tenderness   Rectal:        Extremities:   Moves all extremities well, no edema, no cyanosis, no             redness   Pulses:   Pulses palpable and equal bilaterally   Skin:   No bleeding, bruising or rash   Lymph nodes:   No palpable adenopathy   Neurologic:   A/o x 4 with no deficits       Results Review:   {Results Review:[id number]::\"I reviewed the patient's new clinical results.\"    LABS/IMAGING:  Results for orders placed or performed in visit on 02/03/23   Renal Function Panel    Specimen: Arm, Left; Blood   Result Value Ref Range    Glucose 108 (H) 65 - 99 mg/dL    BUN 38 (H) 6 - 20 mg/dL    Creatinine 3.09 (H) 0.57 - 1.00 mg/dL    Sodium 140 136 - 145 mmol/L    Potassium 4.8 3.5 - 5.2 mmol/L    Chloride 101 98 - 107 mmol/L    CO2 26.7 22.0 - 29.0 mmol/L    Calcium 10.3 8.6 - 10.5 mg/dL    Albumin 4.5 3.5 - 5.2 g/dL    Phosphorus 3.9 2.5 - 4.5 mg/dL    Anion Gap 12.3 5.0 - 15.0 mmol/L    BUN/Creatinine Ratio 12.3 7.0 - 25.0    eGFR 17.3 (L) >60.0 mL/min/1.73   Iron and TIBC    Specimen: Arm, Left; Blood   Result Value Ref Range    Iron 92 37 - 145 mcg/dL    Iron Saturation 25 20 - 50 %    Transferrin 249 200 - 360 mg/dL    TIBC " 371 298 - 536 mcg/dL   Ferritin    Specimen: Arm, Left; Blood   Result Value Ref Range    Ferritin 186.00 (H) 13.00 - 150.00 ng/mL   CBC Auto Differential    Specimen: Arm, Left; Blood   Result Value Ref Range    WBC 5.71 3.40 - 10.80 10*3/mm3    RBC 3.78 3.77 - 5.28 10*6/mm3    Hemoglobin 12.2 12.0 - 15.9 g/dL    Hematocrit 36.2 34.0 - 46.6 %    MCV 95.8 79.0 - 97.0 fL    MCH 32.3 26.6 - 33.0 pg    MCHC 33.7 31.5 - 35.7 g/dL    RDW 13.6 12.3 - 15.4 %    RDW-SD 46.7 37.0 - 54.0 fl    MPV 8.6 6.0 - 12.0 fL    Platelets 160 140 - 450 10*3/mm3    Neutrophil % 52.7 42.7 - 76.0 %    Lymphocyte % 35.9 19.6 - 45.3 %    Monocyte % 8.2 5.0 - 12.0 %    Eosinophil % 1.6 0.3 - 6.2 %    Basophil % 1.1 0.0 - 1.5 %    Immature Grans % 0.5 0.0 - 0.5 %    Neutrophils, Absolute 3.01 1.70 - 7.00 10*3/mm3    Lymphocytes, Absolute 2.05 0.70 - 3.10 10*3/mm3    Monocytes, Absolute 0.47 0.10 - 0.90 10*3/mm3    Eosinophils, Absolute 0.09 0.00 - 0.40 10*3/mm3    Basophils, Absolute 0.06 0.00 - 0.20 10*3/mm3    Immature Grans, Absolute 0.03 0.00 - 0.05 10*3/mm3    nRBC 0.0 0.0 - 0.2 /100 WBC        Result Review :     Assessment & Plan     Hiatal hernia with GERD  Asymptomatic screening for colorectal cancer     Discussion with patient.  I recommended upper and lower endoscopy for further evaluation.  Benefits and alternatives discussed.  Risk procedure including bleeding and perforation discussed.  All questions answered.  She agrees with the plan.  I instructed her to discuss her bowel prep with her nephrologist.  Orders placed.  Thank for the consult.        This document has been electronically signed by Tam Badillo MD  March 6, 2023 07:43 EST

## 2023-03-10 ENCOUNTER — OFFICE VISIT (OUTPATIENT)
Dept: FAMILY MEDICINE CLINIC | Facility: CLINIC | Age: 53
End: 2023-03-10
Payer: MEDICARE

## 2023-03-10 VITALS
HEIGHT: 62 IN | DIASTOLIC BLOOD PRESSURE: 88 MMHG | TEMPERATURE: 98.3 F | WEIGHT: 265 LBS | OXYGEN SATURATION: 98 % | SYSTOLIC BLOOD PRESSURE: 136 MMHG | HEART RATE: 97 BPM | BODY MASS INDEX: 48.76 KG/M2

## 2023-03-10 DIAGNOSIS — M25.512 CHRONIC PAIN OF BOTH SHOULDERS: ICD-10-CM

## 2023-03-10 DIAGNOSIS — M25.511 CHRONIC PAIN OF BOTH SHOULDERS: ICD-10-CM

## 2023-03-10 DIAGNOSIS — I10 PRIMARY HYPERTENSION: Primary | ICD-10-CM

## 2023-03-10 DIAGNOSIS — G89.29 CHRONIC PAIN OF BOTH SHOULDERS: ICD-10-CM

## 2023-03-10 PROCEDURE — 3075F SYST BP GE 130 - 139MM HG: CPT | Performed by: NURSE PRACTITIONER

## 2023-03-10 PROCEDURE — 20610 DRAIN/INJ JOINT/BURSA W/O US: CPT | Performed by: NURSE PRACTITIONER

## 2023-03-10 PROCEDURE — 3079F DIAST BP 80-89 MM HG: CPT | Performed by: NURSE PRACTITIONER

## 2023-03-10 PROCEDURE — 99213 OFFICE O/P EST LOW 20 MIN: CPT | Performed by: NURSE PRACTITIONER

## 2023-03-10 RX ORDER — ASPIRIN 81 MG/1
81 TABLET ORAL DAILY
COMMUNITY

## 2023-03-10 RX ORDER — METHYLPREDNISOLONE ACETATE 40 MG/ML
40 INJECTION, SUSPENSION INTRA-ARTICULAR; INTRALESIONAL; INTRAMUSCULAR; SOFT TISSUE ONCE
Status: DISCONTINUED | OUTPATIENT
Start: 2023-03-10 | End: 2023-03-10

## 2023-03-10 RX ORDER — LIDOCAINE 50 MG/G
3 PATCH TOPICAL EVERY 24 HOURS
Qty: 90 EACH | Refills: 5 | Status: SHIPPED | OUTPATIENT
Start: 2023-03-10 | End: 2023-03-10

## 2023-03-10 RX ADMIN — METHYLPREDNISOLONE ACETATE 40 MG: 40 INJECTION, SUSPENSION INTRA-ARTICULAR; INTRALESIONAL; INTRAMUSCULAR; SOFT TISSUE at 10:47

## 2023-03-10 RX ADMIN — METHYLPREDNISOLONE ACETATE 40 MG: 40 INJECTION, SUSPENSION INTRA-ARTICULAR; INTRALESIONAL; INTRAMUSCULAR; SOFT TISSUE at 10:46

## 2023-03-10 NOTE — PRE-PROCEDURE INSTRUCTIONS
Arrival-time of 1230.    Must have a  for transportation home post-procedure.    Nothing to eat or drink after midnight.    Medications were assessed and reviewed.  Any medications on the morning of the procedure need to be taken at least two hours prior to arrival-time with just a sip of water.  Take only coreg and clonidine the morning of the procedure.    Advised to hold any NSAIDS as well as any vitamins and supplements.    Resume medications as prescribed post-procedure.     Patient verbalized understanding of instructions.    Suzette Rosen RN

## 2023-03-10 NOTE — PROGRESS NOTES
ACUTE VISIT     Patient Name: Katarzyna Norris  : 1970   MRN: [id number]     Chief Complaint:    Chief Complaint   Patient presents with   • Shoulder Pain       History of Present Illness: Katarzyna Norris is a 52 y.o. female who is here today for bilateral shoulder pain.        She is wanting to get an injection in both shoulders if she can, if not, she would like the right shoulder today and she will do the left shoulder when she comes in for a follow up in April.    Pt has tried topical gel no relief   Also tried tylenol no relief       Subjective      Review of Systems:   Review of Systems   Constitutional: Negative for fever.   Respiratory: Negative for cough.    Cardiovascular: Negative for chest pain.   Musculoskeletal: Positive for arthralgias.   Neurological: Negative for numbness.        Past Medical History:   Past Medical History:   Diagnosis Date   • Anxiety    • Arthritis     GILBERT KNEES   • Asthma     SEASONAL ALLERGIES   • CKD (chronic kidney disease)     Stage 3, Follows with [facility]   • Elevated cholesterol    • Gout 2021   • Hernia     Upper   • Hiatal hernia    • Hypertension    • Renal insufficiency     follows with [facility]   • Sinus problem     SEASONAL ALLERGIES       Past Surgical History:   Past Surgical History:   Procedure Laterality Date   • BONY PELVIS SURGERY      Plate   •  SECTION   and    • CHOLECYSTECTOMY     • COLONOSCOPY     • ENDOSCOPY     • TOTAL KNEE ARTHROPLASTY Left 2021    Procedure: TOTAL KNEE ARTHROPLASTY;  Surgeon: Marco Nelson MD;  Location: [facility] OR;  Service: Orthopedics;  Laterality: Left;   • TOTAL KNEE ARTHROPLASTY Right 10/19/2022    Procedure: RIGHT TOTAL KNEE ARTHROPLASTY - NO SHELBY;  Surgeon: Marco Nelson MD;  Location: [facility] OR;  Service: Orthopedics;  Laterality: Right;   • TUBAL ABDOMINAL LIGATION         Family History:   Family History   Problem Relation Age of Onset   • Throat  cancer Father 73   • Stroke Other    • Diabetes Other    • Malig Hyperthermia Neg Hx        Social History:   Social History     Socioeconomic History   • Marital status:    Tobacco Use   • Smoking status: Former   • Smokeless tobacco: Never   Vaping Use   • Vaping Use: Never used   Substance and Sexual Activity   • Alcohol use: Never   • Drug use: Never   • Sexual activity: Defer       Medications:     Current Outpatient Medications:   •  allopurinol (ZYLOPRIM) 300 MG tablet, Take 1 tablet by mouth 2 (Two) Times a Day., Disp: 180 tablet, Rfl: 1  •  apixaban (ELIQUIS) 2.5 MG tablet tablet, Take 1 tablet by mouth 2 (Two) Times a Day., Disp: 14 tablet, Rfl: 0  •  aspirin EC (Ecotrin) 325 MG tablet, Take 1 tablet by mouth Daily., Disp: 21 tablet, Rfl: 0  •  atorvastatin (LIPITOR) 20 MG tablet, Take 1 tablet by mouth Every Night., Disp: 90 tablet, Rfl: 1  •  carvedilol (COREG) 25 MG tablet, TAKE 1 TABLET BY MOUTH TWICE DAILY WITH MEALS, Disp: 180 tablet, Rfl: 0  •  cetirizine (zyrTEC) 10 MG tablet, Take 1 tablet by mouth Every Night., Disp: 90 tablet, Rfl: 1  •  clobetasol (TEMOVATE) 0.05 % cream, Apply 1 application topically to the appropriate area as directed 2 (Two) Times a Day., Disp: 60 g, Rfl: 1  •  cloNIDine (CATAPRES) 0.2 MG tablet, Take 1 tablet by mouth 3 (Three) Times a Day., Disp: 180 tablet, Rfl: 0  •  Diclofenac Sodium (VOLTAREN) 1 % gel gel, APPLY A DIME SIZE AMOUNT TO THE AFFECTED AREA TWICE A DAY, Disp: , Rfl:   •  famotidine (Pepcid) 40 MG tablet, Take 1 tablet by mouth Every Night. States she hasn't started yet, Disp: , Rfl:   •  fluticasone (FLONASE) 50 MCG/ACT nasal spray, 2 sprays into the nostril(s) as directed by provider Daily. 2 sprays each nostril daily, Disp: 18.2 mL, Rfl: 1  •  HYDROcodone-acetaminophen (Norco) 7.5-325 MG per tablet, Take 1 tablet by mouth Every 4 (Four) Hours As Needed for Moderate Pain., Disp: 40 tablet, Rfl: 0  •  linaclotide (Linzess) 145 MCG capsule capsule,  "Take 1 capsule by mouth Every Morning Before Breakfast., Disp: 30 capsule, Rfl: 5  •  Lokelma 10 g pack, TAKE 10 GRAM BY MOUTH 1 TIME EACH DAY, Disp: , Rfl:   •  losartan (COZAAR) 100 MG tablet, Take 1 tablet by mouth once daily, Disp: 90 tablet, Rfl: 0  •  montelukast (SINGULAIR) 10 MG tablet, Take 1 tablet by mouth Every Night., Disp: 90 tablet, Rfl: 1  •  sodium bicarbonate 650 MG tablet, Take 1 tablet by mouth 2 (Two) Times a Day., Disp: 180 tablet, Rfl: 1  •  sodium-potassium-magnesium sulfates (SUPREP) 17.5-3.13-1.6 GM/177ML solution oral solution, Take as directed, Disp: 177 mL, Rfl: 0  •  vitamin B-12 (CYANOCOBALAMIN) 100 MCG tablet, Take 50 mcg by mouth Daily., Disp: , Rfl:   •  zinc gluconate 50 MG tablet, Take 1 tablet by mouth., Disp: , Rfl:   •  lidocaine (LIDODERM) 5 %, Place 3 patches on the skin as directed by provider Daily. Remove & Discard patch within 12 hours or as directed by MD, Disp: 90 each, Rfl: 5    Current Facility-Administered Medications:   •  methylPREDNISolone acetate (DEPO-medrol) injection 40 mg, 40 mg, Intra-articular, Once, Brian Ellis APRN  •  methylPREDNISolone acetate (DEPO-medrol) injection 40 mg, 40 mg, Intra-articular, Once, Brian Ellis APRN    Allergies:   Allergies   Allergen Reactions   • Amlodipine Shortness Of Breath   • Diltiazem Hcl Anxiety         Objective     Physical Exam:  Vital Signs:   Vitals:    03/10/23 1002   BP: 137/90   Pulse: 97   Temp: 98.3 °F (36.8 °C)   SpO2: 98%   Weight: 120 kg (265 lb)   Height: 157.5 cm (62\")     Body mass index is 48.47 kg/m².     Physical Exam  Cardiovascular:      Rate and Rhythm: Normal rate and regular rhythm.      Heart sounds: Normal heart sounds. No murmur heard.  Pulmonary:      Effort: Pulmonary effort is normal.      Breath sounds: Normal breath sounds.   Musculoskeletal:      Comments: Decreased rom of right and left shoulder, unable to touch center of back, able to touch top of head with " right hand   Skin:     General: Skin is warm and dry.   Neurological:      Mental Status: She is alert.         Procedure:   Right Shoulder Joint Injection    Procedure Information:   Informed consent obtained. Patient was informed of possible adverse effects including but not limited to bleeding, damage to nerve, tendon or artery, increased blood sugar and increased blood pressure. Time out was performed. Patient was placed with shoulder passively hanging from side of body at 0 degrees. Lateral edge of scapular spine was palpated and site was marked just inferior to this, to proceed with injection per typical posterior approach. Betadine was swabbed at injection site per typical technique. 27 gauge, 1.5 inch needle injected into bursa, directed slightly medially, aspiration was performed and then Depo-Medrol 40 mg and 2 mL 1% lidocaine without epinephrine was slowly injected into joint space, fluid was free flowing. Needle was removed, betadine wiped off and band-aid placed to injection site.     The patient was instructed to call our office if they had any questions or concerns.      Procedure:   Left Shoulder Joint Injection    Procedure Information:   Informed consent obtained. Patient was informed of possible adverse effects including but not limited to bleeding, damage to nerve, tendon or artery, increased blood sugar and increased blood pressure. Time out was performed. Patient was placed with shoulder passively hanging from side of body at 0 degrees. Lateral edge of scapular spine was palpated and site was marked just inferior to this, to proceed with injection per typical posterior approach. Betadine was swabbed at injection site per typical technique. 27 gauge, 1.5 inch needle injected into bursa, directed slightly medially, aspiration was performed and then Depo-Medrol 40 mg and 2 mL 1% lidocaine without epinephrine was slowly injected into joint space, fluid was free flowing. Needle was removed, betadine  wiped off and band-aid placed to injection site.     The patient was instructed to call our office if they had any questions or concerns.          Assessment / Plan      Assessment/Plan:   Diagnoses and all orders for this visit:    1. Primary hypertension (Primary)    2. Chronic pain of both shoulders  -     methylPREDNISolone acetate (DEPO-medrol) injection 40 mg  -     methylPREDNISolone acetate (DEPO-medrol) injection 40 mg    Other orders  -     lidocaine (LIDODERM) 5 %; Place 3 patches on the skin as directed by provider Daily. Remove & Discard patch within 12 hours or as directed by MD  Dispense: 90 each; Refill: 5       Hypertension currently controlled losartan 100 mg daily Coreg 25 mg twice daily elevated upon arrival to office manual recheck improved  Chronic pain of both shoulders we will provide joint injections bilaterally patient tolerated well aftercare instructions provided if symptoms persist would obtain x-rays        Follow Up:   Return if symptoms worsen or fail to improve.    Frandy Ellis, MARICRUZ      Please note that portions of this note were completed with a voice recognition program.

## 2023-03-13 ENCOUNTER — TELEPHONE (OUTPATIENT)
Dept: FAMILY MEDICINE CLINIC | Facility: CLINIC | Age: 53
End: 2023-03-13

## 2023-03-13 NOTE — TELEPHONE ENCOUNTER
Caller: Katarzyna Norris    Relationship: Self    Best call back number: [phone]    What is the best time to reach you: ANYTIME    Who are you requesting to speak with (clinical staff, provider,  specific staff member): CLINICAL    What was the call regarding: THE PATIENT STATED PCP PRESCRIBED MEDICATION FOR BURSITIS BUT THE PHARMACY WAS REQUESTING ADDITIONAL INFORMATION BEFORE THIS CAN BE FILLED. SHE WOULD LIKE A CALL BACK TO DISCUSS    Do you require a callback: YES

## 2023-03-14 ENCOUNTER — HOSPITAL ENCOUNTER (OUTPATIENT)
Facility: HOSPITAL | Age: 53
Setting detail: HOSPITAL OUTPATIENT SURGERY
Discharge: HOME | End: 2023-03-14
Attending: SURGERY | Admitting: SURGERY
Payer: MEDICARE

## 2023-03-14 ENCOUNTER — ANESTHESIA (OUTPATIENT)
Dept: GASTROENTEROLOGY | Facility: HOSPITAL | Age: 53
End: 2023-03-14
Payer: MEDICARE

## 2023-03-14 ENCOUNTER — ANESTHESIA EVENT (OUTPATIENT)
Dept: GASTROENTEROLOGY | Facility: HOSPITAL | Age: 53
End: 2023-03-14
Payer: MEDICARE

## 2023-03-14 VITALS
RESPIRATION RATE: 14 BRPM | HEART RATE: 79 BPM | OXYGEN SATURATION: 100 % | DIASTOLIC BLOOD PRESSURE: 76 MMHG | SYSTOLIC BLOOD PRESSURE: 132 MMHG | BODY MASS INDEX: 47.58 KG/M2 | WEIGHT: 260.14 LBS | TEMPERATURE: 97.6 F

## 2023-03-14 DIAGNOSIS — K44.9 HIATAL HERNIA: ICD-10-CM

## 2023-03-14 PROCEDURE — 88305 TISSUE EXAM BY PATHOLOGIST: CPT | Performed by: SURGERY

## 2023-03-14 PROCEDURE — 25010000002 PROPOFOL 10 MG/ML EMULSION: Performed by: NURSE ANESTHETIST, CERTIFIED REGISTERED

## 2023-03-14 RX ORDER — PROPOFOL 10 MG/ML
VIAL (ML) INTRAVENOUS AS NEEDED
Status: DISCONTINUED | OUTPATIENT
Start: 2023-03-14 | End: 2023-03-14 | Stop reason: SURG

## 2023-03-14 RX ORDER — LIDOCAINE HYDROCHLORIDE 20 MG/ML
INJECTION, SOLUTION EPIDURAL; INFILTRATION; INTRACAUDAL; PERINEURAL AS NEEDED
Status: DISCONTINUED | OUTPATIENT
Start: 2023-03-14 | End: 2023-03-14 | Stop reason: SURG

## 2023-03-14 RX ORDER — SODIUM CHLORIDE, SODIUM LACTATE, POTASSIUM CHLORIDE, CALCIUM CHLORIDE 600; 310; 30; 20 MG/100ML; MG/100ML; MG/100ML; MG/100ML
30 INJECTION, SOLUTION INTRAVENOUS CONTINUOUS
Status: DISCONTINUED | OUTPATIENT
Start: 2023-03-14 | End: 2023-03-14 | Stop reason: HOSPADM

## 2023-03-14 RX ADMIN — PROPOFOL 50 MG: 10 INJECTION, EMULSION INTRAVENOUS at 12:56

## 2023-03-14 RX ADMIN — SODIUM CHLORIDE, POTASSIUM CHLORIDE, SODIUM LACTATE AND CALCIUM CHLORIDE 30 ML/HR: 600; 310; 30; 20 INJECTION, SOLUTION INTRAVENOUS at 12:21

## 2023-03-14 RX ADMIN — PROPOFOL 300 MCG/KG/MIN: 10 INJECTION, EMULSION INTRAVENOUS at 12:56

## 2023-03-14 RX ADMIN — LIDOCAINE HYDROCHLORIDE 100 MG: 20 INJECTION, SOLUTION EPIDURAL; INFILTRATION; INTRACAUDAL; PERINEURAL at 12:56

## 2023-03-14 NOTE — ANESTHESIA PREPROCEDURE EVALUATION
Anesthesia Evaluation     Patient summary reviewed and Nursing notes reviewed   no history of anesthetic complications:  NPO Solid Status: > 8 hours  NPO Liquid Status: > 2 hours           Airway   Mallampati: II  TM distance: >3 FB  Neck ROM: full  No difficulty expected  Dental      Pulmonary - normal exam    breath sounds clear to auscultation  (+) a smoker Former, asthma,  Cardiovascular - normal exam  Exercise tolerance: good (4-7 METS)    Rhythm: regular  Rate: normal    (+) hypertension, hyperlipidemia,       Neuro/Psych- negative ROS  GI/Hepatic/Renal/Endo    (+) morbid obesity, hiatal hernia,  renal disease CRI,     Musculoskeletal     Abdominal    Substance History      OB/GYN          Other        ROS/Med Hx Other: PAT Nursing Notes unavailable.                   Anesthesia Plan    ASA 3     general   total IV anesthesia  (Patient understands anesthesia not responsible for dental damage.)  intravenous induction     Anesthetic plan, risks, benefits, and alternatives have been provided, discussed and informed consent has been obtained with: patient.    Plan discussed with CRNA.        CODE STATUS:

## 2023-03-14 NOTE — ANESTHESIA POSTPROCEDURE EVALUATION
Patient: Katarzyna Norris    Procedure Summary     Date: 03/14/23 Room / Location: [facility] ENDOSCOPY 2 / [facility] ENDOSCOPY    Anesthesia Start: 1255 Anesthesia Stop: 1317    Procedure: ESOPHAGOGASTRODUODENOSCOPY with biopsies Diagnosis:       Hiatal hernia      (Hiatal hernia [K44.9])    Surgeons: Tam Badillo MD Provider: Abelardo Glover MD    Anesthesia Type: general ASA Status: 3          Anesthesia Type: general    Vitals  Vitals Value Taken Time   /74 03/14/23 1328   Temp 36.4 °C (97.6 °F) 03/14/23 1325   Pulse 71 03/14/23 1329   Resp 14 03/14/23 1325   SpO2 98 % 03/14/23 1329   Vitals shown include unvalidated device data.        Post Anesthesia Care and Evaluation    Patient location during evaluation: bedside  Patient participation: complete - patient participated  Level of consciousness: awake  Pain management: adequate    Airway patency: patent  Anesthetic complications: No anesthetic complications  PONV Status: none  Cardiovascular status: acceptable and stable  Respiratory status: acceptable  Hydration status: acceptable    Comments: An Anesthesiologist personally participated in the most demanding procedures (including induction and emergence if applicable) in the anesthesia plan, monitored the course of anesthesia administration at frequent intervals and remained physically present and available for immediate diagnosis and treatment of emergencies.

## 2023-03-16 ENCOUNTER — OFFICE VISIT (OUTPATIENT)
Dept: ORTHOPEDIC SURGERY | Facility: CLINIC | Age: 53
End: 2023-03-16
Payer: MEDICARE

## 2023-03-16 VITALS — WEIGHT: 260 LBS | BODY MASS INDEX: 47.84 KG/M2 | HEART RATE: 83 BPM | OXYGEN SATURATION: 100 % | HEIGHT: 62 IN

## 2023-03-16 DIAGNOSIS — Z96.651 AFTERCARE FOLLOWING RIGHT KNEE JOINT REPLACEMENT SURGERY: Primary | ICD-10-CM

## 2023-03-16 DIAGNOSIS — Z47.1 AFTERCARE FOLLOWING RIGHT KNEE JOINT REPLACEMENT SURGERY: Primary | ICD-10-CM

## 2023-03-16 DIAGNOSIS — M25.561 RIGHT KNEE PAIN, UNSPECIFIED CHRONICITY: ICD-10-CM

## 2023-03-16 LAB
CYTO UR: NORMAL
LAB AP CASE REPORT: NORMAL
LAB AP CLINICAL INFORMATION: NORMAL
PATH REPORT.FINAL DX SPEC: NORMAL
PATH REPORT.GROSS SPEC: NORMAL

## 2023-03-16 PROCEDURE — 1159F MED LIST DOCD IN RCRD: CPT | Performed by: PHYSICIAN ASSISTANT

## 2023-03-16 PROCEDURE — 99213 OFFICE O/P EST LOW 20 MIN: CPT | Performed by: PHYSICIAN ASSISTANT

## 2023-03-16 PROCEDURE — 1160F RVW MEDS BY RX/DR IN RCRD: CPT | Performed by: PHYSICIAN ASSISTANT

## 2023-03-16 NOTE — PROGRESS NOTES
"Chief Complaint  Pain and Follow-up of the Right Knee    Subjective          History of Present Illness      Katarzyna Norris is a 52 y.o. female  presents to [facility] for     Patient presents for follow-up evaluation of right total knee arthroplasty, 10/19/2022.  She states her knee is \"doing better \".  She states she has been working with podiatry for some feet issues she has had she has gotten some shoe inserts and they are working with her and helping her with her balance.  She denies pain in her knee, denies locking catching or buckling of the knee, she states she has good range of motion and strength for the right knee, no new complaints      Allergies   Allergen Reactions   • Amlodipine Shortness Of Breath   • Diltiazem Hcl Anxiety        Social History     Socioeconomic History   • Marital status:    Tobacco Use   • Smoking status: Former     Types: Cigarettes     Quit date: 2013     Years since quitting: 10.2   • Smokeless tobacco: Never   Vaping Use   • Vaping Use: Never used   Substance and Sexual Activity   • Alcohol use: Never   • Drug use: Never   • Sexual activity: Defer        REVIEW OF SYSTEMS    Constitutional: Denies fevers, chills, weight loss  Cardiovascular: Denies chest pain, shortness of breath  Skin: Denies rashes, acute skin changes  Neurologic: Denies headache, loss of consciousness  MSK: Right knee pain      Objective   Vital Signs:   Pulse 83   Ht 157.5 cm (62\")   Wt 118 kg (260 lb)   SpO2 100%   BMI 47.55 kg/m²     Body mass index is 47.55 kg/m².    Physical Exam    Right knee: Well-healed incision, no erythema, no ecchymosis, no swelling, no effusion, no signs of infection, full extension, flexion 115, stable to varus/valgus stress, stable anterior/posterior drawer, nontender calf, negative Jd testing, 5 out of 5 strength, neurovascular intact, no pain with resisted range of motion    Procedures    Imaging Results (Most Recent)     " Procedure Component Value Units Date/Time    XR Knee 3 View Right [[id number]] Resulted: 03/16/23 1021     Updated: 03/16/23 1022    Narrative:      • View:AP/Lateral and [place]elle view(s)  • Site: Right knee  • Indication: Right knee pain  • Study: X-rays ordered, taken in the office, and reviewed today  • X-ray: Intact appearing right total knee arthroplasty, no signs of   hardware failure or loosening, no subsidence or periprosthetic fracture,   good alignment  • Comparative data: No comparative data found             Result Review :   The following data was reviewed by: SARAH Arora on 03/16/2023:  Data reviewed: Radiologic studies Reviewed by me with the patient             Assessment and Plan    Diagnoses and all orders for this visit:    1. Aftercare following right total knee arthroplasty, 10/19/22 (Primary)    2. Right knee pain, unspecified chronicity  -     XR Knee 3 View Right        Reviewed x-rays with the patient discussed diagnosis and treatment options with her she was advised we recommend continuing weightbearing and activity as tolerated follow-up in 7 months with x-rays of the right knee    Call or return if worsening symptoms.    Follow Up   Return in about 7 months (around 10/16/2023) for Recheck.  Patient was given instructions and counseling regarding her condition or for health maintenance advice. Please see specific information pulled into the AVS if appropriate.

## 2023-03-20 RX ORDER — LOSARTAN POTASSIUM 100 MG/1
TABLET ORAL
Qty: 90 TABLET | Refills: 0 | Status: SHIPPED | OUTPATIENT
Start: 2023-03-20

## 2023-04-07 RX ORDER — CETIRIZINE HYDROCHLORIDE 10 MG/1
10 TABLET ORAL NIGHTLY
COMMUNITY
Start: 2023-03-11 | End: 2023-04-11

## 2023-04-11 ENCOUNTER — OFFICE VISIT (OUTPATIENT)
Dept: FAMILY MEDICINE CLINIC | Facility: CLINIC | Age: 53
End: 2023-04-11
Payer: MEDICARE

## 2023-04-11 VITALS
TEMPERATURE: 98.6 F | SYSTOLIC BLOOD PRESSURE: 120 MMHG | OXYGEN SATURATION: 98 % | DIASTOLIC BLOOD PRESSURE: 76 MMHG | WEIGHT: 268 LBS | BODY MASS INDEX: 49.32 KG/M2 | HEIGHT: 62 IN | HEART RATE: 74 BPM

## 2023-04-11 DIAGNOSIS — R79.89 ELEVATED FERRITIN: ICD-10-CM

## 2023-04-11 DIAGNOSIS — E78.2 MIXED HYPERLIPIDEMIA: Primary | ICD-10-CM

## 2023-04-11 DIAGNOSIS — N18.32 STAGE 3B CHRONIC KIDNEY DISEASE: ICD-10-CM

## 2023-04-11 DIAGNOSIS — J45.20 MILD INTERMITTENT ASTHMA WITHOUT COMPLICATION: ICD-10-CM

## 2023-04-11 DIAGNOSIS — Z13.29 SCREENING FOR THYROID DISORDER: ICD-10-CM

## 2023-04-11 DIAGNOSIS — D64.9 ANEMIA, UNSPECIFIED TYPE: ICD-10-CM

## 2023-04-11 DIAGNOSIS — R73.01 IMPAIRED FASTING GLUCOSE: ICD-10-CM

## 2023-04-11 DIAGNOSIS — M10.9 GOUT, UNSPECIFIED CAUSE, UNSPECIFIED CHRONICITY, UNSPECIFIED SITE: ICD-10-CM

## 2023-04-11 DIAGNOSIS — J30.9 ALLERGIC RHINITIS, UNSPECIFIED SEASONALITY, UNSPECIFIED TRIGGER: ICD-10-CM

## 2023-04-11 DIAGNOSIS — I10 PRIMARY HYPERTENSION: ICD-10-CM

## 2023-04-11 DIAGNOSIS — E66.01 CLASS 3 SEVERE OBESITY DUE TO EXCESS CALORIES WITH SERIOUS COMORBIDITY AND BODY MASS INDEX (BMI) OF 45.0 TO 49.9 IN ADULT: ICD-10-CM

## 2023-04-11 PROBLEM — E66.813 CLASS 3 SEVERE OBESITY DUE TO EXCESS CALORIES WITH SERIOUS COMORBIDITY AND BODY MASS INDEX (BMI) OF 45.0 TO 49.9 IN ADULT: Status: ACTIVE | Noted: 2021-11-11

## 2023-04-11 LAB
ALBUMIN SERPL-MCNC: 4.2 G/DL (ref 3.5–5.2)
ALBUMIN/GLOB SERPL: 2.2 G/DL
ALP SERPL-CCNC: 77 U/L (ref 39–117)
ALT SERPL W P-5'-P-CCNC: 22 U/L (ref 1–33)
ANION GAP SERPL CALCULATED.3IONS-SCNC: 7.3 MMOL/L (ref 5–15)
AST SERPL-CCNC: 12 U/L (ref 1–32)
BASOPHILS # BLD AUTO: 0.05 10*3/MM3 (ref 0–0.2)
BASOPHILS NFR BLD AUTO: 0.8 % (ref 0–1.5)
BILIRUB SERPL-MCNC: 0.5 MG/DL (ref 0–1.2)
BUN SERPL-MCNC: 46 MG/DL (ref 6–20)
BUN/CREAT SERPL: 19.2 (ref 7–25)
CALCIUM SPEC-SCNC: 9.9 MG/DL (ref 8.6–10.5)
CHLORIDE SERPL-SCNC: 108 MMOL/L (ref 98–107)
CHOLEST SERPL-MCNC: 136 MG/DL (ref 0–200)
CO2 SERPL-SCNC: 21.7 MMOL/L (ref 22–29)
CREAT SERPL-MCNC: 2.39 MG/DL (ref 0.57–1)
DEPRECATED RDW RBC AUTO: 52.3 FL (ref 37–54)
EGFRCR SERPLBLD CKD-EPI 2021: 23.6 ML/MIN/1.73
EOSINOPHIL # BLD AUTO: 0.06 10*3/MM3 (ref 0–0.4)
EOSINOPHIL NFR BLD AUTO: 0.9 % (ref 0.3–6.2)
ERYTHROCYTE [DISTWIDTH] IN BLOOD BY AUTOMATED COUNT: 14.4 % (ref 12.3–15.4)
FERRITIN SERPL-MCNC: 221 NG/ML (ref 13–150)
GLOBULIN UR ELPH-MCNC: 1.9 GM/DL
GLUCOSE SERPL-MCNC: 112 MG/DL (ref 65–99)
HBA1C MFR BLD: 5.8 % (ref 4.8–5.6)
HCT VFR BLD AUTO: 33.6 % (ref 34–46.6)
HDLC SERPL-MCNC: 50 MG/DL (ref 40–60)
HGB BLD-MCNC: 11.3 G/DL (ref 12–15.9)
IMM GRANULOCYTES # BLD AUTO: 0.06 10*3/MM3 (ref 0–0.05)
IMM GRANULOCYTES NFR BLD AUTO: 0.9 % (ref 0–0.5)
IRON 24H UR-MRATE: 94 MCG/DL (ref 37–145)
IRON SATN MFR SERPL: 27 % (ref 20–50)
LDLC SERPL CALC-MCNC: 74 MG/DL (ref 0–100)
LDLC/HDLC SERPL: 1.49 {RATIO}
LYMPHOCYTES # BLD AUTO: 1.74 10*3/MM3 (ref 0.7–3.1)
LYMPHOCYTES NFR BLD AUTO: 27.2 % (ref 19.6–45.3)
MCH RBC QN AUTO: 33.8 PG (ref 26.6–33)
MCHC RBC AUTO-ENTMCNC: 33.6 G/DL (ref 31.5–35.7)
MCV RBC AUTO: 100.6 FL (ref 79–97)
MONOCYTES # BLD AUTO: 0.5 10*3/MM3 (ref 0.1–0.9)
MONOCYTES NFR BLD AUTO: 7.8 % (ref 5–12)
NEUTROPHILS NFR BLD AUTO: 3.99 10*3/MM3 (ref 1.7–7)
NEUTROPHILS NFR BLD AUTO: 62.4 % (ref 42.7–76)
NRBC BLD AUTO-RTO: 0 /100 WBC (ref 0–0.2)
PLATELET # BLD AUTO: 139 10*3/MM3 (ref 140–450)
PMV BLD AUTO: 8.7 FL (ref 6–12)
POTASSIUM SERPL-SCNC: 5.1 MMOL/L (ref 3.5–5.2)
PROT SERPL-MCNC: 6.1 G/DL (ref 6–8.5)
RBC # BLD AUTO: 3.34 10*6/MM3 (ref 3.77–5.28)
SODIUM SERPL-SCNC: 137 MMOL/L (ref 136–145)
TIBC SERPL-MCNC: 344 MCG/DL (ref 298–536)
TRANSFERRIN SERPL-MCNC: 231 MG/DL (ref 200–360)
TRIGL SERPL-MCNC: 58 MG/DL (ref 0–150)
TSH SERPL DL<=0.05 MIU/L-ACNC: 1.49 UIU/ML (ref 0.27–4.2)
URATE SERPL-MCNC: 2.9 MG/DL (ref 2.4–5.7)
VLDLC SERPL-MCNC: 12 MG/DL (ref 5–40)
WBC NRBC COR # BLD: 6.4 10*3/MM3 (ref 3.4–10.8)

## 2023-04-11 PROCEDURE — 83036 HEMOGLOBIN GLYCOSYLATED A1C: CPT | Performed by: NURSE PRACTITIONER

## 2023-04-11 PROCEDURE — 84550 ASSAY OF BLOOD/URIC ACID: CPT | Performed by: NURSE PRACTITIONER

## 2023-04-11 PROCEDURE — 80053 COMPREHEN METABOLIC PANEL: CPT | Performed by: NURSE PRACTITIONER

## 2023-04-11 PROCEDURE — 82728 ASSAY OF FERRITIN: CPT | Performed by: NURSE PRACTITIONER

## 2023-04-11 PROCEDURE — 83540 ASSAY OF IRON: CPT | Performed by: NURSE PRACTITIONER

## 2023-04-11 PROCEDURE — 85025 COMPLETE CBC W/AUTO DIFF WBC: CPT | Performed by: NURSE PRACTITIONER

## 2023-04-11 PROCEDURE — 84443 ASSAY THYROID STIM HORMONE: CPT | Performed by: NURSE PRACTITIONER

## 2023-04-11 PROCEDURE — 80061 LIPID PANEL: CPT | Performed by: NURSE PRACTITIONER

## 2023-04-11 PROCEDURE — 84466 ASSAY OF TRANSFERRIN: CPT | Performed by: NURSE PRACTITIONER

## 2023-04-11 RX ORDER — ALBUTEROL SULFATE 90 UG/1
AEROSOL, METERED RESPIRATORY (INHALATION)
Qty: 8 G | Refills: 1 | Status: SHIPPED | OUTPATIENT
Start: 2023-04-11

## 2023-04-11 NOTE — PROGRESS NOTES
Follow Up Office Visit      Patient Name: Katarzyna Norris  : 1970   MRN: [id number]     Chief Complaint:    Chief Complaint   Patient presents with   • Hypertension   • Hyperlipidemia   • Gout   • Chronic Kidney Disease   • Allergic Rhinitis   • Asthma       History of Present Illness: Katarzyna Norris is a 52 y.o. female who is here today to follow up for  HTN, hyperlipidemia, IFG, anxiety, anemia, CKD, gout,  allergic rhinitis and asthma       Mammogram - 2020, 2023  Pap-2022  Colonoguard 2021    Subjective      Review of Systems:   Review of Systems   Constitutional: Negative for chills, fatigue and fever.   HENT: Negative for congestion and sneezing.    Respiratory: Negative for cough, chest tightness and shortness of breath.    Cardiovascular: Negative for chest pain, palpitations and leg swelling.   Gastrointestinal: Negative for abdominal pain, constipation and diarrhea.   Endocrine: Negative for polyuria.   Genitourinary: Negative for difficulty urinating.   Musculoskeletal: Negative for myalgias.   Neurological: Negative for dizziness and light-headedness.   Psychiatric/Behavioral: Negative for dysphoric mood.        Past Medical History:   Past Medical History:   Diagnosis Date   • Anxiety    • Arthritis     GILBERT KNEES   • Asthma     SEASONAL ALLERGIES   • CKD (chronic kidney disease)     Stage 3, Follows with [facility]   • Elevated cholesterol    • Gout 2021   • Hernia     Upper   • Hiatal hernia    • Hypertension    • Renal insufficiency     follows with [facility]   • Sinus problem     SEASONAL ALLERGIES       Past Surgical History:   Past Surgical History:   Procedure Laterality Date   • BONY PELVIS SURGERY      Plate   •  SECTION   and    • CHOLECYSTECTOMY     • COLONOSCOPY     • ENDOSCOPY     • ENDOSCOPY N/A 3/14/2023    Procedure: ESOPHAGOGASTRODUODENOSCOPY with biopsies;  Surgeon: Tam Badillo MD;  Location: [facility] ENDOSCOPY;  Service:  General;  Laterality: N/A;  hiatal hernia   • TOTAL KNEE ARTHROPLASTY Left 12/22/2021    Procedure: TOTAL KNEE ARTHROPLASTY;  Surgeon: Marco Nelson MD;  Location: [facility] MAIN OR;  Service: Orthopedics;  Laterality: Left;   • TOTAL KNEE ARTHROPLASTY Right 10/19/2022    Procedure: RIGHT TOTAL KNEE ARTHROPLASTY - NO SHELBY;  Surgeon: Marco Nelson MD;  Location: [facility] MAIN OR;  Service: Orthopedics;  Laterality: Right;   • TUBAL ABDOMINAL LIGATION  1989       Family History:   Family History   Problem Relation Age of Onset   • Throat cancer Father 73   • Stroke Other    • Diabetes Other    • Malig Hyperthermia Neg Hx        Social History:   Social History     Socioeconomic History   • Marital status:    Tobacco Use   • Smoking status: Former     Types: Cigarettes     Quit date: 2013     Years since quitting: 10.2   • Smokeless tobacco: Never   Vaping Use   • Vaping Use: Never used   Substance and Sexual Activity   • Alcohol use: Never   • Drug use: Never   • Sexual activity: Defer       Medications:     Current Outpatient Medications:   •  allopurinol (ZYLOPRIM) 300 MG tablet, Take 1 tablet by mouth 2 (Two) Times a Day., Disp: 180 tablet, Rfl: 1  •  aspirin 81 MG EC tablet, Take 1 tablet by mouth Daily., Disp: , Rfl:   •  atorvastatin (LIPITOR) 20 MG tablet, Take 1 tablet by mouth Every Night., Disp: 90 tablet, Rfl: 1  •  carvedilol (COREG) 25 MG tablet, TAKE 1 TABLET BY MOUTH TWICE DAILY WITH MEALS, Disp: 180 tablet, Rfl: 0  •  cloNIDine (CATAPRES) 0.2 MG tablet, Take 1 tablet by mouth 3 (Three) Times a Day., Disp: 180 tablet, Rfl: 0  •  Lokelma 10 g pack, TAKE 10 GRAM BY MOUTH 1 TIME EACH DAY, Disp: , Rfl:   •  losartan (COZAAR) 100 MG tablet, Take 1 tablet by mouth once daily, Disp: 90 tablet, Rfl: 0  •  vitamin B-12 (CYANOCOBALAMIN) 100 MCG tablet, Take 50 mcg by mouth Daily., Disp: , Rfl:   •  zinc gluconate 50 MG tablet, Take 1 tablet by mouth Daily., Disp: , Rfl:   •  albuterol sulfate HFA  "108 (90 Base) MCG/ACT inhaler, Take 1-2 Puffs every 4 hours prn, Disp: 8 g, Rfl: 1  •  fluticasone (FLONASE) 50 MCG/ACT nasal spray, 2 sprays into the nostril(s) as directed by provider Daily. 2 sprays each nostril daily (Patient not taking: Reported on 4/11/2023), Disp: 18.2 mL, Rfl: 1    Allergies:   Allergies   Allergen Reactions   • Amlodipine Shortness Of Breath   • Diltiazem Hcl Anxiety         Objective     Physical Exam:  Vital Signs:   Vitals:    04/11/23 1327   BP: 120/76   BP Location: Right arm   Patient Position: Sitting   Pulse: 74   Temp: 98.6 °F (37 °C)   SpO2: 98%   Weight: 122 kg (268 lb)   Height: 157.5 cm (62\")     Body mass index is 49.02 kg/m².     Physical Exam  Constitutional:       General: She is not in acute distress.     Appearance: She is not ill-appearing.   HENT:      Right Ear: Tympanic membrane normal.      Left Ear: Tympanic membrane normal.      Nose: Nose normal.      Mouth/Throat:      Mouth: Mucous membranes are moist.   Eyes:      Conjunctiva/sclera: Conjunctivae normal.   Neck:      Vascular: No carotid bruit.   Cardiovascular:      Rate and Rhythm: Normal rate and regular rhythm.      Pulses: Normal pulses.      Heart sounds: Normal heart sounds. No murmur heard.    No gallop.   Pulmonary:      Effort: Pulmonary effort is normal.      Breath sounds: Normal breath sounds.   Abdominal:      General: Bowel sounds are normal.      Palpations: Abdomen is soft.   Musculoskeletal:      Right lower leg: No edema.      Left lower leg: No edema.   Lymphadenopathy:      Cervical: No cervical adenopathy.   Skin:     General: Skin is warm and dry.      Capillary Refill: Capillary refill takes less than 2 seconds.   Neurological:      General: No focal deficit present.      Mental Status: She is alert.   Psychiatric:         Mood and Affect: Mood normal.         Behavior: Behavior normal.           Assessment / Plan      Assessment/Plan:   Diagnoses and all orders for this visit:    1. " Mixed hyperlipidemia (Primary)  -     Lipid Panel    2. Primary hypertension  -     CBC Auto Differential    3. Impaired fasting glucose  -     Hemoglobin A1c  -     Comprehensive Metabolic Panel    4. Stage 3b chronic kidney disease    5. Anemia, unspecified type    6. Gout, unspecified cause, unspecified chronicity, unspecified site  -     Uric Acid    7. Allergic rhinitis, unspecified seasonality, unspecified trigger    8. Mild intermittent asthma without complication    9. Elevated ferritin  -     Iron Profile  -     Ferritin    10. Class 3 severe obesity due to excess calories with serious comorbidity and body mass index (BMI) of 45.0 to 49.9 in adult    11. Screening for thyroid disorder  -     TSH    Other orders  -     albuterol sulfate  (90 Base) MCG/ACT inhaler; Take 1-2 Puffs every 4 hours prn  Dispense: 8 g; Refill: 1       1.  Hyperlipidemia: Obtain lipid panel today and CMP.  Denies myalgias continue Lipitor 20 mg daily decrease fatty and fried foods in diet avoid red meat and high-fat dairy products increase fresh fruits and vegetables and whole grains.  Exercise at least 30 minutes/day 5 days/week.   2.  Hypertension: Collect CMP today.  Blood pressure below goal at this visit.  Continue Coreg 25 mg twice daily and clonidine 0.2 mg 3 times per day.  Decrease sodium in diet with daily goal of less than 1500 mg of sodium.  Increase water intake.  Weight loss, decrease caloric intake.   3.  IFG: obtain hgb  A1c today.  Decrease carbohydrates in diet.  Exercise 30 minutes daily and weight loss  4.  Stage III chronic kidney disease: Continue to follow nephrology.  Avoid NSAIDs and alcohol.  Collect CMP today.  Increase water intake.  5.  Anemia: Collect ferritin and iron profile.   6.  Gout: Obtain uric acid level today.  Avoid purine in diet (shellfish, organ meats, red meat, alcohol and sweet bread).  Continue taking allopurinol 300 mg 2 times daily.  Denies recent gout flare.   7.  Allergic  "rhinitis: Controlled with Flonase 50 mcg 2 sprays in each nostril daily.  Point toward the ear.   8.  Asthma: albuterol prn use.  Denies asthma exacerbations recently.  9.  Elevated ferritin: Obtain iron profile and ferritin level today to monitor and follow-up as necessary.  10.  Class III obesity with serious comorbidity of hyperlipidemia hypertension impaired fasting glucose recommend reduce overall caloric intake exercise 30 minutes daily and increase water intake patient's insurance does not cover Saxenda or Wegovy or Contrave            Follow Up:   Return in about 6 months (around 10/11/2023).    Frandy Ellis, APRN    \"Please note that portions of this note were completed with a voice recognition program.\"    "

## 2023-04-28 ENCOUNTER — HOSPITAL ENCOUNTER (EMERGENCY)
Facility: HOSPITAL | Age: 53
Discharge: HOME | End: 2023-04-28
Attending: EMERGENCY MEDICINE
Payer: MEDICARE

## 2023-04-28 ENCOUNTER — APPOINTMENT (OUTPATIENT)
Dept: CT IMAGING | Facility: HOSPITAL | Age: 53
End: 2023-04-28
Payer: MEDICARE

## 2023-04-28 VITALS
SYSTOLIC BLOOD PRESSURE: 147 MMHG | HEART RATE: 95 BPM | HEIGHT: 62 IN | WEIGHT: 268.52 LBS | DIASTOLIC BLOOD PRESSURE: 78 MMHG | BODY MASS INDEX: 49.41 KG/M2 | RESPIRATION RATE: 20 BRPM | OXYGEN SATURATION: 98 % | TEMPERATURE: 98.4 F

## 2023-04-28 DIAGNOSIS — N18.9 CHRONIC RENAL IMPAIRMENT, UNSPECIFIED CKD STAGE: ICD-10-CM

## 2023-04-28 DIAGNOSIS — R51.9 NONINTRACTABLE EPISODIC HEADACHE, UNSPECIFIED HEADACHE TYPE: Primary | ICD-10-CM

## 2023-04-28 LAB
ALBUMIN SERPL-MCNC: 3.9 G/DL (ref 3.5–5.2)
ALBUMIN/GLOB SERPL: 1.6 G/DL
ALP SERPL-CCNC: 79 U/L (ref 39–117)
ALT SERPL W P-5'-P-CCNC: 23 U/L (ref 1–33)
ANION GAP SERPL CALCULATED.3IONS-SCNC: 8.5 MMOL/L (ref 5–15)
AST SERPL-CCNC: 16 U/L (ref 1–32)
BACTERIA UR QL AUTO: ABNORMAL /HPF
BASOPHILS # BLD AUTO: 0.05 10*3/MM3 (ref 0–0.2)
BASOPHILS NFR BLD AUTO: 0.7 % (ref 0–1.5)
BILIRUB SERPL-MCNC: 0.6 MG/DL (ref 0–1.2)
BILIRUB UR QL STRIP: NEGATIVE
BUN SERPL-MCNC: 43 MG/DL (ref 6–20)
BUN/CREAT SERPL: 18.1 (ref 7–25)
CALCIUM SPEC-SCNC: 10.2 MG/DL (ref 8.6–10.5)
CHLORIDE SERPL-SCNC: 108 MMOL/L (ref 98–107)
CLARITY UR: CLEAR
CO2 SERPL-SCNC: 26.5 MMOL/L (ref 22–29)
COLOR UR: YELLOW
CREAT SERPL-MCNC: 2.37 MG/DL (ref 0.57–1)
DEPRECATED RDW RBC AUTO: 55 FL (ref 37–54)
EGFRCR SERPLBLD CKD-EPI 2021: 23.8 ML/MIN/1.73
EOSINOPHIL # BLD AUTO: 0.08 10*3/MM3 (ref 0–0.4)
EOSINOPHIL NFR BLD AUTO: 1.2 % (ref 0.3–6.2)
ERYTHROCYTE [DISTWIDTH] IN BLOOD BY AUTOMATED COUNT: 14.7 % (ref 12.3–15.4)
GLOBULIN UR ELPH-MCNC: 2.4 GM/DL
GLUCOSE SERPL-MCNC: 111 MG/DL (ref 65–99)
GLUCOSE UR STRIP-MCNC: NEGATIVE MG/DL
HCT VFR BLD AUTO: 36.2 % (ref 34–46.6)
HGB BLD-MCNC: 12 G/DL (ref 12–15.9)
HGB UR QL STRIP.AUTO: ABNORMAL
HYALINE CASTS UR QL AUTO: ABNORMAL /LPF
IMM GRANULOCYTES # BLD AUTO: 0.04 10*3/MM3 (ref 0–0.05)
IMM GRANULOCYTES NFR BLD AUTO: 0.6 % (ref 0–0.5)
KETONES UR QL STRIP: NEGATIVE
LEUKOCYTE ESTERASE UR QL STRIP.AUTO: ABNORMAL
LYMPHOCYTES # BLD AUTO: 1.61 10*3/MM3 (ref 0.7–3.1)
LYMPHOCYTES NFR BLD AUTO: 24 % (ref 19.6–45.3)
MCH RBC QN AUTO: 34.3 PG (ref 26.6–33)
MCHC RBC AUTO-ENTMCNC: 33.1 G/DL (ref 31.5–35.7)
MCV RBC AUTO: 103.4 FL (ref 79–97)
MONOCYTES # BLD AUTO: 0.52 10*3/MM3 (ref 0.1–0.9)
MONOCYTES NFR BLD AUTO: 7.7 % (ref 5–12)
NEUTROPHILS NFR BLD AUTO: 4.41 10*3/MM3 (ref 1.7–7)
NEUTROPHILS NFR BLD AUTO: 65.8 % (ref 42.7–76)
NITRITE UR QL STRIP: NEGATIVE
NRBC BLD AUTO-RTO: 0 /100 WBC (ref 0–0.2)
PH UR STRIP.AUTO: 5.5 [PH] (ref 5–8)
PLATELET # BLD AUTO: 152 10*3/MM3 (ref 140–450)
PMV BLD AUTO: 8.7 FL (ref 6–12)
POTASSIUM SERPL-SCNC: 4.8 MMOL/L (ref 3.5–5.2)
PROT SERPL-MCNC: 6.3 G/DL (ref 6–8.5)
PROT UR QL STRIP: ABNORMAL
RBC # BLD AUTO: 3.5 10*6/MM3 (ref 3.77–5.28)
RBC # UR STRIP: ABNORMAL /HPF
REF LAB TEST METHOD: ABNORMAL
SODIUM SERPL-SCNC: 143 MMOL/L (ref 136–145)
SP GR UR STRIP: 1.01 (ref 1–1.03)
SQUAMOUS #/AREA URNS HPF: ABNORMAL /HPF
UROBILINOGEN UR QL STRIP: ABNORMAL
WBC # UR STRIP: ABNORMAL /HPF
WBC NRBC COR # BLD: 6.71 10*3/MM3 (ref 3.4–10.8)

## 2023-04-28 PROCEDURE — 99283 EMERGENCY DEPT VISIT LOW MDM: CPT

## 2023-04-28 PROCEDURE — 25010000002 DIPHENHYDRAMINE PER 50 MG: Performed by: EMERGENCY MEDICINE

## 2023-04-28 PROCEDURE — 36415 COLL VENOUS BLD VENIPUNCTURE: CPT

## 2023-04-28 PROCEDURE — 25010000002 ORPHENADRINE CITRATE PER 60 MG: Performed by: EMERGENCY MEDICINE

## 2023-04-28 PROCEDURE — 25010000002 METOCLOPRAMIDE PER 10 MG: Performed by: EMERGENCY MEDICINE

## 2023-04-28 PROCEDURE — 80053 COMPREHEN METABOLIC PANEL: CPT

## 2023-04-28 PROCEDURE — 85025 COMPLETE CBC W/AUTO DIFF WBC: CPT

## 2023-04-28 PROCEDURE — 70450 CT HEAD/BRAIN W/O DYE: CPT

## 2023-04-28 PROCEDURE — 81001 URINALYSIS AUTO W/SCOPE: CPT

## 2023-04-28 PROCEDURE — 96375 TX/PRO/DX INJ NEW DRUG ADDON: CPT

## 2023-04-28 PROCEDURE — 96374 THER/PROPH/DIAG INJ IV PUSH: CPT

## 2023-04-28 RX ORDER — ORPHENADRINE CITRATE 30 MG/ML
60 INJECTION INTRAMUSCULAR; INTRAVENOUS ONCE
Status: COMPLETED | OUTPATIENT
Start: 2023-04-28 | End: 2023-04-28

## 2023-04-28 RX ORDER — METOCLOPRAMIDE HYDROCHLORIDE 5 MG/ML
10 INJECTION INTRAMUSCULAR; INTRAVENOUS ONCE
Status: COMPLETED | OUTPATIENT
Start: 2023-04-28 | End: 2023-04-28

## 2023-04-28 RX ORDER — PROCHLORPERAZINE EDISYLATE 5 MG/ML
10 INJECTION INTRAMUSCULAR; INTRAVENOUS ONCE
Status: DISCONTINUED | OUTPATIENT
Start: 2023-04-28 | End: 2023-04-28

## 2023-04-28 RX ORDER — DIPHENHYDRAMINE HYDROCHLORIDE 50 MG/ML
25 INJECTION INTRAMUSCULAR; INTRAVENOUS ONCE
Status: DISCONTINUED | OUTPATIENT
Start: 2023-04-28 | End: 2023-04-28

## 2023-04-28 RX ORDER — DIPHENHYDRAMINE HYDROCHLORIDE 50 MG/ML
25 INJECTION INTRAMUSCULAR; INTRAVENOUS ONCE
Status: COMPLETED | OUTPATIENT
Start: 2023-04-28 | End: 2023-04-28

## 2023-04-28 RX ORDER — SODIUM CHLORIDE 0.9 % (FLUSH) 0.9 %
10 SYRINGE (ML) INJECTION AS NEEDED
Status: DISCONTINUED | OUTPATIENT
Start: 2023-04-28 | End: 2023-04-28 | Stop reason: HOSPADM

## 2023-04-28 RX ADMIN — METOCLOPRAMIDE HYDROCHLORIDE 10 MG: 5 INJECTION INTRAMUSCULAR; INTRAVENOUS at 15:23

## 2023-04-28 RX ADMIN — DIPHENHYDRAMINE HYDROCHLORIDE 25 MG: 50 INJECTION, SOLUTION INTRAMUSCULAR; INTRAVENOUS at 15:24

## 2023-04-28 RX ADMIN — ORPHENADRINE CITRATE 60 MG: 60 INJECTION INTRAMUSCULAR; INTRAVENOUS at 15:25

## 2023-04-28 NOTE — ED PROVIDER NOTES
Time: 2:08 PM EDT  Date of encounter:  2023  Independent Historian/Clinical History and Information was obtained by:   Patient  Chief Complaint   Patient presents with   • Headache   • Nausea     Off and on for 2 weeks       History is limited by: N/A    History of Present Illness:  Patient is a 52 y.o. year old female who presents to the emergency department for evaluation of intermittent headache and swelling.   Pt reports to the ED for headache and nausea. Pt reports there is swelling and eye pain along with her headache that has been persistent for the past 2 weeks with waxing and waning exacerbations. Pt has pain on both sides of her head with sharp pain above both eyes. Pt reports having a closed head injury in 2017 with intermittent headaches since then with a possible diagnosis of a concussion. Pt also reports having intermittent blurry vision, but could be due to bad eye sight rather than the SIM. Pt denies any fever, chills, neck stiffness, weakness, or numbness.  Patient denies any difficulties walking.  The patient denies any change in coordination.          Patient Care Team  Primary Care Provider: Brian Ellis APRN    Past Medical History:     Allergies   Allergen Reactions   • Amlodipine Shortness Of Breath   • Diltiazem Hcl Anxiety     Past Medical History:   Diagnosis Date   • Anxiety    • Arthritis     GILBERT KNEES   • Asthma     SEASONAL ALLERGIES   • CKD (chronic kidney disease)     Stage 3, Follows with [facility]   • Elevated cholesterol    • Gout 2021   • Hernia     Upper   • Hiatal hernia    • Hypertension    • Renal insufficiency     follows with [facility]   • Sinus problem     SEASONAL ALLERGIES     Past Surgical History:   Procedure Laterality Date   • BONY PELVIS SURGERY      Plate   •  SECTION   and    • CHOLECYSTECTOMY     • COLONOSCOPY     • ENDOSCOPY     • ENDOSCOPY N/A 3/14/2023    Procedure: ESOPHAGOGASTRODUODENOSCOPY with biopsies;  Surgeon:  Tam Badillo MD;  Location: [facility] ENDOSCOPY;  Service: General;  Laterality: N/A;  hiatal hernia   • TOTAL KNEE ARTHROPLASTY Left 12/22/2021    Procedure: TOTAL KNEE ARTHROPLASTY;  Surgeon: Marco Nelson MD;  Location: [facility] MAIN OR;  Service: Orthopedics;  Laterality: Left;   • TOTAL KNEE ARTHROPLASTY Right 10/19/2022    Procedure: RIGHT TOTAL KNEE ARTHROPLASTY - NO SHELBY;  Surgeon: Marco Nelson MD;  Location: [facility] MAIN OR;  Service: Orthopedics;  Laterality: Right;   • TUBAL ABDOMINAL LIGATION  1989     Family History   Problem Relation Age of Onset   • Throat cancer Father 73   • Stroke Other    • Diabetes Other    • Malig Hyperthermia Neg Hx        Home Medications:  Prior to Admission medications    Medication Sig Start Date End Date Taking? Authorizing Provider   albuterol sulfate  (90 Base) MCG/ACT inhaler Take 1-2 Puffs every 4 hours prn 4/11/23   Brian Ellis APRN   allopurinol (ZYLOPRIM) 300 MG tablet Take 1 tablet by mouth 2 (Two) Times a Day. 12/7/22   Brian Ellis APRN   aspirin 81 MG EC tablet Take 1 tablet by mouth Daily.    Provider, MD Al   atorvastatin (LIPITOR) 20 MG tablet Take 1 tablet by mouth Every Night. 12/7/22   Brian Ellis APRN   carvedilol (COREG) 25 MG tablet TAKE 1 TABLET BY MOUTH TWICE DAILY WITH MEALS 1/18/23   Brian Ellis APRN   cloNIDine (CATAPRES) 0.2 MG tablet Take 1 tablet by mouth 3 (Three) Times a Day. 3/3/23   Brian Ellis APRN   fluticasone (FLONASE) 50 MCG/ACT nasal spray 2 sprays into the nostril(s) as directed by provider Daily. 2 sprays each nostril daily  Patient not taking: Reported on 4/11/2023 1/19/23   Brian Ellis APRN   Lokelma 10 g pack TAKE 10 GRAM BY MOUTH 1 TIME EACH DAY 12/1/22   Provider, MD Al   losartan (COZAAR) 100 MG tablet Take 1 tablet by mouth once daily 3/20/23   Brian Ellis APRN   vitamin B-12  "(CYANOCOBALAMIN) 100 MCG tablet Take 50 mcg by mouth Daily.    Provider, Al, MD   zinc gluconate 50 MG tablet Take 1 tablet by mouth Daily.    Provider, Al, MD        Social History:   Social History     Tobacco Use   • Smoking status: Former     Types: Cigarettes     Quit date: 2013     Years since quitting: 10.3   • Smokeless tobacco: Never   Vaping Use   • Vaping Use: Never used   Substance Use Topics   • Alcohol use: Never   • Drug use: Never         Review of Systems:  Review of Systems   Constitutional: Negative for chills, diaphoresis and fever.   HENT: Negative for congestion, postnasal drip, rhinorrhea and sore throat.    Eyes: Positive for visual disturbance. Negative for photophobia.   Respiratory: Negative for cough, chest tightness and shortness of breath.    Cardiovascular: Negative for chest pain, palpitations and leg swelling.   Gastrointestinal: Positive for nausea. Negative for abdominal pain, diarrhea and vomiting.   Genitourinary: Negative for difficulty urinating, dysuria, flank pain, frequency, hematuria and urgency.   Musculoskeletal: Negative for neck pain and neck stiffness.   Skin: Negative for pallor and rash.   Neurological: Positive for headaches. Negative for dizziness, syncope, weakness and numbness.   Hematological: Negative for adenopathy. Does not bruise/bleed easily.   Psychiatric/Behavioral: Negative.         Physical Exam:  /78   Pulse 95   Temp 98.4 °F (36.9 °C) (Oral)   Resp 20   Ht 157.5 cm (62\")   Wt 122 kg (268 lb 8.3 oz)   LMP  (LMP Unknown)   SpO2 98%   BMI 49.11 kg/m²     Physical Exam  Vitals and nursing note reviewed.   Constitutional:       General: She is not in acute distress.     Appearance: Normal appearance. She is not ill-appearing, toxic-appearing or diaphoretic.   HENT:      Head: Normocephalic and atraumatic.      Mouth/Throat:      Mouth: Mucous membranes are moist.   Eyes:      Extraocular Movements:      Right eye: No " nystagmus.      Left eye: No nystagmus.      Pupils: Pupils are equal, round, and reactive to light.      Comments: No photosensitivity   Cardiovascular:      Rate and Rhythm: Normal rate and regular rhythm.      Pulses: Normal pulses.           Carotid pulses are 2+ on the right side and 2+ on the left side.       Radial pulses are 2+ on the right side and 2+ on the left side.        Femoral pulses are 2+ on the right side and 2+ on the left side.       Popliteal pulses are 2+ on the right side and 2+ on the left side.        Dorsalis pedis pulses are 2+ on the right side and 2+ on the left side.        Posterior tibial pulses are 2+ on the right side and 2+ on the left side.      Heart sounds: Normal heart sounds. No murmur heard.  Pulmonary:      Effort: Pulmonary effort is normal. No accessory muscle usage, respiratory distress or retractions.      Breath sounds: Normal breath sounds. No wheezing, rhonchi or rales.   Abdominal:      General: Abdomen is flat. There is no distension.      Palpations: Abdomen is soft. There is no mass or pulsatile mass.      Tenderness: There is no abdominal tenderness. There is no right CVA tenderness, left CVA tenderness, guarding or rebound.      Comments: No rigidity   Musculoskeletal:         General: No swelling, tenderness or deformity.      Cervical back: Neck supple. No rigidity or tenderness. No pain with movement. Normal range of motion.      Right lower leg: No edema.      Left lower leg: No edema.   Skin:     General: Skin is warm and dry.      Capillary Refill: Capillary refill takes less than 2 seconds.      Coloration: Skin is not jaundiced or pale.      Findings: No erythema.   Neurological:      General: No focal deficit present.      Mental Status: She is alert and oriented to person, place, and time. Mental status is at baseline.      Cranial Nerves: Cranial nerves 2-12 are intact. No cranial nerve deficit.      Sensory: Sensation is intact. No sensory deficit.       Motor: Motor function is intact. No weakness or pronator drift.      Coordination: Coordination is intact. Coordination normal. Finger-Nose-Finger Test normal.      Comments: No pronator drift and intact senations for both upper and lower extremities.    Psychiatric:         Mood and Affect: Mood normal.         Behavior: Behavior normal.                  Procedures:  Procedures      Medical Decision Making:      Comorbidities that affect care:    Asthma, Chronic Kidney Disease, Hypertension    External Notes reviewed:    None      The following orders were placed and all results were independently analyzed by me:  Orders Placed This Encounter   Procedures   • CT Head Without Contrast   • Comprehensive Metabolic Panel   • Urinalysis With Microscopic If Indicated (No Culture) - Urine, Clean Catch   • CBC Auto Differential   • Urinalysis, Microscopic Only - Urine, Clean Catch   • Insert peripheral IV   • CBC & Differential       Medications Given in the Emergency Department:  Medications   sodium chloride 0.9 % flush 10 mL (has no administration in time range)   metoclopramide (REGLAN) injection 10 mg (10 mg Intravenous Given 4/28/23 1523)   diphenhydrAMINE (BENADRYL) injection 25 mg (25 mg Intravenous Given 4/28/23 1524)   orphenadrine (NORFLEX) injection 60 mg (60 mg Intravenous Given 4/28/23 1525)        ED Course:    The patient was initially evaluated in the triage area where orders were placed. The patient was later dispositioned by Ishmael Bruner DO.      The patient was advised to stay for completion of workup which includes but is not limited to communication of labs and radiological results, reassessment and plan. The patient was advised that leaving prior to disposition by a provider could result in critical findings that are not communicated to the patient.     ED Course as of 04/28/23 1551   Fri Apr 28, 2023   1409 PROVIDER IN TRIAGE  Patient was evaluated by me in triage, Castro Ospina PA-C.   Orders were placed and patient is currently awaiting final results and disposition.  [MD]      ED Course User Index  [MD] Castro Ospina PA-C       Labs:    Lab Results (last 24 hours)     Procedure Component Value Units Date/Time    CBC & Differential [[id number]]  (Abnormal) Collected: 04/28/23 1255    Specimen: Blood Updated: 04/28/23 1318    Narrative:      The following orders were created for panel order CBC & Differential.  Procedure                               Abnormality         Status                     ---------                               -----------         ------                     CBC Auto Differential[[id number]]        Abnormal            Final result                 Please view results for these tests on the individual orders.    Comprehensive Metabolic Panel [[id number]]  (Abnormal) Collected: 04/28/23 1255    Specimen: Blood Updated: 04/28/23 1333     Glucose 111 mg/dL      BUN 43 mg/dL      Creatinine 2.37 mg/dL      Sodium 143 mmol/L      Potassium 4.8 mmol/L      Chloride 108 mmol/L      CO2 26.5 mmol/L      Calcium 10.2 mg/dL      Total Protein 6.3 g/dL      Albumin 3.9 g/dL      ALT (SGPT) 23 U/L      AST (SGOT) 16 U/L      Alkaline Phosphatase 79 U/L      Total Bilirubin 0.6 mg/dL      Globulin 2.4 gm/dL      A/G Ratio 1.6 g/dL      BUN/Creatinine Ratio 18.1     Anion Gap 8.5 mmol/L      eGFR 23.8 mL/min/1.73     Narrative:      GFR Normal >60  Chronic Kidney Disease <60  Kidney Failure <15      CBC Auto Differential [[id number]]  (Abnormal) Collected: 04/28/23 1255    Specimen: Blood Updated: 04/28/23 1318     WBC 6.71 10*3/mm3      RBC 3.50 10*6/mm3      Hemoglobin 12.0 g/dL      Hematocrit 36.2 %      .4 fL      MCH 34.3 pg      MCHC 33.1 g/dL      RDW 14.7 %      RDW-SD 55.0 fl      MPV 8.7 fL      Platelets 152 10*3/mm3      Neutrophil % 65.8 %      Lymphocyte % 24.0 %      Monocyte % 7.7 %      Eosinophil % 1.2 %      Basophil % 0.7 %      Immature Grans % 0.6 %       Neutrophils, Absolute 4.41 10*3/mm3      Lymphocytes, Absolute 1.61 10*3/mm3      Monocytes, Absolute 0.52 10*3/mm3      Eosinophils, Absolute 0.08 10*3/mm3      Basophils, Absolute 0.05 10*3/mm3      Immature Grans, Absolute 0.04 10*3/mm3      nRBC 0.0 /100 WBC     Urinalysis With Microscopic If Indicated (No Culture) - Urine, Clean Catch [[id number]]  (Abnormal) Collected: 04/28/23 1337    Specimen: Urine, Clean Catch Updated: 04/28/23 1423     Color, UA Yellow     Appearance, UA Clear     pH, UA 5.5     Specific Gravity, UA 1.009     Glucose, UA Negative     Ketones, UA Negative     Bilirubin, UA Negative     Blood, UA Trace     Protein,  mg/dL (2+)     Leuk Esterase, UA Trace     Nitrite, UA Negative     Urobilinogen, UA 0.2 E.U./dL    Urinalysis, Microscopic Only - Urine, Clean Catch [[id number]]  (Abnormal) Collected: 04/28/23 1337    Specimen: Urine, Clean Catch Updated: 04/28/23 1423     RBC, UA 0-2 /HPF      WBC, UA 6-12 /HPF      Bacteria, UA None Seen /HPF      Squamous Epithelial Cells, UA 7-12 /HPF      Hyaline Casts, UA None Seen /LPF      Methodology Manual Light Microscopy           Imaging:    CT Head Without Contrast    Result Date: 4/28/2023  PROCEDURE: CT HEAD WO CONTRAST  COMPARISON:  [facility], MR, BRAIN W/WO CONTRAST, 6/18/2007, 9:31.  [facility], CT, CT HEAD WO CONTRAST, 8/11/2022, 11:38. INDICATIONS: Worsening headache with visual changes  PROTOCOL:   Standard imaging protocol performed    RADIATION:   DLP: 954.5 mGy*cm   MA and/or KV was adjusted to minimize radiation dose.     TECHNIQUE: Axial images of the head without intravenous contrast.  FINDINGS:  The ventricles have a normal size and configuration. There is no evidence of acute intracranial hemorrhage, mass or midline shift. No extra-axial fluid collections are identified. There are no skull fractures. The visualized paranasal sinuses and mastoid air cells are grossly clear.  IMPRESSION:  Normal exam.  JOANNE PATEL MD       Electronically Signed and Approved By: JOANNE PATEL MD on 4/28/2023 at 14:38                 Differential Diagnosis and Discussion:      Headache: Differential diagnosis includes but is not limited to migraine, cluster headache, hypertension, tumor, subarachnoid bleeding, pseudotumor cerebri, temporal arteritis, infections, tension headache, and TMJ syndrome.    All labs were reviewed and interpreted by me.  CT scan radiology impression was interpreted by me.    MDM  Number of Diagnoses or Management Options  Chronic renal impairment, unspecified CKD stage  Nonintractable episodic headache, unspecified headache type  Diagnosis management comments: The patient's CBC was reviewed and shows no abnormalities of critical concern.  Of note, there is no anemia requiring a blood transfusion and the platelet count is acceptable    Patient's chemistry demonstrates a BUN of 43, creatinine of 2.37, chloride of 108, otherwise normal.  I reviewed the patient's past chemistry the patient indeed does have chronic renal insufficiency.  Patient's CT scan demonstrated no acute abnormalities.    The patient's headache was treated with Benadryl, Reglan and Norflex.  The patient was reassessed.  The patient states that her pain is completely gone    The patient presented to the emergency department with a headache.  The patient is now resting comfortably, feels better, is alert, talkative, interactive in no distress after emergency department treatment.  The patient appears well and is able to tolerate p.o. fluids.  Repeat examination is unremarkable and benign.  The patient is neurologically intact, has a normal mental status and is ambulatory in the emergency department.  The history, exam, diagnostic testing if performed in the patient's current condition do not suggest meningitis, stroke, sepsis, subarachnoid hemorrhage, intracranial bleeding, encephalitis, temporal arteritis or other  significant pathology to warrant further testing, continued emergency room treatment, admission, neurological consultation or other specialist evaluation at this point.  The vital signs are stable.  The patient's condition is stable and appropriate for discharge.  The patient will pursue further outpatient evaluation with the primary care physician or other designated consultant as indicated on the discharge instructions.       Amount and/or Complexity of Data Reviewed  Clinical lab tests: reviewed  Tests in the radiology section of CPT®: reviewed  Decide to obtain previous medical records or to obtain history from someone other than the patient: yes (I reviewed the patient's past chemistries.  The patient does indeed have a history of chronic renal sufficiency.  Her renal function has not critically changed)       Social Determinants of Health:    Patient is independent, reliable, and has access to care.       Disposition and Care Coordination:    Discharged: The patient is suitable and stable for discharge with no need for consideration of observation or admission.    I have explained discharge medications and the need for follow up with the patient/caretakers. This was also printed in the discharge instructions. Patient was discharged with the following medications and follow up:      Medication List      No changes were made to your prescriptions during this visit.      Brian Ellis, APRN  [address] [place] [address]  [phone]    On 5/1/2023  Headache, call for appointment       Final diagnoses:   Nonintractable episodic headache, unspecified headache type   Chronic renal impairment, unspecified CKD stage        ED Disposition     ED Disposition   Discharge    Condition   Stable    Comment   --             This medical record created using voice recognition software.    Documentation assistance provided by Oneyda Patrick acting as scribe for Ishmael Bruner DO.  Information recorded by the scribe was done at my direction and has been verified and validated by me.            Oneyda Patrick  04/28/23 1733       Ishmael Bruner DO  04/28/23 0236

## 2023-04-28 NOTE — DISCHARGE INSTRUCTIONS
Please follow-up with your doctor on Monday for evaluation    Return to the emergency immediately for worsening headache, vomiting, neck stiffness, rash, confusion, altered mental status, numbness or weakness in your legs, difficulty speech, difficulty swallowing, change in vision, difficulties with coordination, difficulties walking or any new symptoms you are concerned with

## 2023-05-01 DIAGNOSIS — I10 PRIMARY HYPERTENSION: ICD-10-CM

## 2023-05-01 RX ORDER — ATORVASTATIN CALCIUM 20 MG/1
20 TABLET, FILM COATED ORAL NIGHTLY
Qty: 90 TABLET | Refills: 1 | Status: SHIPPED | OUTPATIENT
Start: 2023-05-01

## 2023-05-01 RX ORDER — CARVEDILOL 25 MG/1
25 TABLET ORAL 2 TIMES DAILY WITH MEALS
Qty: 180 TABLET | Refills: 1 | Status: SHIPPED | OUTPATIENT
Start: 2023-05-01

## 2023-05-01 RX ORDER — CLONIDINE HYDROCHLORIDE 0.2 MG/1
0.2 TABLET ORAL 3 TIMES DAILY
Qty: 270 TABLET | Refills: 1 | Status: SHIPPED | OUTPATIENT
Start: 2023-05-01

## 2023-05-02 ENCOUNTER — APPOINTMENT (OUTPATIENT)
Dept: GENERAL RADIOLOGY | Facility: HOSPITAL | Age: 53
End: 2023-05-02
Payer: MEDICARE

## 2023-05-02 ENCOUNTER — TELEPHONE (OUTPATIENT)
Dept: FAMILY MEDICINE CLINIC | Facility: CLINIC | Age: 53
End: 2023-05-02
Payer: MEDICARE

## 2023-05-02 ENCOUNTER — HOSPITAL ENCOUNTER (EMERGENCY)
Facility: HOSPITAL | Age: 53
Discharge: HOME | End: 2023-05-02
Attending: EMERGENCY MEDICINE | Admitting: EMERGENCY MEDICINE
Payer: MEDICARE

## 2023-05-02 ENCOUNTER — TELEPHONE (OUTPATIENT)
Dept: FAMILY MEDICINE CLINIC | Facility: CLINIC | Age: 53
End: 2023-05-02

## 2023-05-02 VITALS
SYSTOLIC BLOOD PRESSURE: 150 MMHG | DIASTOLIC BLOOD PRESSURE: 93 MMHG | TEMPERATURE: 97.9 F | BODY MASS INDEX: 50.1 KG/M2 | HEIGHT: 62 IN | RESPIRATION RATE: 16 BRPM | OXYGEN SATURATION: 98 % | WEIGHT: 272.27 LBS | HEART RATE: 82 BPM

## 2023-05-02 DIAGNOSIS — K59.00 CONSTIPATION, UNSPECIFIED CONSTIPATION TYPE: Primary | ICD-10-CM

## 2023-05-02 PROCEDURE — 99283 EMERGENCY DEPT VISIT LOW MDM: CPT

## 2023-05-02 PROCEDURE — 74018 RADEX ABDOMEN 1 VIEW: CPT

## 2023-05-02 NOTE — TELEPHONE ENCOUNTER
patient called having trouble starting to have a BM, instructed patient she could use some vaseline, could possibly help

## 2023-05-02 NOTE — TELEPHONE ENCOUNTER
henny moe called she is having issues going to the bathroom, she said she has hemorrhoids and she says she is having severe pain, not sure what to do, she described it as beeing on the verge of coming out but stuck, she asked f she needed to go to the hospital or urgent care??      Brian  Stated for her to go to ER  Patient said she would hold off until her  got home

## 2023-05-02 NOTE — ED PROVIDER NOTES
Time: 2:44 PM EDT  Date of encounter:  2023  Independent Historian/Clinical History and Information was obtained by:   Patient  Chief Complaint   Patient presents with   • Abdominal Pain   • Constipation   • Leg Pain       History is limited by: N/A    History of Present Illness:  Patient is a 52 y.o. year old female who presents to the emergency department for evaluation of constipation.  She said she was here on Friday for something different but did not mention that she was having constipation at that time.  She says she talked her doctor who told her to take MiraLAX, laxative, and use Vaseline around her anus.  She says when these efforts did not help her the primary care advised her to go to the emergency department.  Patient believes that her symptoms are related to her broken pelvis from .  She says that the constipation is putting pressure on her left leg, left arm, and her privates.  She says her constipation has been gone ongoing for 2 weeks.  She has been able to pass some stool but small amounts.    HPI    Patient Care Team  Primary Care Provider: Brian Ellis APRN    Past Medical History:     Allergies   Allergen Reactions   • Amlodipine Shortness Of Breath   • Diltiazem Hcl Anxiety     Past Medical History:   Diagnosis Date   • Anxiety    • Arthritis     GILBERT KNEES   • Asthma     SEASONAL ALLERGIES   • CKD (chronic kidney disease)     Stage 3, Follows with [facility]   • Elevated cholesterol    • Gout 2021   • Hernia     Upper   • Hiatal hernia    • Hypertension    • Renal insufficiency     follows with [facility]   • Sinus problem     SEASONAL ALLERGIES     Past Surgical History:   Procedure Laterality Date   • BONY PELVIS SURGERY      Plate   •  SECTION   and    • CHOLECYSTECTOMY     • COLONOSCOPY     • ENDOSCOPY     • ENDOSCOPY N/A 3/14/2023    Procedure: ESOPHAGOGASTRODUODENOSCOPY with biopsies;  Surgeon: Tam Badillo MD;  Location: [facility]  ENDOSCOPY;  Service: General;  Laterality: N/A;  hiatal hernia   • TOTAL KNEE ARTHROPLASTY Left 12/22/2021    Procedure: TOTAL KNEE ARTHROPLASTY;  Surgeon: Marco Nelson MD;  Location: [facility] MAIN OR;  Service: Orthopedics;  Laterality: Left;   • TOTAL KNEE ARTHROPLASTY Right 10/19/2022    Procedure: RIGHT TOTAL KNEE ARTHROPLASTY - NO SHELBY;  Surgeon: Marco Nelson MD;  Location: [facility] MAIN OR;  Service: Orthopedics;  Laterality: Right;   • TUBAL ABDOMINAL LIGATION  1989     Family History   Problem Relation Age of Onset   • Throat cancer Father 73   • Stroke Other    • Diabetes Other    • Malig Hyperthermia Neg Hx        Home Medications:  Prior to Admission medications    Medication Sig Start Date End Date Taking? Authorizing Provider   albuterol sulfate  (90 Base) MCG/ACT inhaler Take 1-2 Puffs every 4 hours prn 4/11/23   Brian Ellis APRN   allopurinol (ZYLOPRIM) 300 MG tablet Take 1 tablet by mouth 2 (Two) Times a Day. 12/7/22   Brian Ellis APRN   aspirin 81 MG EC tablet Take 1 tablet by mouth Daily.    Provider, MD Al   atorvastatin (LIPITOR) 20 MG tablet Take 1 tablet by mouth Every Night. 5/1/23   Brian Ellis APRN   carvedilol (COREG) 25 MG tablet Take 1 tablet by mouth 2 (Two) Times a Day With Meals. 5/1/23   Brian Ellis APRN   cloNIDine (CATAPRES) 0.2 MG tablet Take 1 tablet by mouth 3 (Three) Times a Day. 5/1/23   Brian Ellis APRN   fluticasone (FLONASE) 50 MCG/ACT nasal spray 2 sprays into the nostril(s) as directed by provider Daily. 2 sprays each nostril daily  Patient not taking: Reported on 4/11/2023 1/19/23   Brian Ellis APRN   Lokelma 10 g pack TAKE 10 GRAM BY MOUTH 1 TIME EACH DAY 12/1/22   ProviderAl MD   losartan (COZAAR) 100 MG tablet Take 1 tablet by mouth once daily 3/20/23   Brian Ellis APRN   vitamin B-12 (CYANOCOBALAMIN) 100 MCG tablet Take 50 mcg by  "mouth Daily.    Provider, Historical, MD   zinc gluconate 50 MG tablet Take 1 tablet by mouth Daily.    Provider, Historical, MD        Social History:   Social History     Tobacco Use   • Smoking status: Former     Types: Cigarettes     Quit date: 2013     Years since quitting: 10.3   • Smokeless tobacco: Never   Vaping Use   • Vaping Use: Never used   Substance Use Topics   • Alcohol use: Never   • Drug use: Never         Review of Systems:  Review of Systems   Constitutional: Negative for chills and fever.   HENT: Negative for congestion, rhinorrhea and sore throat.    Eyes: Negative for pain and visual disturbance.   Respiratory: Negative for apnea, cough, chest tightness and shortness of breath.    Cardiovascular: Negative for chest pain and palpitations.   Gastrointestinal: Positive for constipation. Negative for abdominal pain, diarrhea, nausea and vomiting.   Genitourinary: Negative for difficulty urinating and dysuria.   Musculoskeletal: Negative for joint swelling and myalgias.   Skin: Negative for color change.   Neurological: Negative for seizures and headaches.   Psychiatric/Behavioral: Negative.    All other systems reviewed and are negative.       Physical Exam:  /85   Pulse 76   Temp 97.9 °F (36.6 °C) (Oral)   Resp 16   Ht 157.5 cm (62\")   Wt 124 kg (272 lb 4.3 oz)   LMP  (LMP Unknown) Comment: no periods  SpO2 98%   BMI 49.80 kg/m²     Physical Exam  Vitals and nursing note reviewed.   Constitutional:       General: She is not in acute distress.     Appearance: Normal appearance. She is morbidly obese. She is not toxic-appearing.   HENT:      Head: Normocephalic and atraumatic.      Jaw: There is normal jaw occlusion.   Eyes:      General: Lids are normal.      Extraocular Movements: Extraocular movements intact.      Conjunctiva/sclera: Conjunctivae normal.      Pupils: Pupils are equal, round, and reactive to light.   Cardiovascular:      Rate and Rhythm: Normal rate and regular " rhythm.      Pulses: Normal pulses.      Heart sounds: Normal heart sounds.   Pulmonary:      Effort: Pulmonary effort is normal. No respiratory distress.      Breath sounds: Normal breath sounds. No wheezing or rhonchi.   Abdominal:      General: Abdomen is flat.      Palpations: Abdomen is soft.      Tenderness: There is no abdominal tenderness. There is no guarding or rebound.   Musculoskeletal:         General: Normal range of motion.      Cervical back: Normal range of motion and neck supple.      Right lower leg: No edema.      Left lower leg: No edema.   Skin:     General: Skin is warm and dry.   Neurological:      General: No focal deficit present.      Mental Status: She is alert and oriented to person, place, and time. Mental status is at baseline.   Psychiatric:         Mood and Affect: Mood normal.                  Procedures:  Procedures      Medical Decision Making:      Comorbidities that affect care:    Asthma, Hypertension    External Notes reviewed:    Previous Clinic Note: Patient was recently seen for evaluation of hypertension and hyperlipidemia.      The following orders were placed and all results were independently analyzed by me:  Orders Placed This Encounter   Procedures   • XR Abdomen KUB       Medications Given in the Emergency Department:  Medications   molasses enema (300 mL Rectal Given 5/2/23 2008)        ED Course:    The patient was initially evaluated in the triage area where orders were placed. The patient was later dispositioned by Cyndi Hamlin MD.      The patient was advised to stay for completion of workup which includes but is not limited to communication of labs and radiological results, reassessment and plan. The patient was advised that leaving prior to disposition by a provider could result in critical findings that are not communicated to the patient.          Labs:    Lab Results (last 24 hours)     ** No results found for the last 24 hours. **            Imaging:    XR Abdomen KUB    Result Date: 5/2/2023  PROCEDURE: XR ABDOMEN KUB  COMPARISON: [facility], CR, XR ABDOMEN KUB, 10/03/2022, 9:11.  INDICATIONS: constipation, abd pain  FINDINGS:  The abdominal bowel gas pattern is within normal limits.  There is a large amount of stool within the rectosigmoid colon which may be related to patient's history of constipation.  There is no evidence of bowel obstruction.  No abnormal calcifications overlie the abdomen or pelvis.  There are surgical clips in the right upper quadrant from prior cholecystectomy.  There are postoperative changes of the pubic symphysis related to prior fracture repair.  There are mild degenerative changes of both hips.        1. Large amount of stool within the rectosigmoid colon compatible the patient's history of constipation.  No evidence of bowel obstruction.     OBED LOVE MD       Electronically Signed and Approved By: OBED LOVE MD on 5/02/2023 at 15:13                 Differential Diagnosis and Discussion:      Abdominal Pain: Based on the patient's signs and symptoms, I considered abdominal aortic aneurysm, small bowel obstruction, pancreatitis, acute cholecystitis, acute appendecitis, peptic ulcer disease, gastritis, colitis, endocrine disorders, irritable bowel syndrome and other differential diagnosis an etiology of the patient's abdominal pain.    All X-rays impressions were independently interpreted by me.    X-ray of the abdomen is consistent with a large amount of stool in the colon.  This is consistent with constipation.    MDM     Decision making regarding discharge:    The patient is a 52-year-old female with history of hypertension and diabetes that is come to the ED for evaluation of constipation.    Differential diagnosis for constipation includes:    Functional constipation: This is the most common form of constipation, often due to inadequate fiber intake, insufficient hydration, or a  sedentary lifestyle.  Medication-induced constipation: Certain medications, such as opioids, anticholinergics, and some antacids, can cause constipation as a side effect.  Gastrointestinal disorders: These include irritable bowel syndrome (IBS), inflammatory bowel disease (IBD), and diverticular disease.  Neurological disorders: Conditions such as Parkinson's disease, multiple sclerosis, and spinal cord injuries can contribute to constipation.  Endocrine and metabolic disorders: Hypothyroidism, diabetes, and electrolyte imbalances can lead to constipation.  Obstructive causes: These include bowel obstruction, colorectal cancer, and anal fissures or hemorrhoids, which can cause painful defecation and subsequently constipation.    An abdominal X-ray consistent with constipation can help rule out some entities in the differential diagnosis. Specifically, it can provide evidence against bowel obstruction, as it would typically show signs of obstruction, such as dilated bowel loops or air-fluid levels. However, an abdominal X-ray may not be sufficient to exclude all possible causes of constipation, and further evaluation may be necessary.    Treatments for constipation include:    Lifestyle modifications: Increasing dietary fiber intake, staying adequately hydrated, and engaging in regular physical activity can help improve bowel function.  Over-the-counter laxatives: Various types of laxatives, such as bulk-forming, osmotic, stimulant, and stool softeners, can provide relief for occasional constipation.  Prescription medications: In cases of chronic constipation or constipation associated with specific conditions, prescription medications such as lubiprostone, linaclotide, or plecanatide may be recommended.  Biofeedback therapy: This therapy can help retrain the muscles used for defecation, particularly in cases of pelvic floor dysfunction or dyssynergic defecation.  Enemas or suppositories: These can be used for more  immediate relief in severe cases of constipation or when other treatments have failed.  Addressing underlying causes: Treating any underlying medical conditions contributing to constipation, such as hypothyroidism or diabetes, is essential for long-term relief.    The patient was given an enema in the emergency department and had a large bowel movement.  In addition, the patient reports that she had recently ceased her laxatives.  I advised the patient to continue taking laxatives and increasing her fluid intake.        Patient Care Considerations:    LABS: I considered ordering labs, however The patient states that she does not want any blood draws today.      Consultants/Shared Management Plan:    None    Social Determinants of Health:    Patient is independent, reliable, and has access to care.       Disposition and Care Coordination:    Discharged: The patient is suitable and stable for discharge with no need for consideration of observation or admission.    I have explained the patient´s condition, diagnoses and treatment plan based on the information available to me at this time. I have answered questions and addressed any concerns. The patient has a good  understanding of the patient´s diagnosis, condition, and treatment plan as can be expected at this point. The vital signs have been stable. The patient´s condition is stable and appropriate for discharge from the emergency department.      The patient will pursue further outpatient evaluation with the primary care physician or other designated or consulting physician as outlined in the discharge instructions. They are agreeable to this plan of care and follow-up instructions have been explained in detail. The patient has received these instructions in written format and have expressed an understanding of the discharge instructions. The patient is aware that any significant change in condition or worsening of symptoms should prompt an immediate return to this  or the closest emergency department or call to 911.  I have explained discharge medications and the need for follow up with the patient/caretakers. This was also printed in the discharge instructions. Patient was discharged with the following medications and follow up:      Medication List      No changes were made to your prescriptions during this visit.      No follow-up provider specified.     Final diagnoses:   Constipation, unspecified constipation type        ED Disposition     ED Disposition   Discharge    Condition   Stable    Comment   --             This medical record created using voice recognition software.           Cyndi Hamiln MD  05/02/23 2029

## 2023-05-05 RX ORDER — LOSARTAN POTASSIUM 100 MG/1
1 TABLET ORAL
COMMUNITY
Start: 2023-03-20 | End: 2023-05-09

## 2023-05-09 ENCOUNTER — HOSPITAL ENCOUNTER (OUTPATIENT)
Dept: CT IMAGING | Facility: HOSPITAL | Age: 53
Discharge: HOME | End: 2023-05-09
Admitting: NURSE PRACTITIONER
Payer: MEDICARE

## 2023-05-09 ENCOUNTER — TELEPHONE (OUTPATIENT)
Dept: FAMILY MEDICINE CLINIC | Facility: CLINIC | Age: 53
End: 2023-05-09

## 2023-05-09 ENCOUNTER — OFFICE VISIT (OUTPATIENT)
Dept: FAMILY MEDICINE CLINIC | Facility: CLINIC | Age: 53
End: 2023-05-09
Payer: MEDICARE

## 2023-05-09 VITALS
WEIGHT: 268 LBS | OXYGEN SATURATION: 98 % | HEART RATE: 81 BPM | HEIGHT: 62 IN | DIASTOLIC BLOOD PRESSURE: 88 MMHG | TEMPERATURE: 98.6 F | SYSTOLIC BLOOD PRESSURE: 140 MMHG | BODY MASS INDEX: 49.32 KG/M2

## 2023-05-09 DIAGNOSIS — E07.89 PAINFUL THYROID: ICD-10-CM

## 2023-05-09 DIAGNOSIS — K59.00 CONSTIPATION, UNSPECIFIED CONSTIPATION TYPE: ICD-10-CM

## 2023-05-09 DIAGNOSIS — N18.32 STAGE 3B CHRONIC KIDNEY DISEASE: ICD-10-CM

## 2023-05-09 DIAGNOSIS — G43.109 MIGRAINE WITH AURA AND WITHOUT STATUS MIGRAINOSUS, NOT INTRACTABLE: ICD-10-CM

## 2023-05-09 DIAGNOSIS — R79.89 POSITIVE D DIMER: Primary | ICD-10-CM

## 2023-05-09 DIAGNOSIS — M79.89 LEG SWELLING: Primary | ICD-10-CM

## 2023-05-09 DIAGNOSIS — R79.89 POSITIVE D DIMER: ICD-10-CM

## 2023-05-09 DIAGNOSIS — R06.02 SHORT OF BREATH ON EXERTION: ICD-10-CM

## 2023-05-09 DIAGNOSIS — I10 PRIMARY HYPERTENSION: ICD-10-CM

## 2023-05-09 LAB
ALBUMIN SERPL-MCNC: 4.3 G/DL (ref 3.5–5.2)
ALBUMIN/GLOB SERPL: 2.2 G/DL
ALP SERPL-CCNC: 78 U/L (ref 39–117)
ALT SERPL W P-5'-P-CCNC: 26 U/L (ref 1–33)
ANION GAP SERPL CALCULATED.3IONS-SCNC: 11.6 MMOL/L (ref 5–15)
AST SERPL-CCNC: 16 U/L (ref 1–32)
BILIRUB SERPL-MCNC: 0.5 MG/DL (ref 0–1.2)
BUN SERPL-MCNC: 35 MG/DL (ref 6–20)
BUN/CREAT SERPL: 14.4 (ref 7–25)
CALCIUM SPEC-SCNC: 11.3 MG/DL (ref 8.6–10.5)
CHLORIDE SERPL-SCNC: 108 MMOL/L (ref 98–107)
CO2 SERPL-SCNC: 24.4 MMOL/L (ref 22–29)
CREAT BLDA-MCNC: 2.6 MG/DL
CREAT SERPL-MCNC: 2.43 MG/DL (ref 0.57–1)
D DIMER PPP FEU-MCNC: 1.28 MCGFEU/ML (ref 0–0.54)
EGFRCR SERPLBLD CKD-EPI 2021: 21.3 ML/MIN/1.73
EGFRCR SERPLBLD CKD-EPI 2021: 23.1 ML/MIN/1.73
GLOBULIN UR ELPH-MCNC: 2 GM/DL
GLUCOSE SERPL-MCNC: 105 MG/DL (ref 65–99)
NT-PROBNP SERPL-MCNC: 192 PG/ML (ref 0–900)
POTASSIUM SERPL-SCNC: 4.5 MMOL/L (ref 3.5–5.2)
PROT SERPL-MCNC: 6.3 G/DL (ref 6–8.5)
SODIUM SERPL-SCNC: 144 MMOL/L (ref 136–145)

## 2023-05-09 PROCEDURE — 80053 COMPREHEN METABOLIC PANEL: CPT | Performed by: NURSE PRACTITIONER

## 2023-05-09 PROCEDURE — 82565 ASSAY OF CREATININE: CPT

## 2023-05-09 PROCEDURE — 83880 ASSAY OF NATRIURETIC PEPTIDE: CPT | Performed by: NURSE PRACTITIONER

## 2023-05-09 PROCEDURE — 85379 FIBRIN DEGRADATION QUANT: CPT | Performed by: NURSE PRACTITIONER

## 2023-05-09 NOTE — TELEPHONE ENCOUNTER
----- Message from MARICRUZ Blackwood sent at 5/9/2023  2:56 PM EDT -----  Positive need STAT CT CHEST

## 2023-05-09 NOTE — PROGRESS NOTES
FOLLOW UP      Patient Name: Katarzyna Norris  : 1970   MRN: [id number]     Chief Complaint:    Chief Complaint   Patient presents with   • Leg Swelling   • Arm Swelling       History of Present Illness: Katarzyna Norris is a 52 y.o. female who is here today for bilateral leg swelling and left lower arm swelling.     Also follow up ER 2023 dx with migraine    PROCEDURE:  CT HEAD WO CONTRAST     COMPARISON:  [facility], MR, BRAIN W/WO CONTRAST, 2007, 9:31.  [facility], CT, CT HEAD WO CONTRAST, 2022, 11:38.  INDICATIONS:  Worsening headache with visual changes     PROTOCOL:     Standard imaging protocol performed                 RADIATION:      DLP: 954.5 mGy*cm               MA and/or KV was adjusted to minimize radiation dose.                     TECHNIQUE:    Axial images of the head without intravenous contrast.     FINDINGS:     The ventricles have a normal size and configuration. There is no evidence of acute intracranial   hemorrhage, mass or midline shift. No extra-axial fluid collections are identified. There are no   skull fractures. The visualized paranasal sinuses and mastoid air cells are grossly clear.     IMPRESSION: Normal exam.     JOANNE PATEL MD         Electronically Signed and Approved By: JOANNE PATEL MD on 2023 at 14:38       ER . constipation resolved with enema, taking stool softener and fiber at this time     She says this started last 23. She also reported some numbness in her legs. The numbness has subsided When she had a bowel movement    She also says her neck is sore on her thyroid that started a few days ago.    Subjective      Review of Systems:   Review of Systems   Constitutional: Negative for fever.   HENT: Negative for trouble swallowing.    Respiratory: Positive for shortness of breath. Negative for cough.    Cardiovascular: Positive for leg swelling. Negative for chest pain.    Gastrointestinal: Negative for abdominal pain, diarrhea, nausea and vomiting.   Genitourinary: Negative for dysuria.   Musculoskeletal: Positive for neck pain. Negative for back pain.   Neurological: Positive for headaches. Negative for numbness.        Past Medical History:   Past Medical History:   Diagnosis Date   • Anxiety    • Arthritis     GILBERT KNEES   • Asthma     SEASONAL ALLERGIES   • CKD (chronic kidney disease)     Stage 3, Follows with [facility]   • Elevated cholesterol    • Gout 2021   • Hernia     Upper   • Hiatal hernia    • Hypertension    • Renal insufficiency     follows with [facility]   • Sinus problem     SEASONAL ALLERGIES       Past Surgical History:   Past Surgical History:   Procedure Laterality Date   • BONY PELVIS SURGERY      Plate   •  SECTION   and    • CHOLECYSTECTOMY     • COLONOSCOPY     • ENDOSCOPY     • ENDOSCOPY N/A 3/14/2023    Procedure: ESOPHAGOGASTRODUODENOSCOPY with biopsies;  Surgeon: Tam Badillo MD;  Location: [facility] ENDOSCOPY;  Service: General;  Laterality: N/A;  hiatal hernia   • TOTAL KNEE ARTHROPLASTY Left 2021    Procedure: TOTAL KNEE ARTHROPLASTY;  Surgeon: Marco Nelson MD;  Location: [facility] MAIN OR;  Service: Orthopedics;  Laterality: Left;   • TOTAL KNEE ARTHROPLASTY Right 10/19/2022    Procedure: RIGHT TOTAL KNEE ARTHROPLASTY - NO SHELBY;  Surgeon: Marco Nelson MD;  Location: [facility] MAIN OR;  Service: Orthopedics;  Laterality: Right;   • TUBAL ABDOMINAL LIGATION         Family History:   Family History   Problem Relation Age of Onset   • Throat cancer Father 73   • Stroke Other    • Diabetes Other    • Malig Hyperthermia Neg Hx        Social History:   Social History     Socioeconomic History   • Marital status:    Tobacco Use   • Smoking status: Former     Types: Cigarettes     Quit date:      Years since quitting: 10.3   • Smokeless tobacco: Never   Vaping Use   • Vaping Use: Never used  "  Substance and Sexual Activity   • Alcohol use: Never   • Drug use: Never   • Sexual activity: Defer       Medications:     Current Outpatient Medications:   •  albuterol sulfate  (90 Base) MCG/ACT inhaler, Take 1-2 Puffs every 4 hours prn, Disp: 8 g, Rfl: 1  •  allopurinol (ZYLOPRIM) 300 MG tablet, Take 1 tablet by mouth 2 (Two) Times a Day., Disp: 180 tablet, Rfl: 1  •  aspirin 81 MG EC tablet, Take 1 tablet by mouth Daily., Disp: , Rfl:   •  atorvastatin (LIPITOR) 20 MG tablet, Take 1 tablet by mouth Every Night., Disp: 90 tablet, Rfl: 1  •  carvedilol (COREG) 25 MG tablet, Take 1 tablet by mouth 2 (Two) Times a Day With Meals., Disp: 180 tablet, Rfl: 1  •  cloNIDine (CATAPRES) 0.2 MG tablet, Take 1 tablet by mouth 3 (Three) Times a Day., Disp: 270 tablet, Rfl: 1  •  fluticasone (FLONASE) 50 MCG/ACT nasal spray, 2 sprays into the nostril(s) as directed by provider Daily. 2 sprays each nostril daily, Disp: 18.2 mL, Rfl: 1  •  Lokelma 10 g pack, TAKE 10 GRAM BY MOUTH 1 TIME EACH DAY, Disp: , Rfl:   •  losartan (COZAAR) 100 MG tablet, Take 1 tablet by mouth once daily, Disp: 90 tablet, Rfl: 0  •  vitamin B-12 (CYANOCOBALAMIN) 100 MCG tablet, Take 50 mcg by mouth Daily., Disp: , Rfl:   •  zinc gluconate 50 MG tablet, Take 1 tablet by mouth Daily., Disp: , Rfl:   •  Rimegepant Sulfate (NURTEC) 75 MG tablet dispersible tablet, Take 1 tablet by mouth Daily As Needed (migraine)., Disp: 16 tablet, Rfl: 5    Allergies:   Allergies   Allergen Reactions   • Amlodipine Shortness Of Breath   • Diltiazem Hcl Anxiety         Objective     Physical Exam:  Vital Signs:   Vitals:    05/09/23 0823   BP: (!) 137/105   Pulse: 81   Temp: 98.6 °F (37 °C)   SpO2: 98%   Weight: 122 kg (268 lb)   Height: 157.5 cm (62\")     Body mass index is 49.02 kg/m².     Physical Exam  HENT:      Right Ear: Tympanic membrane normal.      Left Ear: Tympanic membrane normal.      Nose: Nose normal.      Mouth/Throat:      Mouth: Mucous membranes " are moist.   Eyes:      Conjunctiva/sclera: Conjunctivae normal.   Neck:      Thyroid: Thyroid tenderness present.      Vascular: No carotid bruit.   Cardiovascular:      Rate and Rhythm: Normal rate and regular rhythm.      Heart sounds: Normal heart sounds. No murmur heard.  Pulmonary:      Effort: Pulmonary effort is normal.      Breath sounds: Normal breath sounds.   Abdominal:      General: Bowel sounds are normal.      Palpations: Abdomen is soft.   Skin:     General: Skin is warm and dry.   Neurological:      Mental Status: She is alert.             Assessment / Plan      Assessment/Plan:   Diagnoses and all orders for this visit:    1. Leg swelling (Primary)  -     Comprehensive Metabolic Panel  -     proBNP; Future  -     D-dimer, Quantitative    2. Primary hypertension    3. Stage 3b chronic kidney disease    4. Painful thyroid  -     US Thyroid    5. Constipation, unspecified constipation type    6. Migraine with aura and without status migrainosus, not intractable    7. Short of breath on exertion  -     Comprehensive Metabolic Panel  -     proBNP; Future  -     D-dimer, Quantitative    Other orders  -     Rimegepant Sulfate (NURTEC) 75 MG tablet dispersible tablet; Take 1 tablet by mouth Daily As Needed (migraine).  Dispense: 16 tablet; Refill: 5       Leg swelling or shortness of breath we will obtain labs today recommend compression stockings increasing water intake decrease salt intake elevation when sitting compression sock on the a.m. off in p.m.  Hypertension elevated upon arrival clinic manual recheck improved no change in current medications at this time  Chronic kidney disease stage III will obtain CMP to monitor will not start diuretic at this time until reviewed CMP  Painful thyroid we will obtain thyroid ultrasound call with results and further recommendations  Constipation recommend increase exercise 30 minutes daily continue stool softener and fiber daily  Migraine CT reviewed of the head  obtained in ER we will start [facility] for abortive therapy        Follow Up:   Return in about 1 month (around 6/9/2023).    MARICRUZ Ball      Please note that portions of this note were completed with a voice recognition program.

## 2023-05-10 ENCOUNTER — TELEPHONE (OUTPATIENT)
Dept: FAMILY MEDICINE CLINIC | Facility: CLINIC | Age: 53
End: 2023-05-10
Payer: MEDICARE

## 2023-05-10 DIAGNOSIS — R06.02 SHORTNESS OF BREATH: Primary | ICD-10-CM

## 2023-05-10 DIAGNOSIS — R79.89 POSITIVE D-DIMER: ICD-10-CM

## 2023-05-11 ENCOUNTER — HOSPITAL ENCOUNTER (OUTPATIENT)
Dept: GENERAL RADIOLOGY | Facility: HOSPITAL | Age: 53
Discharge: HOME | End: 2023-05-11
Admitting: NURSE PRACTITIONER
Payer: MEDICARE

## 2023-05-11 DIAGNOSIS — R06.02 SHORTNESS OF BREATH: ICD-10-CM

## 2023-05-11 DIAGNOSIS — R79.89 POSITIVE D-DIMER: ICD-10-CM

## 2023-05-11 PROCEDURE — 71046 X-RAY EXAM CHEST 2 VIEWS: CPT

## 2023-05-11 RX ORDER — AZITHROMYCIN 250 MG/1
TABLET, FILM COATED ORAL
Qty: 6 TABLET | Refills: 0 | Status: SHIPPED | OUTPATIENT
Start: 2023-05-11

## 2023-05-12 ENCOUNTER — HOSPITAL ENCOUNTER (OUTPATIENT)
Dept: NUCLEAR MEDICINE | Facility: HOSPITAL | Age: 53
Discharge: HOME | End: 2023-05-12
Payer: MEDICARE

## 2023-05-12 ENCOUNTER — TELEPHONE (OUTPATIENT)
Dept: FAMILY MEDICINE CLINIC | Facility: CLINIC | Age: 53
End: 2023-05-12

## 2023-05-12 DIAGNOSIS — R06.02 SHORTNESS OF BREATH: ICD-10-CM

## 2023-05-12 PROCEDURE — 78580 LUNG PERFUSION IMAGING: CPT

## 2023-05-12 PROCEDURE — A9540 TC99M MAA: HCPCS | Performed by: NURSE PRACTITIONER

## 2023-05-12 PROCEDURE — 0 TECHNETIUM ALBUMIN AGGREGATED: Performed by: NURSE PRACTITIONER

## 2023-05-12 RX ADMIN — KIT FOR THE PREPARATION OF TECHNETIUM TC 99M ALBUMIN AGGREGATED 1 DOSE: 2.5 INJECTION, POWDER, FOR SOLUTION INTRAVENOUS at 07:35

## 2023-05-12 NOTE — TELEPHONE ENCOUNTER
Caller: Katarzyna Norris    Relationship: Self    Best call back number: [phone]    Caller requesting test results: SELF    What test was performed: BLOOD WORK    When was the test performed: 5/9/23    Where was the test performed: [facility]

## 2023-05-12 NOTE — TELEPHONE ENCOUNTER
Spoke to patient she is wanting to know results for NM test, Brian has not signed off yet patient is aware

## 2023-05-16 DIAGNOSIS — R06.02 SHORTNESS OF BREATH: Primary | ICD-10-CM

## 2023-05-24 ENCOUNTER — HOSPITAL ENCOUNTER (EMERGENCY)
Facility: HOSPITAL | Age: 53
Discharge: HOME | End: 2023-05-24
Attending: EMERGENCY MEDICINE
Payer: MEDICARE

## 2023-05-24 VITALS
BODY MASS INDEX: 52.33 KG/M2 | RESPIRATION RATE: 18 BRPM | OXYGEN SATURATION: 97 % | HEART RATE: 102 BPM | SYSTOLIC BLOOD PRESSURE: 143 MMHG | WEIGHT: 284.4 LBS | HEIGHT: 62 IN | DIASTOLIC BLOOD PRESSURE: 83 MMHG

## 2023-05-24 DIAGNOSIS — R04.0 EPISTAXIS: Primary | ICD-10-CM

## 2023-05-24 PROCEDURE — 99283 EMERGENCY DEPT VISIT LOW MDM: CPT

## 2023-05-24 RX ORDER — TRANEXAMIC ACID 100 MG/ML
500 INJECTION, SOLUTION INTRAVENOUS ONCE
Status: COMPLETED | OUTPATIENT
Start: 2023-05-24 | End: 2023-05-24

## 2023-05-24 RX ORDER — SODIUM CHLORIDE 0.9 % (FLUSH) 0.9 %
10 SYRINGE (ML) INJECTION AS NEEDED
Status: DISCONTINUED | OUTPATIENT
Start: 2023-05-24 | End: 2023-05-24 | Stop reason: HOSPADM

## 2023-05-24 RX ADMIN — TRANEXAMIC ACID 500 MG: 100 INJECTION, SOLUTION INTRAVENOUS at 15:02

## 2023-05-24 NOTE — DISCHARGE INSTRUCTIONS
Please apply sterile saline to both nasal passages twice a day as directed on the bottle    Please follow-up with your doctor on Friday for reevaluation    If you have a recurrent bleed, please apply direct constant pressure for 20 minutes.  If this does not stop the bleeding please return to the emergency room

## 2023-05-24 NOTE — ED PROVIDER NOTES
Time: 3:06 PM EDT  Date of encounter:  2023  Independent Historian/Clinical History and Information was obtained by:   Patient  Chief Complaint: Epistaxis    History is limited by: N/A    History of Present Illness:  Patient is a 52 y.o. year old female who presents to the emergency department for evaluation of epistaxis.  Patient states that she began having a nosebleed approximately half hour before arrival.  The patient states that she felt like there was something in her nose she went to dislodge it and she began bleeding.  She states that the bleeding is from the right nares..  The patient notes that when she put pressure on it that she had some bleeding from the eye and from the mouth..  The bleeding has improved almost stopped at this time.  Patient denies any other trauma.  The patient does suffer from allergies.  The patient is not on any blood thinners.  The patient has no headache.  The patient has no shortness of breath.  The patient has no unusual fatigue.    HPI    Patient Care Team  Primary Care Provider: Brian Ellis APRN    Past Medical History:     Allergies   Allergen Reactions   • Amlodipine Shortness Of Breath   • Diltiazem Hcl Anxiety   • Contrast Dye (Echo Or Unknown Ct/Mr) Palpitations     Patient gets to the point of passing out , heart races      Past Medical History:   Diagnosis Date   • Anxiety    • Arthritis     GILBERT KNEES   • Asthma     SEASONAL ALLERGIES   • CKD (chronic kidney disease)     Stage 3, Follows with [facility]   • Elevated cholesterol    • Gout 2021   • Hernia     Upper   • Hiatal hernia    • Hypertension    • Renal insufficiency     follows with [facility]   • Sinus problem     SEASONAL ALLERGIES     Past Surgical History:   Procedure Laterality Date   • BONY PELVIS SURGERY      Plate   •  SECTION   and    • CHOLECYSTECTOMY     • COLONOSCOPY     • ENDOSCOPY     • ENDOSCOPY N/A 3/14/2023    Procedure: ESOPHAGOGASTRODUODENOSCOPY with  biopsies;  Surgeon: Tam Badillo MD;  Location: [facility] ENDOSCOPY;  Service: General;  Laterality: N/A;  hiatal hernia   • TOTAL KNEE ARTHROPLASTY Left 12/22/2021    Procedure: TOTAL KNEE ARTHROPLASTY;  Surgeon: Marco Nelson MD;  Location: [facility] MAIN OR;  Service: Orthopedics;  Laterality: Left;   • TOTAL KNEE ARTHROPLASTY Right 10/19/2022    Procedure: RIGHT TOTAL KNEE ARTHROPLASTY - NO SHELBY;  Surgeon: Marco Nelson MD;  Location: [facility] MAIN OR;  Service: Orthopedics;  Laterality: Right;   • TUBAL ABDOMINAL LIGATION  1989     Family History   Problem Relation Age of Onset   • Throat cancer Father 73   • Stroke Other    • Diabetes Other    • Malig Hyperthermia Neg Hx        Home Medications:  Prior to Admission medications    Medication Sig Start Date End Date Taking? Authorizing Provider   albuterol sulfate  (90 Base) MCG/ACT inhaler Take 1-2 Puffs every 4 hours prn 4/11/23   Brian Ellis APRN   allopurinol (ZYLOPRIM) 300 MG tablet Take 1 tablet by mouth 2 (Two) Times a Day. 12/7/22   Brian Ellis APRN   aspirin 81 MG EC tablet Take 1 tablet by mouth Daily.    Provider, MD Al   atorvastatin (LIPITOR) 20 MG tablet Take 1 tablet by mouth Every Night. 5/1/23   Brian Ellis APRN   azithromycin (Zithromax Z-Yair) 250 MG tablet Take 2 tablets by mouth on day 1, then 1 tablet daily on days 2-5 5/11/23   Brian Ellis APRN   carvedilol (COREG) 25 MG tablet Take 1 tablet by mouth 2 (Two) Times a Day With Meals. 5/1/23   Brian Ellis APRN   cloNIDine (CATAPRES) 0.2 MG tablet Take 1 tablet by mouth 3 (Three) Times a Day. 5/1/23   Brian Ellis APRN   fluticasone (FLONASE) 50 MCG/ACT nasal spray 2 sprays into the nostril(s) as directed by provider Daily. 2 sprays each nostril daily 1/19/23   Brian Ellis APRN   Lokelma 10 g pack TAKE 10 GRAM BY MOUTH 1 TIME EACH DAY 12/1/22   Provider, Al  "MD   losartan (COZAAR) 100 MG tablet Take 1 tablet by mouth once daily 3/20/23   Brian Ellis APRN   Rimegepant Sulfate (NURTEC) 75 MG tablet dispersible tablet Take 1 tablet by mouth Daily As Needed (migraine). 5/9/23   Brian Ellis APRN   Rimegepant Sulfate (NURTEC) 75 MG tablet dispersible tablet Take 1 tablet by mouth As Needed (prn). 5/9/23   Brian Ellis APRN   vitamin B-12 (CYANOCOBALAMIN) 100 MCG tablet Take 50 mcg by mouth Daily.    Provider, Historical, MD   zinc gluconate 50 MG tablet Take 1 tablet by mouth Daily.    Provider, Historical, MD        Social History:   Social History     Tobacco Use   • Smoking status: Former     Types: Cigarettes     Quit date: 2013     Years since quitting: 10.3   • Smokeless tobacco: Never   Vaping Use   • Vaping Use: Never used   Substance Use Topics   • Alcohol use: Never   • Drug use: Never         Review of Systems:  Review of Systems   Constitutional: Negative for chills, diaphoresis and fever.   HENT: Positive for nosebleeds and rhinorrhea. Negative for congestion, postnasal drip and sore throat.    Eyes: Negative for photophobia.   Respiratory: Negative for cough, chest tightness and shortness of breath.    Cardiovascular: Negative for chest pain, palpitations and leg swelling.   Gastrointestinal: Negative for abdominal pain, diarrhea, nausea and vomiting.   Genitourinary: Negative for difficulty urinating, dysuria, flank pain, frequency, hematuria and urgency.   Musculoskeletal: Negative for neck pain and neck stiffness.   Skin: Negative for pallor and rash.   Neurological: Negative for dizziness, syncope, weakness, numbness and headaches.   Hematological: Negative for adenopathy. Does not bruise/bleed easily.   Psychiatric/Behavioral: Negative.         Physical Exam:  /83   Pulse 102   Resp 18   Ht 157.5 cm (62\")   Wt 129 kg (284 lb 6.4 oz)   LMP  (LMP Unknown) Comment: no periods  SpO2 97%   BMI 52.02 kg/m² "     Physical Exam  Vitals and nursing note reviewed.   Constitutional:       General: She is not in acute distress.     Appearance: Normal appearance. She is not ill-appearing, toxic-appearing or diaphoretic.   HENT:      Head: Normocephalic and atraumatic.      Nose: Congestion present.      Right Nostril: No epistaxis or septal hematoma.      Left Nostril: No epistaxis or septal hematoma.      Comments: By the time I evaluated the patient.  The patient's epistaxis was almost resolved.  There was no active bleeding.  There is vessels on the septum which appear to be bleeding and may have some old blood.  There is no blood within the oropharynx     Mouth/Throat:      Mouth: Mucous membranes are moist.      Tongue: No lesions. Tongue does not deviate from midline.      Palate: No mass and lesions.      Pharynx: No pharyngeal swelling or oropharyngeal exudate.   Eyes:      Pupils: Pupils are equal, round, and reactive to light.   Cardiovascular:      Rate and Rhythm: Normal rate and regular rhythm.      Pulses: Normal pulses.           Carotid pulses are 2+ on the right side and 2+ on the left side.       Radial pulses are 2+ on the right side and 2+ on the left side.        Femoral pulses are 2+ on the right side and 2+ on the left side.       Popliteal pulses are 2+ on the right side and 2+ on the left side.        Dorsalis pedis pulses are 2+ on the right side and 2+ on the left side.        Posterior tibial pulses are 2+ on the right side and 2+ on the left side.      Heart sounds: Normal heart sounds. No murmur heard.  Pulmonary:      Effort: Pulmonary effort is normal. No accessory muscle usage, respiratory distress or retractions.      Breath sounds: Normal breath sounds. No wheezing, rhonchi or rales.   Abdominal:      General: Abdomen is flat. There is no distension.      Palpations: Abdomen is soft. There is no mass or pulsatile mass.      Tenderness: There is no abdominal tenderness. There is no right CVA  tenderness, left CVA tenderness, guarding or rebound.      Comments: No rigidity   Musculoskeletal:         General: No swelling, tenderness or deformity.      Cervical back: Neck supple. No tenderness.      Right lower leg: No edema.      Left lower leg: No edema.   Skin:     General: Skin is warm and dry.      Capillary Refill: Capillary refill takes less than 2 seconds.      Coloration: Skin is not jaundiced or pale.      Findings: No erythema.   Neurological:      General: No focal deficit present.      Mental Status: She is alert and oriented to person, place, and time. Mental status is at baseline.      Cranial Nerves: Cranial nerves 2-12 are intact. No cranial nerve deficit.      Sensory: Sensation is intact. No sensory deficit.      Motor: Motor function is intact. No weakness or pronator drift.      Coordination: Coordination is intact. Coordination normal.   Psychiatric:         Mood and Affect: Mood normal.         Behavior: Behavior normal.                  Procedures:  Epistaxis Management    Date/Time: 5/24/2023 4:18 PM  Performed by: Ishmael Bruner DO  Authorized by: Ishmael Bruner DO     Consent:     Consent obtained:  Verbal    Consent given by:  Patient    Risks, benefits, and alternatives were discussed: yes      Risks discussed:  Bleeding, infection, nasal injury and pain    Alternatives discussed:  No treatment  Universal protocol:     Procedure explained and questions answered to patient or proxy's satisfaction: yes    Anesthesia:     Anesthesia method:  None  Procedure details:     Treatment site:  R anterior    Treatment method:  Anterior pack (TXA was placed on 2 x 2)    Treatment complexity:  Limited    Treatment episode: initial    Post-procedure details:     Assessment:  Bleeding stopped    Procedure completion:  Tolerated          Medical Decision Making:      Comorbidities that affect care:    Hypertension, asthma, renal insufficiency, elevated cholesterol    External Notes  reviewed:    None      The following orders were placed and all results were independently analyzed by me:  Orders Placed This Encounter   Procedures   • Epistaxis Management   • Continuous Pulse Oximetry   • Nasal tray  Nursing Communication   • Insert peripheral IV       Medications Given in the Emergency Department:  Medications   silver nitrate 75-25 % applicator 1 application (1 application Topical Not Given 5/24/23 1602)   sodium chloride 0.9 % flush 10 mL (has no administration in time range)   tranexamic acid 500 MG/5ML nasal (ED/Crit Care for epistaxis) - VIAL DISPENSE 500 mg (500 mg Nasal Given 5/24/23 1502)        ED Course:         Labs:    Lab Results (last 24 hours)     ** No results found for the last 24 hours. **           Imaging:    No Radiology Exams Resulted Within Past 24 Hours      Differential Diagnosis and Discussion:    Epistaxis: Differential diagnosis includes but is not limited to trauma, environmental exposure, coagulopathy, allergic, infectious, hypertension, foreign bodies, hormonal, and tumors.        MDM  Number of Diagnoses or Management Options  Diagnosis management comments:   2 x 2's with TXA was applied to the right nare.  It was left in place for over an hour.  The patient had no recurrent bleeding.  The oropharynx was reexamined.  There is no blood in the oropharynx.  The 2 x 2's were pulled.  There is no recurrent epistaxis.  The patient appears appropriate for discharge and outpatient follow-up.  I will have the patient follow-up with her doctor on Friday.  The patient will apply sterile saline to keep the nasal passages moist.  The patient will discuss possible need for otolaryngology referral with her primary care physician             Social Determinants of Health:    Patient is independent, reliable, and has access to care.       Disposition and Care Coordination:    Discharged: The patient is suitable and stable for discharge with no need for consideration of  observation or admission.    I have explained discharge medications and the need for follow up with the patient/caretakers. This was also printed in the discharge instructions. Patient was discharged with the following medications and follow up:      Medication List      No changes were made to your prescriptions during this visit.      Brian Ellis, APRN  [address] [place] [address]  [phone]    On 5/26/2023  Epistaxis, call for appointment       Final diagnoses:   Epistaxis        ED Disposition     ED Disposition   Discharge    Condition   Stable    Comment   --             This medical record created using voice recognition software.           Ishmael Bruner DO  05/24/23 [id number]

## 2023-05-28 ENCOUNTER — HOSPITAL ENCOUNTER (EMERGENCY)
Facility: HOSPITAL | Age: 53
Discharge: HOME | End: 2023-05-28
Attending: EMERGENCY MEDICINE | Admitting: EMERGENCY MEDICINE

## 2023-05-28 VITALS
TEMPERATURE: 98.8 F | BODY MASS INDEX: 47.84 KG/M2 | HEIGHT: 62 IN | WEIGHT: 260 LBS | OXYGEN SATURATION: 97 % | DIASTOLIC BLOOD PRESSURE: 96 MMHG | RESPIRATION RATE: 18 BRPM | SYSTOLIC BLOOD PRESSURE: 125 MMHG | HEART RATE: 106 BPM

## 2023-05-28 DIAGNOSIS — R04.0 EPISTAXIS: Primary | ICD-10-CM

## 2023-05-28 PROCEDURE — 99282 EMERGENCY DEPT VISIT SF MDM: CPT

## 2023-05-28 RX ORDER — TRANEXAMIC ACID 100 MG/ML
500 INJECTION, SOLUTION INTRAVENOUS ONCE
Status: DISCONTINUED | OUTPATIENT
Start: 2023-05-28 | End: 2023-05-29 | Stop reason: HOSPADM

## 2023-05-29 NOTE — ED PROVIDER NOTES
Time: 10:03 PM EDT  Date of encounter:  2023  Independent Historian/Clinical History and Information was obtained by:   Patient  Chief Complaint: epistaxis    History is limited by: N/A    History of Present Illness:  Patient is a 52 y.o. year old female who presents to the emergency department for evaluation of nosebleed. Was seen in the ED  for nosebleed. Had TXA treatment and resolved. Has been using saline spray. Scratched tip of nose today and restarted from right nare. on ASA 81 mg. No other blood thinners. Has been taking all precautions that were discussed at home.     HPI    Patient Care Team  Primary Care Provider: Brian Ellis APRN    Past Medical History:     Allergies   Allergen Reactions   • Amlodipine Shortness Of Breath   • Diltiazem Hcl Anxiety   • Contrast Dye (Echo Or Unknown Ct/Mr) Palpitations     Patient gets to the point of passing out , heart races      Past Medical History:   Diagnosis Date   • Anxiety    • Arthritis     GILBERT KNEES   • Asthma     SEASONAL ALLERGIES   • CKD (chronic kidney disease)     Stage 3, Follows with [facility]   • Elevated cholesterol    • Gout 2021   • Hernia     Upper   • Hiatal hernia    • Hypertension    • Renal insufficiency     follows with [facility]   • Sinus problem     SEASONAL ALLERGIES     Past Surgical History:   Procedure Laterality Date   • BONY PELVIS SURGERY      Plate   •  SECTION   and    • CHOLECYSTECTOMY     • COLONOSCOPY     • ENDOSCOPY     • ENDOSCOPY N/A 3/14/2023    Procedure: ESOPHAGOGASTRODUODENOSCOPY with biopsies;  Surgeon: Tam Badillo MD;  Location: [facility] ENDOSCOPY;  Service: General;  Laterality: N/A;  hiatal hernia   • TOTAL KNEE ARTHROPLASTY Left 2021    Procedure: TOTAL KNEE ARTHROPLASTY;  Surgeon: Marco Nelson MD;  Location: [facility] MAIN OR;  Service: Orthopedics;  Laterality: Left;   • TOTAL KNEE ARTHROPLASTY Right 10/19/2022    Procedure: RIGHT TOTAL KNEE  ARTHROPLASTY - NO SHELBY;  Surgeon: Marco Nelson MD;  Location: [facility] MAIN OR;  Service: Orthopedics;  Laterality: Right;   • TUBAL ABDOMINAL LIGATION  1989     Family History   Problem Relation Age of Onset   • Throat cancer Father 73   • Stroke Other    • Diabetes Other    • Malig Hyperthermia Neg Hx        Home Medications:  Prior to Admission medications    Medication Sig Start Date End Date Taking? Authorizing Provider   albuterol sulfate  (90 Base) MCG/ACT inhaler Take 1-2 Puffs every 4 hours prn 4/11/23   Brian Ellis APRN   allopurinol (ZYLOPRIM) 300 MG tablet Take 1 tablet by mouth 2 (Two) Times a Day. 12/7/22   Brian Ellis APRN   aspirin 81 MG EC tablet Take 1 tablet by mouth Daily.    Provider, MD Al   atorvastatin (LIPITOR) 20 MG tablet Take 1 tablet by mouth Every Night. 5/1/23   Brian Ellis APRN   azithromycin (Zithromax Z-Yair) 250 MG tablet Take 2 tablets by mouth on day 1, then 1 tablet daily on days 2-5 5/11/23   Brian Ellis APRN   carvedilol (COREG) 25 MG tablet Take 1 tablet by mouth 2 (Two) Times a Day With Meals. 5/1/23   Brian Ellis APRN   cloNIDine (CATAPRES) 0.2 MG tablet Take 1 tablet by mouth 3 (Three) Times a Day. 5/1/23   Brian Ellis APRN   fluticasone (FLONASE) 50 MCG/ACT nasal spray 2 sprays into the nostril(s) as directed by provider Daily. 2 sprays each nostril daily 1/19/23   Brian Ellis APRN   Lokelma 10 g pack TAKE 10 GRAM BY MOUTH 1 TIME EACH DAY 12/1/22   ProviderAl MD   losartan (COZAAR) 100 MG tablet Take 1 tablet by mouth once daily 3/20/23   Brian Ellis APRN   Rimegepant Sulfate (NURTEC) 75 MG tablet dispersible tablet Take 1 tablet by mouth Daily As Needed (migraine). 5/9/23   Brian Ellis APRN   Rimegepant Sulfate (NURTEC) 75 MG tablet dispersible tablet Take 1 tablet by mouth As Needed (prn). 5/9/23   Caleb  "MARICRUZ Dunn   vitamin B-12 (CYANOCOBALAMIN) 100 MCG tablet Take 50 mcg by mouth Daily.    Provider, MD Al   zinc gluconate 50 MG tablet Take 1 tablet by mouth Daily.    Provider, Al, MD        Social History:   Social History     Tobacco Use   • Smoking status: Former     Types: Cigarettes     Quit date: 2013     Years since quitting: 10.4   • Smokeless tobacco: Never   Vaping Use   • Vaping Use: Never used   Substance Use Topics   • Alcohol use: Never   • Drug use: Never         Review of Systems:  Review of Systems   Constitutional: Negative for chills and fever.   HENT: Positive for nosebleeds and rhinorrhea.    Respiratory: Negative for cough and stridor.    Gastrointestinal: Negative for nausea and vomiting.   All other systems reviewed and are negative.       Physical Exam:  /96   Pulse 106   Temp 98.8 °F (37.1 °C)   Resp 18   Ht 157.5 cm (62\")   Wt 118 kg (260 lb)   LMP  (LMP Unknown) Comment: no periods  SpO2 97%   BMI 47.55 kg/m²     Physical Exam  Vitals and nursing note reviewed.   Constitutional:       Appearance: Normal appearance. She is not ill-appearing or toxic-appearing.   HENT:      Head: Normocephalic.      Nose: Nose normal.      Comments: Small anterior area of scant bleeding right nare     Mouth/Throat:      Mouth: Mucous membranes are moist.   Eyes:      Conjunctiva/sclera: Conjunctivae normal.   Cardiovascular:      Rate and Rhythm: Normal rate and regular rhythm.   Pulmonary:      Effort: Pulmonary effort is normal.      Breath sounds: Normal breath sounds.   Musculoskeletal:         General: Normal range of motion.      Cervical back: Normal range of motion.   Skin:     General: Skin is warm and dry.      Capillary Refill: Capillary refill takes less than 2 seconds.   Neurological:      Mental Status: She is alert.                  Procedures:  Procedures      Medical Decision Making:      Comorbidities that affect care:    Asthma, Chronic Kidney " Disease, Hypertension    External Notes reviewed:          The following orders were placed and all results were independently analyzed by me:  No orders of the defined types were placed in this encounter.      Medications Given in the Emergency Department:  Medications - No data to display     ED Course:         Labs:    Lab Results (last 24 hours)     ** No results found for the last 24 hours. **           Imaging:    No Radiology Exams Resulted Within Past 24 Hours      Differential Diagnosis and Discussion:    Epistaxis: Differential diagnosis includes but is not limited to trauma, environmental exposure, coagulopathy, allergic, infectious, hypertension, foreign bodies, hormonal, and tumors.        MDM         Patient Care Considerations:    LABS: I considered ordering labs, however bleeding resolved spontaneously      Consultants/Shared Management Plan:        Social Determinants of Health:    Patient is independent, reliable, and has access to care.       Disposition and Care Coordination:    Discharged: The patient is suitable and stable for discharge with no need for consideration of observation or admission.    Bleeding resolved with compression. Has not restarted. Discontinued order for TXA. No evidence posterior bleed. Discussed neosporin, afrin, compression. Referral to ENT. Return precautions discussed.     I have explained the patient´s condition, diagnoses and treatment plan based on the information available to me at this time. I have answered questions and addressed any concerns. The patient has a good  understanding of the patient´s diagnosis, condition, and treatment plan as can be expected at this point. The vital signs have been stable. The patient´s condition is stable and appropriate for discharge from the emergency department.      The patient will pursue further outpatient evaluation with the primary care physician or other designated or consulting physician as outlined in the discharge  instructions. They are agreeable to this plan of care and follow-up instructions have been explained in detail. The patient has received these instructions in written format and have expressed an understanding of the discharge instructions. The patient is aware that any significant change in condition or worsening of symptoms should prompt an immediate return to this or the closest emergency department or call to 911.  I have explained discharge medications and the need for follow up with the patient/caretakers. This was also printed in the discharge instructions. Patient was discharged with the following medications and follow up:      Medication List      No changes were made to your prescriptions during this visit.      Horacio Presley MD  [address] [place] [address] [address]  [place] [id number]  [phone]    Schedule an appointment as soon as possible for a visit       Brian Ellis, APRN  [address]  [place] [id number]  [phone]      As needed    [facility] EMERGENCY ROOM  [address] [place] [id number] [phone]    If symptoms worsen       Final diagnoses:   Epistaxis        ED Disposition     ED Disposition   Discharge    Condition   Stable    Comment   --             This medical record created using voice recognition software.           Kevin Glover PA-C  05/29/23 0034

## 2023-05-29 NOTE — DISCHARGE INSTRUCTIONS
If restarts you can use some neosporin on a q-tip, ice on your neck, clamping. If this does not resolve then you can use a couple of sprays of afrin (oxymetazoline) and clamp. Return if you cannot resolve your nosebleed at home. Otherwise schedule a follow-up appointment with ENT.

## 2023-05-30 ENCOUNTER — TELEPHONE (OUTPATIENT)
Dept: FAMILY MEDICINE CLINIC | Facility: CLINIC | Age: 53
End: 2023-05-30

## 2023-05-30 NOTE — TELEPHONE ENCOUNTER
Pt called office and stated she went to the ER for a nose bleed and is now having facial swelling. She would like advisement for what to do.

## 2023-06-05 ENCOUNTER — HOSPITAL ENCOUNTER (OUTPATIENT)
Dept: GENERAL RADIOLOGY | Facility: HOSPITAL | Age: 53
Discharge: HOME | End: 2023-06-05
Admitting: NURSE PRACTITIONER
Payer: MEDICARE

## 2023-06-05 DIAGNOSIS — R06.02 SHORTNESS OF BREATH: ICD-10-CM

## 2023-06-05 PROCEDURE — 71046 X-RAY EXAM CHEST 2 VIEWS: CPT

## 2023-06-08 ENCOUNTER — OFFICE VISIT (OUTPATIENT)
Dept: FAMILY MEDICINE CLINIC | Facility: CLINIC | Age: 53
End: 2023-06-08
Payer: MEDICARE

## 2023-06-08 VITALS
HEIGHT: 62 IN | HEART RATE: 99 BPM | OXYGEN SATURATION: 98 % | TEMPERATURE: 98.9 F | BODY MASS INDEX: 50.05 KG/M2 | SYSTOLIC BLOOD PRESSURE: 142 MMHG | DIASTOLIC BLOOD PRESSURE: 96 MMHG | WEIGHT: 272 LBS

## 2023-06-08 DIAGNOSIS — M79.89 LEG SWELLING: ICD-10-CM

## 2023-06-08 DIAGNOSIS — I10 PRIMARY HYPERTENSION: ICD-10-CM

## 2023-06-08 DIAGNOSIS — M19.011 PRIMARY OSTEOARTHRITIS OF BOTH SHOULDERS: ICD-10-CM

## 2023-06-08 DIAGNOSIS — M25.512 CHRONIC PAIN OF BOTH SHOULDERS: ICD-10-CM

## 2023-06-08 DIAGNOSIS — M19.012 PRIMARY OSTEOARTHRITIS OF BOTH SHOULDERS: ICD-10-CM

## 2023-06-08 DIAGNOSIS — N18.4 STAGE 4 CHRONIC KIDNEY DISEASE: ICD-10-CM

## 2023-06-08 DIAGNOSIS — G89.29 CHRONIC PAIN OF BOTH SHOULDERS: ICD-10-CM

## 2023-06-08 DIAGNOSIS — Z00.00 ENCOUNTER FOR MEDICARE ANNUAL WELLNESS EXAM: ICD-10-CM

## 2023-06-08 DIAGNOSIS — E83.52 HYPERCALCEMIA: ICD-10-CM

## 2023-06-08 DIAGNOSIS — R06.02 SHORT OF BREATH ON EXERTION: ICD-10-CM

## 2023-06-08 DIAGNOSIS — G43.109 MIGRAINE WITH AURA AND WITHOUT STATUS MIGRAINOSUS, NOT INTRACTABLE: ICD-10-CM

## 2023-06-08 DIAGNOSIS — M25.511 CHRONIC PAIN OF BOTH SHOULDERS: ICD-10-CM

## 2023-06-08 LAB
ALBUMIN SERPL-MCNC: 4.3 G/DL (ref 3.5–5.2)
ALBUMIN/GLOB SERPL: 2 G/DL
ALP SERPL-CCNC: 103 U/L (ref 39–117)
ALT SERPL W P-5'-P-CCNC: 24 U/L (ref 1–33)
ANION GAP SERPL CALCULATED.3IONS-SCNC: 12 MMOL/L (ref 5–15)
AST SERPL-CCNC: 19 U/L (ref 1–32)
BILIRUB SERPL-MCNC: 0.6 MG/DL (ref 0–1.2)
BUN SERPL-MCNC: 63 MG/DL (ref 6–20)
BUN/CREAT SERPL: 23.6 (ref 7–25)
CALCIUM SPEC-SCNC: 9.6 MG/DL (ref 8.6–10.5)
CHLORIDE SERPL-SCNC: 103 MMOL/L (ref 98–107)
CO2 SERPL-SCNC: 22 MMOL/L (ref 22–29)
CREAT SERPL-MCNC: 2.67 MG/DL (ref 0.57–1)
EGFRCR SERPLBLD CKD-EPI 2021: 20.6 ML/MIN/1.73
GLOBULIN UR ELPH-MCNC: 2.2 GM/DL
GLUCOSE SERPL-MCNC: 114 MG/DL (ref 65–99)
NT-PROBNP SERPL-MCNC: 134 PG/ML (ref 0–900)
POTASSIUM SERPL-SCNC: 4.8 MMOL/L (ref 3.5–5.2)
PROT SERPL-MCNC: 6.5 G/DL (ref 6–8.5)
PTH-INTACT SERPL-MCNC: 44.3 PG/ML (ref 15–65)
SODIUM SERPL-SCNC: 137 MMOL/L (ref 136–145)

## 2023-06-08 PROCEDURE — 80053 COMPREHEN METABOLIC PANEL: CPT | Performed by: NURSE PRACTITIONER

## 2023-06-08 PROCEDURE — 83970 ASSAY OF PARATHORMONE: CPT | Performed by: NURSE PRACTITIONER

## 2023-06-08 PROCEDURE — 83880 ASSAY OF NATRIURETIC PEPTIDE: CPT | Performed by: NURSE PRACTITIONER

## 2023-06-08 RX ORDER — CARVEDILOL 25 MG/1
25 TABLET ORAL 2 TIMES DAILY WITH MEALS
Qty: 180 TABLET | Refills: 1 | Status: SHIPPED | OUTPATIENT
Start: 2023-06-08

## 2023-06-08 RX ORDER — ALLOPURINOL 300 MG/1
300 TABLET ORAL DAILY
Qty: 180 TABLET | Refills: 1 | Status: SHIPPED | OUTPATIENT
Start: 2023-06-08

## 2023-06-08 RX ORDER — LOSARTAN POTASSIUM 100 MG/1
100 TABLET ORAL DAILY
Qty: 90 TABLET | Refills: 1 | Status: SHIPPED | OUTPATIENT
Start: 2023-06-08

## 2023-06-08 RX ORDER — CLONIDINE HYDROCHLORIDE 0.2 MG/1
0.2 TABLET ORAL 3 TIMES DAILY
Qty: 270 TABLET | Refills: 1 | Status: SHIPPED | OUTPATIENT
Start: 2023-06-08

## 2023-06-08 RX ORDER — ATORVASTATIN CALCIUM 20 MG/1
20 TABLET, FILM COATED ORAL NIGHTLY
Qty: 90 TABLET | Refills: 1 | Status: SHIPPED | OUTPATIENT
Start: 2023-06-08

## 2023-06-08 NOTE — PROGRESS NOTES
The ABCs of the Annual Wellness Visit  Subsequent Medicare Wellness Visit    Subjective    Katarzyna Norris is a 52 y.o. female who presents for a Subsequent Medicare Wellness Visit.    The following portions of the patient's history were reviewed and   updated as appropriate: allergies, current medications, past family history, past medical history, past social history, past surgical history, and problem list.    Compared to one year ago, the patient feels her physical   health is better.    Compared to one year ago, the patient feels her mental   health is the same.    Recent Hospitalizations:  She was not admitted to the hospital during the last year.       Current Medical Providers:  Patient Care Team:  Brian Ellis APRN as PCP - General (Nurse Practitioner)  Tam Badillo MD as Consulting Physician (General Surgery)    Outpatient Medications Prior to Visit   Medication Sig Dispense Refill    albuterol sulfate  (90 Base) MCG/ACT inhaler Take 1-2 Puffs every 4 hours prn 8 g 1    aspirin 81 MG EC tablet Take 1 tablet by mouth Daily.      fluticasone (FLONASE) 50 MCG/ACT nasal spray 2 sprays into the nostril(s) as directed by provider Daily. 2 sprays each nostril daily 18.2 mL 1    Lokelma 10 g pack TAKE 10 GRAM BY MOUTH 1 TIME EACH DAY      Rimegepant Sulfate (NURTEC) 75 MG tablet dispersible tablet Take 1 tablet by mouth Daily As Needed (migraine). 16 tablet 5    Rimegepant Sulfate (NURTEC) 75 MG tablet dispersible tablet Take 1 tablet by mouth As Needed (prn). 2 tablet 0    vitamin B-12 (CYANOCOBALAMIN) 100 MCG tablet Take 50 mcg by mouth Daily.      zinc gluconate 50 MG tablet Take 1 tablet by mouth Daily.      allopurinol (ZYLOPRIM) 300 MG tablet Take 1 tablet by mouth 2 (Two) Times a Day. 180 tablet 1    atorvastatin (LIPITOR) 20 MG tablet Take 1 tablet by mouth Every Night. 90 tablet 1    carvedilol (COREG) 25 MG tablet Take 1 tablet by mouth 2 (Two) Times a Day With Meals. 180  tablet 1    cloNIDine (CATAPRES) 0.2 MG tablet Take 1 tablet by mouth 3 (Three) Times a Day. 270 tablet 1    losartan (COZAAR) 100 MG tablet Take 1 tablet by mouth once daily 90 tablet 0    azithromycin (Zithromax Z-Yair) 250 MG tablet Take 2 tablets by mouth on day 1, then 1 tablet daily on days 2-5 (Patient not taking: Reported on 6/8/2023) 6 tablet 0     No facility-administered medications prior to visit.       No opioid medication identified on active medication list. I have reviewed chart for other potential  high risk medication/s and harmful drug interactions in the elderly.        Aspirin is on active medication list. Aspirin use is not indicated based on review of current medical condition/s. Risk of harm outweighs potential benefits. Patient instructed to discontinue this medication.  .      Patient Active Problem List   Diagnosis    Gout    Anxiety    Hypertension    Sinus problem    Pain in both knees    Primary osteoarthritis of left knee    Anemia    Impaired fasting glucose    Hyperlipidemia    Aftercare following right knee joint replacement surgery    Osteoarthritis of left knee    Acute non-recurrent maxillary sinusitis    Status post replacement of knee joint    Pain of left calf    Swelling of calf    Cyst of ovary    Asthma    Abdominal pain    Metabolic acidosis    Nephritic syndrome    Class 3 severe obesity due to excess calories with serious comorbidity and body mass index (BMI) of 45.0 to 49.9 in adult    Stage 3 chronic kidney disease    Urinary tract infectious disease    Vitamin D deficiency    Hyperkalemia    Elevated ferritin    Burn    Aftercare following surgery of left total knee arthroplasty, 12/22/2021    Rash    Hypercalcemia    Acute pain of left shoulder    Arthritis of knee, right    Lateral cutaneous femoral nerve of thigh compression or syndrome, right    Bilateral hip pain    Primary localized osteoarthritis of right knee    Osteoarthritis    Constipation    Allergic  "rhinitis    Hiatal hernia    Chronic pain of both shoulders    Leg swelling    Painful thyroid    Migraine with aura and without status migrainosus, not intractable    Short of breath on exertion     Advance Care Planning   Advance Care Planning     Advance Directive is not on file.  ACP discussion was held with the patient during this visit. Patient does not have an advance directive, declines further assistance.     Objective    Vitals:    23 0722   BP: 142/96   Pulse: 99   Temp: 98.9 °F (37.2 °C)   SpO2: 98%   Weight: 123 kg (272 lb)   Height: 157.5 cm (62\")     Estimated body mass index is 49.75 kg/m² as calculated from the following:    Height as of this encounter: 157.5 cm (62\").    Weight as of this encounter: 123 kg (272 lb).    Class 3 Severe Obesity (BMI >=40). Obesity-related health conditions include the following: hypertension and diabetes mellitus. Obesity is unchanged. BMI is is above average; BMI management plan is completed. We discussed portion control and increasing exercise.        Does the patient have evidence of cognitive impairment? No    Lab Results   Component Value Date    TRIG 58 2023    HDL 50 2023    LDL 74 2023    VLDL 12 2023    HGBA1C 5.80 (H) 2023        HEALTH RISK ASSESSMENT    Smoking Status:  Social History     Tobacco Use   Smoking Status Former    Types: Cigarettes    Quit date:     Years since quitting: 10.4   Smokeless Tobacco Never     Alcohol Consumption:  Social History     Substance and Sexual Activity   Alcohol Use Never     Fall Risk Screen:    STEADI Fall Risk Assessment was completed, and patient is at LOW risk for falls.Assessment completed on:2023    Depression Screenin/8/2023     7:24 AM   PHQ-2/PHQ-9 Depression Screening   Little Interest or Pleasure in Doing Things 0-->not at all   Feeling Down, Depressed or Hopeless 1-->several days   PHQ-9: Brief Depression Severity Measure Score 1       Health Habits and " Functional and Cognitive Screenin/8/2023     7:23 AM   Functional & Cognitive Status   Do you have difficulty preparing food and eating? No   Do you have difficulty bathing yourself, getting dressed or grooming yourself? No   Do you have difficulty using the toilet? No   Do you have difficulty moving around from place to place? No   Do you have trouble with steps or getting out of a bed or a chair? No   Current Diet Unhealthy Diet   Dental Exam Up to date   Eye Exam Up to date   Current Exercises Include No Regular Exercise;House Cleaning   Do you need help using the phone?  No   Are you deaf or do you have serious difficulty hearing?  No   Do you need help with transportation? No   Do you need help shopping? No   Do you need help preparing meals?  No   Do you need help with housework?  No   Do you need help with laundry? No   Do you need help taking your medications? No   Do you need help managing money? No   Do you ever drive or ride in a car without wearing a seat belt? No   Have you felt unusual stress, anger or loneliness in the last month? No   Who do you live with? Spouse   If you need help, do you have trouble finding someone available to you? No   Have you been bothered in the last four weeks by sexual problems? No   Do you have difficulty concentrating, remembering or making decisions? No       Age-appropriate Screening Schedule:  Refer to the list below for future screening recommendations based on patient's age, sex and/or medical conditions. Orders for these recommended tests are listed in the plan section. The patient has been provided with a written plan.    Health Maintenance   Topic Date Due    MAMMOGRAM  2022    COVID-19 Vaccine (1) 06/10/2023 (Originally 3/26/1971)    Pneumococcal Vaccine 0-64 (1 - PCV) 2024 (Originally 1976)    TDAP/TD VACCINES (1 - Tdap) 2024 (Originally 1987)    ZOSTER VACCINE (1 of 2) 2024 (Originally 2018)    INFLUENZA VACCINE   08/01/2023    LIPID PANEL  04/11/2024    ANNUAL WELLNESS VISIT  06/08/2024    COLORECTAL CANCER SCREENING  07/14/2024    PAP SMEAR  02/25/2025    HEPATITIS C SCREENING  Completed                  CMS Preventative Services Quick Reference  Risk Factors Identified During Encounter  Inactivity/Sedentary: Patient was advised to exercise at least 150 minutes a week per CDC recommendations. and Patient was advised to do at least 150 minutes a week of moderate intensity activity such as brisk walking and do at least 2 days a week of activities that strengthen muscles such as resistance training and do activities to improve balance such as standing on one foot about 3 days a week.  The above risks/problems have been discussed with the patient.  Pertinent information has been shared with the patient in the After Visit Summary.  An After Visit Summary and PPPS were made available to the patient.    Follow Up:   Next Medicare Wellness visit to be scheduled in 1 year.       Additional E&M Note during same encounter follows:  Patient has multiple medical problems which are significant and separately identifiable that require additional work above and beyond the Medicare Wellness Visit.      Chief Complaint  Medicare Wellness-subsequent, Edema, and thyroid pain        HPI  Katarzyna Norris is also being seen today for left lower arm and bilateral leg swelling, hypercalcemia, ckd stage 4   She is scheduled for a thyroid US 6/13/23.  She says her edema improves with compressions stockings and elevation, occurs only when sitting and standing for long periods of time   She is wanting her bilateral shoulder injections today. It has been 90 days since her last ones.    Labs- 5/2023  Mammogram - raquel 6/27/2023  Pap-2/2022  Colonoscopy- 11/2009    Review of Systems   Constitutional:  Negative for fever.   HENT:  Positive for congestion, postnasal drip and rhinorrhea. Negative for ear pain and sore throat.    Respiratory:  Negative for  "cough.    Cardiovascular:  Negative for chest pain.   Gastrointestinal:  Negative for abdominal pain, diarrhea, nausea and vomiting.   Genitourinary:  Negative for dysuria.   Musculoskeletal:  Positive for arthralgias.   Neurological:  Negative for dizziness. Pt c/o sinus congestion, sinus drainage using flonse and saline nasal spray   Follow up ENT dr schmidt for nosebleed, dr schmidt no intervention informed patient this healed after ER [facility] [phone] and 5.28.2023    Pt would like bilateral injections shoulder, pain osteoarthritis     Objective   Vital Signs:  /96   Pulse 99   Temp 98.9 °F (37.2 °C)   Ht 157.5 cm (62\")   Wt 123 kg (272 lb)   SpO2 98%   BMI 49.75 kg/m²     Physical Exam  HENT:      Right Ear: Tympanic membrane normal.      Left Ear: Tympanic membrane normal.      Nose: Nose normal.      Mouth/Throat:      Mouth: Mucous membranes are moist.   Eyes:      Conjunctiva/sclera: Conjunctivae normal.   Neck:      Vascular: No carotid bruit.   Cardiovascular:      Rate and Rhythm: Normal rate and regular rhythm.      Heart sounds: Normal heart sounds. No murmur heard.  Pulmonary:      Effort: Pulmonary effort is normal.      Breath sounds: Normal breath sounds.   Abdominal:      General: Bowel sounds are normal.      Palpations: Abdomen is soft.   Musculoskeletal:      Right lower leg: No edema.      Left lower leg: No edema.      Comments: Crepitus shoulder bilaterally   Skin:     General: Skin is warm and dry.   Neurological:      Mental Status: She is alert.   Psychiatric:         Mood and Affect: Mood normal.         Behavior: Behavior normal.        Procedure:   Right Shoulder Joint Injection    Procedure Information:   Informed consent obtained. Patient was informed of possible adverse effects including but not limited to bleeding, damage to nerve, tendon or artery, increased blood sugar and increased blood pressure. Time out was performed. Patient was placed with shoulder passively hanging from " side of body at 0 degrees. Lateral edge of scapular spine was palpated and site was marked just inferior to this, to proceed with injection per typical posterior approach. Betadine was swabbed at injection site per typical technique. 27 gauge, 1.5 inch needle injected into bursa, directed slightly medially, aspiration was performed and then Depo-Medrol 40 mg and 2 mL 1% lidocaine without epinephrine was slowly injected into joint space, fluid was free flowing. Needle was removed, betadine wiped off and band-aid placed to injection site.     The patient was instructed to call our office if they had any questions or concerns.     Procedure:   Left Shoulder Joint Injection    Procedure Information:   Informed consent obtained. Patient was informed of possible adverse effects including but not limited to bleeding, damage to nerve, tendon or artery, increased blood sugar and increased blood pressure. Time out was performed. Patient was placed with shoulder passively hanging from side of body at 0 degrees. Lateral edge of scapular spine was palpated and site was marked just inferior to this, to proceed with injection per typical posterior approach. Betadine was swabbed at injection site per typical technique. 27 gauge, 1.5 inch needle injected into bursa, directed slightly medially, aspiration was performed and then Depo-Medrol 40 mg and 2 mL 1% lidocaine without epinephrine was slowly injected into joint space, fluid was free flowing. Needle was removed, betadine wiped off and band-aid placed to injection site.     The patient was instructed to call our office if they had any questions or concerns.            Assessment and Plan   Diagnoses and all orders for this visit:    1. Encounter for Medicare annual wellness exam    2. Primary hypertension  -     cloNIDine (CATAPRES) 0.2 MG tablet; Take 1 tablet by mouth 3 (Three) Times a Day.  Dispense: 270 tablet; Refill: 1  -     proBNP    3. Short of breath on exertion  -      proBNP  -     Complete PFT; Future    4. Stage 4 chronic kidney disease    5. Hypercalcemia  -     Comprehensive Metabolic Panel  -     PTH, Intact    6. Migraine with aura and without status migrainosus, not intractable    7. Leg swelling  -     proBNP    8. Chronic pain of both shoulders    9. Primary osteoarthritis of both shoulders    Other orders  -     allopurinol (ZYLOPRIM) 300 MG tablet; Take 1 tablet by mouth Daily.  Dispense: 180 tablet; Refill: 1  -     atorvastatin (LIPITOR) 20 MG tablet; Take 1 tablet by mouth Every Night.  Dispense: 90 tablet; Refill: 1  -     carvedilol (COREG) 25 MG tablet; Take 1 tablet by mouth 2 (Two) Times a Day With Meals.  Dispense: 180 tablet; Refill: 1  -     losartan (COZAAR) 100 MG tablet; Take 1 tablet by mouth Daily.  Dispense: 90 tablet; Refill: 1    Encounter for Medicare annual wellness exam screening and immunizations reviewed with patient  Htn uncontrolled recommend recheck in 1 to 2 weeks continue Coreg losartan and clonidine as directed increase water intake decrease sodium intake  Shortness of breath reviewed echocardiogram results September 2023Interpretation Summary    Normal left ventricular systolic function.  Trace tricuspid regurgitation.  No significant valve abnormalities noted  We will obtain pulmonary function test recent chest x-ray - June 5, 2022 lung scan negative for pulmonary embolism June 5, 2022   We will obtain pulmonary function test  Shortness of breath with edema bilateral lower extremities recommend compression stockings on a.m. off at p.m. we will obtain BNP  Hypercalcemia we will repeat CMP in office today and parathyroid hormone we will call with results and further recommendations  Chronic kidney disease stage IV will obtain CMP to monitor avoid all NSAIDs continue to follow-up with nephrology    Follow Up   Return in about 4 months (around 10/8/2023).  Patient was given instructions and counseling regarding her condition or for health  maintenance advice. Please see specific information pulled into the AVS if appropriate.

## 2023-06-13 ENCOUNTER — HOSPITAL ENCOUNTER (OUTPATIENT)
Dept: ULTRASOUND IMAGING | Facility: HOSPITAL | Age: 53
Discharge: HOME | End: 2023-06-13
Admitting: NURSE PRACTITIONER
Payer: MEDICARE

## 2023-06-13 PROCEDURE — 76536 US EXAM OF HEAD AND NECK: CPT

## 2023-06-14 ENCOUNTER — TELEPHONE (OUTPATIENT)
Dept: FAMILY MEDICINE CLINIC | Facility: CLINIC | Age: 53
End: 2023-06-14

## 2023-06-14 DIAGNOSIS — E04.2 MULTIPLE THYROID NODULES: Primary | ICD-10-CM

## 2023-06-14 NOTE — TELEPHONE ENCOUNTER
patient called stating she feels better since antibiotic, she can breath fine, states she doesn't think she needs PFT

## 2023-06-24 NOTE — ED NOTES
Milk & molasses enema was administered. After approximately 150 cc's had been administered patient was able to have a bowel movement.   
Vaccine status unknown

## 2023-08-03 ENCOUNTER — TELEPHONE (OUTPATIENT)
Dept: OTOLARYNGOLOGY | Facility: CLINIC | Age: 53
End: 2023-08-03
Payer: MEDICARE

## 2023-08-03 DIAGNOSIS — M79.18 MYOFASCIAL MUSCLE PAIN: Primary | ICD-10-CM

## 2023-08-03 RX ORDER — PREDNISONE 20 MG/1
40 TABLET ORAL DAILY
Qty: 10 TABLET | Refills: 0 | Status: SHIPPED | OUTPATIENT
Start: 2023-08-03 | End: 2023-08-08

## 2023-08-07 ENCOUNTER — HOSPITAL ENCOUNTER (OUTPATIENT)
Dept: GENERAL RADIOLOGY | Facility: HOSPITAL | Age: 53
Discharge: HOME | End: 2023-08-07
Admitting: NURSE PRACTITIONER
Payer: MEDICARE

## 2023-08-07 ENCOUNTER — OFFICE VISIT (OUTPATIENT)
Dept: FAMILY MEDICINE CLINIC | Facility: CLINIC | Age: 53
End: 2023-08-07
Payer: MEDICARE

## 2023-08-07 ENCOUNTER — TELEPHONE (OUTPATIENT)
Dept: FAMILY MEDICINE CLINIC | Facility: CLINIC | Age: 53
End: 2023-08-07

## 2023-08-07 VITALS
OXYGEN SATURATION: 100 % | HEIGHT: 61 IN | WEIGHT: 262.4 LBS | HEART RATE: 84 BPM | TEMPERATURE: 97.8 F | DIASTOLIC BLOOD PRESSURE: 92 MMHG | SYSTOLIC BLOOD PRESSURE: 145 MMHG | BODY MASS INDEX: 49.54 KG/M2

## 2023-08-07 DIAGNOSIS — M54.2 CHRONIC NECK PAIN: ICD-10-CM

## 2023-08-07 DIAGNOSIS — G89.29 CHRONIC NECK PAIN: ICD-10-CM

## 2023-08-07 DIAGNOSIS — K59.01 SLOW TRANSIT CONSTIPATION: Primary | ICD-10-CM

## 2023-08-07 DIAGNOSIS — K64.4 EXTERNAL HEMORRHOID: ICD-10-CM

## 2023-08-07 PROCEDURE — 1160F RVW MEDS BY RX/DR IN RCRD: CPT | Performed by: NURSE PRACTITIONER

## 2023-08-07 PROCEDURE — 1159F MED LIST DOCD IN RCRD: CPT | Performed by: NURSE PRACTITIONER

## 2023-08-07 PROCEDURE — 3077F SYST BP >= 140 MM HG: CPT | Performed by: NURSE PRACTITIONER

## 2023-08-07 PROCEDURE — 72040 X-RAY EXAM NECK SPINE 2-3 VW: CPT

## 2023-08-07 PROCEDURE — 99214 OFFICE O/P EST MOD 30 MIN: CPT | Performed by: NURSE PRACTITIONER

## 2023-08-07 PROCEDURE — 3080F DIAST BP >= 90 MM HG: CPT | Performed by: NURSE PRACTITIONER

## 2023-08-07 RX ORDER — HYDROCORTISONE 25 MG/G
CREAM TOPICAL 2 TIMES DAILY
Qty: 28 G | Refills: 3 | Status: SHIPPED | OUTPATIENT
Start: 2023-08-07

## 2023-08-07 RX ORDER — HYDROCORTISONE ACETATE 25 MG/1
25 SUPPOSITORY RECTAL 2 TIMES DAILY
Qty: 28 SUPPOSITORY | Refills: 1 | Status: SHIPPED | OUTPATIENT
Start: 2023-08-07
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
## 2023-08-07 NOTE — TELEPHONE ENCOUNTER
----- Message from MARICRUZ Mercedes sent at 8/7/2023  2:45 PM EDT -----  She can call her insurance and see what is covered for internal hemorrhoids.  Naida ALCANTARA      ----- Message -----  From: Citlali Cui RegSched Rep  Sent: 8/7/2023   2:05 PM EDT  To: MARICRUZ Mercedes    INSURANCE DENIED ONE OF THE MEDICATION YOU SENT TODAY FOR THIS PATIENT. PLEASE ADVISE.

## 2023-08-10 ENCOUNTER — TREATMENT (OUTPATIENT)
Dept: PHYSICAL THERAPY | Facility: CLINIC | Age: 53
End: 2023-08-10
Payer: MEDICARE

## 2023-08-10 DIAGNOSIS — M79.18 MYOFASCIAL PAIN: ICD-10-CM

## 2023-08-10 DIAGNOSIS — M54.2 PAIN, NECK: Primary | ICD-10-CM

## 2023-08-10 DIAGNOSIS — M26.609 TMJ DYSFUNCTION: ICD-10-CM
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
## 2023-08-16 ENCOUNTER — TREATMENT (OUTPATIENT)
Dept: PHYSICAL THERAPY | Facility: CLINIC | Age: 53
End: 2023-08-16
Payer: MEDICARE

## 2023-08-16 DIAGNOSIS — M79.18 MYOFASCIAL PAIN: ICD-10-CM

## 2023-08-16 DIAGNOSIS — M54.2 PAIN, NECK: Primary | ICD-10-CM

## 2023-08-16 DIAGNOSIS — M26.609 TMJ DYSFUNCTION: ICD-10-CM

## 2023-08-16 NOTE — PROGRESS NOTES
"Physical Therapy Daily Treatment Note  Ronen [facility] [address] [place] [address]    Patient: Katarzyna Norris   : 1970  Referring practitioner: Sameer Flores*  Date of Initial Visit: Type: THERAPY  Noted: 8/10/2023  Today's Date: 2023  Patient seen for 2 sessions           Subjective  Katarzyna Jr reports: pain at arrival -7/10. Claire spoke about \"bursitis B shoulders, had traction before for my neck\", and other medical issues. Claire asked about wearing shoulder supports to help her posture. PTA explained that a support is a passive fix and with therapy she will learn active control with awareness and appropriate strengthening.        Objective   Observed area of soft tissue R neck to be more prevalent than on L.    See Exercise, Manual, and Modality Logs for complete treatment.       Assessment/Plan  Claire commented on relief of pain down to 1/10 with care. Claire is just beginning therapy to attend to deficits outlined in evaluation.     Visit Diagnoses:    ICD-10-CM ICD-9-CM   1. Pain, neck  M54.2 723.1   2. TMJ dysfunction  M26.609 524.60   3. Myofascial pain  M79.18 729.1       Progress per Plan of Care and Progress strengthening /stabilization /functional activity           Timed:  Manual Therapy:    15     mins  85563;  Therapeutic Exercise:         mins  99792;     Neuromuscular Rosemarie:        mins  20043;    Therapeutic Activity:     16     mins  64836;     Gait Training:           mins  29942;     Ultrasound:          mins  42836;    Electrical Stimulation:         mins  14979 ( );  Aquatics  __   mins   44378    Untimed:  Electrical Stimulation:         mins  58776 ( );  Mechanical Traction:         mins  23803;     Timed Treatment:   31   mins   Total Treatment:     31   mins    Electronically Signed:  Serena Garza PTA  Physical Therapist Assistant    [place] PTA license [id number]              "

## 2023-08-22 ENCOUNTER — TELEPHONE (OUTPATIENT)
Dept: PHYSICAL THERAPY | Facility: OTHER | Age: 53
End: 2023-08-22
Payer: MEDICARE

## 2023-08-22 NOTE — TELEPHONE ENCOUNTER
Caller: Katarzyna Norris    Relationship: Self    What was the call regarding: PATIENT CANCELLED ALL APPTS BECAUSE SHE DOESNT NEED PT ANYMORE

## 2023-08-24 ENCOUNTER — HOSPITAL ENCOUNTER (EMERGENCY)
Facility: HOSPITAL | Age: 53
Discharge: HOME | End: 2023-08-24
Attending: EMERGENCY MEDICINE
Payer: MEDICARE

## 2023-08-24 ENCOUNTER — HOSPITAL ENCOUNTER (EMERGENCY)
Facility: HOSPITAL | Age: 53
Discharge: HOME | End: 2023-08-25
Attending: EMERGENCY MEDICINE
Payer: MEDICARE

## 2023-08-24 VITALS
WEIGHT: 266.32 LBS | TEMPERATURE: 98.5 F | OXYGEN SATURATION: 98 % | SYSTOLIC BLOOD PRESSURE: 150 MMHG | DIASTOLIC BLOOD PRESSURE: 88 MMHG | RESPIRATION RATE: 16 BRPM | BODY MASS INDEX: 49.01 KG/M2 | HEIGHT: 62 IN | HEART RATE: 113 BPM

## 2023-08-24 VITALS
HEIGHT: 62 IN | DIASTOLIC BLOOD PRESSURE: 102 MMHG | RESPIRATION RATE: 20 BRPM | WEIGHT: 266 LBS | SYSTOLIC BLOOD PRESSURE: 174 MMHG | BODY MASS INDEX: 48.95 KG/M2 | HEART RATE: 105 BPM | TEMPERATURE: 98.8 F | OXYGEN SATURATION: 96 %

## 2023-08-24 DIAGNOSIS — N18.9 CHRONIC KIDNEY DISEASE, UNSPECIFIED CKD STAGE: ICD-10-CM

## 2023-08-24 DIAGNOSIS — R10.13 EPIGASTRIC PAIN: ICD-10-CM

## 2023-08-24 DIAGNOSIS — J01.90 SUBACUTE SINUSITIS, UNSPECIFIED LOCATION: Primary | ICD-10-CM

## 2023-08-24 DIAGNOSIS — R11.2 NAUSEA AND VOMITING, UNSPECIFIED VOMITING TYPE: Primary | ICD-10-CM

## 2023-08-24 LAB
ALBUMIN SERPL-MCNC: 4.2 G/DL (ref 3.5–5.2)
ALBUMIN SERPL-MCNC: 4.4 G/DL (ref 3.5–5.2)
ALBUMIN/GLOB SERPL: 2 G/DL
ALBUMIN/GLOB SERPL: 2.1 G/DL
ALP SERPL-CCNC: 70 U/L (ref 39–117)
ALP SERPL-CCNC: 80 U/L (ref 39–117)
ALT SERPL W P-5'-P-CCNC: 21 U/L (ref 1–33)
ALT SERPL W P-5'-P-CCNC: 22 U/L (ref 1–33)
ANION GAP SERPL CALCULATED.3IONS-SCNC: 13.6 MMOL/L (ref 5–15)
ANION GAP SERPL CALCULATED.3IONS-SCNC: 14.8 MMOL/L (ref 5–15)
APTT PPP: 24.6 SECONDS (ref 24.2–34.2)
AST SERPL-CCNC: 14 U/L (ref 1–32)
AST SERPL-CCNC: 15 U/L (ref 1–32)
BACTERIA UR QL AUTO: ABNORMAL /HPF
BASOPHILS # BLD AUTO: 0.02 10*3/MM3 (ref 0–0.2)
BASOPHILS # BLD AUTO: 0.04 10*3/MM3 (ref 0–0.2)
BASOPHILS NFR BLD AUTO: 0.5 % (ref 0–1.5)
BASOPHILS NFR BLD AUTO: 0.6 % (ref 0–1.5)
BILIRUB SERPL-MCNC: 0.6 MG/DL (ref 0–1.2)
BILIRUB SERPL-MCNC: 0.6 MG/DL (ref 0–1.2)
BILIRUB UR QL STRIP: NEGATIVE
BUN SERPL-MCNC: 33 MG/DL (ref 6–20)
BUN SERPL-MCNC: 35 MG/DL (ref 6–20)
BUN/CREAT SERPL: 13.4 (ref 7–25)
BUN/CREAT SERPL: 14.8 (ref 7–25)
CALCIUM SPEC-SCNC: 10.3 MG/DL (ref 8.6–10.5)
CALCIUM SPEC-SCNC: 11.1 MG/DL (ref 8.6–10.5)
CHLORIDE SERPL-SCNC: 104 MMOL/L (ref 98–107)
CHLORIDE SERPL-SCNC: 104 MMOL/L (ref 98–107)
CLARITY UR: ABNORMAL
CO2 SERPL-SCNC: 21.2 MMOL/L (ref 22–29)
CO2 SERPL-SCNC: 22.4 MMOL/L (ref 22–29)
COLOR UR: YELLOW
CREAT SERPL-MCNC: 2.37 MG/DL (ref 0.57–1)
CREAT SERPL-MCNC: 2.47 MG/DL (ref 0.57–1)
CRP SERPL-MCNC: <0.3 MG/DL (ref 0–0.5)
D-LACTATE SERPL-SCNC: 1.5 MMOL/L (ref 0.5–2)
D-LACTATE SERPL-SCNC: 2.3 MMOL/L (ref 0.5–2)
DEPRECATED RDW RBC AUTO: 53.1 FL (ref 37–54)
DEPRECATED RDW RBC AUTO: 54 FL (ref 37–54)
EGFRCR SERPLBLD CKD-EPI 2021: 22.7 ML/MIN/1.73
EGFRCR SERPLBLD CKD-EPI 2021: 23.8 ML/MIN/1.73
EOSINOPHIL # BLD AUTO: 0.02 10*3/MM3 (ref 0–0.4)
EOSINOPHIL # BLD AUTO: 0.18 10*3/MM3 (ref 0–0.4)
EOSINOPHIL NFR BLD AUTO: 0.5 % (ref 0.3–6.2)
EOSINOPHIL NFR BLD AUTO: 2.8 % (ref 0.3–6.2)
ERYTHROCYTE [DISTWIDTH] IN BLOOD BY AUTOMATED COUNT: 14.5 % (ref 12.3–15.4)
ERYTHROCYTE [DISTWIDTH] IN BLOOD BY AUTOMATED COUNT: 14.5 % (ref 12.3–15.4)
ERYTHROCYTE [SEDIMENTATION RATE] IN BLOOD: 4 MM/HR (ref 0–30)
GLOBULIN UR ELPH-MCNC: 2.1 GM/DL
GLOBULIN UR ELPH-MCNC: 2.1 GM/DL
GLUCOSE SERPL-MCNC: 112 MG/DL (ref 65–99)
GLUCOSE SERPL-MCNC: 131 MG/DL (ref 65–99)
GLUCOSE UR STRIP-MCNC: NEGATIVE MG/DL
HCG INTACT+B SERPL-ACNC: 2.33 MIU/ML
HCG INTACT+B SERPL-ACNC: 2.88 MIU/ML
HCT VFR BLD AUTO: 34.6 % (ref 34–46.6)
HCT VFR BLD AUTO: 35.9 % (ref 34–46.6)
HGB BLD-MCNC: 11.5 G/DL (ref 12–15.9)
HGB BLD-MCNC: 12.1 G/DL (ref 12–15.9)
HGB UR QL STRIP.AUTO: NEGATIVE
HOLD SPECIMEN: NORMAL
HYALINE CASTS UR QL AUTO: ABNORMAL /LPF
IMM GRANULOCYTES # BLD AUTO: 0.03 10*3/MM3 (ref 0–0.05)
IMM GRANULOCYTES # BLD AUTO: 0.07 10*3/MM3 (ref 0–0.05)
IMM GRANULOCYTES NFR BLD AUTO: 0.8 % (ref 0–0.5)
IMM GRANULOCYTES NFR BLD AUTO: 1.1 % (ref 0–0.5)
INR PPP: 0.95 (ref 0.86–1.15)
KETONES UR QL STRIP: NEGATIVE
LEUKOCYTE ESTERASE UR QL STRIP.AUTO: ABNORMAL
LIPASE SERPL-CCNC: 53 U/L (ref 13–60)
LIPASE SERPL-CCNC: 61 U/L (ref 13–60)
LYMPHOCYTES # BLD AUTO: 1.63 10*3/MM3 (ref 0.7–3.1)
LYMPHOCYTES # BLD AUTO: 1.72 10*3/MM3 (ref 0.7–3.1)
LYMPHOCYTES NFR BLD AUTO: 25.2 % (ref 19.6–45.3)
LYMPHOCYTES NFR BLD AUTO: 43.1 % (ref 19.6–45.3)
MCH RBC QN AUTO: 33.8 PG (ref 26.6–33)
MCH RBC QN AUTO: 34.9 PG (ref 26.6–33)
MCHC RBC AUTO-ENTMCNC: 33.2 G/DL (ref 31.5–35.7)
MCHC RBC AUTO-ENTMCNC: 33.7 G/DL (ref 31.5–35.7)
MCV RBC AUTO: 101.8 FL (ref 79–97)
MCV RBC AUTO: 103.5 FL (ref 79–97)
MONOCYTES # BLD AUTO: 0.16 10*3/MM3 (ref 0.1–0.9)
MONOCYTES # BLD AUTO: 0.41 10*3/MM3 (ref 0.1–0.9)
MONOCYTES NFR BLD AUTO: 4 % (ref 5–12)
MONOCYTES NFR BLD AUTO: 6.3 % (ref 5–12)
NEUTROPHILS NFR BLD AUTO: 2.04 10*3/MM3 (ref 1.7–7)
NEUTROPHILS NFR BLD AUTO: 4.13 10*3/MM3 (ref 1.7–7)
NEUTROPHILS NFR BLD AUTO: 51.1 % (ref 42.7–76)
NEUTROPHILS NFR BLD AUTO: 64 % (ref 42.7–76)
NITRITE UR QL STRIP: NEGATIVE
NRBC BLD AUTO-RTO: 0 /100 WBC (ref 0–0.2)
NRBC BLD AUTO-RTO: 0.5 /100 WBC (ref 0–0.2)
PH UR STRIP.AUTO: 5.5 [PH] (ref 5–8)
PLATELET # BLD AUTO: 139 10*3/MM3 (ref 140–450)
PLATELET # BLD AUTO: 142 10*3/MM3 (ref 140–450)
PMV BLD AUTO: 9 FL (ref 6–12)
PMV BLD AUTO: 9.1 FL (ref 6–12)
POTASSIUM SERPL-SCNC: 3.9 MMOL/L (ref 3.5–5.2)
POTASSIUM SERPL-SCNC: 4.2 MMOL/L (ref 3.5–5.2)
PROT SERPL-MCNC: 6.3 G/DL (ref 6–8.5)
PROT SERPL-MCNC: 6.5 G/DL (ref 6–8.5)
PROT UR QL STRIP: ABNORMAL
PROTHROMBIN TIME: 12.8 SECONDS (ref 11.8–14.9)
RBC # BLD AUTO: 3.4 10*6/MM3 (ref 3.77–5.28)
RBC # BLD AUTO: 3.47 10*6/MM3 (ref 3.77–5.28)
RBC # UR STRIP: ABNORMAL /HPF
REF LAB TEST METHOD: ABNORMAL
SODIUM SERPL-SCNC: 140 MMOL/L (ref 136–145)
SODIUM SERPL-SCNC: 140 MMOL/L (ref 136–145)
SP GR UR STRIP: 1.01 (ref 1–1.03)
SQUAMOUS #/AREA URNS HPF: ABNORMAL /HPF
UROBILINOGEN UR QL STRIP: ABNORMAL
WBC # UR STRIP: ABNORMAL /HPF
WBC NRBC COR # BLD: 3.99 10*3/MM3 (ref 3.4–10.8)
WBC NRBC COR # BLD: 6.46 10*3/MM3 (ref 3.4–10.8)
WHOLE BLOOD HOLD COAG: NORMAL
WHOLE BLOOD HOLD COAG: NORMAL
WHOLE BLOOD HOLD SPECIMEN: NORMAL
WHOLE BLOOD HOLD SPECIMEN: NORMAL

## 2023-08-24 PROCEDURE — 99283 EMERGENCY DEPT VISIT LOW MDM: CPT

## 2023-08-24 PROCEDURE — 83690 ASSAY OF LIPASE: CPT | Performed by: EMERGENCY MEDICINE

## 2023-08-24 PROCEDURE — 84702 CHORIONIC GONADOTROPIN TEST: CPT

## 2023-08-24 PROCEDURE — 96375 TX/PRO/DX INJ NEW DRUG ADDON: CPT

## 2023-08-24 PROCEDURE — 25010000002 DICYCLOMINE PER 20 MG: Performed by: NURSE PRACTITIONER

## 2023-08-24 PROCEDURE — 80053 COMPREHEN METABOLIC PANEL: CPT

## 2023-08-24 PROCEDURE — 85652 RBC SED RATE AUTOMATED: CPT | Performed by: NURSE PRACTITIONER

## 2023-08-24 PROCEDURE — 25010000002 ONDANSETRON PER 1 MG: Performed by: EMERGENCY MEDICINE

## 2023-08-24 PROCEDURE — 85730 THROMBOPLASTIN TIME PARTIAL: CPT | Performed by: NURSE PRACTITIONER

## 2023-08-24 PROCEDURE — 85025 COMPLETE CBC W/AUTO DIFF WBC: CPT

## 2023-08-24 PROCEDURE — 85025 COMPLETE CBC W/AUTO DIFF WBC: CPT | Performed by: EMERGENCY MEDICINE

## 2023-08-24 PROCEDURE — 36415 COLL VENOUS BLD VENIPUNCTURE: CPT

## 2023-08-24 PROCEDURE — 96372 THER/PROPH/DIAG INJ SC/IM: CPT

## 2023-08-24 PROCEDURE — 25010000002 METHYLPREDNISOLONE PER 125 MG: Performed by: EMERGENCY MEDICINE

## 2023-08-24 PROCEDURE — 83690 ASSAY OF LIPASE: CPT

## 2023-08-24 PROCEDURE — 85610 PROTHROMBIN TIME: CPT | Performed by: NURSE PRACTITIONER

## 2023-08-24 PROCEDURE — 81001 URINALYSIS AUTO W/SCOPE: CPT | Performed by: EMERGENCY MEDICINE

## 2023-08-24 PROCEDURE — 84702 CHORIONIC GONADOTROPIN TEST: CPT | Performed by: EMERGENCY MEDICINE

## 2023-08-24 PROCEDURE — 80053 COMPREHEN METABOLIC PANEL: CPT | Performed by: EMERGENCY MEDICINE

## 2023-08-24 PROCEDURE — 83605 ASSAY OF LACTIC ACID: CPT | Performed by: EMERGENCY MEDICINE

## 2023-08-24 PROCEDURE — 83605 ASSAY OF LACTIC ACID: CPT

## 2023-08-24 PROCEDURE — 96374 THER/PROPH/DIAG INJ IV PUSH: CPT

## 2023-08-24 PROCEDURE — 99282 EMERGENCY DEPT VISIT SF MDM: CPT

## 2023-08-24 PROCEDURE — 86140 C-REACTIVE PROTEIN: CPT | Performed by: NURSE PRACTITIONER

## 2023-08-24 PROCEDURE — 96361 HYDRATE IV INFUSION ADD-ON: CPT

## 2023-08-24 PROCEDURE — 87086 URINE CULTURE/COLONY COUNT: CPT | Performed by: NURSE PRACTITIONER

## 2023-08-24 RX ORDER — SODIUM CHLORIDE 0.9 % (FLUSH) 0.9 %
10 SYRINGE (ML) INJECTION AS NEEDED
Status: DISCONTINUED | OUTPATIENT
Start: 2023-08-24 | End: 2023-08-25 | Stop reason: HOSPADM

## 2023-08-24 RX ORDER — ONDANSETRON 2 MG/ML
4 INJECTION INTRAMUSCULAR; INTRAVENOUS ONCE
Status: COMPLETED | OUTPATIENT
Start: 2023-08-24 | End: 2023-08-24

## 2023-08-24 RX ORDER — AMOXICILLIN AND CLAVULANATE POTASSIUM 875; 125 MG/1; MG/1
1 TABLET, FILM COATED ORAL EVERY 12 HOURS
Qty: 28 TABLET | Refills: 0 | Status: SHIPPED | OUTPATIENT
Start: 2023-08-24 | End: 2023-08-30

## 2023-08-24 RX ORDER — DICYCLOMINE HYDROCHLORIDE 10 MG/ML
20 INJECTION INTRAMUSCULAR ONCE
Status: COMPLETED | OUTPATIENT
Start: 2023-08-25 | End: 2023-08-24

## 2023-08-24 RX ORDER — FAMOTIDINE 10 MG/ML
20 INJECTION, SOLUTION INTRAVENOUS ONCE
Status: COMPLETED | OUTPATIENT
Start: 2023-08-25 | End: 2023-08-24

## 2023-08-24 RX ORDER — METHYLPREDNISOLONE SODIUM SUCCINATE 125 MG/2ML
125 INJECTION, POWDER, LYOPHILIZED, FOR SOLUTION INTRAMUSCULAR; INTRAVENOUS ONCE
Status: DISCONTINUED | OUTPATIENT
Start: 2023-08-24 | End: 2023-08-25 | Stop reason: HOSPADM

## 2023-08-24 RX ORDER — SODIUM CHLORIDE 0.9 % (FLUSH) 0.9 %
10 SYRINGE (ML) INJECTION AS NEEDED
Status: DISCONTINUED | OUTPATIENT
Start: 2023-08-24 | End: 2023-08-24 | Stop reason: HOSPADM

## 2023-08-24 RX ADMIN — ONDANSETRON 4 MG: 2 INJECTION INTRAMUSCULAR; INTRAVENOUS at 23:55

## 2023-08-24 RX ADMIN — DICYCLOMINE HYDROCHLORIDE 20 MG: 20 INJECTION, SOLUTION INTRAMUSCULAR at 23:55

## 2023-08-24 RX ADMIN — SODIUM CHLORIDE 1000 ML: 9 INJECTION, SOLUTION INTRAVENOUS at 23:55

## 2023-08-24 RX ADMIN — FAMOTIDINE 20 MG: 10 INJECTION INTRAVENOUS at 23:55

## 2023-08-24 NOTE — ED PROVIDER NOTES
Time: 11:58 AM EDT  Date of encounter:  2023  Independent Historian/Clinical History and Information was obtained by:   {Blank multiple:43490}    History is limited by: {Limited History:98611}    Chief Complaint: ***      History of Present Illness:  Patient is a 52 y.o. year old female who presents to the emergency department for evaluation of ***    HPI    Patient Care Team  Primary Care Provider: Brian Ellis APRN    Past Medical History:     Allergies   Allergen Reactions    Amlodipine Shortness Of Breath    Diltiazem Hcl Anxiety    Contrast Dye (Echo Or Unknown Ct/Mr) Palpitations     Patient gets to the point of passing out , heart races      Past Medical History:   Diagnosis Date    Anxiety     Arthritis     GILBERT KNEES    Asthma     SEASONAL ALLERGIES    CKD (chronic kidney disease)     Stage 3, Follows with [facility]    Elevated cholesterol     Gout 2021    Hernia     Upper    Hiatal hernia     Hypertension     Renal insufficiency     follows with [facility]    Sinus problem     SEASONAL ALLERGIES     Past Surgical History:   Procedure Laterality Date    BONY PELVIS SURGERY      Plate     SECTION   and     CHOLECYSTECTOMY      COLONOSCOPY      ENDOSCOPY      ENDOSCOPY N/A 3/14/2023    Procedure: ESOPHAGOGASTRODUODENOSCOPY with biopsies;  Surgeon: Tam Badillo MD;  Location: [facility] ENDOSCOPY;  Service: General;  Laterality: N/A;  hiatal hernia    TOTAL KNEE ARTHROPLASTY Left 2021    Procedure: TOTAL KNEE ARTHROPLASTY;  Surgeon: Marco Nelson MD;  Location: [facility] MAIN OR;  Service: Orthopedics;  Laterality: Left;    TOTAL KNEE ARTHROPLASTY Right 10/19/2022    Procedure: RIGHT TOTAL KNEE ARTHROPLASTY - NO SHELBY;  Surgeon: Marco Nelson MD;  Location: [facility] MAIN OR;  Service: Orthopedics;  Laterality: Right;    TUBAL ABDOMINAL LIGATION       Family History   Problem Relation Age of Onset    Throat cancer Father 73    Stroke Other      Diabetes Other     Malig Hyperthermia Neg Hx        Home Medications:  Prior to Admission medications    Medication Sig Start Date End Date Taking? Authorizing Provider   albuterol sulfate  (90 Base) MCG/ACT inhaler Take 1-2 Puffs every 4 hours prn 4/11/23   Brian Ellis APRN   allopurinol (ZYLOPRIM) 300 MG tablet Take 1 tablet by mouth Daily. 6/8/23   Brian Ellis APRN   atorvastatin (LIPITOR) 20 MG tablet Take 1 tablet by mouth Every Night. 6/8/23   Brian Ellis APRN   carvedilol (COREG) 25 MG tablet Take 1 tablet by mouth 2 (Two) Times a Day With Meals. 6/8/23   Brian Ellis APRN   cloNIDine (CATAPRES) 0.2 MG tablet Take 1 tablet by mouth 3 (Three) Times a Day. 6/8/23   Brian Ellis APRN   fluticasone (FLONASE) 50 MCG/ACT nasal spray 2 sprays into the nostril(s) as directed by provider Daily. 2 sprays each nostril daily 1/19/23   Brian Ellis APRN   hydrocortisone (ANUSOL-HC) 25 MG suppository Insert 1 suppository into the rectum 2 (Two) Times a Day. 8/7/23   Naida Neal APRN   Hydrocortisone, Perianal, (ANUSOL-HC) 2.5 % rectal cream Insert  into the rectum 2 (Two) Times a Day. 8/7/23   Naida Neal APRN   linaclotide (Linzess) 290 MCG capsule capsule Take 1 capsule by mouth Every Morning Before Breakfast. 8/7/23   Naida Neal APRN   Lokelma 10 g pack TAKE 10 GRAM BY MOUTH 1 TIME EACH DAY 12/1/22   Al Ceballos MD   losartan (COZAAR) 100 MG tablet Take 1 tablet by mouth Daily. 6/8/23   Brian Ellis APRN   vitamin B-12 (CYANOCOBALAMIN) 100 MCG tablet Take 0.5 tablets by mouth Daily.    ProviderAl MD   zinc gluconate 50 MG tablet Take 1 tablet by mouth Daily.    ProviderAl MD        Social History:   Social History     Tobacco Use    Smoking status: Former     Types: Cigarettes     Quit date: 2013     Years since quitting: 10.6    Smokeless tobacco: Never   Vaping  "Use    Vaping Use: Never used   Substance Use Topics    Alcohol use: Never    Drug use: Never         Review of Systems:  Review of Systems     Physical Exam:  /88 (BP Location: Left arm, Patient Position: Sitting)   Pulse 113   Temp 98.5 °F (36.9 °C) (Oral)   Resp 16   Ht 157.5 cm (62\")   Wt 121 kg (266 lb 5.1 oz)   SpO2 98%   BMI 48.71 kg/m²     Physical Exam   ***          Procedures:  Procedures      Medical Decision Making:      Comorbidities that affect care:    {Comorbidities that affect care:82629}    External Notes reviewed:    {External Note review (Optional):45050}      The following orders were placed and all results were independently analyzed by me:  Orders Placed This Encounter   Procedures    Golden Draw    Comprehensive Metabolic Panel    Lipase    Lactic Acid, Plasma    Urinalysis With Microscopic If Indicated (No Culture) - Urine, Clean Catch    hCG, Quantitative, Pregnancy    CBC Auto Differential    NPO Diet NPO Type: Strict NPO    Undress & Gown    Insert Peripheral IV    CBC & Differential    Green Top (Gel)    Lavender Top    Gold Top - SST    Light Blue Top       Medications Given in the Emergency Department:  Medications   sodium chloride 0.9 % flush 10 mL (has no administration in time range)        ED Course:         Labs:    Lab Results (last 24 hours)       ** No results found for the last 24 hours. **             Imaging:    No Radiology Exams Resulted Within Past 24 Hours      Differential Diagnosis and Discussion:    {Differentials:58307}    {Independent Review of (Optional):30062}    MDM     {Critical Care:42297}    Patient Care Considerations:    {Considerations (Optional):24084}      Consultants/Shared Management Plan:    {Shared Management Plan (Optional):34083}    Social Determinants of Health:    {Social Determinants of Health (Optional):68216}      Disposition and Care Coordination:    {Admission consideration:44834}    {Discharge (Optional):79225}    Final " diagnoses:   None        ED Disposition       None            This medical record created using voice recognition software.           available to me at this time. I have answered questions and addressed any concerns. The patient has a good  understanding of the patient´s diagnosis, condition, and treatment plan as can be expected at this point. The vital signs have been stable. The patient´s condition is stable and appropriate for discharge from the emergency department.      The patient will pursue further outpatient evaluation with the primary care physician or other designated or consulting physician as outlined in the discharge instructions. They are agreeable to this plan of care and follow-up instructions have been explained in detail. The patient has received these instructions in written format and have expressed an understanding of the discharge instructions. The patient is aware that any significant change in condition or worsening of symptoms should prompt an immediate return to this or the closest emergency department or call to 911.    Final diagnoses:   Subacute sinusitis, unspecified location        ED Disposition       ED Disposition   Discharge    Condition   Stable    Comment   --               This medical record created using voice recognition software.             Indira [place], MARICRUZ  09/03/23 1705

## 2023-08-25 LAB — BACTERIA SPEC AEROBE CULT: NO GROWTH

## 2023-08-25 PROCEDURE — 87040 BLOOD CULTURE FOR BACTERIA: CPT | Performed by: EMERGENCY MEDICINE

## 2023-08-25 PROCEDURE — 36415 COLL VENOUS BLD VENIPUNCTURE: CPT

## 2023-08-25 RX ORDER — DICYCLOMINE HCL 20 MG
20 TABLET ORAL EVERY 6 HOURS
Qty: 20 TABLET | Refills: 0 | Status: SHIPPED | OUTPATIENT
Start: 2023-08-25 | End: 2023-09-12

## 2023-08-25 RX ORDER — ONDANSETRON 4 MG/1
4 TABLET, ORALLY DISINTEGRATING ORAL EVERY 4 HOURS PRN
Qty: 20 TABLET | Refills: 0 | Status: SHIPPED | OUTPATIENT
Start: 2023-08-25 | End: 2023-08-30

## 2023-08-25 NOTE — ED NOTES
Patient refused her medication stating she thinks she has an allergy to it. But wasn't sure and didn't want to test it.

## 2023-08-25 NOTE — DISCHARGE INSTRUCTIONS
Take medications as prescribed for symptomatic treatment of abdominal pain and nausea with vomiting.    Continue your Augmentin that you are prescribed today for sinusitis.    Follow-up with your PCP on Monday for reevaluation.    Return to emergency department for new or worsening symptoms.    Clear liquids.  Advance diet as tolerated

## 2023-08-25 NOTE — ED PROVIDER NOTES
Time: 11:25 PM EDT  Date of encounter:  2023  Independent Historian/Clinical History and Information was obtained by:   Patient    History is limited by: N/A    Chief Complaint: Nausea and vomiting      History of Present Illness:  Patient is a 52 y.o. year old female who presents to the emergency department for evaluation of nausea and vomiting.  Patient states she was seen here earlier for a little bit of sinus pain and pressure and at that time she still had a little bit of GI upset but was not actively vomiting.  She was placed on Augmentin after she was found to have an ear infection and some sinusitis.  Patient states she took an Augmentin and then shortly thereafter tried to eat and after that she started vomiting and had worsening epigastric pain and has had several episodes of vomiting.  No diarrhea.  Patient denies fever.  No shortness of air or chest pain.  No dysuria.    HPI    Patient Care Team  Primary Care Provider: Brian Ellis APRN    Past Medical History:     Allergies   Allergen Reactions    Amlodipine Shortness Of Breath    Diltiazem Hcl Anxiety    Contrast Dye (Echo Or Unknown Ct/Mr) Palpitations     Patient gets to the point of passing out , heart races      Past Medical History:   Diagnosis Date    Anxiety     Arthritis     GILBERT KNEES    Asthma     SEASONAL ALLERGIES    CKD (chronic kidney disease)     Stage 3, Follows with Dveen    Elevated cholesterol     Gout 2021    Hernia     Upper    Hiatal hernia     Hypertension     Renal insufficiency     follows with Deven    Sinus problem     SEASONAL ALLERGIES     Past Surgical History:   Procedure Laterality Date    BONY PELVIS SURGERY      Plate     SECTION   and     CHOLECYSTECTOMY      COLONOSCOPY      ENDOSCOPY  2009    ENDOSCOPY N/A 3/14/2023    Procedure: ESOPHAGOGASTRODUODENOSCOPY with biopsies;  Surgeon: Tam Badillo MD;  Location: [facility] ENDOSCOPY;  Service: General;  Laterality:  N/A;  hiatal hernia    TOTAL KNEE ARTHROPLASTY Left 12/22/2021    Procedure: TOTAL KNEE ARTHROPLASTY;  Surgeon: Marco Nelson MD;  Location: [facility] MAIN OR;  Service: Orthopedics;  Laterality: Left;    TOTAL KNEE ARTHROPLASTY Right 10/19/2022    Procedure: RIGHT TOTAL KNEE ARTHROPLASTY - NO SHELBY;  Surgeon: Marco Nelson MD;  Location: [facility] MAIN OR;  Service: Orthopedics;  Laterality: Right;    TUBAL ABDOMINAL LIGATION  1989     Family History   Problem Relation Age of Onset    Throat cancer Father 73    Stroke Other     Diabetes Other     Malig Hyperthermia Neg Hx        Home Medications:  Prior to Admission medications    Medication Sig Start Date End Date Taking? Authorizing Provider   albuterol sulfate  (90 Base) MCG/ACT inhaler Take 1-2 Puffs every 4 hours prn 4/11/23   Brian Ellis APRN   allopurinol (ZYLOPRIM) 300 MG tablet Take 1 tablet by mouth Daily. 6/8/23   Brian Ellis APRN   amoxicillin-clavulanate (AUGMENTIN) 875-125 MG per tablet Take 1 tablet by mouth Every 12 (Twelve) Hours. 8/24/23   Indira Wasserman APRN   atorvastatin (LIPITOR) 20 MG tablet Take 1 tablet by mouth Every Night. 6/8/23   Brian Ellis APRN   carvedilol (COREG) 25 MG tablet Take 1 tablet by mouth 2 (Two) Times a Day With Meals. 6/8/23   Brian Ellis APRN   cloNIDine (CATAPRES) 0.2 MG tablet Take 1 tablet by mouth 3 (Three) Times a Day. 6/8/23   Brian Ellis APRN   fluticasone (FLONASE) 50 MCG/ACT nasal spray 2 sprays into the nostril(s) as directed by provider Daily. 2 sprays each nostril daily 1/19/23   Brian Ellis APRN   hydrocortisone (ANUSOL-HC) 25 MG suppository Insert 1 suppository into the rectum 2 (Two) Times a Day. 8/7/23   Naida Neal APRN   Hydrocortisone, Perianal, (ANUSOL-HC) 2.5 % rectal cream Insert  into the rectum 2 (Two) Times a Day. 8/7/23   Naida Neal APRN   linaclotide (Linzess) 290 MCG capsule  "capsule Take 1 capsule by mouth Every Morning Before Breakfast. 8/7/23   Naida Neal MARICRUZ Rivas   Lokelma 10 g pack TAKE 10 GRAM BY MOUTH 1 TIME EACH DAY 12/1/22   Al Ceballos MD   losartan (COZAAR) 100 MG tablet Take 1 tablet by mouth Daily. 6/8/23   Brian Ellis APRN   vitamin B-12 (CYANOCOBALAMIN) 100 MCG tablet Take 0.5 tablets by mouth Daily.    Al Ceballos MD   zinc gluconate 50 MG tablet Take 1 tablet by mouth Daily.    Al Ceballos MD        Social History:   Social History     Tobacco Use    Smoking status: Former     Types: Cigarettes     Quit date: 2013     Years since quitting: 10.6    Smokeless tobacco: Never   Vaping Use    Vaping Use: Never used   Substance Use Topics    Alcohol use: Never    Drug use: Never         Review of Systems:  Review of Systems   Constitutional:  Negative for chills and fever.   HENT:  Positive for congestion, ear pain and sinus pressure. Negative for sore throat.    Eyes:  Negative for pain.   Respiratory:  Negative for cough, chest tightness and shortness of breath.    Cardiovascular:  Negative for chest pain.   Gastrointestinal:  Positive for abdominal pain, nausea and vomiting. Negative for constipation and diarrhea.   Genitourinary:  Negative for flank pain and hematuria.   Musculoskeletal:  Negative for back pain and joint swelling.   Skin: Negative.  Negative for pallor.   Neurological:  Negative for seizures and headaches.   All other systems reviewed and are negative.     Physical Exam:  BP (!) 174/102   Pulse 105   Temp 98.8 øF (37.1 øC)   Resp 20   Ht 157.5 cm (62\")   Wt 121 kg (266 lb)   SpO2 96%   BMI 48.65 kg/mý     Physical Exam  Vitals and nursing note reviewed.   Constitutional:       General: She is not in acute distress.     Appearance: Normal appearance. She is obese. She is not toxic-appearing.   HENT:      Head: Normocephalic and atraumatic.      Mouth/Throat:      Mouth: Mucous membranes are moist. "   Eyes:      General: No scleral icterus.     Conjunctiva/sclera: Conjunctivae normal.   Cardiovascular:      Rate and Rhythm: Regular rhythm. Tachycardia present.      Heart sounds: Normal heart sounds.   Pulmonary:      Effort: Pulmonary effort is normal. No respiratory distress.      Breath sounds: Normal breath sounds.   Abdominal:      General: Bowel sounds are normal. There is no distension.      Palpations: Abdomen is soft.      Tenderness: There is abdominal tenderness (Mild epigastric). There is no right CVA tenderness or left CVA tenderness.   Musculoskeletal:         General: Normal range of motion.      Cervical back: Normal range of motion and neck supple.   Skin:     General: Skin is warm and dry.   Neurological:      Mental Status: She is alert and oriented to person, place, and time.   Psychiatric:         Mood and Affect: Mood normal.         Behavior: Behavior normal.              Medical Decision Making:      Comorbidities that affect care:    Hiatal hernia, Asthma, Chronic Kidney Disease, Hypertension, Obesity    External Notes reviewed:    Previous ED Note: ED visit from earlier today was reviewed      The following orders were placed and all results were independently analyzed by me:  Orders Placed This Encounter   Procedures    Blood Culture - Blood,    Blood Culture - Blood,    [place] Draw    Comprehensive Metabolic Panel    Lipase    Lactic Acid, Plasma    Urinalysis With Microscopic If Indicated (No Culture) - Urine, Clean Catch    hCG, Quantitative, Pregnancy    CBC Auto Differential    NPO Diet NPO Type: Strict NPO    Undress & Gown    Encourage Fluids    Insert Peripheral IV    CBC & Differential    Green Top (Gel)    Lavender Top    Gold Top - SST    Light Blue Top       Medications Given in the Emergency Department:  Medications   sodium chloride 0.9 % flush 10 mL (has no administration in time range)   methylPREDNISolone sodium succinate (SOLU-Medrol) injection 125 mg (125 mg  Intravenous Not Given 8/24/23 2355)   ondansetron (ZOFRAN) injection 4 mg (4 mg Intravenous Given 8/24/23 2355)   sodium chloride 0.9 % bolus 1,000 mL (0 mL Intravenous Stopped 8/25/23 0147)   famotidine (PEPCID) injection 20 mg (20 mg Intravenous Given 8/24/23 2355)   dicyclomine (BENTYL) injection 20 mg (20 mg Intramuscular Given 8/24/23 2355)        ED Course:         Labs:    Lab Results (last 24 hours)       Procedure Component Value Units Date/Time    Urinalysis With Microscopic If Indicated (No Culture) - Urine, Clean Catch [[id number]]  (Abnormal) Collected: 08/24/23 1237    Specimen: Urine, Clean Catch Updated: 08/24/23 1303     Color, UA Yellow     Appearance, UA Cloudy     pH, UA 5.5     Specific Gravity, UA 1.007     Glucose, UA Negative     Ketones, UA Negative     Bilirubin, UA Negative     Blood, UA Negative     Protein,  mg/dL (2+)     Leuk Esterase, UA Small (1+)     Nitrite, UA Negative     Urobilinogen, UA 0.2 E.U./dL    Urinalysis, Microscopic Only - Urine, Clean Catch [[id number]]  (Abnormal) Collected: 08/24/23 1237    Specimen: Urine, Clean Catch Updated: 08/24/23 1322     RBC, UA 0-2 /HPF      WBC, UA 6-12 /HPF      Bacteria, UA 1+ /HPF      Squamous Epithelial Cells, UA 7-12 /HPF      Hyaline Casts, UA None Seen /LPF      Methodology Manual Light Microscopy    Urine Culture - Urine, Urine, Clean Catch [[id number]] Collected: 08/24/23 1237    Specimen: Urine, Clean Catch Updated: 08/24/23 1443    CBC & Differential [[id number]]  (Abnormal) Collected: 08/24/23 1242    Specimen: Blood Updated: 08/24/23 1254    Narrative:      The following orders were created for panel order CBC & Differential.  Procedure                               Abnormality         Status                     ---------                               -----------         ------                     CBC Auto Differential[[id number]]        Abnormal            Final result                 Please view results for these tests on  the individual orders.    Comprehensive Metabolic Panel [[id number]]  (Abnormal) Collected: 08/24/23 1242    Specimen: Blood Updated: 08/24/23 1325     Glucose 131 mg/dL      BUN 35 mg/dL      Creatinine 2.37 mg/dL      Sodium 140 mmol/L      Potassium 4.2 mmol/L      Chloride 104 mmol/L      CO2 21.2 mmol/L      Calcium 10.3 mg/dL      Total Protein 6.5 g/dL      Albumin 4.4 g/dL      ALT (SGPT) 21 U/L      AST (SGOT) 15 U/L      Alkaline Phosphatase 80 U/L      Total Bilirubin 0.6 mg/dL      Globulin 2.1 gm/dL      A/G Ratio 2.1 g/dL      BUN/Creatinine Ratio 14.8     Anion Gap 14.8 mmol/L      eGFR 23.8 mL/min/1.73     Narrative:      GFR Normal >60  Chronic Kidney Disease <60  Kidney Failure <15      Lipase [[id number]]  (Abnormal) Collected: 08/24/23 1242    Specimen: Blood Updated: 08/24/23 1312     Lipase 61 U/L     Lactic Acid, Plasma [[id number]]  (Abnormal) Collected: 08/24/23 1242    Specimen: Blood Updated: 08/24/23 1322     Lactate 2.3 mmol/L     hCG, Quantitative, Pregnancy [[id number]] Collected: 08/24/23 1242    Specimen: Blood Updated: 08/24/23 1310     HCG Quantitative 2.88 mIU/mL     Narrative:      HCG Ranges by Gestational Age    Females - non-pregnant premenopausal   </= 1mIU/mL HCG  Females - postmenopausal               </= 7mIU/mL HCG    3 Weeks       5.4   -      72 mIU/mL  4 Weeks      10.2   -     708 mIU/mL  5 Weeks       217   -   8,245 mIU/mL  6 Weeks       152   -  32,177 mIU/mL  7 Weeks     4,059   - 153,767 mIU/mL  8 Weeks    31,366   - 149,094 mIU/mL  9 Weeks    59,109   - 135,901 mIU/mL  10 Weeks   44,186   - 170,409 mIU/mL  12 Weeks   27,107   - 201,615 mIU/mL  14 Weeks   24,302   -  93,646 mIU/mL  15 Weeks   12,540   -  69,747 mIU/mL  16 Weeks    8,904   -  55,332 mIU/mL  17 Weeks    8,240   -  51,793 mIU/mL  18 Weeks    9,649   -  55,271 mIU/mL      CBC Auto Differential [[id number]]  (Abnormal) Collected: 08/24/23 1242    Specimen: Blood Updated: 08/24/23 1254     WBC 6.46 10*3/mm3       RBC 3.47 10*6/mm3      Hemoglobin 12.1 g/dL      Hematocrit 35.9 %      .5 fL      MCH 34.9 pg      MCHC 33.7 g/dL      RDW 14.5 %      RDW-SD 54.0 fl      MPV 9.1 fL      Platelets 142 10*3/mm3      Neutrophil % 64.0 %      Lymphocyte % 25.2 %      Monocyte % 6.3 %      Eosinophil % 2.8 %      Basophil % 0.6 %      Immature Grans % 1.1 %      Neutrophils, Absolute 4.13 10*3/mm3      Lymphocytes, Absolute 1.63 10*3/mm3      Monocytes, Absolute 0.41 10*3/mm3      Eosinophils, Absolute 0.18 10*3/mm3      Basophils, Absolute 0.04 10*3/mm3      Immature Grans, Absolute 0.07 10*3/mm3      nRBC 0.0 /100 WBC     C-reactive Protein [[id number]]  (Normal) Collected: 08/24/23 1242    Specimen: Blood Updated: 08/24/23 1312     C-Reactive Protein <0.30 mg/dL     Sedimentation Rate [[id number]]  (Normal) Collected: 08/24/23 1242    Specimen: Blood Updated: 08/24/23 1259     Sed Rate 4 mm/hr     aPTT [[id number]]  (Normal) Collected: 08/24/23 1242    Specimen: Blood from Arm, Left Updated: 08/24/23 1303     PTT 24.6 seconds     Protime-INR [[id number]]  (Normal) Collected: 08/24/23 1242    Specimen: Blood Updated: 08/24/23 1301     Protime 12.8 Seconds      INR 0.95    Narrative:      Suggested Therapeutic Ranges For Oral Anticoagulant Therapy:  Level of Therapy                      INR Target Range  Standard Dose                            2.0-3.0  High Dose                                2.5-3.5  Patients not receiving anticoagulant  Therapy Normal Range                     0.86-1.15    CBC & Differential [[id number]]  (Abnormal) Collected: 08/24/23 2219    Specimen: Blood Updated: 08/24/23 2230    Narrative:      The following orders were created for panel order CBC & Differential.  Procedure                               Abnormality         Status                     ---------                               -----------         ------                     CBC Auto Differential[[id number]]        Abnormal            Final  result                 Please view results for these tests on the individual orders.    Comprehensive Metabolic Panel [[id number]]  (Abnormal) Collected: 08/24/23 2219    Specimen: Blood Updated: 08/24/23 2249     Glucose 112 mg/dL      BUN 33 mg/dL      Creatinine 2.47 mg/dL      Sodium 140 mmol/L      Potassium 3.9 mmol/L      Chloride 104 mmol/L      CO2 22.4 mmol/L      Calcium 11.1 mg/dL      Total Protein 6.3 g/dL      Albumin 4.2 g/dL      ALT (SGPT) 22 U/L      AST (SGOT) 14 U/L      Alkaline Phosphatase 70 U/L      Total Bilirubin 0.6 mg/dL      Globulin 2.1 gm/dL      A/G Ratio 2.0 g/dL      BUN/Creatinine Ratio 13.4     Anion Gap 13.6 mmol/L      eGFR 22.7 mL/min/1.73     Narrative:      GFR Normal >60  Chronic Kidney Disease <60  Kidney Failure <15      Lipase [[id number]]  (Normal) Collected: 08/24/23 2219    Specimen: Blood Updated: 08/24/23 2249     Lipase 53 U/L     Lactic Acid, Plasma [[id number]]  (Normal) Collected: 08/24/23 2219    Specimen: Blood Updated: 08/24/23 2245     Lactate 1.5 mmol/L     hCG, Quantitative, Pregnancy [[id number]] Collected: 08/24/23 2219    Specimen: Blood Updated: 08/24/23 2247     HCG Quantitative 2.33 mIU/mL     Narrative:      HCG Ranges by Gestational Age    Females - non-pregnant premenopausal   </= 1mIU/mL HCG  Females - postmenopausal               </= 7mIU/mL HCG    3 Weeks       5.4   -      72 mIU/mL  4 Weeks      10.2   -     708 mIU/mL  5 Weeks       217   -   8,245 mIU/mL  6 Weeks       152   -  32,177 mIU/mL  7 Weeks     4,059   - 153,767 mIU/mL  8 Weeks    31,366   - 149,094 mIU/mL  9 Weeks    59,109   - 135,901 mIU/mL  10 Weeks   44,186   - 170,409 mIU/mL  12 Weeks   27,107   - 201,615 mIU/mL  14 Weeks   24,302   -  93,646 mIU/mL  15 Weeks   12,540   -  69,747 mIU/mL  16 Weeks    8,904   -  55,332 mIU/mL  17 Weeks    8,240   -  51,793 mIU/mL  18 Weeks    9,649   -  55,271 mIU/mL      CBC Auto Differential [[id number]]  (Abnormal) Collected: 08/24/23 9478     Specimen: Blood Updated: 08/24/23 2230     WBC 3.99 10*3/mm3      RBC 3.40 10*6/mm3      Hemoglobin 11.5 g/dL      Hematocrit 34.6 %      .8 fL      MCH 33.8 pg      MCHC 33.2 g/dL      RDW 14.5 %      RDW-SD 53.1 fl      MPV 9.0 fL      Platelets 139 10*3/mm3      Neutrophil % 51.1 %      Lymphocyte % 43.1 %      Monocyte % 4.0 %      Eosinophil % 0.5 %      Basophil % 0.5 %      Immature Grans % 0.8 %      Neutrophils, Absolute 2.04 10*3/mm3      Lymphocytes, Absolute 1.72 10*3/mm3      Monocytes, Absolute 0.16 10*3/mm3      Eosinophils, Absolute 0.02 10*3/mm3      Basophils, Absolute 0.02 10*3/mm3      Immature Grans, Absolute 0.03 10*3/mm3      nRBC 0.5 /100 WBC     Blood Culture - Blood, Arm, Left [[id number]] Collected: 08/25/23 0019    Specimen: Blood from Arm, Left Updated: 08/25/23 0024    Blood Culture - Blood, Arm, Left [[id number]] Collected: 08/25/23 0019    Specimen: Blood from Arm, Left Updated: 08/25/23 0024             Imaging:    No Radiology Exams Resulted Within Past 24 Hours      Differential Diagnosis and Discussion:    Vomiting: Differential diagnosis includes but is not limited to migraine, labyrinthine disorders, psychogenic, metabolic and endocrine causes, peptic ulcer, gastric outlet obstruction, gastritis, gastroenteritis, appendicitis, intestinal obstruction, paralytic ileus, food poisoning, cholecystitis, acute hepatitis, acute pancreatitis, acute febrile illness, and myocardial infarction.    All labs were reviewed and interpreted by me.    MDM  Number of Diagnoses or Management Options  Chronic kidney disease, unspecified CKD stage  Epigastric pain  Nausea and vomiting, unspecified vomiting type  Diagnosis management comments: The patient comes to the ED for evaluation of vomiting. Emesis is much improved in the ED. The patient was given antiemetics in the ED. The patient is resting comfortably and feels better, is alert and in no distress. Repeat examination is unremarkable and  benign; in particular, there's no discomfort at McBurney's point. The history, exam, diagnostic testing, and current condition does not suggest acute appendicitis, bowel obstruction, acute cholecystitis, bowel perforation, major gastrointestinal bleeding, severe diverticulitis, abdominal aortic aneurysm, mesenteric ischemia, volvulus, sepsis, or other significant pathology that warrants further testing, continued ED treatment, admission, for surgical evaluation at this point. The vital signs have been stable. Bloodwork performed shows no signs of acute renal failure. The patient does not have uncontrollable pain, intractable vomiting, or other significant symptoms. The patient is now able to tolerate po intake in the ED and has passed a po challenge. The patient's condition is stable and appropriate for discharge from the emergency department.        Amount and/or Complexity of Data Reviewed  Clinical lab tests: reviewed and ordered  Tests in the medicine section of [facility]: ordered and reviewed  Decide to obtain previous medical records or to obtain history from someone other than the patient: yes    Risk of Complications, Morbidity, and/or Mortality  Presenting problems: low  Diagnostic procedures: low  Management options: low    Patient Progress  Patient progress: stable         Patient Care Considerations:    CT ABDOMEN AND PELVIS: I considered ordering a CT scan of the abdomen and pelvis however patient has very mild epigastric tenderness.  Her gallbladder has been removed and patient has no right lower quadrant tenderness.  Patient is having bowel movements so no concern for obstruction      Consultants/Shared Management Plan:    None    Social Determinants of Health:    Patient has presented with family members who are responsible, reliable and will ensure follow up care.      Disposition and Care Coordination:    Discharged: I considered escalation of care by admitting this patient for observation, however the  patient has improved and is suitable and  stable for discharge.    I have explained the patient's condition, diagnoses and treatment plan based on the information available to me at this time. I have answered questions and addressed any concerns. The patient has a good  understanding of the patient's diagnosis, condition, and treatment plan as can be expected at this point. The vital signs have been stable. The patient's condition is stable and appropriate for discharge from the emergency department.      The patient will pursue further outpatient evaluation with the primary care physician or other designated or consulting physician as outlined in the discharge instructions. They are agreeable to this plan of care and follow-up instructions have been explained in detail. The patient has received these instructions in written format and have expressed an understanding of the discharge instructions. The patient is aware that any significant change in condition or worsening of symptoms should prompt an immediate return to this or the closest emergency department or call to 911.  I have explained discharge medications and the need for follow up with the patient/caretakers. This was also printed in the discharge instructions. Patient was discharged with the following medications and follow up:      Medication List        New Prescriptions      dicyclomine 20 MG tablet  Commonly known as: BENTYL  Take 1 tablet by mouth Every 6 (Six) Hours.     ondansetron ODT 4 MG disintegrating tablet  Commonly known as: ZOFRAN-ODT  Place 1 tablet on the tongue Every 4 (Four) Hours As Needed for Nausea or Vomiting.               Where to Get Your Medications        [facility] [address] [place] [address], [place] [facility] [phone] [facility] [phone] [address] [address]      Phone: [phone]   dicyclomine 20 MG tablet  ondansetron ODT 4 MG disintegrating tablet       Brian Ellis, APRN  [address] [facility] [address] [facility] [address] [place] [address]  [phone]    Schedule an appointment as soon as possible for a visit on 8/28/2023  Reevaluation       Final diagnoses:   Nausea and vomiting, unspecified vomiting type   Epigastric pain   Chronic kidney disease, unspecified CKD stage        ED Disposition       ED Disposition   Discharge    Condition   Stable    Comment   --               This medical record created using voice recognition software.             Denisha Salgado, APRN  08/25/23 0154

## 2023-08-29 ENCOUNTER — APPOINTMENT (OUTPATIENT)
Dept: CT IMAGING | Facility: HOSPITAL | Age: 53
End: 2023-08-29
Payer: MEDICARE

## 2023-08-29 ENCOUNTER — HOSPITAL ENCOUNTER (EMERGENCY)
Facility: HOSPITAL | Age: 53
Discharge: HOME | End: 2023-08-29
Attending: EMERGENCY MEDICINE
Payer: MEDICARE

## 2023-08-29 VITALS
HEART RATE: 101 BPM | DIASTOLIC BLOOD PRESSURE: 107 MMHG | SYSTOLIC BLOOD PRESSURE: 165 MMHG | RESPIRATION RATE: 16 BRPM | HEIGHT: 62 IN | OXYGEN SATURATION: 96 % | WEIGHT: 260.58 LBS | BODY MASS INDEX: 47.95 KG/M2 | TEMPERATURE: 98.5 F

## 2023-08-29 DIAGNOSIS — T50.905A ADVERSE EFFECT OF DRUG, INITIAL ENCOUNTER: Primary | ICD-10-CM

## 2023-08-29 DIAGNOSIS — K59.00 CONSTIPATION, UNSPECIFIED CONSTIPATION TYPE: ICD-10-CM

## 2023-08-29 DIAGNOSIS — R11.0 NAUSEA: ICD-10-CM

## 2023-08-29 DIAGNOSIS — R10.10 PAIN OF UPPER ABDOMEN: ICD-10-CM

## 2023-08-29 LAB
ALBUMIN SERPL-MCNC: 4.2 G/DL (ref 3.5–5.2)
ALBUMIN/GLOB SERPL: 2 G/DL
ALP SERPL-CCNC: 60 U/L (ref 39–117)
ALT SERPL W P-5'-P-CCNC: 35 U/L (ref 1–33)
ANION GAP SERPL CALCULATED.3IONS-SCNC: 13 MMOL/L (ref 5–15)
AST SERPL-CCNC: 27 U/L (ref 1–32)
BACTERIA UR QL AUTO: ABNORMAL /HPF
BASOPHILS # BLD AUTO: 0.04 10*3/MM3 (ref 0–0.2)
BASOPHILS NFR BLD AUTO: 0.8 % (ref 0–1.5)
BILIRUB SERPL-MCNC: 0.7 MG/DL (ref 0–1.2)
BILIRUB UR QL STRIP: NEGATIVE
BUN SERPL-MCNC: 20 MG/DL (ref 6–20)
BUN/CREAT SERPL: 7.9 (ref 7–25)
CALCIUM SPEC-SCNC: 11.1 MG/DL (ref 8.6–10.5)
CHLORIDE SERPL-SCNC: 100 MMOL/L (ref 98–107)
CLARITY UR: ABNORMAL
CO2 SERPL-SCNC: 23 MMOL/L (ref 22–29)
COLOR UR: YELLOW
CREAT SERPL-MCNC: 2.54 MG/DL (ref 0.57–1)
DEPRECATED RDW RBC AUTO: 55.3 FL (ref 37–54)
EGFRCR SERPLBLD CKD-EPI 2021: 21.9 ML/MIN/1.73
EOSINOPHIL # BLD AUTO: 0.18 10*3/MM3 (ref 0–0.4)
EOSINOPHIL NFR BLD AUTO: 3.5 % (ref 0.3–6.2)
ERYTHROCYTE [DISTWIDTH] IN BLOOD BY AUTOMATED COUNT: 14.6 % (ref 12.3–15.4)
GLOBULIN UR ELPH-MCNC: 2.1 GM/DL
GLUCOSE SERPL-MCNC: 113 MG/DL (ref 65–99)
GLUCOSE UR STRIP-MCNC: NEGATIVE MG/DL
HCT VFR BLD AUTO: 33.5 % (ref 34–46.6)
HGB BLD-MCNC: 11.3 G/DL (ref 12–15.9)
HGB UR QL STRIP.AUTO: NEGATIVE
HOLD SPECIMEN: NORMAL
HOLD SPECIMEN: NORMAL
HYALINE CASTS UR QL AUTO: ABNORMAL /LPF
IMM GRANULOCYTES # BLD AUTO: 0.02 10*3/MM3 (ref 0–0.05)
IMM GRANULOCYTES NFR BLD AUTO: 0.4 % (ref 0–0.5)
KETONES UR QL STRIP: NEGATIVE
LEUKOCYTE ESTERASE UR QL STRIP.AUTO: ABNORMAL
LIPASE SERPL-CCNC: 42 U/L (ref 13–60)
LYMPHOCYTES # BLD AUTO: 1.72 10*3/MM3 (ref 0.7–3.1)
LYMPHOCYTES NFR BLD AUTO: 33.2 % (ref 19.6–45.3)
MCH RBC QN AUTO: 34.8 PG (ref 26.6–33)
MCHC RBC AUTO-ENTMCNC: 33.7 G/DL (ref 31.5–35.7)
MCV RBC AUTO: 103.1 FL (ref 79–97)
MONOCYTES # BLD AUTO: 0.45 10*3/MM3 (ref 0.1–0.9)
MONOCYTES NFR BLD AUTO: 8.7 % (ref 5–12)
NEUTROPHILS NFR BLD AUTO: 2.77 10*3/MM3 (ref 1.7–7)
NEUTROPHILS NFR BLD AUTO: 53.4 % (ref 42.7–76)
NITRITE UR QL STRIP: NEGATIVE
NRBC BLD AUTO-RTO: 0 /100 WBC (ref 0–0.2)
PH UR STRIP.AUTO: 5.5 [PH] (ref 5–8)
PLATELET # BLD AUTO: 155 10*3/MM3 (ref 140–450)
PMV BLD AUTO: 8.9 FL (ref 6–12)
POTASSIUM SERPL-SCNC: 4.4 MMOL/L (ref 3.5–5.2)
PROT SERPL-MCNC: 6.3 G/DL (ref 6–8.5)
PROT UR QL STRIP: ABNORMAL
QT INTERVAL: 348 MS
QTC INTERVAL: 425 MS
RBC # BLD AUTO: 3.25 10*6/MM3 (ref 3.77–5.28)
RBC # UR STRIP: ABNORMAL /HPF
REF LAB TEST METHOD: ABNORMAL
SODIUM SERPL-SCNC: 136 MMOL/L (ref 136–145)
SP GR UR STRIP: <=1.005 (ref 1–1.03)
SQUAMOUS #/AREA URNS HPF: ABNORMAL /HPF
TROPONIN T SERPL HS-MCNC: 34 NG/L
TROPONIN T SERPL HS-MCNC: 36 NG/L
UROBILINOGEN UR QL STRIP: ABNORMAL
WBC # UR STRIP: ABNORMAL /HPF
WBC NRBC COR # BLD: 5.18 10*3/MM3 (ref 3.4–10.8)
WHOLE BLOOD HOLD COAG: NORMAL
WHOLE BLOOD HOLD SPECIMEN: NORMAL

## 2023-08-29 PROCEDURE — 99284 EMERGENCY DEPT VISIT MOD MDM: CPT

## 2023-08-29 PROCEDURE — 96375 TX/PRO/DX INJ NEW DRUG ADDON: CPT

## 2023-08-29 PROCEDURE — 81001 URINALYSIS AUTO W/SCOPE: CPT

## 2023-08-29 PROCEDURE — 84484 ASSAY OF TROPONIN QUANT: CPT

## 2023-08-29 PROCEDURE — 74176 CT ABD & PELVIS W/O CONTRAST: CPT

## 2023-08-29 PROCEDURE — 80053 COMPREHEN METABOLIC PANEL: CPT

## 2023-08-29 PROCEDURE — 96374 THER/PROPH/DIAG INJ IV PUSH: CPT

## 2023-08-29 PROCEDURE — 93005 ELECTROCARDIOGRAM TRACING: CPT

## 2023-08-29 PROCEDURE — 25010000002 DROPERIDOL PER 5 MG: Performed by: NURSE PRACTITIONER

## 2023-08-29 PROCEDURE — 36415 COLL VENOUS BLD VENIPUNCTURE: CPT

## 2023-08-29 PROCEDURE — 25010000002 DIPHENHYDRAMINE PER 50 MG: Performed by: NURSE PRACTITIONER

## 2023-08-29 PROCEDURE — 96361 HYDRATE IV INFUSION ADD-ON: CPT

## 2023-08-29 PROCEDURE — 25010000002 ONDANSETRON PER 1 MG: Performed by: NURSE PRACTITIONER

## 2023-08-29 PROCEDURE — 84484 ASSAY OF TROPONIN QUANT: CPT | Performed by: NURSE PRACTITIONER

## 2023-08-29 PROCEDURE — 83690 ASSAY OF LIPASE: CPT

## 2023-08-29 PROCEDURE — 85025 COMPLETE CBC W/AUTO DIFF WBC: CPT

## 2023-08-29 RX ORDER — DIPHENHYDRAMINE HYDROCHLORIDE 50 MG/ML
25 INJECTION INTRAMUSCULAR; INTRAVENOUS ONCE
Status: COMPLETED | OUTPATIENT
Start: 2023-08-29 | End: 2023-08-29

## 2023-08-29 RX ORDER — PROMETHAZINE HYDROCHLORIDE 25 MG/1
25 SUPPOSITORY RECTAL EVERY 6 HOURS PRN
Qty: 10 SUPPOSITORY | Refills: 0 | Status: SHIPPED | OUTPATIENT
Start: 2023-08-29 | End: 2023-08-30

## 2023-08-29 RX ORDER — ONDANSETRON 2 MG/ML
4 INJECTION INTRAMUSCULAR; INTRAVENOUS ONCE
Status: COMPLETED | OUTPATIENT
Start: 2023-08-29 | End: 2023-08-29

## 2023-08-29 RX ORDER — POLYETHYLENE GLYCOL 3350 17 G/17G
17 POWDER, FOR SOLUTION ORAL DAILY
Qty: 7 PACKET | Refills: 0 | Status: SHIPPED | OUTPATIENT
Start: 2023-08-29 | End: 2023-09-05

## 2023-08-29 RX ORDER — SODIUM CHLORIDE 0.9 % (FLUSH) 0.9 %
10 SYRINGE (ML) INJECTION AS NEEDED
Status: DISCONTINUED | OUTPATIENT
Start: 2023-08-29 | End: 2023-08-30 | Stop reason: HOSPADM

## 2023-08-29 RX ORDER — DROPERIDOL 2.5 MG/ML
2.5 INJECTION, SOLUTION INTRAMUSCULAR; INTRAVENOUS ONCE
Status: COMPLETED | OUTPATIENT
Start: 2023-08-29 | End: 2023-08-29

## 2023-08-29 RX ORDER — FAMOTIDINE 10 MG/ML
20 INJECTION, SOLUTION INTRAVENOUS ONCE
Status: COMPLETED | OUTPATIENT
Start: 2023-08-29 | End: 2023-08-29

## 2023-08-29 RX ORDER — OMEPRAZOLE 40 MG/1
40 CAPSULE, DELAYED RELEASE ORAL DAILY
Qty: 14 CAPSULE | Refills: 0 | Status: SHIPPED | OUTPATIENT
Start: 2023-08-29 | End: 2023-08-30 | Stop reason: SDUPTHER

## 2023-08-29 RX ADMIN — FAMOTIDINE 20 MG: 10 INJECTION INTRAVENOUS at 20:33

## 2023-08-29 RX ADMIN — SODIUM CHLORIDE 500 ML: 9 INJECTION, SOLUTION INTRAVENOUS at 20:34

## 2023-08-29 RX ADMIN — ONDANSETRON 4 MG: 2 INJECTION INTRAMUSCULAR; INTRAVENOUS at 21:14

## 2023-08-29 RX ADMIN — DROPERIDOL 2.5 MG: 2.5 INJECTION, SOLUTION INTRAMUSCULAR; INTRAVENOUS at 20:33

## 2023-08-29 RX ADMIN — DIPHENHYDRAMINE HYDROCHLORIDE 25 MG: 50 INJECTION, SOLUTION INTRAMUSCULAR; INTRAVENOUS at 21:13
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
## 2023-08-30 ENCOUNTER — TELEPHONE (OUTPATIENT)
Dept: FAMILY MEDICINE CLINIC | Facility: CLINIC | Age: 53
End: 2023-08-30

## 2023-08-30 ENCOUNTER — OFFICE VISIT (OUTPATIENT)
Dept: FAMILY MEDICINE CLINIC | Facility: CLINIC | Age: 53
End: 2023-08-30
Payer: MEDICARE

## 2023-08-30 VITALS
OXYGEN SATURATION: 97 % | HEART RATE: 101 BPM | TEMPERATURE: 98 F | HEIGHT: 62 IN | SYSTOLIC BLOOD PRESSURE: 142 MMHG | DIASTOLIC BLOOD PRESSURE: 96 MMHG | WEIGHT: 260 LBS | BODY MASS INDEX: 47.84 KG/M2

## 2023-08-30 DIAGNOSIS — I10 PRIMARY HYPERTENSION: ICD-10-CM

## 2023-08-30 DIAGNOSIS — R11.2 NAUSEA AND VOMITING, UNSPECIFIED VOMITING TYPE: Primary | ICD-10-CM

## 2023-08-30 DIAGNOSIS — K44.9 HIATAL HERNIA: ICD-10-CM

## 2023-08-30 DIAGNOSIS — J30.9 ALLERGIC RHINITIS, UNSPECIFIED SEASONALITY, UNSPECIFIED TRIGGER: ICD-10-CM

## 2023-08-30 DIAGNOSIS — R77.8 ELEVATED TROPONIN: ICD-10-CM

## 2023-08-30 DIAGNOSIS — Z12.31 SCREENING MAMMOGRAM FOR BREAST CANCER: ICD-10-CM

## 2023-08-30 DIAGNOSIS — J01.90 ACUTE SINUSITIS, RECURRENCE NOT SPECIFIED, UNSPECIFIED LOCATION: ICD-10-CM

## 2023-08-30 DIAGNOSIS — R10.13 EPIGASTRIC PAIN: ICD-10-CM

## 2023-08-30 PROBLEM — R79.89 ELEVATED TROPONIN: Status: ACTIVE | Noted: 2023-08-30

## 2023-08-30 LAB
BACTERIA SPEC AEROBE CULT: NORMAL
BACTERIA SPEC AEROBE CULT: NORMAL

## 2023-08-30 RX ORDER — OXYCODONE HYDROCHLORIDE AND ACETAMINOPHEN 5; 325 MG/1; MG/1
1 TABLET ORAL
COMMUNITY
Start: 2023-07-16

## 2023-08-30 RX ORDER — PROMETHAZINE HYDROCHLORIDE 25 MG/ML
12.5 INJECTION, SOLUTION INTRAMUSCULAR; INTRAVENOUS EVERY 6 HOURS PRN
Status: SHIPPED | OUTPATIENT
Start: 2023-08-30

## 2023-08-30 RX ORDER — PROMETHAZINE HYDROCHLORIDE 25 MG/ML
12.5 INJECTION, SOLUTION INTRAMUSCULAR; INTRAVENOUS EVERY 6 HOURS PRN
Status: DISCONTINUED | OUTPATIENT
Start: 2023-08-30 | End: 2023-08-30

## 2023-08-30 RX ORDER — OMEPRAZOLE 40 MG/1
40 CAPSULE, DELAYED RELEASE ORAL DAILY
Qty: 30 CAPSULE | Refills: 2 | Status: SHIPPED | OUTPATIENT
Start: 2023-08-30

## 2023-08-30 RX ORDER — METHYLPREDNISOLONE ACETATE 40 MG/ML
80 INJECTION, SUSPENSION INTRA-ARTICULAR; INTRALESIONAL; INTRAMUSCULAR; SOFT TISSUE ONCE
Status: DISCONTINUED | OUTPATIENT
Start: 2023-08-30 | End: 2023-08-30

## 2023-08-30 RX ORDER — AZITHROMYCIN 250 MG/1
TABLET, FILM COATED ORAL
Qty: 6 TABLET | Refills: 0 | Status: SHIPPED | OUTPATIENT
Start: 2023-08-30

## 2023-08-30 RX ORDER — PREDNISONE 20 MG/1
1 TABLET ORAL
COMMUNITY
Start: 2023-08-03 | End: 2023-08-30

## 2023-08-30 RX ORDER — PROMETHAZINE HYDROCHLORIDE 25 MG/1
25 TABLET ORAL EVERY 6 HOURS PRN
Qty: 30 TABLET | Refills: 0 | Status: SHIPPED | OUTPATIENT
Start: 2023-08-30

## 2023-08-30 RX ORDER — METHYLPREDNISOLONE ACETATE 80 MG/ML
80 INJECTION, SUSPENSION INTRA-ARTICULAR; INTRALESIONAL; INTRAMUSCULAR; SOFT TISSUE ONCE
Status: COMPLETED | OUTPATIENT
Start: 2023-08-30 | End: 2023-08-30

## 2023-08-30 RX ORDER — CEPHALEXIN 500 MG/1
500 CAPSULE ORAL
COMMUNITY
Start: 2023-07-11 | End: 2023-08-30

## 2023-08-30 RX ADMIN — PROMETHAZINE HYDROCHLORIDE 25 MG: 25 INJECTION, SOLUTION INTRAMUSCULAR; INTRAVENOUS at 07:56

## 2023-08-30 RX ADMIN — METHYLPREDNISOLONE ACETATE 80 MG: 80 INJECTION, SUSPENSION INTRA-ARTICULAR; INTRALESIONAL; INTRAMUSCULAR; SOFT TISSUE at 07:52

## 2023-08-30 NOTE — DISCHARGE INSTRUCTIONS
Testing here today was unremarkable and did not show any acute abnormality.    No sign of bowel obstruction.  Take MiraLAX for constipation or use your suppositories.    Take medications as prescribed for symptomatic treatment and follow-up with PCP for additional guidance

## 2023-08-30 NOTE — PROGRESS NOTES
"     Follow up      Patient Name: Katarzyna Norris  : 1970   MRN: [id number]     Chief Complaint:    Chief Complaint   Patient presents with    Hospital Follow Up Visit    Nausea    Abdominal Pain       History of Present Illness: Katarzyna Norris is a 52 y.o. female who is here today for a hospital follow up.          23  Patient presents to the emergency department for evaluation of nausea and vomiting.  Patient states she was seen here earlier for a little bit of sinus pain and pressure and at that time she still had a little bit of GI upset but was not actively vomiting.  She was placed on Augmentin after she was found to have an ear infection and some sinusitis.  Patient states she took an Augmentin and then shortly thereafter tried to eat and after that she started vomiting and had worsening epigastric pain and has had several episodes of vomiting.  No diarrhea.  Patient denies fever.  No shortness of air or chest pain.  No dysuria.     23  Patient who presents to the emergency department for evaluation of abdominal pain.  Patient reports she was seen here on Thursday and diagnosed with gastritis.  Patient reports taking Zofran which made her feel worse, reports feeling \"jittery\".  Patient reports she has not had a bowel movement in 4-5 days.  Not tried to take anything over-the-counter for that patient reports nausea but denies vomiting, denies fevers.  Patient complaining of upper abdominal pain intermittently still.  Feels bloating and cramping (MARICRUZ Young, provider in triage)     She has not been taking the Augmentin due to it making her more ill. She says she does not feel any better.  She is currently taking Zofran and Bentyl.    Pt continues to be nausea, sinus pressure, pain drainage       Subjective      Review of Systems:   Review of Systems   Constitutional:  Positive for fatigue. Negative for fever.   HENT:  Positive for congestion, ear pain, postnasal drip, sinus " pressure and sinus pain.    Eyes:  Negative for discharge.   Respiratory:  Negative for cough.    Cardiovascular:  Negative for chest pain.   Gastrointestinal:  Positive for abdominal pain, nausea and vomiting. Negative for diarrhea.   Genitourinary:  Negative for dysuria.   Musculoskeletal:  Positive for arthralgias and myalgias.   Allergic/Immunologic: Positive for environmental allergies.   Neurological:  Positive for headaches.      Past Medical History:   Past Medical History:   Diagnosis Date    Anxiety     Arthritis     GILBERT KNEES    Asthma     SEASONAL ALLERGIES    CKD (chronic kidney disease)     Stage 3, Follows with [facility]    Elevated cholesterol     Gout 2021    Hernia     Upper    Hiatal hernia     Hypertension     Renal insufficiency     follows with [facility]    Sinus problem     SEASONAL ALLERGIES       Past Surgical History:   Past Surgical History:   Procedure Laterality Date    BONY PELVIS SURGERY      Plate     SECTION   and     CHOLECYSTECTOMY      COLONOSCOPY      ENDOSCOPY      ENDOSCOPY N/A 3/14/2023    Procedure: ESOPHAGOGASTRODUODENOSCOPY with biopsies;  Surgeon: Tam Badillo MD;  Location: [facility] ENDOSCOPY;  Service: General;  Laterality: N/A;  hiatal hernia    TOTAL KNEE ARTHROPLASTY Left 2021    Procedure: TOTAL KNEE ARTHROPLASTY;  Surgeon: Marco Nelson MD;  Location: [facility] MAIN OR;  Service: Orthopedics;  Laterality: Left;    TOTAL KNEE ARTHROPLASTY Right 10/19/2022    Procedure: RIGHT TOTAL KNEE ARTHROPLASTY - NO SHELBY;  Surgeon: Marco Nelson MD;  Location: [facility] MAIN OR;  Service: Orthopedics;  Laterality: Right;    TUBAL ABDOMINAL LIGATION         Family History:   Family History   Problem Relation Age of Onset    Throat cancer Father 73    Stroke Other     Diabetes Other     Malig Hyperthermia Neg Hx        Social History:   Social History     Socioeconomic History    Marital status:    Tobacco Use    Smoking  status: Former     Types: Cigarettes     Quit date: 2013     Years since quitting: 10.6    Smokeless tobacco: Never   Vaping Use    Vaping Use: Never used   Substance and Sexual Activity    Alcohol use: Never    Drug use: Never    Sexual activity: Defer       Medications:     Current Outpatient Medications:     albuterol sulfate  (90 Base) MCG/ACT inhaler, Take 1-2 Puffs every 4 hours prn, Disp: 8 g, Rfl: 1    allopurinol (ZYLOPRIM) 300 MG tablet, Take 1 tablet by mouth Daily., Disp: 180 tablet, Rfl: 1    atorvastatin (LIPITOR) 20 MG tablet, Take 1 tablet by mouth Every Night., Disp: 90 tablet, Rfl: 1    carvedilol (COREG) 25 MG tablet, Take 1 tablet by mouth 2 (Two) Times a Day With Meals., Disp: 180 tablet, Rfl: 1    cloNIDine (CATAPRES) 0.2 MG tablet, Take 1 tablet by mouth 3 (Three) Times a Day., Disp: 270 tablet, Rfl: 1    dicyclomine (BENTYL) 20 MG tablet, Take 1 tablet by mouth Every 6 (Six) Hours., Disp: 20 tablet, Rfl: 0    fluticasone (FLONASE) 50 MCG/ACT nasal spray, 2 sprays into the nostril(s) as directed by provider Daily. 2 sprays each nostril daily, Disp: 18.2 mL, Rfl: 1    hydrocortisone (ANUSOL-HC) 25 MG suppository, Insert 1 suppository into the rectum 2 (Two) Times a Day., Disp: 28 suppository, Rfl: 1    Hydrocortisone, Perianal, (ANUSOL-HC) 2.5 % rectal cream, Insert  into the rectum 2 (Two) Times a Day., Disp: 28 g, Rfl: 3    linaclotide (Linzess) 290 MCG capsule capsule, Take 1 capsule by mouth Every Morning Before Breakfast., Disp: 30 capsule, Rfl: 2    Lokelma 10 g pack, TAKE 10 GRAM BY MOUTH 1 TIME EACH DAY, Disp: , Rfl:     losartan (COZAAR) 100 MG tablet, Take 1 tablet by mouth Daily., Disp: 90 tablet, Rfl: 1    omeprazole (priLOSEC) 40 MG capsule, Take 1 capsule by mouth Daily., Disp: 30 capsule, Rfl: 2    oxyCODONE-acetaminophen (PERCOCET) 5-325 MG per tablet, Take 1 tablet by mouth., Disp: , Rfl:     polyethylene glycol (MIRALAX) 17 g packet, Take 17 g by mouth Daily for 7  "days., Disp: 7 packet, Rfl: 0    vitamin B-12 (CYANOCOBALAMIN) 100 MCG tablet, Take 0.5 tablets by mouth Daily., Disp: , Rfl:     zinc gluconate 50 MG tablet, Take 1 tablet by mouth Daily., Disp: , Rfl:     azithromycin (Zithromax Z-Yair) 250 MG tablet, Take 2 tablets by mouth on day 1, then 1 tablet daily on days 2-5, Disp: 6 tablet, Rfl: 0    promethazine (PHENERGAN) 25 MG tablet, Take 1 tablet by mouth Every 6 (Six) Hours As Needed for Nausea or Vomiting., Disp: 30 tablet, Rfl: 0    Current Facility-Administered Medications:     methylPREDNISolone acetate (DEPO-medrol) injection 80 mg, 80 mg, Intramuscular, Once, Brian Ellis APRN    Allergies:   Allergies   Allergen Reactions    Amlodipine Shortness Of Breath    Diltiazem Hcl Anxiety    Augmentin [Amoxicillin-Pot Clavulanate] Nausea And Vomiting    Zofran [Ondansetron] Confusion    Contrast Dye (Echo Or Unknown Ct/Mr) Palpitations     Patient gets to the point of passing out , heart races     Iodinated Contrast Media Palpitations     Patient gets to the point of passing out , heart races         Objective     Physical Exam:  Vital Signs:   Vitals:    08/30/23 0709 08/30/23 0740   BP: (!) 194/117 142/96   Pulse: 101    Temp: 98 øF (36.7 øC)    SpO2: 97%    Weight: 118 kg (260 lb)    Height: 157.5 cm (62.01\")      Body mass index is 47.54 kg/mý.     Physical Exam  HENT:      Right Ear: A middle ear effusion is present.      Left Ear: A middle ear effusion is present.      Nose: Congestion and rhinorrhea present.      Right Turbinates: Enlarged and swollen.      Left Turbinates: Enlarged and swollen.      Right Sinus: Maxillary sinus tenderness present.      Left Sinus: Maxillary sinus tenderness present.      Mouth/Throat:      Comments: pnd  Eyes:      Conjunctiva/sclera:      Right eye: Right conjunctiva is injected.      Left eye: Left conjunctiva is injected.   Neck:      Vascular: No carotid bruit.   Cardiovascular:      Rate and Rhythm: Normal " rate and regular rhythm.   Pulmonary:      Effort: Pulmonary effort is normal.      Breath sounds: Normal breath sounds.   Abdominal:      General: Bowel sounds are normal.      Palpations: Abdomen is soft.   Musculoskeletal:      Right lower leg: No edema.      Left lower leg: No edema.   Lymphadenopathy:      Cervical: No cervical adenopathy.   Skin:     General: Skin is warm and dry.   Neurological:      Mental Status: She is alert.           Assessment / Plan      Assessment/Plan:   Diagnoses and all orders for this visit:    1. Nausea and vomiting, unspecified vomiting type (Primary)  -     Comprehensive Metabolic Panel  -     CBC Auto Differential  -     Helicobacter Pylori, IgA IgG IgM  -     Amylase  -     Lipase    2. Epigastric pain  -     Comprehensive Metabolic Panel  -     CBC Auto Differential  -     Helicobacter Pylori, IgA IgG IgM  -     Amylase  -     Lipase  -     Ambulatory Referral to Cardiology    3. Hiatal hernia    4. Acute sinusitis, recurrence not specified, unspecified location  -     methylPREDNISolone acetate (DEPO-medrol) injection 80 mg    5. Elevated troponin  -     High Sensitivity Troponin T  -     Ambulatory Referral to Cardiology    6. Allergic rhinitis, unspecified seasonality, unspecified trigger  -     methylPREDNISolone acetate (DEPO-medrol) injection 80 mg    7. Primary hypertension    8. Screening mammogram for breast cancer  -     Mammo Screening Digital Tomosynthesis Bilateral With CAD; Future    Other orders  -     promethazine (PHENERGAN) 25 MG tablet; Take 1 tablet by mouth Every 6 (Six) Hours As Needed for Nausea or Vomiting.  Dispense: 30 tablet; Refill: 0  -     omeprazole (priLOSEC) 40 MG capsule; Take 1 capsule by mouth Daily.  Dispense: 30 capsule; Refill: 2  -     azithromycin (Zithromax Z-Yair) 250 MG tablet; Take 2 tablets by mouth on day 1, then 1 tablet daily on days 2-5  Dispense: 6 tablet; Refill: 0         Nausea vomiting epigastric pain with hiatal hernia  we will provide a refill for omeprazole patient reports she did not receive this prescription from pharmacy a prescription for Phenergan oral tablet patient reports her insurance does not cover suppositories prescription provided by ER we will obtain labs  Epigastric pain elevated troponin we will refer to cardiology  Acute sinusitis we will treat with azithromycin and Depo-Medrol 80 as patient had a reaction to Augmentin  Allergic rhinitis continue Flonase we will provide Depo-Medrol 80 in office  Hypertension elevated upon arrival clinic manual recheck improved keep follow-up appoint with nephrology  We will provide order for screening mammogram denies lumps masses blood or discharge from nipple      Follow Up:   Return in about 8 weeks (around 10/25/2023), or if symptoms worsen or fail to improve.    Frandy Ellis, APRN      Please note that portions of this note were completed with a voice recognition program.

## 2023-09-01 ENCOUNTER — TELEPHONE (OUTPATIENT)
Dept: OTOLARYNGOLOGY | Facility: CLINIC | Age: 53
End: 2023-09-01
Payer: MEDICARE

## 2023-09-01 DIAGNOSIS — M79.18 MYOFASCIAL MUSCLE PAIN: Primary | ICD-10-CM

## 2023-09-01 RX ORDER — PREDNISONE 20 MG/1
40 TABLET ORAL DAILY
Qty: 10 TABLET | Refills: 0 | Status: SHIPPED | OUTPATIENT
Start: 2023-09-01 | End: 2023-09-06

## 2023-09-02 ENCOUNTER — DOCUMENTATION (OUTPATIENT)
Dept: FAMILY MEDICINE CLINIC | Facility: CLINIC | Age: 53
End: 2023-09-02
Payer: MEDICARE

## 2023-09-02 RX ORDER — DICYCLOMINE HCL 20 MG
20 TABLET ORAL EVERY 6 HOURS
Qty: 120 TABLET | Refills: 0 | Status: SHIPPED | OUTPATIENT
Start: 2023-09-02

## 2023-09-05 ENCOUNTER — TELEPHONE (OUTPATIENT)
Dept: FAMILY MEDICINE CLINIC | Facility: CLINIC | Age: 53
End: 2023-09-05
Payer: MEDICARE

## 2023-09-05 DIAGNOSIS — K30 STOMACH BURNING: ICD-10-CM

## 2023-09-05 DIAGNOSIS — R14.0 BLOATING: Primary | ICD-10-CM

## 2023-09-05 NOTE — TELEPHONE ENCOUNTER
Patient has a question about Dicyclomine 20 mg. She said she read that you shouldn't take it longer than 2 weeks. I'm also confused because it says that [facility] filled this for patient but I don't see an office visit to go with it. Please advise.

## 2023-09-06 ENCOUNTER — TELEPHONE (OUTPATIENT)
Dept: FAMILY MEDICINE CLINIC | Facility: CLINIC | Age: 53
End: 2023-09-06
Payer: MEDICARE

## 2023-09-06 NOTE — TELEPHONE ENCOUNTER
Patient wants to know if the dicyclomine 20 makes you jittery or addictive?  Patient states that when she she takes it at 3 pm on one day and by the time she wakes up the next morning she is shaky and stomach feels like it is on fire. Please Advise. 09/06/23

## 2023-09-06 NOTE — TELEPHONE ENCOUNTER
Patient also wants to know if the dicyclomine can make your tongue feel numb? She said when she first put it in her mouth it felt that way. But that it did that the other day too when she took a phenergan.

## 2023-09-09 ENCOUNTER — APPOINTMENT (OUTPATIENT)
Dept: GENERAL RADIOLOGY | Facility: HOSPITAL | Age: 53
End: 2023-09-09
Payer: MEDICARE

## 2023-09-09 ENCOUNTER — HOSPITAL ENCOUNTER (EMERGENCY)
Facility: HOSPITAL | Age: 53
Discharge: HOME | End: 2023-09-09
Attending: EMERGENCY MEDICINE
Payer: MEDICARE

## 2023-09-09 VITALS
WEIGHT: 253.53 LBS | DIASTOLIC BLOOD PRESSURE: 73 MMHG | TEMPERATURE: 98.1 F | BODY MASS INDEX: 46.66 KG/M2 | HEIGHT: 62 IN | HEART RATE: 81 BPM | OXYGEN SATURATION: 98 % | SYSTOLIC BLOOD PRESSURE: 138 MMHG | RESPIRATION RATE: 16 BRPM

## 2023-09-09 VITALS
DIASTOLIC BLOOD PRESSURE: 101 MMHG | SYSTOLIC BLOOD PRESSURE: 164 MMHG | HEIGHT: 62 IN | BODY MASS INDEX: 46.7 KG/M2 | WEIGHT: 253.75 LBS | TEMPERATURE: 98.9 F | OXYGEN SATURATION: 97 % | HEART RATE: 92 BPM | RESPIRATION RATE: 19 BRPM

## 2023-09-09 DIAGNOSIS — T50.905A ADVERSE EFFECT OF DRUG, INITIAL ENCOUNTER: Primary | ICD-10-CM

## 2023-09-09 DIAGNOSIS — J30.2 SEASONAL ALLERGIES: Primary | ICD-10-CM

## 2023-09-09 LAB
ALBUMIN SERPL-MCNC: 4.4 G/DL (ref 3.5–5.2)
ALBUMIN/GLOB SERPL: 2.4 G/DL
ALP SERPL-CCNC: 62 U/L (ref 39–117)
ALT SERPL W P-5'-P-CCNC: 26 U/L (ref 1–33)
ANION GAP SERPL CALCULATED.3IONS-SCNC: 10.2 MMOL/L (ref 5–15)
AST SERPL-CCNC: 18 U/L (ref 1–32)
BASOPHILS # BLD AUTO: 0.05 10*3/MM3 (ref 0–0.2)
BASOPHILS NFR BLD AUTO: 0.8 % (ref 0–1.5)
BILIRUB SERPL-MCNC: 0.9 MG/DL (ref 0–1.2)
BUN SERPL-MCNC: 44 MG/DL (ref 6–20)
BUN/CREAT SERPL: 16.6 (ref 7–25)
CALCIUM SPEC-SCNC: 10 MG/DL (ref 8.6–10.5)
CHLORIDE SERPL-SCNC: 95 MMOL/L (ref 98–107)
CK SERPL-CCNC: 31 U/L (ref 20–180)
CO2 SERPL-SCNC: 24.8 MMOL/L (ref 22–29)
CREAT SERPL-MCNC: 2.65 MG/DL (ref 0.57–1)
DEPRECATED RDW RBC AUTO: 53 FL (ref 37–54)
EGFRCR SERPLBLD CKD-EPI 2021: 20.8 ML/MIN/1.73
EOSINOPHIL # BLD AUTO: 0.06 10*3/MM3 (ref 0–0.4)
EOSINOPHIL NFR BLD AUTO: 0.9 % (ref 0.3–6.2)
ERYTHROCYTE [DISTWIDTH] IN BLOOD BY AUTOMATED COUNT: 14.5 % (ref 12.3–15.4)
FLUAV AG NPH QL: NEGATIVE
FLUBV AG NPH QL IA: NEGATIVE
GLOBULIN UR ELPH-MCNC: 1.8 GM/DL
GLUCOSE SERPL-MCNC: 112 MG/DL (ref 65–99)
HCT VFR BLD AUTO: 35.9 % (ref 34–46.6)
HGB BLD-MCNC: 12.5 G/DL (ref 12–15.9)
IMM GRANULOCYTES # BLD AUTO: 0.08 10*3/MM3 (ref 0–0.05)
IMM GRANULOCYTES NFR BLD AUTO: 1.2 % (ref 0–0.5)
LYMPHOCYTES # BLD AUTO: 1.88 10*3/MM3 (ref 0.7–3.1)
LYMPHOCYTES NFR BLD AUTO: 28.6 % (ref 19.6–45.3)
MAGNESIUM SERPL-MCNC: 1.9 MG/DL (ref 1.6–2.6)
MCH RBC QN AUTO: 34.9 PG (ref 26.6–33)
MCHC RBC AUTO-ENTMCNC: 34.8 G/DL (ref 31.5–35.7)
MCV RBC AUTO: 100.3 FL (ref 79–97)
MONOCYTES # BLD AUTO: 0.61 10*3/MM3 (ref 0.1–0.9)
MONOCYTES NFR BLD AUTO: 9.3 % (ref 5–12)
NEUTROPHILS NFR BLD AUTO: 3.89 10*3/MM3 (ref 1.7–7)
NEUTROPHILS NFR BLD AUTO: 59.2 % (ref 42.7–76)
NRBC BLD AUTO-RTO: 0 /100 WBC (ref 0–0.2)
PLATELET # BLD AUTO: 176 10*3/MM3 (ref 140–450)
PMV BLD AUTO: 9.2 FL (ref 6–12)
POTASSIUM SERPL-SCNC: 4.5 MMOL/L (ref 3.5–5.2)
PROT SERPL-MCNC: 6.2 G/DL (ref 6–8.5)
RBC # BLD AUTO: 3.58 10*6/MM3 (ref 3.77–5.28)
S PYO AG THROAT QL: NEGATIVE
SARS-COV-2 RNA RESP QL NAA+PROBE: NOT DETECTED
SODIUM SERPL-SCNC: 130 MMOL/L (ref 136–145)
WBC NRBC COR # BLD: 6.57 10*3/MM3 (ref 3.4–10.8)

## 2023-09-09 PROCEDURE — 71045 X-RAY EXAM CHEST 1 VIEW: CPT

## 2023-09-09 PROCEDURE — 99283 EMERGENCY DEPT VISIT LOW MDM: CPT

## 2023-09-09 PROCEDURE — 96374 THER/PROPH/DIAG INJ IV PUSH: CPT

## 2023-09-09 PROCEDURE — 82550 ASSAY OF CK (CPK): CPT | Performed by: EMERGENCY MEDICINE

## 2023-09-09 PROCEDURE — 63710000001 DIPHENHYDRAMINE PER 50 MG

## 2023-09-09 PROCEDURE — 87635 SARS-COV-2 COVID-19 AMP PRB: CPT | Performed by: EMERGENCY MEDICINE

## 2023-09-09 PROCEDURE — 87804 INFLUENZA ASSAY W/OPTIC: CPT | Performed by: EMERGENCY MEDICINE

## 2023-09-09 PROCEDURE — 80053 COMPREHEN METABOLIC PANEL: CPT | Performed by: EMERGENCY MEDICINE

## 2023-09-09 PROCEDURE — 87880 STREP A ASSAY W/OPTIC: CPT | Performed by: EMERGENCY MEDICINE

## 2023-09-09 PROCEDURE — 83735 ASSAY OF MAGNESIUM: CPT | Performed by: EMERGENCY MEDICINE

## 2023-09-09 PROCEDURE — 85025 COMPLETE CBC W/AUTO DIFF WBC: CPT | Performed by: EMERGENCY MEDICINE

## 2023-09-09 PROCEDURE — 87081 CULTURE SCREEN ONLY: CPT | Performed by: EMERGENCY MEDICINE

## 2023-09-09 PROCEDURE — 25010000002 ONDANSETRON PER 1 MG: Performed by: EMERGENCY MEDICINE

## 2023-09-09 RX ORDER — KETOROLAC TROMETHAMINE 30 MG/ML
30 INJECTION, SOLUTION INTRAMUSCULAR; INTRAVENOUS ONCE
Status: DISCONTINUED | OUTPATIENT
Start: 2023-09-09 | End: 2023-09-09 | Stop reason: HOSPADM

## 2023-09-09 RX ORDER — ACETAMINOPHEN 500 MG
1000 TABLET ORAL ONCE
Status: COMPLETED | OUTPATIENT
Start: 2023-09-09 | End: 2023-09-09

## 2023-09-09 RX ORDER — DIPHENHYDRAMINE HCL 25 MG
50 CAPSULE ORAL ONCE
Status: COMPLETED | OUTPATIENT
Start: 2023-09-09 | End: 2023-09-09

## 2023-09-09 RX ORDER — PREDNISONE 50 MG/1
50 TABLET ORAL DAILY
Qty: 3 TABLET | Refills: 0 | Status: SHIPPED | OUTPATIENT
Start: 2023-09-09 | End: 2023-09-12

## 2023-09-09 RX ORDER — ONDANSETRON 2 MG/ML
4 INJECTION INTRAMUSCULAR; INTRAVENOUS ONCE
Status: COMPLETED | OUTPATIENT
Start: 2023-09-09 | End: 2023-09-09

## 2023-09-09 RX ADMIN — ACETAMINOPHEN 1000 MG: 500 TABLET ORAL at 08:49

## 2023-09-09 RX ADMIN — SODIUM CHLORIDE 1000 ML: 9 INJECTION, SOLUTION INTRAVENOUS at 07:12

## 2023-09-09 RX ADMIN — ONDANSETRON 4 MG: 2 INJECTION INTRAMUSCULAR; INTRAVENOUS at 07:14

## 2023-09-09 RX ADMIN — DIPHENHYDRAMINE HYDROCHLORIDE 50 MG: 25 CAPSULE ORAL at 17:29

## 2023-09-09 NOTE — ED PROVIDER NOTES
Time: 6:52 AM EDT  Date of encounter:  2023  Independent Historian/Clinical History and Information was obtained by:   Patient    History is limited by: N/A    Chief Complaint: Oral swelling      History of Present Illness:  Patient is a 52 y.o. year old female who presents to the emergency department for evaluation of oral swelling, constipation, nausea, and scratchy throat, and dry eyes that is gotten worse over the past 2 to 3 days.  Patient denies fever and chills.  Patient has no chest pain or shortness of breath.  Patient has had a dry cough.  Patient denies abdominal pain.  Patient has no dysuria urinary frequency.    HPI    Patient Care Team  Primary Care Provider: Brian Ellis APRN    Past Medical History:     Allergies   Allergen Reactions    Amlodipine Shortness Of Breath    Diltiazem Hcl Anxiety    Augmentin [Amoxicillin-Pot Clavulanate] Nausea And Vomiting    Zofran [Ondansetron] Confusion    Contrast Dye (Echo Or Unknown Ct/Mr) Palpitations     Patient gets to the point of passing out , heart races     Iodinated Contrast Media Palpitations     Patient gets to the point of passing out , heart races     Past Medical History:   Diagnosis Date    Anxiety     Arthritis     GILBERT KNEES    Asthma     SEASONAL ALLERGIES    CKD (chronic kidney disease)     Stage 3, Follows with [facility]    Elevated cholesterol     Gout 2021    Hernia     Upper    Hiatal hernia     Hypertension     Renal insufficiency     follows with [facility]    Sinus problem     SEASONAL ALLERGIES     Past Surgical History:   Procedure Laterality Date    BONY PELVIS SURGERY      Plate     SECTION   and     CHOLECYSTECTOMY      COLONOSCOPY      ENDOSCOPY  2009    ENDOSCOPY N/A 3/14/2023    Procedure: ESOPHAGOGASTRODUODENOSCOPY with biopsies;  Surgeon: Tam Badillo MD;  Location: [facility] ENDOSCOPY;  Service: General;  Laterality: N/A;  hiatal hernia    TOTAL KNEE ARTHROPLASTY Left 2021     Procedure: TOTAL KNEE ARTHROPLASTY;  Surgeon: Marco Nelson MD;  Location: [facility] MAIN OR;  Service: Orthopedics;  Laterality: Left;    TOTAL KNEE ARTHROPLASTY Right 10/19/2022    Procedure: RIGHT TOTAL KNEE ARTHROPLASTY - NO SHELBY;  Surgeon: Marco Nelson MD;  Location: [facility] MAIN OR;  Service: Orthopedics;  Laterality: Right;    TUBAL ABDOMINAL LIGATION  1989     Family History   Problem Relation Age of Onset    Throat cancer Father 73    Stroke Other     Diabetes Other     Malig Hyperthermia Neg Hx        Home Medications:  Prior to Admission medications    Medication Sig Start Date End Date Taking? Authorizing Provider   albuterol sulfate  (90 Base) MCG/ACT inhaler Take 1-2 Puffs every 4 hours prn 4/11/23   Brian Ellis APRN   allopurinol (ZYLOPRIM) 300 MG tablet Take 1 tablet by mouth Daily. 6/8/23   Brian Ellis APRN   atorvastatin (LIPITOR) 20 MG tablet Take 1 tablet by mouth Every Night. 6/8/23   Brian Ellis APRN   azithromycin (Zithromax Z-Yair) 250 MG tablet Take 2 tablets by mouth on day 1, then 1 tablet daily on days 2-5 8/30/23   Brian Ellis APRN   carvedilol (COREG) 25 MG tablet Take 1 tablet by mouth 2 (Two) Times a Day With Meals. 6/8/23   Brian Ellis APRN   cloNIDine (CATAPRES) 0.2 MG tablet Take 1 tablet by mouth 3 (Three) Times a Day. 6/8/23   Brian Ellis APRN   dicyclomine (BENTYL) 20 MG tablet Take 1 tablet by mouth Every 6 (Six) Hours. 8/25/23   Denisha Salgado APRN   dicyclomine (BENTYL) 20 MG tablet Take 1 tablet by mouth Every 6 (Six) Hours. 9/2/23   Zakia Cook APRN   fluticasone (FLONASE) 50 MCG/ACT nasal spray 2 sprays into the nostril(s) as directed by provider Daily. 2 sprays each nostril daily 1/19/23   Brian Ellis APRN   hydrocortisone (ANUSOL-HC) 25 MG suppository Insert 1 suppository into the rectum 2 (Two) Times a Day. 8/7/23   Val, MARICRUZ Vizcaino    Hydrocortisone, Perianal, (ANUSOL-HC) 2.5 % rectal cream Insert  into the rectum 2 (Two) Times a Day. 8/7/23   Naida Neal APRN   linaclotide (Linzess) 290 MCG capsule capsule Take 1 capsule by mouth Every Morning Before Breakfast. 8/7/23   Naida Neal APRN   Lokelma 10 g pack TAKE 10 GRAM BY MOUTH 1 TIME EACH DAY 12/1/22   Al Ceballos MD   losartan (COZAAR) 100 MG tablet Take 1 tablet by mouth Daily. 6/8/23   Brian Ellis APRN   omeprazole (priLOSEC) 40 MG capsule Take 1 capsule by mouth Daily. 8/30/23   Brian Ellis APRN   oxyCODONE-acetaminophen (PERCOCET) 5-325 MG per tablet Take 1 tablet by mouth. 7/16/23   Al Ceballos MD   promethazine (PHENERGAN) 25 MG tablet Take 1 tablet by mouth Every 6 (Six) Hours As Needed for Nausea or Vomiting. 8/30/23   Brian Ellis APRN   vitamin B-12 (CYANOCOBALAMIN) 100 MCG tablet Take 0.5 tablets by mouth Daily.    Al Ceballos MD   zinc gluconate 50 MG tablet Take 1 tablet by mouth Daily.    Al Ceballos MD        Social History:   Social History     Tobacco Use    Smoking status: Former     Types: Cigarettes     Quit date: 2013     Years since quitting: 10.6    Smokeless tobacco: Never   Vaping Use    Vaping Use: Never used   Substance Use Topics    Alcohol use: Never    Drug use: Never         Review of Systems:  Review of Systems   Constitutional:  Negative for chills and fever.   HENT:  Positive for sore throat. Negative for congestion and rhinorrhea.    Eyes:  Negative for pain and visual disturbance.   Respiratory:  Negative for apnea, cough, chest tightness and shortness of breath.    Cardiovascular:  Negative for chest pain and palpitations.   Gastrointestinal:  Negative for abdominal pain, diarrhea, nausea and vomiting.   Genitourinary:  Negative for difficulty urinating and dysuria.   Musculoskeletal:  Negative for joint swelling and myalgias.   Skin:  Negative for color  "change.   Neurological:  Negative for seizures and headaches.   Psychiatric/Behavioral: Negative.     All other systems reviewed and are negative.     Physical Exam:  BP (!) 164/101 (BP Location: Left arm, Patient Position: Sitting)   Pulse 92   Temp 98.9 °F (37.2 °C) (Oral)   Resp 19   Ht 157.5 cm (62\")   Wt 115 kg (253 lb 12 oz)   SpO2 97%   BMI 46.41 kg/m²     Physical Exam  Vitals and nursing note reviewed.   Constitutional:       General: She is not in acute distress.     Appearance: Normal appearance. She is not toxic-appearing.   HENT:      Head: Normocephalic and atraumatic.      Jaw: There is normal jaw occlusion.      Mouth/Throat:      Comments: (+) Pharyngeal erythema  Eyes:      General: Lids are normal.      Extraocular Movements: Extraocular movements intact.      Conjunctiva/sclera: Conjunctivae normal.      Pupils: Pupils are equal, round, and reactive to light.   Cardiovascular:      Rate and Rhythm: Normal rate and regular rhythm.      Pulses: Normal pulses.      Heart sounds: Normal heart sounds.   Pulmonary:      Effort: Pulmonary effort is normal. No respiratory distress.      Breath sounds: Normal breath sounds. No wheezing or rhonchi.   Abdominal:      General: Abdomen is flat.      Palpations: Abdomen is soft.      Tenderness: There is no abdominal tenderness. There is no guarding or rebound.   Musculoskeletal:         General: Normal range of motion.      Cervical back: Normal range of motion and neck supple.      Right lower leg: No edema.      Left lower leg: No edema.   Skin:     General: Skin is warm and dry.   Neurological:      Mental Status: She is alert and oriented to person, place, and time. Mental status is at baseline.   Psychiatric:         Mood and Affect: Mood normal.                Procedures:  Procedures      Medical Decision Making:      Comorbidities that affect care:    Gastric bypass    External Notes reviewed:    Previous Labs: Previous creatinines were noted " and the patient has a baseline creatinine around 2.5.      The following orders were placed and all results were independently analyzed by me:  Orders Placed This Encounter   Procedures    Influenza Antigen, Rapid - Swab, Nasopharynx    Rapid Strep A Screen - Swab, Throat    COVID-19,CEPHEID/WHITLEY,COR/SVEN/PAD/ARIAS/MAD IN-HOUSE(OR EMERGENT/ADD-ON),NP SWAB IN TRANSPORT MEDIA 3-4 HR TAT, RT-PCR - Swab, Nasopharynx    Beta Strep Culture, Throat - Swab, Throat    XR Chest 1 View    Comprehensive Metabolic Panel    Magnesium    CK    CBC Auto Differential    CBC & Differential       Medications Given in the Emergency Department:  Medications   ketorolac (TORADOL) injection 30 mg (30 mg Intravenous Not Given 9/9/23 0716)   ondansetron (ZOFRAN) injection 4 mg (4 mg Intravenous Given 9/9/23 0714)   sodium chloride 0.9 % bolus 1,000 mL (0 mL Intravenous Stopped 9/9/23 0934)   acetaminophen (TYLENOL) tablet 1,000 mg (1,000 mg Oral Given 9/9/23 0849)        ED Course:         Labs:    Lab Results (last 24 hours)       Procedure Component Value Units Date/Time    CBC & Differential [[id number]]  (Abnormal) Collected: 09/09/23 0711    Specimen: Blood Updated: 09/09/23 0731    Narrative:      The following orders were created for panel order CBC & Differential.  Procedure                               Abnormality         Status                     ---------                               -----------         ------                     CBC Auto Differential[[id number]]        Abnormal            Final result                 Please view results for these tests on the individual orders.    CBC Auto Differential [[id number]]  (Abnormal) Collected: 09/09/23 0711    Specimen: Blood Updated: 09/09/23 0731     WBC 6.57 10*3/mm3      RBC 3.58 10*6/mm3      Hemoglobin 12.5 g/dL      Hematocrit 35.9 %      .3 fL      MCH 34.9 pg      MCHC 34.8 g/dL      RDW 14.5 %      RDW-SD 53.0 fl      MPV 9.2 fL      Platelets 176 10*3/mm3       Neutrophil % 59.2 %      Lymphocyte % 28.6 %      Monocyte % 9.3 %      Eosinophil % 0.9 %      Basophil % 0.8 %      Immature Grans % 1.2 %      Neutrophils, Absolute 3.89 10*3/mm3      Lymphocytes, Absolute 1.88 10*3/mm3      Monocytes, Absolute 0.61 10*3/mm3      Eosinophils, Absolute 0.06 10*3/mm3      Basophils, Absolute 0.05 10*3/mm3      Immature Grans, Absolute 0.08 10*3/mm3      nRBC 0.0 /100 WBC     Influenza Antigen, Rapid - Swab, Nasopharynx [[id number]]  (Normal) Collected: 09/09/23 0809    Specimen: Swab from Nasopharynx Updated: 09/09/23 0838     Influenza A Ag, EIA Negative     Influenza B Ag, EIA Negative    Rapid Strep A Screen - Swab, Throat [[id number]]  (Normal) Collected: 09/09/23 0809    Specimen: Swab from Throat Updated: 09/09/23 0828     Strep A Ag Negative    COVID-19,CEPHEID/WHITLEY,COR/SVEN/PAD/ARIAS/MAD IN-HOUSE(OR EMERGENT/ADD-ON),NP SWAB IN TRANSPORT MEDIA 3-4 HR TAT, RT-PCR - Swab, Nasopharynx [[id number]]  (Normal) Collected: 09/09/23 0809    Specimen: Swab from Nasopharynx Updated: 09/09/23 0856     COVID19 Not Detected    Narrative:      Fact sheet for providers: https://www.fda.gov/media/508012/download     Fact sheet for patients: https://www.fda.gov/media/366622/download  Fact sheet for providers: https://www.fda.gov/media/197591/download     Fact sheet for patients: https://www.fda.gov/media/526884/download    Beta Strep Culture, Throat - Swab, Throat [[id number]] Collected: 09/09/23 0809    Specimen: Swab from Throat Updated: 09/09/23 0828    Comprehensive Metabolic Panel [[id number]]  (Abnormal) Collected: 09/09/23 0842    Specimen: Blood Updated: 09/09/23 0914     Glucose 112 mg/dL      BUN 44 mg/dL      Creatinine 2.65 mg/dL      Sodium 130 mmol/L      Potassium 4.5 mmol/L      Chloride 95 mmol/L      CO2 24.8 mmol/L      Calcium 10.0 mg/dL      Total Protein 6.2 g/dL      Albumin 4.4 g/dL      ALT (SGPT) 26 U/L      AST (SGOT) 18 U/L      Alkaline Phosphatase 62 U/L      Total  Bilirubin 0.9 mg/dL      Globulin 1.8 gm/dL      A/G Ratio 2.4 g/dL      BUN/Creatinine Ratio 16.6     Anion Gap 10.2 mmol/L      eGFR 20.8 mL/min/1.73     Narrative:      GFR Normal >60  Chronic Kidney Disease <60  Kidney Failure <15      Magnesium [[id number]]  (Normal) Collected: 09/09/23 0842    Specimen: Blood Updated: 09/09/23 0914     Magnesium 1.9 mg/dL     CK [[id number]]  (Normal) Collected: 09/09/23 0842    Specimen: Blood Updated: 09/09/23 0914     Creatine Kinase 31 U/L              Imaging:    XR Chest 1 View    Result Date: 9/9/2023  PROCEDURE: XR CHEST 1 VW  COMPARISON: [facility], CR, XR CHEST 1 VW, 6/27/2023, 8:46.  INDICATIONS: PANIC ATTACK WITH SHORTNESS OF BREATH  FINDINGS:  Borderline low lung volumes.  Negative for lobar infiltrate, edema, large effusion or pneumothorax.  Heart size unchanged from prior exam.  Osseous structures grossly unremarkable.        No active pulmonary process.       JOANNA MEAD MD       Electronically Signed and Approved By: JOANNA MEAD MD on 9/09/2023 at 7:33                Differential Diagnosis and Discussion:    Sore Throat: Differential diagnosis includes but is not limited to bacterial infection, viral infection, inhaled irritants, sinus drainage, thyroiditis, epiglottitis, and retropharyngeal abscess.        MDM     Amount and/or Complexity of Data Reviewed  Decide to obtain previous medical records or to obtain history from someone other than the patient: yes       The patient´s CBC that was reviewed and interpreted by me shows no abnormalities of critical concern. Of note, there is no anemia requiring a blood transfusion and the platelet count is acceptable.  The patient´s CMP that was reviewed and interpretted by me shows no abnormalities of critical concern. Of note, the patient´s sodium and potassium are acceptable. The patient´s liver enzymes are unremarkable. The patient´s renal function (creatinine) is preserved. The patient has a  normal anion gap.  COVID-19 swab is negative.  Rapid strep is negative.  Influenza swab is negative.  Patient's symptoms are consistent with seasonal allergies.  Patient has no respiratory distress, nor does she have hypoxia.  Patient is stable and suitable for discharge.      Patient Care Considerations:    PERC: I used the PERC score to risk stratify the patient for PE and a CT of the chest was considered but ultimately not indicated in today's visit.      Consultants/Shared Management Plan:    None    Social Determinants of Health:    Patient is independent, reliable, and has access to care.       Disposition and Care Coordination:    Discharged: I considered escalation of care by admitting this patient for observation, however the patient has improved and is suitable and  stable for discharge.    I have explained the patient´s condition, diagnoses and treatment plan based on the information available to me at this time. I have answered questions and addressed any concerns. The patient has a good  understanding of the patient´s diagnosis, condition, and treatment plan as can be expected at this point. The vital signs have been stable. The patient´s condition is stable and appropriate for discharge from the emergency department.      The patient will pursue further outpatient evaluation with the primary care physician or other designated or consulting physician as outlined in the discharge instructions. They are agreeable to this plan of care and follow-up instructions have been explained in detail. The patient has received these instructions in written format and have expressed an understanding of the discharge instructions. The patient is aware that any significant change in condition or worsening of symptoms should prompt an immediate return to this or the closest emergency department or call to 911.  I have explained discharge medications and the need for follow up with the patient/caretakers. This was also  printed in the discharge instructions. Patient was discharged with the following medications and follow up:      Medication List        New Prescriptions      predniSONE 50 MG tablet  Commonly known as: DELTASONE  Take 1 tablet by mouth Daily.               Where to Get Your Medications        These medications were sent to [facility]/pharmacy #[id number] - [place], [place] - [address] - [phone]  - [phone] FX  [address] [place] [place] [address]      Hours: 24-hours Phone: [phone]   predniSONE 50 MG tablet      Brian Ellis, APRN  [address] [place] [address]  [phone]    In 2 days         Final diagnoses:   Seasonal allergies        ED Disposition       ED Disposition   Discharge    Condition   Stable    Comment   --               This medical record created using voice recognition software.             Cyndi Hamlin MD  09/09/23 [id number]

## 2023-09-09 NOTE — DISCHARGE INSTRUCTIONS
Return the ER if you develop chest pain or shortness of air.  Follow-up primary care provider in a couple days.  Know that a side effect of steroids can cause jitteriness.

## 2023-09-09 NOTE — ED PROVIDER NOTES
"Time: 5:15 PM EDT  Date of encounter:  2023  Independent Historian/Clinical History and Information was obtained by:   Patient    History is limited by: N/A    Chief Complaint: \" Feeling jittery\"      History of Present Illness:  Patient is a 52 y.o. year old female who presents to the emergency department for evaluation of feeling jittery that began 2 hours ago.  Patient states she was seen in the ER earlier today for dry eyes, scratchy throat, and oral swelling.  Patient was evaluated and given a steroid to go home with.  Patient states after she took the steroid she began to feel jittery.  Patient denies chest pain or shortness of breath at this time.    HPI    Patient Care Team  Primary Care Provider: Brian Ellis APRN    Past Medical History:     Allergies   Allergen Reactions    Amlodipine Shortness Of Breath    Diltiazem Hcl Anxiety    Augmentin [Amoxicillin-Pot Clavulanate] Nausea And Vomiting    Zofran [Ondansetron] Confusion    Contrast Dye (Echo Or Unknown Ct/Mr) Palpitations     Patient gets to the point of passing out , heart races     Iodinated Contrast Media Palpitations     Patient gets to the point of passing out , heart races     Past Medical History:   Diagnosis Date    Anxiety     Arthritis     GILBERT KNEES    Asthma     SEASONAL ALLERGIES    CKD (chronic kidney disease)     Stage 3, Follows with [facility]    Elevated cholesterol     Gout 2021    Hernia     Upper    Hiatal hernia     Hypertension     Renal insufficiency     follows with [facility]    Sinus problem     SEASONAL ALLERGIES     Past Surgical History:   Procedure Laterality Date    BONY PELVIS SURGERY      Plate     SECTION   and     CHOLECYSTECTOMY      COLONOSCOPY      ENDOSCOPY  2009    ENDOSCOPY N/A 3/14/2023    Procedure: ESOPHAGOGASTRODUODENOSCOPY with biopsies;  Surgeon: Tam Badillo MD;  Location: [facility] ENDOSCOPY;  Service: General;  Laterality: N/A;  hiatal hernia    TOTAL KNEE " ARTHROPLASTY Left 12/22/2021    Procedure: TOTAL KNEE ARTHROPLASTY;  Surgeon: Marco Nelson MD;  Location: [facility] MAIN OR;  Service: Orthopedics;  Laterality: Left;    TOTAL KNEE ARTHROPLASTY Right 10/19/2022    Procedure: RIGHT TOTAL KNEE ARTHROPLASTY - NO SHELBY;  Surgeon: Marco Nelson MD;  Location: [facility] MAIN OR;  Service: Orthopedics;  Laterality: Right;    TUBAL ABDOMINAL LIGATION  1989     Family History   Problem Relation Age of Onset    Throat cancer Father 73    Stroke Other     Diabetes Other     Malig Hyperthermia Neg Hx        Home Medications:  Prior to Admission medications    Medication Sig Start Date End Date Taking? Authorizing Provider   albuterol sulfate  (90 Base) MCG/ACT inhaler Take 1-2 Puffs every 4 hours prn 4/11/23   Brian Ellis APRN   allopurinol (ZYLOPRIM) 300 MG tablet Take 1 tablet by mouth Daily. 6/8/23   Brian Ellis APRN   atorvastatin (LIPITOR) 20 MG tablet Take 1 tablet by mouth Every Night. 6/8/23   Brian Ellis APRN   azithromycin (Zithromax Z-Yair) 250 MG tablet Take 2 tablets by mouth on day 1, then 1 tablet daily on days 2-5 8/30/23   Brian Ellis APRN   carvedilol (COREG) 25 MG tablet Take 1 tablet by mouth 2 (Two) Times a Day With Meals. 6/8/23   Brian Ellis APRN   cloNIDine (CATAPRES) 0.2 MG tablet Take 1 tablet by mouth 3 (Three) Times a Day. 6/8/23   Brian Ellis APRN   dicyclomine (BENTYL) 20 MG tablet Take 1 tablet by mouth Every 6 (Six) Hours. 8/25/23   Denisha Salgado APRN   dicyclomine (BENTYL) 20 MG tablet Take 1 tablet by mouth Every 6 (Six) Hours. 9/2/23   Zakia Cook APRN   fluticasone (FLONASE) 50 MCG/ACT nasal spray 2 sprays into the nostril(s) as directed by provider Daily. 2 sprays each nostril daily 1/19/23   Brian Ellis APRN   hydrocortisone (ANUSOL-HC) 25 MG suppository Insert 1 suppository into the rectum 2 (Two) Times a Day. 8/7/23    Naida Neal APRN   Hydrocortisone, Perianal, (ANUSOL-HC) 2.5 % rectal cream Insert  into the rectum 2 (Two) Times a Day. 8/7/23   Naida Neal APRN   linaclotide (Linzess) 290 MCG capsule capsule Take 1 capsule by mouth Every Morning Before Breakfast. 8/7/23   Naida Neal APRN   Lokelma 10 g pack TAKE 10 GRAM BY MOUTH 1 TIME EACH DAY 12/1/22   Al Ceballos MD   losartan (COZAAR) 100 MG tablet Take 1 tablet by mouth Daily. 6/8/23   Brian Ellis APRN   omeprazole (priLOSEC) 40 MG capsule Take 1 capsule by mouth Daily. 8/30/23   Brian Ellis APRN   oxyCODONE-acetaminophen (PERCOCET) 5-325 MG per tablet Take 1 tablet by mouth. 7/16/23   Al Ceballos MD   predniSONE (DELTASONE) 50 MG tablet Take 1 tablet by mouth Daily. 9/9/23   Cyndi Hamlin MD   promethazine (PHENERGAN) 25 MG tablet Take 1 tablet by mouth Every 6 (Six) Hours As Needed for Nausea or Vomiting. 8/30/23   Brian Ellis APRN   vitamin B-12 (CYANOCOBALAMIN) 100 MCG tablet Take 0.5 tablets by mouth Daily.    Al Ceballos MD   zinc gluconate 50 MG tablet Take 1 tablet by mouth Daily.    Al Ceballos MD        Social History:   Social History     Tobacco Use    Smoking status: Former     Types: Cigarettes     Quit date: 2013     Years since quitting: 10.6    Smokeless tobacco: Never   Vaping Use    Vaping Use: Never used   Substance Use Topics    Alcohol use: Never    Drug use: Never         Review of Systems:  Review of Systems   Constitutional:  Negative for chills and fever.   HENT:  Negative for congestion, rhinorrhea and sore throat.    Eyes:  Negative for pain and visual disturbance.   Respiratory:  Negative for apnea, cough, chest tightness and shortness of breath.    Cardiovascular:  Negative for chest pain and palpitations.   Gastrointestinal:  Negative for abdominal pain, diarrhea, nausea and vomiting.   Genitourinary:  Negative for difficulty  "urinating and dysuria.   Musculoskeletal:  Negative for joint swelling and myalgias.   Skin:  Negative for color change.   Neurological:  Positive for tremors. Negative for seizures and headaches.   Psychiatric/Behavioral: Negative.     All other systems reviewed and are negative.     Physical Exam:  /73   Pulse 81   Temp 98.1 °F (36.7 °C) (Oral)   Resp 16   Ht 157.5 cm (62.01\")   Wt 115 kg (253 lb 8.5 oz)   SpO2 98%   BMI 46.36 kg/m²     Physical Exam  Vitals and nursing note reviewed.   Constitutional:       General: She is not in acute distress.     Appearance: Normal appearance. She is not toxic-appearing.   HENT:      Head: Normocephalic and atraumatic.      Jaw: There is normal jaw occlusion.   Eyes:      General: Lids are normal.      Extraocular Movements: Extraocular movements intact.      Conjunctiva/sclera: Conjunctivae normal.      Pupils: Pupils are equal, round, and reactive to light.   Cardiovascular:      Rate and Rhythm: Normal rate and regular rhythm.      Pulses: Normal pulses.      Heart sounds: Normal heart sounds.   Pulmonary:      Effort: Pulmonary effort is normal. No respiratory distress.      Breath sounds: Normal breath sounds. No wheezing or rhonchi.   Abdominal:      General: Abdomen is flat.      Palpations: Abdomen is soft.      Tenderness: There is no abdominal tenderness. There is no guarding or rebound.   Musculoskeletal:         General: Normal range of motion.      Cervical back: Normal range of motion and neck supple.      Right lower leg: No edema.      Left lower leg: No edema.   Skin:     General: Skin is warm and dry.   Neurological:      Mental Status: She is alert and oriented to person, place, and time. Mental status is at baseline.   Psychiatric:         Mood and Affect: Mood normal.                Procedures:  Procedures      Medical Decision Making:      Comorbidities that affect care:    Anxiety, Asthma    External Notes reviewed:    Previous ED Note: " 9/9/2023      The following orders were placed and all results were independently analyzed by me:  No orders of the defined types were placed in this encounter.      Medications Given in the Emergency Department:  Medications   diphenhydrAMINE (BENADRYL) capsule 50 mg (has no administration in time range)        ED Course:         Labs:    Lab Results (last 24 hours)       Procedure Component Value Units Date/Time    CBC & Differential [[id number]]  (Abnormal) Collected: 09/09/23 0711    Specimen: Blood Updated: 09/09/23 0731    Narrative:      The following orders were created for panel order CBC & Differential.  Procedure                               Abnormality         Status                     ---------                               -----------         ------                     CBC Auto Differential[[id number]]        Abnormal            Final result                 Please view results for these tests on the individual orders.    CBC Auto Differential [[id number]]  (Abnormal) Collected: 09/09/23 0711    Specimen: Blood Updated: 09/09/23 0731     WBC 6.57 10*3/mm3      RBC 3.58 10*6/mm3      Hemoglobin 12.5 g/dL      Hematocrit 35.9 %      .3 fL      MCH 34.9 pg      MCHC 34.8 g/dL      RDW 14.5 %      RDW-SD 53.0 fl      MPV 9.2 fL      Platelets 176 10*3/mm3      Neutrophil % 59.2 %      Lymphocyte % 28.6 %      Monocyte % 9.3 %      Eosinophil % 0.9 %      Basophil % 0.8 %      Immature Grans % 1.2 %      Neutrophils, Absolute 3.89 10*3/mm3      Lymphocytes, Absolute 1.88 10*3/mm3      Monocytes, Absolute 0.61 10*3/mm3      Eosinophils, Absolute 0.06 10*3/mm3      Basophils, Absolute 0.05 10*3/mm3      Immature Grans, Absolute 0.08 10*3/mm3      nRBC 0.0 /100 WBC     Influenza Antigen, Rapid - Swab, Nasopharynx [[id number]]  (Normal) Collected: 09/09/23 0809    Specimen: Swab from Nasopharynx Updated: 09/09/23 0838     Influenza A Ag, EIA Negative     Influenza B Ag, EIA Negative    Rapid Strep A Screen  - Swab, Throat [[id number]]  (Normal) Collected: 09/09/23 0809    Specimen: Swab from Throat Updated: 09/09/23 0828     Strep A Ag Negative    COVID-19,CEPHEID/WHITLEY,COR/SVEN/PAD/ARIAS/MAD IN-HOUSE(OR EMERGENT/ADD-ON),NP SWAB IN TRANSPORT MEDIA 3-4 HR TAT, RT-PCR - Swab, Nasopharynx [[id number]]  (Normal) Collected: 09/09/23 0809    Specimen: Swab from Nasopharynx Updated: 09/09/23 0856     COVID19 Not Detected    Narrative:      Fact sheet for providers: https://www.fda.gov/media/728581/download     Fact sheet for patients: https://www.fda.gov/media/841491/download  Fact sheet for providers: https://www.fda.gov/media/902157/download     Fact sheet for patients: https://www.fda.gov/media/879955/download    Beta Strep Culture, Throat - Swab, Throat [[id number]] Collected: 09/09/23 0809    Specimen: Swab from Throat Updated: 09/09/23 0828    Comprehensive Metabolic Panel [[id number]]  (Abnormal) Collected: 09/09/23 0842    Specimen: Blood Updated: 09/09/23 0914     Glucose 112 mg/dL      BUN 44 mg/dL      Creatinine 2.65 mg/dL      Sodium 130 mmol/L      Potassium 4.5 mmol/L      Chloride 95 mmol/L      CO2 24.8 mmol/L      Calcium 10.0 mg/dL      Total Protein 6.2 g/dL      Albumin 4.4 g/dL      ALT (SGPT) 26 U/L      AST (SGOT) 18 U/L      Alkaline Phosphatase 62 U/L      Total Bilirubin 0.9 mg/dL      Globulin 1.8 gm/dL      A/G Ratio 2.4 g/dL      BUN/Creatinine Ratio 16.6     Anion Gap 10.2 mmol/L      eGFR 20.8 mL/min/1.73     Narrative:      GFR Normal >60  Chronic Kidney Disease <60  Kidney Failure <15      Magnesium [[id number]]  (Normal) Collected: 09/09/23 0842    Specimen: Blood Updated: 09/09/23 0914     Magnesium 1.9 mg/dL     CK [[id number]]  (Normal) Collected: 09/09/23 0842    Specimen: Blood Updated: 09/09/23 0914     Creatine Kinase 31 U/L              Imaging:    XR Chest 1 View    Result Date: 9/9/2023  PROCEDURE: XR CHEST 1 VW  COMPARISON: [facility], , XR CHEST 1 VW, 6/27/2023, 8:46.   INDICATIONS: PANIC ATTACK WITH SHORTNESS OF BREATH  FINDINGS:  Borderline low lung volumes.  Negative for lobar infiltrate, edema, large effusion or pneumothorax.  Heart size unchanged from prior exam.  Osseous structures grossly unremarkable.        No active pulmonary process.       JOANNA MEAD MD       Electronically Signed and Approved By: JOANNA MEAD MD on 9/09/2023 at 7:33                Differential Diagnosis and Discussion:    Allergic Reaction: Differential diagnosis includes but is not limited to drug side effects, contact dermatitis, autoimmune conditions, infections, mast cell disorders, serum sickness, anaphylactoid reactions, angioedema, panic or anxiety attacks.        MDM     Patient states she was feeling fine until she got home and took the steroid as prescribed.  Patient states she began to feel jittery but denies chest pain or shortness of air.  Patient that she feels fine right now but just wants to make sure that her feeling jittery could have been due to the steroid.  I gave the patient Benadryl in the ER and she felt much better.  I warned the patient that side effect of steroids can make you feel jittery.  Patient states she wishes to go home at this time and agrees with plan of care.      Patient Care Considerations:          Consultants/Shared Management Plan:    None    Social Determinants of Health:    Patient is independent, reliable, and has access to care.       Disposition and Care Coordination:    Discharged: I considered escalation of care by admitting this patient to the hospital, however the patient has improved and is suitable and stable for discharge.    I have explained the patient´s condition, diagnoses and treatment plan based on the information available to me at this time. I have answered questions and addressed any concerns. The patient has a good  understanding of the patient´s diagnosis, condition, and treatment plan as can be expected at this point. The vital  signs have been stable. The patient´s condition is stable and appropriate for discharge from the emergency department.      The patient will pursue further outpatient evaluation with the primary care physician or other designated or consulting physician as outlined in the discharge instructions. They are agreeable to this plan of care and follow-up instructions have been explained in detail. The patient has received these instructions in written format and have expressed an understanding of the discharge instructions. The patient is aware that any significant change in condition or worsening of symptoms should prompt an immediate return to this or the closest emergency department or call to 911.  I have explained discharge medications and the need for follow up with the patient/caretakers. This was also printed in the discharge instructions. Patient was discharged with the following medications and follow up:      Medication List      No changes were made to your prescriptions during this visit.      Brian Ellis, APRN  [address] [place] [address]  [phone]    Call in 2 days  As needed       Final diagnoses:   Adverse effect of drug, initial encounter        ED Disposition       ED Disposition   Discharge    Condition   Stable    Comment   --               This medical record created using voice recognition software.             Ishmael Rivera PA-C  09/09/23 [id number]

## 2023-09-11 LAB — BACTERIA SPEC AEROBE CULT: NORMAL

## 2023-09-12 ENCOUNTER — TELEPHONE (OUTPATIENT)
Dept: FAMILY MEDICINE CLINIC | Facility: CLINIC | Age: 53
End: 2023-09-12

## 2023-09-12 ENCOUNTER — OFFICE VISIT (OUTPATIENT)
Dept: FAMILY MEDICINE CLINIC | Facility: CLINIC | Age: 53
End: 2023-09-12
Payer: MEDICARE

## 2023-09-12 VITALS
TEMPERATURE: 97.9 F | HEART RATE: 98 BPM | DIASTOLIC BLOOD PRESSURE: 83 MMHG | WEIGHT: 251 LBS | SYSTOLIC BLOOD PRESSURE: 134 MMHG | OXYGEN SATURATION: 95 % | BODY MASS INDEX: 46.19 KG/M2 | HEIGHT: 62 IN

## 2023-09-12 DIAGNOSIS — J32.0 CHRONIC MAXILLARY SINUSITIS: ICD-10-CM

## 2023-09-12 DIAGNOSIS — K59.00 CONSTIPATION, UNSPECIFIED CONSTIPATION TYPE: ICD-10-CM

## 2023-09-12 DIAGNOSIS — F41.9 ANXIETY: ICD-10-CM

## 2023-09-12 DIAGNOSIS — J30.9 ALLERGIC RHINITIS, UNSPECIFIED SEASONALITY, UNSPECIFIED TRIGGER: ICD-10-CM

## 2023-09-12 DIAGNOSIS — F41.0 PANIC ATTACK: Primary | ICD-10-CM

## 2023-09-12 PROBLEM — M79.662 PAIN OF LEFT CALF: Status: RESOLVED | Noted: 2022-01-12 | Resolved: 2023-09-12

## 2023-09-12 PROBLEM — R06.02 SHORT OF BREATH ON EXERTION: Status: RESOLVED | Noted: 2023-05-09 | Resolved: 2023-09-12

## 2023-09-12 PROBLEM — M17.11 ARTHRITIS OF KNEE, RIGHT: Status: RESOLVED | Noted: 2022-06-30 | Resolved: 2023-09-12

## 2023-09-12 PROBLEM — R10.9 ABDOMINAL PAIN: Status: RESOLVED | Noted: 2021-11-11 | Resolved: 2023-09-12

## 2023-09-12 PROBLEM — R60.0 LOCALIZED EDEMA: Status: RESOLVED | Noted: 2023-08-31 | Resolved: 2023-09-12

## 2023-09-12 PROBLEM — M25.561 PAIN IN BOTH KNEES: Status: RESOLVED | Noted: 2021-08-24 | Resolved: 2023-09-12

## 2023-09-12 PROBLEM — R60.0 LOCALIZED EDEMA: Status: ACTIVE | Noted: 2023-08-31

## 2023-09-12 PROBLEM — T30.0 BURN: Status: RESOLVED | Noted: 2022-01-18 | Resolved: 2023-09-12

## 2023-09-12 PROBLEM — M25.512 ACUTE PAIN OF LEFT SHOULDER: Status: RESOLVED | Noted: 2022-05-02 | Resolved: 2023-09-12

## 2023-09-12 PROBLEM — M25.551 BILATERAL HIP PAIN: Status: RESOLVED | Noted: 2022-07-15 | Resolved: 2023-09-12

## 2023-09-12 PROBLEM — R21 RASH: Status: RESOLVED | Noted: 2022-04-21 | Resolved: 2023-09-12

## 2023-09-12 PROBLEM — J01.00 ACUTE NON-RECURRENT MAXILLARY SINUSITIS: Status: RESOLVED | Noted: 2021-11-02 | Resolved: 2023-09-12

## 2023-09-12 PROBLEM — M25.562 PAIN IN BOTH KNEES: Status: RESOLVED | Noted: 2021-08-24 | Resolved: 2023-09-12

## 2023-09-12 PROBLEM — M79.89 LEG SWELLING: Status: RESOLVED | Noted: 2023-05-09 | Resolved: 2023-09-12

## 2023-09-12 PROBLEM — E87.20 METABOLIC ACIDOSIS: Status: RESOLVED | Noted: 2021-11-11 | Resolved: 2023-09-12

## 2023-09-12 PROBLEM — E87.5 HYPERKALEMIA: Status: RESOLVED | Noted: 2022-01-18 | Resolved: 2023-09-12

## 2023-09-12 PROBLEM — M19.90 OSTEOARTHRITIS: Status: RESOLVED | Noted: 2022-07-28 | Resolved: 2023-09-12

## 2023-09-12 PROBLEM — R11.2 NAUSEA AND VOMITING: Status: RESOLVED | Noted: 2023-08-30 | Resolved: 2023-09-12

## 2023-09-12 PROBLEM — E83.52 HYPERCALCEMIA: Status: RESOLVED | Noted: 2022-04-21 | Resolved: 2023-09-12

## 2023-09-12 PROBLEM — M79.89 SWELLING OF CALF: Status: RESOLVED | Noted: 2022-01-12 | Resolved: 2023-09-12

## 2023-09-12 PROBLEM — N39.0 URINARY TRACT INFECTIOUS DISEASE: Status: RESOLVED | Noted: 2021-11-11 | Resolved: 2023-09-12

## 2023-09-12 PROBLEM — J34.9 SINUS PROBLEM: Status: RESOLVED | Noted: 2021-06-08 | Resolved: 2023-09-12

## 2023-09-12 PROBLEM — M25.552 BILATERAL HIP PAIN: Status: RESOLVED | Noted: 2022-07-15 | Resolved: 2023-09-12

## 2023-09-12 RX ORDER — DOCUSATE SODIUM 100 MG/1
100 CAPSULE, LIQUID FILLED ORAL 2 TIMES DAILY
Qty: 180 CAPSULE | Refills: 1 | Status: SHIPPED | OUTPATIENT
Start: 2023-09-12

## 2023-09-12 NOTE — TELEPHONE ENCOUNTER
Spoke with patient:   Would recommend increasing water intake avoid caffeine's it does take some time for prednisone to clear her system. if patient feels her heart is racing or with chest pain recommend ER

## 2023-09-12 NOTE — TELEPHONE ENCOUNTER
Patient said that she is more jittery and feels like her heart rate is increased. What would you like patient to do?

## 2023-09-12 NOTE — PROGRESS NOTES
"     ACUTE VISIT     Patient Name: Katarzyna Norris  : 1970   MRN: [id number]     Chief Complaint:    Chief Complaint   Patient presents with    jittery       History of Present Illness: Katarzyna Norris is a 52 y.o. female who is here today for feeling jittery and panic like.         She said the symptoms started after she started the Prednisone. The hospital had put every 6 hours bently. She took her last dose of Prednisone Saturday morning. She is feeling better, but still a little jittery.    Date of encounter:  2023  Independent Historian/Clinical History and Information was obtained by:   Patient     Chief Complaint: Oral swelling     Given rx for prednisone 50 mg daily      History of Present Illness:  Patient is a 52 y.o. year old female who presents to the emergency department for evaluation of oral swelling, constipation, nausea, and scratchy throat, and dry eyes that is gotten worse over the past 2 to 3 days.  Patient denies fever and chills.  Patient has no chest pain or shortness of breath.  Patient has had a dry cough.  Patient denies abdominal pain.  Patient has no dysuria urinary frequency.    Date of encounter:  2023  Independent Historian/Clinical History and Information was obtained by:   Patient       Chief Complaint: \" Feeling jittery\"        History of Present Illness:  Patient is a 52 y.o. year old female who presents to the emergency department for evaluation of feeling jittery that began 2 hours ago.  Patient states she was seen in the ER earlier today for dry eyes, scratchy throat, and oral swelling.  Patient was evaluated and given a steroid to go home with.  Patient states after she took the steroid she began to feel jittery.  Patient denies chest pain or shortness of breath at this time.    Also taking pnd, using flonase and benadryl and continue sinus pressure states headache has improved completed azithromycin  States she has tried claritin, zyrtec, allegra no relief "     Patient reports constipation has improved with OTC stool softeners would like a prescription now with soft daily bowel movements    Subjective      Review of Systems:   Review of Systems   Constitutional:  Negative for fever.   HENT:  Positive for ear pain, postnasal drip and sinus pressure. Negative for sore throat.    Eyes:  Negative for discharge.   Respiratory:  Negative for cough.    Cardiovascular:  Negative for chest pain.   Allergic/Immunologic: Positive for environmental allergies.   Neurological:  Negative for dizziness and headaches.   Psychiatric/Behavioral:  The patient is nervous/anxious.       Past Medical History:   Past Medical History:   Diagnosis Date    Anxiety     Arthritis     GILBERT KNEES    Asthma     SEASONAL ALLERGIES    CKD (chronic kidney disease)     Stage 3, Follows with [facility]    Elevated cholesterol     Gout 2021    Hernia     Upper    Hiatal hernia     Hypertension     Renal insufficiency     follows with [facility]    Sinus problem     SEASONAL ALLERGIES       Past Surgical History:   Past Surgical History:   Procedure Laterality Date    BONY PELVIS SURGERY      Plate     SECTION   and     CHOLECYSTECTOMY      COLONOSCOPY      ENDOSCOPY  2009    ENDOSCOPY N/A 3/14/2023    Procedure: ESOPHAGOGASTRODUODENOSCOPY with biopsies;  Surgeon: Tam Badillo MD;  Location: [facility] ENDOSCOPY;  Service: General;  Laterality: N/A;  hiatal hernia    TOTAL KNEE ARTHROPLASTY Left 2021    Procedure: TOTAL KNEE ARTHROPLASTY;  Surgeon: Marco Nelson MD;  Location: [facility] MAIN OR;  Service: Orthopedics;  Laterality: Left;    TOTAL KNEE ARTHROPLASTY Right 10/19/2022    Procedure: RIGHT TOTAL KNEE ARTHROPLASTY - NO SHELBY;  Surgeon: Marco Nelson MD;  Location: [facility] MAIN OR;  Service: Orthopedics;  Laterality: Right;    TUBAL ABDOMINAL LIGATION         Family History:   Family History   Problem Relation Age of Onset    Throat cancer Father 73     Stroke Other     Diabetes Other     Malig Hyperthermia Neg Hx        Social History:   Social History     Socioeconomic History    Marital status:    Tobacco Use    Smoking status: Former     Types: Cigarettes     Quit date: 2013     Years since quitting: 10.7    Smokeless tobacco: Never   Vaping Use    Vaping Use: Never used   Substance and Sexual Activity    Alcohol use: Never    Drug use: Never    Sexual activity: Defer       Medications:     Current Outpatient Medications:     acetaminophen (TYLENOL) 325 MG suppository, Insert 1 suppository into the rectum., Disp: , Rfl:     albuterol sulfate  (90 Base) MCG/ACT inhaler, Take 1-2 Puffs every 4 hours prn, Disp: 8 g, Rfl: 1    allopurinol (ZYLOPRIM) 300 MG tablet, Take 1 tablet by mouth Daily., Disp: 180 tablet, Rfl: 1    atorvastatin (LIPITOR) 20 MG tablet, Take 1 tablet by mouth Every Night., Disp: 90 tablet, Rfl: 1    carvedilol (COREG) 25 MG tablet, Take 1 tablet by mouth 2 (Two) Times a Day With Meals., Disp: 180 tablet, Rfl: 1    cloNIDine (CATAPRES) 0.2 MG tablet, Take 1 tablet by mouth 3 (Three) Times a Day., Disp: 270 tablet, Rfl: 1    fluticasone (FLONASE) 50 MCG/ACT nasal spray, 2 sprays into the nostril(s) as directed by provider Daily. 2 sprays each nostril daily, Disp: 18.2 mL, Rfl: 1    hydrocortisone (ANUSOL-HC) 25 MG suppository, Insert 1 suppository into the rectum 2 (Two) Times a Day., Disp: 28 suppository, Rfl: 1    Hydrocortisone, Perianal, (ANUSOL-HC) 2.5 % rectal cream, Insert  into the rectum 2 (Two) Times a Day., Disp: 28 g, Rfl: 3    linaclotide (Linzess) 290 MCG capsule capsule, Take 1 capsule by mouth Every Morning Before Breakfast., Disp: 30 capsule, Rfl: 2    Lokelma 10 g pack, TAKE 10 GRAM BY MOUTH 1 TIME EACH DAY, Disp: , Rfl:     losartan (COZAAR) 100 MG tablet, Take 1 tablet by mouth Daily., Disp: 90 tablet, Rfl: 1    omeprazole (priLOSEC) 40 MG capsule, Take 1 capsule by mouth Daily., Disp: 30 capsule, Rfl: 2     "oxyCODONE-acetaminophen (PERCOCET) 5-325 MG per tablet, Take 1 tablet by mouth., Disp: , Rfl:     promethazine (PHENERGAN) 25 MG tablet, Take 1 tablet by mouth Every 6 (Six) Hours As Needed for Nausea or Vomiting., Disp: 30 tablet, Rfl: 0    vitamin B-12 (CYANOCOBALAMIN) 100 MCG tablet, Take 0.5 tablets by mouth Daily., Disp: , Rfl:     zinc gluconate 50 MG tablet, Take 1 tablet by mouth Daily., Disp: , Rfl:     docusate sodium (Colace) 100 MG capsule, Take 1 capsule by mouth 2 (Two) Times a Day., Disp: 180 capsule, Rfl: 1    Current Facility-Administered Medications:     promethazine (PHENERGAN) injection 12.5 mg, 12.5 mg, Intravenous, Q6H PRN, Colasanti, Chrysalis Lindsey, APRN, 25 mg at 08/30/23 0756    Allergies:   Allergies   Allergen Reactions    Amlodipine Shortness Of Breath    Diltiazem Hcl Anxiety    Augmentin [Amoxicillin-Pot Clavulanate] Nausea And Vomiting    Zofran [Ondansetron] Confusion    Contrast Dye (Echo Or Unknown Ct/Mr) Palpitations     Patient gets to the point of passing out , heart races     Iodinated Contrast Media Palpitations     Patient gets to the point of passing out , heart races         Objective     Physical Exam:  Vital Signs:   Vitals:    09/12/23 0919   BP: 134/83   Pulse: 98   Temp: 97.9 °F (36.6 °C)   SpO2: 95%   Weight: 68.5 kg (151 lb)   Height: 157.5 cm (62.01\")     Body mass index is 27.61 kg/m².     Physical Exam  HENT:      Right Ear: Tympanic membrane normal.      Left Ear: Tympanic membrane normal.      Nose: Nose normal.      Right Sinus: Maxillary sinus tenderness present.      Left Sinus: Maxillary sinus tenderness present.      Mouth/Throat:      Mouth: Mucous membranes are moist.   Eyes:      Conjunctiva/sclera: Conjunctivae normal.   Neck:      Vascular: No carotid bruit.   Cardiovascular:      Rate and Rhythm: Normal rate and regular rhythm.   Pulmonary:      Effort: Pulmonary effort is normal.      Breath sounds: Normal breath sounds.   Abdominal:      General: " Bowel sounds are normal.      Palpations: Abdomen is soft.   Musculoskeletal:      Right lower leg: No edema.      Left lower leg: No edema.   Lymphadenopathy:      Cervical: No cervical adenopathy.   Skin:     General: Skin is warm and dry.   Neurological:      Mental Status: She is alert.   Psychiatric:         Mood and Affect: Mood normal.           Assessment / Plan      Assessment/Plan:   Diagnoses and all orders for this visit:    1. Panic attack (Primary)    2. Anxiety    3. Allergic rhinitis, unspecified seasonality, unspecified trigger    4. Chronic maxillary sinusitis  -     XR Sinuses 3+ View; Future    5. Constipation, unspecified constipation type    Other orders  -     docusate sodium (Colace) 100 MG capsule; Take 1 capsule by mouth 2 (Two) Times a Day.  Dispense: 180 capsule; Refill: 1         Panic anxiety feel this is related to prednisone patient has stopped this medication after ER visit states this is improving declines treatment at this time feels this was medication related  Allergic rhinitis uncontrolled on Flonase and Benadryl discussed changing Benadryl to Xyzal patient declined at this time  Chronic maxillary sinusitis will obtain sinus x-rays call with results and further recommendations  Constipation currently controlled with stool softeners we will provide a refill recommend increase water and fiber in diet exercise 30 minutes daily weight loss      Follow Up:   Return if symptoms worsen or fail to improve.    MARICRUZ Ball      Please note that portions of this note were completed with a voice recognition program.

## 2023-09-13 ENCOUNTER — OFFICE VISIT (OUTPATIENT)
Dept: SURGERY | Facility: CLINIC | Age: 53
End: 2023-09-13
Payer: MEDICARE

## 2023-09-13 VITALS — WEIGHT: 251 LBS | HEIGHT: 62 IN | BODY MASS INDEX: 46.19 KG/M2

## 2023-09-13 DIAGNOSIS — N18.4 STAGE 4 CHRONIC KIDNEY DISEASE: Primary | ICD-10-CM

## 2023-09-13 PROCEDURE — 1160F RVW MEDS BY RX/DR IN RCRD: CPT | Performed by: STUDENT IN AN ORGANIZED HEALTH CARE EDUCATION/TRAINING PROGRAM

## 2023-09-13 PROCEDURE — 1159F MED LIST DOCD IN RCRD: CPT | Performed by: STUDENT IN AN ORGANIZED HEALTH CARE EDUCATION/TRAINING PROGRAM

## 2023-09-13 PROCEDURE — 99213 OFFICE O/P EST LOW 20 MIN: CPT | Performed by: STUDENT IN AN ORGANIZED HEALTH CARE EDUCATION/TRAINING PROGRAM
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
## 2023-09-19 ENCOUNTER — OFFICE VISIT (OUTPATIENT)
Dept: CARDIOLOGY | Facility: CLINIC | Age: 53
End: 2023-09-19
Payer: MEDICARE

## 2023-09-19 VITALS
DIASTOLIC BLOOD PRESSURE: 78 MMHG | BODY MASS INDEX: 46.01 KG/M2 | SYSTOLIC BLOOD PRESSURE: 128 MMHG | WEIGHT: 250 LBS | HEIGHT: 62 IN | HEART RATE: 95 BPM

## 2023-09-19 DIAGNOSIS — E78.2 MIXED HYPERLIPIDEMIA: ICD-10-CM

## 2023-09-19 DIAGNOSIS — I10 PRIMARY HYPERTENSION: ICD-10-CM

## 2023-09-19 DIAGNOSIS — R77.8 ELEVATED TROPONIN: Primary | ICD-10-CM

## 2023-09-19 PROBLEM — Z96.651 AFTERCARE FOLLOWING RIGHT KNEE JOINT REPLACEMENT SURGERY: Status: RESOLVED | Noted: 2021-08-24 | Resolved: 2023-09-19

## 2023-09-19 PROBLEM — Z47.1 AFTERCARE FOLLOWING RIGHT KNEE JOINT REPLACEMENT SURGERY: Status: RESOLVED | Noted: 2021-08-24 | Resolved: 2023-09-19

## 2023-09-19 PROBLEM — J01.90 ACUTE SINUSITIS: Status: RESOLVED | Noted: 2023-08-30 | Resolved: 2023-09-19

## 2023-09-19 PROBLEM — Z47.89 AFTERCARE FOLLOWING SURGERY OF THE MUSCULOSKELETAL SYSTEM: Status: RESOLVED | Noted: 2022-02-07 | Resolved: 2023-09-19

## 2023-09-19 PROBLEM — R79.89 ELEVATED TROPONIN: Status: RESOLVED | Noted: 2023-08-30 | Resolved: 2023-09-19

## 2023-09-19 PROBLEM — M25.512 CHRONIC PAIN OF BOTH SHOULDERS: Status: RESOLVED | Noted: 2023-03-10 | Resolved: 2023-09-19

## 2023-09-19 PROBLEM — G89.29 CHRONIC PAIN OF BOTH SHOULDERS: Status: RESOLVED | Noted: 2023-03-10 | Resolved: 2023-09-19

## 2023-09-19 PROBLEM — M25.511 CHRONIC PAIN OF BOTH SHOULDERS: Status: RESOLVED | Noted: 2023-03-10 | Resolved: 2023-09-19

## 2023-09-19 NOTE — PROGRESS NOTES
Chief Complaint  Hyperlipidemia, Hypertension, and Follow-up    Subjective            History of Present Illness  Katarzyna Norris is a 52-year-old white/ female patient who presents to the office today for ER follow-up.  Over the last month she has had multiple ER visits for varied symptoms ranging from upper respiratory symptoms to abdominal symptoms 2 adverse drug reaction.  On 2023 her troponin levels were noted to be elevated but trended downward.  EKG was performed with no change from prior EKG from 2 months ago.  For that reason she was urged to follow-up with cardiology.  Today she denies any chest pain, shortness of breath, lightheadedness/dizziness, palpitations, or edema.  She is medicated for hypertension and hyperlipidemia.  She is compliant with medications.    PMH  Past Medical History:   Diagnosis Date    Anxiety     Arthritis     GILBERT KNEES    Asthma     SEASONAL ALLERGIES    CKD (chronic kidney disease)     Stage 3, Follows with [facility]    Elevated cholesterol     Gastritis     Gout 2021    Hiatal hernia     Hypertension          ALLERGY  Allergies   Allergen Reactions    Amlodipine Shortness Of Breath    Diltiazem Hcl Anxiety    Augmentin [Amoxicillin-Pot Clavulanate] Nausea And Vomiting    Doxycycline Nausea And Vomiting    Zofran [Ondansetron] Confusion    Contrast Dye (Echo Or Unknown Ct/Mr) Palpitations     Patient gets to the point of passing out , heart races     Iodinated Contrast Media Palpitations     Patient gets to the point of passing out , heart races          SURGICALHX  Past Surgical History:   Procedure Laterality Date    BONY PELVIS SURGERY      Plate     SECTION   and     CHOLECYSTECTOMY      COLONOSCOPY      ENDOSCOPY  2009    ENDOSCOPY N/A 3/14/2023    Procedure: ESOPHAGOGASTRODUODENOSCOPY with biopsies;  Surgeon: Tam Badillo MD;  Location: [facility] ENDOSCOPY;  Service: General;  Laterality: N/A;  hiatal hernia    TOTAL KNEE  ARTHROPLASTY Left 12/22/2021    Procedure: TOTAL KNEE ARTHROPLASTY;  Surgeon: Marco Nelson MD;  Location: [facility] MAIN OR;  Service: Orthopedics;  Laterality: Left;    TOTAL KNEE ARTHROPLASTY Right 10/19/2022    Procedure: RIGHT TOTAL KNEE ARTHROPLASTY - NO SHELBY;  Surgeon: Marco Nelson MD;  Location: [facility] MAIN OR;  Service: Orthopedics;  Laterality: Right;    TUBAL ABDOMINAL LIGATION  1989          SOC  Social History     Socioeconomic History    Marital status:    Tobacco Use    Smoking status: Former     Types: Cigarettes     Quit date: 2013     Years since quitting: 10.7    Smokeless tobacco: Never   Vaping Use    Vaping Use: Never used   Substance and Sexual Activity    Alcohol use: Never    Drug use: Never    Sexual activity: Defer         FAMHX  Family History   Problem Relation Age of Onset    Throat cancer Father 73    Stroke Other     Diabetes Other     Malig Hyperthermia Neg Hx           MEDSIGONLY  Current Outpatient Medications on File Prior to Visit   Medication Sig    acetaminophen (TYLENOL) 325 MG suppository Insert 1 suppository into the rectum.    albuterol sulfate  (90 Base) MCG/ACT inhaler Take 1-2 Puffs every 4 hours prn    allopurinol (ZYLOPRIM) 300 MG tablet Take 1 tablet by mouth Daily.    atorvastatin (LIPITOR) 20 MG tablet Take 1 tablet by mouth Every Night.    carvedilol (COREG) 25 MG tablet Take 1 tablet by mouth 2 (Two) Times a Day With Meals.    cloNIDine (CATAPRES) 0.2 MG tablet Take 1 tablet by mouth 3 (Three) Times a Day.    docusate sodium (Colace) 100 MG capsule Take 1 capsule by mouth 2 (Two) Times a Day.    fluticasone (FLONASE) 50 MCG/ACT nasal spray 2 sprays into the nostril(s) as directed by provider Daily. 2 sprays each nostril daily    hydrocortisone (ANUSOL-HC) 25 MG suppository Insert 1 suppository into the rectum 2 (Two) Times a Day.    Hydrocortisone, Perianal, (ANUSOL-HC) 2.5 % rectal cream Insert  into the rectum 2 (Two) Times a Day.     "linaclotide (Linzess) 290 MCG capsule capsule Take 1 capsule by mouth Every Morning Before Breakfast.    Lokelma 10 g pack TAKE 10 GRAM BY MOUTH 1 TIME EACH DAY    losartan (COZAAR) 100 MG tablet Take 1 tablet by mouth Daily.    omeprazole (priLOSEC) 40 MG capsule Take 1 capsule by mouth Daily.    oxyCODONE-acetaminophen (PERCOCET) 5-325 MG per tablet Take 1 tablet by mouth.    promethazine (PHENERGAN) 25 MG tablet Take 1 tablet by mouth Every 6 (Six) Hours As Needed for Nausea or Vomiting.    vitamin B-12 (CYANOCOBALAMIN) 100 MCG tablet Take 0.5 tablets by mouth Daily.    zinc gluconate 50 MG tablet Take 1 tablet by mouth Daily.     Current Facility-Administered Medications on File Prior to Visit   Medication    promethazine (PHENERGAN) injection 12.5 mg         Objective   /78   Pulse 95   Ht 157.5 cm (62.01\")   Wt 113 kg (250 lb)   BMI 45.71 kg/m²       Physical Exam  Constitutional:       Appearance: She is obese.   HENT:      Head: Normocephalic.   Neck:      Vascular: No carotid bruit.   Cardiovascular:      Rate and Rhythm: Normal rate and regular rhythm.      Pulses: Normal pulses.      Heart sounds: Normal heart sounds. No murmur heard.  Pulmonary:      Effort: Pulmonary effort is normal.      Breath sounds: Normal breath sounds.   Musculoskeletal:      Cervical back: Neck supple.      Right lower leg: No edema.      Left lower leg: No edema.   Skin:     General: Skin is dry.   Neurological:      Mental Status: She is alert and oriented to person, place, and time.   Psychiatric:         Behavior: Behavior normal.         Result Review :   The following data was reviewed by: MARICRUZ Mullins on 09/19/2023:  proBNP   Date Value Ref Range Status   06/27/2023 72.1 0.0 - 900.0 pg/mL Final     CMP          8/29/2023    18:39 9/9/2023    08:42   CMP   Glucose 113  112    BUN 20  44    Creatinine 2.54  2.65    EGFR 21.9  20.8    Sodium 136  130    Potassium 4.4  4.5    Chloride 100  95    Calcium " 11.1  10.0    Total Protein 6.3  6.2    Albumin 4.2  4.4    Globulin 2.1  1.8    Total Bilirubin 0.7  0.9    Alkaline Phosphatase 60  62    AST (SGOT) 27  18    ALT (SGPT) 35  26    Albumin/Globulin Ratio 2.0  2.4    BUN/Creatinine Ratio 7.9  16.6    Anion Gap 13.0  10.2      CBC w/diff          8/29/2023    18:39 9/9/2023    07:11   CBC w/Diff   WBC 5.18  6.57    RBC 3.25  3.58    Hemoglobin 11.3  12.5    Hematocrit 33.5  35.9    .1  100.3    MCH 34.8  34.9    MCHC 33.7  34.8    RDW 14.6  14.5    Platelets 155  176    Neutrophil Rel % 53.4  59.2    Immature Granulocyte Rel % 0.4  1.2    Lymphocyte Rel % 33.2  28.6    Monocyte Rel % 8.7  9.3    Eosinophil Rel % 3.5  0.9    Basophil Rel % 0.8  0.8       Lab Results   Component Value Date    TSH 1.490 04/11/2023      Lab Results   Component Value Date    FREET4 1.1 01/31/2020      D-Dimer, Quantitative   Date Value Ref Range Status   05/09/2023 1.28 (H) 0.00 - 0.54 MCGFEU/mL Final     Magnesium   Date Value Ref Range Status   09/09/2023 1.9 1.6 - 2.6 mg/dL Final      No results found for: DIGOXIN   Lab Results   Component Value Date    TROPONINT 34 (H) 08/29/2023           Lipid Panel          4/11/2023    14:07   Lipid Panel   Total Cholesterol 136    Triglycerides 58    HDL Cholesterol 50    VLDL Cholesterol 12    LDL Cholesterol  74    LDL/HDL Ratio 1.49        Results for orders placed in visit on 09/23/22    Adult Transthoracic Echo Complete W/ Cont if Necessary Per Protocol    Interpretation Summary  Normal left ventricular systolic function.  Trace tricuspid regurgitation.  No significant valve abnormalities noted.       Assessment and Plan    Diagnoses and all orders for this visit:    1. Elevated troponin (Primary)  She denies any anginal symptoms, continue current treatment for hypertension and hyperlipidemia.  She will notify office if she develops any anginal symptoms.    2. Primary hypertension  Currently controlled without adverse effects from  medication, continue carvedilol 25 mg twice daily, clonidine 0.2 mg 3 times daily, and losartan 100 mg daily.    3. Mixed hyperlipidemia  Last lipid panel was 4/11/2023 with LDL 74 which is within her goal range, continue atorvastatin 20 mg daily.          Follow Up   Return in about 1 year (around 9/19/2024) for Follow up with Dr Aldrich.    Patient was given instructions and counseling regarding her condition or for health maintenance advice. Please see specific information pulled into the AVS if appropriate.     Katarzyna S Norris  reports that she quit smoking about 10 years ago. Her smoking use included cigarettes. She has never used smokeless tobacco.         Erika Jimenez, APRN  09/19/23  11:20 EDT    Dictated Utilizing Dragon Dictation

## 2023-09-20 ENCOUNTER — TELEPHONE (OUTPATIENT)
Dept: FAMILY MEDICINE CLINIC | Facility: CLINIC | Age: 53
End: 2023-09-20
Payer: MEDICARE

## 2023-09-20 RX ORDER — CLOBETASOL PROPIONATE 0.5 MG/G
1 CREAM TOPICAL 2 TIMES DAILY
Qty: 60 G | Refills: 1 | Status: SHIPPED | OUTPATIENT
Start: 2023-09-20

## 2023-09-20 NOTE — TELEPHONE ENCOUNTER
Caller: Katarzyna Norris    Relationship: Self    Best call back number: [phone]     Requested Prescriptions:   STEROID CREAM FOR RASHES       Pharmacy where request should be sent: [facility] [address] [place] [address] [place] - [address] [facility] - [phone] [facility] [phone] [place]     Last office visit with prescribing clinician: 9/12/2023   Last telemedicine visit with prescribing clinician: Visit date not found   Next office visit with prescribing clinician: 10/24/2023     Does the patient have less than a 3 day supply:  [x] Yes  [] No    Would you like a call back once the refill request has been completed: [] Yes [x] No    If the office needs to give you a call back, can they leave a voicemail: [] Yes [x] No    Denise Peralta, PCT   09/20/23 08:25 EDT

## 2023-09-28 LAB
QT INTERVAL: 348 MS
QTC INTERVAL: 425 MS

## 2023-10-06 ENCOUNTER — HOSPITAL ENCOUNTER (OUTPATIENT)
Dept: GENERAL RADIOLOGY | Facility: HOSPITAL | Age: 53
Discharge: HOME | End: 2023-10-06
Admitting: NURSE PRACTITIONER
Payer: MEDICARE

## 2023-10-06 ENCOUNTER — OFFICE VISIT (OUTPATIENT)
Dept: FAMILY MEDICINE CLINIC | Facility: CLINIC | Age: 53
End: 2023-10-06
Payer: MEDICARE

## 2023-10-06 VITALS
SYSTOLIC BLOOD PRESSURE: 120 MMHG | OXYGEN SATURATION: 97 % | DIASTOLIC BLOOD PRESSURE: 87 MMHG | WEIGHT: 250 LBS | HEART RATE: 87 BPM | TEMPERATURE: 98.3 F | HEIGHT: 62 IN | BODY MASS INDEX: 46.01 KG/M2

## 2023-10-06 DIAGNOSIS — J01.90 ACUTE NON-RECURRENT SINUSITIS, UNSPECIFIED LOCATION: Primary | ICD-10-CM

## 2023-10-06 DIAGNOSIS — E87.1 HYPONATREMIA: ICD-10-CM

## 2023-10-06 DIAGNOSIS — H69.93 EUSTACHIAN TUBE DISORDER, BILATERAL: ICD-10-CM

## 2023-10-06 DIAGNOSIS — N18.32 STAGE 3B CHRONIC KIDNEY DISEASE: ICD-10-CM

## 2023-10-06 DIAGNOSIS — M25.50 ARTHRALGIA, UNSPECIFIED JOINT: ICD-10-CM

## 2023-10-06 DIAGNOSIS — J32.0 CHRONIC MAXILLARY SINUSITIS: ICD-10-CM

## 2023-10-06 DIAGNOSIS — R25.2 MUSCLE CRAMPS: ICD-10-CM

## 2023-10-06 PROCEDURE — 70220 X-RAY EXAM OF SINUSES: CPT

## 2023-10-06 RX ORDER — METHYLPREDNISOLONE ACETATE 80 MG/ML
60 INJECTION, SUSPENSION INTRA-ARTICULAR; INTRALESIONAL; INTRAMUSCULAR; SOFT TISSUE ONCE
Status: COMPLETED | OUTPATIENT
Start: 2023-10-06 | End: 2023-10-06

## 2023-10-06 RX ADMIN — METHYLPREDNISOLONE ACETATE 60 MG: 80 INJECTION, SUSPENSION INTRA-ARTICULAR; INTRALESIONAL; INTRAMUSCULAR; SOFT TISSUE at 15:32

## 2023-10-06 NOTE — PROGRESS NOTES
ACUTE VISIT     Patient Name: Katarzyna Norris  : 1970   MRN: [id number]     Chief Complaint:    Chief Complaint   Patient presents with    leg cramps    Follow-up       History of Present Illness: Katarzyna Norris is a 53 y.o. female who is here today for an ER follow up.    Katarzyna Norris is a 53 y.o. female  presents to the  10/5/2023 with complaint of sinus pain and congestion with postnasal drainage.  Patient was diagnosed here recently with acute bacterial sinusitis.  She started clindamycin after her last visit.  However, patient states this made her joints throughout her body become tender and painful.  She discontinued the clindamycin.  Sinus symptoms have not worsened, but they have not improved.  She is not having any fever, chills, neck stiffness, chest pain, shortness of air.    Patient tested negative for covid and strep.  Patient was prescribed Omnicef. She wants to discuss it before starting it.    She had went to  23 for the same symptoms and was prescribed Clindamycin. She has been on it for 7 days and day 2 she started having joint pain and leg cramps.               Subjective      Review of Systems:   Review of Systems   Constitutional:  Negative for fever.   HENT:  Positive for ear pain, postnasal drip, rhinorrhea, sinus pressure and sinus pain.    Eyes:  Negative for discharge.   Respiratory:  Negative for cough.    Cardiovascular:  Negative for chest pain.   Allergic/Immunologic: Positive for environmental allergies.   Neurological:  Positive for headaches.      Past Medical History:   Past Medical History:   Diagnosis Date    Anxiety     Arthritis     GILBERT KNEES    Asthma     SEASONAL ALLERGIES    CKD (chronic kidney disease)     Stage 3, Follows with [facility]    Elevated cholesterol     Gastritis     Gastritis     Gout 2021    Hiatal hernia     Hypertension        Past Surgical History:   Past Surgical History:   Procedure Laterality Date    BONY PELVIS SURGERY       Plate     SECTION   and     CHOLECYSTECTOMY      COLONOSCOPY      ENDOSCOPY      ENDOSCOPY N/A 3/14/2023    Procedure: ESOPHAGOGASTRODUODENOSCOPY with biopsies;  Surgeon: Tam Badillo MD;  Location: [facility] ENDOSCOPY;  Service: General;  Laterality: N/A;  hiatal hernia    TOTAL KNEE ARTHROPLASTY Left 2021    Procedure: TOTAL KNEE ARTHROPLASTY;  Surgeon: Marco Nelson MD;  Location: [facility] MAIN OR;  Service: Orthopedics;  Laterality: Left;    TOTAL KNEE ARTHROPLASTY Right 10/19/2022    Procedure: RIGHT TOTAL KNEE ARTHROPLASTY - NO SHELBY;  Surgeon: Marco Nelson MD;  Location: [facility] MAIN OR;  Service: Orthopedics;  Laterality: Right;    TUBAL ABDOMINAL LIGATION         Family History:   Family History   Problem Relation Age of Onset    Throat cancer Father 73    Stroke Other     Diabetes Other     Malig Hyperthermia Neg Hx        Social History:   Social History     Socioeconomic History    Marital status:    Tobacco Use    Smoking status: Former     Types: Cigarettes     Quit date:      Years since quitting: 10.7    Smokeless tobacco: Never   Vaping Use    Vaping Use: Never used   Substance and Sexual Activity    Alcohol use: Never    Drug use: Never    Sexual activity: Defer       Medications:     Current Outpatient Medications:     acetaminophen (TYLENOL) 325 MG suppository, Insert 1 suppository into the rectum., Disp: , Rfl:     albuterol sulfate  (90 Base) MCG/ACT inhaler, Take 1-2 Puffs every 4 hours prn, Disp: 8 g, Rfl: 1    allopurinol (ZYLOPRIM) 300 MG tablet, Take 1 tablet by mouth Daily., Disp: 180 tablet, Rfl: 1    atorvastatin (LIPITOR) 20 MG tablet, Take 1 tablet by mouth Every Night., Disp: 90 tablet, Rfl: 1    carvedilol (COREG) 12.5 MG tablet, Take 1 tablet by mouth 2 (Two) Times a Day With Meals., Disp: , Rfl:     carvedilol (COREG) 25 MG tablet, Take 1 tablet by mouth 2 (Two) Times a Day With Meals., Disp: 180 tablet,  Rfl: 1    cefdinir (OMNICEF) 300 MG capsule, Take 1 capsule by mouth Daily for 7 days., Disp: 7 capsule, Rfl: 0    clobetasol (TEMOVATE) 0.05 % cream, Apply 1 application  topically to the appropriate area as directed 2 (Two) Times a Day., Disp: 60 g, Rfl: 1    cloNIDine (CATAPRES) 0.2 MG tablet, Take 1 tablet by mouth 3 (Three) Times a Day., Disp: 270 tablet, Rfl: 1    diphenhydrAMINE (BENADRYL) 25 mg capsule, Take 1 capsule by mouth Every 6 (Six) Hours As Needed for Itching., Disp: , Rfl:     docusate calcium (SURFAK) 240 MG capsule, Take 1 capsule by mouth 2 (Two) Times a Day., Disp: , Rfl:     docusate sodium (Colace) 100 MG capsule, Take 1 capsule by mouth 2 (Two) Times a Day., Disp: 180 capsule, Rfl: 1    fluticasone (FLONASE) 50 MCG/ACT nasal spray, 2 sprays into the nostril(s) as directed by provider Daily. 2 sprays each nostril daily, Disp: 18.2 mL, Rfl: 1    hydrocortisone (ANUSOL-HC) 25 MG suppository, Insert 1 suppository into the rectum 2 (Two) Times a Day., Disp: 28 suppository, Rfl: 1    Hydrocortisone, Perianal, (ANUSOL-HC) 2.5 % rectal cream, Insert  into the rectum 2 (Two) Times a Day., Disp: 28 g, Rfl: 3    linaclotide (Linzess) 290 MCG capsule capsule, Take 1 capsule by mouth Every Morning Before Breakfast., Disp: 30 capsule, Rfl: 2    Lokelma 10 g pack, TAKE 10 GRAM BY MOUTH 1 TIME EACH DAY, Disp: , Rfl:     losartan (COZAAR) 100 MG tablet, Take 1 tablet by mouth Daily., Disp: 90 tablet, Rfl: 1    moxifloxacin (AVELOX) 400 MG tablet, Take 1 tablet by mouth Daily., Disp: , Rfl:     omeprazole (priLOSEC) 40 MG capsule, Take 1 capsule by mouth Daily., Disp: 30 capsule, Rfl: 2    oxyCODONE-acetaminophen (PERCOCET) 5-325 MG per tablet, Take 1 tablet by mouth., Disp: , Rfl:     promethazine (PHENERGAN) 25 MG tablet, Take 1 tablet by mouth Every 6 (Six) Hours As Needed for Nausea or Vomiting., Disp: 30 tablet, Rfl: 0    vitamin B-12 (CYANOCOBALAMIN) 100 MCG tablet, Take 0.5 tablets by mouth Daily.,  "Disp: , Rfl:     zinc gluconate 50 MG tablet, Take 1 tablet by mouth Daily., Disp: , Rfl:     Current Facility-Administered Medications:     promethazine (PHENERGAN) injection 12.5 mg, 12.5 mg, Intravenous, Q6H PRN, Evelyni, Brian Lindsey, APRN, 25 mg at 08/30/23 0756    Allergies:   Allergies   Allergen Reactions    Amlodipine Shortness Of Breath    Diltiazem Hcl Anxiety    Augmentin [Amoxicillin-Pot Clavulanate] Nausea And Vomiting    Clindamycin/Lincomycin Swelling    Doxycycline Nausea And Vomiting    Zofran [Ondansetron] Confusion    Contrast Dye (Echo Or Unknown Ct/Mr) Palpitations     Patient gets to the point of passing out , heart races     Iodinated Contrast Media Palpitations     Patient gets to the point of passing out , heart races         Objective     Physical Exam:  Vital Signs:   Vitals:    10/06/23 1434   BP: 120/87   Pulse: 87   Temp: 98.3 °F (36.8 °C)   SpO2: 97%   Weight: 113 kg (250 lb)   Height: 157.5 cm (62.01\")     Body mass index is 45.71 kg/m².     Physical Exam  HENT:      Right Ear: A middle ear effusion is present.      Left Ear: A middle ear effusion is present.      Nose: Congestion present.      Right Turbinates: Enlarged and swollen.      Left Turbinates: Enlarged and swollen.      Right Sinus: Maxillary sinus tenderness present.      Left Sinus: Maxillary sinus tenderness present.      Comments: Dull transillumination bilateral maxillary sinuses     Mouth/Throat:      Mouth: Mucous membranes are moist.   Eyes:      Conjunctiva/sclera:      Right eye: Right conjunctiva is injected.      Left eye: Left conjunctiva is injected.   Cardiovascular:      Rate and Rhythm: Normal rate and regular rhythm.   Pulmonary:      Effort: Pulmonary effort is normal.      Breath sounds: Normal breath sounds.   Lymphadenopathy:      Cervical: No cervical adenopathy.   Skin:     General: Skin is warm and dry.   Neurological:      Mental Status: She is alert.         Assessment / Plan  "     Assessment/Plan:   Diagnoses and all orders for this visit:    1. Acute non-recurrent sinusitis, unspecified location (Primary)  -     methylPREDNISolone acetate (DEPO-medrol) injection 60 mg    2. Stage 3b chronic kidney disease  -     Comprehensive Metabolic Panel    3. Hyponatremia  -     Comprehensive Metabolic Panel    4. Arthralgia, unspecified joint    5. Muscle cramps  -     Magnesium  -     Comprehensive Metabolic Panel    6. Eustachian tube disorder, bilateral         Acute sinusitis recommend continue all allergy medications we will provide Depo-Medrol 60 in office continue cefdinir as prescribed by urgent care provider  Chronic kidney disease and hyponatremia will obtain CMP to monitor  Joint pain muscle cramping patient reports improving after she stopped the clindamycin  Station 2 disorder continue Flonase patient has a follow-up scheduled with ear nose and throat in January      Follow Up:   Return if symptoms worsen or fail to improve.    Frandy Ellis, MARICRUZ      Please note that portions of this note were completed with a voice recognition program.

## 2023-10-12 ENCOUNTER — OFFICE VISIT (OUTPATIENT)
Dept: ORTHOPEDIC SURGERY | Facility: CLINIC | Age: 53
End: 2023-10-12
Payer: MEDICARE

## 2023-10-12 VITALS — HEIGHT: 62 IN | OXYGEN SATURATION: 97 % | BODY MASS INDEX: 46.01 KG/M2 | WEIGHT: 250 LBS | HEART RATE: 86 BPM

## 2023-10-12 DIAGNOSIS — Z47.1 AFTERCARE FOLLOWING RIGHT KNEE JOINT REPLACEMENT SURGERY: ICD-10-CM

## 2023-10-12 DIAGNOSIS — Z47.89 AFTERCARE FOLLOWING SURGERY OF THE MUSCULOSKELETAL SYSTEM: Primary | ICD-10-CM

## 2023-10-12 DIAGNOSIS — Z96.651 AFTERCARE FOLLOWING RIGHT KNEE JOINT REPLACEMENT SURGERY: ICD-10-CM

## 2023-10-12 DIAGNOSIS — M25.562 LEFT KNEE PAIN, UNSPECIFIED CHRONICITY: ICD-10-CM

## 2023-10-12 DIAGNOSIS — M25.561 RIGHT KNEE PAIN, UNSPECIFIED CHRONICITY: ICD-10-CM

## 2023-10-12 NOTE — PROGRESS NOTES
Chief Complaint  Pain and Follow-up of the Right Knee    Subjective          History of Present Illness      Katarzyna Norris is a 53 y.o. female  presents to [facility] for     Patient presents for follow-up evaluation of right total knee arthroplasty, 10/19/2022 and left total knee arthroplasty, 12/22/2021.  Patient was seen by Dr. Nelson for bilateral knees on July 11, 2023.  She was complaining of stiffness and pain to the knees, Dr. Nelson did x-rays, reassured her of normal findings with range of motion and x-rays.  Patient states that her achiness and pains that she was worried about in the past have improved.  She states there was a medicine that she was having some side effects that caused her to have joint pain.  She states her knees are feeling better doing better she states she has other concerns for her health but she is happy with her knee replacements.      Allergies   Allergen Reactions    Amlodipine Shortness Of Breath    Diltiazem Hcl Anxiety    Augmentin [Amoxicillin-Pot Clavulanate] Nausea And Vomiting    Clindamycin/Lincomycin Swelling    Doxycycline Nausea And Vomiting    Zofran [Ondansetron] Confusion    Contrast Dye (Echo Or Unknown Ct/Mr) Palpitations     Patient gets to the point of passing out , heart races     Iodinated Contrast Media Palpitations     Patient gets to the point of passing out , heart races        Social History     Socioeconomic History    Marital status:    Tobacco Use    Smoking status: Former     Types: Cigarettes     Quit date: 2013     Years since quitting: 10.7    Smokeless tobacco: Never   Vaping Use    Vaping Use: Never used   Substance and Sexual Activity    Alcohol use: Never    Drug use: Never    Sexual activity: Defer        REVIEW OF SYSTEMS    Constitutional: Awake alert and oriented x3, no acute distress, denies fevers, chills, weight loss  Respiratory: No respiratory distress  Vascular: Brisk cap refill,  "Intact distal pulses, No cyanosis, compartments soft with no signs or symptoms of compartment syndrome or DVT.   Cardiovascular: Denies chest pain, shortness of breath  Skin: Denies rashes, acute skin changes  Neurologic: Denies headache, loss of consciousness  MSK: Bilateral knee pain      Objective   Vital Signs:   Pulse 86   Ht 157.5 cm (62\")   Wt 113 kg (250 lb)   SpO2 97%   BMI 45.73 kg/mý     Body mass index is 45.73 kg/mý.    Physical Exam       Right knee- ROM 0-120 degrees. Stable to varus/valgus stress. Tender to the anterior lateral knee. Tender to the medial knee. Positive EHL, FHL, GS and TA. Sensation intact to all 5 nerves of the foot. Positive pulses. Patella well tracking. Well healed scar. Right leg slightly shorter than the left.      Left knee- well healed scar. ROM 0-120 degrees. Stable to varus/valgus stress. Tender to the anterior lateral knee. Tender to the posterior knee. Positive EHL, FHL, GS and TA. Sensation intact to all 5 nerves of the foot. Positive pulses. Patella well tracking.          Procedures    Imaging Results (Most Recent)       Procedure Component Value Units Date/Time    XR Knee 3 View Right [[id number]] Resulted: 10/12/23 1044     Updated: 10/12/23 1044    Narrative:      View:AP/Lateral and [place] view(s)  Site: Right knee, left knee  Indication: Right knee pain, left knee pain  Study: X-rays ordered, taken in the office, and reviewed today  X-ray: Right knee: Intact appearing right total knee arthroplasty, no   signs of hardware failure or loosening, no subsidence or periprosthetic   fracture, good alignment.  Left knee: Intact appearing left total knee   arthroplasty, no signs of hardware failure or loosening, no subsidence or   periprosthetic fracture, good alignment  Comparative data: Previous studies    XR Knee 3 View Left [[id number]] Resulted: 10/12/23 1044     Updated: 10/12/23 1044    Narrative:      View:AP/Lateral and [place] view(s)  Site: Right knee, left " knee  Indication: Right knee pain, left knee pain  Study: X-rays ordered, taken in the office, and reviewed today  X-ray: Right knee: Intact appearing right total knee arthroplasty, no   signs of hardware failure or loosening, no subsidence or periprosthetic   fracture, good alignment.  Left knee: Intact appearing left total knee   arthroplasty, no signs of hardware failure or loosening, no subsidence or   periprosthetic fracture, good alignment  Comparative data: Previous studies             Result Review :   The following data was reviewed by: SARAH Arora on 10/12/2023:  Data reviewed : Radiologic studies reviewed by me with the patient              Assessment and Plan    Diagnoses and all orders for this visit:    1. Aftercare following surgery of left total knee arthroplasty, 12/22/2021 (Primary)    2. Aftercare following right knee joint replacement npamhex23/19/22    3. Right knee pain, unspecified chronicity  -     XR Knee 3 View Right    4. Left knee pain, unspecified chronicity  -     XR Knee 3 View Left        Reviewed x-rays with the patient discussed diagnosis and treatment options with her she will continue activity and weightbearing as tolerated follow-up in 1 year for bilateral knees with x-rays    Call or return if worsening symptoms.    Follow Up   Return in about 1 year (around 10/12/2024) for Recheck.  Patient was given instructions and counseling regarding her condition or for health maintenance advice. Please see specific information pulled into the AVS if appropriate.

## 2023-10-13 DIAGNOSIS — K59.01 SLOW TRANSIT CONSTIPATION: ICD-10-CM

## 2023-10-16 DIAGNOSIS — J30.9 ALLERGIC RHINITIS, UNSPECIFIED SEASONALITY, UNSPECIFIED TRIGGER: Primary | ICD-10-CM

## 2023-10-16 DIAGNOSIS — H92.03 OTALGIA OF BOTH EARS: ICD-10-CM

## 2023-10-16 DIAGNOSIS — R51.9 INTRACTABLE HEADACHE, UNSPECIFIED CHRONICITY PATTERN, UNSPECIFIED HEADACHE TYPE: ICD-10-CM

## 2023-10-22 ENCOUNTER — HOSPITAL ENCOUNTER (EMERGENCY)
Facility: HOSPITAL | Age: 53
Discharge: HOME | End: 2023-10-22
Attending: EMERGENCY MEDICINE | Admitting: EMERGENCY MEDICINE
Payer: MEDICARE

## 2023-10-22 ENCOUNTER — APPOINTMENT (OUTPATIENT)
Dept: CT IMAGING | Facility: HOSPITAL | Age: 53
End: 2023-10-22
Payer: MEDICARE

## 2023-10-22 VITALS
RESPIRATION RATE: 18 BRPM | BODY MASS INDEX: 46.29 KG/M2 | HEART RATE: 76 BPM | TEMPERATURE: 98.3 F | OXYGEN SATURATION: 98 % | SYSTOLIC BLOOD PRESSURE: 159 MMHG | WEIGHT: 251.54 LBS | DIASTOLIC BLOOD PRESSURE: 92 MMHG | HEIGHT: 62 IN

## 2023-10-22 DIAGNOSIS — R51.9 NONINTRACTABLE HEADACHE, UNSPECIFIED CHRONICITY PATTERN, UNSPECIFIED HEADACHE TYPE: Primary | ICD-10-CM

## 2023-10-22 LAB
ALBUMIN SERPL-MCNC: 4.2 G/DL (ref 3.5–5.2)
ALBUMIN/GLOB SERPL: 2.1 G/DL
ALP SERPL-CCNC: 80 U/L (ref 39–117)
ALT SERPL W P-5'-P-CCNC: 25 U/L (ref 1–33)
ANION GAP SERPL CALCULATED.3IONS-SCNC: 12.8 MMOL/L (ref 5–15)
AST SERPL-CCNC: 18 U/L (ref 1–32)
BACTERIA UR QL AUTO: ABNORMAL /HPF
BASOPHILS # BLD AUTO: 0.06 10*3/MM3 (ref 0–0.2)
BASOPHILS NFR BLD AUTO: 0.9 % (ref 0–1.5)
BILIRUB SERPL-MCNC: 0.6 MG/DL (ref 0–1.2)
BILIRUB UR QL STRIP: NEGATIVE
BUN SERPL-MCNC: 42 MG/DL (ref 6–20)
BUN/CREAT SERPL: 11.5 (ref 7–25)
CALCIUM SPEC-SCNC: 10.9 MG/DL (ref 8.6–10.5)
CHLORIDE SERPL-SCNC: 102 MMOL/L (ref 98–107)
CLARITY UR: ABNORMAL
CO2 SERPL-SCNC: 25.2 MMOL/L (ref 22–29)
COLOR UR: YELLOW
CREAT SERPL-MCNC: 3.64 MG/DL (ref 0.57–1)
DEPRECATED RDW RBC AUTO: 54.9 FL (ref 37–54)
EGFRCR SERPLBLD CKD-EPI 2021: 14.1 ML/MIN/1.73
EOSINOPHIL # BLD AUTO: 0.15 10*3/MM3 (ref 0–0.4)
EOSINOPHIL NFR BLD AUTO: 2.3 % (ref 0.3–6.2)
ERYTHROCYTE [DISTWIDTH] IN BLOOD BY AUTOMATED COUNT: 14.6 % (ref 12.3–15.4)
GLOBULIN UR ELPH-MCNC: 2 GM/DL
GLUCOSE SERPL-MCNC: 119 MG/DL (ref 65–99)
GLUCOSE UR STRIP-MCNC: NEGATIVE MG/DL
HCT VFR BLD AUTO: 32.7 % (ref 34–46.6)
HGB BLD-MCNC: 10.6 G/DL (ref 12–15.9)
HGB UR QL STRIP.AUTO: NEGATIVE
HYALINE CASTS UR QL AUTO: ABNORMAL /LPF
IMM GRANULOCYTES # BLD AUTO: 0.05 10*3/MM3 (ref 0–0.05)
IMM GRANULOCYTES NFR BLD AUTO: 0.8 % (ref 0–0.5)
KETONES UR QL STRIP: NEGATIVE
LEUKOCYTE ESTERASE UR QL STRIP.AUTO: ABNORMAL
LYMPHOCYTES # BLD AUTO: 2.24 10*3/MM3 (ref 0.7–3.1)
LYMPHOCYTES NFR BLD AUTO: 34.2 % (ref 19.6–45.3)
MCH RBC QN AUTO: 33.4 PG (ref 26.6–33)
MCHC RBC AUTO-ENTMCNC: 32.4 G/DL (ref 31.5–35.7)
MCV RBC AUTO: 103.2 FL (ref 79–97)
MONOCYTES # BLD AUTO: 0.59 10*3/MM3 (ref 0.1–0.9)
MONOCYTES NFR BLD AUTO: 9 % (ref 5–12)
NEUTROPHILS NFR BLD AUTO: 3.46 10*3/MM3 (ref 1.7–7)
NEUTROPHILS NFR BLD AUTO: 52.8 % (ref 42.7–76)
NITRITE UR QL STRIP: NEGATIVE
NRBC BLD AUTO-RTO: 0 /100 WBC (ref 0–0.2)
PH UR STRIP.AUTO: 5.5 [PH] (ref 5–8)
PLATELET # BLD AUTO: 135 10*3/MM3 (ref 140–450)
PMV BLD AUTO: 9.1 FL (ref 6–12)
POTASSIUM SERPL-SCNC: 4.5 MMOL/L (ref 3.5–5.2)
PROT SERPL-MCNC: 6.2 G/DL (ref 6–8.5)
PROT UR QL STRIP: ABNORMAL
RBC # BLD AUTO: 3.17 10*6/MM3 (ref 3.77–5.28)
RBC # UR STRIP: ABNORMAL /HPF
REF LAB TEST METHOD: ABNORMAL
SODIUM SERPL-SCNC: 140 MMOL/L (ref 136–145)
SP GR UR STRIP: 1.01 (ref 1–1.03)
SQUAMOUS #/AREA URNS HPF: ABNORMAL /HPF
UROBILINOGEN UR QL STRIP: ABNORMAL
WBC # UR STRIP: ABNORMAL /HPF
WBC NRBC COR # BLD: 6.55 10*3/MM3 (ref 3.4–10.8)

## 2023-10-22 PROCEDURE — 81001 URINALYSIS AUTO W/SCOPE: CPT | Performed by: EMERGENCY MEDICINE

## 2023-10-22 PROCEDURE — 99284 EMERGENCY DEPT VISIT MOD MDM: CPT

## 2023-10-22 PROCEDURE — 70450 CT HEAD/BRAIN W/O DYE: CPT

## 2023-10-22 PROCEDURE — 87086 URINE CULTURE/COLONY COUNT: CPT | Performed by: EMERGENCY MEDICINE

## 2023-10-22 PROCEDURE — 85025 COMPLETE CBC W/AUTO DIFF WBC: CPT | Performed by: EMERGENCY MEDICINE

## 2023-10-22 PROCEDURE — 80053 COMPREHEN METABOLIC PANEL: CPT | Performed by: EMERGENCY MEDICINE

## 2023-10-22 RX ORDER — ACETAMINOPHEN 325 MG/1
650 TABLET ORAL ONCE
Status: COMPLETED | OUTPATIENT
Start: 2023-10-22 | End: 2023-10-22

## 2023-10-22 RX ADMIN — ACETAMINOPHEN 650 MG: 325 TABLET ORAL at 18:21

## 2023-10-22 NOTE — DISCHARGE INSTRUCTIONS
Your creatinine did get worse as did your GFR today.  It is imperative that you drink plenty of fluids and also follow-up with your nephrologist.

## 2023-10-22 NOTE — ED PROVIDER NOTES
Time: 5:58 PM EDT  Date of encounter:  10/22/2023  Independent Historian/Clinical History and Information was obtained by:   Patient    History is limited by: N/A    Chief Complaint: Blurred vision      History of Present Illness:  Patient is a 53 y.o. year old female who presents to the emergency department for evaluation of blurred vision.  The patient describes almost as though a glazed went over her eyes.  Patient denies loss of vision.  Patient has no numbness or tingling.  Patient does report a right-sided headache.  Patient has no chest pain or shortness of breath.  Patient has no cough hemoptysis.  Patient denies dysuria urinary frequency.    HPI    Patient Care Team  Primary Care Provider: Brian Ellis APRN    Past Medical History:     Allergies   Allergen Reactions    Amlodipine Shortness Of Breath    Diltiazem Hcl Anxiety    Augmentin [Amoxicillin-Pot Clavulanate] Nausea And Vomiting    Clindamycin/Lincomycin Swelling    Doxycycline Nausea And Vomiting    Zofran [Ondansetron] Confusion    Contrast Dye (Echo Or Unknown Ct/Mr) Palpitations     Patient gets to the point of passing out , heart races     Iodinated Contrast Media Palpitations     Patient gets to the point of passing out , heart races     Past Medical History:   Diagnosis Date    Anxiety     Arthritis     GILBERT KNEES    Asthma     SEASONAL ALLERGIES    CKD (chronic kidney disease)     Stage 3, Follows with [facility]    Elevated cholesterol     Gastritis     Gastritis     Gout 2021    Hiatal hernia     Hypertension      Past Surgical History:   Procedure Laterality Date    BONY PELVIS SURGERY      Plate     SECTION   and     CHOLECYSTECTOMY      COLONOSCOPY      ENDOSCOPY  2009    ENDOSCOPY N/A 3/14/2023    Procedure: ESOPHAGOGASTRODUODENOSCOPY with biopsies;  Surgeon: Tam Badillo MD;  Location: [facility] ENDOSCOPY;  Service: General;  Laterality: N/A;  hiatal hernia    TOTAL KNEE ARTHROPLASTY Left  12/22/2021    Procedure: TOTAL KNEE ARTHROPLASTY;  Surgeon: Marco Nelson MD;  Location: [facility] MAIN OR;  Service: Orthopedics;  Laterality: Left;    TOTAL KNEE ARTHROPLASTY Right 10/19/2022    Procedure: RIGHT TOTAL KNEE ARTHROPLASTY - NO SHELBY;  Surgeon: Marco Nelson MD;  Location: [facility] MAIN OR;  Service: Orthopedics;  Laterality: Right;    TUBAL ABDOMINAL LIGATION  1989     Family History   Problem Relation Age of Onset    Throat cancer Father 73    Stroke Other     Diabetes Other     Malig Hyperthermia Neg Hx        Home Medications:  Prior to Admission medications    Medication Sig Start Date End Date Taking? Authorizing Provider   acetaminophen (TYLENOL) 325 MG suppository Insert 1 suppository into the rectum.    ProviderAl MD   albuterol sulfate  (90 Base) MCG/ACT inhaler Take 1-2 Puffs every 4 hours prn 4/11/23   Brian Ellis APRN   allopurinol (ZYLOPRIM) 300 MG tablet Take 1 tablet by mouth Daily. 6/8/23   Brian Ellis APRN   atorvastatin (LIPITOR) 20 MG tablet Take 1 tablet by mouth Every Night. 6/8/23   Brian Ellis APRN   carvedilol (COREG) 12.5 MG tablet Take 1 tablet by mouth 2 (Two) Times a Day With Meals.    ProviderAl MD   carvedilol (COREG) 25 MG tablet Take 1 tablet by mouth 2 (Two) Times a Day With Meals. 6/8/23   Brian Ellis APRN   clobetasol (TEMOVATE) 0.05 % cream Apply 1 application  topically to the appropriate area as directed 2 (Two) Times a Day. 9/20/23   Brian Ellis APRN   cloNIDine (CATAPRES) 0.2 MG tablet Take 1 tablet by mouth 3 (Three) Times a Day. 6/8/23   Brian Ellis APRN   diphenhydrAMINE (BENADRYL) 25 mg capsule Take 1 capsule by mouth Every 6 (Six) Hours As Needed for Itching.    Al Ceballos MD   docusate calcium (SURFAK) 240 MG capsule Take 1 capsule by mouth 2 (Two) Times a Day.    Al Ceballos MD   docusate sodium (Colace) 100 MG  capsule Take 1 capsule by mouth 2 (Two) Times a Day. 9/12/23   Brian Ellis APRN   fluticasone (FLONASE) 50 MCG/ACT nasal spray 2 sprays into the nostril(s) as directed by provider Daily. 2 sprays each nostril daily 1/19/23   Brian Ellis APRN   hydrocortisone (ANUSOL-HC) 25 MG suppository Insert 1 suppository into the rectum 2 (Two) Times a Day. 8/7/23   Naida Neal APRN   Hydrocortisone, Perianal, (ANUSOL-HC) 2.5 % rectal cream Insert  into the rectum 2 (Two) Times a Day. 8/7/23   Naida Neal APRN   linaclotide (Linzess) 290 MCG capsule capsule Take 1 capsule by mouth Every Morning Before Breakfast. 10/13/23   Brian Ellis APRN   Lokelma 10 g pack TAKE 10 GRAM BY MOUTH 1 TIME EACH DAY 12/1/22   Al Ceballos MD   losartan (COZAAR) 100 MG tablet Take 1 tablet by mouth Daily. 6/8/23   Brian Ellis APRN   omeprazole (priLOSEC) 40 MG capsule Take 1 capsule by mouth Daily. 8/30/23   Brian Ellis APRN   oxyCODONE-acetaminophen (PERCOCET) 5-325 MG per tablet Take 1 tablet by mouth.  Patient not taking: Reported on 10/12/2023 7/16/23   Al Ceballos MD   promethazine (PHENERGAN) 25 MG tablet Take 1 tablet by mouth Every 6 (Six) Hours As Needed for Nausea or Vomiting. 8/30/23   Brian Ellis APRN   vitamin B-12 (CYANOCOBALAMIN) 100 MCG tablet Take 0.5 tablets by mouth Daily.    Al Ceballos MD   zinc gluconate 50 MG tablet Take 1 tablet by mouth Daily.    Al Ceballos MD        Social History:   Social History     Tobacco Use    Smoking status: Former     Types: Cigarettes     Quit date: 2013     Years since quitting: 10.8    Smokeless tobacco: Never   Vaping Use    Vaping Use: Never used   Substance Use Topics    Alcohol use: Never    Drug use: Never         Review of Systems:  Review of Systems   Constitutional:  Negative for chills and fever.   HENT:  Negative for congestion, rhinorrhea and sore  "throat.    Eyes:  Negative for pain and visual disturbance.   Respiratory:  Negative for apnea, cough, chest tightness and shortness of breath.    Cardiovascular:  Negative for chest pain and palpitations.   Gastrointestinal:  Negative for abdominal pain, diarrhea, nausea and vomiting.   Genitourinary:  Negative for difficulty urinating and dysuria.   Musculoskeletal:  Negative for joint swelling and myalgias.   Skin:  Negative for color change.   Neurological:  Positive for headaches. Negative for seizures.   Psychiatric/Behavioral: Negative.     All other systems reviewed and are negative.       Physical Exam:  /92   Pulse 76   Temp 98.3 °F (36.8 °C) (Oral)   Resp 18   Ht 157.5 cm (62\")   Wt 114 kg (251 lb 8.7 oz)   SpO2 98%   BMI 46.01 kg/m²     Physical Exam  Vitals and nursing note reviewed.   Constitutional:       General: She is not in acute distress.     Appearance: Normal appearance. She is not toxic-appearing.   HENT:      Head: Normocephalic and atraumatic.      Jaw: There is normal jaw occlusion.   Eyes:      General: Lids are normal.      Extraocular Movements: Extraocular movements intact.      Conjunctiva/sclera: Conjunctivae normal.      Pupils: Pupils are equal, round, and reactive to light.   Cardiovascular:      Rate and Rhythm: Normal rate and regular rhythm.      Pulses: Normal pulses.      Heart sounds: Normal heart sounds.   Pulmonary:      Effort: Pulmonary effort is normal. No respiratory distress.      Breath sounds: Normal breath sounds. No wheezing or rhonchi.   Abdominal:      General: Abdomen is flat.      Palpations: Abdomen is soft.      Tenderness: There is no abdominal tenderness. There is no guarding or rebound.   Musculoskeletal:         General: Normal range of motion.      Cervical back: Normal range of motion and neck supple.      Right lower leg: No edema.      Left lower leg: No edema.   Skin:     General: Skin is warm and dry.   Neurological:      Mental Status: " She is alert and oriented to person, place, and time. Mental status is at baseline.   Psychiatric:         Mood and Affect: Mood normal.                  Procedures:  Procedures      Medical Decision Making:      Comorbidities that affect care:    Hypertension    External Notes reviewed:    Previous Clinic Note: Patient was seen in urgent care for the same symptoms and advised to come to the emergency department.      The following orders were placed and all results were independently analyzed by me:  Orders Placed This Encounter   Procedures    Urine Culture - Urine,    CT Head Without Contrast    Comprehensive Metabolic Panel    Urinalysis With Culture If Indicated - Urine, Clean Catch    CBC Auto Differential    Urinalysis, Microscopic Only - Urine, Clean Catch    CBC & Differential       Medications Given in the Emergency Department:  Medications   acetaminophen (TYLENOL) tablet 650 mg (650 mg Oral Given 10/22/23 1821)        ED Course:         Labs:    Lab Results (last 24 hours)       Procedure Component Value Units Date/Time    Urinalysis With Culture If Indicated - Urine, Clean Catch [[id number]]  (Abnormal) Collected: 10/22/23 1817    Specimen: Urine, Clean Catch Updated: 10/22/23 1830     Color, UA Yellow     Appearance, UA Cloudy     pH, UA 5.5     Specific Gravity, UA 1.012     Glucose, UA Negative     Ketones, UA Negative     Bilirubin, UA Negative     Blood, UA Negative     Protein, UA 30 mg/dL (1+)     Leuk Esterase, UA Trace     Nitrite, UA Negative     Urobilinogen, UA 0.2 E.U./dL    Narrative:      In absence of clinical symptoms, the presence of pyuria, bacteria, and/or nitrites on the urinalysis result does not correlate with infection.    Urinalysis, Microscopic Only - Urine, Clean Catch [[id number]]  (Abnormal) Collected: 10/22/23 1817    Specimen: Urine, Clean Catch Updated: 10/22/23 1830     RBC, UA 0-2 /HPF      WBC, UA 11-20 /HPF      Bacteria, UA None Seen /HPF      Squamous Epithelial  Cells, UA 7-12 /HPF      Hyaline Casts, UA 3-6 /LPF      Methodology Automated Microscopy    Urine Culture - Urine, Urine, Clean Catch [[id number]]  (Normal) Collected: 10/22/23 1817    Specimen: Urine, Clean Catch Updated: 10/23/23 1243     Urine Culture No growth    CBC & Differential [[id number]]  (Abnormal) Collected: 10/22/23 1820    Specimen: Blood Updated: 10/22/23 1827    Narrative:      The following orders were created for panel order CBC & Differential.  Procedure                               Abnormality         Status                     ---------                               -----------         ------                     CBC Auto Differential[[id number]]        Abnormal            Final result                 Please view results for these tests on the individual orders.    Comprehensive Metabolic Panel [[id number]]  (Abnormal) Collected: 10/22/23 1820    Specimen: Blood Updated: 10/22/23 1849     Glucose 119 mg/dL      BUN 42 mg/dL      Creatinine 3.64 mg/dL      Sodium 140 mmol/L      Potassium 4.5 mmol/L      Chloride 102 mmol/L      CO2 25.2 mmol/L      Calcium 10.9 mg/dL      Total Protein 6.2 g/dL      Albumin 4.2 g/dL      ALT (SGPT) 25 U/L      AST (SGOT) 18 U/L      Alkaline Phosphatase 80 U/L      Total Bilirubin 0.6 mg/dL      Globulin 2.0 gm/dL      A/G Ratio 2.1 g/dL      BUN/Creatinine Ratio 11.5     Anion Gap 12.8 mmol/L      eGFR 14.1 mL/min/1.73      Comment: <15 Indicative of kidney failure       Narrative:      GFR Normal >60  Chronic Kidney Disease <60  Kidney Failure <15      CBC Auto Differential [[id number]]  (Abnormal) Collected: 10/22/23 1820    Specimen: Blood Updated: 10/22/23 1827     WBC 6.55 10*3/mm3      RBC 3.17 10*6/mm3      Hemoglobin 10.6 g/dL      Hematocrit 32.7 %      .2 fL      MCH 33.4 pg      MCHC 32.4 g/dL      RDW 14.6 %      RDW-SD 54.9 fl      MPV 9.1 fL      Platelets 135 10*3/mm3      Neutrophil % 52.8 %      Lymphocyte % 34.2 %      Monocyte % 9.0 %       Eosinophil % 2.3 %      Basophil % 0.9 %      Immature Grans % 0.8 %      Neutrophils, Absolute 3.46 10*3/mm3      Lymphocytes, Absolute 2.24 10*3/mm3      Monocytes, Absolute 0.59 10*3/mm3      Eosinophils, Absolute 0.15 10*3/mm3      Basophils, Absolute 0.06 10*3/mm3      Immature Grans, Absolute 0.05 10*3/mm3      nRBC 0.0 /100 WBC              Imaging:    CT Head Without Contrast    Result Date: 10/22/2023  PROCEDURE: CT HEAD WO CONTRAST  COMPARISON:  [facility], CT, CT HEAD WO CONTRAST, 4/28/2023, 14:28. INDICATIONS: headache  PROTOCOL:   Standard imaging protocol performed    RADIATION:   DLP: 1082.2 mGy*cm   MA and/or KV was adjusted to minimize radiation dose.     TECHNIQUE: After obtaining the patient's consent, CT images were obtained without non-ionic intravenous contrast material.  FINDINGS:  There is no CT evidence of acute hemorrhage, infarct, mass, mass effect or midline shift. There is no hydrocephalus. The gray-white matter junctions are preserved. The mastoid air cells and visualized paranasal sinuses are clear. No skull fractures or calvarial lesions.               No acute intracranial abnormality.     EDWIN HARRIS DO       Electronically Signed and Approved By: EDWIN HARRIS DO on 10/22/2023 at 18:37                Differential Diagnosis and Discussion:    Headache: Differential diagnosis includes but is not limited to migraine, cluster headache, hypertension, tumor, subarachnoid bleeding, pseudotumor cerebri, temporal arteritis, infections, tension headache, and TMJ syndrome.    All labs were reviewed and interpreted by me.  CT scan radiology impression was interpreted by me.    MDM     Amount and/or Complexity of Data Reviewed  Decide to obtain previous medical records or to obtain history from someone other than the patient: yes       The patient presented to the emergency department with a headache.  The patient´s CBC that was reviewed and interpreted by me shows no  abnormalities of critical concern. Of note, there is no anemia requiring a blood transfusion and the platelet count is acceptable.  CMP does show worsening creatinine.  The patient refused the liter of fluids despite me telling her that her creatinine is worse.  The patient is now resting comfortably in feels better, is alert, talkative, interactive and in no distress after ED treatment. The patient appears well and is able to tolerate PO fluids. Repeat examination is unremarkable and benign. The patient is neurologically intact, has a normal mental status, and this ambulatory in the ED. The history, exam, diagnostic testing (if any) and the patient's current condition do not suggest meningitis, stroke, sepsis, subarachnoid hemorrhage, intracranial bleeding, encephalitis, temporal arteritis or other significant pathology to warrant further testing, continued ED treatment, admission, neurological consultation, for other specialist evaluation at this point. The vital signs have been stable. The patient's condition is stable and appropriate for discharge. The patient will pursue further outpatient evaluation with the primary care physician or other designated or consulting physician as indicated in the discharge instructions.        Patient Care Considerations:    CONSULT: I considered consulting nephrology, however the patient reports that she would like to leave and will follow-up with Dr. Vines.      Consultants/Shared Management Plan:    None    Social Determinants of Health:    Patient is independent, reliable, and has access to care.       Disposition and Care Coordination:    Discharged: I considered escalation of care by admitting this patient for observation, however the patient has improved and is suitable and  stable for discharge.    I have explained the patient´s condition, diagnoses and treatment plan based on the information available to me at this time. I have answered questions and addressed any  concerns. The patient has a good  understanding of the patient´s diagnosis, condition, and treatment plan as can be expected at this point. The vital signs have been stable. The patient´s condition is stable and appropriate for discharge from the emergency department.      The patient will pursue further outpatient evaluation with the primary care physician or other designated or consulting physician as outlined in the discharge instructions. They are agreeable to this plan of care and follow-up instructions have been explained in detail. The patient has received these instructions in written format and have expressed an understanding of the discharge instructions. The patient is aware that any significant change in condition or worsening of symptoms should prompt an immediate return to this or the closest emergency department or call to 911.  I have explained discharge medications and the need for follow up with the patient/caretakers. This was also printed in the discharge instructions. Patient was discharged with the following medications and follow up:      Medication List      No changes were made to your prescriptions during this visit.      Brian Ellis, APRN  [address] [place] [address]  [phone]             Final diagnoses:   Nonintractable headache, unspecified chronicity pattern, unspecified headache type        ED Disposition       ED Disposition   Discharge    Condition   Stable    Comment   --               This medical record created using voice recognition software.             Cyndi Hamlin MD  10/23/23 0743

## 2023-10-22 NOTE — ED NOTES
Patient refusing IV or IV fluids. Said that she does not want IV, she said she is always dehydrated, and there is no need for the IV. Provider made aware. Patient did let nurse draw labs for CBC, and CMP.

## 2023-10-23 LAB — BACTERIA SPEC AEROBE CULT: NO GROWTH

## 2023-10-24 ENCOUNTER — OFFICE VISIT (OUTPATIENT)
Dept: FAMILY MEDICINE CLINIC | Facility: CLINIC | Age: 53
End: 2023-10-24
Payer: MEDICARE

## 2023-10-24 ENCOUNTER — APPOINTMENT (OUTPATIENT)
Dept: GENERAL RADIOLOGY | Facility: HOSPITAL | Age: 53
End: 2023-10-24
Payer: MEDICARE

## 2023-10-24 VITALS
TEMPERATURE: 97.7 F | HEART RATE: 84 BPM | DIASTOLIC BLOOD PRESSURE: 80 MMHG | SYSTOLIC BLOOD PRESSURE: 132 MMHG | WEIGHT: 247 LBS | HEIGHT: 62 IN | OXYGEN SATURATION: 98 % | BODY MASS INDEX: 45.45 KG/M2

## 2023-10-24 DIAGNOSIS — E55.9 VITAMIN D DEFICIENCY: ICD-10-CM

## 2023-10-24 DIAGNOSIS — K59.01 SLOW TRANSIT CONSTIPATION: ICD-10-CM

## 2023-10-24 DIAGNOSIS — E78.2 MIXED HYPERLIPIDEMIA: Primary | ICD-10-CM

## 2023-10-24 DIAGNOSIS — I10 PRIMARY HYPERTENSION: ICD-10-CM

## 2023-10-24 DIAGNOSIS — E83.52 HYPERCALCEMIA: ICD-10-CM

## 2023-10-24 DIAGNOSIS — M10.9 GOUT, UNSPECIFIED CAUSE, UNSPECIFIED CHRONICITY, UNSPECIFIED SITE: ICD-10-CM

## 2023-10-24 DIAGNOSIS — J30.9 ALLERGIC RHINITIS, UNSPECIFIED SEASONALITY, UNSPECIFIED TRIGGER: ICD-10-CM

## 2023-10-24 DIAGNOSIS — R73.01 IMPAIRED FASTING GLUCOSE: ICD-10-CM

## 2023-10-24 DIAGNOSIS — H53.8 BLURRED VISION, BILATERAL: ICD-10-CM

## 2023-10-24 DIAGNOSIS — N18.32 STAGE 3B CHRONIC KIDNEY DISEASE: ICD-10-CM

## 2023-10-24 DIAGNOSIS — D64.9 ANEMIA, UNSPECIFIED TYPE: ICD-10-CM

## 2023-10-24 LAB
25(OH)D3 SERPL-MCNC: 53.3 NG/ML (ref 30–100)
ALBUMIN SERPL-MCNC: 4 G/DL (ref 3.5–5.2)
ALBUMIN SERPL-MCNC: 4.3 G/DL (ref 3.5–5.2)
ALBUMIN/GLOB SERPL: 1.8 G/DL
ALBUMIN/GLOB SERPL: 2 G/DL
ALP SERPL-CCNC: 68 U/L (ref 39–117)
ALP SERPL-CCNC: 80 U/L (ref 39–117)
ALT SERPL W P-5'-P-CCNC: 24 U/L (ref 1–33)
ALT SERPL W P-5'-P-CCNC: 26 U/L (ref 1–33)
AMYLASE SERPL-CCNC: 78 U/L (ref 28–100)
ANION GAP SERPL CALCULATED.3IONS-SCNC: 11.5 MMOL/L (ref 5–15)
ANION GAP SERPL CALCULATED.3IONS-SCNC: 14 MMOL/L (ref 5–15)
AST SERPL-CCNC: 17 U/L (ref 1–32)
AST SERPL-CCNC: 20 U/L (ref 1–32)
BASOPHILS # BLD AUTO: 0.04 10*3/MM3 (ref 0–0.2)
BASOPHILS # BLD AUTO: 0.05 10*3/MM3 (ref 0–0.2)
BASOPHILS NFR BLD AUTO: 0.8 % (ref 0–1.5)
BASOPHILS NFR BLD AUTO: 0.8 % (ref 0–1.5)
BILIRUB SERPL-MCNC: 0.4 MG/DL (ref 0–1.2)
BILIRUB SERPL-MCNC: 0.6 MG/DL (ref 0–1.2)
BUN SERPL-MCNC: 40 MG/DL (ref 6–20)
BUN SERPL-MCNC: 42 MG/DL (ref 6–20)
BUN/CREAT SERPL: 12.4 (ref 7–25)
BUN/CREAT SERPL: 12.5 (ref 7–25)
CALCIUM SPEC-SCNC: 10 MG/DL (ref 8.6–10.5)
CALCIUM SPEC-SCNC: 10.8 MG/DL (ref 8.6–10.5)
CHLORIDE SERPL-SCNC: 101 MMOL/L (ref 98–107)
CHLORIDE SERPL-SCNC: 102 MMOL/L (ref 98–107)
CHOLEST SERPL-MCNC: 147 MG/DL (ref 0–200)
CO2 SERPL-SCNC: 23 MMOL/L (ref 22–29)
CO2 SERPL-SCNC: 24.5 MMOL/L (ref 22–29)
CREAT SERPL-MCNC: 3.22 MG/DL (ref 0.57–1)
CREAT SERPL-MCNC: 3.35 MG/DL (ref 0.57–1)
DEPRECATED RDW RBC AUTO: 53.9 FL (ref 37–54)
DEPRECATED RDW RBC AUTO: 54.9 FL (ref 37–54)
EGFRCR SERPLBLD CKD-EPI 2021: 15.6 ML/MIN/1.73
EGFRCR SERPLBLD CKD-EPI 2021: 16.4 ML/MIN/1.73
EOSINOPHIL # BLD AUTO: 0.11 10*3/MM3 (ref 0–0.4)
EOSINOPHIL # BLD AUTO: 0.12 10*3/MM3 (ref 0–0.4)
EOSINOPHIL NFR BLD AUTO: 1.9 % (ref 0.3–6.2)
EOSINOPHIL NFR BLD AUTO: 2.1 % (ref 0.3–6.2)
ERYTHROCYTE [DISTWIDTH] IN BLOOD BY AUTOMATED COUNT: 14.7 % (ref 12.3–15.4)
ERYTHROCYTE [DISTWIDTH] IN BLOOD BY AUTOMATED COUNT: 14.7 % (ref 12.3–15.4)
FERRITIN SERPL-MCNC: 253 NG/ML (ref 13–150)
GEN 5 2HR TROPONIN T REFLEX: 44 NG/L
GLOBULIN UR ELPH-MCNC: 2.2 GM/DL
GLOBULIN UR ELPH-MCNC: 2.2 GM/DL
GLUCOSE SERPL-MCNC: 108 MG/DL (ref 65–99)
GLUCOSE SERPL-MCNC: 125 MG/DL (ref 65–99)
H PYLORI IGG SER IA-ACNC: NEGATIVE
HCT VFR BLD AUTO: 32.5 % (ref 34–46.6)
HCT VFR BLD AUTO: 33.4 % (ref 34–46.6)
HDLC SERPL-MCNC: 48 MG/DL (ref 40–60)
HGB BLD-MCNC: 10.6 G/DL (ref 12–15.9)
HGB BLD-MCNC: 11.2 G/DL (ref 12–15.9)
HOLD SPECIMEN: NORMAL
HOLD SPECIMEN: NORMAL
IMM GRANULOCYTES # BLD AUTO: 0.03 10*3/MM3 (ref 0–0.05)
IMM GRANULOCYTES # BLD AUTO: 0.03 10*3/MM3 (ref 0–0.05)
IMM GRANULOCYTES NFR BLD AUTO: 0.5 % (ref 0–0.5)
IMM GRANULOCYTES NFR BLD AUTO: 0.6 % (ref 0–0.5)
IRON 24H UR-MRATE: 88 MCG/DL (ref 37–145)
IRON SATN MFR SERPL: 26 % (ref 20–50)
LDLC SERPL CALC-MCNC: 74 MG/DL (ref 0–100)
LDLC/HDLC SERPL: 1.46 {RATIO}
LIPASE SERPL-CCNC: 49 U/L (ref 13–60)
LIPASE SERPL-CCNC: 51 U/L (ref 13–60)
LYMPHOCYTES # BLD AUTO: 1.9 10*3/MM3 (ref 0.7–3.1)
LYMPHOCYTES # BLD AUTO: 2.15 10*3/MM3 (ref 0.7–3.1)
LYMPHOCYTES NFR BLD AUTO: 34.8 % (ref 19.6–45.3)
LYMPHOCYTES NFR BLD AUTO: 36.2 % (ref 19.6–45.3)
MAGNESIUM SERPL-MCNC: 1.8 MG/DL (ref 1.6–2.6)
MAGNESIUM SERPL-MCNC: 1.9 MG/DL (ref 1.6–2.6)
MCH RBC QN AUTO: 33.7 PG (ref 26.6–33)
MCH RBC QN AUTO: 34.5 PG (ref 26.6–33)
MCHC RBC AUTO-ENTMCNC: 32.6 G/DL (ref 31.5–35.7)
MCHC RBC AUTO-ENTMCNC: 33.5 G/DL (ref 31.5–35.7)
MCV RBC AUTO: 102.8 FL (ref 79–97)
MCV RBC AUTO: 103.2 FL (ref 79–97)
MONOCYTES # BLD AUTO: 0.42 10*3/MM3 (ref 0.1–0.9)
MONOCYTES # BLD AUTO: 0.51 10*3/MM3 (ref 0.1–0.9)
MONOCYTES NFR BLD AUTO: 8 % (ref 5–12)
MONOCYTES NFR BLD AUTO: 8.3 % (ref 5–12)
NEUTROPHILS NFR BLD AUTO: 2.75 10*3/MM3 (ref 1.7–7)
NEUTROPHILS NFR BLD AUTO: 3.31 10*3/MM3 (ref 1.7–7)
NEUTROPHILS NFR BLD AUTO: 52.3 % (ref 42.7–76)
NEUTROPHILS NFR BLD AUTO: 53.7 % (ref 42.7–76)
NRBC BLD AUTO-RTO: 0 /100 WBC (ref 0–0.2)
NRBC BLD AUTO-RTO: 0.2 /100 WBC (ref 0–0.2)
NT-PROBNP SERPL-MCNC: 80 PG/ML (ref 0–900)
PHOSPHATE SERPL-MCNC: 3.2 MG/DL (ref 2.5–4.5)
PLATELET # BLD AUTO: 142 10*3/MM3 (ref 140–450)
PLATELET # BLD AUTO: 147 10*3/MM3 (ref 140–450)
PMV BLD AUTO: 8.9 FL (ref 6–12)
PMV BLD AUTO: 9.1 FL (ref 6–12)
POTASSIUM SERPL-SCNC: 4.1 MMOL/L (ref 3.5–5.2)
POTASSIUM SERPL-SCNC: 4.6 MMOL/L (ref 3.5–5.2)
PROT SERPL-MCNC: 6.2 G/DL (ref 6–8.5)
PROT SERPL-MCNC: 6.5 G/DL (ref 6–8.5)
PTH-INTACT SERPL-MCNC: 21.3 PG/ML (ref 15–65)
RBC # BLD AUTO: 3.15 10*6/MM3 (ref 3.77–5.28)
RBC # BLD AUTO: 3.25 10*6/MM3 (ref 3.77–5.28)
SODIUM SERPL-SCNC: 137 MMOL/L (ref 136–145)
SODIUM SERPL-SCNC: 139 MMOL/L (ref 136–145)
TIBC SERPL-MCNC: 335 MCG/DL (ref 298–536)
TRANSFERRIN SERPL-MCNC: 225 MG/DL (ref 200–360)
TRIGL SERPL-MCNC: 144 MG/DL (ref 0–150)
TROPONIN T DELTA: 0 NG/L
TROPONIN T SERPL HS-MCNC: 44 NG/L
TROPONIN T SERPL HS-MCNC: 65 NG/L
URATE SERPL-MCNC: 2.4 MG/DL (ref 2.4–5.7)
VIT B12 BLD-MCNC: >2000 PG/ML (ref 211–946)
VLDLC SERPL-MCNC: 25 MG/DL (ref 5–40)
WBC NRBC COR # BLD: 5.25 10*3/MM3 (ref 3.4–10.8)
WBC NRBC COR # BLD: 6.17 10*3/MM3 (ref 3.4–10.8)
WHOLE BLOOD HOLD COAG: NORMAL
WHOLE BLOOD HOLD SPECIMEN: NORMAL

## 2023-10-24 PROCEDURE — 84100 ASSAY OF PHOSPHORUS: CPT | Performed by: NURSE PRACTITIONER

## 2023-10-24 PROCEDURE — 83735 ASSAY OF MAGNESIUM: CPT | Performed by: NURSE PRACTITIONER

## 2023-10-24 PROCEDURE — 93005 ELECTROCARDIOGRAM TRACING: CPT | Performed by: EMERGENCY MEDICINE

## 2023-10-24 PROCEDURE — 71045 X-RAY EXAM CHEST 1 VIEW: CPT

## 2023-10-24 PROCEDURE — 84550 ASSAY OF BLOOD/URIC ACID: CPT | Performed by: NURSE PRACTITIONER

## 2023-10-24 PROCEDURE — 83735 ASSAY OF MAGNESIUM: CPT

## 2023-10-24 PROCEDURE — 84484 ASSAY OF TROPONIN QUANT: CPT | Performed by: NURSE PRACTITIONER

## 2023-10-24 PROCEDURE — 80053 COMPREHEN METABOLIC PANEL: CPT

## 2023-10-24 PROCEDURE — 82728 ASSAY OF FERRITIN: CPT | Performed by: NURSE PRACTITIONER

## 2023-10-24 PROCEDURE — 80053 COMPREHEN METABOLIC PANEL: CPT | Performed by: NURSE PRACTITIONER

## 2023-10-24 PROCEDURE — 80061 LIPID PANEL: CPT | Performed by: NURSE PRACTITIONER

## 2023-10-24 PROCEDURE — 85025 COMPLETE CBC W/AUTO DIFF WBC: CPT | Performed by: NURSE PRACTITIONER

## 2023-10-24 PROCEDURE — 36415 COLL VENOUS BLD VENIPUNCTURE: CPT

## 2023-10-24 PROCEDURE — 93005 ELECTROCARDIOGRAM TRACING: CPT

## 2023-10-24 PROCEDURE — 82306 VITAMIN D 25 HYDROXY: CPT | Performed by: NURSE PRACTITIONER

## 2023-10-24 PROCEDURE — 82607 VITAMIN B-12: CPT | Performed by: NURSE PRACTITIONER

## 2023-10-24 PROCEDURE — 86677 HELICOBACTER PYLORI ANTIBODY: CPT | Performed by: NURSE PRACTITIONER

## 2023-10-24 PROCEDURE — 99284 EMERGENCY DEPT VISIT MOD MDM: CPT

## 2023-10-24 PROCEDURE — 83690 ASSAY OF LIPASE: CPT | Performed by: NURSE PRACTITIONER

## 2023-10-24 PROCEDURE — 84484 ASSAY OF TROPONIN QUANT: CPT

## 2023-10-24 PROCEDURE — 83970 ASSAY OF PARATHORMONE: CPT | Performed by: NURSE PRACTITIONER

## 2023-10-24 PROCEDURE — 85025 COMPLETE CBC W/AUTO DIFF WBC: CPT

## 2023-10-24 PROCEDURE — 93010 ELECTROCARDIOGRAM REPORT: CPT | Performed by: INTERNAL MEDICINE

## 2023-10-24 PROCEDURE — 83540 ASSAY OF IRON: CPT | Performed by: NURSE PRACTITIONER

## 2023-10-24 PROCEDURE — 83690 ASSAY OF LIPASE: CPT

## 2023-10-24 PROCEDURE — 82150 ASSAY OF AMYLASE: CPT | Performed by: NURSE PRACTITIONER

## 2023-10-24 PROCEDURE — 83880 ASSAY OF NATRIURETIC PEPTIDE: CPT

## 2023-10-24 PROCEDURE — 84466 ASSAY OF TRANSFERRIN: CPT | Performed by: NURSE PRACTITIONER

## 2023-10-24 RX ORDER — PROMETHAZINE HYDROCHLORIDE 25 MG/1
25 TABLET ORAL EVERY 6 HOURS PRN
Qty: 30 TABLET | Refills: 0 | Status: SHIPPED | OUTPATIENT
Start: 2023-10-24

## 2023-10-24 RX ORDER — ATORVASTATIN CALCIUM 20 MG/1
20 TABLET, FILM COATED ORAL NIGHTLY
Qty: 90 TABLET | Refills: 1 | Status: SHIPPED | OUTPATIENT
Start: 2023-10-24

## 2023-10-24 RX ORDER — DOCUSATE SODIUM 100 MG/1
100 CAPSULE, LIQUID FILLED ORAL 2 TIMES DAILY
Qty: 180 CAPSULE | Refills: 1 | Status: SHIPPED | OUTPATIENT
Start: 2023-10-24

## 2023-10-24 RX ORDER — LOSARTAN POTASSIUM 100 MG/1
100 TABLET ORAL DAILY
Qty: 90 TABLET | Refills: 1 | Status: SHIPPED | OUTPATIENT
Start: 2023-10-24

## 2023-10-24 RX ORDER — ALLOPURINOL 300 MG/1
300 TABLET ORAL DAILY
Qty: 180 TABLET | Refills: 1 | Status: SHIPPED | OUTPATIENT
Start: 2023-10-24

## 2023-10-24 RX ORDER — ASPIRIN 81 MG/1
324 TABLET, CHEWABLE ORAL ONCE
Status: DISCONTINUED | OUTPATIENT
Start: 2023-10-24 | End: 2023-10-25 | Stop reason: HOSPADM

## 2023-10-24 RX ORDER — SODIUM CHLORIDE 0.9 % (FLUSH) 0.9 %
10 SYRINGE (ML) INJECTION AS NEEDED
Status: DISCONTINUED | OUTPATIENT
Start: 2023-10-24 | End: 2023-10-25 | Stop reason: HOSPADM

## 2023-10-24 RX ORDER — CARVEDILOL 25 MG/1
25 TABLET ORAL 2 TIMES DAILY WITH MEALS
Qty: 180 TABLET | Refills: 1 | Status: SHIPPED | OUTPATIENT
Start: 2023-10-24

## 2023-10-24 RX ORDER — ALBUTEROL SULFATE 90 UG/1
AEROSOL, METERED RESPIRATORY (INHALATION)
Qty: 8 G | Refills: 1 | Status: SHIPPED | OUTPATIENT
Start: 2023-10-24

## 2023-10-24 RX ORDER — OMEPRAZOLE 40 MG/1
40 CAPSULE, DELAYED RELEASE ORAL DAILY
Qty: 90 CAPSULE | Refills: 2 | Status: SHIPPED | OUTPATIENT
Start: 2023-10-24

## 2023-10-24 RX ORDER — FLUTICASONE PROPIONATE 50 MCG
2 SPRAY, SUSPENSION (ML) NASAL DAILY
Qty: 18.2 ML | Refills: 1 | Status: SHIPPED | OUTPATIENT
Start: 2023-10-24

## 2023-10-24 NOTE — PROGRESS NOTES
Follow Up Office Visit      Patient Name: Katarzyna Norris  : 1970   MRN: [id number]     Chief Complaint:    Chief Complaint   Patient presents with    Anxiety    Hypertension    Anemia    impaired fasting glucose    Hyperlipidemia    Asthma    Allergic Rhinitis    Migraine       History of Present Illness: Katarzyna Norris is a 53 y.o. female who is here today to follow up for anxiety, HTN, anemia, IFG, hyperlipidemia, asthma, allergic rhinitis, migraine.      Mammogram-2020 scheduled for 2023  Pap-2022  Labs- 10/2023  Colonoscopy-2009    Follow up [facility] ER  Patient went to the ER 10/22/23 for headache and blurred vision. She had a normal CT head. She is planning on making an Optometrist appointment.   sebastian is a 53 y.o. year old female who presents to the emergency department for evaluation of blurred vision.  The patient describes almost as though a glazed went over her eyes.  Patient denies loss of vision.  Patient has no numbness or tingling.  Patient does report a right-sided headache.  Patient has no chest pain or shortness of breath.     Subjective      Review of Systems:   Review of Systems   Constitutional:  Negative for fever.   HENT:  Negative for ear pain and sore throat.    Eyes:  Positive for visual disturbance.   Respiratory:  Negative for cough.    Cardiovascular:  Negative for chest pain.   Gastrointestinal:  Negative for abdominal pain, diarrhea, nausea and vomiting.   Genitourinary:  Negative for dysuria.   Neurological:  Positive for headaches.        Past Medical History:   Past Medical History:   Diagnosis Date    Anxiety     Arthritis     GILBERT KNEES    Asthma     SEASONAL ALLERGIES    CKD (chronic kidney disease)     Stage 3, Follows with [facility]    Elevated cholesterol     Gastritis     Gastritis     Gout 2021    Hiatal hernia     Hypertension        Past Surgical History:   Past Surgical History:   Procedure Laterality Date    BONY PELVIS SURGERY      Plate      SECTION   and     CHOLECYSTECTOMY      COLONOSCOPY      ENDOSCOPY      ENDOSCOPY N/A 3/14/2023    Procedure: ESOPHAGOGASTRODUODENOSCOPY with biopsies;  Surgeon: Tam Badillo MD;  Location: [facility] ENDOSCOPY;  Service: General;  Laterality: N/A;  hiatal hernia    TOTAL KNEE ARTHROPLASTY Left 2021    Procedure: TOTAL KNEE ARTHROPLASTY;  Surgeon: Marco Nelson MD;  Location: [facility] MAIN OR;  Service: Orthopedics;  Laterality: Left;    TOTAL KNEE ARTHROPLASTY Right 10/19/2022    Procedure: RIGHT TOTAL KNEE ARTHROPLASTY - NO SHELBY;  Surgeon: Marco Nelson MD;  Location: [facility] MAIN OR;  Service: Orthopedics;  Laterality: Right;    TUBAL ABDOMINAL LIGATION         Family History:   Family History   Problem Relation Age of Onset    Throat cancer Father 73    Stroke Other     Diabetes Other     Malig Hyperthermia Neg Hx        Social History:   Social History     Socioeconomic History    Marital status:    Tobacco Use    Smoking status: Former     Types: Cigarettes     Quit date:      Years since quitting: 10.8    Smokeless tobacco: Never   Vaping Use    Vaping Use: Never used   Substance and Sexual Activity    Alcohol use: Never    Drug use: Never    Sexual activity: Defer       Medications:     Current Outpatient Medications:     acetaminophen (TYLENOL) 325 MG suppository, Insert 1 suppository into the rectum., Disp: , Rfl:     albuterol sulfate  (90 Base) MCG/ACT inhaler, Take 1-2 Puffs every 4 hours prn, Disp: 8 g, Rfl: 1    allopurinol (ZYLOPRIM) 300 MG tablet, Take 1 tablet by mouth Daily., Disp: 180 tablet, Rfl: 1    atorvastatin (LIPITOR) 20 MG tablet, Take 1 tablet by mouth Every Night., Disp: 90 tablet, Rfl: 1    carvedilol (COREG) 25 MG tablet, Take 1 tablet by mouth 2 (Two) Times a Day With Meals., Disp: 180 tablet, Rfl: 1    clobetasol (TEMOVATE) 0.05 % cream, Apply 1 application  topically to the appropriate area as directed 2 (Two)  Times a Day., Disp: 60 g, Rfl: 1    cloNIDine (CATAPRES) 0.2 MG tablet, Take 1 tablet by mouth 3 (Three) Times a Day., Disp: 270 tablet, Rfl: 1    diphenhydrAMINE (BENADRYL) 25 mg capsule, Take 1 capsule by mouth Every 6 (Six) Hours As Needed for Itching., Disp: , Rfl:     docusate calcium (SURFAK) 240 MG capsule, Take 1 capsule by mouth 2 (Two) Times a Day., Disp: , Rfl:     docusate sodium (Colace) 100 MG capsule, Take 1 capsule by mouth 2 (Two) Times a Day., Disp: 180 capsule, Rfl: 1    fluticasone (FLONASE) 50 MCG/ACT nasal spray, 2 sprays into the nostril(s) as directed by provider Daily. 2 sprays each nostril daily, Disp: 18.2 mL, Rfl: 1    hydrocortisone (ANUSOL-HC) 25 MG suppository, Insert 1 suppository into the rectum 2 (Two) Times a Day., Disp: 28 suppository, Rfl: 1    Hydrocortisone, Perianal, (ANUSOL-HC) 2.5 % rectal cream, Insert  into the rectum 2 (Two) Times a Day., Disp: 28 g, Rfl: 3    linaclotide (Linzess) 290 MCG capsule capsule, Take 1 capsule by mouth Every Morning Before Breakfast., Disp: 90 capsule, Rfl: 2    Lokelma 10 g pack, TAKE 10 GRAM BY MOUTH 1 TIME EACH DAY, Disp: , Rfl:     losartan (COZAAR) 100 MG tablet, Take 1 tablet by mouth Daily., Disp: 90 tablet, Rfl: 1    omeprazole (priLOSEC) 40 MG capsule, Take 1 capsule by mouth Daily., Disp: 90 capsule, Rfl: 2    oxyCODONE-acetaminophen (PERCOCET) 5-325 MG per tablet, Take 1 tablet by mouth., Disp: , Rfl:     promethazine (PHENERGAN) 25 MG tablet, Take 1 tablet by mouth Every 6 (Six) Hours As Needed for Nausea or Vomiting., Disp: 30 tablet, Rfl: 0    vitamin B-12 (CYANOCOBALAMIN) 100 MCG tablet, Take 0.5 tablets by mouth Daily., Disp: , Rfl:     zinc gluconate 50 MG tablet, Take 1 tablet by mouth Daily., Disp: , Rfl:     Current Facility-Administered Medications:     promethazine (PHENERGAN) injection 12.5 mg, 12.5 mg, Intravenous, Q6H PRN, Brian Ellis APRN, 25 mg at 08/30/23 0756    Allergies:   Allergies   Allergen  "Reactions    Amlodipine Shortness Of Breath    Diltiazem Hcl Anxiety    Augmentin [Amoxicillin-Pot Clavulanate] Nausea And Vomiting    Clindamycin/Lincomycin Swelling    Doxycycline Nausea And Vomiting    Zofran [Ondansetron] Confusion    Contrast Dye (Echo Or Unknown Ct/Mr) Palpitations     Patient gets to the point of passing out , heart races     Iodinated Contrast Media Palpitations     Patient gets to the point of passing out , heart races             Objective     Physical Exam:  Vital Signs:   Vitals:    10/24/23 0804   BP: 156/97   Pulse: 84   Temp: 97.7 °F (36.5 °C)   SpO2: 98%   Weight: 112 kg (247 lb)   Height: 157.5 cm (62\")     Body mass index is 45.18 kg/m².           Physical Exam  HENT:      Right Ear: Tympanic membrane normal.      Left Ear: Tympanic membrane normal.      Nose: Nose normal.      Mouth/Throat:      Mouth: Mucous membranes are moist.   Eyes:      Conjunctiva/sclera: Conjunctivae normal.   Neck:      Vascular: No carotid bruit.   Cardiovascular:      Rate and Rhythm: Normal rate and regular rhythm.      Heart sounds: Normal heart sounds. No murmur heard.  Pulmonary:      Effort: Pulmonary effort is normal.      Breath sounds: Normal breath sounds.   Abdominal:      General: Bowel sounds are normal.      Palpations: Abdomen is soft.   Musculoskeletal:      Right lower leg: No edema.      Left lower leg: No edema.   Skin:     General: Skin is warm and dry.   Neurological:      Mental Status: She is alert.   Psychiatric:         Mood and Affect: Mood normal.         Behavior: Behavior normal.             Assessment / Plan      Assessment/Plan:   Diagnoses and all orders for this visit:    1. Mixed hyperlipidemia (Primary)  -     Lipid Panel    2. Primary hypertension  -     CBC Auto Differential    3. Impaired fasting glucose    4. Stage 3b chronic kidney disease  -     Renal Function Panel; Future  -     Protein, Total; Future  -     Microalbumin / Creatinine Urine Ratio - Urine, Clean " Catch  -     Uric Acid  -     Urinalysis With Culture If Indicated -    5. Vitamin D deficiency  -     Vitamin D 25 hydroxy; Future    6. Anemia, unspecified type  -     Iron Profile  -     Ferritin  -     Vitamin B12    7. Gout, unspecified cause, unspecified chronicity, unspecified site    8. Slow transit constipation  -     linaclotide (Linzess) 290 MCG capsule capsule; Take 1 capsule by mouth Every Morning Before Breakfast.  Dispense: 90 capsule; Refill: 2    9. Blurred vision, bilateral  -     Ambulatory Referral to Ophthalmology    10. Hypercalcemia  -     PTH, Intact; Future  -     Calcium, Urine, 24 Hour - Urine, Clean Catch    Other orders  -     albuterol sulfate  (90 Base) MCG/ACT inhaler; Take 1-2 Puffs every 4 hours prn  Dispense: 8 g; Refill: 1  -     allopurinol (ZYLOPRIM) 300 MG tablet; Take 1 tablet by mouth Daily.  Dispense: 180 tablet; Refill: 1  -     atorvastatin (LIPITOR) 20 MG tablet; Take 1 tablet by mouth Every Night.  Dispense: 90 tablet; Refill: 1  -     carvedilol (COREG) 25 MG tablet; Take 1 tablet by mouth 2 (Two) Times a Day With Meals.  Dispense: 180 tablet; Refill: 1  -     docusate sodium (Colace) 100 MG capsule; Take 1 capsule by mouth 2 (Two) Times a Day.  Dispense: 180 capsule; Refill: 1  -     fluticasone (FLONASE) 50 MCG/ACT nasal spray; 2 sprays into the nostril(s) as directed by provider Daily. 2 sprays each nostril daily  Dispense: 18.2 mL; Refill: 1  -     losartan (COZAAR) 100 MG tablet; Take 1 tablet by mouth Daily.  Dispense: 90 tablet; Refill: 1  -     omeprazole (priLOSEC) 40 MG capsule; Take 1 capsule by mouth Daily.  Dispense: 90 capsule; Refill: 2  -     promethazine (PHENERGAN) 25 MG tablet; Take 1 tablet by mouth Every 6 (Six) Hours As Needed for Nausea or Vomiting.  Dispense: 30 tablet; Refill: 0       For lipidemia obtain lipid panel and CMP to monitor current statin dose Lipitor 20 mg at nighttime  Hypertension currently controlled losartan 100 mg daily  "Coreg 25 twice daily  Chronic kidney disease will obtain labs urine as recommended by nephrologist follow-up as scheduled  MD deficiency will obtain labs  Anemia we will obtain labs to monitor  Gout no recent flares will obtain uric acid level monitor allopurinol 300 mg daily  Slow transit constipation currently controlled with increase fiber in diet and Linzess we will provide a refill  Blurred vision CT head normal will refer to ophthalmology  Hypercalcemia we will obtain parathyroid hormone and calcium urine collection  Allergic rhinitis currently controlled with Flonase      Follow Up:   Return in about 6 months (around 4/24/2024).    Frandy Ellis, APRDIANE    \"Please note that portions of this note were completed with a voice recognition program.\"    "

## 2023-10-25 ENCOUNTER — HOSPITAL ENCOUNTER (EMERGENCY)
Facility: HOSPITAL | Age: 53
Discharge: HOME | End: 2023-10-25
Attending: EMERGENCY MEDICINE
Payer: MEDICARE

## 2023-10-25 ENCOUNTER — TELEPHONE (OUTPATIENT)
Dept: FAMILY MEDICINE CLINIC | Facility: CLINIC | Age: 53
End: 2023-10-25
Payer: MEDICARE

## 2023-10-25 VITALS
HEART RATE: 81 BPM | DIASTOLIC BLOOD PRESSURE: 87 MMHG | HEIGHT: 62 IN | OXYGEN SATURATION: 98 % | RESPIRATION RATE: 18 BRPM | SYSTOLIC BLOOD PRESSURE: 157 MMHG | BODY MASS INDEX: 45.64 KG/M2 | WEIGHT: 248.02 LBS | TEMPERATURE: 97.6 F

## 2023-10-25 DIAGNOSIS — R79.89 ELEVATED TROPONIN: ICD-10-CM

## 2023-10-25 DIAGNOSIS — N18.9 CHRONIC KIDNEY DISEASE, UNSPECIFIED CKD STAGE: Primary | ICD-10-CM

## 2023-10-25 NOTE — ED NOTES
"Tech arrived to room to hook patient up to pulse, oxygen, blood pressure cuff, and cardiac monitor. Patient let tech apply pulse/oxygen monitor and blood pressure cuff. Patient refused cardiac monitoring stating \"I have sat out in the waiting room for 5 hours and haven't had all of that on, if the doctor needs it, I will put it on\". RN aware.   "

## 2023-10-25 NOTE — DISCHARGE INSTRUCTIONS
Please return to emergency room for chest pain, chest pressure, shortness of breath, near passing out, passing out, unusual fatigue, unusual sweating or any new symptoms you are concerned with

## 2023-10-25 NOTE — ED TRIAGE NOTES
Pt sent by PCP. Pt sent for abnormal labs, trop 65. Pt states that she is having mild pressure in her chest, onset today

## 2023-10-25 NOTE — TELEPHONE ENCOUNTER
Received a call from [facility] lab on 10/24/23 at 8pm with critical Troponin of 65.  Called and spoke with pt and she stated she was having some chest heaviness. Advised pt to go to ER for evaluation.

## 2023-10-25 NOTE — ED PROVIDER NOTES
Time: 8:49 PM EDT  Date of encounter:  10/24/2023  Independent Historian/Clinical History and Information was obtained by:   Patient    History is limited by: N/A    Chief complaint: Elevated troponin        History of Present Illness:    Patient states that she was seen in the emergency department on Sunday for visual disturbance.  She was referred back to her primary care physician today.  The patient had a follow-up visit with her primary care physician today.  Her primary care physician ordered outpatient labs.  The primary care physician called her this evening told her she needed to go to the emergency room because of an elevated troponin.  The patient states that she was very surprised by the call.  The patient states that she has not been having chest pain chest pressure or shortness of breath.  The patient's had no near-syncope or syncope.  The patient's had no unusual sweating.  The patient's had no back pain, neck pain or arm pain.  The patient does note that she has chronic renal failure and she sees Dr. Carvalho her nephrologist.  The patient came to the emergency room at the request of her primary care provider but has no symptoms at this time.  The nursing notes do mention chest pain or chest discomfort.  The patient adamantly denies that.    Patient Care Team  Primary Care Provider: Brian Ellis APRN    Past Medical History:     Allergies   Allergen Reactions    Amlodipine Shortness Of Breath    Diltiazem Hcl Anxiety    Augmentin [Amoxicillin-Pot Clavulanate] Nausea And Vomiting    Clindamycin/Lincomycin Swelling    Doxycycline Nausea And Vomiting    Zofran [Ondansetron] Confusion    Contrast Dye (Echo Or Unknown Ct/Mr) Palpitations     Patient gets to the point of passing out , heart races     Iodinated Contrast Media Palpitations     Patient gets to the point of passing out , heart races     Past Medical History:   Diagnosis Date    Anxiety     Arthritis     GILBERT KNEES    Asthma      SEASONAL ALLERGIES    CKD (chronic kidney disease)     Stage 3, Follows with [facility]    Elevated cholesterol     Gastritis     Gastritis     Gout 2021    Hiatal hernia     Hypertension      Past Surgical History:   Procedure Laterality Date    BONY PELVIS SURGERY      Plate     SECTION   and     CHOLECYSTECTOMY      COLONOSCOPY      ENDOSCOPY      ENDOSCOPY N/A 3/14/2023    Procedure: ESOPHAGOGASTRODUODENOSCOPY with biopsies;  Surgeon: Tam Badillo MD;  Location: [facility] ENDOSCOPY;  Service: General;  Laterality: N/A;  hiatal hernia    TOTAL KNEE ARTHROPLASTY Left 2021    Procedure: TOTAL KNEE ARTHROPLASTY;  Surgeon: Marco Nelson MD;  Location: [facility] MAIN OR;  Service: Orthopedics;  Laterality: Left;    TOTAL KNEE ARTHROPLASTY Right 10/19/2022    Procedure: RIGHT TOTAL KNEE ARTHROPLASTY - NO SHELBY;  Surgeon: Marco Nelson MD;  Location: [facility] MAIN OR;  Service: Orthopedics;  Laterality: Right;    TUBAL ABDOMINAL LIGATION       Family History   Problem Relation Age of Onset    Throat cancer Father 73    Stroke Other     Diabetes Other     Malig Hyperthermia Neg Hx        Home Medications:  Prior to Admission medications    Medication Sig Start Date End Date Taking? Authorizing Provider   acetaminophen (TYLENOL) 325 MG suppository Insert 1 suppository into the rectum.    Provider, MD Al   albuterol sulfate  (90 Base) MCG/ACT inhaler Take 1-2 Puffs every 4 hours prn 10/24/23   Brian Ellis APRN   allopurinol (ZYLOPRIM) 300 MG tablet Take 1 tablet by mouth Daily. 10/24/23   Brian Ellis APRN   atorvastatin (LIPITOR) 20 MG tablet Take 1 tablet by mouth Every Night. 10/24/23   Brian Ellis APRN   carvedilol (COREG) 25 MG tablet Take 1 tablet by mouth 2 (Two) Times a Day With Meals. 10/24/23   Brian Ellis APRN   clobetasol (TEMOVATE) 0.05 % cream Apply 1 application  topically to the  appropriate area as directed 2 (Two) Times a Day. 9/20/23   Brian Ellis APRN   cloNIDine (CATAPRES) 0.2 MG tablet Take 1 tablet by mouth 3 (Three) Times a Day. 6/8/23   Brian Ellis APRN   diphenhydrAMINE (BENADRYL) 25 mg capsule Take 1 capsule by mouth Every 6 (Six) Hours As Needed for Itching.    Al Ceballos MD   docusate calcium (SURFAK) 240 MG capsule Take 1 capsule by mouth 2 (Two) Times a Day.    Al Ceballos MD   docusate sodium (Colace) 100 MG capsule Take 1 capsule by mouth 2 (Two) Times a Day. 10/24/23   Brian Ellis APRN   fluticasone (FLONASE) 50 MCG/ACT nasal spray 2 sprays into the nostril(s) as directed by provider Daily. 2 sprays each nostril daily 10/24/23   Brian Ellis APRN   hydrocortisone (ANUSOL-HC) 25 MG suppository Insert 1 suppository into the rectum 2 (Two) Times a Day. 8/7/23   Naida Neal APRN   Hydrocortisone, Perianal, (ANUSOL-HC) 2.5 % rectal cream Insert  into the rectum 2 (Two) Times a Day. 8/7/23   Naida Neal APRN   linaclotide (Linzess) 290 MCG capsule capsule Take 1 capsule by mouth Every Morning Before Breakfast. 10/24/23   Brian Ellis APRN   Lokelma 10 g pack TAKE 10 GRAM BY MOUTH 1 TIME EACH DAY 12/1/22   Al Ceballos MD   losartan (COZAAR) 100 MG tablet Take 1 tablet by mouth Daily. 10/24/23   Brian Ellis APRN   omeprazole (priLOSEC) 40 MG capsule Take 1 capsule by mouth Daily. 10/24/23   Brian Ellis APRN   oxyCODONE-acetaminophen (PERCOCET) 5-325 MG per tablet Take 1 tablet by mouth. 7/16/23   Al Ceballos MD   promethazine (PHENERGAN) 25 MG tablet Take 1 tablet by mouth Every 6 (Six) Hours As Needed for Nausea or Vomiting. 10/24/23   Brian Ellis APRN   vitamin B-12 (CYANOCOBALAMIN) 100 MCG tablet Take 0.5 tablets by mouth Daily.    Provider, MD Al   zinc gluconate 50 MG tablet Take 1 tablet by mouth Daily.     Provider, MD Al   albuterol sulfate  (90 Base) MCG/ACT inhaler Take 1-2 Puffs every 4 hours prn 4/11/23 10/24/23  Brian Ellis APRN   allopurinol (ZYLOPRIM) 300 MG tablet Take 1 tablet by mouth Daily. 6/8/23 10/24/23  Brian Ellis APRN   atorvastatin (LIPITOR) 20 MG tablet Take 1 tablet by mouth Every Night. 6/8/23 10/24/23  Brian Ellis APRN   carvedilol (COREG) 12.5 MG tablet Take 1 tablet by mouth 2 (Two) Times a Day With Meals.  10/24/23  Provider, MD Al   carvedilol (COREG) 25 MG tablet Take 1 tablet by mouth 2 (Two) Times a Day With Meals. 6/8/23 10/24/23  rBian Ellis APRN   docusate sodium (Colace) 100 MG capsule Take 1 capsule by mouth 2 (Two) Times a Day. 9/12/23 10/24/23  Brian Ellis APRN   fluticasone (FLONASE) 50 MCG/ACT nasal spray 2 sprays into the nostril(s) as directed by provider Daily. 2 sprays each nostril daily 1/19/23 10/24/23  Brian Ellis APRN   linaclotide (Linzess) 290 MCG capsule capsule Take 1 capsule by mouth Every Morning Before Breakfast. 10/13/23 10/24/23  Brian Ellis APRN   losartan (COZAAR) 100 MG tablet Take 1 tablet by mouth Daily. 6/8/23 10/24/23  Brian Ellis APRN   omeprazole (priLOSEC) 40 MG capsule Take 1 capsule by mouth Daily. 8/30/23 10/24/23  Brian Ellis APRN   promethazine (PHENERGAN) 25 MG tablet Take 1 tablet by mouth Every 6 (Six) Hours As Needed for Nausea or Vomiting. 8/30/23 10/24/23  Brian Ellis APRN        Social History:   Social History     Tobacco Use    Smoking status: Former     Types: Cigarettes     Quit date: 2013     Years since quitting: 10.8    Smokeless tobacco: Never   Vaping Use    Vaping Use: Never used   Substance Use Topics    Alcohol use: Never    Drug use: Never         Review of Systems:  Review of Systems   Constitutional:  Negative for chills, diaphoresis and fever.   HENT:  Negative  "for congestion, postnasal drip, rhinorrhea and sore throat.    Eyes:  Negative for photophobia.   Respiratory:  Negative for cough, chest tightness and shortness of breath.    Cardiovascular:  Negative for chest pain, palpitations and leg swelling.   Gastrointestinal:  Negative for abdominal pain, diarrhea, nausea and vomiting.   Genitourinary:  Negative for difficulty urinating, dysuria, flank pain, frequency, hematuria and urgency.   Musculoskeletal:  Negative for neck pain and neck stiffness.   Skin:  Negative for pallor and rash.   Neurological:  Negative for dizziness, syncope, weakness, numbness and headaches.   Hematological:  Negative for adenopathy. Does not bruise/bleed easily.   Psychiatric/Behavioral: Negative.          Physical Exam:  /87   Pulse 81   Temp 97.6 °F (36.4 °C) (Oral)   Resp 18   Ht 157.5 cm (62\")   Wt 113 kg (248 lb 0.3 oz)   SpO2 98%   BMI 45.36 kg/m²         Physical Exam  Vitals and nursing note reviewed.   Constitutional:       General: She is not in acute distress.     Appearance: Normal appearance. She is not ill-appearing, toxic-appearing or diaphoretic.   HENT:      Head: Normocephalic and atraumatic.      Mouth/Throat:      Mouth: Mucous membranes are moist.   Eyes:      Pupils: Pupils are equal, round, and reactive to light.   Cardiovascular:      Rate and Rhythm: Normal rate and regular rhythm.      Pulses: Normal pulses.           Carotid pulses are 2+ on the right side and 2+ on the left side.       Radial pulses are 2+ on the right side and 2+ on the left side.        Femoral pulses are 2+ on the right side and 2+ on the left side.       Popliteal pulses are 2+ on the right side and 2+ on the left side.        Dorsalis pedis pulses are 2+ on the right side and 2+ on the left side.        Posterior tibial pulses are 2+ on the right side and 2+ on the left side.      Heart sounds: Normal heart sounds. No murmur heard.  Pulmonary:      Effort: Pulmonary effort is " normal. No accessory muscle usage, respiratory distress or retractions.      Breath sounds: Normal breath sounds. No wheezing, rhonchi or rales.   Abdominal:      General: Abdomen is flat. There is no distension.      Palpations: Abdomen is soft. There is no mass or pulsatile mass.      Tenderness: There is no abdominal tenderness. There is no right CVA tenderness, left CVA tenderness, guarding or rebound.      Comments: No rigidity   Musculoskeletal:         General: No swelling, tenderness or deformity.      Cervical back: Neck supple. No tenderness.      Right lower leg: No edema.      Left lower leg: No edema.   Skin:     General: Skin is warm and dry.      Capillary Refill: Capillary refill takes less than 2 seconds.      Coloration: Skin is not jaundiced or pale.      Findings: No erythema.   Neurological:      General: No focal deficit present.      Mental Status: She is alert and oriented to person, place, and time. Mental status is at baseline.      Cranial Nerves: Cranial nerves 2-12 are intact. No cranial nerve deficit.      Sensory: Sensation is intact. No sensory deficit.      Motor: Motor function is intact. No weakness or pronator drift.      Coordination: Coordination is intact. Coordination normal.   Psychiatric:         Mood and Affect: Mood normal.         Behavior: Behavior normal.                Procedures:  Procedures      Medical Decision Making:      Comorbidities that affect care:    Hypertension, elevated cholesterol, arthritis, anxiety, asthma, chronic kidney disease    External Notes reviewed:    None      The following orders were placed and all results were independently analyzed by me:  Orders Placed This Encounter   Procedures    XR Chest 1 View    [place] Draw    High Sensitivity Troponin T    Comprehensive Metabolic Panel    Lipase    BNP    Magnesium    CBC Auto Differential    High Sensitivity Troponin T 2Hr    ECG 12 Lead ED Triage Standing Order; Chest Pain    CBC & Differential     Green Top (Gel)    Lavender Top    Gold Top - SST    Light Blue Top       Medications Given in the Emergency Department:  Medications - No data to display       ED Course:    The patient was initially evaluated in the triage area where orders were placed. The patient was later dispositioned by Ishmael Bruner DO.      The patient was advised to stay for completion of workup which includes but is not limited to communication of labs and radiological results, reassessment and plan. The patient was advised that leaving prior to disposition by a provider could result in critical findings that are not communicated to the patient.     ED Course as of 10/26/23 0351 Tue Oct 24, 2023   2050 --- PROVIDER IN TRIAGE NOTE ---    The patient was evaluated by Kenny muhammad in triage. Orders were placed and the patient is currently awaiting disposition.    [AJ]   2057 EKG:    Rhythm: Sinus rhythm with PAC and 2 PVCs  Rate: 104  Intervals: Normal QT interval  T-wave: Baseline artifact obscures detail, nonspecific T wave flattening,   ST Segment: Again, baseline artifact obscures detail, there is no obvious pathological ST elevation and reciprocal ST depression to suggest STEMI    EKG Comparison: Excluding the baseline artifact, there is probably no significant change in the EKG from the EKG performed August 29, 2023    Interpreted by me   [SD]   2254 EKG:    Rhythm: Normal sinus rhythm  Rate: 75  Intervals: Normal WV and QT interval  T-wave: Nonspecific T wave flattening,  ST Segment: No pathological ST elevation or reciprocal ST depression to suggest STEMI    EKG Comparison: No change in the QRS and ST morphology from the EKG that was performed earlier in the department    Interpreted by me   [SD]      ED Course User Index  [AJ] Kenny Martínez PA-C  [SD] Ishmael Bruner DO       Labs:    Lab Results (last 24 hours)       ** No results found for the last 24 hours. **             Imaging:    No Radiology Exams Resulted  Within Past 24 Hours      Differential Diagnosis and Discussion:      Metabolic: Differential diagnosis includes but is not limited to hypertension, hyperglycemia, hyperkalemia, hypocalcemia, metabolic acidosis, hypokalemia, hypoglycemia, malnutrition, hypothyroidism, hyperthyroidism, and adrenal insufficiency.     All labs were reviewed and interpreted by me.  All X-rays impressions were independently interpreted by me.  EKG was interpreted by me.    MDM  Number of Diagnoses or Management Options  Chronic kidney disease, unspecified CKD stage  Elevated troponin  Diagnosis management comments: Patient's vital signs are stable in the emergency room.    The patient's CBC was reviewed and shows no abnormalities of critical concern.  Of note, there is no anemia requiring a blood transfusion and the platelet count is acceptable    Patient's chemistry demonstrates a glucose of 125, BUN of 42, creatinine 3.35.  In comparison to old chemistries, the patient does have a history of chronic kidney disease.  The patient had a normal bicarb and a normal anion gap.    Patient's EKG demonstrated no evidence of STEMI or dysrhythmia.    Patient had an elevated troponin of 44.  I do feel this is expected due to the patient's kidney disease.  I did repeated it 2 hours later and it again returned at 44 with a delta of 0 this is considered clinically insignificant.  The patient was asymptomatic never had any chest pain or cardiac equivalent symptoms.  I do feel the troponin is elevated in relation to the patient's chronic kidney disease.    The patient will be referred back to their primary care physician and nephrologist.    The patient was given very specific instructions on when and why to return to the emergency room.  The patient voiced understanding and felt comfortable with the discharge instructions.  They would return to the emergency room if necessary.  The patient appears appropriate for discharge and outpatient  follow-up.         Amount and/or Complexity of Data Reviewed  Clinical lab tests: reviewed  Tests in the radiology section of CPT®: reviewed  Tests in the medicine section of CPT®: reviewed  Decide to obtain previous medical records or to obtain history from someone other than the patient: yes         Social Determinants of Health:    Patient is independent, reliable, and has access to care.       Disposition and Care Coordination:    Discharged: The patient is suitable and stable for discharge with no need for consideration of observation or admission.    I have explained discharge medications and the need for follow up with the patient/caretakers. This was also printed in the discharge instructions. Patient was discharged with the following medications and follow up:      Medication List      No changes were made to your prescriptions during this visit.      Brian Ellis, APRN  [address] [facility]   [address] [place] [address]  [phone]    On 10/27/2023  Call for appointment    Promise Carvalho MD  [address] [facility] [address]  [facility] [address]  [phone]    On 10/26/2023  Worsening chronic kidney disease, call for appointment       Final diagnoses:   Chronic kidney disease, unspecified CKD stage   Elevated troponin        ED Disposition       ED Disposition   Discharge    Condition   Stable    Comment   --               This medical record created using voice recognition software.             Ishmael Bruner DO  10/26/23 0351

## 2023-10-26 DIAGNOSIS — N18.4 CHRONIC KIDNEY DISEASE, STAGE IV (SEVERE): Primary | ICD-10-CM

## 2023-10-26 DIAGNOSIS — N18.9 ANEMIA OF CHRONIC RENAL FAILURE, UNSPECIFIED CKD STAGE: ICD-10-CM

## 2023-10-26 DIAGNOSIS — D63.1 ANEMIA OF CHRONIC RENAL FAILURE, UNSPECIFIED CKD STAGE: ICD-10-CM

## 2023-10-26 LAB
CALCIUM 24H UR-MCNC: 2.7 MG/DL
CALCIUM 24H UR-MRATE: 23.8 MG/24 HR (ref 100–300)
COLLECT DURATION TIME UR: 24 HRS
SPECIMEN VOL 24H UR: 880 ML

## 2023-10-26 PROCEDURE — 81050 URINALYSIS VOLUME MEASURE: CPT | Performed by: NURSE PRACTITIONER

## 2023-10-26 PROCEDURE — 82340 ASSAY OF CALCIUM IN URINE: CPT | Performed by: NURSE PRACTITIONER

## 2023-10-27 LAB
QT INTERVAL: 353 MS
QT INTERVAL: 378 MS
QTC INTERVAL: 423 MS
QTC INTERVAL: 464 MS

## 2023-10-31 ENCOUNTER — TREATMENT (OUTPATIENT)
Dept: PHYSICAL THERAPY | Facility: CLINIC | Age: 53
End: 2023-10-31
Payer: MEDICARE

## 2023-10-31 DIAGNOSIS — M54.2 CERVICAL PAIN: Primary | ICD-10-CM

## 2023-10-31 DIAGNOSIS — M54.2 PAINFUL CERVICAL ROM: ICD-10-CM

## 2023-10-31 DIAGNOSIS — M50.90 CERVICAL DISC DISEASE: ICD-10-CM

## 2023-10-31 PROCEDURE — 97161 PT EVAL LOW COMPLEX 20 MIN: CPT | Performed by: PHYSICAL THERAPIST

## 2023-10-31 PROCEDURE — 97140 MANUAL THERAPY 1/> REGIONS: CPT | Performed by: PHYSICAL THERAPIST

## 2023-10-31 PROCEDURE — 97110 THERAPEUTIC EXERCISES: CPT | Performed by: PHYSICAL THERAPIST
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
## 2023-11-02 ENCOUNTER — TREATMENT (OUTPATIENT)
Dept: PHYSICAL THERAPY | Facility: CLINIC | Age: 53
End: 2023-11-02
Payer: MEDICARE

## 2023-11-02 DIAGNOSIS — M54.2 PAINFUL CERVICAL ROM: ICD-10-CM

## 2023-11-02 DIAGNOSIS — M54.2 CERVICAL PAIN: Primary | ICD-10-CM

## 2023-11-02 DIAGNOSIS — M50.90 CERVICAL DISC DISEASE: ICD-10-CM

## 2023-11-02 PROCEDURE — 97110 THERAPEUTIC EXERCISES: CPT | Performed by: PHYSICAL THERAPIST

## 2023-11-02 PROCEDURE — 97140 MANUAL THERAPY 1/> REGIONS: CPT | Performed by: PHYSICAL THERAPIST

## 2023-11-02 NOTE — PROGRESS NOTES
Physical Therapy Daily Treatment Note  [address] [place] [id number]    Patient: Katarzyna Norris   : 1970  Diagnosis/ICD-10 Code:  Cervical pain [M54.2]  Referring practitioner: Violeta Mendoza, *  Date of Initial Visit: Type: THERAPY  Noted: 10/31/2023  Today's Date: 2023  Patient seen for 2 sessions           Subjective : Patient reports she felt like the last session was helpful, but the left side of her neck and upper back got really tight trying to do the exercises.    Objective   See Exercise, Manual, and Modality Logs for complete treatment.       Assessment/Plan : Pt tolerated today's session well. Upon review with pt demonstrating the stretches, she was not using her hand to help her with the gentle stretch and was just moving her neck and almost straining the side she's going to. This was reviewed with her last time and reinforced today. Pt would benefit from continued skilled therapy to address deficits. Progress per plan of care.             Timed:  Manual Therapy:    15     mins  17197;  Therapeutic Exercise:    12     mins  29900;     Neuromuscular Rosemarie:    0    mins  95053;    Therapeutic Activity:     0     mins  87652;     Gait Trainin     mins  41196;     Ultrasound:     0     mins  89783;    Aquatic Therapy    0     mins  51253;    Untimed:  Electrical Stimulation:    0     mins  08509 ( );  Dry Needling     0     mins self-pay;  Mechanical Traction    0     mins  63408  Moist Heat     0     mins  No charge  Canalith Repos              0     mins 34430    Timed Treatment:   27   mins   Total Treatment:     27   mins    Efe Turcios PT  Physical Therapist    Electronically signed 2023    [place] License: PT - [id number]

## 2023-11-09 ENCOUNTER — TRANSCRIBE ORDERS (OUTPATIENT)
Dept: VASCULAR SURGERY | Facility: HOSPITAL | Age: 53
End: 2023-11-09
Payer: MEDICARE

## 2023-11-09 DIAGNOSIS — N18.4 CHRONIC KIDNEY DISEASE, STAGE IV (SEVERE): Primary | ICD-10-CM

## 2023-11-21 ENCOUNTER — TREATMENT (OUTPATIENT)
Dept: PHYSICAL THERAPY | Facility: CLINIC | Age: 53
End: 2023-11-21
Payer: MEDICARE

## 2023-11-21 DIAGNOSIS — M54.2 PAINFUL CERVICAL ROM: ICD-10-CM

## 2023-11-21 DIAGNOSIS — M54.2 CERVICAL PAIN: Primary | ICD-10-CM

## 2023-11-21 DIAGNOSIS — M50.90 CERVICAL DISC DISEASE: ICD-10-CM

## 2023-11-21 NOTE — PROGRESS NOTES
"Physical Therapy Daily Treatment Note  [facility] [address] [place] [address]    Patient: Katarzyna Norris   : 1970  Referring practitioner: Violeta Mendoza, *  Date of Initial Visit: Type: THERAPY  Noted: 10/31/2023  Today's Date: 2023  Patient seen for 3 sessions           Subjective  Katarzyna Norris reports:She feels herself tightening up, rating pain at 6-7/10. \"My eyes are kind of drawed in and hurting, kind of foggy.\" After session, Claire noted her pain was down to 1/10.        Objective   Introduced theracane and had her utilize this to aid trigger points in her neck/shoulders B. Claire is to check at [employer] or like stores to find one herself for self management at home to aid pain reduction and control.     See Exercise, Manual, and Modality Logs for complete treatment.       Assessment/Plan  Claire noted good relief of tension in her neck/shoulders along with reduced pain during/after session. She is to obtain a theracane or similar device to aid self management of pain. Continue next week as per outlined POC.    Visit Diagnoses:    ICD-10-CM ICD-9-CM   1. Cervical pain  M54.2 723.1   2. Cervical disc disease  M50.90 722.91   3. Painful cervical ROM  M54.2 723.1       Progress per Plan of Care and Progress strengthening /stabilization /functional activity           Timed:  Manual Therapy:    20     mins  04739;  Therapeutic Exercise:         mins  67997;     Neuromuscular Rosemarie:        mins  46077;    Therapeutic Activity:     8     mins  12277;     Gait Training:           mins  64780;     Ultrasound:          mins  73091;    Electrical Stimulation:         mins  00186 ( );  Aquatics  __   mins   88397    Untimed:  Electrical Stimulation:         mins  05357 ( );  Mechanical Traction:         mins  65833;     Timed Treatment:   28   mins   Total Treatment:     28   mins    Electronically Signed:  Serena Garza PTA  Physical Therapist Assistant    [place] " PTA license [id number]

## 2023-11-27 DIAGNOSIS — I10 PRIMARY HYPERTENSION: ICD-10-CM

## 2023-11-27 RX ORDER — CLONIDINE HYDROCHLORIDE 0.2 MG/1
0.2 TABLET ORAL 3 TIMES DAILY
Qty: 270 TABLET | Refills: 0 | Status: SHIPPED | OUTPATIENT
Start: 2023-11-27

## 2023-12-05 ENCOUNTER — TRANSCRIBE ORDERS (OUTPATIENT)
Dept: ADMINISTRATIVE | Facility: HOSPITAL | Age: 53
End: 2023-12-05
Payer: MEDICARE

## 2023-12-05 ENCOUNTER — LAB (OUTPATIENT)
Dept: LAB | Facility: HOSPITAL | Age: 53
End: 2023-12-05
Payer: MEDICARE

## 2023-12-05 ENCOUNTER — CLINICAL SUPPORT (OUTPATIENT)
Dept: FAMILY MEDICINE CLINIC | Facility: CLINIC | Age: 53
End: 2023-12-05
Payer: MEDICARE

## 2023-12-05 DIAGNOSIS — N18.4 CHRONIC KIDNEY DISEASE, STAGE IV (SEVERE): Primary | ICD-10-CM

## 2023-12-05 DIAGNOSIS — N05.9 RENAL GLOMERULAR DISEASE: ICD-10-CM

## 2023-12-05 DIAGNOSIS — E83.52 HYPERCALCEMIA: ICD-10-CM

## 2023-12-05 DIAGNOSIS — M10.9 GOUT, UNSPECIFIED CAUSE, UNSPECIFIED CHRONICITY, UNSPECIFIED SITE: ICD-10-CM

## 2023-12-05 DIAGNOSIS — E55.9 AVITAMINOSIS D: ICD-10-CM

## 2023-12-05 DIAGNOSIS — N18.6 ANEMIA IN END-STAGE RENAL DISEASE: ICD-10-CM

## 2023-12-05 DIAGNOSIS — R60.0 LEG EDEMA: ICD-10-CM

## 2023-12-05 DIAGNOSIS — I10 PRIMARY HYPERTENSION: ICD-10-CM

## 2023-12-05 DIAGNOSIS — N39.0 LOWER URINARY TRACT INFECTION: ICD-10-CM

## 2023-12-05 DIAGNOSIS — D63.1 ANEMIA IN END-STAGE RENAL DISEASE: ICD-10-CM

## 2023-12-05 DIAGNOSIS — E66.01 SEVERE OBESITY: ICD-10-CM

## 2023-12-05 DIAGNOSIS — E79.0 URICACIDEMIA: ICD-10-CM

## 2023-12-05 DIAGNOSIS — N18.4 CHRONIC KIDNEY DISEASE, STAGE IV (SEVERE): ICD-10-CM

## 2023-12-05 LAB
ALBUMIN SERPL-MCNC: 4.3 G/DL (ref 3.5–5.2)
ANION GAP SERPL CALCULATED.3IONS-SCNC: 11.8 MMOL/L (ref 5–15)
BACTERIA UR QL AUTO: ABNORMAL /HPF
BASOPHILS # BLD AUTO: 0.08 10*3/MM3 (ref 0–0.2)
BASOPHILS NFR BLD AUTO: 1.1 % (ref 0–1.5)
BILIRUB UR QL STRIP: NEGATIVE
BUN SERPL-MCNC: 33 MG/DL (ref 6–20)
BUN/CREAT SERPL: 9.3 (ref 7–25)
CALCIUM SPEC-SCNC: 11.5 MG/DL (ref 8.6–10.5)
CHLORIDE SERPL-SCNC: 101 MMOL/L (ref 98–107)
CLARITY UR: CLEAR
CO2 SERPL-SCNC: 26.2 MMOL/L (ref 22–29)
COLOR UR: YELLOW
CREAT SERPL-MCNC: 3.54 MG/DL (ref 0.57–1)
CREAT UR-MCNC: 34.6 MG/DL
DEPRECATED RDW RBC AUTO: 50.7 FL (ref 37–54)
EGFRCR SERPLBLD CKD-EPI 2021: 14.6 ML/MIN/1.73
EOSINOPHIL # BLD AUTO: 0.26 10*3/MM3 (ref 0–0.4)
EOSINOPHIL NFR BLD AUTO: 3.6 % (ref 0.3–6.2)
ERYTHROCYTE [DISTWIDTH] IN BLOOD BY AUTOMATED COUNT: 13.8 % (ref 12.3–15.4)
GLUCOSE SERPL-MCNC: 112 MG/DL (ref 65–99)
GLUCOSE UR STRIP-MCNC: NEGATIVE MG/DL
HCT VFR BLD AUTO: 32 % (ref 34–46.6)
HGB BLD-MCNC: 11 G/DL (ref 12–15.9)
HGB UR QL STRIP.AUTO: NEGATIVE
HYALINE CASTS UR QL AUTO: ABNORMAL /LPF
IMM GRANULOCYTES # BLD AUTO: 0.13 10*3/MM3 (ref 0–0.05)
IMM GRANULOCYTES NFR BLD AUTO: 1.8 % (ref 0–0.5)
KETONES UR QL STRIP: NEGATIVE
LEUKOCYTE ESTERASE UR QL STRIP.AUTO: NEGATIVE
LYMPHOCYTES # BLD AUTO: 2.36 10*3/MM3 (ref 0.7–3.1)
LYMPHOCYTES NFR BLD AUTO: 32.2 % (ref 19.6–45.3)
MCH RBC QN AUTO: 34.8 PG (ref 26.6–33)
MCHC RBC AUTO-ENTMCNC: 34.4 G/DL (ref 31.5–35.7)
MCV RBC AUTO: 101.3 FL (ref 79–97)
MONOCYTES # BLD AUTO: 0.63 10*3/MM3 (ref 0.1–0.9)
MONOCYTES NFR BLD AUTO: 8.6 % (ref 5–12)
NEUTROPHILS NFR BLD AUTO: 3.86 10*3/MM3 (ref 1.7–7)
NEUTROPHILS NFR BLD AUTO: 52.7 % (ref 42.7–76)
NITRITE UR QL STRIP: NEGATIVE
NRBC BLD AUTO-RTO: 0.3 /100 WBC (ref 0–0.2)
PH UR STRIP.AUTO: 6 [PH] (ref 5–8)
PHOSPHATE SERPL-MCNC: 4.6 MG/DL (ref 2.5–4.5)
PLATELET # BLD AUTO: 220 10*3/MM3 (ref 140–450)
PMV BLD AUTO: 9.4 FL (ref 6–12)
POTASSIUM SERPL-SCNC: 4.9 MMOL/L (ref 3.5–5.2)
PROT ?TM UR-MCNC: 21 MG/DL
PROT UR QL STRIP: ABNORMAL
PROT/CREAT UR: 0.61 MG/G{CREAT}
RBC # BLD AUTO: 3.16 10*6/MM3 (ref 3.77–5.28)
RBC # UR STRIP: ABNORMAL /HPF
REF LAB TEST METHOD: ABNORMAL
SODIUM SERPL-SCNC: 139 MMOL/L (ref 136–145)
SP GR UR STRIP: 1.01 (ref 1–1.03)
SQUAMOUS #/AREA URNS HPF: ABNORMAL /HPF
UROBILINOGEN UR QL STRIP: ABNORMAL
WBC # UR STRIP: ABNORMAL /HPF
WBC NRBC COR # BLD AUTO: 7.32 10*3/MM3 (ref 3.4–10.8)

## 2023-12-05 PROCEDURE — 84156 ASSAY OF PROTEIN URINE: CPT

## 2023-12-05 PROCEDURE — 81001 URINALYSIS AUTO W/SCOPE: CPT

## 2023-12-05 PROCEDURE — 82570 ASSAY OF URINE CREATININE: CPT

## 2023-12-05 PROCEDURE — 36415 COLL VENOUS BLD VENIPUNCTURE: CPT

## 2023-12-05 PROCEDURE — 85025 COMPLETE CBC W/AUTO DIFF WBC: CPT

## 2023-12-05 PROCEDURE — 80069 RENAL FUNCTION PANEL: CPT

## 2023-12-06 ENCOUNTER — TELEPHONE (OUTPATIENT)
Dept: FAMILY MEDICINE CLINIC | Facility: CLINIC | Age: 53
End: 2023-12-06

## 2023-12-06 DIAGNOSIS — M50.30 DEGENERATION, INTERVERTEBRAL DISC, CERVICAL: Primary | ICD-10-CM

## 2023-12-06 NOTE — TELEPHONE ENCOUNTER
Caller: Katarzyna Norris    Relationship: Self    Best call back number: [phone]     What is the medical concern/diagnosis: PHYSICAL THERAPY FOR NECK    What specialty or service is being requested: PHYSICAL THERAPY    What is the provider, practice or medical service name: [facility]    What is the office location: [address] [place] [address]    What is the office phone number: [phone]     Any additional details: PATIENT STATES THAT SHE HAS BEEN GOING TO PHYSICAL THERAPY FOR A WHILE BUT HER REFERRAL  23    PATIENT STATES THAT SHE NEEDS THIS REFERRAL TO COMPLETE HER LAST TWO APPOINTMENTS THIS YEAR.

## 2023-12-18 ENCOUNTER — TELEPHONE (OUTPATIENT)
Dept: FAMILY MEDICINE CLINIC | Facility: CLINIC | Age: 53
End: 2023-12-18
Payer: MEDICARE

## 2023-12-18 NOTE — TELEPHONE ENCOUNTER
Patient is wanting to make sure Mucinex dm is ok for her to take since she has hypertension or could you recommend something else

## 2023-12-19 NOTE — TELEPHONE ENCOUNTER
Spoke to patient. She said she went to urgent care 11/27/23 and they diagnosed her with Rhinosinusitis. She finished the penicillin they gave her and felt like it was getting better. However she said that she has started feeling bad again and also has a cough. She is hoping you can send something in for her.

## 2023-12-19 NOTE — TELEPHONE ENCOUNTER
There was an opening tomorrow at 3:15 pm. Patient is ok to coming in to follow up from her urgent care visit.

## 2023-12-20 ENCOUNTER — TREATMENT (OUTPATIENT)
Dept: PHYSICAL THERAPY | Facility: CLINIC | Age: 53
End: 2023-12-20
Payer: MEDICARE

## 2023-12-20 ENCOUNTER — OFFICE VISIT (OUTPATIENT)
Dept: FAMILY MEDICINE CLINIC | Facility: CLINIC | Age: 53
End: 2023-12-20
Payer: MEDICARE

## 2023-12-20 VITALS
HEART RATE: 114 BPM | TEMPERATURE: 100.2 F | HEIGHT: 62 IN | OXYGEN SATURATION: 96 % | WEIGHT: 247 LBS | DIASTOLIC BLOOD PRESSURE: 84 MMHG | BODY MASS INDEX: 45.45 KG/M2 | SYSTOLIC BLOOD PRESSURE: 132 MMHG

## 2023-12-20 DIAGNOSIS — M54.2 PAINFUL CERVICAL ROM: ICD-10-CM

## 2023-12-20 DIAGNOSIS — M50.90 CERVICAL DISC DISEASE: ICD-10-CM

## 2023-12-20 DIAGNOSIS — J34.89 SINUS PRESSURE: ICD-10-CM

## 2023-12-20 DIAGNOSIS — R09.81 SINUS CONGESTION: ICD-10-CM

## 2023-12-20 DIAGNOSIS — M54.2 CERVICAL PAIN: Primary | ICD-10-CM

## 2023-12-20 DIAGNOSIS — R05.1 ACUTE COUGH: ICD-10-CM

## 2023-12-20 DIAGNOSIS — J01.00 ACUTE NON-RECURRENT MAXILLARY SINUSITIS: Primary | ICD-10-CM

## 2023-12-20 PROCEDURE — 97110 THERAPEUTIC EXERCISES: CPT | Performed by: PHYSICAL THERAPIST

## 2023-12-20 PROCEDURE — 97161 PT EVAL LOW COMPLEX 20 MIN: CPT | Performed by: PHYSICAL THERAPIST

## 2023-12-20 RX ORDER — AZITHROMYCIN 250 MG/1
TABLET, FILM COATED ORAL
Qty: 6 TABLET | Refills: 0 | Status: SHIPPED | OUTPATIENT
Start: 2023-12-20

## 2023-12-20 RX ORDER — DEXTROMETHORPHAN HYDROBROMIDE AND PROMETHAZINE HYDROCHLORIDE 15; 6.25 MG/5ML; MG/5ML
5 SYRUP ORAL 4 TIMES DAILY PRN
Qty: 473 ML | Refills: 0 | Status: SHIPPED | OUTPATIENT
Start: 2023-12-20

## 2023-12-20 NOTE — PROGRESS NOTES
ACUTE VISIT     Patient Name: Katarzyna Norris  : 1970   MRN: [id number]     Chief Complaint:    Chief Complaint   Patient presents with    Follow-up     Urgent care       History of Present Illness: Katarzyna Norris is a 53 y.o. female who is here today for urgent care follow up/    23-   Patient presents with    Cough       Reports chest congestion  Symptoms began 4 days ago    Earache       Bilateral, worse on right    Hoarse       She tested negative for flu, covid and RSV. She was treated with Omnicef and Prednisone for Rhinosinusitis.  She was starting to feel better until she completed her medications and 2-3 days completing cefdinir started experiencing symptoms agin   now she's feeling bad again, sinus drainage, cough, sinus pressure, coughing x2 weeks   Using flonase no relief and saline         Subjective      Review of Systems:   Review of Systems   Constitutional:  Positive for fever.   HENT:  Positive for ear pain, postnasal drip, rhinorrhea, sinus pressure, sinus pain and sore throat.    Eyes:  Positive for discharge.   Respiratory:  Positive for cough. Negative for shortness of breath.    Cardiovascular:  Negative for chest pain.   Neurological:  Positive for headaches.        Past Medical History:   Past Medical History:   Diagnosis Date    Anxiety     Arthritis     GILBERT KNEES    Asthma     SEASONAL ALLERGIES    CKD (chronic kidney disease)     Stage 3, Follows with [facility]    Elevated cholesterol     Gastritis     Gastritis     Gout 2021    Hiatal hernia     Hypertension        Past Surgical History:   Past Surgical History:   Procedure Laterality Date    BONY PELVIS SURGERY      Plate     SECTION   and     CHOLECYSTECTOMY      COLONOSCOPY      ENDOSCOPY      ENDOSCOPY N/A 3/14/2023    Procedure: ESOPHAGOGASTRODUODENOSCOPY with biopsies;  Surgeon: Tam Badillo MD;  Location: [facility] ENDOSCOPY;  Service: General;  Laterality: N/A;  hiatal  hernia    TOTAL KNEE ARTHROPLASTY Left 12/22/2021    Procedure: TOTAL KNEE ARTHROPLASTY;  Surgeon: Marco Nelson MD;  Location: [facility] MAIN OR;  Service: Orthopedics;  Laterality: Left;    TOTAL KNEE ARTHROPLASTY Right 10/19/2022    Procedure: RIGHT TOTAL KNEE ARTHROPLASTY - NO SHELBY;  Surgeon: Marco Nelson MD;  Location: [facility] MAIN OR;  Service: Orthopedics;  Laterality: Right;    TUBAL ABDOMINAL LIGATION  1989       Family History:   Family History   Problem Relation Age of Onset    Throat cancer Father 73    Stroke Other     Diabetes Other     Malig Hyperthermia Neg Hx        Social History:   Social History     Socioeconomic History    Marital status:    Tobacco Use    Smoking status: Former     Types: Cigarettes     Quit date: 2013     Years since quitting: 10.9     Passive exposure: Never    Smokeless tobacco: Never   Vaping Use    Vaping Use: Never used   Substance and Sexual Activity    Alcohol use: Never    Drug use: Never    Sexual activity: Defer       Medications:     Current Outpatient Medications:     acetaminophen (TYLENOL) 325 MG suppository, Insert 1 suppository into the rectum., Disp: , Rfl:     allopurinol (ZYLOPRIM) 300 MG tablet, Take 1 tablet by mouth Daily., Disp: 180 tablet, Rfl: 1    atorvastatin (LIPITOR) 20 MG tablet, Take 1 tablet by mouth Every Night., Disp: 90 tablet, Rfl: 1    azithromycin (Zithromax Z-Yair) 250 MG tablet, Take 2 tablets by mouth on day 1, then 1 tablet daily on days 2-5, Disp: 6 tablet, Rfl: 0    carvedilol (COREG) 25 MG tablet, Take 1 tablet by mouth 2 (Two) Times a Day With Meals., Disp: 180 tablet, Rfl: 1    clobetasol (TEMOVATE) 0.05 % cream, Apply 1 application  topically to the appropriate area as directed 2 (Two) Times a Day., Disp: 60 g, Rfl: 1    cloNIDine (CATAPRES) 0.2 MG tablet, TAKE 1 TABLET BY MOUTH THREE TIMES DAILY, Disp: 270 tablet, Rfl: 0    diphenhydrAMINE (BENADRYL) 25 mg capsule, Take 1 capsule by mouth Every 6 (Six) Hours As  "Needed for Itching., Disp: , Rfl:     fluticasone (FLONASE) 50 MCG/ACT nasal spray, 2 sprays into the nostril(s) as directed by provider Daily. 2 sprays each nostril daily, Disp: 18.2 mL, Rfl: 1    hydrocortisone (ANUSOL-HC) 25 MG suppository, Insert 1 suppository into the rectum 2 (Two) Times a Day., Disp: 28 suppository, Rfl: 1    Hydrocortisone, Perianal, (ANUSOL-HC) 2.5 % rectal cream, Insert  into the rectum 2 (Two) Times a Day., Disp: 28 g, Rfl: 3    linaclotide (Linzess) 290 MCG capsule capsule, Take 1 capsule by mouth Every Morning Before Breakfast., Disp: 90 capsule, Rfl: 2    losartan (COZAAR) 100 MG tablet, Take 1 tablet by mouth Daily., Disp: 90 tablet, Rfl: 1    promethazine-dextromethorphan (PROMETHAZINE-DM) 6.25-15 MG/5ML syrup, Take 5 mL by mouth 4 (Four) Times a Day As Needed for Cough., Disp: 473 mL, Rfl: 0    vitamin B-12 (CYANOCOBALAMIN) 100 MCG tablet, Take 0.5 tablets by mouth Daily., Disp: , Rfl:     zinc gluconate 50 MG tablet, Take 1 tablet by mouth Daily., Disp: , Rfl:     Allergies:   Allergies   Allergen Reactions    Amlodipine Shortness Of Breath    Diltiazem Hcl Anxiety    Augmentin [Amoxicillin-Pot Clavulanate] Nausea And Vomiting    Clindamycin/Lincomycin Swelling    Doxycycline Nausea And Vomiting    Zofran [Ondansetron] Confusion    Contrast Dye (Echo Or Unknown Ct/Mr) Palpitations     Patient gets to the point of passing out , heart races     Iodinated Contrast Media Palpitations     Patient gets to the point of passing out , heart races         Objective     Physical Exam:  Vital Signs:   Vitals:    12/20/23 1454   BP: 132/84   Pulse: 114   Temp: 100.2 °F (37.9 °C)   SpO2: 96%   Weight: 112 kg (247 lb)   Height: 157.5 cm (62\")     Body mass index is 45.18 kg/m².     Physical Exam  HENT:      Right Ear: Tympanic membrane is injected and erythematous.      Left Ear: Tympanic membrane is injected and erythematous.      Nose: Congestion and rhinorrhea present.      Right Turbinates: " Enlarged and swollen.      Left Turbinates: Enlarged and swollen.      Right Sinus: Maxillary sinus tenderness present.      Left Sinus: Maxillary sinus tenderness present.      Comments: Dull transillumination bilateral maxillary sinuses     Mouth/Throat:      Mouth: Mucous membranes are moist.      Comments: Clear pnd  Cardiovascular:      Rate and Rhythm: Normal rate and regular rhythm.   Pulmonary:      Effort: Pulmonary effort is normal.      Breath sounds: Normal breath sounds.   Lymphadenopathy:      Cervical: No cervical adenopathy.   Skin:     General: Skin is warm and dry.   Neurological:      Mental Status: She is alert.             Assessment / Plan      Assessment/Plan:   Diagnoses and all orders for this visit:    1. Acute non-recurrent maxillary sinusitis (Primary)    2. Acute cough    3. Sinus pressure    4. Sinus congestion    Other orders  -     azithromycin (Zithromax Z-Yair) 250 MG tablet; Take 2 tablets by mouth on day 1, then 1 tablet daily on days 2-5  Dispense: 6 tablet; Refill: 0  -     promethazine-dextromethorphan (PROMETHAZINE-DM) 6.25-15 MG/5ML syrup; Take 5 mL by mouth 4 (Four) Times a Day As Needed for Cough.  Dispense: 473 mL; Refill: 0         Acute cough sinus pressure sinus congestion allergic rhinitis continue Flonase we will treat maxillary sinusitis with azithromycin as patient has completed cefdinir within the past 3 weeks and provide Bromfed-DM for cough    Follow Up:   Return if symptoms worsen or fail to improve.    MARICRUZ Ball      Please note that portions of this note were completed with a voice recognition program.
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
## 2024-01-02 ENCOUNTER — OFFICE VISIT (OUTPATIENT)
Dept: GASTROENTEROLOGY | Facility: CLINIC | Age: 54
End: 2024-01-02
Payer: MEDICARE

## 2024-01-02 VITALS
WEIGHT: 252.2 LBS | SYSTOLIC BLOOD PRESSURE: 127 MMHG | HEIGHT: 62 IN | DIASTOLIC BLOOD PRESSURE: 77 MMHG | BODY MASS INDEX: 46.41 KG/M2 | HEART RATE: 85 BPM

## 2024-01-02 DIAGNOSIS — G89.29 CHRONIC ABDOMINAL PAIN: Primary | ICD-10-CM

## 2024-01-02 DIAGNOSIS — K64.9 HEMORRHOIDS, UNSPECIFIED HEMORRHOID TYPE: ICD-10-CM

## 2024-01-02 DIAGNOSIS — R10.9 CHRONIC ABDOMINAL PAIN: Primary | ICD-10-CM

## 2024-01-02 DIAGNOSIS — K59.00 CONSTIPATION, UNSPECIFIED CONSTIPATION TYPE: ICD-10-CM

## 2024-01-02 RX ORDER — DICYCLOMINE HYDROCHLORIDE 10 MG/1
20 CAPSULE ORAL
Qty: 120 CAPSULE | Refills: 1 | Status: SHIPPED | OUTPATIENT
Start: 2024-01-02 | End: 2024-01-02

## 2024-01-02 RX ORDER — ALUMINUM ZIRCONIUM OCTACHLOROHYDREX GLY 16 G/100G
GEL TOPICAL 3 TIMES DAILY
Qty: 90 PACKET | Refills: 12 | Status: SHIPPED | OUTPATIENT
Start: 2024-01-02 | End: 2025-01-01

## 2024-01-02 RX ORDER — FAMOTIDINE 20 MG/1
20 TABLET, FILM COATED ORAL
Qty: 60 TABLET | Refills: 2 | Status: SHIPPED | OUTPATIENT
Start: 2024-01-02 | End: 2024-06-30

## 2024-01-02 NOTE — PROGRESS NOTES
Patient Name: Katarzyna Norris   Visit Date: 01/02/2024   Patient ID: [id number]  Provider: Aston Arroyo MD    Sex: female  Location:  Location Address:  Location Phone: [phone] [address] [phone]    YOB: 1970  Age: 53 y.o.   Primary Care Provider Brian Ellis APRN      Referring Provider: No ref. provider found        Chief Complaint  Abdominal Pain (Upper and central - sometimes worse with spicy foods), Constipation (1 bm e 2 days), and Med Management (Wants to discuss dosage linzess)    HPI  Katarzyna Norris is a 53 y.o. female with known history of CKD stage IV, hypertension, GERD, gastritis, arthritis who presents to [facility] GASTR[facility]OLOG[facility] on referral from No ref. provider found for a gastroenterology evaluation of postprandial, burning type upper abdominal pain lasted less than 30 minutes for last 4 months.  She also notices pain in the periumbilical and bilateral lower quadrant areas, it is not burning in character and usually is squeezing type started just before defecation.  She was diagnosed with gastritis in ED in August 2023.  Patient had EGD on 03/14/2023 and other then small sized hiatal hernia pathology was negative for gastritis, esophagitis or duodenitis.    Patient also complains of constipation and goes once after every 2 days.  Her PCP prescribed Linzess 290 mcg capsule daily that patient did not started yet and would like to discuss about it.  Prune juice helps with constipation.  She also has history of hemorrhoids that bleeds intermittently especially while she is severely constipated.  She is on hydrocortisone ointment.    Past Medical History:   Diagnosis Date    Anxiety     Arthritis     GILBERT KNEES    Asthma     SEASONAL ALLERGIES    CKD (chronic kidney disease)     Stage 3, Follows with [facility]    Elevated cholesterol     Gastritis     Gastritis     GERD (gastroesophageal reflux disease)     Gout 02/23/2021    Hiatal  hernia     Hypertension     Lactose intolerance        Past Surgical History:   Procedure Laterality Date    BONY PELVIS SURGERY      Plate     SECTION   and     CHOLECYSTECTOMY      COLONOSCOPY      ENDOSCOPY      ENDOSCOPY N/A 3/14/2023    Procedure: ESOPHAGOGASTRODUODENOSCOPY with biopsies;  Surgeon: Tam Badillo MD;  Location: [facility] ENDOSCOPY;  Service: General;  Laterality: N/A;  hiatal hernia    TOTAL KNEE ARTHROPLASTY Left 2021    Procedure: TOTAL KNEE ARTHROPLASTY;  Surgeon: Marco Nelson MD;  Location: [facility] MAIN OR;  Service: Orthopedics;  Laterality: Left;    TOTAL KNEE ARTHROPLASTY Right 10/19/2022    Procedure: RIGHT TOTAL KNEE ARTHROPLASTY - NO SHELBY;  Surgeon: Marco Nelson MD;  Location: [facility] MAIN OR;  Service: Orthopedics;  Laterality: Right;    TUBAL ABDOMINAL LIGATION         Family History   Problem Relation Age of Onset    Throat cancer Father 73    Stroke Other     Diabetes Other     Malig Hyperthermia Neg Hx     Colon cancer Neg Hx         Social History     Social History Narrative    Not on file       Allergies   Allergen Reactions    Amlodipine Shortness Of Breath    Diltiazem Hcl Anxiety    Augmentin [Amoxicillin-Pot Clavulanate] Nausea And Vomiting    Clindamycin/Lincomycin Swelling    Doxycycline Nausea And Vomiting    Zofran [Ondansetron] Confusion    Contrast Dye (Echo Or Unknown Ct/Mr) Palpitations     Patient gets to the point of passing out , heart races     Iodinated Contrast Media Palpitations     Patient gets to the point of passing out , heart races       CURRENT & DISCONTINUED MEDICATIONS  Current Outpatient Medications   Medication Instructions    acetaminophen (TYLENOL) 325 mg    allopurinol (ZYLOPRIM) 300 mg, Oral, Daily    atorvastatin (LIPITOR) 20 mg, Oral, Nightly    azithromycin (Zithromax Z-Yair) 250 MG tablet Take 2 tablets by mouth on day 1, then 1 tablet daily on days 2-5    carvedilol (COREG) 25 mg,  "Oral, 2 Times Daily With Meals    clobetasol (TEMOVATE) 0.05 % cream 1 application , Topical, 2 Times Daily    cloNIDine (CATAPRES) 0.2 mg, Oral, 3 Times Daily    diphenhydrAMINE (BENADRYL) 25 mg, Oral, Every 6 Hours PRN    famotidine (PEPCID) 20 mg, Oral, 2 Times Daily Before Meals    fluticasone (FLONASE) 50 MCG/ACT nasal spray 2 sprays, Nasal, Daily, 2 sprays each nostril daily    hydrocortisone (ANUSOL-HC) 25 mg, Rectal, 2 Times Daily    Hydrocortisone, Perianal, (ANUSOL-HC) 2.5 % rectal cream Rectal, 2 Times Daily    linaclotide (LINZESS) 145 mcg, Oral, Every Morning Before Breakfast    losartan (COZAAR) 100 mg, Oral, Daily    promethazine-dextromethorphan (PROMETHAZINE-DM) 6.25-15 MG/5ML syrup 5 mL, Oral, 4 Times Daily PRN    psyllium (Metamucil Smooth Texture) 58.6 % powder Oral, 3 Times Daily    vitamin B-12 (CYANOCOBALAMIN) 50 mcg, Oral, Daily    zinc gluconate 50 mg, Oral, Daily       Medications Discontinued During This Encounter   Medication Reason    linaclotide (Linzess) 290 MCG capsule capsule Dose adjustment    dicyclomine (BENTYL) 10 MG capsule *Error          VITAL SIGNS  /77 (BP Location: Left arm, Patient Position: Sitting, Cuff Size: Large Adult)   Pulse 85   Ht 157.5 cm (62\")   Wt 114 kg (252 lb 3.2 oz)   BMI 46.13 kg/m²       OBJECTIVE/PHYSICAL EXAM  Physical Exam  Constitutional:       Appearance: Normal appearance. She is obese.   HENT:      Mouth/Throat:      Mouth: Mucous membranes are moist.      Pharynx: Oropharynx is clear.   Eyes:      General: No scleral icterus.     Extraocular Movements: Extraocular movements intact.      Conjunctiva/sclera: Conjunctivae normal.      Pupils: Pupils are equal, round, and reactive to light.   Cardiovascular:      Rate and Rhythm: Normal rate and regular rhythm.      Heart sounds: No murmur heard.  Pulmonary:      Effort: Pulmonary effort is normal.      Breath sounds: Normal breath sounds.   Abdominal:      General: Bowel sounds are normal. "      Palpations: Abdomen is soft. There is no mass.      Tenderness: There is no abdominal tenderness. There is no rebound.   Skin:     General: Skin is warm and dry.      Coloration: Skin is not jaundiced or pale.   Neurological:      General: No focal deficit present.      Mental Status: She is alert and oriented to person, place, and time.   Psychiatric:         Mood and Affect: Mood normal.         Behavior: Behavior normal.         Thought Content: Thought content normal.         Judgment: Judgment normal.      Results Reviewed:  The following data was reviewed by: Aston Arroyo MD on 01/02/2024:    CMP          10/22/2023    18:20 10/24/2023    08:46 10/24/2023    21:09 12/5/2023    10:58   CMP   Glucose 119  108  125  112    BUN 42  40  42  33    Creatinine 3.64  3.22  3.35  3.54    EGFR 14.1  16.4  15.6  14.6    Sodium 140  139  137  139    Potassium 4.5  4.6  4.1  4.9    Chloride 102  102  101  101    Calcium 10.9  10.8  10.0  11.5    Total Protein 6.2  6.5  6.2     Albumin 4.2  4.3  4.0  4.3    Globulin 2.0  2.2  2.2     Total Bilirubin 0.6  0.6  0.4     Alkaline Phosphatase 80  68  80     AST (SGOT) 18  20  17     ALT (SGPT) 25  26  24     Albumin/Globulin Ratio 2.1  2.0  1.8     BUN/Creatinine Ratio 11.5  12.4  12.5  9.3    Anion Gap 12.8  14.0  11.5  11.8      CBC w/diff          10/22/2023    18:20 10/24/2023    08:46 10/24/2023    21:09 12/5/2023    10:58   CBC w/Diff   WBC 6.55  5.25  6.17  7.32    RBC 3.17  3.25  3.15  3.16    Hemoglobin 10.6  11.2  10.6  11.0    Hematocrit 32.7  33.4  32.5  32.0    .2  102.8  103.2  101.3    MCH 33.4  34.5  33.7  34.8    MCHC 32.4  33.5  32.6  34.4    RDW 14.6  14.7  14.7  13.8    Platelets 135  147  142  220    Neutrophil Rel % 52.8  52.3  53.7  52.7    Immature Granulocyte Rel % 0.8  0.6  0.5  1.8    Lymphocyte Rel % 34.2  36.2  34.8  32.2    Monocyte Rel % 9.0  8.0  8.3  8.6    Eosinophil Rel % 2.3  2.1  1.9  3.6    Basophil Rel % 0.9  0.8  0.8  1.1       Lipid Panel          4/11/2023    14:07 10/24/2023    08:46   Lipid Panel   Total Cholesterol 136  147    Triglycerides 58  144    HDL Cholesterol 50  48    VLDL Cholesterol 12  25    LDL Cholesterol  74  74    LDL/HDL Ratio 1.49  1.46      BMP          10/22/2023    18:20 10/24/2023    08:46 10/24/2023    21:09 12/5/2023    10:58   BMP   BUN 42  40  42  33    Creatinine 3.64  3.22  3.35  3.54    Sodium 140  139  137  139    Potassium 4.5  4.6  4.1  4.9    Chloride 102  102  101  101    CO2 25.2  23.0  24.5  26.2    Calcium 10.9  10.8  10.0  11.5          Lab Results   Component Value Date    WBC 7.32 12/05/2023    WBC 6.17 10/24/2023    WBC 5.25 10/24/2023    HGB 11.0 (L) 12/05/2023    HGB 10.6 (L) 10/24/2023    HGB 11.2 (L) 10/24/2023    HCT 32.0 (L) 12/05/2023    HCT 32.5 (L) 10/24/2023    HCT 33.4 (L) 10/24/2023    .3 (H) 12/05/2023    .2 (H) 10/24/2023    .8 (H) 10/24/2023     12/05/2023     10/24/2023     10/24/2023       Lab Results   Component Value Date     12/05/2023     10/24/2023     10/24/2023    K 4.9 12/05/2023    K 4.1 10/24/2023    K 4.6 10/24/2023    CO2 26.2 12/05/2023    CO2 24.5 10/24/2023    CO2 23.0 10/24/2023    CALCIUM 11.5 (H) 12/05/2023    CALCIUM 10.0 10/24/2023    CALCIUM 10.8 (H) 10/24/2023    BUN 33 (H) 12/05/2023    BUN 42 (H) 10/24/2023    BUN 40 (H) 10/24/2023        Lab Results   Component Value Date    BILITOT 0.4 10/24/2023    BILITOT 0.6 10/24/2023    BILITOT 0.6 10/22/2023    AST 17 10/24/2023    AST 20 10/24/2023    AST 18 10/22/2023    ALT 24 10/24/2023    ALT 26 10/24/2023    ALT 25 10/22/2023    ALKPHOS 80 10/24/2023    ALKPHOS 68 10/24/2023    ALKPHOS 80 10/22/2023    GLOB 2.2 10/24/2023    GLOB 2.2 10/24/2023    GLOB 2.0 10/22/2023    AGRATIO 1.8 10/24/2023    AGRATIO 2.0 10/24/2023    AGRATIO 2.1 10/22/2023        Lab Results   Component Value Date    CHOL 147 10/24/2023    CHOL 136 04/11/2023    CHOL 134  10/28/2022    CHLPL 119 01/29/2021    CHLPL 231 (H) 01/31/2020    TRIG 144 10/24/2023    TRIG 58 04/11/2023    TRIG 96 10/28/2022    HDL 48 10/24/2023    HDL 50 04/11/2023    HDL 42 10/28/2022    LDL 74 10/24/2023    LDL 74 04/11/2023    LDL 74 10/28/2022        Lab Results   Component Value Date    PTT 24.6 08/24/2023    PTT 24.8 03/05/2019    PROTIME 12.8 08/24/2023    PROTIME 12.9 10/12/2022    PROTIME 10.1 12/06/2021    INR 0.95 08/24/2023    INR 0.96 10/12/2022    INR 0.95 (L) 12/06/2021        Lab Results   Component Value Date    IRON 88 10/24/2023    IRON 94 04/11/2023    IRON 92 02/03/2023    TIBC 335 10/24/2023    TIBC 344 04/11/2023    TIBC 371 02/03/2023    LABIRON 26 10/24/2023    LABIRON 27 04/11/2023    LABIRON 25 02/03/2023    FERRITIN 253.00 (H) 10/24/2023    FERRITIN 221.00 (H) 04/11/2023    FERRITIN 186.00 (H) 02/03/2023    CHDIISGT93 >2,000 (H) 10/24/2023    FOSRPCJQ08 1,938 (H) 04/20/2022        Lab Results   Component Value Date    SEDRATE 4 08/24/2023    CRP <0.30 08/24/2023         XR Chest 1 View    Result Date: 10/24/2023    No acute infiltrate is appreciated.  Mild to moderate cardiomegaly is suspected, as before.     Please note that portions of this note were completed with a voice recognition program.  SLAVA ELMORE JR, MD       Electronically Signed and Approved By: SLAVA ELMORE JR, MD on 10/24/2023 at 22:44              CT Head Without Contrast    Result Date: 10/22/2023   No acute intracranial abnormality.     EDWIN HARRIS DO       Electronically Signed and Approved By: EDWIN HARRIS DO on 10/22/2023 at 18:37             XR Spine Cervical 3 View    Result Date: 10/13/2023    Cervical spine series demonstrating multi level degenerative change.      GERTRUDE SRINIVASAN MD       Electronically Signed and Approved By: GERTRUDE SRINIVASAN MD on 10/13/2023 at 18:20             XR Sinuses 3+ View    Result Date: 10/7/2023    No air-fluid levels to suggest acute sinusitis radiographically.      JOANNA MEAD MD       Electronically Signed and Approved By: JOANNA MEAD MD on 10/07/2023 at 10:17             XR Chest 1 View    Result Date: 9/9/2023    No active pulmonary process.       JOANNA MEAD MD       Electronically Signed and Approved By: JOANNA MEAD MD on 9/09/2023 at 7:33                  Assessment and Plan   Diagnoses and all orders for this visit:    1. Chronic abdominal pain (Primary)    2. Constipation, unspecified constipation type    3. Hemorrhoids, unspecified hemorrhoid type    Other orders  -     famotidine (PEPCID) 20 MG tablet; Take 1 tablet by mouth 2 (Two) Times a Day Before Meals for 180 days.  Dispense: 60 tablet; Refill: 2  -     psyllium (Metamucil Smooth Texture) 58.6 % powder; Take  by mouth 3 (Three) Times a Day.  Dispense: 90 packet; Refill: 12  -     linaclotide (Linzess) 72 MCG capsule capsule; Take 2 capsules by mouth Every Morning Before Breakfast.  Dispense: 90 capsule; Refill: 3  -     Discontinue: dicyclomine (BENTYL) 10 MG capsule; Take 2 capsules by mouth 4 (Four) Times a Day Before Meals & at Bedtime.  Dispense: 120 capsule; Refill: 1    PLAN & RECOMMENDATIONS    Patient with history of stage IV CKD, hypertension, GERD, small sized hiatal hernia, hemorrhoids presented with complaint of upper abdominal burning type pain and periumbilical and bilateral lower quadrant squeezing pain that comes and goes and usually relieved in few minutes.  No association of upper abdominal pain with food whereas periumbilical and bilateral lower quadrant abdominal pain correlated with constipation and defecation.  Patient symptoms are more in favor of IBS rather than gastritis versus PUD.  She has stage IV CKD, therefore will not start Bentyl.  Patient advised and recommended followings  Take low FODMAP diet  Drinks plenty of water  Avoid straining during defecation and prolonged sitting on the commode  Use squatty potty for defecation  Take Linzess 145 mcg capsule daily  before breakfast   Take Metamucil once daily  Take sitz bath 15 to 20 minutes daily  Use Tucks pad to clean and soak the anal area, especially after defecation  Hydrocortisone suppository twice daily, taking it while lying down in bed    FOLLOW UP    Return in about 3 months (around 4/2/2024), or if symptoms worsen or fail to improve.    Patient was given instructions and counseling regarding her condition or for health maintenance advice. Please see specific information pulled into the AVS if appropriate.       Part of this note may be an electronic transcription/translation of spoken language to printed text using the Dragon Dictation System

## 2024-01-02 NOTE — PATIENT INSTRUCTIONS
Take low FODMAP diet  Drinks plenty of water  Avoid constipation or straining during defecation  Use squatty potty for defecation  Take sitz bath 15 to 20 minutes daily  Use Tucks pad to clean and soak the anal area, especially after defecation  Hydrocortisone suppository twice daily, taking it while lying down in bed  Metamucil once daily  Linzess 145 mcg orally daily  Pepcid 20 mg orally twice daily for a month and then once daily

## 2024-01-04 ENCOUNTER — PATIENT ROUNDING (BHMG ONLY) (OUTPATIENT)
Dept: GASTROENTEROLOGY | Facility: CLINIC | Age: 54
End: 2024-01-04
Payer: MEDICARE

## 2024-01-04 NOTE — PROGRESS NOTES
"1/4/2024      Hello, may I speak with Katarzyna Norris     My name is Prosper. I am calling from [facility]. I show that you had a recent visit with Aston Arroyo MD.    Before we get started may I verify your date of birth? 1970    I am calling to officially welcome you to our practice and ask about your recent visit. Is this a good time to talk? Yes    Tell me about your visit with us. What things went well? \"My visit with Dr. Arroyo went great.\"     We strive to ensure that we protect your safety and privacy. Is there anything we could have done to improve this during your visit?    No    We're always looking for ways to make our patients' experiences even better. Do you have recommendations on ways we may improve? No    Overall were you satisfied with your first visit to our practice? Yes    I appreciate you taking the time to speak with me today. Is there anything else I can do for you? I discussed with patient about her Linzess prescription. Patient stated her pharmacy had not received the rx. I confirmed that the rx went through on our end, but advised patient to check with pharmacy. Patient is aware to contact the office if pharmacy does not have prescription.     I am glad to hear that you had a very good visit and I appreciate you taking the time to provide feedback on this call. We would greatly appreciate you filling out a survey if you receive one in the mail, email or text. This is a great opportunity to provide any additional feedback that you may think of after this call as well.       Thank you, and have a great day.    "

## 2024-01-29 ENCOUNTER — TELEPHONE (OUTPATIENT)
Dept: VASCULAR SURGERY | Facility: HOSPITAL | Age: 54
End: 2024-01-29
Payer: MEDICARE

## 2024-01-29 ENCOUNTER — TELEPHONE (OUTPATIENT)
Dept: VASCULAR SURGERY | Facility: HOSPITAL | Age: 54
End: 2024-01-29

## 2024-01-29 ENCOUNTER — LAB (OUTPATIENT)
Dept: LAB | Facility: HOSPITAL | Age: 54
End: 2024-01-29
Payer: MEDICARE

## 2024-01-29 ENCOUNTER — TRANSCRIBE ORDERS (OUTPATIENT)
Dept: ADMINISTRATIVE | Facility: HOSPITAL | Age: 54
End: 2024-01-29
Payer: MEDICARE

## 2024-01-29 DIAGNOSIS — N18.6 ANEMIA IN END-STAGE RENAL DISEASE: ICD-10-CM

## 2024-01-29 DIAGNOSIS — E66.01 MORBID OBESITY: ICD-10-CM

## 2024-01-29 DIAGNOSIS — N18.4 CHRONIC KIDNEY DISEASE, STAGE IV (SEVERE): ICD-10-CM

## 2024-01-29 DIAGNOSIS — N05.9 RENAL GLOMERULAR DISEASE: ICD-10-CM

## 2024-01-29 DIAGNOSIS — T83.511A URINARY TRACT INFECTION ASSOCIATED WITH CATHETERIZATION OF URINARY TRACT, UNSPECIFIED INDWELLING URINARY CATHETER TYPE, INITIAL ENCOUNTER: ICD-10-CM

## 2024-01-29 DIAGNOSIS — N18.4 CHRONIC KIDNEY DISEASE, STAGE IV (SEVERE): Primary | ICD-10-CM

## 2024-01-29 DIAGNOSIS — D63.1 ANEMIA IN END-STAGE RENAL DISEASE: ICD-10-CM

## 2024-01-29 DIAGNOSIS — N39.0 URINARY TRACT INFECTION ASSOCIATED WITH CATHETERIZATION OF URINARY TRACT, UNSPECIFIED INDWELLING URINARY CATHETER TYPE, INITIAL ENCOUNTER: ICD-10-CM

## 2024-01-29 DIAGNOSIS — R60.9 EDEMA, UNSPECIFIED TYPE: ICD-10-CM

## 2024-01-29 DIAGNOSIS — M10.9 GOUT, UNSPECIFIED CAUSE, UNSPECIFIED CHRONICITY, UNSPECIFIED SITE: ICD-10-CM

## 2024-01-29 DIAGNOSIS — E79.0 URICACIDEMIA: ICD-10-CM

## 2024-01-29 DIAGNOSIS — I10 PRIMARY HYPERTENSION: ICD-10-CM

## 2024-01-29 DIAGNOSIS — E83.52 HYPERCALCEMIA: ICD-10-CM

## 2024-01-29 DIAGNOSIS — E55.9 VITAMIN D DEFICIENCY, UNSPECIFIED: ICD-10-CM

## 2024-01-29 LAB
25(OH)D3 SERPL-MCNC: 56.6 NG/ML (ref 30–100)
ALBUMIN SERPL-MCNC: 4.1 G/DL (ref 3.5–5.2)
ANION GAP SERPL CALCULATED.3IONS-SCNC: 12.9 MMOL/L (ref 5–15)
BASOPHILS # BLD AUTO: 0.06 10*3/MM3 (ref 0–0.2)
BASOPHILS NFR BLD AUTO: 1 % (ref 0–1.5)
BILIRUB UR QL STRIP: NEGATIVE
BUN SERPL-MCNC: 40 MG/DL (ref 6–20)
BUN/CREAT SERPL: 11.2 (ref 7–25)
CALCIUM SPEC-SCNC: 12.6 MG/DL (ref 8.6–10.5)
CHLORIDE SERPL-SCNC: 102 MMOL/L (ref 98–107)
CLARITY UR: CLEAR
CO2 SERPL-SCNC: 23.1 MMOL/L (ref 22–29)
COLOR UR: YELLOW
CREAT SERPL-MCNC: 3.56 MG/DL (ref 0.57–1)
CREAT UR-MCNC: 36.5 MG/DL
DEPRECATED RDW RBC AUTO: 51.4 FL (ref 37–54)
EGFRCR SERPLBLD CKD-EPI 2021: 14.5 ML/MIN/1.73
EOSINOPHIL # BLD AUTO: 0.32 10*3/MM3 (ref 0–0.4)
EOSINOPHIL NFR BLD AUTO: 5.6 % (ref 0.3–6.2)
ERYTHROCYTE [DISTWIDTH] IN BLOOD BY AUTOMATED COUNT: 14.3 % (ref 12.3–15.4)
GLUCOSE SERPL-MCNC: 109 MG/DL (ref 65–99)
GLUCOSE UR STRIP-MCNC: NEGATIVE MG/DL
HCT VFR BLD AUTO: 32.4 % (ref 34–46.6)
HGB BLD-MCNC: 10.4 G/DL (ref 12–15.9)
HGB UR QL STRIP.AUTO: NEGATIVE
IMM GRANULOCYTES # BLD AUTO: 0.02 10*3/MM3 (ref 0–0.05)
IMM GRANULOCYTES NFR BLD AUTO: 0.3 % (ref 0–0.5)
KETONES UR QL STRIP: NEGATIVE
LEUKOCYTE ESTERASE UR QL STRIP.AUTO: NEGATIVE
LYMPHOCYTES # BLD AUTO: 1.91 10*3/MM3 (ref 0.7–3.1)
LYMPHOCYTES NFR BLD AUTO: 33.2 % (ref 19.6–45.3)
MCH RBC QN AUTO: 32.1 PG (ref 26.6–33)
MCHC RBC AUTO-ENTMCNC: 32.1 G/DL (ref 31.5–35.7)
MCV RBC AUTO: 100 FL (ref 79–97)
MONOCYTES # BLD AUTO: 0.44 10*3/MM3 (ref 0.1–0.9)
MONOCYTES NFR BLD AUTO: 7.6 % (ref 5–12)
NEUTROPHILS NFR BLD AUTO: 3.01 10*3/MM3 (ref 1.7–7)
NEUTROPHILS NFR BLD AUTO: 52.3 % (ref 42.7–76)
NITRITE UR QL STRIP: NEGATIVE
NRBC BLD AUTO-RTO: 0.2 /100 WBC (ref 0–0.2)
PH UR STRIP.AUTO: 6 [PH] (ref 5–8)
PHOSPHATE SERPL-MCNC: 3.9 MG/DL (ref 2.5–4.5)
PLATELET # BLD AUTO: 201 10*3/MM3 (ref 140–450)
PMV BLD AUTO: 9.6 FL (ref 6–12)
POTASSIUM SERPL-SCNC: 4.6 MMOL/L (ref 3.5–5.2)
PROT ?TM UR-MCNC: 20.7 MG/DL
PROT UR QL STRIP: ABNORMAL
PROT/CREAT UR: 0.57 MG/G{CREAT}
PTH-INTACT SERPL-MCNC: 14.9 PG/ML (ref 15–65)
RBC # BLD AUTO: 3.24 10*6/MM3 (ref 3.77–5.28)
SODIUM SERPL-SCNC: 138 MMOL/L (ref 136–145)
SP GR UR STRIP: 1.01 (ref 1–1.03)
UROBILINOGEN UR QL STRIP: ABNORMAL
WBC NRBC COR # BLD AUTO: 5.76 10*3/MM3 (ref 3.4–10.8)

## 2024-01-29 PROCEDURE — 85025 COMPLETE CBC W/AUTO DIFF WBC: CPT

## 2024-01-29 PROCEDURE — 36415 COLL VENOUS BLD VENIPUNCTURE: CPT

## 2024-01-29 PROCEDURE — 83970 ASSAY OF PARATHORMONE: CPT

## 2024-01-29 PROCEDURE — 80069 RENAL FUNCTION PANEL: CPT

## 2024-01-29 PROCEDURE — 81003 URINALYSIS AUTO W/O SCOPE: CPT

## 2024-01-29 PROCEDURE — 84156 ASSAY OF PROTEIN URINE: CPT | Performed by: INTERNAL MEDICINE

## 2024-01-29 PROCEDURE — 82306 VITAMIN D 25 HYDROXY: CPT

## 2024-01-29 PROCEDURE — 86335 IMMUNFIX E-PHORSIS/URINE/CSF: CPT

## 2024-01-29 PROCEDURE — 82570 ASSAY OF URINE CREATININE: CPT | Performed by: INTERNAL MEDICINE

## 2024-01-29 NOTE — TELEPHONE ENCOUNTER
Caller: Katarzyna Norris    Relationship: Self    Best call back number: [phone]    What is the best time to reach you: ANY    Who are you requesting to speak with (clinical staff, provider,  specific staff member): TAWNYA    Do you know the name of the person who called: PT    What was the call regarding: PT STATES NO NEED TO CALL HER BACK AFTER RESCHEDULING HER TEST AND FOLLOW UP. SHE WILL GET AN ALERT FROM MY CHART. THANK YOU    Is it okay if the provider responds through [place]: YES

## 2024-01-29 NOTE — TELEPHONE ENCOUNTER
DR RYAN IS NOT GOING TO BE IN THE OFFICE IN THE AFTERNOON ON 1/31. PT CAN KEEP APPT FOR THE TEST BUT WILL NEED TO RESCHEDULE APPT WITH DR RYAN.  HUB CAN RESCHEDULE DR RYAN APPT UNLESS PT HAS ANY QUESTIONS.

## 2024-01-31 LAB — INTERPRETATION UR IFE-IMP: NORMAL

## 2024-02-21 ENCOUNTER — OFFICE VISIT (OUTPATIENT)
Dept: VASCULAR SURGERY | Facility: HOSPITAL | Age: 54
End: 2024-02-21
Payer: MEDICARE

## 2024-02-21 ENCOUNTER — HOSPITAL ENCOUNTER (OUTPATIENT)
Dept: CARDIOLOGY | Facility: HOSPITAL | Age: 54
Discharge: HOME | End: 2024-02-21
Payer: MEDICARE

## 2024-02-21 VITALS
TEMPERATURE: 98.3 F | HEART RATE: 98 BPM | DIASTOLIC BLOOD PRESSURE: 90 MMHG | OXYGEN SATURATION: 98 % | RESPIRATION RATE: 18 BRPM | SYSTOLIC BLOOD PRESSURE: 150 MMHG

## 2024-02-21 DIAGNOSIS — N18.5 CKD (CHRONIC KIDNEY DISEASE) STAGE 5, GFR LESS THAN 15 ML/MIN: Primary | ICD-10-CM

## 2024-02-21 DIAGNOSIS — N18.4 CHRONIC KIDNEY DISEASE, STAGE IV (SEVERE): ICD-10-CM

## 2024-02-21 LAB
BH CV VAS MEAS BASILIC ANTECUBITAL FOSSA LEFT: 0.32 CM
BH CV VAS MEAS BASILIC ANTECUBITAL FOSSA RIGHT: 0.42 CM
BH CV VAS MEAS BASILIC FOREARM LEFT - DIST: 0.29 CM
BH CV VAS MEAS BASILIC FOREARM LEFT - MID: 0.3 CM
BH CV VAS MEAS BASILIC FOREARM LEFT - PROX: 0.41 CM
BH CV VAS MEAS BASILIC FOREARM RIGHT - DIST: 0.21 CM
BH CV VAS MEAS BASILIC FOREARM RIGHT - MID: 0.23 CM
BH CV VAS MEAS BASILIC FOREARM RIGHT - PROX: 0.31 CM
BH CV VAS MEAS BASILIC UPPER ARM LEFT - DIST: 0.3 CM
BH CV VAS MEAS BASILIC UPPER ARM LEFT - MID: 0.48 CM
BH CV VAS MEAS BASILIC UPPER ARM LEFT - PROX: 0.45 CM
BH CV VAS MEAS BASILIC UPPER ARM RIGHT - DIST: 0.52 CM
BH CV VAS MEAS BASILIC UPPER ARM RIGHT - MID: 0.44 CM
BH CV VAS MEAS BASILIC UPPER ARM RIGHT - PROX: 0.65 CM
BH CV VAS MEAS CEPHALIC ANTECUBITAL FOSSA LEFT: 0.5 CM
BH CV VAS MEAS CEPHALIC ANTECUBITAL FOSSA RIGHT: 0.38 CM
BH CV VAS MEAS CEPHALIC FOREARM LEFT - DIST: 0.43 CM
BH CV VAS MEAS CEPHALIC FOREARM LEFT - MID: 0.19 CM
BH CV VAS MEAS CEPHALIC FOREARM LEFT - PROX: 0.21 CM
BH CV VAS MEAS CEPHALIC FOREARM RIGHT - DIST: 0.18 CM
BH CV VAS MEAS CEPHALIC FOREARM RIGHT - MID: 0.31 CM
BH CV VAS MEAS CEPHALIC FOREARM RIGHT - PROX: 0.35 CM
BH CV VAS MEAS CEPHALIC UPPER ARM LEFT - DIST: 0.43 CM
BH CV VAS MEAS CEPHALIC UPPER ARM LEFT - MID: 0.4 CM
BH CV VAS MEAS CEPHALIC UPPER ARM LEFT - PROX: 0.36 CM
BH CV VAS MEAS CEPHALIC UPPER ARM RIGHT - DIST: 0.42 CM
BH CV VAS MEAS CEPHALIC UPPER ARM RIGHT - MID: 0.44 CM
BH CV VAS MEAS CEPHALIC UPPER ARM RIGHT - PROX: 0.4 CM
BH CV VAS MEAS RADIAL UPPER ARM LEFT - PROX: 0.16 CM
BH CV VAS MEAS RADIAL UPPER ARM RIGHT - PROX: 0.17 CM
UPPER ARTERIAL LEFT ARM BRACHIAL LENGTH: 0.26 CM
UPPER ARTERIAL RIGHT ARM BRACHIAL LENGTH: 0.33 CM

## 2024-02-21 PROCEDURE — 93985 DUP-SCAN HEMO COMPL BI STD: CPT

## 2024-02-21 RX ORDER — CEFAZOLIN SODIUM 2 G/100ML
2 INJECTION, SOLUTION INTRAVENOUS ONCE
OUTPATIENT
Start: 2024-02-21 | End: 2024-02-21

## 2024-02-21 NOTE — PROGRESS NOTES
[facility]   HISTORY AND PHYSICAL    Patient Name: Katarzyna Norris  : 1970  MRN: [id number]  Primary Care Physician:  Brian Ellis APRN  Date of admission: (Not on file)    Subjective   Subjective     Chief Complaint: Need for permanent access for hemodialysis    HPI:    Katarzyna Norris is a 53 y.o. female approaching the need for hemodialysis.  At this time to discuss about permanent access.  Right-handed.    Review of Systems    Non contributory except for the History of Present Illness    Personal History     Past Medical History:   Diagnosis Date    Anxiety     Arthritis     GILBERT KNEES    Asthma     SEASONAL ALLERGIES    CKD (chronic kidney disease)     Stage 3, Follows with [facility]    Elevated cholesterol     Gastritis     Gastritis     GERD (gastroesophageal reflux disease)     Gout 2021    Hiatal hernia     Hypertension     Lactose intolerance        Past Surgical History:   Procedure Laterality Date    BONY PELVIS SURGERY      Plate     SECTION   and     CHOLECYSTECTOMY      COLONOSCOPY      ENDOSCOPY  2009    ENDOSCOPY N/A 3/14/2023    Procedure: ESOPHAGOGASTRODUODENOSCOPY with biopsies;  Surgeon: Tam Badillo MD;  Location: [facility] ENDOSCOPY;  Service: General;  Laterality: N/A;  hiatal hernia    TOTAL KNEE ARTHROPLASTY Left 2021    Procedure: TOTAL KNEE ARTHROPLASTY;  Surgeon: Marco Nelson MD;  Location: [facility] MAIN OR;  Service: Orthopedics;  Laterality: Left;    TOTAL KNEE ARTHROPLASTY Right 10/19/2022    Procedure: RIGHT TOTAL KNEE ARTHROPLASTY - NO SHELBY;  Surgeon: Marco Nelson MD;  Location: [facility] MAIN OR;  Service: Orthopedics;  Laterality: Right;    TUBAL ABDOMINAL LIGATION         Family History: family history includes Diabetes in an other family member; Stroke in an other family member; Throat cancer (age of onset: 73) in her father. Otherwise pertinent FHx was reviewed and not pertinent to current  issue.    Social History:  reports that she quit smoking about 11 years ago. Her smoking use included cigarettes. She has never been exposed to tobacco smoke. She has never used smokeless tobacco. She reports that she does not drink alcohol and does not use drugs.    Home Medications:  Current Outpatient Medications on File Prior to Visit   Medication Sig    acetaminophen (TYLENOL) 325 MG suppository Insert 1 suppository into the rectum.    allopurinol (ZYLOPRIM) 300 MG tablet Take 1 tablet by mouth Daily.    atorvastatin (LIPITOR) 20 MG tablet Take 1 tablet by mouth Every Night.    carvedilol (COREG) 25 MG tablet Take 1 tablet by mouth 2 (Two) Times a Day With Meals.    clobetasol (TEMOVATE) 0.05 % cream Apply 1 application  topically to the appropriate area as directed 2 (Two) Times a Day.    cloNIDine (CATAPRES) 0.2 MG tablet TAKE 1 TABLET BY MOUTH THREE TIMES DAILY    diphenhydrAMINE (BENADRYL) 25 mg capsule Take 1 capsule by mouth Every 6 (Six) Hours As Needed for Itching.    famotidine (PEPCID) 20 MG tablet Take 1 tablet by mouth 2 (Two) Times a Day Before Meals for 180 days.    fluticasone (FLONASE) 50 MCG/ACT nasal spray 2 sprays into the nostril(s) as directed by provider Daily. 2 sprays each nostril daily    Hydrocortisone, Perianal, (ANUSOL-HC) 2.5 % rectal cream Insert  into the rectum 2 (Two) Times a Day.    linaclotide (Linzess) 72 MCG capsule capsule Take 2 capsules by mouth Every Morning Before Breakfast.    losartan (COZAAR) 100 MG tablet Take 1 tablet by mouth Daily.    promethazine-dextromethorphan (PROMETHAZINE-DM) 6.25-15 MG/5ML syrup Take 5 mL by mouth 4 (Four) Times a Day As Needed for Cough.    psyllium (Metamucil Smooth Texture) 58.6 % powder Take  by mouth 3 (Three) Times a Day.    vitamin B-12 (CYANOCOBALAMIN) 100 MCG tablet Take 0.5 tablets by mouth Daily.    zinc gluconate 50 MG tablet Take 1 tablet by mouth Daily.    azithromycin (Zithromax Z-Yair) 250 MG tablet Take 2 tablets by mouth  on day 1, then 1 tablet daily on days 2-5 (Patient not taking: Reported on 2/21/2024)    hydrocortisone (ANUSOL-HC) 25 MG suppository Insert 1 suppository into the rectum 2 (Two) Times a Day. (Patient not taking: Reported on 1/2/2024)     No current facility-administered medications on file prior to visit.          Allergies:  Allergies   Allergen Reactions    Amlodipine Shortness Of Breath    Diltiazem Hcl Anxiety    Augmentin [Amoxicillin-Pot Clavulanate] Nausea And Vomiting    Clindamycin/Lincomycin Swelling    Doxycycline Nausea And Vomiting    Zofran [Ondansetron] Confusion    Contrast Dye (Echo Or Unknown Ct/Mr) Palpitations     Patient gets to the point of passing out , heart races     Iodinated Contrast Media Palpitations     Patient gets to the point of passing out , heart races       Objective   Objective     Vitals:   Temp:  [98.3 °F (36.8 °C)] 98.3 °F (36.8 °C)  Heart Rate:  [98] 98  Resp:  [18] 18  BP: (150)/(90) 150/90    Physical Exam    General: Awake, alert, NAD   Eyes:  JESSICA   Neck: Supple   Lungs: Clear   Heart: RRR   Abdomen: benign   Musculoskeletal:  normal motor tone, symmetric   Skin: warm, normal turgor   Neuro: strength 5/5 all extremities   Pulses: +2 left radial, +2 left brachial.    Diagnostic studies:   A vein mapping ultrasound in the office today demonstrates satisfactory bilateral upper arm cephalic and basilic veins.    Assessment & Plan   Assessment / Plan     Active Hospital Problems:  There are no active hospital problems to display for this patient.      Diagnoses and all orders for this visit:    1. CKD (chronic kidney disease) stage 5, GFR less than 15 ml/min (Primary)  -     Case Request; Standing  -     ceFAZolin in dextrose (ANCEF) IVPB solution 2 g  -     Case Request    Other orders  -     Follow Anesthesia Guidelines / Protocol; Future  -     Follow Anesthesia Guidelines / Protocol; Standing  -     Obtain Informed Consent; Future  -     Provide NPO Instructions to  Patient; Future  -     Chlorhexidine Skin Prep; Future  -     CBC & Differential; Standing  -     Basic Metabolic Panel; Standing        Assessment/plan:   Katarzyna is in need for permanent access for hemodialysis.  The plan is for creation of a left brachiocephalic arteriovenous fistula.  I have discussed with her in detail the mechanics of the procedure, the indications, benefits, risks, alternatives, as well as potential complications to include but not limited to infection, bleeding, reoperation, failure of the fistula to develop.  She appears to understand and desires to proceed.      Electronically signed by Yaniv Dominguez MD, 02/21/24, 11:29 AM EST.

## 2024-02-26 DIAGNOSIS — K64.4 EXTERNAL HEMORRHOID: ICD-10-CM

## 2024-02-26 RX ORDER — HYDROCORTISONE 25 MG/G
CREAM TOPICAL 2 TIMES DAILY
Qty: 28 G | Refills: 3 | Status: SHIPPED | OUTPATIENT
Start: 2024-02-26

## 2024-02-26 NOTE — TELEPHONE ENCOUNTER
Caller: Katarzyna Norris    Relationship: Self    Best call back number: [phone]     Requested Prescriptions:   Requested Prescriptions     Pending Prescriptions Disp Refills    Hydrocortisone, Perianal, (ANUSOL-HC) 2.5 % rectal cream 28 g 3     Sig: Insert  into the rectum 2 (Two) Times a Day.        Pharmacy where request should be sent: [address] [facility] [phone] [facility] [phone]      Last office visit with prescribing clinician: 12/20/2023   Last telemedicine visit with prescribing clinician: Visit date not found   Next office visit with prescribing clinician: 4/24/2024     Does the patient have less than a 3 day supply:  [x] Yes  [] No    Would you like a call back once the refill request has been completed: [] Yes [] No    If the office needs to give you a call back, can they leave a voicemail: [] Yes [] No    Ngozi Pritchard Rep   02/26/24 10:24 EST

## 2024-03-02 ENCOUNTER — APPOINTMENT (OUTPATIENT)
Dept: GENERAL RADIOLOGY | Facility: HOSPITAL | Age: 54
End: 2024-03-02
Payer: MEDICARE

## 2024-03-02 ENCOUNTER — HOSPITAL ENCOUNTER (EMERGENCY)
Facility: HOSPITAL | Age: 54
Discharge: HOME | End: 2024-03-02
Attending: EMERGENCY MEDICINE
Payer: MEDICARE

## 2024-03-02 VITALS
OXYGEN SATURATION: 97 % | RESPIRATION RATE: 16 BRPM | HEIGHT: 62 IN | BODY MASS INDEX: 46.01 KG/M2 | HEART RATE: 111 BPM | TEMPERATURE: 98.4 F | WEIGHT: 250 LBS | SYSTOLIC BLOOD PRESSURE: 127 MMHG | DIASTOLIC BLOOD PRESSURE: 73 MMHG

## 2024-03-02 DIAGNOSIS — R19.7 DIARRHEA, UNSPECIFIED TYPE: Primary | ICD-10-CM

## 2024-03-02 LAB
ALBUMIN SERPL-MCNC: 4.1 G/DL (ref 3.5–5.2)
ALBUMIN/GLOB SERPL: 1.5 G/DL
ALP SERPL-CCNC: 130 U/L (ref 39–117)
ALT SERPL W P-5'-P-CCNC: 18 U/L (ref 1–33)
ANION GAP SERPL CALCULATED.3IONS-SCNC: 13.4 MMOL/L (ref 5–15)
AST SERPL-CCNC: 16 U/L (ref 1–32)
BASOPHILS # BLD AUTO: 0.06 10*3/MM3 (ref 0–0.2)
BASOPHILS NFR BLD AUTO: 0.7 % (ref 0–1.5)
BILIRUB SERPL-MCNC: 0.4 MG/DL (ref 0–1.2)
BILIRUB UR QL STRIP: NEGATIVE
BUN SERPL-MCNC: 60 MG/DL (ref 6–20)
BUN/CREAT SERPL: 16.1 (ref 7–25)
CALCIUM SPEC-SCNC: 10.9 MG/DL (ref 8.6–10.5)
CHLORIDE SERPL-SCNC: 101 MMOL/L (ref 98–107)
CLARITY UR: CLEAR
CO2 SERPL-SCNC: 18.6 MMOL/L (ref 22–29)
COLOR UR: YELLOW
CREAT SERPL-MCNC: 3.73 MG/DL (ref 0.57–1)
DEPRECATED RDW RBC AUTO: 52.2 FL (ref 37–54)
EGFRCR SERPLBLD CKD-EPI 2021: 13.7 ML/MIN/1.73
EOSINOPHIL # BLD AUTO: 0.23 10*3/MM3 (ref 0–0.4)
EOSINOPHIL NFR BLD AUTO: 2.6 % (ref 0.3–6.2)
ERYTHROCYTE [DISTWIDTH] IN BLOOD BY AUTOMATED COUNT: 14.3 % (ref 12.3–15.4)
FLUAV SUBTYP SPEC NAA+PROBE: NOT DETECTED
FLUBV RNA ISLT QL NAA+PROBE: NOT DETECTED
GLOBULIN UR ELPH-MCNC: 2.7 GM/DL
GLUCOSE SERPL-MCNC: 129 MG/DL (ref 65–99)
GLUCOSE UR STRIP-MCNC: NEGATIVE MG/DL
HCT VFR BLD AUTO: 32 % (ref 34–46.6)
HGB BLD-MCNC: 10.1 G/DL (ref 12–15.9)
HGB UR QL STRIP.AUTO: NEGATIVE
HOLD SPECIMEN: NORMAL
HOLD SPECIMEN: NORMAL
IMM GRANULOCYTES # BLD AUTO: 0.06 10*3/MM3 (ref 0–0.05)
IMM GRANULOCYTES NFR BLD AUTO: 0.7 % (ref 0–0.5)
KETONES UR QL STRIP: NEGATIVE
LEUKOCYTE ESTERASE UR QL STRIP.AUTO: NEGATIVE
LIPASE SERPL-CCNC: 59 U/L (ref 13–60)
LYMPHOCYTES # BLD AUTO: 1.9 10*3/MM3 (ref 0.7–3.1)
LYMPHOCYTES NFR BLD AUTO: 21.3 % (ref 19.6–45.3)
MCH RBC QN AUTO: 32 PG (ref 26.6–33)
MCHC RBC AUTO-ENTMCNC: 31.6 G/DL (ref 31.5–35.7)
MCV RBC AUTO: 101.3 FL (ref 79–97)
MONOCYTES # BLD AUTO: 0.65 10*3/MM3 (ref 0.1–0.9)
MONOCYTES NFR BLD AUTO: 7.3 % (ref 5–12)
NEUTROPHILS NFR BLD AUTO: 6.03 10*3/MM3 (ref 1.7–7)
NEUTROPHILS NFR BLD AUTO: 67.4 % (ref 42.7–76)
NITRITE UR QL STRIP: NEGATIVE
NRBC BLD AUTO-RTO: 0 /100 WBC (ref 0–0.2)
PH UR STRIP.AUTO: 5.5 [PH] (ref 5–8)
PLATELET # BLD AUTO: 185 10*3/MM3 (ref 140–450)
PMV BLD AUTO: 9.1 FL (ref 6–12)
POTASSIUM SERPL-SCNC: 4.9 MMOL/L (ref 3.5–5.2)
PROT SERPL-MCNC: 6.8 G/DL (ref 6–8.5)
PROT UR QL STRIP: ABNORMAL
RBC # BLD AUTO: 3.16 10*6/MM3 (ref 3.77–5.28)
RSV RNA NPH QL NAA+NON-PROBE: NOT DETECTED
SARS-COV-2 RNA RESP QL NAA+PROBE: NOT DETECTED
SODIUM SERPL-SCNC: 133 MMOL/L (ref 136–145)
SP GR UR STRIP: 1.01 (ref 1–1.03)
UROBILINOGEN UR QL STRIP: ABNORMAL
WBC NRBC COR # BLD AUTO: 8.93 10*3/MM3 (ref 3.4–10.8)
WHOLE BLOOD HOLD COAG: NORMAL
WHOLE BLOOD HOLD SPECIMEN: NORMAL

## 2024-03-02 PROCEDURE — 85025 COMPLETE CBC W/AUTO DIFF WBC: CPT

## 2024-03-02 PROCEDURE — 87637 SARSCOV2&INF A&B&RSV AMP PRB: CPT

## 2024-03-02 PROCEDURE — 74018 RADEX ABDOMEN 1 VIEW: CPT

## 2024-03-02 PROCEDURE — 81003 URINALYSIS AUTO W/O SCOPE: CPT

## 2024-03-02 PROCEDURE — 80053 COMPREHEN METABOLIC PANEL: CPT

## 2024-03-02 PROCEDURE — 25010000002 ONDANSETRON PER 1 MG

## 2024-03-02 PROCEDURE — 99283 EMERGENCY DEPT VISIT LOW MDM: CPT

## 2024-03-02 PROCEDURE — 96374 THER/PROPH/DIAG INJ IV PUSH: CPT

## 2024-03-02 PROCEDURE — 96361 HYDRATE IV INFUSION ADD-ON: CPT

## 2024-03-02 PROCEDURE — 25810000003 SODIUM CHLORIDE 0.9 % SOLUTION

## 2024-03-02 PROCEDURE — 83690 ASSAY OF LIPASE: CPT

## 2024-03-02 RX ORDER — ONDANSETRON 2 MG/ML
4 INJECTION INTRAMUSCULAR; INTRAVENOUS ONCE
Status: COMPLETED | OUTPATIENT
Start: 2024-03-02 | End: 2024-03-02

## 2024-03-02 RX ORDER — SODIUM CHLORIDE 0.9 % (FLUSH) 0.9 %
10 SYRINGE (ML) INJECTION AS NEEDED
Status: DISCONTINUED | OUTPATIENT
Start: 2024-03-02 | End: 2024-03-02 | Stop reason: HOSPADM

## 2024-03-02 RX ORDER — ONDANSETRON 4 MG/1
4 TABLET, ORALLY DISINTEGRATING ORAL EVERY 8 HOURS PRN
Qty: 15 TABLET | Refills: 0 | Status: SHIPPED | OUTPATIENT
Start: 2024-03-02

## 2024-03-02 RX ADMIN — ONDANSETRON 4 MG: 2 INJECTION INTRAMUSCULAR; INTRAVENOUS at 10:22

## 2024-03-02 RX ADMIN — SODIUM CHLORIDE 1000 ML: 9 INJECTION, SOLUTION INTRAVENOUS at 10:21

## 2024-03-02 NOTE — DISCHARGE INSTRUCTIONS
Please follow-up with your primary care provider as needed.  Your x-ray today was normal.  Your lab values are similar to previous labs you had drawn at this facility.  You have been prescribed a medication for nausea that should be available for you to  your pharmacy today.  Please return to the ED for intractable vomiting, chest pain, shortness of breath.

## 2024-03-02 NOTE — ED PROVIDER NOTES
Subjective   History of Present Illness    Review of Systems    Past Medical History:   Diagnosis Date    Anxiety     Arthritis     GILBERT KNEES    Asthma     SEASONAL ALLERGIES    CKD (chronic kidney disease)     Stage 3, Follows with [facility]    Elevated cholesterol     Gastritis     Gastritis     GERD (gastroesophageal reflux disease)     Gout 2021    Hiatal hernia     Hypertension     Lactose intolerance        Allergies   Allergen Reactions    Amlodipine Shortness Of Breath    Diltiazem Hcl Anxiety    Augmentin [Amoxicillin-Pot Clavulanate] Nausea And Vomiting    Clindamycin/Lincomycin Swelling    Doxycycline Nausea And Vomiting    Zofran [Ondansetron] Confusion    Contrast Dye (Echo Or Unknown Ct/Mr) Palpitations     Patient gets to the point of passing out , heart races     Iodinated Contrast Media Palpitations     Patient gets to the point of passing out , heart races       Past Surgical History:   Procedure Laterality Date    BONY PELVIS SURGERY      Plate     SECTION   and     CHOLECYSTECTOMY      COLONOSCOPY      ENDOSCOPY      ENDOSCOPY N/A 3/14/2023    Procedure: ESOPHAGOGASTRODUODENOSCOPY with biopsies;  Surgeon: Tam Badillo MD;  Location: [facility] ENDOSCOPY;  Service: General;  Laterality: N/A;  hiatal hernia    TOTAL KNEE ARTHROPLASTY Left 2021    Procedure: TOTAL KNEE ARTHROPLASTY;  Surgeon: Marco Nelson MD;  Location: [facility] MAIN OR;  Service: Orthopedics;  Laterality: Left;    TOTAL KNEE ARTHROPLASTY Right 10/19/2022    Procedure: RIGHT TOTAL KNEE ARTHROPLASTY - NO SHELBY;  Surgeon: Marco Nelson MD;  Location: [facility] MAIN OR;  Service: Orthopedics;  Laterality: Right;    TUBAL ABDOMINAL LIGATION         Family History   Problem Relation Age of Onset    Throat cancer Father 73    Stroke Other     Diabetes Other     Malig Hyperthermia Neg Hx     Colon cancer Neg Hx        Social History     Socioeconomic History    Marital status:     Tobacco Use    Smoking status: Former     Current packs/day: 0.00     Types: Cigarettes     Quit date:      Years since quittin.1     Passive exposure: Never    Smokeless tobacco: Never   Vaping Use    Vaping status: Never Used   Substance and Sexual Activity    Alcohol use: Never    Drug use: Never    Sexual activity: Defer           Objective   Physical Exam    Procedures           ED Course                                             Medical Decision Making  Amount and/or Complexity of Data Reviewed  Labs: ordered.    Risk  Prescription drug management.        Final diagnoses:   None       ED Disposition  ED Disposition       None            No follow-up provider specified.       Medication List      No changes were made to your prescriptions during this visit.

## 2024-03-02 NOTE — ED PROVIDER NOTES
Time: 11:19 AM EST  Date of encounter:  3/2/2024  Independent Historian/Clinical History and Information was obtained by:   Patient    History is limited by: N/A    Chief Complaint: Diarrhea      History of Present Illness:  Patient is a 53 y.o. year old female who presents to the emergency department for evaluation of diarrhea and vomiting that started last night.  Patient reports she is chronically constipated and has taken laxatives and believes that to be the cause of her diarrhea.  Reports she has also had dry heaving and nausea.  Patient is concerned for dehydration.  Reports similar episodes in the past where she became dehydrated after treating her constipation and developing diarrhea.  She reports she had some epigastric discomfort at the beginning of this episode that is now relieved.  Denies any sick contacts.  Denies any fevers, chills, body aches, chest pain, shortness of breath.  Denies dysuria hematuria or back pain.    HPI    Patient Care Team  Primary Care Provider: Brian Ellis APRN    Past Medical History:     Allergies   Allergen Reactions    Amlodipine Shortness Of Breath    Diltiazem Hcl Anxiety    Augmentin [Amoxicillin-Pot Clavulanate] Nausea And Vomiting    Clindamycin/Lincomycin Swelling    Doxycycline Nausea And Vomiting    Zofran [Ondansetron] Confusion    Contrast Dye (Echo Or Unknown Ct/Mr) Palpitations     Patient gets to the point of passing out , heart races     Iodinated Contrast Media Palpitations     Patient gets to the point of passing out , heart races     Past Medical History:   Diagnosis Date    Anxiety     Arthritis     GILBERT KNEES    Asthma     SEASONAL ALLERGIES    CKD (chronic kidney disease)     Stage 3, Follows with [facility]    Elevated cholesterol     Gastritis     Gastritis     GERD (gastroesophageal reflux disease)     Gout 02/23/2021    Hiatal hernia     Hypertension     Lactose intolerance      Past Surgical History:   Procedure Laterality Date    BONY  PELVIS SURGERY      Plate     SECTION   and     CHOLECYSTECTOMY      COLONOSCOPY      ENDOSCOPY      ENDOSCOPY N/A 3/14/2023    Procedure: ESOPHAGOGASTRODUODENOSCOPY with biopsies;  Surgeon: Tam Badillo MD;  Location: [facility] ENDOSCOPY;  Service: General;  Laterality: N/A;  hiatal hernia    TOTAL KNEE ARTHROPLASTY Left 2021    Procedure: TOTAL KNEE ARTHROPLASTY;  Surgeon: Marco Nelson MD;  Location: [facility] MAIN OR;  Service: Orthopedics;  Laterality: Left;    TOTAL KNEE ARTHROPLASTY Right 10/19/2022    Procedure: RIGHT TOTAL KNEE ARTHROPLASTY - NO SHELBY;  Surgeon: Marco Nelson MD;  Location: [facility] MAIN OR;  Service: Orthopedics;  Laterality: Right;    TUBAL ABDOMINAL LIGATION       Family History   Problem Relation Age of Onset    Throat cancer Father 73    Stroke Other     Diabetes Other     Malig Hyperthermia Neg Hx     Colon cancer Neg Hx        Home Medications:  Prior to Admission medications    Medication Sig Start Date End Date Taking? Authorizing Provider   acetaminophen (TYLENOL) 325 MG suppository Insert 1 suppository into the rectum.    Provider, MD Al   allopurinol (ZYLOPRIM) 300 MG tablet Take 1 tablet by mouth Daily. 10/24/23   Brian Ellis APRN   atorvastatin (LIPITOR) 20 MG tablet Take 1 tablet by mouth Every Night. 10/24/23   Brian Ellis APRN   azithromycin (Zithromax Z-Yair) 250 MG tablet Take 2 tablets by mouth on day 1, then 1 tablet daily on days 2-5  Patient not taking: Reported on 23   Brian Ellis APRN   carvedilol (COREG) 25 MG tablet Take 1 tablet by mouth 2 (Two) Times a Day With Meals. 10/24/23   Brian Elils APRN   clobetasol (TEMOVATE) 0.05 % cream Apply 1 application  topically to the appropriate area as directed 2 (Two) Times a Day. 23   Brian Ellis APRN   cloNIDine (CATAPRES) 0.2 MG tablet TAKE 1 TABLET BY MOUTH THREE TIMES  DAILY 23   Brian Ellis APRN   diphenhydrAMINE (BENADRYL) 25 mg capsule Take 1 capsule by mouth Every 6 (Six) Hours As Needed for Itching.    ProviderAl MD   famotidine (PEPCID) 20 MG tablet Take 1 tablet by mouth 2 (Two) Times a Day Before Meals for 180 days. 24  Aston Arroyo MD   fluticasone (FLONASE) 50 MCG/ACT nasal spray 2 sprays into the nostril(s) as directed by provider Daily. 2 sprays each nostril daily 10/24/23   Brian Ellis APRN   hydrocortisone (ANUSOL-HC) 25 MG suppository Insert 1 suppository into the rectum 2 (Two) Times a Day.  Patient not taking: Reported on 2024   Naida Neal APRN   Hydrocortisone, Perianal, (ANUSOL-HC) 2.5 % rectal cream Insert  into the rectum 2 (Two) Times a Day. 24   Brian Ellis APRN   linaclotide (Linzess) 72 MCG capsule capsule Take 2 capsules by mouth Every Morning Before Breakfast. 24  Aston Arroyo MD   losartan (COZAAR) 100 MG tablet Take 1 tablet by mouth Daily. 10/24/23   Brian Ellis APRN   promethazine-dextromethorphan (PROMETHAZINE-DM) 6.25-15 MG/5ML syrup Take 5 mL by mouth 4 (Four) Times a Day As Needed for Cough. 23   Brian Ellis APRN   psyllium (Metamucil Smooth Texture) 58.6 % powder Take  by mouth 3 (Three) Times a Day. 24  Aston Arroyo MD   vitamin B-12 (CYANOCOBALAMIN) 100 MCG tablet Take 0.5 tablets by mouth Daily.    ProviderAl MD   zinc gluconate 50 MG tablet Take 1 tablet by mouth Daily.    ProviderAl MD        Social History:   Social History     Tobacco Use    Smoking status: Former     Current packs/day: 0.00     Types: Cigarettes     Quit date:      Years since quittin.1     Passive exposure: Never    Smokeless tobacco: Never   Vaping Use    Vaping status: Never Used   Substance Use Topics    Alcohol use: Never    Drug use: Never         Review of  "Systems:  Review of Systems   Constitutional:  Negative for chills, fatigue and fever.   HENT:  Negative for ear pain, rhinorrhea and sore throat.    Eyes:  Negative for visual disturbance.   Respiratory:  Negative for cough and shortness of breath.    Cardiovascular:  Negative for chest pain.   Gastrointestinal:  Positive for abdominal pain (Resolved), diarrhea, nausea and vomiting.   Genitourinary:  Negative for difficulty urinating, dysuria and hematuria.   Musculoskeletal:  Negative for arthralgias, back pain and myalgias.   Skin:  Negative for rash.   Neurological:  Negative for light-headedness and headaches.   Hematological:  Negative for adenopathy.   Psychiatric/Behavioral: Negative.          Physical Exam:  /73 (BP Location: Right arm, Patient Position: Lying)   Pulse 111   Temp 98.4 °F (36.9 °C) (Oral)   Resp 16   Ht 157.5 cm (62\")   Wt 113 kg (250 lb)   LMP  (LMP Unknown) Comment: no periods  SpO2 97%   BMI 45.73 kg/m²     Physical Exam  Vitals and nursing note reviewed.   Constitutional:       General: She is not in acute distress.     Appearance: Normal appearance. She is not toxic-appearing.   HENT:      Head: Normocephalic and atraumatic.      Nose: Nose normal.      Mouth/Throat:      Mouth: Mucous membranes are moist.   Eyes:      Conjunctiva/sclera: Conjunctivae normal.   Cardiovascular:      Rate and Rhythm: Normal rate.      Pulses: Normal pulses.      Heart sounds: Normal heart sounds.   Pulmonary:      Effort: Pulmonary effort is normal.      Breath sounds: Normal breath sounds.   Abdominal:      General: Bowel sounds are normal. There is no distension.      Palpations: Abdomen is soft.      Tenderness: There is no abdominal tenderness.   Musculoskeletal:         General: Normal range of motion.      Cervical back: Normal range of motion.   Skin:     General: Skin is warm and dry.   Neurological:      General: No focal deficit present.      Mental Status: She is alert and " oriented to person, place, and time.   Psychiatric:         Mood and Affect: Mood normal.         Behavior: Behavior normal.         Thought Content: Thought content normal.         Judgment: Judgment normal.                  Procedures:  Procedures      Medical Decision Making:      Comorbidities that affect care:    Asthma, Chronic Kidney Disease, Hypertension    External Notes reviewed:    None      The following orders were placed and all results were independently analyzed by me:  Orders Placed This Encounter   Procedures    COVID-19, FLU A/B, RSV PCR 1 HR TAT - Swab, Nasopharynx    XR Abdomen KUB    [place] Draw    Comprehensive Metabolic Panel    Lipase    Urinalysis With Microscopic If Indicated (No Culture) - Urine, Clean Catch    CBC Auto Differential    Undress & Gown    CBC & Differential    Green Top (Gel)    Lavender Top    Gold Top - SST    Light Blue Top       Medications Given in the Emergency Department:  Medications   sodium chloride 0.9 % bolus 1,000 mL (0 mL Intravenous Stopped 3/2/24 1121)   ondansetron (ZOFRAN) injection 4 mg (4 mg Intravenous Given 3/2/24 1022)        ED Course:         Labs:    Lab Results (last 24 hours)       Procedure Component Value Units Date/Time    CBC & Differential [[id number]]  (Abnormal) Collected: 03/02/24 0905    Specimen: Blood Updated: 03/02/24 0918    Narrative:      The following orders were created for panel order CBC & Differential.  Procedure                               Abnormality         Status                     ---------                               -----------         ------                     CBC Auto Differential[[id number]]        Abnormal            Final result                 Please view results for these tests on the individual orders.    Comprehensive Metabolic Panel [[id number]]  (Abnormal) Collected: 03/02/24 0905    Specimen: Blood Updated: 03/02/24 0936     Glucose 129 mg/dL      BUN 60 mg/dL      Creatinine 3.73 mg/dL      Sodium 133  mmol/L      Potassium 4.9 mmol/L      Chloride 101 mmol/L      CO2 18.6 mmol/L      Calcium 10.9 mg/dL      Total Protein 6.8 g/dL      Albumin 4.1 g/dL      ALT (SGPT) 18 U/L      AST (SGOT) 16 U/L      Alkaline Phosphatase 130 U/L      Total Bilirubin 0.4 mg/dL      Globulin 2.7 gm/dL      A/G Ratio 1.5 g/dL      BUN/Creatinine Ratio 16.1     Anion Gap 13.4 mmol/L      eGFR 13.7 mL/min/1.73      Comment: <15 Indicative of kidney failure       Narrative:      GFR Normal >60  Chronic Kidney Disease <60  Kidney Failure <15      Lipase [[id number]]  (Normal) Collected: 03/02/24 0905    Specimen: Blood Updated: 03/02/24 0936     Lipase 59 U/L     CBC Auto Differential [[id number]]  (Abnormal) Collected: 03/02/24 0905    Specimen: Blood Updated: 03/02/24 0918     WBC 8.93 10*3/mm3      RBC 3.16 10*6/mm3      Hemoglobin 10.1 g/dL      Hematocrit 32.0 %      .3 fL      MCH 32.0 pg      MCHC 31.6 g/dL      RDW 14.3 %      RDW-SD 52.2 fl      MPV 9.1 fL      Platelets 185 10*3/mm3      Neutrophil % 67.4 %      Lymphocyte % 21.3 %      Monocyte % 7.3 %      Eosinophil % 2.6 %      Basophil % 0.7 %      Immature Grans % 0.7 %      Neutrophils, Absolute 6.03 10*3/mm3      Lymphocytes, Absolute 1.90 10*3/mm3      Monocytes, Absolute 0.65 10*3/mm3      Eosinophils, Absolute 0.23 10*3/mm3      Basophils, Absolute 0.06 10*3/mm3      Immature Grans, Absolute 0.06 10*3/mm3      nRBC 0.0 /100 WBC     Urinalysis With Microscopic If Indicated (No Culture) - Urine, Clean Catch [[id number]]  (Abnormal) Collected: 03/02/24 0931    Specimen: Urine, Clean Catch Updated: 03/02/24 0953     Color, UA Yellow     Appearance, UA Clear     pH, UA 5.5     Specific Gravity, UA 1.007     Glucose, UA Negative     Ketones, UA Negative     Bilirubin, UA Negative     Blood, UA Negative     Protein, UA Trace     Leuk Esterase, UA Negative     Nitrite, UA Negative     Urobilinogen, UA 0.2 E.U./dL    Narrative:      Urine microscopic not indicated.     COVID-19, FLU A/B, RSV PCR 1 HR TAT - Swab, Nasopharynx [[id number]]  (Normal) Collected: 03/02/24 1024    Specimen: Swab from Nasopharynx Updated: 03/02/24 1111     COVID19 Not Detected     Influenza A PCR Not Detected     Influenza B PCR Not Detected     RSV, PCR Not Detected    Narrative:      Fact sheet for providers: https://www.fda.gov/media/132964/download    Fact sheet for patients: https://www.fda.gov/media/079384/download    Test performed by PCR.             Imaging:    XR Abdomen KUB    Result Date: 3/2/2024  PROCEDURE: XR ABDOMEN KUB  COMPARISON: [facility], CT, CT ABDOMEN PELVIS WO CONTRAST, 8/29/2023, 20:04.  INDICATIONS: CONSTIPATION AND DIARRHEA  FINDINGS:  There is a nonspecific nonobstructing bowel gas pattern.  Postsurgical changes noted at the pubic symphysis.        1. Nonspecific nonobstructing bowel gas pattern     MICHELLE CRUZ MD       Electronically Signed and Approved By: MICHELLE CRUZ MD on 3/02/2024 at 11:54                Differential Diagnosis and Discussion:    Vomiting: Differential diagnosis includes but is not limited to migraine, labyrinthine disorders, psychogenic, metabolic and endocrine causes, peptic ulcer, gastric outlet obstruction, gastritis, gastroenteritis, appendicitis, intestinal obstruction, paralytic ileus, food poisoning, cholecystitis, acute hepatitis, acute pancreatitis, acute febrile illness, and myocardial infarction.    All labs were reviewed and interpreted by me.  All X-rays impressions were independently interpreted by me.    MDM  Number of Diagnoses or Management Options  Diarrhea, unspecified type  Diagnosis management comments: The patient comes to the ED for evaluation of vomiting. Emesis is much improved in the ED. The patient was given antiemetics in the ED. The patient is resting comfortably and feels better, is alert and in no distress. Repeat examination is unremarkable and benign; in particular, there's no discomfort at  McBurney's point. The history, exam, diagnostic testing, and current condition does not suggest acute appendicitis, bowel obstruction, acute cholecystitis, bowel perforation, major gastrointestinal bleeding, severe diverticulitis, abdominal aortic aneurysm, mesenteric ischemia, volvulus, sepsis, or other significant pathology that warrants further testing, continued ED treatment, admission, or surgical evaluation at this point. The vital signs have been stable. Bloodwork performed shows no signs of acute renal failure. The patient does not have uncontrollable pain, intractable vomiting, or other significant symptoms. The patient is now able to tolerate po intake in the ED and has passed a po challenge. The patient's condition is stable and appropriate for discharge from the emergency department.        Amount and/or Complexity of Data Reviewed  Clinical lab tests: reviewed  Tests in the radiology section of CPT®: reviewed  Decide to obtain previous medical records or to obtain history from someone other than the patient: yes                 Patient Care Considerations:    CT ABDOMEN AND PELVIS: I considered ordering a CT scan of the abdomen and pelvis however patient is nontender on abdominal exam.      Consultants/Shared Management Plan:    None    Social Determinants of Health:    Patient is independent, reliable, and has access to care.       Disposition and Care Coordination:    Discharged: The patient is suitable and stable for discharge with no need for consideration of admission.    I have explained the patient´s condition, diagnoses and treatment plan based on the information available to me at this time. I have answered questions and addressed any concerns. The patient has a good  understanding of the patient´s diagnosis, condition, and treatment plan as can be expected at this point. The vital signs have been stable. The patient´s condition is stable and appropriate for discharge from the emergency  department.      The patient will pursue further outpatient evaluation with the primary care physician or other designated or consulting physician as outlined in the discharge instructions. They are agreeable to this plan of care and follow-up instructions have been explained in detail. The patient has received these instructions in written format and has expressed an understanding of the discharge instructions. The patient is aware that any significant change in condition or worsening of symptoms should prompt an immediate return to this or the closest emergency department or call to 911.  I have explained discharge medications and the need for follow up with the patient/caretakers. This was also printed in the discharge instructions. Patient was discharged with the following medications and follow up:      Medication List        New Prescriptions      ondansetron ODT 4 MG disintegrating tablet  Commonly known as: ZOFRAN-ODT  Place 1 tablet on the tongue Every 8 (Eight) Hours As Needed for Nausea or Vomiting.               Where to Get Your Medications        These medications were sent to [facility] [address] [place], [place] [address] [facility] - [phone] [facility] [phone]   [address] [facility] [address]      Phone: [phone]   ondansetron ODT 4 MG disintegrating tablet      Brian Ellis, APRN  [address] [facility] [place] [address]  [phone]    Go to   As needed       Final diagnoses:   Diarrhea, unspecified type        ED Disposition       ED Disposition   Discharge    Condition   Stable    Comment   --               This medical record created using voice recognition software.             Denise Yadav, APRN  03/03/24 0786

## 2024-03-13 ENCOUNTER — APPOINTMENT (OUTPATIENT)
Dept: GENERAL RADIOLOGY | Facility: HOSPITAL | Age: 54
End: 2024-03-13
Payer: MEDICARE

## 2024-03-13 ENCOUNTER — HOSPITAL ENCOUNTER (EMERGENCY)
Facility: HOSPITAL | Age: 54
Discharge: HOME | End: 2024-03-13
Attending: EMERGENCY MEDICINE | Admitting: EMERGENCY MEDICINE
Payer: MEDICARE

## 2024-03-13 VITALS
HEART RATE: 84 BPM | SYSTOLIC BLOOD PRESSURE: 136 MMHG | WEIGHT: 221.78 LBS | BODY MASS INDEX: 40.81 KG/M2 | TEMPERATURE: 98 F | DIASTOLIC BLOOD PRESSURE: 80 MMHG | RESPIRATION RATE: 19 BRPM | HEIGHT: 62 IN | OXYGEN SATURATION: 99 %

## 2024-03-13 DIAGNOSIS — N18.9 CHRONIC KIDNEY DISEASE, UNSPECIFIED CKD STAGE: ICD-10-CM

## 2024-03-13 DIAGNOSIS — R06.02 SHORTNESS OF BREATH: Primary | ICD-10-CM

## 2024-03-13 LAB
ALBUMIN SERPL-MCNC: 4.2 G/DL (ref 3.5–5.2)
ALBUMIN/GLOB SERPL: 1.6 G/DL
ALP SERPL-CCNC: 127 U/L (ref 39–117)
ALT SERPL W P-5'-P-CCNC: 18 U/L (ref 1–33)
ANION GAP SERPL CALCULATED.3IONS-SCNC: 14.6 MMOL/L (ref 5–15)
AST SERPL-CCNC: 17 U/L (ref 1–32)
BASOPHILS # BLD AUTO: 0.06 10*3/MM3 (ref 0–0.2)
BASOPHILS NFR BLD AUTO: 0.8 % (ref 0–1.5)
BILIRUB SERPL-MCNC: 0.4 MG/DL (ref 0–1.2)
BUN SERPL-MCNC: 43 MG/DL (ref 6–20)
BUN/CREAT SERPL: 12.6 (ref 7–25)
CALCIUM SPEC-SCNC: 11.6 MG/DL (ref 8.6–10.5)
CHLORIDE SERPL-SCNC: 101 MMOL/L (ref 98–107)
CO2 SERPL-SCNC: 22.4 MMOL/L (ref 22–29)
CREAT SERPL-MCNC: 3.4 MG/DL (ref 0.57–1)
DEPRECATED RDW RBC AUTO: 54 FL (ref 37–54)
EGFRCR SERPLBLD CKD-EPI 2021: 15.4 ML/MIN/1.73
EOSINOPHIL # BLD AUTO: 0.2 10*3/MM3 (ref 0–0.4)
EOSINOPHIL NFR BLD AUTO: 2.8 % (ref 0.3–6.2)
ERYTHROCYTE [DISTWIDTH] IN BLOOD BY AUTOMATED COUNT: 14.5 % (ref 12.3–15.4)
GLOBULIN UR ELPH-MCNC: 2.7 GM/DL
GLUCOSE SERPL-MCNC: 124 MG/DL (ref 65–99)
HCT VFR BLD AUTO: 34.3 % (ref 34–46.6)
HGB BLD-MCNC: 10.9 G/DL (ref 12–15.9)
HOLD SPECIMEN: NORMAL
HOLD SPECIMEN: NORMAL
IMM GRANULOCYTES # BLD AUTO: 0.02 10*3/MM3 (ref 0–0.05)
IMM GRANULOCYTES NFR BLD AUTO: 0.3 % (ref 0–0.5)
LYMPHOCYTES # BLD AUTO: 1.67 10*3/MM3 (ref 0.7–3.1)
LYMPHOCYTES NFR BLD AUTO: 23.4 % (ref 19.6–45.3)
MCH RBC QN AUTO: 32.3 PG (ref 26.6–33)
MCHC RBC AUTO-ENTMCNC: 31.8 G/DL (ref 31.5–35.7)
MCV RBC AUTO: 101.8 FL (ref 79–97)
MONOCYTES # BLD AUTO: 0.42 10*3/MM3 (ref 0.1–0.9)
MONOCYTES NFR BLD AUTO: 5.9 % (ref 5–12)
NEUTROPHILS NFR BLD AUTO: 4.77 10*3/MM3 (ref 1.7–7)
NEUTROPHILS NFR BLD AUTO: 66.8 % (ref 42.7–76)
NRBC BLD AUTO-RTO: 0 /100 WBC (ref 0–0.2)
NT-PROBNP SERPL-MCNC: 81.6 PG/ML (ref 0–900)
PLATELET # BLD AUTO: 201 10*3/MM3 (ref 140–450)
PMV BLD AUTO: 9 FL (ref 6–12)
POTASSIUM SERPL-SCNC: 4.7 MMOL/L (ref 3.5–5.2)
PROT SERPL-MCNC: 6.9 G/DL (ref 6–8.5)
QT INTERVAL: 362 MS
QTC INTERVAL: 444 MS
RBC # BLD AUTO: 3.37 10*6/MM3 (ref 3.77–5.28)
SODIUM SERPL-SCNC: 138 MMOL/L (ref 136–145)
TROPONIN T SERPL HS-MCNC: 41 NG/L
WBC NRBC COR # BLD AUTO: 7.14 10*3/MM3 (ref 3.4–10.8)
WHOLE BLOOD HOLD COAG: NORMAL
WHOLE BLOOD HOLD SPECIMEN: NORMAL

## 2024-03-13 PROCEDURE — 93005 ELECTROCARDIOGRAM TRACING: CPT

## 2024-03-13 PROCEDURE — 71045 X-RAY EXAM CHEST 1 VIEW: CPT

## 2024-03-13 PROCEDURE — 36415 COLL VENOUS BLD VENIPUNCTURE: CPT

## 2024-03-13 PROCEDURE — 99284 EMERGENCY DEPT VISIT MOD MDM: CPT

## 2024-03-13 PROCEDURE — 85025 COMPLETE CBC W/AUTO DIFF WBC: CPT

## 2024-03-13 PROCEDURE — 84484 ASSAY OF TROPONIN QUANT: CPT

## 2024-03-13 PROCEDURE — 93005 ELECTROCARDIOGRAM TRACING: CPT | Performed by: EMERGENCY MEDICINE

## 2024-03-13 PROCEDURE — 80053 COMPREHEN METABOLIC PANEL: CPT

## 2024-03-13 PROCEDURE — 83880 ASSAY OF NATRIURETIC PEPTIDE: CPT

## 2024-03-13 RX ORDER — SODIUM CHLORIDE 0.9 % (FLUSH) 0.9 %
10 SYRINGE (ML) INJECTION AS NEEDED
Status: DISCONTINUED | OUTPATIENT
Start: 2024-03-13 | End: 2024-03-13 | Stop reason: HOSPADM

## 2024-03-13 NOTE — ED PROVIDER NOTES
Time: 12:21 PM EDT  Date of encounter:  3/13/2024  Independent Historian/Clinical History and Information was obtained by:   Patient    History is limited by: N/A    Chief Complaint: shortness of breath and leg swelling      History of Present Illness:  Patient is a 53 y.o. year old female who presents to the emergency department for evaluation of shortness of breath and leg swelling. Patient states shortness of breath has occurred for a week now, along with bilateral LE swelling. She admits to some cramping in the lower left leg. She came today out of precaution as her nephrologist warned her to visit ER if certain symptoms occurred. Her shortness of breath is minimal and she denies chest pain or cough.   She also admits to feelings of urinary retention, but denies decreased frequency. She denies fever, chills, or vomiting. She admits to nausea.     HPI    Patient Care Team  Primary Care Provider: Brian Ellis APRN    Past Medical History:     Allergies   Allergen Reactions    Amlodipine Shortness Of Breath    Diltiazem Hcl Anxiety    Augmentin [Amoxicillin-Pot Clavulanate] Nausea And Vomiting    Clindamycin/Lincomycin Swelling    Doxycycline Nausea And Vomiting    Zofran [Ondansetron] Confusion    Contrast Dye (Echo Or Unknown Ct/Mr) Palpitations     Patient gets to the point of passing out , heart races     Iodinated Contrast Media Palpitations     Patient gets to the point of passing out , heart races     Past Medical History:   Diagnosis Date    Anxiety     Arthritis     GILBERT KNEES    Asthma     SEASONAL ALLERGIES    CKD (chronic kidney disease)     Stage 3, Follows with [facility]    Elevated cholesterol     Gastritis     Gastritis     GERD (gastroesophageal reflux disease)     Gout 2021    Hiatal hernia     Hypertension     Lactose intolerance      Past Surgical History:   Procedure Laterality Date    BONY PELVIS SURGERY      Plate     SECTION   and     CHOLECYSTECTOMY       COLONOSCOPY  2009    ENDOSCOPY  2009    ENDOSCOPY N/A 3/14/2023    Procedure: ESOPHAGOGASTRODUODENOSCOPY with biopsies;  Surgeon: Tam Badillo MD;  Location: [facility];  Service: General;  Laterality: N/A;  hiatal hernia    TOTAL KNEE ARTHROPLASTY Left 12/22/2021    Procedure: TOTAL KNEE ARTHROPLASTY;  Surgeon: Marco Nelson MD;  Location: [facility] MAIN OR;  Service: Orthopedics;  Laterality: Left;    TOTAL KNEE ARTHROPLASTY Right 10/19/2022    Procedure: RIGHT TOTAL KNEE ARTHROPLASTY - NO SHELBY;  Surgeon: Marco Nelson MD;  Location: [facility] MA[facility] OR;  Service: Orthopedics;  Laterality: Right;    TUBAL ABDOMINAL LIGATION  1989     Family History   Problem Relation Age of Onset    Throat cancer Father 73    Stroke Other     Diabetes Other     Malig Hyperthermia Neg Hx     Colon cancer Neg Hx        Home Medications:  Prior to Admission medications    Medication Sig Start Date End Date Taking? Authorizing Provider   acetaminophen (TYLENOL) 325 MG suppository Insert 1 suppository into the rectum.    Provider, MD Al   allopurinol (ZYLOPRIM) 300 MG tablet Take 1 tablet by mouth Daily. 10/24/23   Brian Ellis APRN   atorvastatin (LIPITOR) 20 MG tablet Take 1 tablet by mouth Every Night. 10/24/23   Brian Ellis APRN   azithromycin (Zithromax Z-Yair) 250 MG tablet Take 2 tablets by mouth on day 1, then 1 tablet daily on days 2-5  Patient not taking: Reported on 2/21/2024 12/20/23   Brian Ellis APRN   carvedilol (COREG) 25 MG tablet Take 1 tablet by mouth 2 (Two) Times a Day With Meals. 10/24/23   Brian Ellis APRN   clobetasol (TEMOVATE) 0.05 % cream Apply 1 application  topically to the appropriate area as directed 2 (Two) Times a Day. 9/20/23   Brian Ellis APRN   cloNIDine (CATAPRES) 0.2 MG tablet TAKE 1 TABLET BY MOUTH THREE TIMES DAILY 11/27/23   Brian Ellis APRN   diphenhydrAMINE (BENADRYL) 25 mg capsule  Take 1 capsule by mouth Every 6 (Six) Hours As Needed for Itching.    Provider, MD Al   famotidine (PEPCID) 20 MG tablet Take 1 tablet by mouth 2 (Two) Times a Day Before Meals for 180 days. 24  Aston Arroyo MD   fluticasone (FLONASE) 50 MCG/ACT nasal spray 2 sprays into the nostril(s) as directed by provider Daily. 2 sprays each nostril daily 10/24/23   Brain Ellis APRN   hydrocortisone (ANUSOL-HC) 25 MG suppository Insert 1 suppository into the rectum 2 (Two) Times a Day.  Patient not taking: Reported on 2024   Naida Neal APRN   Hydrocortisone, Perianal, (ANUSOL-HC) 2.5 % rectal cream Insert  into the rectum 2 (Two) Times a Day. 24   Brian Ellis APRN   linaclotide (Linzess) 72 MCG capsule capsule Take 2 capsules by mouth Every Morning Before Breakfast. 24  Aston Arroyo MD   losartan (COZAAR) 100 MG tablet Take 1 tablet by mouth Daily. 10/24/23   Brian Ellis APRN   ondansetron ODT (ZOFRAN-ODT) 4 MG disintegrating tablet Place 1 tablet on the tongue Every 8 (Eight) Hours As Needed for Nausea or Vomiting. 3/2/24   Denise Yadav APRN   promethazine-dextromethorphan (PROMETHAZINE-DM) 6.25-15 MG/5ML syrup Take 5 mL by mouth 4 (Four) Times a Day As Needed for Cough. 23   Brian Ellis APRN   psyllium (Metamucil Smooth Texture) 58.6 % powder Take  by mouth 3 (Three) Times a Day. 24  Aston Arroyo MD   vitamin B-12 (CYANOCOBALAMIN) 100 MCG tablet Take 0.5 tablets by mouth Daily.    ProviderAl MD   zinc gluconate 50 MG tablet Take 1 tablet by mouth Daily.    ProviderAl MD        Social History:   Social History     Tobacco Use    Smoking status: Former     Current packs/day: 0.00     Types: Cigarettes     Quit date:      Years since quittin.2     Passive exposure: Never    Smokeless tobacco: Never   Vaping Use    Vaping status: Never Used  "  Substance Use Topics    Alcohol use: Never    Drug use: Never         Review of Systems:  Review of Systems   Constitutional:  Negative for chills and fever.   HENT:  Negative for congestion, rhinorrhea and sore throat.    Eyes:  Negative for pain and visual disturbance.   Respiratory:  Positive for shortness of breath. Negative for apnea, cough and chest tightness.    Cardiovascular:  Negative for chest pain and palpitations.   Gastrointestinal:  Positive for nausea. Negative for abdominal pain, diarrhea and vomiting.   Genitourinary:  Positive for difficulty urinating. Negative for decreased urine volume, dysuria, flank pain and hematuria.   Musculoskeletal:  Negative for joint swelling and myalgias.   Skin:  Negative for color change.   Neurological:  Negative for seizures, numbness and headaches.   Psychiatric/Behavioral: Negative.     All other systems reviewed and are negative.       Physical Exam:  /90   Pulse 92   Temp 98 °F (36.7 °C) (Oral)   Resp 17   Ht 157.5 cm (62\")   Wt 101 kg (221 lb 12.5 oz)   LMP  (LMP Unknown) Comment: no periods  SpO2 96%   BMI 40.56 kg/m²     Physical Exam  Vitals and nursing note reviewed.   Constitutional:       General: She is not in acute distress.     Appearance: Normal appearance. She is well-developed. She is not toxic-appearing.   HENT:      Head: Normocephalic and atraumatic.      Jaw: There is normal jaw occlusion.   Eyes:      General: Lids are normal.      Extraocular Movements: Extraocular movements intact.      Conjunctiva/sclera: Conjunctivae normal.      Pupils: Pupils are equal, round, and reactive to light.   Cardiovascular:      Rate and Rhythm: Normal rate and regular rhythm.      Pulses: Normal pulses.      Heart sounds: Normal heart sounds.   Pulmonary:      Effort: Pulmonary effort is normal. No respiratory distress.      Breath sounds: Normal breath sounds. No wheezing or rhonchi.   Chest:      Chest wall: No mass or tenderness. "   Abdominal:      General: Abdomen is flat.      Palpations: Abdomen is soft.      Tenderness: There is no abdominal tenderness. There is no guarding or rebound.   Musculoskeletal:         General: Normal range of motion.      Cervical back: Normal range of motion and neck supple.      Right lower le+ Pitting Edema present.      Left lower le+ Pitting Edema present.   Skin:     General: Skin is warm and dry.   Neurological:      Mental Status: She is alert and oriented to person, place, and time. Mental status is at baseline.   Psychiatric:         Mood and Affect: Mood normal.                  Procedures:  Procedures      Medical Decision Making:      Comorbidities that affect care:    Chronic Kidney Disease, Hypertension, diaphragmatic hernia    External Notes reviewed:    Previous Clinic Note: Nephrology office visit on 2024 with Dr. Promise Carvalho. Diagnoses: Stage 5 CKD, Vit D deficiency, HTN, localized edema, hypercalcemia, nephritic syndrome, anemia in CKD, UTI      The following orders were placed and all results were independently analyzed by me:  Orders Placed This Encounter   Procedures    XR Chest 1 View    [place] Draw    Comprehensive Metabolic Panel    BNP    Single High Sensitivity Troponin T    CBC Auto Differential    NPO Diet NPO Type: Strict NPO    Undress & Gown    Continuous Pulse Oximetry    Vital Signs    Oxygen Therapy- Nasal Cannula; Titrate 1-6 LPM Per SpO2; 90 - 95%    ECG 12 Lead ED Triage Standing Order; SOA    Insert Peripheral IV    CBC & Differential    Green Top (Gel)    Lavender Top    Gold Top - SST    Light Blue Top       Medications Given in the Emergency Department:  Medications   sodium chloride 0.9 % flush 10 mL (has no administration in time range)        ED Course:         Labs:    Lab Results (last 24 hours)       Procedure Component Value Units Date/Time    CBC & Differential [[id number]]  (Abnormal) Collected: 24 1101    Specimen: Blood from Arm, Right  Updated: 03/13/24 1109    Narrative:      The following orders were created for panel order CBC & Differential.  Procedure                               Abnormality         Status                     ---------                               -----------         ------                     CBC Auto Differential[[id number]]        Abnormal            Final result                 Please view results for these tests on the individual orders.    Comprehensive Metabolic Panel [[id number]]  (Abnormal) Collected: 03/13/24 1101    Specimen: Blood from Arm, Right Updated: 03/13/24 1138     Glucose 124 mg/dL      BUN 43 mg/dL      Creatinine 3.40 mg/dL      Sodium 138 mmol/L      Potassium 4.7 mmol/L      Chloride 101 mmol/L      CO2 22.4 mmol/L      Calcium 11.6 mg/dL      Total Protein 6.9 g/dL      Albumin 4.2 g/dL      ALT (SGPT) 18 U/L      AST (SGOT) 17 U/L      Alkaline Phosphatase 127 U/L      Total Bilirubin 0.4 mg/dL      Globulin 2.7 gm/dL      A/G Ratio 1.6 g/dL      BUN/Creatinine Ratio 12.6     Anion Gap 14.6 mmol/L      eGFR 15.4 mL/min/1.73     Narrative:      GFR Normal >60  Chronic Kidney Disease <60  Kidney Failure <15      BNP [[id number]]  (Normal) Collected: 03/13/24 1101    Specimen: Blood from Arm, Right Updated: 03/13/24 1136     proBNP 81.6 pg/mL     Narrative:      This assay is used as an aid in the diagnosis of individuals suspected of having heart failure. It can be used as an aid in the diagnosis of acute decompensated heart failure (ADHF) in patients presenting with signs and symptoms of ADHF to the emergency department (ED). In addition, NT-proBNP of <300 pg/mL indicates ADHF is not likely.    Age Range Result Interpretation  NT-proBNP Concentration (pg/mL:      <50             Positive            >450                   Gray                 300-450                    Negative             <300    50-75           Positive            >900                  Gray                300-900                   Negative            <300      >75             Positive            >1800                  Gray                300-1800                  Negative            <300    Single High Sensitivity Troponin T [[id number]]  (Abnormal) Collected: 03/13/24 1101    Specimen: Blood from Arm, Right Updated: 03/13/24 1138     HS Troponin T 41 ng/L     Narrative:      High Sensitive Troponin T Reference Range:  <14.0 ng/L- Negative Female for AMI  <22.0 ng/L- Negative Male for AMI  >=14 - Abnormal Female indicating possible myocardial injury.  >=22 - Abnormal Male indicating possible myocardial injury.   Clinicians would have to utilize clinical acumen, EKG, Troponin, and serial changes to determine if it is an Acute Myocardial Infarction or myocardial injury due to an underlying chronic condition.         CBC Auto Differential [[id number]]  (Abnormal) Collected: 03/13/24 1101    Specimen: Blood from Arm, Right Updated: 03/13/24 1109     WBC 7.14 10*3/mm3      RBC 3.37 10*6/mm3      Hemoglobin 10.9 g/dL      Hematocrit 34.3 %      .8 fL      MCH 32.3 pg      MCHC 31.8 g/dL      RDW 14.5 %      RDW-SD 54.0 fl      MPV 9.0 fL      Platelets 201 10*3/mm3      Neutrophil % 66.8 %      Lymphocyte % 23.4 %      Monocyte % 5.9 %      Eosinophil % 2.8 %      Basophil % 0.8 %      Immature Grans % 0.3 %      Neutrophils, Absolute 4.77 10*3/mm3      Lymphocytes, Absolute 1.67 10*3/mm3      Monocytes, Absolute 0.42 10*3/mm3      Eosinophils, Absolute 0.20 10*3/mm3      Basophils, Absolute 0.06 10*3/mm3      Immature Grans, Absolute 0.02 10*3/mm3      nRBC 0.0 /100 WBC              Imaging:    XR Chest 1 View    Result Date: 3/13/2024  PROCEDURE: XR CHEST 1 VW  COMPARISON: [facility], , XR CHEST 1 VW, 10/24/2023, 21:37.  INDICATIONS: SOA Triage Protocol/ SOA TODAY AND ONCE LAST WEEK. CONCERN FOR KIDNEY FAILURE, LEG SWELLING AND PAIN  FINDINGS:  No focal pulmonary opacities are seen.  Pulmonary vascularity appears within  normal limits.  Heart size is prominent but stable.  No pneumothorax or large effusion identified.       Stable cardiomegaly.       RONALD DEMARCO MD       Electronically Signed and Approved By: RONALD DEMARCO MD on 3/13/2024 at 11:26                Differential Diagnosis and Discussion:    Dyspnea: Differential diagnosis includes but is not limited to metabolic acidosis, neurological disorders, psychogenic, asthma, pneumothorax, upper airway obstruction, COPD, pneumonia, noncardiogenic pulmonary edema, interstitial lung disease, anemia, congestive heart failure, and pulmonary embolism    All labs were reviewed and interpreted by me.  All X-rays impressions were independently interpreted by me.    MDM  Number of Diagnoses or Management Options  Chronic kidney disease, unspecified CKD stage  Shortness of breath  Diagnosis management comments: In summary this is a 53-year-old female with a history of CKD who presents to the emergency department for evaluation of shortness of breath and leg swelling.  It is reported at the time of my examination however that her leg swelling has significantly improved she has minimal edema at this time.  Chest x-ray unremarkable.  CBC independently reviewed by me and shows no critical abnormalities except baseline anemia due to CKD.  CMP independently reviewed by me and shows no critical abnormalities except baseline CKD.  Patient is otherwise well-appearing in no acute distress.  Very strict return to ER and follow-up instructions have been provided to the patient.         Amount and/or Complexity of Data Reviewed  Decide to obtain previous medical records or to obtain history from someone other than the patient: yes                 Patient Care Considerations:    CT CHEST: I considered ordering a CT scan of the chest, however no significant respiratory stress or hypoxia      Consultants/Shared Management Plan:    None    Social Determinants of Health:    Patient is independent, reliable,  and has access to care.       Disposition and Care Coordination:    Discharged: I considered escalation of care by admitting this patient to the hospital, however no significant fluid overload identified.    I have explained the patient´s condition, diagnoses and treatment plan based on the information available to me at this time. I have answered questions and addressed any concerns. The patient has a good  understanding of the patient´s diagnosis, condition, and treatment plan as can be expected at this point. The vital signs have been stable. The patient´s condition is stable and appropriate for discharge from the emergency department.      The patient will pursue further outpatient evaluation with the primary care physician or other designated or consulting physician as outlined in the discharge instructions. They are agreeable to this plan of care and follow-up instructions have been explained in detail. The patient has received these instructions in written format and has expressed an understanding of the discharge instructions. The patient is aware that any significant change in condition or worsening of symptoms should prompt an immediate return to this or the closest emergency department or call to 911.  I have explained discharge medications and the need for follow up with the patient/caretakers. This was also printed in the discharge instructions. Patient was discharged with the following medications and follow up:      Medication List      No changes were made to your prescriptions during this visit.      Brian Ellis, APRN  [address] [place] [address]  [phone]    In 1 week         Final diagnoses:   Shortness of breath   Chronic kidney disease, unspecified CKD stage        ED Disposition       ED Disposition   Discharge    Condition   Stable    Comment   --               This medical record created using voice recognition software.            Naman Hollis,  MD  03/13/24 0963

## 2024-03-20 LAB
QT INTERVAL: 362 MS
QTC INTERVAL: 444 MS

## 2024-03-27 ENCOUNTER — TELEPHONE (OUTPATIENT)
Dept: VASCULAR SURGERY | Facility: HOSPITAL | Age: 54
End: 2024-03-27
Payer: MEDICARE

## 2024-03-27 NOTE — TELEPHONE ENCOUNTER
Caller: Katarzyna Norris    Relationship: Self    Best call back number: [phone]    What is the best time to reach you: ANY    Who are you requesting to speak with (clinical staff, provider,  specific staff member): CLINICAL    Do you know the name of the person who called: PT    What was the call regarding: PT IS WANTING TO KNOW IF SHE GOES TO URGENT TREATMENT AND GETS SOME PENICILLIN IF IT WILL INTERFERE WITH HER HAVING SURGERY. PLEASE GIVE HER A CALL BACK TO DISCUSS. THANK YOU    Is it okay if the provider responds through [employer]: NO

## 2024-03-27 NOTE — TELEPHONE ENCOUNTER
Consulted with Dr. Dominguez on patient question. Patient may take an antibiotic for sinus concerns and will not interfere with surgery scheduled for next week.

## 2024-04-09 ENCOUNTER — TELEPHONE (OUTPATIENT)
Dept: FAMILY MEDICINE CLINIC | Facility: CLINIC | Age: 54
End: 2024-04-09
Payer: MEDICARE

## 2024-04-09 DIAGNOSIS — E78.2 MIXED HYPERLIPIDEMIA: ICD-10-CM

## 2024-04-09 DIAGNOSIS — E55.9 VITAMIN D DEFICIENCY: Primary | ICD-10-CM

## 2024-04-09 DIAGNOSIS — N18.32 STAGE 3B CHRONIC KIDNEY DISEASE: ICD-10-CM

## 2024-04-09 DIAGNOSIS — Z13.29 SCREENING FOR THYROID DISORDER: ICD-10-CM

## 2024-04-09 DIAGNOSIS — D64.9 ANEMIA, UNSPECIFIED TYPE: ICD-10-CM

## 2024-04-09 DIAGNOSIS — M10.9 GOUT, UNSPECIFIED CAUSE, UNSPECIFIED CHRONICITY, UNSPECIFIED SITE: ICD-10-CM

## 2024-04-09 DIAGNOSIS — R73.01 IMPAIRED FASTING GLUCOSE: ICD-10-CM

## 2024-04-09 DIAGNOSIS — E66.01 CLASS 3 SEVERE OBESITY DUE TO EXCESS CALORIES WITH SERIOUS COMORBIDITY AND BODY MASS INDEX (BMI) OF 45.0 TO 49.9 IN ADULT: ICD-10-CM

## 2024-04-11 ENCOUNTER — TRANSCRIBE ORDERS (OUTPATIENT)
Dept: ADMINISTRATIVE | Facility: HOSPITAL | Age: 54
End: 2024-04-11
Payer: MEDICARE

## 2024-04-11 DIAGNOSIS — N05.9 RENAL GLOMERULAR DISEASE: ICD-10-CM

## 2024-04-11 DIAGNOSIS — R60.0 LOCALIZED EDEMA: ICD-10-CM

## 2024-04-11 DIAGNOSIS — D63.1 ERYTHROPOIETIN DEFICIENCY ANEMIA: ICD-10-CM

## 2024-04-11 DIAGNOSIS — E66.01 MORBID OBESITY: ICD-10-CM

## 2024-04-11 DIAGNOSIS — E83.39 HYPOPHOSPHATURIA: ICD-10-CM

## 2024-04-11 DIAGNOSIS — E87.20 ACIDOSIS: ICD-10-CM

## 2024-04-11 DIAGNOSIS — M10.9 GOUT, UNSPECIFIED CAUSE, UNSPECIFIED CHRONICITY, UNSPECIFIED SITE: ICD-10-CM

## 2024-04-11 DIAGNOSIS — E83.52 HYPERCALCEMIA: ICD-10-CM

## 2024-04-11 DIAGNOSIS — I10 ESSENTIAL (PRIMARY) HYPERTENSION: ICD-10-CM

## 2024-04-11 DIAGNOSIS — N39.0 URINARY TRACT INFECTION WITHOUT HEMATURIA, SITE UNSPECIFIED: ICD-10-CM

## 2024-04-11 DIAGNOSIS — E55.9 VITAMIN D DEFICIENCY: Primary | ICD-10-CM

## 2024-04-11 DIAGNOSIS — N18.5 CHRONIC KIDNEY DISEASE, STAGE V: ICD-10-CM

## 2024-04-12 ENCOUNTER — LAB (OUTPATIENT)
Dept: LAB | Facility: HOSPITAL | Age: 54
End: 2024-04-12
Payer: MEDICARE

## 2024-04-12 ENCOUNTER — TRANSCRIBE ORDERS (OUTPATIENT)
Dept: ADMINISTRATIVE | Facility: HOSPITAL | Age: 54
End: 2024-04-12
Payer: MEDICARE

## 2024-04-12 DIAGNOSIS — E83.39 OTHER DISORDERS OF PHOSPHORUS METABOLISM: ICD-10-CM

## 2024-04-12 DIAGNOSIS — N18.4 CHRONIC KIDNEY DISEASE, STAGE IV (SEVERE): ICD-10-CM

## 2024-04-12 DIAGNOSIS — N05.9 RENAL GLOMERULAR DISEASE: ICD-10-CM

## 2024-04-12 DIAGNOSIS — D63.1 ANEMIA IN STAGE 5 CHRONIC KIDNEY DISEASE: ICD-10-CM

## 2024-04-12 DIAGNOSIS — E87.20 ACIDOSIS: ICD-10-CM

## 2024-04-12 DIAGNOSIS — E83.52 HYPERCALCEMIA: ICD-10-CM

## 2024-04-12 DIAGNOSIS — M10.9 GOUT, UNSPECIFIED CAUSE, UNSPECIFIED CHRONICITY, UNSPECIFIED SITE: ICD-10-CM

## 2024-04-12 DIAGNOSIS — N18.5 ANEMIA IN STAGE 5 CHRONIC KIDNEY DISEASE: ICD-10-CM

## 2024-04-12 DIAGNOSIS — D63.1 ERYTHROPOIETIN DEFICIENCY ANEMIA: ICD-10-CM

## 2024-04-12 DIAGNOSIS — I10 HYPERTENSION, UNSPECIFIED TYPE: ICD-10-CM

## 2024-04-12 DIAGNOSIS — E55.9 VITAMIN D DEFICIENCY: ICD-10-CM

## 2024-04-12 DIAGNOSIS — N18.9 ANEMIA OF CHRONIC RENAL FAILURE, UNSPECIFIED CKD STAGE: ICD-10-CM

## 2024-04-12 DIAGNOSIS — D63.1 ANEMIA OF CHRONIC RENAL FAILURE, UNSPECIFIED CKD STAGE: ICD-10-CM

## 2024-04-12 DIAGNOSIS — N18.5 CHRONIC KIDNEY DISEASE, STAGE V: ICD-10-CM

## 2024-04-12 DIAGNOSIS — E66.01 OBESITY, MORBID: ICD-10-CM

## 2024-04-12 DIAGNOSIS — R60.0 LOCALIZED EDEMA: ICD-10-CM

## 2024-04-12 DIAGNOSIS — E66.01 MORBID OBESITY: ICD-10-CM

## 2024-04-12 DIAGNOSIS — N18.5 CHRONIC KIDNEY DISEASE, STAGE V: Primary | ICD-10-CM

## 2024-04-12 DIAGNOSIS — E83.39 HYPOPHOSPHATURIA: ICD-10-CM

## 2024-04-12 DIAGNOSIS — I10 ESSENTIAL (PRIMARY) HYPERTENSION: ICD-10-CM

## 2024-04-12 DIAGNOSIS — N39.0 URINARY TRACT INFECTION WITHOUT HEMATURIA, SITE UNSPECIFIED: ICD-10-CM

## 2024-04-12 LAB
25(OH)D3 SERPL-MCNC: 48.9 NG/ML (ref 30–100)
ALBUMIN SERPL-MCNC: 4.2 G/DL (ref 3.5–5.2)
ALBUMIN UR-MCNC: 10.8 MG/DL
ALBUMIN/GLOB SERPL: 1.8 G/DL
ALP SERPL-CCNC: 118 U/L (ref 39–117)
ALT SERPL W P-5'-P-CCNC: 16 U/L (ref 1–33)
ANION GAP SERPL CALCULATED.3IONS-SCNC: 12 MMOL/L (ref 5–15)
AST SERPL-CCNC: 17 U/L (ref 1–32)
BACTERIA UR QL AUTO: ABNORMAL /HPF
BASOPHILS # BLD AUTO: 0.07 10*3/MM3 (ref 0–0.2)
BASOPHILS NFR BLD AUTO: 0.9 % (ref 0–1.5)
BILIRUB SERPL-MCNC: 0.4 MG/DL (ref 0–1.2)
BILIRUB UR QL STRIP: NEGATIVE
BUN SERPL-MCNC: 57 MG/DL (ref 6–20)
BUN/CREAT SERPL: 15.1 (ref 7–25)
CALCIUM SPEC-SCNC: 12.4 MG/DL (ref 8.6–10.5)
CHLORIDE SERPL-SCNC: 103 MMOL/L (ref 98–107)
CHOLEST SERPL-MCNC: 112 MG/DL (ref 0–200)
CLARITY UR: ABNORMAL
CO2 SERPL-SCNC: 21 MMOL/L (ref 22–29)
COLOR UR: YELLOW
CREAT SERPL-MCNC: 3.77 MG/DL (ref 0.57–1)
CREAT UR-MCNC: 30.5 MG/DL
CREAT UR-MCNC: 32.5 MG/DL
DEPRECATED RDW RBC AUTO: 52.8 FL (ref 37–54)
EGFRCR SERPLBLD CKD-EPI 2021: 13.6 ML/MIN/1.73
EOSINOPHIL # BLD AUTO: 0.26 10*3/MM3 (ref 0–0.4)
EOSINOPHIL NFR BLD AUTO: 3.5 % (ref 0.3–6.2)
ERYTHROCYTE [DISTWIDTH] IN BLOOD BY AUTOMATED COUNT: 14.7 % (ref 12.3–15.4)
FERRITIN SERPL-MCNC: 166 NG/ML (ref 13–150)
GLOBULIN UR ELPH-MCNC: 2.3 GM/DL
GLUCOSE SERPL-MCNC: 109 MG/DL (ref 65–99)
GLUCOSE UR STRIP-MCNC: NEGATIVE MG/DL
HBA1C MFR BLD: 6 % (ref 4.8–5.6)
HCT VFR BLD AUTO: 31.3 % (ref 34–46.6)
HDLC SERPL-MCNC: 38 MG/DL (ref 40–60)
HGB BLD-MCNC: 10 G/DL (ref 12–15.9)
HGB UR QL STRIP.AUTO: ABNORMAL
HOLD SPECIMEN: NORMAL
HYALINE CASTS UR QL AUTO: ABNORMAL /LPF
IMM GRANULOCYTES # BLD AUTO: 0.03 10*3/MM3 (ref 0–0.05)
IMM GRANULOCYTES NFR BLD AUTO: 0.4 % (ref 0–0.5)
IRON 24H UR-MRATE: 51 MCG/DL (ref 37–145)
IRON SATN MFR SERPL: 16 % (ref 20–50)
KETONES UR QL STRIP: NEGATIVE
LDLC SERPL CALC-MCNC: 49 MG/DL (ref 0–100)
LDLC/HDLC SERPL: 1.21 {RATIO}
LEUKOCYTE ESTERASE UR QL STRIP.AUTO: ABNORMAL
LYMPHOCYTES # BLD AUTO: 1.95 10*3/MM3 (ref 0.7–3.1)
LYMPHOCYTES NFR BLD AUTO: 26.2 % (ref 19.6–45.3)
MCH RBC QN AUTO: 31.3 PG (ref 26.6–33)
MCHC RBC AUTO-ENTMCNC: 31.9 G/DL (ref 31.5–35.7)
MCV RBC AUTO: 98.1 FL (ref 79–97)
MICROALBUMIN/CREAT UR: 354.1 MG/G (ref 0–29)
MONOCYTES # BLD AUTO: 0.49 10*3/MM3 (ref 0.1–0.9)
MONOCYTES NFR BLD AUTO: 6.6 % (ref 5–12)
NEUTROPHILS NFR BLD AUTO: 4.65 10*3/MM3 (ref 1.7–7)
NEUTROPHILS NFR BLD AUTO: 62.4 % (ref 42.7–76)
NITRITE UR QL STRIP: NEGATIVE
NRBC BLD AUTO-RTO: 0 /100 WBC (ref 0–0.2)
PH UR STRIP.AUTO: 6.5 [PH] (ref 5–8)
PHOSPHATE SERPL-MCNC: 3.6 MG/DL (ref 2.5–4.5)
PLATELET # BLD AUTO: 189 10*3/MM3 (ref 140–450)
PMV BLD AUTO: 9.4 FL (ref 6–12)
POTASSIUM SERPL-SCNC: 5.3 MMOL/L (ref 3.5–5.2)
PROT ?TM UR-MCNC: 19.7 MG/DL
PROT SERPL-MCNC: 6.5 G/DL (ref 6–8.5)
PROT UR QL STRIP: ABNORMAL
PROT/CREAT UR: 0.61 MG/G{CREAT}
PTH-INTACT SERPL-MCNC: 15.6 PG/ML (ref 15–65)
RBC # BLD AUTO: 3.19 10*6/MM3 (ref 3.77–5.28)
RBC # UR STRIP: ABNORMAL /HPF
REF LAB TEST METHOD: ABNORMAL
SODIUM SERPL-SCNC: 136 MMOL/L (ref 136–145)
SP GR UR STRIP: 1.01 (ref 1–1.03)
SQUAMOUS #/AREA URNS HPF: ABNORMAL /HPF
TIBC SERPL-MCNC: 317 MCG/DL (ref 298–536)
TRANSFERRIN SERPL-MCNC: 213 MG/DL (ref 200–360)
TRIGL SERPL-MCNC: 141 MG/DL (ref 0–150)
TSH SERPL DL<=0.05 MIU/L-ACNC: 2.53 UIU/ML (ref 0.27–4.2)
URATE SERPL-MCNC: 2.7 MG/DL (ref 2.4–5.7)
UROBILINOGEN UR QL STRIP: ABNORMAL
VIT B12 BLD-MCNC: >2000 PG/ML (ref 211–946)
VLDLC SERPL-MCNC: 25 MG/DL (ref 5–40)
WBC # UR STRIP: ABNORMAL /HPF
WBC NRBC COR # BLD AUTO: 7.45 10*3/MM3 (ref 3.4–10.8)

## 2024-04-12 PROCEDURE — 82570 ASSAY OF URINE CREATININE: CPT

## 2024-04-12 PROCEDURE — 83970 ASSAY OF PARATHORMONE: CPT

## 2024-04-12 PROCEDURE — 85025 COMPLETE CBC W/AUTO DIFF WBC: CPT

## 2024-04-12 PROCEDURE — 82306 VITAMIN D 25 HYDROXY: CPT | Performed by: NURSE PRACTITIONER

## 2024-04-12 PROCEDURE — 82043 UR ALBUMIN QUANTITATIVE: CPT

## 2024-04-12 PROCEDURE — 87086 URINE CULTURE/COLONY COUNT: CPT | Performed by: NURSE PRACTITIONER

## 2024-04-12 PROCEDURE — 36415 COLL VENOUS BLD VENIPUNCTURE: CPT

## 2024-04-12 PROCEDURE — 81003 URINALYSIS AUTO W/O SCOPE: CPT

## 2024-04-12 PROCEDURE — 82607 VITAMIN B-12: CPT | Performed by: NURSE PRACTITIONER

## 2024-04-12 PROCEDURE — 87086 URINE CULTURE/COLONY COUNT: CPT

## 2024-04-12 PROCEDURE — 84156 ASSAY OF PROTEIN URINE: CPT

## 2024-04-12 PROCEDURE — 84443 ASSAY THYROID STIM HORMONE: CPT | Performed by: NURSE PRACTITIONER

## 2024-04-12 PROCEDURE — 84466 ASSAY OF TRANSFERRIN: CPT | Performed by: NURSE PRACTITIONER

## 2024-04-12 PROCEDURE — 84100 ASSAY OF PHOSPHORUS: CPT

## 2024-04-12 PROCEDURE — 80061 LIPID PANEL: CPT | Performed by: NURSE PRACTITIONER

## 2024-04-12 PROCEDURE — 83036 HEMOGLOBIN GLYCOSYLATED A1C: CPT | Performed by: NURSE PRACTITIONER

## 2024-04-12 PROCEDURE — 83540 ASSAY OF IRON: CPT | Performed by: NURSE PRACTITIONER

## 2024-04-12 PROCEDURE — 82728 ASSAY OF FERRITIN: CPT | Performed by: NURSE PRACTITIONER

## 2024-04-12 PROCEDURE — 81001 URINALYSIS AUTO W/SCOPE: CPT | Performed by: NURSE PRACTITIONER

## 2024-04-12 PROCEDURE — 84550 ASSAY OF BLOOD/URIC ACID: CPT | Performed by: NURSE PRACTITIONER

## 2024-04-12 PROCEDURE — 80053 COMPREHEN METABOLIC PANEL: CPT | Performed by: NURSE PRACTITIONER

## 2024-04-13 LAB
BACTERIA SPEC AEROBE CULT: NO GROWTH
BACTERIA SPEC AEROBE CULT: NORMAL

## 2024-04-14 PROCEDURE — 87086 URINE CULTURE/COLONY COUNT: CPT

## 2024-04-24 ENCOUNTER — OFFICE VISIT (OUTPATIENT)
Dept: FAMILY MEDICINE CLINIC | Facility: CLINIC | Age: 54
End: 2024-04-24
Payer: MEDICARE

## 2024-04-24 VITALS
TEMPERATURE: 97.2 F | HEIGHT: 62 IN | OXYGEN SATURATION: 99 % | SYSTOLIC BLOOD PRESSURE: 119 MMHG | HEART RATE: 86 BPM | DIASTOLIC BLOOD PRESSURE: 78 MMHG | WEIGHT: 245 LBS | BODY MASS INDEX: 45.08 KG/M2

## 2024-04-24 DIAGNOSIS — E78.2 MIXED HYPERLIPIDEMIA: ICD-10-CM

## 2024-04-24 DIAGNOSIS — R73.01 IMPAIRED FASTING GLUCOSE: ICD-10-CM

## 2024-04-24 DIAGNOSIS — M10.9 GOUT, UNSPECIFIED CAUSE, UNSPECIFIED CHRONICITY, UNSPECIFIED SITE: ICD-10-CM

## 2024-04-24 DIAGNOSIS — Z12.31 SCREENING MAMMOGRAM FOR BREAST CANCER: ICD-10-CM

## 2024-04-24 DIAGNOSIS — E55.9 VITAMIN D DEFICIENCY: ICD-10-CM

## 2024-04-24 DIAGNOSIS — Z12.11 SCREENING FOR MALIGNANT NEOPLASM OF COLON: ICD-10-CM

## 2024-04-24 DIAGNOSIS — D64.9 ANEMIA, UNSPECIFIED TYPE: ICD-10-CM

## 2024-04-24 DIAGNOSIS — J30.9 ALLERGIC RHINITIS, UNSPECIFIED SEASONALITY, UNSPECIFIED TRIGGER: ICD-10-CM

## 2024-04-24 DIAGNOSIS — N18.5 CKD (CHRONIC KIDNEY DISEASE) STAGE 5, GFR LESS THAN 15 ML/MIN: ICD-10-CM

## 2024-04-24 DIAGNOSIS — J45.20 MILD INTERMITTENT ASTHMA WITHOUT COMPLICATION: ICD-10-CM

## 2024-04-24 DIAGNOSIS — I10 PRIMARY HYPERTENSION: Primary | ICD-10-CM

## 2024-04-24 DIAGNOSIS — Z13.29 SCREENING FOR THYROID DISORDER: ICD-10-CM

## 2024-04-24 DIAGNOSIS — R74.8 ELEVATED VITAMIN B12 LEVEL: ICD-10-CM

## 2024-04-24 PROCEDURE — 3078F DIAST BP <80 MM HG: CPT | Performed by: NURSE PRACTITIONER

## 2024-04-24 PROCEDURE — 3074F SYST BP LT 130 MM HG: CPT | Performed by: NURSE PRACTITIONER

## 2024-04-24 PROCEDURE — 99214 OFFICE O/P EST MOD 30 MIN: CPT | Performed by: NURSE PRACTITIONER

## 2024-04-24 RX ORDER — CLONIDINE HYDROCHLORIDE 0.2 MG/1
0.2 TABLET ORAL 3 TIMES DAILY
Qty: 270 TABLET | Refills: 0 | Status: SHIPPED | OUTPATIENT
Start: 2024-04-24

## 2024-04-24 RX ORDER — FERROUS SULFATE 325(65) MG
325 TABLET ORAL
COMMUNITY

## 2024-04-24 RX ORDER — FLUTICASONE PROPIONATE 50 MCG
2 SPRAY, SUSPENSION (ML) NASAL DAILY
Qty: 18.2 ML | Refills: 1 | Status: SHIPPED | OUTPATIENT
Start: 2024-04-24

## 2024-04-24 RX ORDER — CARVEDILOL 25 MG/1
25 TABLET ORAL 2 TIMES DAILY WITH MEALS
Qty: 180 TABLET | Refills: 1 | Status: SHIPPED | OUTPATIENT
Start: 2024-04-24

## 2024-04-24 RX ORDER — ATORVASTATIN CALCIUM 20 MG/1
20 TABLET, FILM COATED ORAL NIGHTLY
Qty: 90 TABLET | Refills: 1 | Status: SHIPPED | OUTPATIENT
Start: 2024-04-24

## 2024-04-24 RX ORDER — ALLOPURINOL 300 MG/1
300 TABLET ORAL DAILY
Qty: 180 TABLET | Refills: 1 | Status: SHIPPED | OUTPATIENT
Start: 2024-04-24

## 2024-04-24 RX ORDER — LOSARTAN POTASSIUM 100 MG/1
100 TABLET ORAL DAILY
Qty: 90 TABLET | Refills: 1 | Status: SHIPPED | OUTPATIENT
Start: 2024-04-24

## 2024-04-24 NOTE — PROGRESS NOTES
Follow Up Office Visit      Patient Name: Katarzyna Norris  : 1970   MRN: [id number]     Chief Complaint:    Chief Complaint   Patient presents with    Anxiety    Hypertension    Anemia    Hyperlipidemia    impaired fasting glucose    Asthma    Chronic Kidney Disease    Migraine       History of Present Illness: Katarzyna Norris is a 53 y.o. female who is here today to follow up for anxiety, HTN, anemia, IFG, hyperlipidemia, asthma, allergic rhinitis, migraine.  Review lab results      Mammogram-2020  Pap-2022  Labs- 2024  Colonoscopy-2009, cologuard 2021. Renal transplant team wants her to have a colonoscopy     C/o She is trying to get on the kidney transplant list  Follow up nephrology consult 4/15/2024  - CKD stage V with bland UA and mild proteinuria secondary to recurrent pyelonephritis in the past, HTN and NSAIDs use. 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 Clinically she seems stable. No uremic symptoms. Attended TOPS and chose peritoneal dialysis when needed. Going for AV fistula for backup on May 7, 2024. She is working with transplant as well.  - Hyperkalemia, mild, on ARB, monitor.   - Hypertension. Blood pressure is controlled. Continue current medications and monitor. Avoid excessive salt intake.  - Hypovitaminosis D, not currently on treatment. Level is adequate. Monitor.  - Hypercalcemia, recurrent. Etiology is uncertain. I asked her to avoid Tums, stop zinc and B12. Patient to bring all her medication bottles upon return for verification. PTH is suppressed. 24-hour urine calcium was low. This argues against primary hyper para. 1-25 dihydroxy vitamin D level and serum immunofixation were not done as ordered, will reorder for return. Chest x-ray in 2023 was unremarkable.  - Obesity: Weight loss recommended.  - Hyperuricemia/gout, on allopurinol. Level is low. Reduce allopurinol to once a day and monitor.   - Edema,  controlled. Not on diuretics.  - Anemia, etiology is uncertain, some evidence of iron deficiency, start iron tablet daily with meals and monitor.  - Hyperphosphatemia, controlled, continue low phosphorus diet.       Subjective      Review of Systems:   Review of Systems   Constitutional:  Negative for fever.   HENT:  Negative for ear pain and sore throat.    Respiratory:  Negative for cough.    Cardiovascular:  Negative for chest pain.   Gastrointestinal:  Negative for abdominal pain, constipation, diarrhea, nausea and vomiting.   Genitourinary:  Negative for dysuria.   Musculoskeletal:  Negative for myalgias.        Past Medical History:   Past Medical History:   Diagnosis Date    Anxiety     Arthritis     GILBERT KNEES    Asthma     SEASONAL ALLERGIES- NO INHALERS    CKD (chronic kidney disease)     Stage 5, Follows with [facility]- NO CURRENT DIALYSIS    Elevated cholesterol     Gastritis     GERD (gastroesophageal reflux disease)     Gout 2021    Hiatal hernia     Hypertension     Lactose intolerance        Past Surgical History:   Past Surgical History:   Procedure Laterality Date    BONY PELVIS SURGERY      Plate     SECTION   and     CHOLECYSTECTOMY      COLONOSCOPY      ENDOSCOPY      ENDOSCOPY N/A 3/14/2023    Procedure: ESOPHAGOGASTRODUODENOSCOPY with biopsies;  Surgeon: Tam Badillo MD;  Location: [facility] ENDOSCOPY;  Service: General;  Laterality: N/A;  hiatal hernia    TOTAL KNEE ARTHROPLASTY Left 2021    Procedure: TOTAL KNEE ARTHROPLASTY;  Surgeon: Marco Nelson MD;  Location: [facility] MAIN OR;  Service: Orthopedics;  Laterality: Left;    TOTAL KNEE ARTHROPLASTY Right 10/19/2022    Procedure: RIGHT TOTAL KNEE ARTHROPLASTY - NO SHELBY;  Surgeon: Marco Nelson MD;  Location: [facility] MAIN OR;  Service: Orthopedics;  Laterality: Right;    TUBAL ABDOMINAL LIGATION         Family History:   Family History   Problem Relation Age of Onset    Throat cancer  Father 73    Stroke Other     Diabetes Other     Malig Hyperthermia Neg Hx     Colon cancer Neg Hx        Social History:   Social History     Socioeconomic History    Marital status:    Tobacco Use    Smoking status: Former     Current packs/day: 0.00     Types: Cigarettes     Quit date:      Years since quittin.3     Passive exposure: Never    Smokeless tobacco: Never   Vaping Use    Vaping status: Never Used   Substance and Sexual Activity    Alcohol use: Never    Drug use: Never    Sexual activity: Defer       Medications:     Current Outpatient Medications:     allopurinol (ZYLOPRIM) 300 MG tablet, Take 1 tablet by mouth Daily., Disp: 180 tablet, Rfl: 1    atorvastatin (LIPITOR) 20 MG tablet, Take 1 tablet by mouth Every Night., Disp: 90 tablet, Rfl: 1    carvedilol (COREG) 25 MG tablet, Take 1 tablet by mouth 2 (Two) Times a Day With Meals., Disp: 180 tablet, Rfl: 1    cloNIDine (CATAPRES) 0.2 MG tablet, Take 1 tablet by mouth 3 (Three) Times a Day., Disp: 270 tablet, Rfl: 0    diphenhydrAMINE (BENADRYL) 25 mg capsule, Take 1 capsule by mouth Every 6 (Six) Hours As Needed for Itching., Disp: , Rfl:     fluticasone (FLONASE) 50 MCG/ACT nasal spray, 2 sprays into the nostril(s) as directed by provider Daily. 2 sprays each nostril daily, Disp: 18.2 mL, Rfl: 1    losartan (COZAAR) 100 MG tablet, Take 1 tablet by mouth Daily., Disp: 90 tablet, Rfl: 1    psyllium (Metamucil Smooth Texture) 58.6 % powder, Take  by mouth 3 (Three) Times a Day., Disp: 90 packet, Rfl: 12    ferrous sulfate 325 (65 FE) MG tablet, Take 1 tablet by mouth Daily With Breakfast., Disp: , Rfl:     Allergies:   Allergies   Allergen Reactions    Amlodipine Shortness Of Breath    Clindamycin/Lincomycin Swelling    Diltiazem Hcl Anxiety    Zofran [Ondansetron] Confusion    Augmentin [Amoxicillin-Pot Clavulanate] Nausea And Vomiting    Contrast Dye (Echo Or Unknown Ct/Mr) Palpitations     Patient gets to the point of passing out ,  "heart races     Doxycycline Nausea And Vomiting    Iodinated Contrast Media Palpitations     Patient gets to the point of passing out , heart races             Objective     Physical Exam:  Vital Signs:   Vitals:    04/24/24 0846   BP: 119/78   Pulse: 86   Temp: 97.2 °F (36.2 °C)   SpO2: 99%   Weight: 111 kg (245 lb)   Height: 157.5 cm (62\")     Body mass index is 44.81 kg/m².           Physical Exam  HENT:      Right Ear: Tympanic membrane normal.      Left Ear: Tympanic membrane normal.      Nose: Nose normal.      Mouth/Throat:      Mouth: Mucous membranes are moist.   Eyes:      Conjunctiva/sclera: Conjunctivae normal.   Neck:      Vascular: No carotid bruit.   Cardiovascular:      Rate and Rhythm: Normal rate and regular rhythm.      Heart sounds: Normal heart sounds. No murmur heard.  Pulmonary:      Effort: Pulmonary effort is normal.      Breath sounds: Normal breath sounds.   Abdominal:      General: Bowel sounds are normal.      Palpations: Abdomen is soft.   Musculoskeletal:      Right lower leg: No edema.      Left lower leg: No edema.   Skin:     General: Skin is warm and dry.   Neurological:      Mental Status: She is alert.   Psychiatric:         Mood and Affect: Mood normal.         Behavior: Behavior normal.           Assessment / Plan      Assessment/Plan:   Diagnoses and all orders for this visit:    1. Primary hypertension (Primary)  -     Microalbumin / Creatinine Urine Ratio - Urine, Clean Catch; Future  -     cloNIDine (CATAPRES) 0.2 MG tablet; Take 1 tablet by mouth 3 (Three) Times a Day.  Dispense: 270 tablet; Refill: 0    2. Mixed hyperlipidemia  -     Lipid Panel; Future  -     Comprehensive Metabolic Panel; Future  -     Lipid Panel; Future    3. Impaired fasting glucose  -     Comprehensive Metabolic Panel; Future  -     Hemoglobin A1c; Future  -     Microalbumin / Creatinine Urine Ratio - Urine, Clean Catch; Future  -     Urinalysis With Culture If Indicated -; Future  -     " Comprehensive Metabolic Panel; Future  -     Hemoglobin A1c; Future  -     Urinalysis With Culture If Indicated -; Future    4. CKD (chronic kidney disease) stage 5, GFR less than 15 ml/min  -     Comprehensive Metabolic Panel; Future    5. Mild intermittent asthma without complication    6. Allergic rhinitis, unspecified seasonality, unspecified trigger    7. Gout, unspecified cause, unspecified chronicity, unspecified site  -     Uric Acid; Future    8. Vitamin D deficiency  -     Vitamin D,25-Hydroxy; Future    9. Anemia, unspecified type  -     CBC Auto Differential; Future  -     Iron Profile; Future  -     Ferritin; Future  -     CBC Auto Differential; Future  -     Iron Profile; Future  -     Ferritin; Future    10. Elevated vitamin B12 level  -     Vitamin B12; Future    11. Screening for thyroid disorder  -     TSH; Future  -     TSH; Future    12. Screening mammogram for breast cancer  -     Mammo Screening Digital Tomosynthesis Bilateral With CAD; Future    13. Screening for malignant neoplasm of colon  -     Cologuard - Stool, Per Rectum; Future  -     Ambulatory Referral For Screening Colonoscopy    Other orders  -     allopurinol (ZYLOPRIM) 300 MG tablet; Take 1 tablet by mouth Daily.  Dispense: 180 tablet; Refill: 1  -     atorvastatin (LIPITOR) 20 MG tablet; Take 1 tablet by mouth Every Night.  Dispense: 90 tablet; Refill: 1  -     carvedilol (COREG) 25 MG tablet; Take 1 tablet by mouth 2 (Two) Times a Day With Meals.  Dispense: 180 tablet; Refill: 1  -     fluticasone (FLONASE) 50 MCG/ACT nasal spray; 2 sprays into the nostril(s) as directed by provider Daily. 2 sprays each nostril daily  Dispense: 18.2 mL; Refill: 1  -     losartan (COZAAR) 100 MG tablet; Take 1 tablet by mouth Daily.  Dispense: 90 tablet; Refill: 1         Hypertension currently controlled losartan 100 mg daily carbidopa-levodopa and clonidine will provide refills  Hyperlipidemia LDL below goal on current statin dose Lipitor 20 mg  "at nighttime denies myalgias liver enzymes normal  Impaired fasting glucose patient is prediabetic discussed reducing added carbs sugars patient reports she likes to eat biscuits recommend cutting these out of her diet exercise 30 minutes daily and reducing overall caloric intake  Chronic kidney disease stage V follow-up with nephrology as scheduled avoid all NSAIDs increase water intake  Mild intermittent asthma without complication albuterol as needed  Allergic rhinitis currently controlled with Benadryl OTC as needed  Anemia start ferrous sulfate patient reports she bought it over-the-counter we will reassess in 90 days  Elevated B12 level patient has been instructed to stop OTC supplements increase fluids will recheck levels in 90 days  Will provide order screening mammogram denies lumps mass tenderness blood or discharge from nipple  Will provide order for screening colonoscopy as requested by renal transplant prefers a screening colonoscopy over Cologuard  Gout no recent flares or tach uric acid level normal continue allopurinol at this time  Vitamin D deficiency will reassess in 90 days patient has stopped OTC supplements      Follow Up:   Return in about 3 months (around 7/24/2024).    Frandy Ellis, MARICRUZ    \"Please note that portions of this note were completed with a voice recognition program.\"    "

## 2024-05-07 ENCOUNTER — ANESTHESIA (OUTPATIENT)
Dept: PERIOP | Facility: HOSPITAL | Age: 54
End: 2024-05-07
Payer: MEDICARE

## 2024-05-07 ENCOUNTER — ANESTHESIA EVENT (OUTPATIENT)
Dept: PERIOP | Facility: HOSPITAL | Age: 54
End: 2024-05-07
Payer: MEDICARE

## 2024-05-07 ENCOUNTER — HOSPITAL ENCOUNTER (OUTPATIENT)
Facility: HOSPITAL | Age: 54
Setting detail: HOSPITAL OUTPATIENT SURGERY
Discharge: HOME | End: 2024-05-07
Attending: SURGERY | Admitting: SURGERY
Payer: MEDICARE

## 2024-05-07 VITALS
SYSTOLIC BLOOD PRESSURE: 120 MMHG | RESPIRATION RATE: 18 BRPM | DIASTOLIC BLOOD PRESSURE: 64 MMHG | OXYGEN SATURATION: 96 % | HEIGHT: 62 IN | BODY MASS INDEX: 44.18 KG/M2 | WEIGHT: 240.08 LBS | HEART RATE: 93 BPM | TEMPERATURE: 97.5 F

## 2024-05-07 DIAGNOSIS — N18.5 CKD (CHRONIC KIDNEY DISEASE) STAGE 5, GFR LESS THAN 15 ML/MIN: ICD-10-CM

## 2024-05-07 LAB
ANION GAP SERPL CALCULATED.3IONS-SCNC: 12.8 MMOL/L (ref 5–15)
BASOPHILS # BLD AUTO: 0.06 10*3/MM3 (ref 0–0.2)
BASOPHILS NFR BLD AUTO: 0.8 % (ref 0–1.5)
BUN SERPL-MCNC: 60 MG/DL (ref 6–20)
BUN/CREAT SERPL: 14.2 (ref 7–25)
CALCIUM SPEC-SCNC: 11.2 MG/DL (ref 8.6–10.5)
CHLORIDE SERPL-SCNC: 106 MMOL/L (ref 98–107)
CO2 SERPL-SCNC: 17.2 MMOL/L (ref 22–29)
CREAT SERPL-MCNC: 4.24 MG/DL (ref 0.57–1)
DEPRECATED RDW RBC AUTO: 51.8 FL (ref 37–54)
EGFRCR SERPLBLD CKD-EPI 2021: 11.8 ML/MIN/1.73
EOSINOPHIL # BLD AUTO: 0.31 10*3/MM3 (ref 0–0.4)
EOSINOPHIL NFR BLD AUTO: 3.9 % (ref 0.3–6.2)
ERYTHROCYTE [DISTWIDTH] IN BLOOD BY AUTOMATED COUNT: 14.6 % (ref 12.3–15.4)
GLUCOSE SERPL-MCNC: 115 MG/DL (ref 65–99)
HCT VFR BLD AUTO: 31.3 % (ref 34–46.6)
HGB BLD-MCNC: 10.2 G/DL (ref 12–15.9)
IMM GRANULOCYTES # BLD AUTO: 0.05 10*3/MM3 (ref 0–0.05)
IMM GRANULOCYTES NFR BLD AUTO: 0.6 % (ref 0–0.5)
LYMPHOCYTES # BLD AUTO: 2.12 10*3/MM3 (ref 0.7–3.1)
LYMPHOCYTES NFR BLD AUTO: 27 % (ref 19.6–45.3)
MCH RBC QN AUTO: 32.3 PG (ref 26.6–33)
MCHC RBC AUTO-ENTMCNC: 32.6 G/DL (ref 31.5–35.7)
MCV RBC AUTO: 99.1 FL (ref 79–97)
MONOCYTES # BLD AUTO: 0.53 10*3/MM3 (ref 0.1–0.9)
MONOCYTES NFR BLD AUTO: 6.8 % (ref 5–12)
NEUTROPHILS NFR BLD AUTO: 4.78 10*3/MM3 (ref 1.7–7)
NEUTROPHILS NFR BLD AUTO: 60.9 % (ref 42.7–76)
NRBC BLD AUTO-RTO: 0 /100 WBC (ref 0–0.2)
PLATELET # BLD AUTO: 210 10*3/MM3 (ref 140–450)
PMV BLD AUTO: 9.2 FL (ref 6–12)
POTASSIUM SERPL-SCNC: 4.5 MMOL/L (ref 3.5–5.2)
QT INTERVAL: 367 MS
QTC INTERVAL: 431 MS
RBC # BLD AUTO: 3.16 10*6/MM3 (ref 3.77–5.28)
SODIUM SERPL-SCNC: 136 MMOL/L (ref 136–145)
WBC NRBC COR # BLD AUTO: 7.85 10*3/MM3 (ref 3.4–10.8)

## 2024-05-07 PROCEDURE — 25010000002 VASOPRESSIN 20 UNIT/ML SOLUTION: Performed by: NURSE ANESTHETIST, CERTIFIED REGISTERED

## 2024-05-07 PROCEDURE — 85025 COMPLETE CBC W/AUTO DIFF WBC: CPT | Performed by: SURGERY

## 2024-05-07 PROCEDURE — 25810000003 SODIUM CHLORIDE 0.9 % SOLUTION: Performed by: ANESTHESIOLOGY

## 2024-05-07 PROCEDURE — 25010000002 DEXAMETHASONE PER 1 MG: Performed by: NURSE ANESTHETIST, CERTIFIED REGISTERED

## 2024-05-07 PROCEDURE — 25010000002 CEFAZOLIN SODIUM 2 G RECONSTITUTED SOLUTION: Performed by: SURGERY

## 2024-05-07 PROCEDURE — 25010000002 BUPIVACAINE (PF) 0.5 % SOLUTION 10 ML VIAL: Performed by: SURGERY

## 2024-05-07 PROCEDURE — 80048 BASIC METABOLIC PNL TOTAL CA: CPT | Performed by: ANESTHESIOLOGY

## 2024-05-07 PROCEDURE — 25010000002 HYDROMORPHONE 1 MG/ML SOLUTION: Performed by: NURSE ANESTHETIST, CERTIFIED REGISTERED

## 2024-05-07 PROCEDURE — 25010000002 HEPARIN (PORCINE) PER 1000 UNITS: Performed by: NURSE ANESTHETIST, CERTIFIED REGISTERED

## 2024-05-07 PROCEDURE — 25010000002 HEPARIN (PORCINE) PER 1000 UNITS: Performed by: SURGERY

## 2024-05-07 PROCEDURE — 25010000002 LIDOCAINE 1 % SOLUTION 20 ML VIAL: Performed by: SURGERY

## 2024-05-07 PROCEDURE — 25010000002 MIDAZOLAM PER 1MG: Performed by: ANESTHESIOLOGY

## 2024-05-07 PROCEDURE — 25010000002 ONDANSETRON PER 1 MG: Performed by: NURSE ANESTHETIST, CERTIFIED REGISTERED

## 2024-05-07 PROCEDURE — 25010000002 PROPOFOL 10 MG/ML EMULSION: Performed by: NURSE ANESTHETIST, CERTIFIED REGISTERED

## 2024-05-07 PROCEDURE — 93005 ELECTROCARDIOGRAM TRACING: CPT | Performed by: ANESTHESIOLOGY

## 2024-05-07 DEVICE — LIGACLIP MCA MULTIPLE CLIP APPLIERS, 20 MEDIUM CLIPS
Type: IMPLANTABLE DEVICE | Site: ARM | Status: FUNCTIONAL
Brand: LIGACLIP

## 2024-05-07 RX ORDER — PROMETHAZINE HYDROCHLORIDE 12.5 MG/1
25 TABLET ORAL ONCE AS NEEDED
Status: DISCONTINUED | OUTPATIENT
Start: 2024-05-07 | End: 2024-05-07 | Stop reason: HOSPADM

## 2024-05-07 RX ORDER — PHENYLEPHRINE HCL IN 0.9% NACL 1 MG/10 ML
SYRINGE (ML) INTRAVENOUS AS NEEDED
Status: DISCONTINUED | OUTPATIENT
Start: 2024-05-07 | End: 2024-05-07 | Stop reason: SURG

## 2024-05-07 RX ORDER — PROPOFOL 10 MG/ML
VIAL (ML) INTRAVENOUS AS NEEDED
Status: DISCONTINUED | OUTPATIENT
Start: 2024-05-07 | End: 2024-05-07 | Stop reason: SURG

## 2024-05-07 RX ORDER — ONDANSETRON 2 MG/ML
INJECTION INTRAMUSCULAR; INTRAVENOUS AS NEEDED
Status: DISCONTINUED | OUTPATIENT
Start: 2024-05-07 | End: 2024-05-07 | Stop reason: SURG

## 2024-05-07 RX ORDER — MIDAZOLAM HYDROCHLORIDE 2 MG/2ML
2 INJECTION, SOLUTION INTRAMUSCULAR; INTRAVENOUS ONCE
Status: COMPLETED | OUTPATIENT
Start: 2024-05-07 | End: 2024-05-07

## 2024-05-07 RX ORDER — ONDANSETRON 2 MG/ML
4 INJECTION INTRAMUSCULAR; INTRAVENOUS ONCE AS NEEDED
Status: CANCELLED | OUTPATIENT
Start: 2024-05-07

## 2024-05-07 RX ORDER — HEPARIN SODIUM 5000 [USP'U]/ML
INJECTION, SOLUTION INTRAVENOUS; SUBCUTANEOUS AS NEEDED
Status: DISCONTINUED | OUTPATIENT
Start: 2024-05-07 | End: 2024-05-07 | Stop reason: SURG

## 2024-05-07 RX ORDER — ACETAMINOPHEN 500 MG
1000 TABLET ORAL ONCE
Status: COMPLETED | OUTPATIENT
Start: 2024-05-07 | End: 2024-05-07

## 2024-05-07 RX ORDER — OXYCODONE HYDROCHLORIDE 5 MG/1
5 TABLET ORAL
Status: DISCONTINUED | OUTPATIENT
Start: 2024-05-07 | End: 2024-05-07 | Stop reason: HOSPADM

## 2024-05-07 RX ORDER — LIDOCAINE HYDROCHLORIDE 20 MG/ML
INJECTION, SOLUTION EPIDURAL; INFILTRATION; INTRACAUDAL; PERINEURAL AS NEEDED
Status: DISCONTINUED | OUTPATIENT
Start: 2024-05-07 | End: 2024-05-07 | Stop reason: SURG

## 2024-05-07 RX ORDER — PROMETHAZINE HYDROCHLORIDE 25 MG/1
25 SUPPOSITORY RECTAL ONCE AS NEEDED
Status: DISCONTINUED | OUTPATIENT
Start: 2024-05-07 | End: 2024-05-07 | Stop reason: HOSPADM

## 2024-05-07 RX ORDER — KETAMINE HCL IN NACL, ISO-OSM 100MG/10ML
SYRINGE (ML) INJECTION AS NEEDED
Status: DISCONTINUED | OUTPATIENT
Start: 2024-05-07 | End: 2024-05-07 | Stop reason: SURG

## 2024-05-07 RX ORDER — OXYCODONE HYDROCHLORIDE AND ACETAMINOPHEN 5; 325 MG/1; MG/1
1 TABLET ORAL EVERY 6 HOURS PRN
Qty: 20 TABLET | Refills: 0 | Status: SHIPPED | OUTPATIENT
Start: 2024-05-07

## 2024-05-07 RX ORDER — VASOPRESSIN 20 U/ML
INJECTION PARENTERAL AS NEEDED
Status: DISCONTINUED | OUTPATIENT
Start: 2024-05-07 | End: 2024-05-07 | Stop reason: SURG

## 2024-05-07 RX ORDER — BUPIVACAINE HCL/0.9 % NACL/PF 0.1 %
2 PLASTIC BAG, INJECTION (ML) EPIDURAL ONCE
Status: COMPLETED | OUTPATIENT
Start: 2024-05-07 | End: 2024-05-07

## 2024-05-07 RX ORDER — DEXAMETHASONE SODIUM PHOSPHATE 4 MG/ML
INJECTION, SOLUTION INTRA-ARTICULAR; INTRALESIONAL; INTRAMUSCULAR; INTRAVENOUS; SOFT TISSUE AS NEEDED
Status: DISCONTINUED | OUTPATIENT
Start: 2024-05-07 | End: 2024-05-07 | Stop reason: SURG

## 2024-05-07 RX ORDER — SODIUM CHLORIDE 9 MG/ML
9 INJECTION, SOLUTION INTRAVENOUS CONTINUOUS PRN
Status: DISCONTINUED | OUTPATIENT
Start: 2024-05-07 | End: 2024-05-07 | Stop reason: HOSPADM

## 2024-05-07 RX ADMIN — SODIUM CHLORIDE 9 ML/HR: 9 INJECTION, SOLUTION INTRAVENOUS at 08:12

## 2024-05-07 RX ADMIN — Medication 200 MCG: at 09:01

## 2024-05-07 RX ADMIN — MIDAZOLAM HYDROCHLORIDE 2 MG: 1 INJECTION, SOLUTION INTRAMUSCULAR; INTRAVENOUS at 08:41

## 2024-05-07 RX ADMIN — VASOPRESSIN 2 UNITS: 20 INJECTION INTRAVENOUS at 09:17

## 2024-05-07 RX ADMIN — Medication 100 MCG: at 08:58

## 2024-05-07 RX ADMIN — HEPARIN SODIUM 3000 UNITS: 5000 INJECTION INTRAVENOUS; SUBCUTANEOUS at 09:14

## 2024-05-07 RX ADMIN — Medication 200 MCG: at 09:08

## 2024-05-07 RX ADMIN — Medication 200 MCG: at 09:15

## 2024-05-07 RX ADMIN — HYDROMORPHONE HYDROCHLORIDE 0.5 MG: 1 INJECTION, SOLUTION INTRAMUSCULAR; INTRAVENOUS; SUBCUTANEOUS at 10:04

## 2024-05-07 RX ADMIN — ONDANSETRON HYDROCHLORIDE 4 MG: 2 SOLUTION INTRAMUSCULAR; INTRAVENOUS at 08:54

## 2024-05-07 RX ADMIN — VASOPRESSIN 2 UNITS: 20 INJECTION INTRAVENOUS at 09:41

## 2024-05-07 RX ADMIN — PROPOFOL 140 MG: 10 INJECTION, EMULSION INTRAVENOUS at 08:51

## 2024-05-07 RX ADMIN — Medication 5 MG: at 09:03

## 2024-05-07 RX ADMIN — Medication 10 MG: at 09:16

## 2024-05-07 RX ADMIN — ACETAMINOPHEN 1000 MG: 500 TABLET ORAL at 08:12

## 2024-05-07 RX ADMIN — PROPOFOL 30 MG: 10 INJECTION, EMULSION INTRAVENOUS at 09:44

## 2024-05-07 RX ADMIN — Medication 2 G: at 08:56

## 2024-05-07 RX ADMIN — DEXAMETHASONE SODIUM PHOSPHATE 4 MG: 4 INJECTION, SOLUTION INTRAMUSCULAR; INTRAVENOUS at 08:54

## 2024-05-07 RX ADMIN — LIDOCAINE HYDROCHLORIDE 60 MG: 20 INJECTION, SOLUTION INTRAVENOUS at 08:51

## 2024-05-08 ENCOUNTER — TELEPHONE (OUTPATIENT)
Dept: GASTROENTEROLOGY | Facility: CLINIC | Age: 54
End: 2024-05-08
Payer: MEDICARE

## 2024-05-09 ENCOUNTER — TELEPHONE (OUTPATIENT)
Dept: VASCULAR SURGERY | Facility: HOSPITAL | Age: 54
End: 2024-05-09
Payer: MEDICARE

## 2024-05-09 ENCOUNTER — LAB (OUTPATIENT)
Dept: LAB | Facility: HOSPITAL | Age: 54
End: 2024-05-09
Payer: MEDICARE

## 2024-05-09 ENCOUNTER — TRANSCRIBE ORDERS (OUTPATIENT)
Dept: ADMINISTRATIVE | Facility: HOSPITAL | Age: 54
End: 2024-05-09
Payer: MEDICARE

## 2024-05-09 DIAGNOSIS — E83.39 OTHER DISORDERS OF PHOSPHORUS METABOLISM: ICD-10-CM

## 2024-05-09 DIAGNOSIS — N39.0 URINARY TRACT INFECTION WITH HEMATURIA, SITE UNSPECIFIED: ICD-10-CM

## 2024-05-09 DIAGNOSIS — M10.9 GOUT, UNSPECIFIED CAUSE, UNSPECIFIED CHRONICITY, UNSPECIFIED SITE: ICD-10-CM

## 2024-05-09 DIAGNOSIS — E55.9 VITAMIN D DEFICIENCY: ICD-10-CM

## 2024-05-09 DIAGNOSIS — R31.9 URINARY TRACT INFECTION WITH HEMATURIA, SITE UNSPECIFIED: ICD-10-CM

## 2024-05-09 DIAGNOSIS — E66.01 MORBID (SEVERE) OBESITY DUE TO EXCESS CALORIES: ICD-10-CM

## 2024-05-09 DIAGNOSIS — D63.1 ANEMIA IN CHRONIC KIDNEY DISEASE (CODE): ICD-10-CM

## 2024-05-09 DIAGNOSIS — E79.0 HYPERURICEMIA: ICD-10-CM

## 2024-05-09 DIAGNOSIS — N18.5 CHRONIC KIDNEY DISEASE, STAGE V: ICD-10-CM

## 2024-05-09 DIAGNOSIS — E87.20 ACIDOSIS, UNSPECIFIED: ICD-10-CM

## 2024-05-09 DIAGNOSIS — N18.5 CHRONIC KIDNEY DISEASE, STAGE V: Primary | ICD-10-CM

## 2024-05-09 DIAGNOSIS — I10 HYPERTENSION, UNSPECIFIED TYPE: ICD-10-CM

## 2024-05-09 DIAGNOSIS — R60.0 LOCALIZED EDEMA: ICD-10-CM

## 2024-05-09 DIAGNOSIS — E83.52 HYPERCALCEMIA: ICD-10-CM

## 2024-05-09 LAB
25(OH)D3 SERPL-MCNC: 51.5 NG/ML (ref 30–100)
ALBUMIN SERPL-MCNC: 4.2 G/DL (ref 3.5–5.2)
ANION GAP SERPL CALCULATED.3IONS-SCNC: 19.9 MMOL/L (ref 5–15)
BASOPHILS # BLD AUTO: 0.03 10*3/MM3 (ref 0–0.2)
BASOPHILS NFR BLD AUTO: 0.3 % (ref 0–1.5)
BUN SERPL-MCNC: 54 MG/DL (ref 6–20)
BUN/CREAT SERPL: 15 (ref 7–25)
CALCIUM SPEC-SCNC: 10.4 MG/DL (ref 8.6–10.5)
CHLORIDE SERPL-SCNC: 104 MMOL/L (ref 98–107)
CO2 SERPL-SCNC: 16.1 MMOL/L (ref 22–29)
CREAT SERPL-MCNC: 3.6 MG/DL (ref 0.57–1)
DEPRECATED RDW RBC AUTO: 51.5 FL (ref 37–54)
EGFRCR SERPLBLD CKD-EPI 2021: 14.3 ML/MIN/1.73
EOSINOPHIL # BLD AUTO: 0.01 10*3/MM3 (ref 0–0.4)
EOSINOPHIL NFR BLD AUTO: 0.1 % (ref 0.3–6.2)
ERYTHROCYTE [DISTWIDTH] IN BLOOD BY AUTOMATED COUNT: 14.8 % (ref 12.3–15.4)
GLUCOSE SERPL-MCNC: 111 MG/DL (ref 65–99)
HCT VFR BLD AUTO: 31.1 % (ref 34–46.6)
HGB BLD-MCNC: 10.9 G/DL (ref 12–15.9)
IMM GRANULOCYTES # BLD AUTO: 0.14 10*3/MM3 (ref 0–0.05)
IMM GRANULOCYTES NFR BLD AUTO: 1.5 % (ref 0–0.5)
LYMPHOCYTES # BLD AUTO: 2.29 10*3/MM3 (ref 0.7–3.1)
LYMPHOCYTES NFR BLD AUTO: 24.7 % (ref 19.6–45.3)
MCH RBC QN AUTO: 33.9 PG (ref 26.6–33)
MCHC RBC AUTO-ENTMCNC: 35 G/DL (ref 31.5–35.7)
MCV RBC AUTO: 96.6 FL (ref 79–97)
MONOCYTES # BLD AUTO: 0.52 10*3/MM3 (ref 0.1–0.9)
MONOCYTES NFR BLD AUTO: 5.6 % (ref 5–12)
NEUTROPHILS NFR BLD AUTO: 6.29 10*3/MM3 (ref 1.7–7)
NEUTROPHILS NFR BLD AUTO: 67.8 % (ref 42.7–76)
NRBC BLD AUTO-RTO: 0.3 /100 WBC (ref 0–0.2)
PHOSPHATE SERPL-MCNC: 3.7 MG/DL (ref 2.5–4.5)
PLATELET # BLD AUTO: 217 10*3/MM3 (ref 140–450)
PMV BLD AUTO: 9.2 FL (ref 6–12)
POTASSIUM SERPL-SCNC: 4.6 MMOL/L (ref 3.5–5.2)
RBC # BLD AUTO: 3.22 10*6/MM3 (ref 3.77–5.28)
SODIUM SERPL-SCNC: 140 MMOL/L (ref 136–145)
URATE SERPL-MCNC: 2.3 MG/DL (ref 2.4–5.7)
WBC NRBC COR # BLD AUTO: 9.28 10*3/MM3 (ref 3.4–10.8)

## 2024-05-09 PROCEDURE — 85025 COMPLETE CBC W/AUTO DIFF WBC: CPT

## 2024-05-09 PROCEDURE — 82784 ASSAY IGA/IGD/IGG/IGM EACH: CPT

## 2024-05-09 PROCEDURE — 82306 VITAMIN D 25 HYDROXY: CPT

## 2024-05-09 PROCEDURE — 80069 RENAL FUNCTION PANEL: CPT

## 2024-05-09 PROCEDURE — 36415 COLL VENOUS BLD VENIPUNCTURE: CPT

## 2024-05-09 PROCEDURE — 84550 ASSAY OF BLOOD/URIC ACID: CPT

## 2024-05-09 PROCEDURE — 86334 IMMUNOFIX E-PHORESIS SERUM: CPT

## 2024-05-10 ENCOUNTER — TELEPHONE (OUTPATIENT)
Dept: VASCULAR SURGERY | Facility: HOSPITAL | Age: 54
End: 2024-05-10
Payer: MEDICARE

## 2024-05-10 LAB
IGA SERPL-MCNC: 74 MG/DL (ref 87–352)
IGG SERPL-MCNC: 761 MG/DL (ref 586–1602)
IGM SERPL-MCNC: 19 MG/DL (ref 26–217)
PROT PATTERN SERPL IFE-IMP: ABNORMAL

## 2024-05-10 RX ORDER — ACETAMINOPHEN 325 MG/1
650 TABLET ORAL EVERY 6 HOURS PRN
COMMUNITY

## 2024-05-14 ENCOUNTER — OFFICE VISIT (OUTPATIENT)
Dept: FAMILY MEDICINE CLINIC | Facility: CLINIC | Age: 54
End: 2024-05-14
Payer: MEDICARE

## 2024-05-14 VITALS
SYSTOLIC BLOOD PRESSURE: 149 MMHG | DIASTOLIC BLOOD PRESSURE: 85 MMHG | TEMPERATURE: 99 F | WEIGHT: 239 LBS | BODY MASS INDEX: 43.98 KG/M2 | HEART RATE: 87 BPM | OXYGEN SATURATION: 98 % | HEIGHT: 62 IN

## 2024-05-14 DIAGNOSIS — Z01.419 ENCOUNTER FOR WELL WOMAN EXAM WITH ROUTINE GYNECOLOGICAL EXAM: Primary | ICD-10-CM

## 2024-05-14 DIAGNOSIS — Z12.4 SCREENING FOR MALIGNANT NEOPLASM OF CERVIX: ICD-10-CM

## 2024-05-14 PROCEDURE — 99396 PREV VISIT EST AGE 40-64: CPT | Performed by: NURSE PRACTITIONER

## 2024-05-14 PROCEDURE — 3079F DIAST BP 80-89 MM HG: CPT | Performed by: NURSE PRACTITIONER

## 2024-05-14 PROCEDURE — 1126F AMNT PAIN NOTED NONE PRSNT: CPT | Performed by: NURSE PRACTITIONER

## 2024-05-14 PROCEDURE — 87624 HPV HI-RISK TYP POOLED RSLT: CPT | Performed by: NURSE PRACTITIONER

## 2024-05-14 PROCEDURE — 3077F SYST BP >= 140 MM HG: CPT | Performed by: NURSE PRACTITIONER

## 2024-05-14 PROCEDURE — 2014F MENTAL STATUS ASSESS: CPT | Performed by: NURSE PRACTITIONER

## 2024-05-14 PROCEDURE — G0123 SCREEN CERV/VAG THIN LAYER: HCPCS | Performed by: NURSE PRACTITIONER

## 2024-05-14 RX ORDER — DICYCLOMINE HYDROCHLORIDE 10 MG/1
10 CAPSULE ORAL
COMMUNITY
Start: 2024-01-02

## 2024-05-14 RX ORDER — SODIUM BICARBONATE 650 MG/1
650 TABLET ORAL
COMMUNITY
Start: 2024-05-14 | End: 2025-05-14

## 2024-05-14 NOTE — PROGRESS NOTES
Female Physical / PAP Note      Patient Name: Katarzyna Norris  : 1970   MRN: [id number]     Chief Complaint:    Chief Complaint   Patient presents with    Gynecologic Exam       History of Present Illness: Katarzyna Norris is a 53 y.o. female who is here today for her annual health maintenance and physical.     Pap- 2022    Mammogram-2022 scheduled 2024  Colonoscopy-, consulted scheduled tomorrow       C/o she has had a few episodes of her BP dropping. 150/77 at 1:15 pm today.  Per nephrology yesterday  Hypertension. Blood pressure is low at home. She cut her losartan to 50mg daily for now. Will have her check BP at home and bring readings. Can cut clonidine to twice daily as well. Avoid excessive salt intake.       Subjective      Review of Systems:   Review of Systems   Constitutional:  Negative for fever.   Respiratory:  Negative for cough.    Cardiovascular:  Negative for chest pain.   Genitourinary:  Negative for dysuria and pelvic pain.   Skin:  Negative for rash.      Breast - No tenderness, lumps, discharge, or blood from nipples.      Past Medical History, Social History, Family History and Care Team were all reviewed with patient and updated as appropriate.     Medications:     Current Outpatient Medications:     acetaminophen (TYLENOL) 325 MG tablet, Take 2 tablets by mouth Every 6 (Six) Hours As Needed., Disp: , Rfl:     allopurinol (ZYLOPRIM) 300 MG tablet, Take 1 tablet by mouth Daily., Disp: 180 tablet, Rfl: 1    atorvastatin (LIPITOR) 20 MG tablet, Take 1 tablet by mouth Every Night., Disp: 90 tablet, Rfl: 1    carvedilol (COREG) 25 MG tablet, Take 1 tablet by mouth 2 (Two) Times a Day With Meals., Disp: 180 tablet, Rfl: 1    cloNIDine (CATAPRES) 0.2 MG tablet, Take 1 tablet by mouth 3 (Three) Times a Day., Disp: 270 tablet, Rfl: 0    dicyclomine (BENTYL) 10 MG capsule, Take 1 capsule by mouth., Disp: , Rfl:     diphenhydrAMINE (BENADRYL) 25 mg capsule, Take 1 capsule by  "mouth Every 6 (Six) Hours As Needed for Itching., Disp: , Rfl:     ferrous sulfate 325 (65 FE) MG tablet, Take 1 tablet by mouth Daily With Breakfast., Disp: , Rfl:     fluticasone (FLONASE) 50 MCG/ACT nasal spray, 2 sprays into the nostril(s) as directed by provider Daily. 2 sprays each nostril daily, Disp: 18.2 mL, Rfl: 1    losartan (COZAAR) 100 MG tablet, Take 1 tablet by mouth Daily., Disp: 90 tablet, Rfl: 1    oxyCODONE-acetaminophen (Percocet) 5-325 MG per tablet, Take 1 tablet by mouth Every 6 (Six) Hours As Needed for Severe Pain., Disp: 20 tablet, Rfl: 0    psyllium (Metamucil Smooth Texture) 58.6 % powder, Take  by mouth 3 (Three) Times a Day., Disp: 90 packet, Rfl: 12    sodium bicarbonate 650 MG tablet, Take 1 tablet by mouth., Disp: , Rfl:     Allergies:   Allergies   Allergen Reactions    Amlodipine Shortness Of Breath    Clindamycin/Lincomycin Swelling    Diltiazem Hcl Anxiety    Zofran [Ondansetron] Confusion    Augmentin [Amoxicillin-Pot Clavulanate] Nausea And Vomiting    Contrast Dye (Echo Or Unknown Ct/Mr) Palpitations     Patient gets to the point of passing out , heart races     Doxycycline Nausea And Vomiting    Iodinated Contrast Media Palpitations     Patient gets to the point of passing out , heart races               Objective     Physical Exam:  Vital Signs:   Vitals:    05/14/24 1442   BP: 149/85   Pulse: 87   Temp: 99 °F (37.2 °C)   SpO2: 98%   Weight: 108 kg (239 lb)   Height: 157.5 cm (62\")     Body mass index is 43.71 kg/m².           Physical Exam  Cardiovascular:      Rate and Rhythm: Normal rate and regular rhythm.      Heart sounds: Normal heart sounds. No murmur heard.  Pulmonary:      Effort: Pulmonary effort is normal.      Breath sounds: Normal breath sounds.   Abdominal:      General: Bowel sounds are normal.      Palpations: Abdomen is soft.   Genitourinary:     General: Normal vulva.      Vagina: Normal.      Cervix: Normal.      Uterus: Normal.       Adnexa: Right adnexa " "normal and left adnexa normal.      Rectum: Normal.   Musculoskeletal:      Right lower leg: No edema.      Left lower leg: No edema.   Skin:     General: Skin is warm and dry.   Neurological:      Mental Status: She is alert.   Psychiatric:         Mood and Affect: Mood normal.         Behavior: Behavior normal.           Assessment / Plan      Assessment/Plan:   Diagnoses and all orders for this visit:    1. Encounter for well woman exam with routine gynecological exam (Primary)    2. Screening for malignant neoplasm of cervix  -     IgP, Aptima HPV               Follow Up:   Return in about 1 year (around 5/14/2025).    Healthcare Maintenance:   Counseling provided on screening pap, screening mammogram, screening colonoscopy  Katarzyna Norris voices understanding and acceptance of this advice and will call back with any further questions or concerns. AVS with preventive healthcare tips printed for patient.     MARICRUZ Portillo    \"Please note that portions of this note were completed with a voice recognition program.\"    "

## 2024-05-15 ENCOUNTER — PREP FOR SURGERY (OUTPATIENT)
Dept: OTHER | Facility: HOSPITAL | Age: 54
End: 2024-05-15
Payer: MEDICARE

## 2024-05-15 ENCOUNTER — CLINICAL SUPPORT (OUTPATIENT)
Dept: GASTROENTEROLOGY | Facility: CLINIC | Age: 54
End: 2024-05-15
Payer: MEDICARE

## 2024-05-15 DIAGNOSIS — Z12.11 ENCOUNTER FOR SCREENING FOR MALIGNANT NEOPLASM OF COLON: Primary | ICD-10-CM

## 2024-05-15 RX ORDER — UBIDECARENONE 75 MG
50 CAPSULE ORAL DAILY
COMMUNITY

## 2024-05-15 NOTE — PROGRESS NOTES
Katarzyna S Norris  1970  53 y.o.    Reason for call: Screening Colonoscopy    Prep prescribed: Jada  Prep instructions reviewed with patient and sent to patient via Mail     Clearance needed? CARDIAC - [facility]-TRANSPLANT TEAM       Is the patient currently on any injectable medications for weight loss or diabetes? No      Family history of colon cancer? No  If yes, indicate relative:     The patient has been scheduled for: Colonoscopy  Procedure Date: 24   Family History   Problem Relation Age of Onset    Throat cancer Father 73    Stroke Other     Diabetes Other     Malig Hyperthermia Neg Hx     Colon cancer Neg Hx      Past Medical History:   Diagnosis Date    Anxiety     Arthritis     GILBERT KNEES    Asthma     SEASONAL ALLERGIES- NO INHALERS    CKD (chronic kidney disease)     Stage 5, Follows with [facility]- NO CURRENT DIALYSIS    Elevated cholesterol     Gastritis     GERD (gastroesophageal reflux disease)     Gout 2021    Hiatal hernia     Hypertension     Lactose intolerance      Allergies   Allergen Reactions    Amlodipine Shortness Of Breath    Clindamycin/Lincomycin Swelling    Diltiazem Hcl Anxiety    Zofran [Ondansetron] Confusion    Augmentin [Amoxicillin-Pot Clavulanate] Nausea And Vomiting    Contrast Dye (Echo Or Unknown Ct/Mr) Palpitations     Patient gets to the point of passing out , heart races     Doxycycline Nausea And Vomiting    Iodinated Contrast Media Palpitations     Patient gets to the point of passing out , heart races     Past Surgical History:   Procedure Laterality Date    ARTERIOVENOUS FISTULA/SHUNT SURGERY Left 2024    Procedure: 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;  Surgeon: Yaniv Dominguez MD;  Location: [facility] MAIN OR;  Service: Vascular;  Laterality: Left;    BONY PELVIS SURGERY      Plate     SECTION   and     CHOLECYSTECTOMY      COLONOSCOPY      ENDOSCOPY  2009    ENDOSCOPY N/A 3/14/2023    Procedure: ESOPHAGOGASTRODUODENOSCOPY  with biopsies;  Surgeon: Tam Badillo MD;  Location: [facility] ENDOSCOPY;  Service: General;  Laterality: N/A;  hiatal hernia    TOTAL KNEE ARTHROPLASTY Left 2021    Procedure: TOTAL KNEE ARTHROPLASTY;  Surgeon: Marco Nelson MD;  Location: [facility] MAIN OR;  Service: Orthopedics;  Laterality: Left;    TOTAL KNEE ARTHROPLASTY Right 10/19/2022    Procedure: RIGHT TOTAL KNEE ARTHROPLASTY - NO SHELBY;  Surgeon: Marco Nelson MD;  Location: [facility] MAIN OR;  Service: Orthopedics;  Laterality: Right;    TUBAL ABDOMINAL LIGATION       Social History     Socioeconomic History    Marital status:    Tobacco Use    Smoking status: Former     Current packs/day: 0.00     Types: Cigarettes     Quit date:      Years since quittin.3     Passive exposure: Never    Smokeless tobacco: Never   Vaping Use    Vaping status: Never Used   Substance and Sexual Activity    Alcohol use: Never    Drug use: Never    Sexual activity: Defer       Current Outpatient Medications:     acetaminophen (TYLENOL) 325 MG tablet, Take 2 tablets by mouth Every 6 (Six) Hours As Needed., Disp: , Rfl:     allopurinol (ZYLOPRIM) 300 MG tablet, Take 1 tablet by mouth Daily., Disp: 180 tablet, Rfl: 1    atorvastatin (LIPITOR) 20 MG tablet, Take 1 tablet by mouth Every Night., Disp: 90 tablet, Rfl: 1    carvedilol (COREG) 25 MG tablet, Take 1 tablet by mouth 2 (Two) Times a Day With Meals., Disp: 180 tablet, Rfl: 1    cloNIDine (CATAPRES) 0.2 MG tablet, Take 1 tablet by mouth 3 (Three) Times a Day., Disp: 270 tablet, Rfl: 0    dicyclomine (BENTYL) 10 MG capsule, Take 1 capsule by mouth., Disp: , Rfl:     diphenhydrAMINE (BENADRYL) 25 mg capsule, Take 1 capsule by mouth Every 6 (Six) Hours As Needed for Itching., Disp: , Rfl:     ferrous sulfate 325 (65 FE) MG tablet, Take 1 tablet by mouth Daily With Breakfast., Disp: , Rfl:     fluticasone (FLONASE) 50 MCG/ACT nasal spray, 2 sprays into the nostril(s) as directed by  provider Daily. 2 sprays each nostril daily, Disp: 18.2 mL, Rfl: 1    losartan (COZAAR) 100 MG tablet, Take 1 tablet by mouth Daily., Disp: 90 tablet, Rfl: 1    psyllium (Metamucil Smooth Texture) 58.6 % powder, Take  by mouth 3 (Three) Times a Day., Disp: 90 packet, Rfl: 12    sodium bicarbonate 650 MG tablet, Take 1 tablet by mouth., Disp: , Rfl:     TART CHERRY PO, Take  by mouth., Disp: , Rfl:     vitamin B-12 (CYANOCOBALAMIN) 100 MCG tablet, Take 0.5 tablets by mouth Daily., Disp: , Rfl:     Zinc Sulfate (ZINC 15 PO), Take  by mouth., Disp: , Rfl:     oxyCODONE-acetaminophen (Percocet) 5-325 MG per tablet, Take 1 tablet by mouth Every 6 (Six) Hours As Needed for Severe Pain., Disp: 20 tablet, Rfl: 0

## 2024-05-17 ENCOUNTER — HOSPITAL ENCOUNTER (EMERGENCY)
Facility: HOSPITAL | Age: 54
Discharge: HOME | End: 2024-05-17
Attending: EMERGENCY MEDICINE
Payer: MEDICARE

## 2024-05-17 ENCOUNTER — APPOINTMENT (OUTPATIENT)
Dept: GENERAL RADIOLOGY | Facility: HOSPITAL | Age: 54
End: 2024-05-17
Payer: MEDICARE

## 2024-05-17 ENCOUNTER — APPOINTMENT (OUTPATIENT)
Dept: CT IMAGING | Facility: HOSPITAL | Age: 54
End: 2024-05-17
Payer: MEDICARE

## 2024-05-17 VITALS
HEIGHT: 62 IN | WEIGHT: 239.64 LBS | DIASTOLIC BLOOD PRESSURE: 79 MMHG | SYSTOLIC BLOOD PRESSURE: 115 MMHG | HEART RATE: 87 BPM | TEMPERATURE: 98.1 F | OXYGEN SATURATION: 99 % | BODY MASS INDEX: 44.1 KG/M2 | RESPIRATION RATE: 18 BRPM

## 2024-05-17 DIAGNOSIS — K59.00 CONSTIPATION, UNSPECIFIED CONSTIPATION TYPE: ICD-10-CM

## 2024-05-17 DIAGNOSIS — R10.30 LOWER ABDOMINAL PAIN: Primary | ICD-10-CM

## 2024-05-17 LAB
ALBUMIN SERPL-MCNC: 3.8 G/DL (ref 3.5–5.2)
ALBUMIN/GLOB SERPL: 1.3 G/DL
ALP SERPL-CCNC: 119 U/L (ref 39–117)
ALT SERPL W P-5'-P-CCNC: 15 U/L (ref 1–33)
ANION GAP SERPL CALCULATED.3IONS-SCNC: 14.7 MMOL/L (ref 5–15)
AST SERPL-CCNC: 17 U/L (ref 1–32)
BASOPHILS # BLD AUTO: 0.06 10*3/MM3 (ref 0–0.2)
BASOPHILS NFR BLD AUTO: 0.9 % (ref 0–1.5)
BILIRUB SERPL-MCNC: 0.4 MG/DL (ref 0–1.2)
BILIRUB UR QL STRIP: NEGATIVE
BUN SERPL-MCNC: 49 MG/DL (ref 6–20)
BUN/CREAT SERPL: 16.1 (ref 7–25)
CALCIUM SPEC-SCNC: 11.1 MG/DL (ref 8.6–10.5)
CHLORIDE SERPL-SCNC: 105 MMOL/L (ref 98–107)
CLARITY UR: CLEAR
CO2 SERPL-SCNC: 18.3 MMOL/L (ref 22–29)
COLOR UR: YELLOW
CREAT SERPL-MCNC: 3.05 MG/DL (ref 0.57–1)
CYTOLOGIST CVX/VAG CYTO: NORMAL
CYTOLOGY CVX/VAG DOC CYTO: NORMAL
CYTOLOGY CVX/VAG DOC THIN PREP: NORMAL
DEPRECATED RDW RBC AUTO: 53.7 FL (ref 37–54)
DX ICD CODE: NORMAL
EGFRCR SERPLBLD CKD-EPI 2021: 17.5 ML/MIN/1.73
EOSINOPHIL # BLD AUTO: 0.22 10*3/MM3 (ref 0–0.4)
EOSINOPHIL NFR BLD AUTO: 3.3 % (ref 0.3–6.2)
ERYTHROCYTE [DISTWIDTH] IN BLOOD BY AUTOMATED COUNT: 15 % (ref 12.3–15.4)
GLOBULIN UR ELPH-MCNC: 2.9 GM/DL
GLUCOSE SERPL-MCNC: 125 MG/DL (ref 65–99)
GLUCOSE UR STRIP-MCNC: NEGATIVE MG/DL
HCT VFR BLD AUTO: 31.7 % (ref 34–46.6)
HGB BLD-MCNC: 10.3 G/DL (ref 12–15.9)
HGB UR QL STRIP.AUTO: NEGATIVE
HOLD SPECIMEN: NORMAL
HOLD SPECIMEN: NORMAL
HPV I/H RISK 4 DNA CVX QL PROBE+SIG AMP: NEGATIVE
IMM GRANULOCYTES # BLD AUTO: 0.04 10*3/MM3 (ref 0–0.05)
IMM GRANULOCYTES NFR BLD AUTO: 0.6 % (ref 0–0.5)
KETONES UR QL STRIP: NEGATIVE
LEUKOCYTE ESTERASE UR QL STRIP.AUTO: NEGATIVE
LYMPHOCYTES # BLD AUTO: 2.31 10*3/MM3 (ref 0.7–3.1)
LYMPHOCYTES NFR BLD AUTO: 34.3 % (ref 19.6–45.3)
Lab: NORMAL
MAGNESIUM SERPL-MCNC: 1.7 MG/DL (ref 1.6–2.6)
MCH RBC QN AUTO: 32 PG (ref 26.6–33)
MCHC RBC AUTO-ENTMCNC: 32.5 G/DL (ref 31.5–35.7)
MCV RBC AUTO: 98.4 FL (ref 79–97)
MONOCYTES # BLD AUTO: 0.42 10*3/MM3 (ref 0.1–0.9)
MONOCYTES NFR BLD AUTO: 6.2 % (ref 5–12)
NEUTROPHILS NFR BLD AUTO: 3.69 10*3/MM3 (ref 1.7–7)
NEUTROPHILS NFR BLD AUTO: 54.7 % (ref 42.7–76)
NITRITE UR QL STRIP: NEGATIVE
NRBC BLD AUTO-RTO: 0 /100 WBC (ref 0–0.2)
OTHER STN SPEC: NORMAL
PH UR STRIP.AUTO: 5.5 [PH] (ref 5–8)
PLATELET # BLD AUTO: 177 10*3/MM3 (ref 140–450)
PMV BLD AUTO: 8.7 FL (ref 6–12)
POTASSIUM SERPL-SCNC: 4.3 MMOL/L (ref 3.5–5.2)
PROT SERPL-MCNC: 6.7 G/DL (ref 6–8.5)
PROT UR QL STRIP: ABNORMAL
RBC # BLD AUTO: 3.22 10*6/MM3 (ref 3.77–5.28)
SODIUM SERPL-SCNC: 138 MMOL/L (ref 136–145)
SP GR UR STRIP: <=1.005 (ref 1–1.03)
STAT OF ADQ CVX/VAG CYTO-IMP: NORMAL
TROPONIN T SERPL HS-MCNC: 30 NG/L
TSH SERPL DL<=0.05 MIU/L-ACNC: 1.95 UIU/ML (ref 0.27–4.2)
UROBILINOGEN UR QL STRIP: ABNORMAL
WBC NRBC COR # BLD AUTO: 6.74 10*3/MM3 (ref 3.4–10.8)
WHOLE BLOOD HOLD COAG: NORMAL
WHOLE BLOOD HOLD SPECIMEN: NORMAL

## 2024-05-17 PROCEDURE — 93005 ELECTROCARDIOGRAM TRACING: CPT

## 2024-05-17 PROCEDURE — 36415 COLL VENOUS BLD VENIPUNCTURE: CPT

## 2024-05-17 PROCEDURE — 84443 ASSAY THYROID STIM HORMONE: CPT

## 2024-05-17 PROCEDURE — 84484 ASSAY OF TROPONIN QUANT: CPT

## 2024-05-17 PROCEDURE — 80053 COMPREHEN METABOLIC PANEL: CPT

## 2024-05-17 PROCEDURE — 71045 X-RAY EXAM CHEST 1 VIEW: CPT

## 2024-05-17 PROCEDURE — 99284 EMERGENCY DEPT VISIT MOD MDM: CPT

## 2024-05-17 PROCEDURE — 93005 ELECTROCARDIOGRAM TRACING: CPT | Performed by: EMERGENCY MEDICINE

## 2024-05-17 PROCEDURE — 85025 COMPLETE CBC W/AUTO DIFF WBC: CPT

## 2024-05-17 PROCEDURE — 74176 CT ABD & PELVIS W/O CONTRAST: CPT

## 2024-05-17 PROCEDURE — 81003 URINALYSIS AUTO W/O SCOPE: CPT | Performed by: EMERGENCY MEDICINE

## 2024-05-17 PROCEDURE — 83735 ASSAY OF MAGNESIUM: CPT

## 2024-05-17 RX ORDER — ACETAMINOPHEN 325 MG/1
975 TABLET ORAL ONCE
Status: COMPLETED | OUTPATIENT
Start: 2024-05-17 | End: 2024-05-17

## 2024-05-17 RX ORDER — POLYETHYLENE GLYCOL 3350 17 G/17G
17 POWDER, FOR SOLUTION ORAL DAILY
Qty: 14 EACH | Refills: 0 | Status: SHIPPED | OUTPATIENT
Start: 2024-05-17

## 2024-05-17 RX ORDER — SODIUM CHLORIDE 0.9 % (FLUSH) 0.9 %
10 SYRINGE (ML) INJECTION AS NEEDED
Status: DISCONTINUED | OUTPATIENT
Start: 2024-05-17 | End: 2024-05-17 | Stop reason: HOSPADM

## 2024-05-17 RX ADMIN — ACETAMINOPHEN 975 MG: 325 TABLET ORAL at 04:30

## 2024-05-17 NOTE — ED PROVIDER NOTES
Time: 1:53 AM EDT  Date of encounter:  5/17/2024  Independent Historian/Clinical History and Information was obtained by:   Patient    History is limited by: N/A    Chief Complaint: Palpitations      History of Present Illness:  Patient is a 53 y.o. year old female who presents to the emergency department for evaluation of palpitations    Patient states she has had lower abdominal discomfort and mild nausea for the past 3 days.  She states she has had some increased constipation over the past 2 days.  She denies any diarrhea or vomiting.  No fevers or chills.  She discussed the symptoms with her PCP who did not provide any additional treatment or testing.  She states that the pain is caused her heart to race over the past 2 days which prompted her visit today.    HPI    Patient Care Team  Primary Care Provider: Brian Ellis APRN    Past Medical History:     Allergies   Allergen Reactions    Amlodipine Shortness Of Breath    Clindamycin/Lincomycin Swelling    Diltiazem Hcl Anxiety    Zofran [Ondansetron] Confusion    Augmentin [Amoxicillin-Pot Clavulanate] Nausea And Vomiting    Contrast Dye (Echo Or Unknown Ct/Mr) Palpitations     Patient gets to the point of passing out , heart races     Doxycycline Nausea And Vomiting    Iodinated Contrast Media Palpitations     Patient gets to the point of passing out , heart races     Past Medical History:   Diagnosis Date    Anxiety     Arthritis     GILBERT KNEES    Asthma     SEASONAL ALLERGIES- NO INHALERS    CKD (chronic kidney disease)     Stage 5, Follows with [facility]- NO CURRENT DIALYSIS    Elevated cholesterol     Gastritis     GERD (gastroesophageal reflux disease)     Gout 02/23/2021    Hiatal hernia     Hypertension     Lactose intolerance      Past Surgical History:   Procedure Laterality Date    ARTERIOVENOUS FISTULA/SHUNT SURGERY Left 5/7/2024    Procedure: 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;  Surgeon: Yaniv Dominguez MD;  Location: [facility]  OR;  Service: Vascular;  Laterality: Left;    BONY PELVIS SURGERY      Plate     SECTION   and     CHOLECYSTECTOMY      COLONOSCOPY      ENDOSCOPY      ENDOSCOPY N/A 3/14/2023    Procedure: ESOPHAGOGASTRODUODENOSCOPY with biopsies;  Surgeon: Tam Badillo MD;  Location: [facility] ENDOSCOPY;  Service: General;  Laterality: N/A;  hiatal hernia    TOTAL KNEE ARTHROPLASTY Left 2021    Procedure: TOTAL KNEE ARTHROPLASTY;  Surgeon: Marco Nelson MD;  Location: [facility] MAIN OR;  Service: Orthopedics;  Laterality: Left;    TOTAL KNEE ARTHROPLASTY Right 10/19/2022    Procedure: RIGHT TOTAL KNEE ARTHROPLASTY - NO SHELBY;  Surgeon: Marco Nelson MD;  Location: [facility] MAIN OR;  Service: Orthopedics;  Laterality: Right;    TUBAL ABDOMINAL LIGATION       Family History   Problem Relation Age of Onset    Throat cancer Father 73    Stroke Other     Diabetes Other     Malig Hyperthermia Neg Hx     Colon cancer Neg Hx        Home Medications:  Prior to Admission medications    Medication Sig Start Date End Date Taking? Authorizing Provider   acetaminophen (TYLENOL) 325 MG tablet Take 2 tablets by mouth Every 6 (Six) Hours As Needed.    Al Ceballos MD   allopurinol (ZYLOPRIM) 300 MG tablet Take 1 tablet by mouth Daily. 24   Brian Ellis APRN   atorvastatin (LIPITOR) 20 MG tablet Take 1 tablet by mouth Every Night. 24   Brian Ellis APRN   carvedilol (COREG) 25 MG tablet Take 1 tablet by mouth 2 (Two) Times a Day With Meals. 24   Brian Ellis APRN   cloNIDine (CATAPRES) 0.2 MG tablet Take 1 tablet by mouth 3 (Three) Times a Day. 24   Brian Ellis APRN   dicyclomine (BENTYL) 10 MG capsule Take 1 capsule by mouth. 24   Al Ceballos MD   diphenhydrAMINE (BENADRYL) 25 mg capsule Take 1 capsule by mouth Every 6 (Six) Hours As Needed for Itching.    Al Ceballos MD   ferrous sulfate  325 (65 FE) MG tablet Take 1 tablet by mouth Daily With Breakfast.    Al Ceballos MD   fluticasone (FLONASE) 50 MCG/ACT nasal spray 2 sprays into the nostril(s) as directed by provider Daily. 2 sprays each nostril daily 24   Brian Ellis APRN   losartan (COZAAR) 100 MG tablet Take 1 tablet by mouth Daily. 24   Brian Ellis APRN   oxyCODONE-acetaminophen (Percocet) 5-325 MG per tablet Take 1 tablet by mouth Every 6 (Six) Hours As Needed for Severe Pain. 24   Yaniv Dominguez MD   polyethylene glycol (GoLYTELY) 236 g solution Take per office instructions 5/15/24   Guerline Cruz APRN   psyllium (Metamucil Smooth Texture) 58.6 % powder Take  by mouth 3 (Three) Times a Day. 24  Aston Arroyo MD   sodium bicarbonate 650 MG tablet Take 1 tablet by mouth. 24  Al Ceballos MD   TART CHERRY PO Take  by mouth.    Al Ceballos MD   vitamin B-12 (CYANOCOBALAMIN) 100 MCG tablet Take 0.5 tablets by mouth Daily.    Al Ceballos MD   Zinc Sulfate (ZINC 15 PO) Take  by mouth.    Al Ceballos MD        Social History:   Social History     Tobacco Use    Smoking status: Former     Current packs/day: 0.00     Types: Cigarettes     Quit date:      Years since quittin.3     Passive exposure: Never    Smokeless tobacco: Never   Vaping Use    Vaping status: Never Used   Substance Use Topics    Alcohol use: Never    Drug use: Never         Review of Systems:  Review of Systems   Constitutional:  Negative for chills and fever.   HENT:  Negative for congestion, ear pain and sore throat.    Eyes:  Negative for pain.   Respiratory:  Negative for cough, chest tightness and shortness of breath.    Cardiovascular:  Positive for palpitations. Negative for chest pain.   Gastrointestinal:  Positive for abdominal pain, constipation and diarrhea. Negative for nausea and vomiting.   Genitourinary:  Negative for flank pain and  "hematuria.   Musculoskeletal:  Negative for joint swelling.   Skin:  Negative for pallor.   Neurological:  Negative for seizures and headaches.   All other systems reviewed and are negative.       Physical Exam:  /79   Pulse 87   Temp 98.1 °F (36.7 °C) (Oral)   Resp 18   Ht 157.5 cm (62\")   Wt 109 kg (239 lb 10.2 oz)   SpO2 99%   BMI 43.83 kg/m²     Physical Exam  Vitals and nursing note reviewed.   Constitutional:       General: She is not in acute distress.     Appearance: Normal appearance. She is not toxic-appearing.   HENT:      Head: Normocephalic and atraumatic.      Jaw: There is normal jaw occlusion.   Eyes:      General: Lids are normal.      Extraocular Movements: Extraocular movements intact.      Conjunctiva/sclera: Conjunctivae normal.      Pupils: Pupils are equal, round, and reactive to light.   Cardiovascular:      Rate and Rhythm: Normal rate and regular rhythm.      Pulses: Normal pulses.      Heart sounds: Normal heart sounds.   Pulmonary:      Effort: Pulmonary effort is normal. No respiratory distress.      Breath sounds: Normal breath sounds. No wheezing or rhonchi.   Abdominal:      General: Abdomen is flat.      Palpations: Abdomen is soft.      Tenderness: There is no abdominal tenderness. There is no guarding or rebound.   Musculoskeletal:         General: Normal range of motion.      Cervical back: Normal range of motion and neck supple.      Right lower leg: No edema.      Left lower leg: No edema.   Skin:     General: Skin is warm and dry.      Comments: Left upper extremity surgical site clean dry and intact with subacute ecchymosis, at site of AV fistula   Neurological:      Mental Status: She is alert and oriented to person, place, and time. Mental status is at baseline.   Psychiatric:         Mood and Affect: Mood normal.               Procedures:  Procedures      Medical Decision Making:      Comorbidities that affect care:    Gout, hypertension, asthma, chronic kidney " disease, GERD    External Notes reviewed:    Previous Clinic Note: Outpatient PCP visit May 14, 2024      The following orders were placed and all results were independently analyzed by me:  Orders Placed This Encounter   Procedures    XR Chest 1 View    CT Abdomen Pelvis Without Contrast    [place] Draw    Comprehensive Metabolic Panel    Magnesium    Single High Sensitivity Troponin T    TSH    CBC Auto Differential    Urinalysis With Culture If Indicated - Urine, Clean Catch    NPO Diet NPO Type: Strict NPO    Undress & Gown    Continuous Pulse Oximetry    Oxygen Therapy- Nasal Cannula; Titrate 1-6 LPM Per SpO2; 90 - 95%    ECG 12 Lead ED Triage Standing Order; Dysrhythmia    Insert Peripheral IV    CBC & Differential    Green Top (Gel)    Lavender Top    Gold Top - SST    Light Blue Top       Medications Given in the Emergency Department:  Medications   sodium chloride 0.9 % flush 10 mL (has no administration in time range)   acetaminophen (TYLENOL) tablet 975 mg (975 mg Oral Given 5/17/24 0430)        ED Course:    ED Course as of 05/17/24 0704   Fri May 17, 2024   0154 My interpretation of EKG: Sinus rhythm 91, no acute ischemia [JS]      ED Course User Index  [JS] Masoud Mazariegos MD       Labs:    Lab Results (last 24 hours)       Procedure Component Value Units Date/Time    CBC & Differential [[id number]]  (Abnormal) Collected: 05/17/24 0028    Specimen: Blood from Arm, Right Updated: 05/17/24 0039    Narrative:      The following orders were created for panel order CBC & Differential.  Procedure                               Abnormality         Status                     ---------                               -----------         ------                     CBC Auto Differential[[id number]]        Abnormal            Final result                 Please view results for these tests on the individual orders.    Comprehensive Metabolic Panel [[id number]]  (Abnormal) Collected: 05/17/24 0028    Specimen: Blood from  Arm, Right Updated: 05/17/24 0059     Glucose 125 mg/dL      BUN 49 mg/dL      Creatinine 3.05 mg/dL      Sodium 138 mmol/L      Potassium 4.3 mmol/L      Chloride 105 mmol/L      CO2 18.3 mmol/L      Calcium 11.1 mg/dL      Total Protein 6.7 g/dL      Albumin 3.8 g/dL      ALT (SGPT) 15 U/L      AST (SGOT) 17 U/L      Alkaline Phosphatase 119 U/L      Total Bilirubin 0.4 mg/dL      Globulin 2.9 gm/dL      A/G Ratio 1.3 g/dL      BUN/Creatinine Ratio 16.1     Anion Gap 14.7 mmol/L      eGFR 17.5 mL/min/1.73     Narrative:      GFR Normal >60  Chronic Kidney Disease <60  Kidney Failure <15      Magnesium [[id number]]  (Normal) Collected: 05/17/24 0028    Specimen: Blood from Arm, Right Updated: 05/17/24 0059     Magnesium 1.7 mg/dL     Single High Sensitivity Troponin T [[id number]]  (Abnormal) Collected: 05/17/24 0028    Specimen: Blood from Arm, Right Updated: 05/17/24 0105     HS Troponin T 30 ng/L     Narrative:      High Sensitive Troponin T Reference Range:  <14.0 ng/L- Negative Female for AMI  <22.0 ng/L- Negative Male for AMI  >=14 - Abnormal Female indicating possible myocardial injury.  >=22 - Abnormal Male indicating possible myocardial injury.   Clinicians would have to utilize clinical acumen, EKG, Troponin, and serial changes to determine if it is an Acute Myocardial Infarction or myocardial injury due to an underlying chronic condition.         TSH [[id number]]  (Normal) Collected: 05/17/24 0028    Specimen: Blood from Arm, Right Updated: 05/17/24 0105     TSH 1.950 uIU/mL     CBC Auto Differential [[id number]]  (Abnormal) Collected: 05/17/24 0028    Specimen: Blood from Arm, Right Updated: 05/17/24 0039     WBC 6.74 10*3/mm3      RBC 3.22 10*6/mm3      Hemoglobin 10.3 g/dL      Hematocrit 31.7 %      MCV 98.4 fL      MCH 32.0 pg      MCHC 32.5 g/dL      RDW 15.0 %      RDW-SD 53.7 fl      MPV 8.7 fL      Platelets 177 10*3/mm3      Neutrophil % 54.7 %      Lymphocyte % 34.3 %      Monocyte % 6.2 %       Eosinophil % 3.3 %      Basophil % 0.9 %      Immature Grans % 0.6 %      Neutrophils, Absolute 3.69 10*3/mm3      Lymphocytes, Absolute 2.31 10*3/mm3      Monocytes, Absolute 0.42 10*3/mm3      Eosinophils, Absolute 0.22 10*3/mm3      Basophils, Absolute 0.06 10*3/mm3      Immature Grans, Absolute 0.04 10*3/mm3      nRBC 0.0 /100 WBC     Urinalysis With Culture If Indicated - Urine, Clean Catch [[id number]]  (Abnormal) Collected: 05/17/24 0443    Specimen: Urine, Clean Catch Updated: 05/17/24 0451     Color, UA Yellow     Appearance, UA Clear     pH, UA 5.5     Specific Gravity, UA <=1.005     Glucose, UA Negative     Ketones, UA Negative     Bilirubin, UA Negative     Blood, UA Negative     Protein, UA Trace     Leuk Esterase, UA Negative     Nitrite, UA Negative     Urobilinogen, UA 0.2 E.U./dL    Narrative:      In absence of clinical symptoms, the presence of pyuria, bacteria, and/or nitrites on the urinalysis result does not correlate with infection.  Urine microscopic not indicated.             Imaging:    CT Abdomen Pelvis Without Contrast    Result Date: 5/17/2024  CT ABDOMEN PELVIS WO CONTRAST-  INDICATION: Lower abdominal pain and constipation. Flank pain.  TECHNIQUE: CT of the abdomen and pelvis without IV contrast. Coronal and sagittal reconstructions were obtained.  Radiation dose reduction techniques included automated exposure control or exposure modulation based on body size.  COMPARISON: 8/29/2023  FINDINGS: Calcified granuloma present in the left lower lobe. Lung bases are otherwise clear. No abdominal aortic aneurysm is identified. Gallbladder is surgically absent. No biliary obstruction. There is cortical scarring and some cortical atrophy in both kidneys. Renal contours are similar to prior. No renal or ureteral stones. No hydronephrosis. Remaining unenhanced solid abdominal organs appear normal. Urinary bladder is normal. Solid pelvic organs are grossly normal.  Fat-containing umbilical hernia  is similar to prior. The appendix is normal. Large bowel is normal without evidence of colitis. No small bowel obstruction is identified. Stomach is normal. No adenopathy or free fluid is seen. Patient is status post ORIF of the anterior pelvis.       1. No clearly acute findings in the abdomen or pelvis. 2. Grossly normal unopacified GI tract including the appendix. 3. Nonobstructed kidneys. Bilateral cortical atrophy and cortical scarring appear stable from prior. 4. Cholecystectomy and ORIF of the pelvis.    Electronically Signed By-Dr. Andrew Lang MD On:5/17/2024 4:50 AM      XR Chest 1 View    Result Date: 5/17/2024  XR CHEST 1 VW-  INDICATION:  Tachycardia.  TECHNIQUE: Upright PA chest x-ray  COMPARISON:  3/13/2024  FINDINGS: Cardiomegaly is unchanged. There is chronic elevation of the right hemidiaphragm. The lungs are clear. Pulmonary vascularity is normal. There is no pneumothorax.      No acute cardiopulmonary findings.    Electronically Signed By-Dr. Andrew Lang MD On:5/17/2024 12:40 AM         Differential Diagnosis and Discussion:    Abdominal Pain: Based on the patient's signs and symptoms, I considered abdominal aortic aneurysm, small bowel obstruction, pancreatitis, acute cholecystitis, acute appendecitis, peptic ulcer disease, gastritis, colitis, endocrine disorders, irritable bowel syndrome and other differential diagnosis an etiology of the patient's abdominal pain.    All labs were reviewed and interpreted by me.  All X-rays impressions were independently interpreted by me.  EKG was interpreted by me.  CT scan radiology impression was interpreted by me.    MDM     Amount and/or Complexity of Data Reviewed  Decide to obtain previous medical records or to obtain history from someone other than the patient: yes                 Patient Care Considerations:    ANTIBIOTICS: I considered prescribing antibiotics as an outpatient however no bacterial focus of infection was  found.      Consultants/Shared Management Plan:    None    Social Determinants of Health:    Patient has presented with family members who are responsible, reliable and will ensure follow up care.      Disposition and Care Coordination:    Discharged: The patient is suitable and stable for discharge with no need for consideration of admission.    I have explained the patient´s condition, diagnoses and treatment plan based on the information available to me at this time. I have answered questions and addressed any concerns. The patient has a good  understanding of the patient´s diagnosis, condition, and treatment plan as can be expected at this point. The vital signs have been stable. The patient´s condition is stable and appropriate for discharge from the emergency department.      The patient will pursue further outpatient evaluation with the primary care physician or other designated or consulting physician as outlined in the discharge instructions. They are agreeable to this plan of care and follow-up instructions have been explained in detail. The patient has received these instructions in written format and has expressed an understanding of the discharge instructions. The patient is aware that any significant change in condition or worsening of symptoms should prompt an immediate return to this or the closest emergency department or call to 911.  I have explained discharge medications and the need for follow up with the patient/caretakers. This was also printed in the discharge instructions. Patient was discharged with the following medications and follow up:      Medication List        New Prescriptions      polyethylene glycol 17 g packet  Commonly known as: MIRALAX  Take 17 g by mouth Daily.               Where to Get Your Medications        These medications were [facility] to [facility] [address] [place] [address] [facility] [phone] [facility] [phone]   [address] [facility]  [address]      Phone: [phone]   polyethylene glycol 17 g packet      Caleb Almazmike Portillo, APRN  [address] [place] [address] [phone]    Schedule an appointment as soon as possible for a visit          Final diagnoses:   Lower abdominal pain   Constipation, unspecified constipation type        ED Disposition       ED Disposition   Discharge    Condition   Stable    Comment   --               This medical record created using voice recognition software.             Masoud Mazariegos MD  05/17/24 0704

## 2024-05-18 LAB
QT INTERVAL: 357 MS
QTC INTERVAL: 439 MS

## 2024-05-21 ENCOUNTER — TELEPHONE (OUTPATIENT)
Dept: GASTROENTEROLOGY | Facility: CLINIC | Age: 54
End: 2024-05-21
Payer: MEDICARE

## 2024-05-21 NOTE — TELEPHONE ENCOUNTER
2024    Dear ,      Patient Name: Katarzyna Norris  : 1970      This patient is waiting to have a Colonoscopy and/or Esophagogastroduodenoscopy which I will perform at [facility] on 2024. Please respond to this request noting your recommendations regarding clearance from a Cardiac  standpoint.  You may contact our office at [phone] Option 2 with any questions. I appreciate your prompt response in this matter. Please return this form to our office as soon as possible to [phone].    ____ I approve my patient from a Cardiac  standpoint    ____ I do NOT approve my patient from a Cardiac  standpoint at this time      Please specify clearance expiration date:____________________________________      Approving physician name (please print): _____________________________________________      Approving physician signature: ________________________________ Date:________________  Sincerely,  [facility] - Gastroenterology   Dr. Campa          Please fax approval or denial to our office as soon as possible.

## 2024-05-21 NOTE — TELEPHONE ENCOUNTER
Procedure: Colonoscopy and/or EGD     Med Directive: NA     PMH: HTN, HLD     Last Seen: 9/19/23

## 2024-05-23 NOTE — PRE-PROCEDURE INSTRUCTIONS
"Instructed on date and arrival time of 1000. Come to entrance \"C\". Must have  over age 18 to drive home.  May have two visitors; however, children under 12 must stay in waiting room.  Discussed clear liquid diet (no red or purple), bowel prep, and NPO.  May take medications as usual except for blood thinners, diabetic medications, and weight loss medications.  Verbalized understanding of instructions given.  Instructed to call for questions or concerns.  Cardiac clearance noted in chart.  "

## 2024-05-24 ENCOUNTER — OFFICE VISIT (OUTPATIENT)
Dept: VASCULAR SURGERY | Facility: HOSPITAL | Age: 54
End: 2024-05-24
Payer: MEDICARE

## 2024-05-24 VITALS
OXYGEN SATURATION: 98 % | HEART RATE: 94 BPM | SYSTOLIC BLOOD PRESSURE: 144 MMHG | DIASTOLIC BLOOD PRESSURE: 98 MMHG | RESPIRATION RATE: 18 BRPM | TEMPERATURE: 98.4 F

## 2024-05-24 DIAGNOSIS — N18.5 CKD (CHRONIC KIDNEY DISEASE) STAGE 5, GFR LESS THAN 15 ML/MIN: Primary | ICD-10-CM

## 2024-05-24 PROCEDURE — G0463 HOSPITAL OUTPT CLINIC VISIT: HCPCS | Performed by: SURGERY

## 2024-05-24 NOTE — PROGRESS NOTES
[facility]   Follow up Office    Patient Name: Katarzyna Norris  : 1970  MRN: [id number]  Primary Care Physician:  Brian Ellis APRN      Subjective   Subjective     HPI:    Katarzyna Norris is a 53 y.o. female here for follow-up after creation of left arm fistula 2024.  Doing well.  Having some burning in the volar aspect of the midforearm.  No other complaints at this time.      Objective     Vitals:   Temp:  [98.4 °F (36.9 °C)] 98.4 °F (36.9 °C)  Heart Rate:  [94] 94  Resp:  [18] 18  BP: (144)/(98) 144/98    Physical Exam      General: Alert, no acute distress  Wound: Healing well, no signs of infection.  AV fistula: Satisfactory bruit and thrill.    Assessment & Plan   Assessment / Plan     Diagnoses and all orders for this visit:    1. CKD (chronic kidney disease) stage 5, GFR less than 15 ml/min (Primary)  -     Duplex Hemodialysis Access CAR       Assessment/Plan:   Satisfactory progress following creation of left arm fistula.  Follow-up in 1 month with duplex.  Exercise the left arm.        Electronically signed by Yaniv Dominguez MD, 24, 9:11 AM EDT.

## 2024-05-29 ENCOUNTER — ANESTHESIA EVENT (OUTPATIENT)
Dept: GASTROENTEROLOGY | Facility: HOSPITAL | Age: 54
End: 2024-05-29
Payer: MEDICARE

## 2024-05-29 NOTE — ANESTHESIA PREPROCEDURE EVALUATION
Anesthesia Evaluation     Patient summary reviewed and Nursing notes reviewed   no history of anesthetic complications:   NPO Solid Status: > 8 hours  NPO Liquid Status: > 2 hours           Airway   Mallampati: II  TM distance: >3 FB  Neck ROM: full  No difficulty expected  Dental      Pulmonary - normal exam   (+) asthma,  Cardiovascular - normal exam  Exercise tolerance: good (4-7 METS)    ECG reviewed  Patient on routine beta blocker    (+) hypertension, hyperlipidemia    ROS comment: EKG 5/17/24  HEART RATE= 91  bpm  RR Interval= 660  ms  MO Interval= 152  ms  P Horizontal Axis= -3  deg  P Front Axis= 23  deg  QRSD Interval= 90  ms  QT Interval= 357  ms  QTcB= 439  ms  QRS Axis= -16  deg  T Wave Axis= 10  deg  - BORDERLINE ECG -  Sinus rhythm  Low voltage, precordial leads  LVH by voltage  When compared with ECG of 07-May-2024 7:01:00,  Significant axis, voltage or hypertrophy change    9/23/22 Echo  Left Ventricle Calculated left ventricular EF = 66%   Normal left ventricular cavity size and wall thickness noted. All left ventricular wall segments contract normally.  Right Ventricle Normal right ventricular cavity size, wall thickness, systolic function and septal motion noted.  Left Atrium Normal left atrial size and volume noted.  Right Atrium Normal right atrial cavity size noted. The diameter of the inferior vena cava is 1.3 cm.  Aortic Valve The aortic valve is structurally normal with no regurgitation or stenosis present.  Mitral Valve The mitral valve is structurally normal with no regurgitation or significant stenosis present.  Tricuspid Valve The tricuspid valve is structurally normal with no significant stenosis present. Trace tricuspid valve regurgitation is present. Estimated right ventricular systolic pressure from tricuspid regurgitation is normal (<35 mmHg).  Pulmonic Valve The pulmonic valve is structurally normal with no regurgitation or significant stenosis present.  Greater Vessels No  dilation of the aortic root is present.  Pericardium The pericardium is normal. There is no evidence of pericardial effusion. .          Neuro/Psych  (+) headaches, numbness, psychiatric history  GI/Hepatic/Renal/Endo    (+) obesity, morbid obesity, hiatal hernia, GERD, renal disease-    ROS Comment: Chronic Kidney disease stage 5. Had AV fistula placed 5/7/24, No dialysis yet.     Musculoskeletal     (+) neck pain  Abdominal   (+) obese   Substance History - negative use     OB/GYN negative ob/gyn ROS         Other   arthritis,     ROS/Med Hx Other: PAT Nursing Notes unavailable.                     Anesthesia Plan    ASA 4     general   total IV anesthesia  (Total IV Anesthesia    Patient understands anesthesia not responsible for dental damage.  )  intravenous induction     Anesthetic plan, risks, benefits, and alternatives have been provided, discussed and informed consent has been obtained with: patient.    Plan discussed with CRNA.        CODE STATUS:

## 2024-05-30 ENCOUNTER — ANESTHESIA (OUTPATIENT)
Dept: GASTROENTEROLOGY | Facility: HOSPITAL | Age: 54
End: 2024-05-30
Payer: MEDICARE

## 2024-05-30 ENCOUNTER — HOSPITAL ENCOUNTER (OUTPATIENT)
Facility: HOSPITAL | Age: 54
Setting detail: HOSPITAL OUTPATIENT SURGERY
Discharge: HOME | End: 2024-05-30
Attending: INTERNAL MEDICINE | Admitting: INTERNAL MEDICINE
Payer: MEDICARE

## 2024-05-30 VITALS
DIASTOLIC BLOOD PRESSURE: 93 MMHG | SYSTOLIC BLOOD PRESSURE: 142 MMHG | BODY MASS INDEX: 43.71 KG/M2 | HEART RATE: 91 BPM | RESPIRATION RATE: 16 BRPM | TEMPERATURE: 98.9 F | WEIGHT: 238.98 LBS | OXYGEN SATURATION: 100 %

## 2024-05-30 PROCEDURE — G0121 COLON CA SCRN NOT HI RSK IND: HCPCS | Performed by: INTERNAL MEDICINE

## 2024-05-30 PROCEDURE — 25810000003 SODIUM CHLORIDE 0.9 % SOLUTION: Performed by: NURSE ANESTHETIST, CERTIFIED REGISTERED

## 2024-05-30 PROCEDURE — 25010000002 PROPOFOL 10 MG/ML EMULSION: Performed by: NURSE ANESTHETIST, CERTIFIED REGISTERED

## 2024-05-30 RX ORDER — LIDOCAINE HYDROCHLORIDE 20 MG/ML
INJECTION, SOLUTION EPIDURAL; INFILTRATION; INTRACAUDAL; PERINEURAL AS NEEDED
Status: DISCONTINUED | OUTPATIENT
Start: 2024-05-30 | End: 2024-05-30 | Stop reason: SURG

## 2024-05-30 RX ORDER — SODIUM CHLORIDE, SODIUM LACTATE, POTASSIUM CHLORIDE, CALCIUM CHLORIDE 600; 310; 30; 20 MG/100ML; MG/100ML; MG/100ML; MG/100ML
30 INJECTION, SOLUTION INTRAVENOUS CONTINUOUS
Status: DISCONTINUED | OUTPATIENT
Start: 2024-05-30 | End: 2024-05-30

## 2024-05-30 RX ORDER — SODIUM CHLORIDE 9 MG/ML
30 INJECTION, SOLUTION INTRAVENOUS CONTINUOUS
Status: DISCONTINUED | OUTPATIENT
Start: 2024-05-30 | End: 2024-05-30 | Stop reason: HOSPADM

## 2024-05-30 RX ORDER — SODIUM CHLORIDE, SODIUM LACTATE, POTASSIUM CHLORIDE, CALCIUM CHLORIDE 600; 310; 30; 20 MG/100ML; MG/100ML; MG/100ML; MG/100ML
INJECTION, SOLUTION INTRAVENOUS CONTINUOUS PRN
Status: DISCONTINUED | OUTPATIENT
Start: 2024-05-30 | End: 2024-05-30

## 2024-05-30 RX ORDER — SODIUM CHLORIDE 9 MG/ML
INJECTION, SOLUTION INTRAVENOUS CONTINUOUS PRN
Status: DISCONTINUED | OUTPATIENT
Start: 2024-05-30 | End: 2024-05-30 | Stop reason: SURG

## 2024-05-30 RX ORDER — SODIUM CHLORIDE 9 MG/ML
9 INJECTION, SOLUTION INTRAVENOUS CONTINUOUS
Status: DISCONTINUED | OUTPATIENT
Start: 2024-05-30 | End: 2024-05-30 | Stop reason: HOSPADM

## 2024-05-30 RX ORDER — SODIUM CHLORIDE 9 MG/ML
30 INJECTION, SOLUTION INTRAVENOUS CONTINUOUS
Status: DISCONTINUED | OUTPATIENT
Start: 2024-05-30 | End: 2024-05-30

## 2024-05-30 RX ORDER — PROPOFOL 10 MG/ML
VIAL (ML) INTRAVENOUS AS NEEDED
Status: DISCONTINUED | OUTPATIENT
Start: 2024-05-30 | End: 2024-05-30 | Stop reason: SURG

## 2024-05-30 RX ADMIN — PROPOFOL 200 MCG/KG/MIN: 10 INJECTION, EMULSION INTRAVENOUS at 10:48

## 2024-05-30 RX ADMIN — PROPOFOL 100 MG: 10 INJECTION, EMULSION INTRAVENOUS at 10:48

## 2024-05-30 RX ADMIN — SODIUM CHLORIDE 30 ML/HR: 9 INJECTION, SOLUTION INTRAVENOUS at 10:11

## 2024-05-30 RX ADMIN — POLYETHYLENE GLYCOL 3350, SODIUM SULFATE ANHYDROUS, SODIUM BICARBONATE, SODIUM CHLORIDE, POTASSIUM CHLORIDE 2000 ML: 236; 22.74; 6.74; 5.86; 2.97 POWDER, FOR SOLUTION ORAL at 07:20

## 2024-05-30 RX ADMIN — LIDOCAINE HYDROCHLORIDE 100 MG: 20 INJECTION, SOLUTION INTRAVENOUS at 10:48

## 2024-05-30 RX ADMIN — SODIUM CHLORIDE: 9 INJECTION, SOLUTION INTRAVENOUS at 10:43

## 2024-05-30 NOTE — ANESTHESIA POSTPROCEDURE EVALUATION
Patient: Katarzyna Norris    Procedure Summary       Date: 05/30/24 Room / Location: [facility] ENDOSCOPY 3 / [facility] ENDOSCOPY    Anesthesia Start: 1043 Anesthesia Stop: 1131    Procedure: COLONOSCOPY Diagnosis:       Encounter for screening for malignant neoplasm of colon      (Encounter for screening for malignant neoplasm of colon [Z12.11])    Surgeons: Aston Arroyo MD Provider: Sangeetha Ritchie CRNA    Anesthesia Type: general ASA Status: 4            Anesthesia Type: general    Vitals  Vitals Value Taken Time   /93 05/30/24 1146   Temp 37.2 °C (98.9 °F) 05/30/24 1146   Pulse 90 05/30/24 1148   Resp 16 05/30/24 1146   SpO2 100 % 05/30/24 1148   Vitals shown include unfiled device data.        Post Anesthesia Care and Evaluation    Patient location during evaluation: bedside  Patient participation: complete - patient participated  Level of consciousness: awake  Pain management: adequate    Airway patency: patent  Anesthetic complications: No anesthetic complications  PONV Status: controlled  Cardiovascular status: acceptable and stable  Respiratory status: acceptable

## 2024-05-30 NOTE — ANESTHESIA POSTPROCEDURE EVALUATION
Patient: Katarzyna Norris    Procedure Summary       Date: 05/30/24 Room / Location: [facility] ENDOSCOPY 3 / [facility] ENDOSCOPY    Anesthesia Start: 1043 Anesthesia Stop: 1131    Procedure: COLONOSCOPY Diagnosis:       Encounter for screening for malignant neoplasm of colon      (Encounter for screening for malignant neoplasm of colon [Z12.11])    Surgeons: Aston Arroyo MD Provider: Sangeetha Ritchie CRNA    Anesthesia Type: general ASA Status: 4            Anesthesia Type: general    Vitals  Vitals Value Taken Time   /93 05/30/24 1146   Temp 37.2 °C (98.9 °F) 05/30/24 1146   Pulse 90 05/30/24 1148   Resp 16 05/30/24 1146   SpO2 100 % 05/30/24 1148   Vitals shown include unfiled device data.        Post Anesthesia Care and Evaluation    Patient location during evaluation: bedside  Patient participation: complete - patient participated  Level of consciousness: awake  Pain management: adequate    Airway patency: patent  Anesthetic complications: No anesthetic complications  PONV Status: controlled  Cardiovascular status: acceptable and stable  Respiratory status: acceptable

## 2024-05-30 NOTE — H&P
Pre Procedure History & Physical    Chief Complaint: Colonoscopy    HPI: Patient is 53 years old female with known history of hypertension, lactose intolerance, hiatal hernia, gout, GERD, hypercholesterolemia, morbid obesity, CKD stage V with hemodialysis access presented today for open access average risk screening colonoscopy in  Preparation for possible kidney transplant.  Patient had previous colonoscopy about 6 years ago and no polyps were found.  She denies family history of colon cancer or colon polyps.  She does not take any blood thinner or NSAIDs medications.  Patient denies obvious GI symptoms of bleed including melena, hematochezia or bright red blood per rectum.  No nausea, vomiting or abdominal pain.  No altered bowel habits    Past Medical History:   Past Medical History:   Diagnosis Date    Anxiety     Arthritis     GILBERT KNEES    Asthma     SEASONAL ALLERGIES- NO INHALERS    CKD (chronic kidney disease)     Stage 5, Follows with [facility]- NO CURRENT DIALYSIS    Elevated cholesterol     Gastritis     GERD (gastroesophageal reflux disease)     Gout 2021    Hiatal hernia     Hiatal hernia     Hypertension     Lactose intolerance     Transplant, organ     ON KIDNEY TRANSPLANT LIST       Past Surgical History:  Past Surgical History:   Procedure Laterality Date    ARTERIOVENOUS FISTULA/SHUNT SURGERY Left 2024    Procedure: 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;  Surgeon: Yaniv Dominguez MD;  Location: [facility] MAIN OR;  Service: Vascular;  Laterality: Left;    BONY PELVIS SURGERY      Plate     SECTION   and     CHOLECYSTECTOMY      COLONOSCOPY      COLONOSCOPY      COLONOSCOPY      ENDOSCOPY      ENDOSCOPY N/A 2023    Procedure: ESOPHAGOGASTRODUODENOSCOPY with biopsies;  Surgeon: Tam Badillo MD;  Location: [facility] ENDOSCOPY;  Service: General;  Laterality: N/A;  hiatal hernia    JOINT REPLACEMENT      TOTAL KNEE ARTHROPLASTY Left  12/22/2021    Procedure: TOTAL KNEE ARTHROPLASTY;  Surgeon: Marco Nelson MD;  Location: [facility] MAIN OR;  Service: Orthopedics;  Laterality: Left;    TOTAL KNEE ARTHROPLASTY Right 10/19/2022    Procedure: RIGHT TOTAL KNEE ARTHROPLASTY - NO SHELBY;  Surgeon: Marco Nelson MD;  Location: [facility] MAIN OR;  Service: Orthopedics;  Laterality: Right;    TUBAL ABDOMINAL LIGATION  1989       Family History:  Family History   Problem Relation Age of Onset    Diabetes Mother     Throat cancer Father 73    Hypertension Sister     Hypertension Sister     Hypertension Sister     Diabetes Brother     Hypertension Brother     Stroke Other     Diabetes Other     Malig Hyperthermia Neg Hx     Colon cancer Neg Hx        Social History:   reports that she quit smoking about 11 years ago. Her smoking use included cigarettes. She has never been exposed to tobacco smoke. She has never used smokeless tobacco. She reports that she does not drink alcohol and does not use drugs.    Medications:   Medications Prior to Admission   Medication Sig Dispense Refill Last Dose    acetaminophen (TYLENOL) 325 MG tablet Take 2 tablets by mouth Every 6 (Six) Hours As Needed.   Past Month    allopurinol (ZYLOPRIM) 300 MG tablet Take 1 tablet by mouth Daily. 180 tablet 1 5/29/2024    atorvastatin (LIPITOR) 20 MG tablet Take 1 tablet by mouth Every Night. 90 tablet 1 5/29/2024    carvedilol (COREG) 25 MG tablet Take 1 tablet by mouth 2 (Two) Times a Day With Meals. (Patient taking differently: Take 1 tablet by mouth Daily.) 180 tablet 1 5/29/2024    cloNIDine (CATAPRES) 0.2 MG tablet Take 1 tablet by mouth 3 (Three) Times a Day. 270 tablet 0 5/29/2024    diphenhydrAMINE (BENADRYL) 25 mg capsule Take 1 capsule by mouth Every 6 (Six) Hours As Needed for Itching.   5/29/2024    ferrous sulfate 325 (65 FE) MG tablet Take 1 tablet by mouth Daily With Breakfast.   5/29/2024    fluticasone (FLONASE) 50 MCG/ACT nasal spray 2 sprays into the nostril(s)  as directed by provider Daily. 2 sprays each nostril daily 18.2 mL 1 Past Week    losartan (COZAAR) 100 MG tablet Take 1 tablet by mouth Daily. 90 tablet 1 5/29/2024    polyethylene glycol (MIRALAX) 17 g packet Take 17 g by mouth Daily. (Patient taking differently: Take 17 g by mouth Daily As Needed.) 14 each 0 Past Week    sodium bicarbonate 650 MG tablet Take 1 tablet by mouth 2 (Two) Times a Day.   5/29/2024    TART CHERRY PO Take  by mouth.   5/29/2024    vitamin B-12 (CYANOCOBALAMIN) 100 MCG tablet Take 0.5 tablets by mouth Daily.   5/29/2024    Zinc Sulfate (ZINC 15 PO) Take  by mouth.   5/29/2024       Allergies:  Amlodipine, Clindamycin/lincomycin, Contrast dye (echo or unknown ct/mr), Diltiazem hcl, Zofran [ondansetron], Augmentin [amoxicillin-pot clavulanate], and Doxycycline      Objective     Blood pressure 164/85, pulse 90, temperature 97.8 °F (36.6 °C), temperature source Temporal, resp. rate 16, weight 108 kg (238 lb 15.7 oz), SpO2 98%, not currently breastfeeding.    Physical Exam   Constitutional: Pt is oriented to person, place, and time and well-developed, well-nourished, and in no distress.   Mouth/Throat: Oropharynx is clear and moist.   Neck: Normal range of motion.   Cardiovascular: Normal rate, regular rhythm and normal heart sounds.    Pulmonary/Chest: Effort normal and breath sounds normal.   Abdominal: Soft. Nontender  Skin: Skin is warm and dry.   Psychiatric: Mood, memory, affect and judgment normal.     Assessment & Plan     Diagnosis:    Average risk screening colonoscopy    Anticipated Surgical Procedure:    Colonoscopy    The risks, benefits, and alternatives of this procedure have been discussed with the patient or the responsible party- the patient understands and agrees to proceed.        Electronically signed by Aston Arroyo MD, 05/30/24, 7:41 AM EDT.

## 2024-06-03 ENCOUNTER — TELEPHONE (OUTPATIENT)
Dept: VASCULAR SURGERY | Facility: HOSPITAL | Age: 54
End: 2024-06-03
Payer: MEDICARE

## 2024-06-03 NOTE — TELEPHONE ENCOUNTER
Called patient and addressed her concerns. Instructed patient to monitor for increased swelling & pain, loss of use in fingers and hands. Patient verbalized understanding.
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
## 2024-06-23 ENCOUNTER — HOSPITAL ENCOUNTER (EMERGENCY)
Facility: HOSPITAL | Age: 54
Discharge: HOME | End: 2024-06-24
Attending: EMERGENCY MEDICINE | Admitting: EMERGENCY MEDICINE
Payer: MEDICARE

## 2024-06-23 ENCOUNTER — APPOINTMENT (OUTPATIENT)
Dept: GENERAL RADIOLOGY | Facility: HOSPITAL | Age: 54
End: 2024-06-23
Payer: MEDICARE

## 2024-06-23 DIAGNOSIS — N18.9 CHRONIC KIDNEY DISEASE, UNSPECIFIED CKD STAGE: ICD-10-CM

## 2024-06-23 DIAGNOSIS — R07.89 CHEST DISCOMFORT: ICD-10-CM

## 2024-06-23 DIAGNOSIS — R00.2 PALPITATIONS: Primary | ICD-10-CM

## 2024-06-23 LAB
ALBUMIN SERPL-MCNC: 3.9 G/DL (ref 3.5–5.2)
ALBUMIN/GLOB SERPL: 1.7 G/DL
ALP SERPL-CCNC: 128 U/L (ref 39–117)
ALT SERPL W P-5'-P-CCNC: 17 U/L (ref 1–33)
AMPHET+METHAMPHET UR QL: NEGATIVE
ANION GAP SERPL CALCULATED.3IONS-SCNC: 12.2 MMOL/L (ref 5–15)
AST SERPL-CCNC: 16 U/L (ref 1–32)
BARBITURATES UR QL SCN: NEGATIVE
BASOPHILS # BLD AUTO: 0.07 10*3/MM3 (ref 0–0.2)
BASOPHILS NFR BLD AUTO: 0.8 % (ref 0–1.5)
BENZODIAZ UR QL SCN: NEGATIVE
BILIRUB SERPL-MCNC: 0.4 MG/DL (ref 0–1.2)
BUN SERPL-MCNC: 38 MG/DL (ref 6–20)
BUN/CREAT SERPL: 13.6 (ref 7–25)
CALCIUM SPEC-SCNC: 11.8 MG/DL (ref 8.6–10.5)
CANNABINOIDS SERPL QL: NEGATIVE
CHLORIDE SERPL-SCNC: 102 MMOL/L (ref 98–107)
CO2 SERPL-SCNC: 22.8 MMOL/L (ref 22–29)
COCAINE UR QL: NEGATIVE
CREAT SERPL-MCNC: 2.8 MG/DL (ref 0.57–1)
DEPRECATED RDW RBC AUTO: 52.9 FL (ref 37–54)
EGFRCR SERPLBLD CKD-EPI 2021: 19.4 ML/MIN/1.73
EOSINOPHIL # BLD AUTO: 0.26 10*3/MM3 (ref 0–0.4)
EOSINOPHIL NFR BLD AUTO: 3.1 % (ref 0.3–6.2)
ERYTHROCYTE [DISTWIDTH] IN BLOOD BY AUTOMATED COUNT: 14.9 % (ref 12.3–15.4)
FENTANYL UR-MCNC: NEGATIVE NG/ML
GEN 5 2HR TROPONIN T REFLEX: 29 NG/L
GLOBULIN UR ELPH-MCNC: 2.3 GM/DL
GLUCOSE SERPL-MCNC: 121 MG/DL (ref 65–99)
HCT VFR BLD AUTO: 33.2 % (ref 34–46.6)
HGB BLD-MCNC: 11 G/DL (ref 12–15.9)
HOLD SPECIMEN: NORMAL
HOLD SPECIMEN: NORMAL
IMM GRANULOCYTES # BLD AUTO: 0.03 10*3/MM3 (ref 0–0.05)
IMM GRANULOCYTES NFR BLD AUTO: 0.4 % (ref 0–0.5)
LIPASE SERPL-CCNC: 48 U/L (ref 13–60)
LYMPHOCYTES # BLD AUTO: 1.81 10*3/MM3 (ref 0.7–3.1)
LYMPHOCYTES NFR BLD AUTO: 21.8 % (ref 19.6–45.3)
MAGNESIUM SERPL-MCNC: 2.1 MG/DL (ref 1.6–2.6)
MCH RBC QN AUTO: 32.2 PG (ref 26.6–33)
MCHC RBC AUTO-ENTMCNC: 33.1 G/DL (ref 31.5–35.7)
MCV RBC AUTO: 97.1 FL (ref 79–97)
METHADONE UR QL SCN: NEGATIVE
MONOCYTES # BLD AUTO: 0.61 10*3/MM3 (ref 0.1–0.9)
MONOCYTES NFR BLD AUTO: 7.3 % (ref 5–12)
NEUTROPHILS NFR BLD AUTO: 5.52 10*3/MM3 (ref 1.7–7)
NEUTROPHILS NFR BLD AUTO: 66.6 % (ref 42.7–76)
NRBC BLD AUTO-RTO: 0 /100 WBC (ref 0–0.2)
NT-PROBNP SERPL-MCNC: 65.1 PG/ML (ref 0–900)
OPIATES UR QL: NEGATIVE
OXYCODONE UR QL SCN: NEGATIVE
PLATELET # BLD AUTO: 187 10*3/MM3 (ref 140–450)
PMV BLD AUTO: 8.8 FL (ref 6–12)
POTASSIUM SERPL-SCNC: 4 MMOL/L (ref 3.5–5.2)
PROT SERPL-MCNC: 6.2 G/DL (ref 6–8.5)
RBC # BLD AUTO: 3.42 10*6/MM3 (ref 3.77–5.28)
SODIUM SERPL-SCNC: 137 MMOL/L (ref 136–145)
T4 FREE SERPL-MCNC: 1.06 NG/DL (ref 0.92–1.68)
TROPONIN T DELTA: 1 NG/L
TROPONIN T SERPL HS-MCNC: 28 NG/L
TSH SERPL DL<=0.05 MIU/L-ACNC: 2.26 UIU/ML (ref 0.27–4.2)
WBC NRBC COR # BLD AUTO: 8.3 10*3/MM3 (ref 3.4–10.8)
WHOLE BLOOD HOLD COAG: NORMAL
WHOLE BLOOD HOLD SPECIMEN: NORMAL

## 2024-06-23 PROCEDURE — 80307 DRUG TEST PRSMV CHEM ANLYZR: CPT | Performed by: EMERGENCY MEDICINE

## 2024-06-23 PROCEDURE — 93005 ELECTROCARDIOGRAM TRACING: CPT | Performed by: EMERGENCY MEDICINE

## 2024-06-23 PROCEDURE — 80053 COMPREHEN METABOLIC PANEL: CPT | Performed by: EMERGENCY MEDICINE

## 2024-06-23 PROCEDURE — 84443 ASSAY THYROID STIM HORMONE: CPT | Performed by: EMERGENCY MEDICINE

## 2024-06-23 PROCEDURE — 83735 ASSAY OF MAGNESIUM: CPT | Performed by: EMERGENCY MEDICINE

## 2024-06-23 PROCEDURE — 99284 EMERGENCY DEPT VISIT MOD MDM: CPT

## 2024-06-23 PROCEDURE — 96374 THER/PROPH/DIAG INJ IV PUSH: CPT

## 2024-06-23 PROCEDURE — 84439 ASSAY OF FREE THYROXINE: CPT | Performed by: EMERGENCY MEDICINE

## 2024-06-23 PROCEDURE — 83880 ASSAY OF NATRIURETIC PEPTIDE: CPT | Performed by: EMERGENCY MEDICINE

## 2024-06-23 PROCEDURE — 84484 ASSAY OF TROPONIN QUANT: CPT | Performed by: EMERGENCY MEDICINE

## 2024-06-23 PROCEDURE — 25010000002 ONDANSETRON PER 1 MG: Performed by: EMERGENCY MEDICINE

## 2024-06-23 PROCEDURE — 71045 X-RAY EXAM CHEST 1 VIEW: CPT

## 2024-06-23 PROCEDURE — 85025 COMPLETE CBC W/AUTO DIFF WBC: CPT | Performed by: EMERGENCY MEDICINE

## 2024-06-23 PROCEDURE — 93005 ELECTROCARDIOGRAM TRACING: CPT

## 2024-06-23 PROCEDURE — 83690 ASSAY OF LIPASE: CPT | Performed by: EMERGENCY MEDICINE

## 2024-06-23 PROCEDURE — 36415 COLL VENOUS BLD VENIPUNCTURE: CPT | Performed by: EMERGENCY MEDICINE

## 2024-06-23 RX ORDER — SODIUM CHLORIDE 0.9 % (FLUSH) 0.9 %
10 SYRINGE (ML) INJECTION AS NEEDED
Status: DISCONTINUED | OUTPATIENT
Start: 2024-06-23 | End: 2024-06-24 | Stop reason: HOSPADM

## 2024-06-23 RX ORDER — CLONIDINE HYDROCHLORIDE 0.1 MG/1
0.2 TABLET ORAL ONCE
Status: COMPLETED | OUTPATIENT
Start: 2024-06-23 | End: 2024-06-23

## 2024-06-23 RX ORDER — ALUMINA, MAGNESIA, AND SIMETHICONE 2400; 2400; 240 MG/30ML; MG/30ML; MG/30ML
15 SUSPENSION ORAL ONCE
Status: COMPLETED | OUTPATIENT
Start: 2024-06-23 | End: 2024-06-23

## 2024-06-23 RX ORDER — ONDANSETRON 2 MG/ML
4 INJECTION INTRAMUSCULAR; INTRAVENOUS ONCE
Status: COMPLETED | OUTPATIENT
Start: 2024-06-23 | End: 2024-06-23

## 2024-06-23 RX ORDER — FAMOTIDINE 10 MG/ML
20 INJECTION, SOLUTION INTRAVENOUS ONCE
Status: DISCONTINUED | OUTPATIENT
Start: 2024-06-23 | End: 2024-06-24 | Stop reason: HOSPADM

## 2024-06-23 RX ORDER — ASPIRIN 81 MG/1
324 TABLET, CHEWABLE ORAL ONCE
Status: DISCONTINUED | OUTPATIENT
Start: 2024-06-23 | End: 2024-06-24 | Stop reason: HOSPADM

## 2024-06-23 RX ORDER — CARVEDILOL 25 MG/1
25 TABLET ORAL ONCE
Status: DISCONTINUED | OUTPATIENT
Start: 2024-06-23 | End: 2024-06-24 | Stop reason: HOSPADM

## 2024-06-23 RX ADMIN — ONDANSETRON 4 MG: 2 INJECTION INTRAMUSCULAR; INTRAVENOUS at 20:37

## 2024-06-23 RX ADMIN — CLONIDINE HYDROCHLORIDE 0.2 MG: 0.1 TABLET ORAL at 22:19

## 2024-06-23 RX ADMIN — ALUMINUM HYDROXIDE, MAGNESIUM HYDROXIDE, DIMETHICONE 15 ML: 400; 400; 40 SUSPENSION ORAL at 20:37

## 2024-06-24 ENCOUNTER — HOSPITAL ENCOUNTER (OUTPATIENT)
Dept: CARDIOLOGY | Facility: HOSPITAL | Age: 54
Discharge: HOME | End: 2024-06-24
Payer: MEDICARE

## 2024-06-24 ENCOUNTER — OFFICE VISIT (OUTPATIENT)
Dept: VASCULAR SURGERY | Facility: HOSPITAL | Age: 54
End: 2024-06-24
Payer: MEDICARE

## 2024-06-24 ENCOUNTER — PATIENT OUTREACH (OUTPATIENT)
Dept: CASE MANAGEMENT | Facility: OTHER | Age: 54
End: 2024-06-24
Payer: MEDICARE

## 2024-06-24 VITALS
OXYGEN SATURATION: 97 % | TEMPERATURE: 97.8 F | HEART RATE: 91 BPM | HEIGHT: 62 IN | DIASTOLIC BLOOD PRESSURE: 95 MMHG | BODY MASS INDEX: 43.86 KG/M2 | RESPIRATION RATE: 18 BRPM | SYSTOLIC BLOOD PRESSURE: 149 MMHG | WEIGHT: 238.32 LBS

## 2024-06-24 VITALS
HEART RATE: 72 BPM | OXYGEN SATURATION: 97 % | RESPIRATION RATE: 18 BRPM | DIASTOLIC BLOOD PRESSURE: 90 MMHG | SYSTOLIC BLOOD PRESSURE: 140 MMHG | TEMPERATURE: 97.5 F

## 2024-06-24 DIAGNOSIS — N18.30 STAGE 3 CHRONIC KIDNEY DISEASE, UNSPECIFIED WHETHER STAGE 3A OR 3B CKD: ICD-10-CM

## 2024-06-24 DIAGNOSIS — R00.2 PALPITATIONS: Primary | ICD-10-CM

## 2024-06-24 DIAGNOSIS — N18.4 CKD (CHRONIC KIDNEY DISEASE) STAGE 4, GFR 15-29 ML/MIN: Primary | ICD-10-CM

## 2024-06-24 LAB
BH CV VAS DIALYSIS ARTERIAL ANASTOMOSIS DIAMETER: 0.74 CM
BH CV VAS DIALYSIS ARTERIAL ANASTOMOSIS EDV: 20 CM/SEC
BH CV VAS DIALYSIS ARTERIAL ANASTOMOSIS PSV: 88 CM/SEC
BH CV VAS DIALYSIS CONDUIT DIST DEPTH: 2.09 CM
BH CV VAS DIALYSIS CONDUIT DIST DIAMETER: 0.61 CM
BH CV VAS DIALYSIS CONDUIT DIST EDV: 66 CM/SEC
BH CV VAS DIALYSIS CONDUIT DIST PSV: 83 CM/SEC
BH CV VAS DIALYSIS CONDUIT MID DEPTH: 1.59 CM
BH CV VAS DIALYSIS CONDUIT MID DIAMETER: 0.71 CM
BH CV VAS DIALYSIS CONDUIT MID EDV: 53 CM/SEC
BH CV VAS DIALYSIS CONDUIT MID PSV: 78 CM/SEC
BH CV VAS DIALYSIS CONDUIT MID/DIST DEPTH: 1.96 CM
BH CV VAS DIALYSIS CONDUIT MID/DIST DIAMETER: 0.67 CM
BH CV VAS DIALYSIS CONDUIT MID/DIST EDV: 47 CM/SEC
BH CV VAS DIALYSIS CONDUIT MID/DIST FLOW VOL: 1002 ML/MIN
BH CV VAS DIALYSIS CONDUIT MID/DIST PSV: 63 CM/SEC
BH CV VAS DIALYSIS CONDUIT PROX DEPTH: 0.96 CM
BH CV VAS DIALYSIS CONDUIT PROX DIAMETER: 0.57 CM
BH CV VAS DIALYSIS CONDUIT PROX EDV: 93 CM/SEC
BH CV VAS DIALYSIS CONDUIT PROX PSV: 156 CM/SEC
BH CV VAS DIALYSIS CONDUIT PROX/MID DEPTH: 1.19 CM
BH CV VAS DIALYSIS CONDUIT PROX/MID DIAMETER: 0.75 CM
BH CV VAS DIALYSIS CONDUIT PROX/MID FLOW VOL: 1104 ML/MIN
BH CV VAS DIALYSIS PRE-INFLOW BRACHIAL DIAMETER: 0.5 CM
BH CV VAS DIALYSIS PRE-INFLOW BRACHIAL EDV: 90 CM/SEC
BH CV VAS DIALYSIS PRE-INFLOW BRACHIAL FLOW VOL: 588 ML/MIN
BH CV VAS DIALYSIS PRE-INFLOW BRACHIAL PSV: 129 CM/SEC
BH CV VAS DIALYSIS VENOUS ANASTOMOSIS DIAMETER: 0.56 CM
BH CV VAS DIALYSIS VENOUS ANASTOMOSIS EDV: 72 CM/SEC
BH CV VAS DIALYSIS VENOUS ANASTOMOSIS PSV: 115 CM/SEC
BH CV VAS DIALYSIS VENOUS OUTFLOW SUBCL-CEPHALIC JX EDV: 30 CM/SEC
BH CV VAS DIALYSIS VENOUS OUTFLOW SUBCL-CEPHALIC JX PSV: 63 CM/SEC

## 2024-06-24 PROCEDURE — 99024 POSTOP FOLLOW-UP VISIT: CPT | Performed by: SURGERY

## 2024-06-24 PROCEDURE — 93990 DOPPLER FLOW TESTING: CPT | Performed by: SURGERY

## 2024-06-24 PROCEDURE — 93990 DOPPLER FLOW TESTING: CPT

## 2024-06-24 PROCEDURE — 3077F SYST BP >= 140 MM HG: CPT | Performed by: SURGERY

## 2024-06-24 PROCEDURE — 1159F MED LIST DOCD IN RCRD: CPT | Performed by: SURGERY

## 2024-06-24 PROCEDURE — 1160F RVW MEDS BY RX/DR IN RCRD: CPT | Performed by: SURGERY

## 2024-06-24 PROCEDURE — 3080F DIAST BP >= 90 MM HG: CPT | Performed by: SURGERY

## 2024-06-24 NOTE — OUTREACH NOTE
Patient Outreach    ER visit on 6/23/24 for Palpitations. EKG showed no evidence of STEMI or dysrhythmia. BNP normal. No medication changes. Spoke to  Jarvis. Reports Katarzyna has Cardiology appt on 6/27/24. She was presently getting Vascular ultrasound. Follow up phone call was scheduled. If no needs arise plan to dc.    Names and Relationships of Patient/Support Persons: Caller: JARVIS LOPEZ; Relationship: Emergency Contact -         Education Documentation  No documentation found.        Angela NATION  Ambulatory Case Management    6/24/2024, 09:49 EDT

## 2024-06-24 NOTE — PROGRESS NOTES
[facility]   Follow up Office    Patient Name: Katarzyna Norris  : 1970  MRN: [id number]  Primary Care Physician:  Brian Ellsi APRN      Subjective   Subjective     HPI:    Katarzyna oNrris is a 53 y.o. female here for follow-up to check on her left arm fistula created 2024.  Doing okay.  No new complaints.      Objective     Vitals:   Temp:  [97.5 °F (36.4 °C)-98.1 °F (36.7 °C)] 97.5 °F (36.4 °C)  Heart Rate:  [] 72  Resp:  [16-20] 18  BP: (135-197)/() 140/90    Physical Exam      General: Alert, no acute distress  Wound: Well-healed.  Left arm fistula: Excellent bruit and thrill.    Diagnostic studies: Duplex of the left arm fistula in the office today demonstrates a patent fistula with satisfactory flows in the range of 1000 mL/min.  The depth of the fistula appears to be mostly in the 1 cm range.    Assessment & Plan   Assessment / Plan     Diagnoses and all orders for this visit:    1. CKD (chronic kidney disease) stage 4, GFR 15-29 ml/min (Primary)  -     Duplex Hemodialysis Access CAR       Assessment/Plan:   Mrs. Norris's left arm fistula appears to be progressing satisfactorily although depth is a concern.  She may need a transposition at some point.  The plan will be for her to follow-up with me with a repeat study in 3 months.        Electronically signed by Yaniv Dominguez MD, 24, 10:21 AM EDT.

## 2024-06-24 NOTE — ED PROVIDER NOTES
"Time: 11:24 PM EDT  Date of encounter:  6/23/2024  Independent Historian/Clinical History and Information was obtained by:   Patient  Chief Complaint: Palpitations    History is limited by: N/A    History of Present Illness:  Patient is a 53 y.o. year old female who presents to the emergency department for evaluation of palpitations.  Patient notes that she was out in the heat for about an hour today.  The patient states that she was sweating.  States that she went inside and she was developing a fast rapid heart rate.  She states that it was fast and rapid but she states it was not a regular.  She states shortly after eating she started developing epigastric and chest discomfort that describes as burning.  The patient states that she became nauseated with it.  Nuys any shortness of breath.  The patient had no near-syncope or syncope.  The patient had no unusual fatigue.  Patient states that she is feeling slightly better now.  The patient feels like the palpitations have resolved but she still has this burning in her chest.  Patient does note that she had a fistula placed last week for her chronic kidney disease and probable dialysis at some point.  Patient states that she had a cardiac stress test a month ago which she was told was normal.  She states that the tach mentioned there is slight abnormality on her echocardiogram.  She does have a appointment her follow-up appoint with her cardiologist at the end of this week.    HPI    Patient Care Team  Primary Care Provider: Brian Ellis APRN    Past Medical History:     Allergies   Allergen Reactions    Amlodipine Shortness Of Breath    Clindamycin/Lincomycin Swelling     FACIAL SWELLING      Contrast Dye (Echo Or Unknown Ct/Mr) Palpitations     \"BLOOD PRESSURE GOES GISSELLE HIGH\", \"HEART RACES\"    Diltiazem Hcl Anxiety    Zofran [Ondansetron] Confusion    Augmentin [Amoxicillin-Pot Clavulanate] Nausea And Vomiting    Doxycycline Nausea And Vomiting     Past " Medical History:   Diagnosis Date    Anxiety     Arthritis     GILBERT KNEES    Asthma     SEASONAL ALLERGIES- NO INHALERS    CKD (chronic kidney disease)     Stage 5, Follows with [facility]- NO CURRENT DIALYSIS    Elevated cholesterol     Gastritis     GERD (gastroesophageal reflux disease)     Gout 2021    Hiatal hernia     Hiatal hernia     Hypertension     Lactose intolerance     Transplant, organ     ON KIDNEY TRANSPLANT LIST     Past Surgical History:   Procedure Laterality Date    ARTERIOVENOUS FISTULA/SHUNT SURGERY Left 2024    Procedure: 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;  Surgeon: Yaniv Dominguez MD;  Location: [facility] MAIN OR;  Service: Vascular;  Laterality: Left;    BONY PELVIS SURGERY      Plate     SECTION   and     CHOLECYSTECTOMY      COLONOSCOPY      COLONOSCOPY      COLONOSCOPY      COLONOSCOPY N/A 2024    Procedure: COLONOSCOPY;  Surgeon: Aston Arroyo MD;  Location: [facility] ENDOSCOPY;  Service: Gastroenterology;  Laterality: N/A;  HEMORRHOIDS    ENDOSCOPY      ENDOSCOPY N/A 2023    Procedure: ESOPHAGOGASTRODUODENOSCOPY with biopsies;  Surgeon: Tam Badillo MD;  Location: [facility] ENDOSCOPY;  Service: General;  Laterality: N/A;  hiatal hernia    JOINT REPLACEMENT      TOTAL KNEE ARTHROPLASTY Left 2021    Procedure: TOTAL KNEE ARTHROPLASTY;  Surgeon: Marco Nelson MD;  Location: [facility] MAIN OR;  Service: Orthopedics;  Laterality: Left;    TOTAL KNEE ARTHROPLASTY Right 10/19/2022    Procedure: RIGHT TOTAL KNEE ARTHROPLASTY - NO SHELBY;  Surgeon: Marco Nelson MD;  Location: [facility] MAIN OR;  Service: Orthopedics;  Laterality: Right;    TUBAL ABDOMINAL LIGATION       Family History   Problem Relation Age of Onset    Diabetes Mother     Throat cancer Father 73    Hypertension Sister     Hypertension Sister     Hypertension Sister     Diabetes Brother     Hypertension Brother     Stroke Other     Diabetes  Other     Malig Hyperthermia Neg Hx     Colon cancer Neg Hx        Home Medications:  Prior to Admission medications    Medication Sig Start Date End Date Taking? Authorizing Provider   acetaminophen (TYLENOL) 325 MG tablet Take 2 tablets by mouth Every 6 (Six) Hours As Needed.    Al Ceballos MD   allopurinol (ZYLOPRIM) 300 MG tablet Take 1 tablet by mouth Daily. 4/24/24   Brian Ellis APRN   atorvastatin (LIPITOR) 20 MG tablet Take 1 tablet by mouth Every Night. 4/24/24   Brian Ellis APRN   carvedilol (COREG) 25 MG tablet Take 1 tablet by mouth 2 (Two) Times a Day With Meals. 4/24/24   Brian Ellis APRN   cloNIDine (CATAPRES) 0.2 MG tablet Take 1 tablet by mouth 3 (Three) Times a Day. 4/24/24   Brian Ellis APRN   diphenhydrAMINE (BENADRYL) 25 mg capsule Take 1 capsule by mouth Every 6 (Six) Hours As Needed for Itching.    Al Ceballos MD   ferrous sulfate 325 (65 FE) MG tablet Take 1 tablet by mouth Daily With Breakfast.    Al Ceballos MD   fluticasone (FLONASE) 50 MCG/ACT nasal spray 2 sprays into the nostril(s) as directed by provider Daily. 2 sprays each nostril daily 4/24/24   Brian Ellis APRN   losartan (COZAAR) 100 MG tablet Take 1 tablet by mouth Daily. 4/24/24   Brian Ellis APRN   polyethylene glycol (MIRALAX) 17 g packet Take 17 g by mouth Daily. 5/17/24   Masoud Mazariegos MD   sodium bicarbonate 650 MG tablet Take 1 tablet by mouth 2 (Two) Times a Day. 5/14/24 5/14/25  Al Ceballos MD   TART CHERRY PO Take  by mouth.    Al Ceballos MD   vitamin B-12 (CYANOCOBALAMIN) 100 MCG tablet Take 0.5 tablets by mouth Daily.    Al Ceballos MD   Zinc Sulfate (ZINC 15 PO) Take  by mouth.    Al Ceballos MD        Social History:   Social History     Tobacco Use    Smoking status: Former     Current packs/day: 0.00     Types: Cigarettes     Quit date: 2013     Years since  "quittin.4     Passive exposure: Never    Smokeless tobacco: Never   Vaping Use    Vaping status: Never Used   Substance Use Topics    Alcohol use: Never    Drug use: Never         Review of Systems:  Review of Systems   Constitutional:  Negative for chills, diaphoresis and fever.   HENT:  Negative for congestion, postnasal drip, rhinorrhea and sore throat.    Eyes:  Negative for photophobia.   Respiratory:  Negative for cough, chest tightness and shortness of breath.    Cardiovascular:  Positive for chest pain and palpitations. Negative for leg swelling.   Gastrointestinal:  Positive for nausea. Negative for abdominal pain, diarrhea and vomiting.   Genitourinary:  Negative for difficulty urinating, dysuria, flank pain, frequency, hematuria and urgency.   Musculoskeletal:  Negative for neck pain and neck stiffness.   Skin:  Negative for pallor and rash.   Neurological:  Negative for dizziness, syncope, weakness, numbness and headaches.   Hematological:  Negative for adenopathy. Does not bruise/bleed easily.   Psychiatric/Behavioral: Negative.          Physical Exam:  /88   Pulse 102   Temp 98.1 °F (36.7 °C) (Oral)   Resp 18   Ht 157.5 cm (62.01\")   Wt 108 kg (238 lb 5.1 oz)   SpO2 97%   BMI 43.58 kg/m²     Physical Exam  Vitals and nursing note reviewed.   Constitutional:       General: She is not in acute distress.     Appearance: Normal appearance. She is not ill-appearing, toxic-appearing or diaphoretic.   HENT:      Head: Normocephalic and atraumatic.      Mouth/Throat:      Mouth: Mucous membranes are moist.   Eyes:      Pupils: Pupils are equal, round, and reactive to light.   Cardiovascular:      Rate and Rhythm: Normal rate and regular rhythm.      Pulses: Normal pulses.           Carotid pulses are 2+ on the right side and 2+ on the left side.       Radial pulses are 2+ on the right side and 2+ on the left side.        Femoral pulses are 2+ on the right side and 2+ on the left side.       " Popliteal pulses are 2+ on the right side and 2+ on the left side.        Dorsalis pedis pulses are 2+ on the right side and 2+ on the left side.        Posterior tibial pulses are 2+ on the right side and 2+ on the left side.      Heart sounds: Normal heart sounds. No murmur heard.  Pulmonary:      Effort: Pulmonary effort is normal. No accessory muscle usage, respiratory distress or retractions.      Breath sounds: Normal breath sounds. No decreased breath sounds, wheezing, rhonchi or rales.   Chest:      Chest wall: No mass or tenderness.   Abdominal:      General: Abdomen is flat. There is no distension or abdominal bruit.      Palpations: Abdomen is soft. There is no mass or pulsatile mass.      Tenderness: There is no abdominal tenderness. There is no right CVA tenderness, left CVA tenderness, guarding or rebound.      Comments: No rigidity   Musculoskeletal:         General: No swelling, tenderness or deformity.      Cervical back: Neck supple. No tenderness.      Right lower leg: No tenderness. No edema.      Left lower leg: No tenderness. No edema.      Comments: The patient does appear to have a recent fistula placed on the left upper extremity.  It is nontender.  It is nonbleeding.   Skin:     General: Skin is warm and dry.      Capillary Refill: Capillary refill takes less than 2 seconds.      Coloration: Skin is not jaundiced or pale.      Findings: No erythema.   Neurological:      General: No focal deficit present.      Mental Status: She is alert and oriented to person, place, and time. Mental status is at baseline.      Cranial Nerves: Cranial nerves 2-12 are intact. No cranial nerve deficit.      Sensory: Sensation is intact. No sensory deficit.      Motor: Motor function is intact. No weakness or pronator drift.      Coordination: Coordination is intact. Coordination normal.   Psychiatric:         Mood and Affect: Mood normal.         Behavior: Behavior normal.                   Procedures:  Procedures      Medical Decision Making:      Comorbidities that affect care:    Anxiety, hypertension, asthma, hyperlipidemia, chronic kidney disease    External Notes reviewed:    None      The following orders were placed and all results were independently analyzed by me:  Orders Placed This Encounter   Procedures    XR Chest 1 View    [place] Draw    High Sensitivity Troponin T    Comprehensive Metabolic Panel    Lipase    BNP    Magnesium    CBC Auto Differential    High Sensitivity Troponin T 2Hr    T4, Free    TSH    Urine Drug Screen - Urine, Clean Catch    NPO Diet NPO Type: Strict NPO    Undress & Gown    Continuous Pulse Oximetry    Oxygen Therapy- Nasal Cannula; Titrate 1-6 LPM Per SpO2; 90 - 95%    ECG 12 Lead ED Triage Standing Order; Chest Pain    ECG 12 Lead ED Triage Standing Order; Chest Pain    Insert Peripheral IV    CBC & Differential    Green Top (Gel)    Lavender Top    Gold Top - SST    Light Blue Top       Medications Given in the Emergency Department:  Medications   sodium chloride 0.9 % flush 10 mL (has no administration in time range)   aspirin chewable tablet 324 mg (324 mg Oral Not Given 6/23/24 2042)   famotidine (PEPCID) injection 20 mg (20 mg Intravenous Not Given 6/23/24 2037)   carvedilol (COREG) tablet 25 mg (25 mg Oral Not Given 6/23/24 2219)   ondansetron (ZOFRAN) injection 4 mg (4 mg Intravenous Given 6/23/24 2037)   aluminum-magnesium hydroxide-simethicone (MAALOX MAX) 400-400-40 MG/5ML suspension 15 mL (15 mL Oral Given 6/23/24 2037)   cloNIDine (CATAPRES) tablet 0.2 mg (0.2 mg Oral Given 6/23/24 2219)        ED Course:    ED Course as of 06/23/24 2350   Sun Jun 23, 2024   1845 EKG:    Rhythm: Sinus tachycardia  Rate: 107  Intervals: Normal MN and QT interval  T-wave: Nonspecific T wave flattening, isolated nonspecific T wave inversion III  ST Segment: No pathological ST elevation or reciprocal ST depression to suggest STEMI    EKG Comparison: No change in  the QRS and ST morphology from EKG performed May 17, 2024    Interpreted by me   [SD]   2048 EKG:    Rhythm: Sinus rhythm  Rate: 98  Intervals: Normal GA and QT interval  T-wave: Nonspecific T wave inversion and isolated in III  ST Segment: No pathological ST elevation and reciprocal ST depression to suggest STEMI    EKG Comparison: No change in the QRS and ST morphology from the EKG that was performed earlier in the department    Interpreted by me   [SD]      ED Course User Index  [SD] Ishmael Bruner DO       Labs:    Lab Results (last 24 hours)       Procedure Component Value Units Date/Time    High Sensitivity Troponin T [[id number]]  (Abnormal) Collected: 06/23/24 1921    Specimen: Blood from Hand, Right Updated: 06/23/24 1952     HS Troponin T 28 ng/L     Narrative:      High Sensitive Troponin T Reference Range:  <14.0 ng/L- Negative Female for AMI  <22.0 ng/L- Negative Male for AMI  >=14 - Abnormal Female indicating possible myocardial injury.  >=22 - Abnormal Male indicating possible myocardial injury.   Clinicians would have to utilize clinical acumen, EKG, Troponin, and serial changes to determine if it is an Acute Myocardial Infarction or myocardial injury due to an underlying chronic condition.         CBC & Differential [[id number]]  (Abnormal) Collected: 06/23/24 1921    Specimen: Blood from Hand, Right Updated: 06/23/24 1933    Narrative:      The following orders were created for panel order CBC & Differential.  Procedure                               Abnormality         Status                     ---------                               -----------         ------                     CBC Auto Differential[[id number]]        Abnormal            Final result                 Please view results for these tests on the individual orders.    Comprehensive Metabolic Panel [[id number]]  (Abnormal) Collected: 06/23/24 1921    Specimen: Blood from Hand, Right Updated: 06/23/24 1952     Glucose 121 mg/dL      BUN 38  mg/dL      Creatinine 2.80 mg/dL      Sodium 137 mmol/L      Potassium 4.0 mmol/L      Chloride 102 mmol/L      CO2 22.8 mmol/L      Calcium 11.8 mg/dL      Total Protein 6.2 g/dL      Albumin 3.9 g/dL      ALT (SGPT) 17 U/L      AST (SGOT) 16 U/L      Alkaline Phosphatase 128 U/L      Total Bilirubin 0.4 mg/dL      Globulin 2.3 gm/dL      A/G Ratio 1.7 g/dL      BUN/Creatinine Ratio 13.6     Anion Gap 12.2 mmol/L      eGFR 19.4 mL/min/1.73     Narrative:      GFR Normal >60  Chronic Kidney Disease <60  Kidney Failure <15      Lipase [[id number]]  (Normal) Collected: 06/23/24 1921    Specimen: Blood from Hand, Right Updated: 06/23/24 1952     Lipase 48 U/L     BNP [[id number]]  (Normal) Collected: 06/23/24 1921    Specimen: Blood from Hand, Right Updated: 06/23/24 1950     proBNP 65.1 pg/mL     Narrative:      This assay is used as an aid in the diagnosis of individuals suspected of having heart failure. It can be used as an aid in the diagnosis of acute decompensated heart failure (ADHF) in patients presenting with signs and symptoms of ADHF to the emergency department (ED). In addition, NT-proBNP of <300 pg/mL indicates ADHF is not likely.    Age Range Result Interpretation  NT-proBNP Concentration (pg/mL:      <50             Positive            >450                   Gray                 300-450                    Negative             <300    50-75           Positive            >900                  Gray                300-900                  Negative            <300      >75             Positive            >1800                  Gray                300-1800                  Negative            <300    Magnesium [[id number]]  (Normal) Collected: 06/23/24 1921    Specimen: Blood from Hand, Right Updated: 06/23/24 1952     Magnesium 2.1 mg/dL     CBC Auto Differential [[id number]]  (Abnormal) Collected: 06/23/24 1921    Specimen: Blood from Hand, Right Updated: 06/23/24 1933     WBC 8.30 10*3/mm3      RBC 3.42  10*6/mm3      Hemoglobin 11.0 g/dL      Hematocrit 33.2 %      MCV 97.1 fL      MCH 32.2 pg      MCHC 33.1 g/dL      RDW 14.9 %      RDW-SD 52.9 fl      MPV 8.8 fL      Platelets 187 10*3/mm3      Neutrophil % 66.6 %      Lymphocyte % 21.8 %      Monocyte % 7.3 %      Eosinophil % 3.1 %      Basophil % 0.8 %      Immature Grans % 0.4 %      Neutrophils, Absolute 5.52 10*3/mm3      Lymphocytes, Absolute 1.81 10*3/mm3      Monocytes, Absolute 0.61 10*3/mm3      Eosinophils, Absolute 0.26 10*3/mm3      Basophils, Absolute 0.07 10*3/mm3      Immature Grans, Absolute 0.03 10*3/mm3      nRBC 0.0 /100 WBC     T4, Free [[id number]]  (Normal) Collected: 06/23/24 1921    Specimen: Blood from Hand, Right Updated: 06/23/24 2048     Free T4 1.06 ng/dL     TSH [[id number]]  (Normal) Collected: 06/23/24 1921    Specimen: Blood from Hand, Right Updated: 06/23/24 2048     TSH 2.260 uIU/mL     Urine Drug Screen - Urine, Clean Catch [[id number]]  (Normal) Collected: 06/23/24 2118    Specimen: Urine, Clean Catch Updated: 06/23/24 2154     Amphet/Methamphet, Screen Negative     Barbiturates Screen, Urine Negative     Benzodiazepine Screen, Urine Negative     Cocaine Screen, Urine Negative     Opiate Screen Negative     THC, Screen, Urine Negative     Methadone Screen, Urine Negative     Oxycodone Screen, Urine Negative     Fentanyl, Urine Negative    Narrative:      Negative Thresholds Per Drugs Screened:    Amphetamines                 500 ng/ml  Barbiturates                 200 ng/ml  Benzodiazepines              100 ng/ml  Cocaine                      300 ng/ml  Methadone                    300 ng/ml  Opiates                      300 ng/ml  Oxycodone                    100 ng/ml  THC                           50 ng/ml  Fentanyl                       5 ng/ml      The Normal Value for all drugs tested is negative. This report includes final unconfirmed screening results to be used for medical treatment purposes only. Unconfirmed results  must not be used for non-medical purposes such as employment or legal testing. Clinical consideration should be applied to any drug of abuse test, particularly when unconfirmed results are used.            High Sensitivity Troponin T 2Hr [[id number]]  (Abnormal) Collected: 06/23/24 2121    Specimen: Blood from Hand, Right Updated: 06/23/24 2153     HS Troponin T 29 ng/L      Troponin T Delta 1 ng/L     Narrative:      High Sensitive Troponin T Reference Range:  <14.0 ng/L- Negative Female for AMI  <22.0 ng/L- Negative Male for AMI  >=14 - Abnormal Female indicating possible myocardial injury.  >=22 - Abnormal Male indicating possible myocardial injury.   Clinicians would have to utilize clinical acumen, EKG, Troponin, and serial changes to determine if it is an Acute Myocardial Infarction or myocardial injury due to an underlying chronic condition.                  Imaging:    XR Chest 1 View    Result Date: 6/23/2024  XR CHEST 1 VW Date of Exam: 6/23/2024 7:03 PM EDT Indication: Chest Pain Triage Protocol Comparison: 5/17/2024 Findings: Heart remains enlarged. There is mild chronic elevation of the right hemidiaphragm. Pulmonary vascularity is normal. There is mild atelectasis at the right base. Lungs are otherwise clear. No pneumothorax.     Stable cardiac enlargement with some mild right basilar atelectasis. Electronically Signed: Andrew Lang MD  6/23/2024 7:06 PM EDT  Workstation ID: [id number]       Differential Diagnosis and Discussion:    Palpitations: Differential diagnosis includes but is not limited to anxiety, atrioventricular blocks, mitral valve disease, hypoxia, coronary artery disease, hypokalemia, anemia, fever, COPD, congestive heart failure, pericarditis, Yamilet-Parkinson-White syndrome, pulmonary embolism, SVT, atrial fibrillation, atrial flutter, sinus tachycardia, thyrotoxicosis, and pheochromocytoma.    All labs were reviewed and interpreted by me.  All X-rays impressions were independently  interpreted by me.  EKG was interpreted by me.    MDM  Number of Diagnoses or Management Options  Chest discomfort  Chronic kidney disease, unspecified CKD stage  Palpitations  Diagnosis management comments: The patient's CBC was reviewed and shows no abnormalities of critical concern.  Of note, there is no anemia requiring a blood transfusion and the platelet count is acceptable    The patient had a normal proBNP.  This makes acute decompensated heart failure unlikely    Urine toxicology screen was negative for any stimulants or illegal substances    The patient's TSH and free T4 were normal.  I do not feel that the patient had thyrotoxicosis or thyroid storm and thus not the cause of the patient's symptoms    Patient CMP demonstrates the patient's chronic renal disease.  The patient's BUN is 38 and the patient's creatinine is 2.8.  This is consistent with her known chronic disease.  There does not appear to be acute kidney injury.  The patient's sodium and potassium are normal.  The patient has normal liver enzymes and anion gap.    The patient had 2 EKGs that demonstrate no evidence of STEMI or dysrhythmia    Patient's initial troponin was 28.  I suspect this may be elevated due to the patient's chronic kidney disease.  We repeated it 2 hours later and it returned at 29 and this is only a delta of 1.  This is not considered clinically significant.  I do not feel the patient had a myocardial infarction.    Chest x-ray was performed while in the emergency room.  The chest x-ray demonstrated no acute cardiopulmonary process    The patient was given Zofran in the emergency room.  The patient was given Maalox due to the fact her chest discomfort was described as burning and occurred after eating.  She states the Maalox completely took away her pain.    I did also write for the patient's normal antihypertensive that she takes at night.      Wells' Criteria for Pulmonary Embolism - MDCalc  Calculated on Jun 23 2024  "11:48 PM  1.5 points -> Low risk group: 1.3% chance of PE in an ED population. Another study assigned scores = 4 as \"PE Unlikely\" and had a 3% incidence of PE.    I discussed the patient's Wells criteria with her.  The patient understands that with a well score she is low risk for a pulmonary embolism.  I discussed possibly performing a D-dimer with her.  She does note that it has a negative predictive value.  I did tell her that if the D-dimer was positive we would do a VQ scan to rule pulmonary embolism.  Understand that she is low risk using the Wells score the patient does not want to have a D-dimer or Q scan performed at this time.    HEART Score for Major Cardiac Events - MDCalc  Calculated on Jun 23 2024 11:49 PM  4 points -> Moderate Score (4-6 points) Risk of MACE of 12-16.6%.  If troponin is positive, many experts recommend further workup and admission even with a low HEART Score.    I discussed the patient's heart score with her.  The patient understands she is moderate risk for cardiovascular event over the next 6 weeks.  I offered admission to the hospital for further evaluation of the palpitations and chest discomfort.  The patient states that since she has had a stress test with a cardiologist within the last month and she has an appointment with her cardiologist at the end of this week she does not want to stay in the hospital.  The patient does understand the risks of cardiovascular event over the next 6 weeks.  She also basis is on the fact that the symptoms came on after eating a salad and was relieved with Maalox.  I discussed with the patient should she change her mind and want to be admitted to the hospital for further evaluation of the palpitations or chest discomfort to return immediately.  Have also discussed with her that if she has recurrent chest discomfort, shortness of breath, near passing out, passing out, unusual fatigue, unusual sweating to return as well.  The patient will maintain " her appointment with her cardiologist on Friday.       Amount and/or Complexity of Data Reviewed  Clinical lab tests: reviewed  Tests in the radiology section of CPT®: reviewed  Tests in the medicine section of CPT®: reviewed             Social Determinants of Health:    Patient is independent, reliable, and has access to care.       Disposition and Care Coordination:    I advised the patient that in my opinion through evaluation of the history, physical, and objective data admission to the hospital is warranted. However, the patient/caretaker has declined admission understanding the risks of discharge.    I have explained discharge medications and the need for follow up with the patient/caretakers. This was also printed in the discharge instructions. Patient was discharged with the following medications and follow up:      Medication List      No changes were made to your prescriptions during this visit.      Brian Ellis, APRN  [address] [place] [address]  [phone]    Call on 6/24/2024         Final diagnoses:   Palpitations   Chest discomfort   Chronic kidney disease, unspecified CKD stage        ED Disposition       ED Disposition   Discharge    Condition   Stable    Comment   --               This medical record created using voice recognition software.             Ishmael Bruner DO  06/23/24 0727

## 2024-06-24 NOTE — DISCHARGE INSTRUCTIONS
Please return to the emergency immediately if you change your mind and would like to be admitted to the hospital for further evaluation of your rapid heart rate and chest discomfort    Please return to the emergency immediately for return of chest discomfort, shortness of breath, near passing out, passing out, unusual sweating, near passing out, passing out or any new symptoms or concerns with    Please keep your appointment with your cardiologist scheduled for the end of this week

## 2024-06-25 ENCOUNTER — TELEPHONE (OUTPATIENT)
Dept: CARDIOLOGY | Facility: CLINIC | Age: 54
End: 2024-06-25

## 2024-06-25 NOTE — TELEPHONE ENCOUNTER
Caller: Katarzyna Norris    Relationship to patient: Self    Best call back number: [phone]     Chief complaint: PATIENT HAD A SCAN OF HER HEART AND SOMETHING WAS FOUND.     Type of visit: FOLLOW UP     Requested date: ASAP     If rescheduling, when is the original appointment:      Additional notes:  CLINIC DONE A SCAN OF PATIENT'S HEART. PATIENT STATES THEY FOUND SOMETHING ON THAT SCAN. PATIENT STATES  CLINIC IS TO FAX SCAN.

## 2024-06-26 NOTE — PROGRESS NOTES
The ABCs of the Annual Wellness Visit  Subsequent Medicare Wellness Visit    Subjective    Katarzyna Norris is a 53 y.o. female who presents for a Subsequent Medicare Wellness Visit.    The following portions of the patient's history were reviewed and   updated as appropriate: allergies, current medications, past family history, past medical history, past social history, past surgical history, and problem list.    Compared to one year ago, the patient feels her physical   health is better.    Compared to one year ago, the patient feels her mental   health is the same.    Recent Hospitalizations:  She was not admitted to the hospital during the last year.       Current Medical Providers:  Patient Care Team:  Brian Ellis APRN as PCP - General (Nurse Practitioner)  Tam Badillo MD as Consulting Physician (General Surgery)  Angela Rivers RN as Ambulatory  (Population Health)  Bobo Aldrich MD as Consulting Physician (Cardiology)  Yaniv Dominguez MD as Consulting Physician (Vascular Surgery)  Promise Carvalho MD as Consulting Physician (Nephrology)    Outpatient Medications Prior to Visit   Medication Sig Dispense Refill    acetaminophen (TYLENOL) 325 MG tablet Take 2 tablets by mouth Every 6 (Six) Hours As Needed.      allopurinol (ZYLOPRIM) 300 MG tablet Take 1 tablet by mouth Daily. 180 tablet 1    atorvastatin (LIPITOR) 20 MG tablet Take 1 tablet by mouth Every Night. 90 tablet 1    carvedilol (COREG) 25 MG tablet Take 1 tablet by mouth 2 (Two) Times a Day With Meals. 180 tablet 1    cloNIDine (CATAPRES) 0.2 MG tablet Take 1 tablet by mouth 3 (Three) Times a Day. 270 tablet 0    diphenhydrAMINE (BENADRYL) 25 mg capsule Take 1 capsule by mouth Every 6 (Six) Hours As Needed for Itching.      ferrous sulfate 325 (65 FE) MG tablet Take 1 tablet by mouth Daily With Breakfast.      fluticasone (FLONASE) 50 MCG/ACT nasal spray 2 sprays into the nostril(s) as directed by  provider Daily. 2 sprays each nostril daily 18.2 mL 1    losartan (COZAAR) 100 MG tablet Take 1 tablet by mouth Daily. 90 tablet 1    polyethylene glycol (MIRALAX) 17 g packet Take 17 g by mouth Daily. 14 each 0    sodium bicarbonate 650 MG tablet Take 1 tablet by mouth 2 (Two) Times a Day.      TART CHERRY PO Take  by mouth.      vitamin B-12 (CYANOCOBALAMIN) 100 MCG tablet Take 0.5 tablets by mouth Daily.      Zinc Sulfate (ZINC 15 PO) Take  by mouth.       No facility-administered medications prior to visit.       No opioid medication identified on active medication list. I have reviewed chart for other potential  high risk medication/s and harmful drug interactions in the elderly.        Aspirin is not on active medication list.  Aspirin use is not indicated based on review of current medical condition/s. Risk of harm outweighs potential benefits.  .    Patient Active Problem List   Diagnosis    Gout    Anxiety    Hypertension    Primary osteoarthritis of left knee    Anemia    Impaired fasting glucose    Hyperlipidemia    Aftercare following right knee joint replacement [id number]/19/22    Osteoarthritis of left knee    Status post replacement of knee joint    Cyst of ovary    Asthma    Nephritic syndrome    Class 3 severe obesity due to excess calories with serious comorbidity and body mass index (BMI) of 45.0 to 49.9 in adult    Stage 3 chronic kidney disease    Vitamin D deficiency    Elevated ferritin    Aftercare following surgery of left total knee arthroplasty, 12/22/2021    Lateral cutaneous femoral nerve of thigh compression or syndrome, right    Primary localized osteoarthritis of right knee    Allergic rhinitis    Hiatal hernia    Painful thyroid    Migraine with aura and without status migrainosus, not intractable    External hemorrhoid    Screening mammogram for breast cancer    Acute non-recurrent sinusitis    Panic attack    Hyponatremia    Joint pain    Muscle cramps    Eustachian tube disorder,  "bilateral    Right knee pain    Left knee pain    Blurred vision, bilateral    CKD (chronic kidney disease) stage 5, GFR less than 15 ml/min    Encounter for screening for malignant neoplasm of colon    Dermatitis    Encounter for Medicare annual wellness exam    Palpitation     Advance Care Planning   Advance Care Planning     Advance Directive is not on file.  ACP discussion was held with the patient during this visit. Patient does not have an advance directive, declines further assistance.     Objective    Vitals:    24 0802 24 0825   BP: 126/81 118/82  Comment: jaquelin   BP Location:  Right arm   Patient Position:  Sitting   Pulse: 101 97   Temp: 98.9 °F (37.2 °C)    SpO2: 97%    Weight: 105 kg (232 lb)    Height: 157.5 cm (62.01\")      Estimated body mass index is 42.42 kg/m² as calculated from the following:    Height as of this encounter: 157.5 cm (62.01\").    Weight as of this encounter: 105 kg (232 lb).           Does the patient have evidence of cognitive impairment? No    Lab Results   Component Value Date    TRIG 141 2024    HDL 38 (L) 2024    LDL 49 2024    VLDL 25 2024    HGBA1C 5.8 (H) 2024        HEALTH RISK ASSESSMENT    Smoking Status:  Social History     Tobacco Use   Smoking Status Former    Current packs/day: 0.00    Types: Cigarettes    Quit date:     Years since quittin.4    Passive exposure: Never   Smokeless Tobacco Never     Alcohol Consumption:  Social History     Substance and Sexual Activity   Alcohol Use Never     Fall Risk Screen:    TIFFANIADI Fall Risk Assessment was completed, and patient is at LOW risk for falls.Assessment completed on:2024    Depression Screenin/14/2024     2:43 PM   PHQ-2/PHQ-9 Depression Screening   Little Interest or Pleasure in Doing Things 0-->not at all   Feeling Down, Depressed or Hopeless 0-->not at all   Trouble Falling or Staying Asleep, or Sleeping Too Much 0-->not at all   Feeling Tired or " Having Little Energy 0-->not at all   Poor Appetite or Overeating 0-->not at all   Feeling Bad about Yourself - or that You are a Failure or Have Let Yourself or Your Family Down 0-->not at all   Trouble Concentrating on Things, Such as Reading the Newspaper or Watching Television 0-->not at all   Moving or Speaking So Slowly that Other People Could Have Noticed? Or the Opposite - Being So Fidgety 0-->not at all   Thoughts that You Would be Better Off Dead or of Hurting Yourself in Some Way 0-->not at all   PHQ-9: Brief Depression Severity Measure Score 0   If You Checked Off Any Problems, How Difficult Have These Problems Made It For You to Do Your Work, Take Care of Things at Home, or Get Along with Other People? not difficult at all       Health Habits and Functional and Cognitive Screenin/27/2024     7:58 AM   Functional & Cognitive Status   Do you have difficulty preparing food and eating? No   Do you have difficulty bathing yourself, getting dressed or grooming yourself? No   Do you have difficulty using the toilet? No   Do you have difficulty moving around from place to place? No   Do you have trouble with steps or getting out of a bed or a chair? No   Current Diet Well Balanced Diet   Dental Exam Up to date   Eye Exam Up to date   Current Exercises Include No Regular Exercise   Do you need help using the phone?  No   Are you deaf or do you have serious difficulty hearing?  No   Do you need help to go to places out of walking distance? No   Do you need help shopping? No   Do you need help preparing meals?  No   Do you need help with housework?  No   Do you need help with laundry? No   Do you need help taking your medications? No   Do you need help managing money? No   Do you ever drive or ride in a car without wearing a seat belt? No   Have you felt unusual stress, anger or loneliness in the last month? No   Who do you live with? Spouse   If you need help, do you have trouble finding someone  available to you? No   Have you been bothered in the last four weeks by sexual problems? No   Do you have difficulty concentrating, remembering or making decisions? No       Age-appropriate Screening Schedule:  Refer to the list below for future screening recommendations based on patient's age, sex and/or medical conditions. Orders for these recommended tests are listed in the plan section. The patient has been provided with a written plan.    Health Maintenance   Topic Date Due    MAMMOGRAM  02/21/2022    COVID-19 Vaccine (1 - 2023-24 season) 07/13/2024 (Originally 9/1/2023)    ZOSTER VACCINE (1 of 2) 05/09/2025 (Originally 9/26/2018)    Pneumococcal Vaccine 0-64 (1 of 2 - PCV) 05/10/2025 (Originally 9/26/1974)    TDAP/TD VACCINES (1 - Tdap) 05/10/2025 (Originally 9/26/1987)    INFLUENZA VACCINE  08/01/2024    BMI FOLLOWUP  12/06/2024    LIPID PANEL  05/20/2025    ANNUAL WELLNESS VISIT  06/27/2025    PAP SMEAR  05/14/2027    COLORECTAL CANCER SCREENING  05/30/2034    HEPATITIS C SCREENING  Completed                  CMS Preventative Services Quick Reference  Risk Factors Identified During Encounter  Inactivity/Sedentary: Patient was advised to exercise at least 150 minutes a week per CDC recommendations. and Patient was advised to do at least 150 minutes a week of moderate intensity activity such as brisk walking and do at least 2 days a week of activities that strengthen muscles such as resistance training and do activities to improve balance such as standing on one foot about 3 days a week.  The above risks/problems have been discussed with the patient.  Pertinent information has been shared with the patient in the After Visit Summary.  An After Visit Summary and PPPS were made available to the patient.    Follow Up:   Next Medicare Wellness visit to be scheduled in 1 year.     Additional E&M Note during same encounter follows:  Patient has multiple medical problems which are significant and separately  identifiable that require additional work above and beyond the Medicare Wellness Visit.      Chief Complaint  Medicare Wellness-subsequent and Hypertension        HPI  Katarzyna Norris is also being seen today for HTN.  She says since having her port placed 6 weeks ago, she has been having elevated BP.    160/90's at home in the past week in then evenings   Only taking coreg once daily x 6 weeks , only taking her morning dose     [facility] ER 6/23/24   Chief Complaint: Palpitations     History is limited by: N/A     History of Present Illness:  Patient is a 53 y.o. year old female who presents to the emergency department for evaluation of palpitations.  Patient notes that she was out in the heat for about an hour today.  The patient states that she was sweating.  States that she went inside and she was developing a fast rapid heart rate.  She states that it was fast and rapid but she states it was not a regular.  She states shortly after eating she started developing epigastric and chest discomfort that describes as burning.  The patient states that she became nauseated with it.  Nuys any shortness of breath.  The patient had no near-syncope or syncope.  The patient had no unusual fatigue.  Patient states that she is feeling slightly better now.  The patient feels like the palpitations have resolved but she still has this burning in her chest.  Patient does note that she had a fistula placed last week for her chronic kidney disease and probable dialysis at some point.  Patient states that she had a cardiac stress test a month ago which she was told was normal.  She states that the tach mentioned there is slight abnormality on her echocardiogram.  She does have a appointment her follow-up appoint with her cardiologist at the end of this week.       Pt had stopped coreg night time dose     Also rash on her right arm where tape was for iv, tried neosporin caused it to be worse       Review of Systems   Constitutional:   "Negative for fever.   Respiratory:  Negative for cough.    Cardiovascular:  Negative for chest pain.   Gastrointestinal:  Negative for abdominal pain, constipation, diarrhea, nausea and vomiting.   Genitourinary:  Negative for dysuria.   Musculoskeletal:  Negative for myalgias.   Neurological:  Negative for dizziness.       Objective   Vital Signs:  /82 (BP Location: Right arm, Patient Position: Sitting) Comment: jaquelin  Pulse 97   Temp 98.9 °F (37.2 °C)   Ht 157.5 cm (62.01\")   Wt 105 kg (232 lb)   SpO2 97%   BMI 42.42 kg/m²     Physical Exam  HENT:      Nose: Nose normal.   Eyes:      Conjunctiva/sclera: Conjunctivae normal.   Cardiovascular:      Rate and Rhythm: Normal rate and regular rhythm.      Heart sounds: Normal heart sounds. No murmur heard.  Pulmonary:      Effort: Pulmonary effort is normal.      Breath sounds: Normal breath sounds.   Musculoskeletal:      Right lower leg: No edema.      Left lower leg: No edema.   Skin:     General: Skin is warm and dry.      Comments: Thematous scaly rash right AC no surrounding erythema no edema   Neurological:      Mental Status: She is alert and oriented to person, place, and time.   Psychiatric:         Mood and Affect: Mood normal.         Behavior: Behavior normal.                   Assessment and Plan   Diagnoses and all orders for this visit:    1. Encounter for Medicare annual wellness exam (Primary)    2. Primary hypertension    3. Palpitation    4. Dermatitis    Other orders  -     clobetasol propionate (TEMOVATE) 0.05 % cream; Apply 1 Application topically to the appropriate area as directed 2 (Two) Times a Day.  Dispense: 60 g; Refill: 1      encounter for annual Medicare wellness exam immunizations screening reviewed with patient  Hypertension uncontrolled in the evenings patient has discontinued her nighttime dose of Coreg and also experienced palpitations discussed the importance of taking her medication as prescribed continue Coreg twice " daily as directed with a blood pressure and heart rate check in 2 weeks keep follow-up appointment today as scheduled with cardiology  Dermatitis will treat with clobetasol cream and recommend do not use Neosporin       Follow Up   Return in about 1 year (around 6/27/2025).  Patient was given instructions and counseling regarding her condition or for health maintenance advice. Please see specific information pulled into the AVS if appropriate.

## 2024-06-27 ENCOUNTER — OFFICE VISIT (OUTPATIENT)
Dept: FAMILY MEDICINE CLINIC | Facility: CLINIC | Age: 54
End: 2024-06-27
Payer: MEDICARE

## 2024-06-27 ENCOUNTER — HOSPITAL ENCOUNTER (OUTPATIENT)
Dept: MAMMOGRAPHY | Facility: HOSPITAL | Age: 54
Discharge: HOME | End: 2024-06-27
Admitting: NURSE PRACTITIONER
Payer: MEDICARE

## 2024-06-27 ENCOUNTER — OFFICE VISIT (OUTPATIENT)
Dept: CARDIOLOGY | Facility: CLINIC | Age: 54
End: 2024-06-27
Payer: MEDICARE

## 2024-06-27 VITALS
SYSTOLIC BLOOD PRESSURE: 118 MMHG | BODY MASS INDEX: 42.69 KG/M2 | DIASTOLIC BLOOD PRESSURE: 82 MMHG | HEART RATE: 97 BPM | HEIGHT: 62 IN | WEIGHT: 232 LBS | OXYGEN SATURATION: 97 % | TEMPERATURE: 98.9 F

## 2024-06-27 VITALS
DIASTOLIC BLOOD PRESSURE: 82 MMHG | HEIGHT: 62 IN | BODY MASS INDEX: 43.06 KG/M2 | SYSTOLIC BLOOD PRESSURE: 158 MMHG | HEART RATE: 92 BPM | WEIGHT: 234 LBS

## 2024-06-27 DIAGNOSIS — Z12.31 SCREENING MAMMOGRAM FOR BREAST CANCER: ICD-10-CM

## 2024-06-27 DIAGNOSIS — R07.9 CHEST PAIN, UNSPECIFIED TYPE: ICD-10-CM

## 2024-06-27 DIAGNOSIS — R00.2 PALPITATION: ICD-10-CM

## 2024-06-27 DIAGNOSIS — I10 PRIMARY HYPERTENSION: ICD-10-CM

## 2024-06-27 DIAGNOSIS — E78.2 HYPERLIPEMIA, MIXED: ICD-10-CM

## 2024-06-27 DIAGNOSIS — I10 HYPERTENSION, ESSENTIAL: Primary | ICD-10-CM

## 2024-06-27 DIAGNOSIS — L30.9 DERMATITIS: ICD-10-CM

## 2024-06-27 DIAGNOSIS — Z00.00 ENCOUNTER FOR MEDICARE ANNUAL WELLNESS EXAM: Primary | ICD-10-CM

## 2024-06-27 LAB
QT INTERVAL: 317 MS
QT INTERVAL: 338 MS
QTC INTERVAL: 424 MS
QTC INTERVAL: 432 MS

## 2024-06-27 PROCEDURE — 99213 OFFICE O/P EST LOW 20 MIN: CPT | Performed by: NURSE PRACTITIONER

## 2024-06-27 PROCEDURE — 77063 BREAST TOMOSYNTHESIS BI: CPT

## 2024-06-27 PROCEDURE — 1126F AMNT PAIN NOTED NONE PRSNT: CPT | Performed by: NURSE PRACTITIONER

## 2024-06-27 PROCEDURE — 1170F FXNL STATUS ASSESSED: CPT | Performed by: NURSE PRACTITIONER

## 2024-06-27 PROCEDURE — 3079F DIAST BP 80-89 MM HG: CPT | Performed by: NURSE PRACTITIONER

## 2024-06-27 PROCEDURE — 77067 SCR MAMMO BI INCL CAD: CPT

## 2024-06-27 PROCEDURE — G0439 PPPS, SUBSEQ VISIT: HCPCS | Performed by: NURSE PRACTITIONER

## 2024-06-27 PROCEDURE — 3074F SYST BP LT 130 MM HG: CPT | Performed by: NURSE PRACTITIONER

## 2024-06-27 RX ORDER — CLOBETASOL PROPIONATE 0.5 MG/G
1 CREAM TOPICAL 2 TIMES DAILY
Qty: 60 G | Refills: 1 | Status: SHIPPED | OUTPATIENT
Start: 2024-06-27

## 2024-06-27 NOTE — PROGRESS NOTES
[facility]  Cardiology progress Note    Patient Name: Katarzyna Norris  : 1970    CHIEF COMPLAINT  Hypertension        Subjective   Subjective     HISTORY OF PRESENT ILLNESS    Katarzyna Norris is a 53 y.o. female with history of hypertension.  No chest pain or shortness of breath.    REVIEW OF SYSTEMS    Constitutional:    No fever, no weight loss  Skin:     No rash  Otolaryngeal:    No difficulty swallowing  Cardiovascular: See HPI.  Pulmonary:    No cough, no sputum production    Personal History     Social History:    reports that she quit smoking about 11 years ago. Her smoking use included cigarettes. She has never been exposed to tobacco smoke. She has never used smokeless tobacco. She reports that she does not drink alcohol and does not use drugs.    Home Medications:  Current Outpatient Medications on File Prior to Visit   Medication Sig    acetaminophen (TYLENOL) 325 MG tablet Take 2 tablets by mouth Every 6 (Six) Hours As Needed.    allopurinol (ZYLOPRIM) 300 MG tablet Take 1 tablet by mouth Daily.    atorvastatin (LIPITOR) 20 MG tablet Take 1 tablet by mouth Every Night.    carvedilol (COREG) 25 MG tablet Take 1 tablet by mouth 2 (Two) Times a Day With Meals.    cloNIDine (CATAPRES) 0.2 MG tablet Take 1 tablet by mouth 3 (Three) Times a Day.    diphenhydrAMINE (BENADRYL) 25 mg capsule Take 1 capsule by mouth Every 6 (Six) Hours As Needed for Itching.    ferrous sulfate 325 (65 FE) MG tablet Take 1 tablet by mouth Daily With Breakfast.    fluticasone (FLONASE) 50 MCG/ACT nasal spray 2 sprays into the nostril(s) as directed by provider Daily. 2 sprays each nostril daily    losartan (COZAAR) 100 MG tablet Take 1 tablet by mouth Daily.    polyethylene glycol (MIRALAX) 17 g packet Take 17 g by mouth Daily.    sodium bicarbonate 650 MG tablet Take 1 tablet by mouth 2 (Two) Times a Day.    TART CHERRY PO Take  by mouth.    vitamin B-12 (CYANOCOBALAMIN) 100 MCG tablet Take 0.5 tablets by  "mouth Daily.    Zinc Sulfate (ZINC 15 PO) Take  by mouth.     No current facility-administered medications on file prior to visit.       Past Medical History:   Diagnosis Date    Anxiety     Arthritis     GILBERT KNEES    Asthma     SEASONAL ALLERGIES- NO INHALERS    CKD (chronic kidney disease)     Stage 5, Follows with [facility]- NO CURRENT DIALYSIS    Elevated cholesterol     Gastritis     GERD (gastroesophageal reflux disease)     Gout 02/23/2021    Hiatal hernia     Hiatal hernia     Hypertension     Lactose intolerance     Transplant, organ     ON KIDNEY TRANSPLANT LIST       Allergies:  Allergies   Allergen Reactions    Amlodipine Shortness Of Breath    Clindamycin/Lincomycin Swelling     FACIAL SWELLING      Contrast Dye (Echo Or Unknown Ct/Mr) Palpitations     \"BLOOD PRESSURE GOES GISSELLE HIGH\", \"HEART RACES\"    Diltiazem Hcl Anxiety    Zofran [Ondansetron] Confusion    Augmentin [Amoxicillin-Pot Clavulanate] Nausea And Vomiting    Doxycycline Nausea And Vomiting       Objective    Objective       Vitals:   Temp:  [98.9 °F (37.2 °C)] 98.9 °F (37.2 °C)  Heart Rate:  [] 92  BP: (118-158)/(81-82) 158/82  Body mass index is 42.8 kg/m².     PHYSICAL EXAM:    General Appearance:   well developed  well nourished  HENT:   oropharynx moist  lips not cyanotic  Neck:  thyroid not enlarged  supple  Respiratory:  no respiratory distress  normal breath sounds  no rales  Cardiovascular:  no jugular venous distention  regular rhythm  apical impulse normal  S1 normal, S2 normal  no S3, no S4   no murmur  no rub, no thrill  carotid pulses normal; no bruit  pedal pulses normal  lower extremity edema: none    Skin:   warm, dry  Psychiatric:  judgement and insight appropriate  normal mood and affect        Result Review:  I have personally reviewed the available results from  [x]  Laboratory  [x]  EKG  [x]  Cardiology  [x]  Medications  [x]  Old records  []  Other:     Procedures  Lab Results   Component Value Date    CHOL 112 " 04/12/2024    CHOL 147 10/24/2023    CHOL 136 04/11/2023     Lab Results   Component Value Date    TRIG 141 04/12/2024    TRIG 144 10/24/2023    TRIG 58 04/11/2023     Lab Results   Component Value Date    HDL 38 (L) 04/12/2024    HDL 48 10/24/2023    HDL 50 04/11/2023     Lab Results   Component Value Date    LDL 49 04/12/2024    LDL 74 10/24/2023    LDL 74 04/11/2023     Lab Results   Component Value Date    VLDL 25 04/12/2024    VLDL 25 10/24/2023    VLDL 12 04/11/2023     Results for orders placed in visit on 09/23/22    Adult Transthoracic Echo Complete W/ Cont if Necessary Per Protocol    Interpretation Summary  Normal left ventricular systolic function.  Trace tricuspid regurgitation.  No significant valve abnormalities noted.     Impression/Plan:  1.  Essential hypertension controlled: Continue losartan 100 mg once a day.  Continue carvedilol 25 mg twice a day.  Continue clonidine 0.2 mg 3 times a day.  Monitor blood pressure regularly.  2.  Mixed hyperlipidemia: Continue Lipitor 20 mg once a day.  Monitor lipid and hepatic profile.  3.  Precordial atypical chest pain: Recent negative stress test as per patient.           Bobo Aldrich MD   06/27/24   09:54 EDT

## 2024-07-03 ENCOUNTER — OFFICE VISIT (OUTPATIENT)
Dept: VASCULAR SURGERY | Facility: HOSPITAL | Age: 54
End: 2024-07-03
Payer: MEDICARE

## 2024-07-03 VITALS
TEMPERATURE: 97.8 F | RESPIRATION RATE: 18 BRPM | OXYGEN SATURATION: 98 % | HEART RATE: 82 BPM | SYSTOLIC BLOOD PRESSURE: 110 MMHG | DIASTOLIC BLOOD PRESSURE: 80 MMHG

## 2024-07-03 DIAGNOSIS — N18.4 CKD (CHRONIC KIDNEY DISEASE) STAGE 4, GFR 15-29 ML/MIN: Primary | ICD-10-CM

## 2024-07-03 DIAGNOSIS — Z98.890 S/P ARTERIOVENOUS (AV) FISTULA CREATION: ICD-10-CM

## 2024-07-03 PROCEDURE — G0463 HOSPITAL OUTPT CLINIC VISIT: HCPCS | Performed by: NURSE PRACTITIONER

## 2024-07-03 NOTE — PROGRESS NOTES
" Psychiatric     Progress Note    Patient Name: Katarzyna Norris  : 1970  MRN: [id number]  Primary Care Physician:  Brian Ellis APRN  Date of admission: (Not on file)  Chief Complaint:    Chief Complaint   Patient presents with    Hemodialysis Access    Follow-up     Patient is here as a follow up because of some questions and concerns regarding her left arm brachiocephalic fistula created by Dr. Dominguez on 2024. Patient states she has some \"shooting pains\" that radiate down her left arm towards her hands. Patient states when she flexes her left arm , \"it feels like a piece of plastic is poking me in the elbow\". Patient also states she feels some tingling in fingers with certain positions. Fistula is positive for thrill and bruit. Surgical site WNL.        Subjective   Subjective     Katarzyna Norris is a 53 y.o. female presents for follow-up, she had a left arm arteriovenous fistula creation performed on May 7, 2024 by Dr. Dominguez.  She is not currently on dialysis. She is concerned with some numbness along the radial and and in the 4th and 5th digit of the left arm.     Objective   Objective     Vitals:   Temp:  [97.8 °F (36.6 °C)] 97.8 °F (36.6 °C)  Heart Rate:  [82] 82  Resp:  [18] 18  BP: (110)/(80) 110/80    Physical Exam   General: Alert, no acute distress  Extremities: Left arm: Well-healed surgical incision, Thrill and bruit, palpable radial pulse.   Neuro: No gross deficits    Result Review    Result Review:  I have personally reviewed the results from the time of this admission to 7/3/2024 13:59 EDT and agree with these findings:  []  Laboratory  []  Microbiology  []  Radiology  []  EKG/Telemetry   []  Cardiology/Vascular   []  Pathology  []  Old records  []  Other:    Most notable findings include:     Assessment & Plan   Assessment / Plan     Assessment/Plan:  Diagnoses and all orders for this visit:    1. CKD (chronic kidney disease) stage 4, GFR 15-29 ml/min (Primary)    2. " S/P arteriovenous (AV) fistula creation    Ms. Norris appears to be making satisfactory progress following a left arm arteriovenous fistula creation performed in May 2024  Dr. Dominguez.  She is not currently on dialysis and is working to make lifestyle modifications to be considered for kidney transplant.  I recommend that she follow-up as scheduled with Dr. Dominguez in September with a hemodialysis duplex.    Active Hospital Problems:  There are no active hospital problems to display for this patient.          Electronically signed by MARICRUZ Palma, 07/03/24, 10:47 AM EDT.

## 2024-07-15 ENCOUNTER — HOSPITAL ENCOUNTER (EMERGENCY)
Facility: HOSPITAL | Age: 54
Discharge: HOME | End: 2024-07-15
Attending: EMERGENCY MEDICINE
Payer: MEDICARE

## 2024-07-15 VITALS
DIASTOLIC BLOOD PRESSURE: 87 MMHG | HEIGHT: 62 IN | WEIGHT: 235.01 LBS | HEART RATE: 96 BPM | OXYGEN SATURATION: 97 % | TEMPERATURE: 98 F | SYSTOLIC BLOOD PRESSURE: 110 MMHG | RESPIRATION RATE: 17 BRPM | BODY MASS INDEX: 43.25 KG/M2

## 2024-07-15 DIAGNOSIS — R20.2 ARM PARESTHESIA, LEFT: Primary | ICD-10-CM

## 2024-07-15 PROCEDURE — 99282 EMERGENCY DEPT VISIT SF MDM: CPT

## 2024-07-16 NOTE — DISCHARGE INSTRUCTIONS
Please call the physician that inserted your shunt and notify them of the numbness and tingling sensation you experienced that led you to the emergency department.  They may want to follow-up with you within 1 to 2 days.  Tonight however you had a palpable pulse, your skin was pink warm and dry, and you were able to move all digits on the left extremity.  A thrill was also felt when your shunt was assessed.    If you experience the same sensation that does not resolve as quickly as it did tonight, if your fingers become cold to the touch, if your skin does not return to a pink color when blanched within 2 to 3 seconds, or if you become unable to use your left upper extremity please return to the ER immediately.  Otherwise follow-up with your primary care provider or the surgeon that did your procedure within 2 to 3 days

## 2024-07-16 NOTE — ED PROVIDER NOTES
"Time: 10:48 PM EDT  Date of encounter:  7/15/2024  Room number: 67/67  Independent Historian/Clinical History and Information was obtained by:   Patient    History is limited by: N/A    Chief Complaint: Left arm paresthesia      History of Present Illness:  Patient is a 53 y.o. year old female who presents to the emergency department for evaluation of left arm paresthesia.  Patient had a dialysis shunt placed in her left arm approximately 8 weeks ago.  Tonight she developed paresthesias in and a tingling sensation while holding a paper in sketching.  She advises that her arm was not bent at a 90 degree angle, which she has been instructed to avoid, but it was slightly angled when she developed the numbness and tingling.  It has since returned almost back to baseline.  She advises the thrill is still palpable.  Patient denies any pain    HPI    Patient Care Team  Primary Care Provider: Brian Ellis APRN    Past Medical History:     Allergies   Allergen Reactions    Amlodipine Shortness Of Breath    Clindamycin/Lincomycin Swelling     FACIAL SWELLING      Contrast Dye (Echo Or Unknown Ct/Mr) Palpitations     \"BLOOD PRESSURE GOES GISSELLE HIGH\", \"HEART RACES\"    Diltiazem Hcl Anxiety    Zofran [Ondansetron] Confusion    Augmentin [Amoxicillin-Pot Clavulanate] Nausea And Vomiting    Doxycycline Nausea And Vomiting     Past Medical History:   Diagnosis Date    Anxiety     Arthritis     GILBERT KNEES    Asthma     SEASONAL ALLERGIES- NO INHALERS    CKD (chronic kidney disease)     Stage 5, Follows with [facility]- NO CURRENT DIALYSIS    Elevated cholesterol     Gastritis     GERD (gastroesophageal reflux disease)     Gout 02/23/2021    Hiatal hernia     Hiatal hernia     Hypertension     Lactose intolerance     Transplant, organ     ON KIDNEY TRANSPLANT LIST     Past Surgical History:   Procedure Laterality Date    ARTERIOVENOUS FISTULA/SHUNT SURGERY Left 05/07/2024    Procedure: CREATION OF BRACHIOCEPHALIC AV FISTULA LEFT " ARM;  Surgeon: Yaniv Dominguez MD;  Location: [facility] MAIN OR;  Service: Vascular;  Laterality: Left;    BONY PELVIS SURGERY      Plate     SECTION   and     CHOLECYSTECTOMY      COLONOSCOPY      COLONOSCOPY      COLONOSCOPY      COLONOSCOPY N/A 2024    Procedure: COLONOSCOPY;  Surgeon: Aston Arroyo MD;  Location: [facility] ENDOSCOPY;  Service: Gastroenterology;  Laterality: N/A;  HEMORRHOIDS    ENDOSCOPY      ENDOSCOPY N/A 2023    Procedure: ESOPHAGOGASTRODUODENOSCOPY with biopsies;  Surgeon: Tam Badillo MD;  Location: [facility] ENDOSCOPY;  Service: General;  Laterality: N/A;  hiatal hernia    JOINT REPLACEMENT      TOTAL KNEE ARTHROPLASTY Left 2021    Procedure: TOTAL KNEE ARTHROPLASTY;  Surgeon: Marco Nelson MD;  Location: [facility] MAIN OR;  Service: Orthopedics;  Laterality: Left;    TOTAL KNEE ARTHROPLASTY Right 10/19/2022    Procedure: RIGHT TOTAL KNEE ARTHROPLASTY - NO SHELBY;  Surgeon: Marco Nelson MD;  Location: [facility] MAIN OR;  Service: Orthopedics;  Laterality: Right;    TUBAL ABDOMINAL LIGATION       Family History   Problem Relation Age of Onset    Diabetes Mother     Throat cancer Father 73    Hypertension Sister     Hypertension Sister     Hypertension Sister     Diabetes Brother     Hypertension Brother     Stroke Other     Diabetes Other     Malig Hyperthermia Neg Hx     Colon cancer Neg Hx        Home Medications:  Prior to Admission medications    Medication Sig Start Date End Date Taking? Authorizing Provider   acetaminophen (TYLENOL) 325 MG tablet Take 2 tablets by mouth Every 6 (Six) Hours As Needed.    Provider, MD Al   allopurinol (ZYLOPRIM) 300 MG tablet Take 1 tablet by mouth Daily. 24   Brian Ellis APRN   atorvastatin (LIPITOR) 20 MG tablet Take 1 tablet by mouth Every Night. 24   Brian Ellis APRN   carvedilol (COREG) 25 MG tablet Take 1 tablet by mouth 2 (Two)  Times a Day With Meals. 24   Brian Ellis APRN   clobetasol propionate (TEMOVATE) 0.05 % cream Apply 1 Application topically to the appropriate area as directed 2 (Two) Times a Day. 24   Brian Ellis APRN   cloNIDine (CATAPRES) 0.2 MG tablet Take 1 tablet by mouth 3 (Three) Times a Day. 24   Brian Ellis APRN   diphenhydrAMINE (BENADRYL) 25 mg capsule Take 1 capsule by mouth Every 6 (Six) Hours As Needed for Itching.    Al Ceballos MD   ferrous sulfate 325 (65 FE) MG tablet Take 1 tablet by mouth Daily With Breakfast.    Al Ceballos MD   fluticasone (FLONASE) 50 MCG/ACT nasal spray 2 sprays into the nostril(s) as directed by provider Daily. 2 sprays each nostril daily 24   Brian Ellis APRN   losartan (COZAAR) 100 MG tablet Take 1 tablet by mouth Daily. 24   Brian Ellis APRN   polyethylene glycol (MIRALAX) 17 g packet Take 17 g by mouth Daily. 24   Masoud Mazariegos MD   sodium bicarbonate 650 MG tablet Take 1 tablet by mouth 2 (Two) Times a Day. 24  Al Ceballos MD   TART CHERRY PO Take  by mouth.    Al Ceballos MD   vitamin B-12 (CYANOCOBALAMIN) 100 MCG tablet Take 0.5 tablets by mouth Daily.    Al Ceballos MD   Zinc Sulfate (ZINC 15 PO) Take  by mouth.    Al Ceballos MD        Social History:   Social History     Tobacco Use    Smoking status: Former     Current packs/day: 0.00     Types: Cigarettes     Quit date:      Years since quittin.5     Passive exposure: Never    Smokeless tobacco: Never   Vaping Use    Vaping status: Never Used   Substance Use Topics    Alcohol use: Never    Drug use: Never         Review of Systems:  Review of Systems   Constitutional:  Negative for chills and fever.   HENT:  Negative for congestion, ear pain and sore throat.    Eyes:  Negative for pain.   Respiratory:  Negative for cough, chest tightness and  "shortness of breath.    Cardiovascular:  Negative for chest pain.   Gastrointestinal:  Negative for abdominal pain, diarrhea, nausea and vomiting.   Genitourinary:  Negative for flank pain and hematuria.   Musculoskeletal:  Negative for joint swelling.        Numbness and tingling to left arm   Skin:  Negative for pallor.   Neurological:  Negative for seizures and headaches.   All other systems reviewed and are negative.       Physical Exam:  /87 (BP Location: Right arm, Patient Position: Sitting)   Pulse 96   Temp 98 °F (36.7 °C) (Oral)   Resp 17   Ht 157.5 cm (62\")   Wt 107 kg (235 lb 0.2 oz)   SpO2 97%   BMI 42.98 kg/m²     Physical Exam  Vitals and nursing note reviewed.   Constitutional:       General: She is not in acute distress.     Appearance: Normal appearance. She is not toxic-appearing.   HENT:      Head: Normocephalic and atraumatic.      Mouth/Throat:      Mouth: Mucous membranes are moist.   Eyes:      General: No scleral icterus.  Cardiovascular:      Rate and Rhythm: Normal rate and regular rhythm.      Heart sounds: Normal heart sounds.      Comments: Radial pulse to left upper extremity is slightly weaker than the right and is positional however patient states she since having a shunt placed, has had to rotate her wrist to palpate a strong palpable pulse.  Thrill to left upper arm is palpable  Pulmonary:      Effort: Pulmonary effort is normal. No respiratory distress.      Breath sounds: Normal breath sounds.   Abdominal:      General: Abdomen is flat.      Palpations: Abdomen is soft.      Tenderness: There is no abdominal tenderness.   Musculoskeletal:         General: Swelling (Scant amount of swelling to left hand and forearm area however patient states it has been that way since her procedure) present. No tenderness, deformity or signs of injury. Normal range of motion.      Cervical back: Normal range of motion and neck supple.   Skin:     General: Skin is warm and dry.      " Capillary Refill: Capillary refill takes 2 to 3 seconds.      Coloration: Skin is not jaundiced or pale.      Findings: No bruising, erythema, lesion or rash.   Neurological:      Mental Status: She is alert and oriented to person, place, and time. Mental status is at baseline.        =          Procedures:  Procedures      Medical Decision Making:      Comorbidities that affect care:    Chronic kidney disease, gastritis, hypertension, hernia, gout, asthma, anxiety, transplant    External Notes reviewed:          The following orders were placed and all results were independently analyzed by me:  No orders of the defined types were placed in this encounter.      Medications Given in the Emergency Department:  Medications - No data to display     ED Course:    ED Course as of 07/15/24 2338   Mon Jul 15, 2024   2333 Patient was educated on signs and symptoms to look for that may indicate an occlusion, such as cap refill, temperature of extremity, palpable pulse, and temperature skin.  Patient was able to return demonstrate those instructions [MS]      ED Course User Index  [MS] Mercedez Avendano APRN       Labs:    Lab Results (last 24 hours)       ** No results found for the last 24 hours. **             Imaging:    No Radiology Exams Resulted Within Past 24 Hours      Differential Diagnosis and Discussion:    Paresthesia: Differential diagnosis includes but is not limited to acute stroke, electrolyte imbalance, anxiety, radiculopathy, autoimmune disorders, and endocrine disorders.        MDM               Patient Care Considerations:    Vascular study to left upper extremity however all pulses were palpable, skin was pink warm and dry, and cap refills within normal limits.      Consultants/Shared Management Plan:    None    Social Determinants of Health:    Patient is independent, reliable, and has access to care.       Disposition and Care Coordination:    Discharged: The patient is suitable and stable for  discharge with no need for consideration of admission.    I have explained the patient´s condition, diagnoses and treatment plan based on the information available to me at this time. I have answered questions and addressed any concerns. The patient has a good  understanding of the patient´s diagnosis, condition, and treatment plan as can be expected at this point. The vital signs have been stable. The patient´s condition is stable and appropriate for discharge from the emergency department.      The patient will pursue further outpatient evaluation with the primary care physician or other designated or consulting physician as outlined in the discharge instructions. They are agreeable to this plan of care and follow-up instructions have been explained in detail. The patient has received these instructions in written format and has expressed an understanding of the discharge instructions. The patient is aware that any significant change in condition or worsening of symptoms should prompt an immediate return to this or the closest emergency department or call to 911.    Final diagnoses:   Arm paresthesia, left        ED Disposition       ED Disposition   Discharge    Condition   Stable    Comment   --               This medical record created using voice recognition software.       Mercedez Avendano, APRN  07/15/24 6086

## 2024-07-18 ENCOUNTER — PATIENT OUTREACH (OUTPATIENT)
Dept: CASE MANAGEMENT | Facility: OTHER | Age: 54
End: 2024-07-18
Payer: MEDICARE

## 2024-07-18 DIAGNOSIS — R00.2 PALPITATIONS: Primary | ICD-10-CM

## 2024-07-18 DIAGNOSIS — N18.30 STAGE 3 CHRONIC KIDNEY DISEASE, UNSPECIFIED WHETHER STAGE 3A OR 3B CKD: ICD-10-CM

## 2024-07-18 NOTE — OUTREACH NOTE
Patient Outreach    Followed by PCP and Cardiology for HTN. Notes reviewed and reported as controlled. Followed by Vascular for s/p fistula creation. Established with Nephrology for stage 5 CKD. She contacts each office with any needs using phone or mychart.     Names and Relationships of Patient/Support Persons: Contact: Katarzyna Norris; Relationship: Self -         Education Documentation  No documentation found.        Angela NATION  Ambulatory Case Management    7/18/2024, 08:46 EDT

## 2024-07-23 ENCOUNTER — OFFICE VISIT (OUTPATIENT)
Dept: SURGERY | Facility: CLINIC | Age: 54
End: 2024-07-23
Payer: MEDICARE

## 2024-07-23 VITALS
WEIGHT: 232 LBS | HEIGHT: 62 IN | SYSTOLIC BLOOD PRESSURE: 124 MMHG | DIASTOLIC BLOOD PRESSURE: 82 MMHG | BODY MASS INDEX: 42.69 KG/M2

## 2024-07-23 DIAGNOSIS — K43.2 INCISIONAL HERNIA, WITHOUT OBSTRUCTION OR GANGRENE: Primary | ICD-10-CM

## 2024-07-23 DIAGNOSIS — K42.9 UMBILICAL HERNIA WITHOUT OBSTRUCTION AND WITHOUT GANGRENE: ICD-10-CM

## 2024-07-23 RX ORDER — SCOLOPAMINE TRANSDERMAL SYSTEM 1 MG/1
1 PATCH, EXTENDED RELEASE TRANSDERMAL CONTINUOUS
OUTPATIENT
Start: 2024-07-23 | End: 2024-07-26

## 2024-07-23 RX ORDER — HYDROCORTISONE 25 MG/G
CREAM TOPICAL
COMMUNITY
Start: 2024-07-05

## 2024-07-23 RX ORDER — ACETAMINOPHEN 325 MG/1
650 TABLET ORAL ONCE
OUTPATIENT
Start: 2024-07-23 | End: 2024-07-23

## 2024-07-23 RX ORDER — ONDANSETRON 2 MG/ML
4 INJECTION INTRAMUSCULAR; INTRAVENOUS EVERY 6 HOURS PRN
OUTPATIENT
Start: 2024-07-23

## 2024-07-23 NOTE — PROGRESS NOTES
Follow Up Office Visit      Patient Name: Katarzyna Norris  : 1970   MRN: [id number]     Chief Complaint:    Chief Complaint   Patient presents with    Anxiety    Hypertension    Anemia    Hyperlipidemia    Asthma    impaired fasting glucose    Migraine    Chronic Kidney Disease       History of Present Illness: Katarzyna Norris is a 53 y.o. female who is here today to follow up for anxiety, HTN, anemia, IFG, hyperlipidemia, asthma, allergic rhinitis, migraine.  Review labs      Mammogram-2024  Pap-2022  Labs- 2024  Colonoscopy-2009, cologuard 2021. Has active order for cologuard.    Pt reports stopped losartan 100 mg daily, since    Pt reports she was feeling dizzy lightheaded     Follow up cardiology consult per progress note Bobo Aldrich MD   24   Impression/Plan:  1.  Essential hypertension controlled: Continue losartan 100 mg once a day.  Continue carvedilol 25 mg twice a day.  Continue clonidine 0.2 mg 3 times a day.  Monitor blood pressure regularly.  2.  Mixed hyperlipidemia: Continue Lipitor 20 mg once a day.  Monitor lipid and hepatic profile.  3.  Precordial atypical chest pain: Recent negative stress test as per patient.        Follow up vascular surgery per progress note  MARICRUZ Palma, 24,  1. CKD (chronic kidney disease) stage 4, GFR 15-29 ml/min (Primary)     2. S/P arteriovenous (AV) fistula creation     Ms. Norris appears to be making satisfactory progress following a left arm arteriovenous fistula creation performed in May 2024  Dr. Dominguez.  She is not currently on dialysis and is working to make lifestyle modifications to be considered for kidney transplant.  I recommend that she follow-up as scheduled with Dr. Dominguez in September with a hemodialysis duplex.            Subjective      Review of Systems:   Review of Systems   Constitutional:  Negative for fever.   HENT:  Negative for ear pain and sore throat.    Eyes:  Negative for visual  disturbance.   Respiratory:  Negative for cough.    Cardiovascular:  Negative for chest pain.   Gastrointestinal:  Negative for abdominal pain.   Genitourinary:  Negative for dysuria.   Musculoskeletal:  Negative for myalgias.   Neurological:  Negative for headaches.        Past Medical History:   Past Medical History:   Diagnosis Date    Anxiety     Arthritis     GILBERT KNEES    Asthma     SEASONAL ALLERGIES- NO INHALERS    CKD (chronic kidney disease)     Stage 5, Follows with [facility]- NO CURRENT DIALYSIS    Elevated cholesterol     Gastritis     GERD (gastroesophageal reflux disease)     Gout 2021    Hiatal hernia     Hiatal hernia     Hypertension     Lactose intolerance     Transplant, organ     ON KIDNEY TRANSPLANT LIST       Past Surgical History:   Past Surgical History:   Procedure Laterality Date    ARTERIOVENOUS FISTULA/SHUNT SURGERY Left 2024    Procedure: 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;  Surgeon: Yaniv Dominguez MD;  Location: [facility] MAIN OR;  Service: Vascular;  Laterality: Left;    BONY PELVIS SURGERY      Plate     SECTION   and     CHOLECYSTECTOMY      COLONOSCOPY      COLONOSCOPY      COLONOSCOPY      COLONOSCOPY N/A 2024    Procedure: COLONOSCOPY;  Surgeon: Aston Arroyo MD;  Location: [facility] ENDOSCOPY;  Service: Gastroenterology;  Laterality: N/A;  HEMORRHOIDS    ENDOSCOPY      ENDOSCOPY N/A 2023    Procedure: ESOPHAGOGASTRODUODENOSCOPY with biopsies;  Surgeon: Tam Badillo MD;  Location: [facility] ENDOSCOPY;  Service: General;  Laterality: N/A;  hiatal hernia    JOINT REPLACEMENT      TOTAL KNEE ARTHROPLASTY Left 2021    Procedure: TOTAL KNEE ARTHROPLASTY;  Surgeon: Marco Nelson MD;  Location: [facility] MAIN OR;  Service: Orthopedics;  Laterality: Left;    TOTAL KNEE ARTHROPLASTY Right 10/19/2022    Procedure: RIGHT TOTAL KNEE ARTHROPLASTY - NO SHELBY;  Surgeon: Marco Nelson MD;  Location: [facility]  MAIN OR;  Service: Orthopedics;  Laterality: Right;    TUBAL ABDOMINAL LIGATION         Family History:   Family History   Problem Relation Age of Onset    Diabetes Mother     Throat cancer Father 73    Hypertension Sister     Hypertension Sister     Hypertension Sister     Diabetes Brother     Hypertension Brother     Stroke Other     Diabetes Other     Malig Hyperthermia Neg Hx     Colon cancer Neg Hx        Social History:   Social History     Socioeconomic History    Marital status:    Tobacco Use    Smoking status: Former     Current packs/day: 0.00     Types: Cigarettes     Quit date:      Years since quittin.5     Passive exposure: Never    Smokeless tobacco: Never   Vaping Use    Vaping status: Never Used   Substance and Sexual Activity    Alcohol use: Never    Drug use: Never    Sexual activity: Defer       Medications:     Current Outpatient Medications:     acetaminophen (TYLENOL) 325 MG tablet, Take 2 tablets by mouth Every 6 (Six) Hours As Needed., Disp: , Rfl:     allopurinol (ZYLOPRIM) 300 MG tablet, Take 1 tablet by mouth Daily., Disp: 180 tablet, Rfl: 1    atorvastatin (LIPITOR) 20 MG tablet, Take 1 tablet by mouth Every Night., Disp: 90 tablet, Rfl: 1    carvedilol (COREG) 25 MG tablet, Take 1 tablet by mouth 2 (Two) Times a Day With Meals., Disp: 180 tablet, Rfl: 1    clobetasol propionate (TEMOVATE) 0.05 % cream, Apply 1 Application topically to the appropriate area as directed 2 (Two) Times a Day., Disp: 60 g, Rfl: 1    cloNIDine (CATAPRES) 0.2 MG tablet, Take 1 tablet by mouth 3 (Three) Times a Day., Disp: 270 tablet, Rfl: 0    diphenhydrAMINE (BENADRYL) 25 mg capsule, Take 1 capsule by mouth Every 6 (Six) Hours As Needed for Itching., Disp: , Rfl:     ferrous sulfate 325 (65 FE) MG tablet, Take 1 tablet by mouth Daily With Breakfast., Disp: , Rfl:     fluticasone (FLONASE) 50 MCG/ACT nasal spray, 2 sprays into the nostril(s) as directed by provider Daily. 2 sprays each  "nostril daily, Disp: 18.2 mL, Rfl: 1    polyethylene glycol (MIRALAX) 17 g packet, Take 17 g by mouth Daily., Disp: 14 each, Rfl: 0    Procto-Med HC 2.5 % rectal cream, INSERT INTO THE RECTUM TWICE A DAY, Disp: , Rfl:     sodium bicarbonate 650 MG tablet, Take 1 tablet by mouth 2 (Two) Times a Day., Disp: , Rfl:     TART CHERRY PO, Take  by mouth., Disp: , Rfl:     vitamin B-12 (CYANOCOBALAMIN) 100 MCG tablet, Take 0.5 tablets by mouth Daily., Disp: , Rfl:     Zinc Sulfate (ZINC 15 PO), Take  by mouth., Disp: , Rfl:     losartan (COZAAR) 100 MG tablet, Take 1 tablet by mouth Daily. (Patient not taking: Reported on 7/24/2024), Disp: 90 tablet, Rfl: 1    Allergies:   Allergies   Allergen Reactions    Amlodipine Shortness Of Breath    Clindamycin/Lincomycin Swelling     FACIAL SWELLING      Contrast Dye (Echo Or Unknown Ct/Mr) Palpitations     \"BLOOD PRESSURE GOES GISSELLE HIGH\", \"HEART RACES\"    Diltiazem Hcl Anxiety    Zofran [Ondansetron] Confusion    Augmentin [Amoxicillin-Pot Clavulanate] Nausea And Vomiting     Beta lactam allergy details  Antibiotic reaction: nausea  Age at reaction: adult  Dose to reaction time: days  Reason for antibiotic: unknown  Epinephrine required for reaction?: unknown  Tolerated antibiotics: unknown       Doxycycline Nausea And Vomiting               Objective     Physical Exam:  Vital Signs:   Vitals:    07/24/24 0835 07/24/24 0913   BP: 158/88 134/68  Comment: manual   Pulse: 86    Temp: 98.9 °F (37.2 °C)    SpO2: 99%    Weight: 105 kg (232 lb)    Height: 157.5 cm (62\")      Body mass index is 42.43 kg/m².           Physical Exam  HENT:      Right Ear: Tympanic membrane normal.      Left Ear: Tympanic membrane normal.      Nose: Nose normal.      Mouth/Throat:      Mouth: Mucous membranes are moist.   Eyes:      Conjunctiva/sclera: Conjunctivae normal.   Neck:      Vascular: No carotid bruit.   Cardiovascular:      Rate and Rhythm: Normal rate and regular rhythm.      Heart sounds: Normal " heart sounds. No murmur heard.  Pulmonary:      Effort: Pulmonary effort is normal.      Breath sounds: Normal breath sounds.   Abdominal:      General: Bowel sounds are normal.      Palpations: Abdomen is soft.   Musculoskeletal:      Right lower leg: No edema.      Left lower leg: No edema.   Skin:     General: Skin is warm and dry.   Neurological:      Mental Status: She is alert.   Psychiatric:         Mood and Affect: Mood normal.         Behavior: Behavior normal.           Assessment / Plan      Assessment/Plan:   Diagnoses and all orders for this visit:    1. Primary hypertension (Primary)    2. Mixed hyperlipidemia  -     Comprehensive Metabolic Panel  -     Lipid Panel    3. Impaired fasting glucose  -     Comprehensive Metabolic Panel  -     Hemoglobin A1c    4. CKD (chronic kidney disease) stage 5, GFR less than 15 ml/min  -     Comprehensive Metabolic Panel    5. Gout, unspecified cause, unspecified chronicity, unspecified site  -     Uric Acid; Future    6. Vitamin D deficiency    7. Class 3 severe obesity due to excess calories with serious comorbidity and body mass index (BMI) of 40.0 to 44.9 in adult    8. Anemia, unspecified type  -     Cancel: CBC Auto Differential  -     Iron Profile  -     Ferritin    9. Screening for thyroid disorder  -     TSH    Other orders  -     allopurinol (ZYLOPRIM) 300 MG tablet; Take 1 tablet by mouth Daily.  Dispense: 180 tablet; Refill: 1  -     atorvastatin (LIPITOR) 20 MG tablet; Take 1 tablet by mouth Every Night.  Dispense: 90 tablet; Refill: 1  -     carvedilol (COREG) 25 MG tablet; Take 1 tablet by mouth 2 (Two) Times a Day With Meals.  Dispense: 180 tablet; Refill: 1  -     fluticasone (FLONASE) 50 MCG/ACT nasal spray; 2 sprays into the nostril(s) as directed by provider Daily. 2 sprays each nostril daily  Dispense: 18.2 mL; Refill: 1       HTN resume losartan as recommended by cardiology will hold clonidine  blood pressure closely as when patient combine  "these medications she had lightheaded dizziness with low blood pressure readings recommend patient monitor blood pressure once daily resuming losartan and bring blood pressure log home monitor to office in 4 weeks  Will obtain lipid panel and CMP to monitor current statin dose Lipitor 20 mg at nighttime denies myalgias  Impaired fasting glucose obtain hemoglobin A1c to monitor recommend reduce added sugars carbs exercise 30 minutes daily and weight loss patient has lost 3 pounds in the past month states she has a strict diet and is being active daily  Chronic kidney disease will obtain CMP to monitor recommend avoid all NSAIDs follow-up with nephrology as scheduled  Gout no recent flares will obtain uric acid level monitor current allopurinol dose 300 mg daily  Vitamin D deficiency we will obtain labs to monitor  Class III obesity with comorbidities recommend reducing overall caloric intake increasing water increasing exercise patient's insurance does not cover Wegovy or other weight loss medications  Anemia will obtain labs to monitor currently taking ferrous sulfate 3 and 25 mg once daily        Follow Up:   Return in about 3 months (around 10/24/2024).    Frandy Ellis, APRN    \"Please note that portions of this note were completed with a voice recognition program.\"    "

## 2024-07-24 ENCOUNTER — OFFICE VISIT (OUTPATIENT)
Dept: FAMILY MEDICINE CLINIC | Facility: CLINIC | Age: 54
End: 2024-07-24
Payer: MEDICARE

## 2024-07-24 ENCOUNTER — LAB (OUTPATIENT)
Dept: LAB | Facility: HOSPITAL | Age: 54
End: 2024-07-24
Payer: MEDICARE

## 2024-07-24 ENCOUNTER — TRANSCRIBE ORDERS (OUTPATIENT)
Dept: ADMINISTRATIVE | Facility: HOSPITAL | Age: 54
End: 2024-07-24
Payer: MEDICARE

## 2024-07-24 ENCOUNTER — TELEPHONE (OUTPATIENT)
Dept: SURGERY | Facility: CLINIC | Age: 54
End: 2024-07-24
Payer: MEDICARE

## 2024-07-24 VITALS
TEMPERATURE: 98.9 F | SYSTOLIC BLOOD PRESSURE: 134 MMHG | WEIGHT: 232 LBS | OXYGEN SATURATION: 99 % | HEIGHT: 62 IN | DIASTOLIC BLOOD PRESSURE: 68 MMHG | HEART RATE: 86 BPM | BODY MASS INDEX: 42.69 KG/M2

## 2024-07-24 DIAGNOSIS — N39.0 URINARY TRACT INFECTION WITHOUT HEMATURIA, SITE UNSPECIFIED: ICD-10-CM

## 2024-07-24 DIAGNOSIS — I10 PRIMARY HYPERTENSION: Primary | ICD-10-CM

## 2024-07-24 DIAGNOSIS — M10.9 GOUT, UNSPECIFIED CAUSE, UNSPECIFIED CHRONICITY, UNSPECIFIED SITE: ICD-10-CM

## 2024-07-24 DIAGNOSIS — E66.01 OBESITIES, MORBID: ICD-10-CM

## 2024-07-24 DIAGNOSIS — M10.9 PODAGRA: ICD-10-CM

## 2024-07-24 DIAGNOSIS — Z13.29 SCREENING FOR THYROID DISORDER: ICD-10-CM

## 2024-07-24 DIAGNOSIS — E55.9 VITAMIN D DEFICIENCY: ICD-10-CM

## 2024-07-24 DIAGNOSIS — I10 ESSENTIAL HYPERTENSION, MALIGNANT: ICD-10-CM

## 2024-07-24 DIAGNOSIS — D63.1 ERYTHROPOIETIN DEFICIENCY ANEMIA: ICD-10-CM

## 2024-07-24 DIAGNOSIS — R60.0 LOCALIZED EDEMA: ICD-10-CM

## 2024-07-24 DIAGNOSIS — E83.39 HYPOPHOSPHATURIA: ICD-10-CM

## 2024-07-24 DIAGNOSIS — E87.20 ACIDOSIS: ICD-10-CM

## 2024-07-24 DIAGNOSIS — D64.9 ANEMIA, UNSPECIFIED TYPE: ICD-10-CM

## 2024-07-24 DIAGNOSIS — N18.5 CKD (CHRONIC KIDNEY DISEASE) STAGE 5, GFR LESS THAN 15 ML/MIN: ICD-10-CM

## 2024-07-24 DIAGNOSIS — N18.5 CHRONIC KIDNEY DISEASE, STAGE V: ICD-10-CM

## 2024-07-24 DIAGNOSIS — E55.9 AVITAMINOSIS D: ICD-10-CM

## 2024-07-24 DIAGNOSIS — R73.01 IMPAIRED FASTING GLUCOSE: ICD-10-CM

## 2024-07-24 DIAGNOSIS — E83.52 HYPERCALCEMIA SYNDROME: ICD-10-CM

## 2024-07-24 DIAGNOSIS — E66.01 CLASS 3 SEVERE OBESITY DUE TO EXCESS CALORIES WITH SERIOUS COMORBIDITY AND BODY MASS INDEX (BMI) OF 40.0 TO 44.9 IN ADULT: ICD-10-CM

## 2024-07-24 DIAGNOSIS — N18.5 CHRONIC KIDNEY DISEASE, STAGE V: Primary | ICD-10-CM

## 2024-07-24 DIAGNOSIS — E78.2 MIXED HYPERLIPIDEMIA: ICD-10-CM

## 2024-07-24 LAB
ALBUMIN SERPL-MCNC: 4.1 G/DL (ref 3.5–5.2)
ALBUMIN/GLOB SERPL: 1.5 G/DL
ALP SERPL-CCNC: 137 U/L (ref 39–117)
ALT SERPL W P-5'-P-CCNC: 17 U/L (ref 1–33)
ANION GAP SERPL CALCULATED.3IONS-SCNC: 14 MMOL/L (ref 5–15)
AST SERPL-CCNC: 15 U/L (ref 1–32)
BASOPHILS # BLD AUTO: 0.06 10*3/MM3 (ref 0–0.2)
BASOPHILS NFR BLD AUTO: 0.7 % (ref 0–1.5)
BILIRUB SERPL-MCNC: 0.4 MG/DL (ref 0–1.2)
BUN SERPL-MCNC: 49 MG/DL (ref 6–20)
BUN/CREAT SERPL: 14.9 (ref 7–25)
CALCIUM SPEC-SCNC: 11.4 MG/DL (ref 8.6–10.5)
CHLORIDE SERPL-SCNC: 102 MMOL/L (ref 98–107)
CHOLEST SERPL-MCNC: 124 MG/DL (ref 0–200)
CO2 SERPL-SCNC: 18 MMOL/L (ref 22–29)
CREAT SERPL-MCNC: 3.29 MG/DL (ref 0.57–1)
CREAT UR-MCNC: 46.3 MG/DL
DEPRECATED RDW RBC AUTO: 48.7 FL (ref 37–54)
EGFRCR SERPLBLD CKD-EPI 2021: 16 ML/MIN/1.73
EOSINOPHIL # BLD AUTO: 0.18 10*3/MM3 (ref 0–0.4)
EOSINOPHIL NFR BLD AUTO: 2 % (ref 0.3–6.2)
ERYTHROCYTE [DISTWIDTH] IN BLOOD BY AUTOMATED COUNT: 14.4 % (ref 12.3–15.4)
FERRITIN SERPL-MCNC: 149 NG/ML (ref 13–150)
GLOBULIN UR ELPH-MCNC: 2.7 GM/DL
GLUCOSE SERPL-MCNC: 109 MG/DL (ref 65–99)
HBA1C MFR BLD: 6 % (ref 4.8–5.6)
HCT VFR BLD AUTO: 34.3 % (ref 34–46.6)
HDLC SERPL-MCNC: 37 MG/DL (ref 40–60)
HGB BLD-MCNC: 11.4 G/DL (ref 12–15.9)
IMM GRANULOCYTES # BLD AUTO: 0.03 10*3/MM3 (ref 0–0.05)
IMM GRANULOCYTES NFR BLD AUTO: 0.3 % (ref 0–0.5)
IRON 24H UR-MRATE: 38 MCG/DL (ref 37–145)
IRON SATN MFR SERPL: 13 % (ref 20–50)
LDLC SERPL CALC-MCNC: 64 MG/DL (ref 0–100)
LDLC/HDLC SERPL: 1.65 {RATIO}
LYMPHOCYTES # BLD AUTO: 2.04 10*3/MM3 (ref 0.7–3.1)
LYMPHOCYTES NFR BLD AUTO: 22.6 % (ref 19.6–45.3)
MCH RBC QN AUTO: 31.3 PG (ref 26.6–33)
MCHC RBC AUTO-ENTMCNC: 33.2 G/DL (ref 31.5–35.7)
MCV RBC AUTO: 94.2 FL (ref 79–97)
MONOCYTES # BLD AUTO: 0.54 10*3/MM3 (ref 0.1–0.9)
MONOCYTES NFR BLD AUTO: 6 % (ref 5–12)
NEUTROPHILS NFR BLD AUTO: 6.17 10*3/MM3 (ref 1.7–7)
NEUTROPHILS NFR BLD AUTO: 68.4 % (ref 42.7–76)
NRBC BLD AUTO-RTO: 0 /100 WBC (ref 0–0.2)
PHOSPHATE SERPL-MCNC: 3.4 MG/DL (ref 2.5–4.5)
PLATELET # BLD AUTO: 193 10*3/MM3 (ref 140–450)
PMV BLD AUTO: 9.3 FL (ref 6–12)
POTASSIUM SERPL-SCNC: 4.4 MMOL/L (ref 3.5–5.2)
PROT ?TM UR-MCNC: 31.1 MG/DL
PROT SERPL-MCNC: 6.8 G/DL (ref 6–8.5)
PROT/CREAT UR: 0.67 MG/G{CREAT}
RBC # BLD AUTO: 3.64 10*6/MM3 (ref 3.77–5.28)
SODIUM SERPL-SCNC: 134 MMOL/L (ref 136–145)
TIBC SERPL-MCNC: 294 MCG/DL (ref 298–536)
TRANSFERRIN SERPL-MCNC: 197 MG/DL (ref 200–360)
TRIGL SERPL-MCNC: 130 MG/DL (ref 0–150)
TSH SERPL DL<=0.05 MIU/L-ACNC: 1.87 UIU/ML (ref 0.27–4.2)
VLDLC SERPL-MCNC: 23 MG/DL (ref 5–40)
WBC NRBC COR # BLD AUTO: 9.02 10*3/MM3 (ref 3.4–10.8)

## 2024-07-24 PROCEDURE — 1126F AMNT PAIN NOTED NONE PRSNT: CPT | Performed by: NURSE PRACTITIONER

## 2024-07-24 PROCEDURE — 3078F DIAST BP <80 MM HG: CPT | Performed by: NURSE PRACTITIONER

## 2024-07-24 PROCEDURE — 84443 ASSAY THYROID STIM HORMONE: CPT | Performed by: NURSE PRACTITIONER

## 2024-07-24 PROCEDURE — 85025 COMPLETE CBC W/AUTO DIFF WBC: CPT

## 2024-07-24 PROCEDURE — 83540 ASSAY OF IRON: CPT | Performed by: NURSE PRACTITIONER

## 2024-07-24 PROCEDURE — 82728 ASSAY OF FERRITIN: CPT | Performed by: NURSE PRACTITIONER

## 2024-07-24 PROCEDURE — 84466 ASSAY OF TRANSFERRIN: CPT | Performed by: NURSE PRACTITIONER

## 2024-07-24 PROCEDURE — 80061 LIPID PANEL: CPT | Performed by: NURSE PRACTITIONER

## 2024-07-24 PROCEDURE — 84156 ASSAY OF PROTEIN URINE: CPT

## 2024-07-24 PROCEDURE — 3075F SYST BP GE 130 - 139MM HG: CPT | Performed by: NURSE PRACTITIONER

## 2024-07-24 PROCEDURE — 82570 ASSAY OF URINE CREATININE: CPT

## 2024-07-24 PROCEDURE — 36415 COLL VENOUS BLD VENIPUNCTURE: CPT

## 2024-07-24 PROCEDURE — 84100 ASSAY OF PHOSPHORUS: CPT

## 2024-07-24 PROCEDURE — 83036 HEMOGLOBIN GLYCOSYLATED A1C: CPT | Performed by: NURSE PRACTITIONER

## 2024-07-24 PROCEDURE — 99214 OFFICE O/P EST MOD 30 MIN: CPT | Performed by: NURSE PRACTITIONER

## 2024-07-24 PROCEDURE — 80053 COMPREHEN METABOLIC PANEL: CPT | Performed by: NURSE PRACTITIONER

## 2024-07-24 RX ORDER — CARVEDILOL 25 MG/1
25 TABLET ORAL 2 TIMES DAILY WITH MEALS
Qty: 180 TABLET | Refills: 1 | Status: SHIPPED | OUTPATIENT
Start: 2024-07-24

## 2024-07-24 RX ORDER — CLOBETASOL PROPIONATE 0.5 MG/G
1 CREAM TOPICAL 2 TIMES DAILY
Qty: 60 G | Refills: 1 | Status: CANCELLED | OUTPATIENT
Start: 2024-07-24

## 2024-07-24 RX ORDER — ATORVASTATIN CALCIUM 20 MG/1
20 TABLET, FILM COATED ORAL NIGHTLY
Qty: 90 TABLET | Refills: 1 | Status: SHIPPED | OUTPATIENT
Start: 2024-07-24

## 2024-07-24 RX ORDER — ALLOPURINOL 300 MG/1
300 TABLET ORAL DAILY
Qty: 180 TABLET | Refills: 1 | Status: SHIPPED | OUTPATIENT
Start: 2024-07-24

## 2024-07-24 RX ORDER — CLONIDINE HYDROCHLORIDE 0.2 MG/1
0.2 TABLET ORAL 3 TIMES DAILY
Qty: 270 TABLET | Refills: 0 | Status: CANCELLED | OUTPATIENT
Start: 2024-07-24

## 2024-07-24 RX ORDER — FLUTICASONE PROPIONATE 50 MCG
2 SPRAY, SUSPENSION (ML) NASAL DAILY
Qty: 18.2 ML | Refills: 1 | Status: SHIPPED | OUTPATIENT
Start: 2024-07-24

## 2024-07-24 NOTE — PROGRESS NOTES
"Dudley Link is a 74year old female presenting to the walk-in clinic today for forehead redness, swelling, and ""burning feeling\"" that started this past Tuesday. +left-side throat and face tenderness. Denies sob, or difficulty swallowing.     Swabs/Specimens collected during triage process:  None      Triaged to: the lobby  " Chief Complaint   Patient presents with    PD Cath Consult       Subjective      Katarzyna Norris is a 53 y.o. female who is referred by MARICRUZ Woods to be evaluated for peritoneal dialysis catheter placement. Patient has CKD secondary to chronic UTIs and  is not on dialysis . Patient does have a AV access.  Patient has not had a recent intraabdominal infection. The patient reports having regular bowel movements.  Patient did have a Colonoscopy.   Is being worked up for transplant.  She reports having abdominal wall hernia at the umbilicus.    Past Medical History:   Diagnosis Date    Anxiety     Arthritis     GILBERT KNEES    Asthma     SEASONAL ALLERGIES- NO INHALERS    CKD (chronic kidney disease)     Stage 5, Follows with [facility]- NO CURRENT DIALYSIS    Elevated cholesterol     Gastritis     GERD (gastroesophageal reflux disease)     Gout 2021    Hiatal hernia     Hiatal hernia     Hypertension     Lactose intolerance     Transplant, organ     ON KIDNEY TRANSPLANT LIST       Past Surgical History:   Procedure Laterality Date    ARTERIOVENOUS FISTULA/SHUNT SURGERY Left 2024    Procedure: 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;  Surgeon: Yaniv Dominguez MD;  Location: [facility] MAIN OR;  Service: Vascular;  Laterality: Left;    BONY PELVIS SURGERY      Plate     SECTION   and     CHOLECYSTECTOMY      COLONOSCOPY      COLONOSCOPY      COLONOSCOPY      COLONOSCOPY N/A 2024    Procedure: COLONOSCOPY;  Surgeon: Aston Arroyo MD;  Location: [facility] ENDOSCOPY;  Service: Gastroenterology;  Laterality: N/A;  HEMORRHOIDS    ENDOSCOPY      ENDOSCOPY N/A 2023    Procedure: ESOPHAGOGASTRODUODENOSCOPY with biopsies;  Surgeon: Tam Badillo MD;  Location: [facility] ENDOSCOPY;  Service: General;  Laterality: N/A;  hiatal hernia    JOINT REPLACEMENT      TOTAL KNEE ARTHROPLASTY Left 2021    Procedure: TOTAL KNEE ARTHROPLASTY;  Surgeon: Omar  Marco VALLEJO MD;  Location: [facility] MAIN OR;  Service: Orthopedics;  Laterality: Left;    TOTAL KNEE ARTHROPLASTY Right 10/19/2022    Procedure: RIGHT TOTAL KNEE ARTHROPLASTY - NO SHELBY;  Surgeon: Marco Nelson MD;  Location: [facility] MAIN OR;  Service: Orthopedics;  Laterality: Right;    TUBAL ABDOMINAL LIGATION  1989         Current Outpatient Medications:     acetaminophen (TYLENOL) 325 MG tablet, Take 2 tablets by mouth Every 6 (Six) Hours As Needed., Disp: , Rfl:     clobetasol propionate (TEMOVATE) 0.05 % cream, Apply 1 Application topically to the appropriate area as directed 2 (Two) Times a Day., Disp: 60 g, Rfl: 1    cloNIDine (CATAPRES) 0.2 MG tablet, Take 1 tablet by mouth 3 (Three) Times a Day., Disp: 270 tablet, Rfl: 0    diphenhydrAMINE (BENADRYL) 25 mg capsule, Take 1 capsule by mouth Every 6 (Six) Hours As Needed for Itching., Disp: , Rfl:     ferrous sulfate 325 (65 FE) MG tablet, Take 1 tablet by mouth Daily With Breakfast., Disp: , Rfl:     losartan (COZAAR) 100 MG tablet, Take 1 tablet by mouth Daily. (Patient not taking: Reported on 7/24/2024), Disp: 90 tablet, Rfl: 1    polyethylene glycol (MIRALAX) 17 g packet, Take 17 g by mouth Daily., Disp: 14 each, Rfl: 0    Procto-Med HC 2.5 % rectal cream, INSERT INTO THE RECTUM TWICE A DAY, Disp: , Rfl:     sodium bicarbonate 650 MG tablet, Take 1 tablet by mouth 2 (Two) Times a Day., Disp: , Rfl:     TART CHERRY PO, Take  by mouth., Disp: , Rfl:     vitamin B-12 (CYANOCOBALAMIN) 100 MCG tablet, Take 0.5 tablets by mouth Daily., Disp: , Rfl:     Zinc Sulfate (ZINC 15 PO), Take  by mouth., Disp: , Rfl:     allopurinol (ZYLOPRIM) 300 MG tablet, Take 1 tablet by mouth Daily., Disp: 180 tablet, Rfl: 1    atorvastatin (LIPITOR) 20 MG tablet, Take 1 tablet by mouth Every Night., Disp: 90 tablet, Rfl: 1    carvedilol (COREG) 25 MG tablet, Take 1 tablet by mouth 2 (Two) Times a Day With Meals., Disp: 180 tablet, Rfl: 1    fluticasone (FLONASE) 50 MCG/ACT nasal  "spray, 2 sprays into the nostril(s) as directed by provider Daily. 2 sprays each nostril daily, Disp: 18.2 mL, Rfl: 1    Allergies   Allergen Reactions    Amlodipine Shortness Of Breath    Clindamycin/Lincomycin Swelling     FACIAL SWELLING      Contrast Dye (Echo Or Unknown Ct/Mr) Palpitations     \"BLOOD PRESSURE GOES GISSELLE HIGH\", \"HEART RACES\"    Diltiazem Hcl Anxiety    Zofran [Ondansetron] Confusion    Augmentin [Amoxicillin-Pot Clavulanate] Nausea And Vomiting     Beta lactam allergy details  Antibiotic reaction: nausea  Age at reaction: adult  Dose to reaction time: days  Reason for antibiotic: unknown  Epinephrine required for reaction?: unknown  Tolerated antibiotics: unknown       Doxycycline Nausea And Vomiting       Family History   Problem Relation Age of Onset    Diabetes Mother     Throat cancer Father 73    Hypertension Sister     Hypertension Sister     Hypertension Sister     Diabetes Brother     Hypertension Brother     Stroke Other     Diabetes Other     Malig Hyperthermia Neg Hx     Colon cancer Neg Hx        Social History     Socioeconomic History    Marital status:    Tobacco Use    Smoking status: Former     Current packs/day: 0.00     Types: Cigarettes     Quit date:      Years since quittin.5     Passive exposure: Never    Smokeless tobacco: Never   Vaping Use    Vaping status: Never Used   Substance and Sexual Activity    Alcohol use: Never    Drug use: Never    Sexual activity: Defer     REVIEW OF SYSTEMS    12 point review of system was performed and this was negative    Physical Examination  /82 (BP Location: Left arm, Patient Position: Sitting)   Ht 157.5 cm (62\")   Wt 105 kg (232 lb)   BMI 42.43 kg/m²   Body mass index is 42.43 kg/m².  Physical Exam  Constitutional:       Appearance: She is obese.   HENT:      Head: Normocephalic and atraumatic.      Nose: Nose normal.      Mouth/Throat:      Pharynx: Oropharynx is clear.   Eyes:      General: No scleral " icterus.     Conjunctiva/sclera: Conjunctivae normal.   Cardiovascular:      Rate and Rhythm: Normal rate and regular rhythm.   Pulmonary:      Effort: Pulmonary effort is normal.   Abdominal:      General: Bowel sounds are normal. There is no distension.      Palpations: Abdomen is soft. There is no mass.      Tenderness: There is no abdominal tenderness.      Hernia: A hernia is present.          Comments: There is a small and easily reducible umbilical hernia.  There is a small and easily reducible incisional hernia just below the umbilical defect.  There is a well-healed infraumbilical incision   Musculoskeletal:         General: Normal range of motion.      Cervical back: Normal range of motion and neck supple.   Skin:     General: Skin is warm and dry.      Capillary Refill: Capillary refill takes less than 2 seconds.   Neurological:      General: No focal deficit present.      Mental Status: She is alert. Mental status is at baseline.   Psychiatric:         Mood and Affect: Mood normal.         Behavior: Behavior normal.       CT scan abdomen pelvis 5/17/2024: There is evidence of wide rectus muscle diastases.  There is small fat-containing umbilical hernia.  Inferior to the umbilical hernia there is a small and fat-containing incisional hernia measured approximately 1.5 cm.  At the pelvis there is evidence of fixation of the pelvis and prior surgery.  At the suprapubic area there is separation of the rectus muscle and possible suprapubic hernia.      Labs   6/23/2024  Hemoglobin 11, platelets 193      Assessment:   Katarzyna Norris is a 53 y.o. female with CKD due to chronic UTI that is not on dialysis and will need peritoneal dialysis catheter placement.  She has umbilical as well as infraumbilical incisional hernia.  She has possible suprapubic incisional hernia.  Discussed with patient about further step.  I think that in order to be candidate for peritoneal dialysis catheter placement she should undergo  hernia repair.  I offer her robotic assisted laparoscopic umbilical and incisional hernia repair with possible mesh placement.  She understand that she may have suprapubic hernia that will need to be fixed at the time of the procedure.  I discussed with her about the possibility of placing mesh and likelihood of the mesh being placed in between the layers of the abdominal wall so to decrease intra-abdominal adhesions so she can potentially have peritoneal dialysis catheter placement.      Plan:     -Originally scheduled for robotic umbilical incisional hernia repair.  Patient later called to cancel the procedure.  -Patient to call whenever ready for surgery  Orders Placed This Encounter   Procedures    MRSA Screen Culture (Outpatient) - Swab, Nares     Standing Status:   Future     Standing Expiration Date:   7/23/2025     Order Specific Question:   Release to patient     Answer:   Routine Release [[id number]]    Provide Patient With Instructions on NPO Status     Standing Status:   Future    Provide Chlorhexidine Skin Prep Wipes and Instructions     Chlorhexidine Skin Prep and Instructions For All Patients Having a Procedure Requiring and Outward Incision if Not Allergic. If Allergic, Give Antibacterial Skin Wipes and Instructions. Do Not Use for Facial Cases or on Any Mucous Membranes.     Standing Status:   Future    ECG 12 Lead     Standing Status:   Future     Standing Expiration Date:   7/23/2025     Order Specific Question:   Reason for Exam:     Answer:   preop     Order Specific Question:   Release to patient     Answer:   Routine Release [[id number]]         Bradford Martniez MD  General, Minimally Invasive and Endoscopic Surgery  [facility] [address], [address]  [place], [place], [address]  P: [phone]  F: [phone]

## 2024-07-24 NOTE — TELEPHONE ENCOUNTER
Received call from Mayra at the [facility] stating that pt is wanting to cancel her hernia repair on 8/7 with Dr Martinez.

## 2024-07-25 ENCOUNTER — TELEPHONE (OUTPATIENT)
Dept: FAMILY MEDICINE CLINIC | Facility: CLINIC | Age: 54
End: 2024-07-25
Payer: MEDICARE

## 2024-07-25 NOTE — TELEPHONE ENCOUNTER
Called and advised patient      Patient was seen yesterday and her Losartan was adjusted up, but her BP is still kareem today. 168/70.

## 2024-08-01 ENCOUNTER — TELEPHONE (OUTPATIENT)
Dept: FAMILY MEDICINE CLINIC | Facility: CLINIC | Age: 54
End: 2024-08-01
Payer: MEDICARE

## 2024-09-10 PROCEDURE — 87086 URINE CULTURE/COLONY COUNT: CPT | Performed by: NURSE PRACTITIONER

## 2024-09-12 ENCOUNTER — PATIENT ROUNDING (BHMG ONLY) (OUTPATIENT)
Dept: URGENT CARE | Facility: CLINIC | Age: 54
End: 2024-09-12
Payer: MEDICARE

## 2024-09-12 NOTE — ED NOTES
Thank you for letting us care for you in your recent visit to our urgent care center. We would love to hear about your experience with us. Was this the first time you have visited our location?    We’re always looking for ways to make our patients’ experiences even better. Do you have any recommendations on ways we may improve?     I appreciate you taking the time to respond. Please be on the lookout for a survey about your recent visit from [employer] via text or email. We would greatly appreciate if you could fill that out and turn it back in. We want your voice to be heard and we value your feedback.   Thank you for choosing [facility] for your healthcare needs.

## 2024-09-15 ENCOUNTER — APPOINTMENT (OUTPATIENT)
Dept: GENERAL RADIOLOGY | Facility: HOSPITAL | Age: 54
End: 2024-09-15
Payer: MEDICARE

## 2024-09-15 ENCOUNTER — APPOINTMENT (OUTPATIENT)
Dept: CT IMAGING | Facility: HOSPITAL | Age: 54
End: 2024-09-15
Payer: MEDICARE

## 2024-09-15 ENCOUNTER — HOSPITAL ENCOUNTER (OUTPATIENT)
Facility: HOSPITAL | Age: 54
Setting detail: OBSERVATION
Discharge: HOME | End: 2024-09-17
Attending: EMERGENCY MEDICINE | Admitting: INTERNAL MEDICINE
Payer: MEDICARE

## 2024-09-15 ENCOUNTER — APPOINTMENT (OUTPATIENT)
Dept: MRI IMAGING | Facility: HOSPITAL | Age: 54
End: 2024-09-15
Payer: MEDICARE

## 2024-09-15 DIAGNOSIS — I10 PRIMARY HYPERTENSION: ICD-10-CM

## 2024-09-15 DIAGNOSIS — G45.9 TIA (TRANSIENT ISCHEMIC ATTACK): ICD-10-CM

## 2024-09-15 DIAGNOSIS — Z78.9 DECREASED ACTIVITIES OF DAILY LIVING (ADL): ICD-10-CM

## 2024-09-15 DIAGNOSIS — R20.0 RIGHT FACIAL NUMBNESS: ICD-10-CM

## 2024-09-15 DIAGNOSIS — R20.0 RIGHT ARM NUMBNESS: ICD-10-CM

## 2024-09-15 DIAGNOSIS — R26.2 DIFFICULTY IN WALKING: ICD-10-CM

## 2024-09-15 DIAGNOSIS — G45.9 TRANSIENT ISCHEMIC ATTACK (TIA): Primary | ICD-10-CM

## 2024-09-15 DIAGNOSIS — R13.12 DYSPHAGIA, OROPHARYNGEAL: ICD-10-CM

## 2024-09-15 LAB
ABO GROUP BLD: NORMAL
ABO GROUP BLD: NORMAL
ALBUMIN SERPL-MCNC: 4.3 G/DL (ref 3.5–5.2)
ALBUMIN/GLOB SERPL: 1.5 G/DL
ALP SERPL-CCNC: 154 U/L (ref 39–117)
ALT SERPL W P-5'-P-CCNC: 14 U/L (ref 1–33)
ANION GAP SERPL CALCULATED.3IONS-SCNC: 13.4 MMOL/L (ref 5–15)
APTT PPP: 29 SECONDS (ref 24.2–34.2)
AST SERPL-CCNC: 15 U/L (ref 1–32)
BACTERIA UR QL AUTO: ABNORMAL /HPF
BASOPHILS # BLD AUTO: 0.07 10*3/MM3 (ref 0–0.2)
BASOPHILS NFR BLD AUTO: 0.8 % (ref 0–1.5)
BILIRUB SERPL-MCNC: 0.6 MG/DL (ref 0–1.2)
BILIRUB UR QL STRIP: NEGATIVE
BLD GP AB SCN SERPL QL: NEGATIVE
BUN SERPL-MCNC: 53 MG/DL (ref 6–20)
BUN/CREAT SERPL: 16.1 (ref 7–25)
CALCIUM SPEC-SCNC: 11.7 MG/DL (ref 8.6–10.5)
CHLORIDE SERPL-SCNC: 101 MMOL/L (ref 98–107)
CLARITY UR: CLEAR
CO2 SERPL-SCNC: 20.6 MMOL/L (ref 22–29)
COLOR UR: YELLOW
CREAT SERPL-MCNC: 3.3 MG/DL (ref 0.57–1)
DEPRECATED RDW RBC AUTO: 53.9 FL (ref 37–54)
EGFRCR SERPLBLD CKD-EPI 2021: 15.9 ML/MIN/1.73
EOSINOPHIL # BLD AUTO: 0.24 10*3/MM3 (ref 0–0.4)
EOSINOPHIL NFR BLD AUTO: 2.8 % (ref 0.3–6.2)
ERYTHROCYTE [DISTWIDTH] IN BLOOD BY AUTOMATED COUNT: 15.2 % (ref 12.3–15.4)
GLOBULIN UR ELPH-MCNC: 2.8 GM/DL
GLUCOSE BLDC GLUCOMTR-MCNC: 104 MG/DL (ref 70–99)
GLUCOSE BLDC GLUCOMTR-MCNC: 106 MG/DL (ref 70–99)
GLUCOSE SERPL-MCNC: 102 MG/DL (ref 65–99)
GLUCOSE UR STRIP-MCNC: NEGATIVE MG/DL
HCT VFR BLD AUTO: 35.1 % (ref 34–46.6)
HGB BLD-MCNC: 10.9 G/DL (ref 12–15.9)
HGB UR QL STRIP.AUTO: NEGATIVE
HOLD SPECIMEN: NORMAL
HOLD SPECIMEN: NORMAL
HYALINE CASTS UR QL AUTO: ABNORMAL /LPF
IMM GRANULOCYTES # BLD AUTO: 0.03 10*3/MM3 (ref 0–0.05)
IMM GRANULOCYTES NFR BLD AUTO: 0.3 % (ref 0–0.5)
INR PPP: 1.05 (ref 0.86–1.15)
KETONES UR QL STRIP: NEGATIVE
LEUKOCYTE ESTERASE UR QL STRIP.AUTO: ABNORMAL
LYMPHOCYTES # BLD AUTO: 1.96 10*3/MM3 (ref 0.7–3.1)
LYMPHOCYTES NFR BLD AUTO: 22.7 % (ref 19.6–45.3)
MCH RBC QN AUTO: 30.1 PG (ref 26.6–33)
MCHC RBC AUTO-ENTMCNC: 31.1 G/DL (ref 31.5–35.7)
MCV RBC AUTO: 97 FL (ref 79–97)
MONOCYTES # BLD AUTO: 0.48 10*3/MM3 (ref 0.1–0.9)
MONOCYTES NFR BLD AUTO: 5.6 % (ref 5–12)
NEUTROPHILS NFR BLD AUTO: 5.86 10*3/MM3 (ref 1.7–7)
NEUTROPHILS NFR BLD AUTO: 67.8 % (ref 42.7–76)
NITRITE UR QL STRIP: NEGATIVE
NRBC BLD AUTO-RTO: 0 /100 WBC (ref 0–0.2)
PH UR STRIP.AUTO: 5.5 [PH] (ref 5–8)
PLATELET # BLD AUTO: 199 10*3/MM3 (ref 140–450)
PMV BLD AUTO: 9.6 FL (ref 6–12)
POTASSIUM SERPL-SCNC: 4.7 MMOL/L (ref 3.5–5.2)
PROT SERPL-MCNC: 7.1 G/DL (ref 6–8.5)
PROT UR QL STRIP: ABNORMAL
PROTHROMBIN TIME: 13.9 SECONDS (ref 11.8–14.9)
QT INTERVAL: 345 MS
QTC INTERVAL: 422 MS
RBC # BLD AUTO: 3.62 10*6/MM3 (ref 3.77–5.28)
RBC # UR STRIP: ABNORMAL /HPF
REF LAB TEST METHOD: ABNORMAL
RH BLD: POSITIVE
RH BLD: POSITIVE
SODIUM SERPL-SCNC: 135 MMOL/L (ref 136–145)
SP GR UR STRIP: 1.01 (ref 1–1.03)
SQUAMOUS #/AREA URNS HPF: ABNORMAL /HPF
T&S EXPIRATION DATE: NORMAL
TROPONIN T SERPL HS-MCNC: 31 NG/L
UROBILINOGEN UR QL STRIP: ABNORMAL
WBC # UR STRIP: ABNORMAL /HPF
WBC NRBC COR # BLD AUTO: 8.64 10*3/MM3 (ref 3.4–10.8)
WHOLE BLOOD HOLD COAG: NORMAL
WHOLE BLOOD HOLD SPECIMEN: NORMAL

## 2024-09-15 PROCEDURE — 70450 CT HEAD/BRAIN W/O DYE: CPT

## 2024-09-15 PROCEDURE — 70551 MRI BRAIN STEM W/O DYE: CPT

## 2024-09-15 PROCEDURE — 86900 BLOOD TYPING SEROLOGIC ABO: CPT

## 2024-09-15 PROCEDURE — 86900 BLOOD TYPING SEROLOGIC ABO: CPT | Performed by: EMERGENCY MEDICINE

## 2024-09-15 PROCEDURE — 96372 THER/PROPH/DIAG INJ SC/IM: CPT

## 2024-09-15 PROCEDURE — 85730 THROMBOPLASTIN TIME PARTIAL: CPT | Performed by: EMERGENCY MEDICINE

## 2024-09-15 PROCEDURE — 94799 UNLISTED PULMONARY SVC/PX: CPT

## 2024-09-15 PROCEDURE — G0378 HOSPITAL OBSERVATION PER HR: HCPCS

## 2024-09-15 PROCEDURE — 85025 COMPLETE CBC W/AUTO DIFF WBC: CPT | Performed by: EMERGENCY MEDICINE

## 2024-09-15 PROCEDURE — 82948 REAGENT STRIP/BLOOD GLUCOSE: CPT

## 2024-09-15 PROCEDURE — 85610 PROTHROMBIN TIME: CPT | Performed by: EMERGENCY MEDICINE

## 2024-09-15 PROCEDURE — 25010000002 HEPARIN (PORCINE) PER 1000 UNITS: Performed by: STUDENT IN AN ORGANIZED HEALTH CARE EDUCATION/TRAINING PROGRAM

## 2024-09-15 PROCEDURE — 93005 ELECTROCARDIOGRAM TRACING: CPT | Performed by: EMERGENCY MEDICINE

## 2024-09-15 PROCEDURE — 94761 N-INVAS EAR/PLS OXIMETRY MLT: CPT

## 2024-09-15 PROCEDURE — 81001 URINALYSIS AUTO W/SCOPE: CPT | Performed by: EMERGENCY MEDICINE

## 2024-09-15 PROCEDURE — 99285 EMERGENCY DEPT VISIT HI MDM: CPT

## 2024-09-15 PROCEDURE — 86850 RBC ANTIBODY SCREEN: CPT | Performed by: EMERGENCY MEDICINE

## 2024-09-15 PROCEDURE — 71045 X-RAY EXAM CHEST 1 VIEW: CPT

## 2024-09-15 PROCEDURE — 80053 COMPREHEN METABOLIC PANEL: CPT | Performed by: EMERGENCY MEDICINE

## 2024-09-15 PROCEDURE — 86901 BLOOD TYPING SEROLOGIC RH(D): CPT

## 2024-09-15 PROCEDURE — 99222 1ST HOSP IP/OBS MODERATE 55: CPT | Performed by: STUDENT IN AN ORGANIZED HEALTH CARE EDUCATION/TRAINING PROGRAM

## 2024-09-15 PROCEDURE — 86901 BLOOD TYPING SEROLOGIC RH(D): CPT | Performed by: EMERGENCY MEDICINE

## 2024-09-15 PROCEDURE — 84484 ASSAY OF TROPONIN QUANT: CPT | Performed by: EMERGENCY MEDICINE

## 2024-09-15 RX ORDER — SODIUM CHLORIDE 0.9 % (FLUSH) 0.9 %
10 SYRINGE (ML) INJECTION EVERY 12 HOURS SCHEDULED
Status: DISCONTINUED | OUTPATIENT
Start: 2024-09-15 | End: 2024-09-17 | Stop reason: HOSPADM

## 2024-09-15 RX ORDER — ASPIRIN 325 MG
325 TABLET ORAL ONCE
Status: COMPLETED | OUTPATIENT
Start: 2024-09-15 | End: 2024-09-15

## 2024-09-15 RX ORDER — SODIUM CHLORIDE 0.9 % (FLUSH) 0.9 %
10 SYRINGE (ML) INJECTION AS NEEDED
Status: DISCONTINUED | OUTPATIENT
Start: 2024-09-15 | End: 2024-09-17 | Stop reason: HOSPADM

## 2024-09-15 RX ORDER — SODIUM CHLORIDE 9 MG/ML
40 INJECTION, SOLUTION INTRAVENOUS AS NEEDED
Status: DISCONTINUED | OUTPATIENT
Start: 2024-09-15 | End: 2024-09-17 | Stop reason: HOSPADM

## 2024-09-15 RX ORDER — ACETAMINOPHEN 650 MG/1
650 SUPPOSITORY RECTAL EVERY 4 HOURS PRN
Status: DISCONTINUED | OUTPATIENT
Start: 2024-09-15 | End: 2024-09-17 | Stop reason: HOSPADM

## 2024-09-15 RX ORDER — ACETAMINOPHEN 325 MG/1
650 TABLET ORAL EVERY 4 HOURS PRN
Status: DISCONTINUED | OUTPATIENT
Start: 2024-09-15 | End: 2024-09-17 | Stop reason: HOSPADM

## 2024-09-15 RX ORDER — LABETALOL HYDROCHLORIDE 5 MG/ML
10 INJECTION, SOLUTION INTRAVENOUS
Status: DISCONTINUED | OUTPATIENT
Start: 2024-09-15 | End: 2024-09-16

## 2024-09-15 RX ORDER — ASPIRIN 81 MG/1
81 TABLET, CHEWABLE ORAL DAILY
Status: DISCONTINUED | OUTPATIENT
Start: 2024-09-16 | End: 2024-09-17 | Stop reason: HOSPADM

## 2024-09-15 RX ORDER — ASPIRIN 300 MG/1
300 SUPPOSITORY RECTAL DAILY
Status: DISCONTINUED | OUTPATIENT
Start: 2024-09-16 | End: 2024-09-16

## 2024-09-15 RX ORDER — HEPARIN SODIUM 5000 [USP'U]/ML
5000 INJECTION, SOLUTION INTRAVENOUS; SUBCUTANEOUS EVERY 12 HOURS SCHEDULED
Status: DISCONTINUED | OUTPATIENT
Start: 2024-09-15 | End: 2024-09-17 | Stop reason: HOSPADM

## 2024-09-15 RX ORDER — ATORVASTATIN CALCIUM 40 MG/1
80 TABLET, FILM COATED ORAL NIGHTLY
Status: DISCONTINUED | OUTPATIENT
Start: 2024-09-15 | End: 2024-09-16

## 2024-09-15 RX ADMIN — HEPARIN SODIUM 5000 UNITS: 5000 INJECTION INTRAVENOUS; SUBCUTANEOUS at 21:08

## 2024-09-15 RX ADMIN — ATORVASTATIN CALCIUM 80 MG: 40 TABLET, FILM COATED ORAL at 21:06

## 2024-09-15 RX ADMIN — Medication 10 ML: at 21:10

## 2024-09-15 RX ADMIN — ASPIRIN 325 MG: 325 TABLET ORAL at 15:08

## 2024-09-16 ENCOUNTER — APPOINTMENT (OUTPATIENT)
Dept: MRI IMAGING | Facility: HOSPITAL | Age: 54
End: 2024-09-16
Payer: MEDICARE

## 2024-09-16 ENCOUNTER — APPOINTMENT (OUTPATIENT)
Dept: CARDIOLOGY | Facility: HOSPITAL | Age: 54
End: 2024-09-16
Payer: MEDICARE

## 2024-09-16 LAB
CHOLEST SERPL-MCNC: 106 MG/DL (ref 0–200)
GLUCOSE BLDC GLUCOMTR-MCNC: 133 MG/DL (ref 70–99)
HBA1C MFR BLD: 5.6 % (ref 4.8–5.6)
HDLC SERPL-MCNC: 32 MG/DL (ref 40–60)
LDLC SERPL CALC-MCNC: 48 MG/DL (ref 0–100)
LDLC/HDLC SERPL: 1.37 {RATIO}
TRIGL SERPL-MCNC: 151 MG/DL (ref 0–150)
VLDLC SERPL-MCNC: 26 MG/DL (ref 5–40)

## 2024-09-16 PROCEDURE — 80061 LIPID PANEL: CPT | Performed by: STUDENT IN AN ORGANIZED HEALTH CARE EDUCATION/TRAINING PROGRAM

## 2024-09-16 PROCEDURE — G0378 HOSPITAL OBSERVATION PER HR: HCPCS

## 2024-09-16 PROCEDURE — 97165 OT EVAL LOW COMPLEX 30 MIN: CPT

## 2024-09-16 PROCEDURE — 82948 REAGENT STRIP/BLOOD GLUCOSE: CPT

## 2024-09-16 PROCEDURE — 93306 TTE W/DOPPLER COMPLETE: CPT

## 2024-09-16 PROCEDURE — 25010000002 HEPARIN (PORCINE) PER 1000 UNITS: Performed by: STUDENT IN AN ORGANIZED HEALTH CARE EDUCATION/TRAINING PROGRAM

## 2024-09-16 PROCEDURE — 99232 SBSQ HOSP IP/OBS MODERATE 35: CPT | Performed by: INTERNAL MEDICINE

## 2024-09-16 PROCEDURE — 92610 EVALUATE SWALLOWING FUNCTION: CPT

## 2024-09-16 PROCEDURE — 63710000001 DIPHENHYDRAMINE PER 50 MG: Performed by: INTERNAL MEDICINE

## 2024-09-16 PROCEDURE — 97161 PT EVAL LOW COMPLEX 20 MIN: CPT

## 2024-09-16 PROCEDURE — 96372 THER/PROPH/DIAG INJ SC/IM: CPT

## 2024-09-16 PROCEDURE — 25010000002 SULFUR HEXAFLUORIDE MICROSPH 60.7-25 MG RECONSTITUTED SUSPENSION: Performed by: INTERNAL MEDICINE

## 2024-09-16 PROCEDURE — 83036 HEMOGLOBIN GLYCOSYLATED A1C: CPT | Performed by: STUDENT IN AN ORGANIZED HEALTH CARE EDUCATION/TRAINING PROGRAM

## 2024-09-16 RX ORDER — CARVEDILOL 25 MG/1
25 TABLET ORAL 2 TIMES DAILY WITH MEALS
Status: DISCONTINUED | OUTPATIENT
Start: 2024-09-16 | End: 2024-09-17 | Stop reason: HOSPADM

## 2024-09-16 RX ORDER — ATORVASTATIN CALCIUM 40 MG/1
40 TABLET, FILM COATED ORAL NIGHTLY
Status: DISCONTINUED | OUTPATIENT
Start: 2024-09-16 | End: 2024-09-17 | Stop reason: HOSPADM

## 2024-09-16 RX ORDER — DIPHENHYDRAMINE HCL 25 MG
25 CAPSULE ORAL EVERY 6 HOURS PRN
Status: DISCONTINUED | OUTPATIENT
Start: 2024-09-16 | End: 2024-09-17 | Stop reason: HOSPADM

## 2024-09-16 RX ORDER — CLOPIDOGREL BISULFATE 75 MG/1
75 TABLET ORAL DAILY
Status: DISCONTINUED | OUTPATIENT
Start: 2024-09-16 | End: 2024-09-17 | Stop reason: HOSPADM

## 2024-09-16 RX ORDER — ASPIRIN 81 MG/1
81 TABLET, CHEWABLE ORAL DAILY
Qty: 30 TABLET | Refills: 0 | Status: SHIPPED | OUTPATIENT
Start: 2024-09-17 | End: 2024-10-17

## 2024-09-16 RX ORDER — ALLOPURINOL 300 MG/1
300 TABLET ORAL DAILY
Qty: 180 TABLET | Refills: 1 | Status: SHIPPED | OUTPATIENT
Start: 2024-09-16

## 2024-09-16 RX ORDER — CLONIDINE HYDROCHLORIDE 0.2 MG/1
0.2 TABLET ORAL DAILY
Start: 2024-09-16

## 2024-09-16 RX ORDER — LOSARTAN POTASSIUM 50 MG/1
100 TABLET ORAL
Status: DISCONTINUED | OUTPATIENT
Start: 2024-09-16 | End: 2024-09-17 | Stop reason: HOSPADM

## 2024-09-16 RX ORDER — SODIUM BICARBONATE 650 MG/1
650 TABLET ORAL 2 TIMES DAILY
Status: DISCONTINUED | OUTPATIENT
Start: 2024-09-16 | End: 2024-09-17 | Stop reason: HOSPADM

## 2024-09-16 RX ORDER — CLOPIDOGREL BISULFATE 75 MG/1
75 TABLET ORAL DAILY
Qty: 20 TABLET | Refills: 0 | Status: SHIPPED | OUTPATIENT
Start: 2024-09-17 | End: 2024-10-07

## 2024-09-16 RX ADMIN — CARVEDILOL 12.5 MG: 25 TABLET, FILM COATED ORAL at 21:00

## 2024-09-16 RX ADMIN — Medication 10 ML: at 20:26

## 2024-09-16 RX ADMIN — HEPARIN SODIUM 5000 UNITS: 5000 INJECTION INTRAVENOUS; SUBCUTANEOUS at 20:25

## 2024-09-16 RX ADMIN — ATORVASTATIN CALCIUM 40 MG: 40 TABLET, FILM COATED ORAL at 20:25

## 2024-09-16 RX ADMIN — DIPHENHYDRAMINE HYDROCHLORIDE 25 MG: 25 CAPSULE ORAL at 12:32

## 2024-09-16 RX ADMIN — HEPARIN SODIUM 5000 UNITS: 5000 INJECTION INTRAVENOUS; SUBCUTANEOUS at 08:14

## 2024-09-16 RX ADMIN — SODIUM BICARBONATE 650 MG TABLET 650 MG: at 08:14

## 2024-09-16 RX ADMIN — CLOPIDOGREL BISULFATE 75 MG: 75 TABLET ORAL at 08:14

## 2024-09-16 RX ADMIN — Medication 10 ML: at 08:21

## 2024-09-16 RX ADMIN — SULFUR HEXAFLUORIDE 5 ML: KIT at 17:58

## 2024-09-16 RX ADMIN — DIPHENHYDRAMINE HYDROCHLORIDE 25 MG: 25 CAPSULE ORAL at 21:00

## 2024-09-16 RX ADMIN — SODIUM BICARBONATE 650 MG TABLET 650 MG: at 20:25

## 2024-09-16 RX ADMIN — LOSARTAN POTASSIUM 100 MG: 50 TABLET, FILM COATED ORAL at 12:32

## 2024-09-16 RX ADMIN — ASPIRIN 81 MG: 81 TABLET, CHEWABLE ORAL at 08:14

## 2024-09-17 ENCOUNTER — READMISSION MANAGEMENT (OUTPATIENT)
Dept: CALL CENTER | Facility: HOSPITAL | Age: 54
End: 2024-09-17
Payer: MEDICARE

## 2024-09-17 VITALS
SYSTOLIC BLOOD PRESSURE: 152 MMHG | OXYGEN SATURATION: 98 % | TEMPERATURE: 98.1 F | HEART RATE: 90 BPM | RESPIRATION RATE: 18 BRPM | DIASTOLIC BLOOD PRESSURE: 79 MMHG | HEIGHT: 62 IN | BODY MASS INDEX: 42.03 KG/M2 | WEIGHT: 228.4 LBS

## 2024-09-17 LAB
ANION GAP SERPL CALCULATED.3IONS-SCNC: 14.2 MMOL/L (ref 5–15)
ASCENDING AORTA: 3.8 CM
BH CV ECHO MEAS - AO MAX PG: 9.2 MMHG
BH CV ECHO MEAS - AO MEAN PG: 4.6 MMHG
BH CV ECHO MEAS - AO ROOT DIAM: 2.8 CM
BH CV ECHO MEAS - AO V2 MAX: 151.5 CM/SEC
BH CV ECHO MEAS - AO V2 VTI: 26.6 CM
BH CV ECHO MEAS - EDV(MOD-SP2): 62 ML
BH CV ECHO MEAS - EDV(MOD-SP4): 105.3 ML
BH CV ECHO MEAS - EF(MOD-SP2): 71.6 %
BH CV ECHO MEAS - EF(MOD-SP4): 84.7 %
BH CV ECHO MEAS - ESV(MOD-SP2): 17.6 ML
BH CV ECHO MEAS - ESV(MOD-SP4): 16.1 ML
BH CV ECHO MEAS - IVS/LVPW: 0.8 CM
BH CV ECHO MEAS - IVSD: 1.2 CM
BH CV ECHO MEAS - LA DIMENSION: 3.8 CM
BH CV ECHO MEAS - LAT PEAK E' VEL: 11.8 CM/SEC
BH CV ECHO MEAS - LV DIASTOLIC VOL/BSA (35-75): 52.2 CM2
BH CV ECHO MEAS - LV MAX PG: 2.6 MMHG
BH CV ECHO MEAS - LV MEAN PG: 1.6 MMHG
BH CV ECHO MEAS - LV SYSTOLIC VOL/BSA (12-30): 8 CM2
BH CV ECHO MEAS - LV V1 MAX: 80 CM/SEC
BH CV ECHO MEAS - LV V1 VTI: 18.5 CM
BH CV ECHO MEAS - LVIDD: 3.7 CM
BH CV ECHO MEAS - LVIDS: 2.1 CM
BH CV ECHO MEAS - LVOT DIAM: 2 CM
BH CV ECHO MEAS - LVPWD: 1.5 CM
BH CV ECHO MEAS - MED PEAK E' VEL: 9.9 CM/SEC
BH CV ECHO MEAS - MV A MAX VEL: 94.8 CM/SEC
BH CV ECHO MEAS - MV E MAX VEL: 70.2 CM/SEC
BH CV ECHO MEAS - MV E/A: 0.74
BH CV ECHO MEAS - MV MAX PG: 8.5 MMHG
BH CV ECHO MEAS - MV MEAN PG: 3.7 MMHG
BH CV ECHO MEAS - MV V2 VTI: 23.3 CM
BH CV ECHO MEAS - PA ACC TIME: 0.06 SEC
BH CV ECHO MEAS - PA V2 MAX: 150 CM/SEC
BH CV ECHO MEAS - RAP SYSTOLE: 5 MMHG
BH CV ECHO MEAS - RV MAX PG: 4.8 MMHG
BH CV ECHO MEAS - RV V1 MAX: 110 CM/SEC
BH CV ECHO MEAS - RV V1 VTI: 20 CM
BH CV ECHO MEAS - RVSP: 25 MMHG
BH CV ECHO MEAS - SV(MOD-SP2): 44.4 ML
BH CV ECHO MEAS - SV(MOD-SP4): 89.2 ML
BH CV ECHO MEAS - SVI(MOD-SP2): 22 ML/M2
BH CV ECHO MEAS - SVI(MOD-SP4): 44.2 ML/M2
BH CV ECHO MEAS - TAPSE (>1.6): 2.5 CM
BH CV ECHO MEAS - TR MAX PG: 20.3 MMHG
BH CV ECHO MEAS - TR MAX VEL: 222.3 CM/SEC
BH CV ECHO MEASUREMENTS AVERAGE E/E' RATIO: 6.47
BH CV XLRA - TDI S': 20.7 CM/SEC
BUN SERPL-MCNC: 58 MG/DL (ref 6–20)
BUN/CREAT SERPL: 16.9 (ref 7–25)
CALCIUM SPEC-SCNC: 11.6 MG/DL (ref 8.6–10.5)
CHLORIDE SERPL-SCNC: 105 MMOL/L (ref 98–107)
CO2 SERPL-SCNC: 17.8 MMOL/L (ref 22–29)
CREAT SERPL-MCNC: 3.43 MG/DL (ref 0.57–1)
DEPRECATED RDW RBC AUTO: 53.7 FL (ref 37–54)
EGFRCR SERPLBLD CKD-EPI 2021: 15.2 ML/MIN/1.73
ERYTHROCYTE [DISTWIDTH] IN BLOOD BY AUTOMATED COUNT: 15.4 % (ref 12.3–15.4)
GLUCOSE SERPL-MCNC: 99 MG/DL (ref 65–99)
HCT VFR BLD AUTO: 33.8 % (ref 34–46.6)
HGB BLD-MCNC: 10.8 G/DL (ref 12–15.9)
MCH RBC QN AUTO: 31 PG (ref 26.6–33)
MCHC RBC AUTO-ENTMCNC: 32 G/DL (ref 31.5–35.7)
MCV RBC AUTO: 97.1 FL (ref 79–97)
PLATELET # BLD AUTO: 175 10*3/MM3 (ref 140–450)
PMV BLD AUTO: 8.9 FL (ref 6–12)
POTASSIUM SERPL-SCNC: 4.4 MMOL/L (ref 3.5–5.2)
RBC # BLD AUTO: 3.48 10*6/MM3 (ref 3.77–5.28)
SODIUM SERPL-SCNC: 137 MMOL/L (ref 136–145)
WBC NRBC COR # BLD AUTO: 7.85 10*3/MM3 (ref 3.4–10.8)

## 2024-09-17 PROCEDURE — 63710000001 DIPHENHYDRAMINE PER 50 MG: Performed by: INTERNAL MEDICINE

## 2024-09-17 PROCEDURE — 85027 COMPLETE CBC AUTOMATED: CPT | Performed by: INTERNAL MEDICINE

## 2024-09-17 PROCEDURE — 99239 HOSP IP/OBS DSCHRG MGMT >30: CPT | Performed by: INTERNAL MEDICINE

## 2024-09-17 PROCEDURE — G0378 HOSPITAL OBSERVATION PER HR: HCPCS

## 2024-09-17 PROCEDURE — 80048 BASIC METABOLIC PNL TOTAL CA: CPT | Performed by: INTERNAL MEDICINE

## 2024-09-17 RX ADMIN — Medication 10 ML: at 09:29

## 2024-09-17 RX ADMIN — SODIUM BICARBONATE 650 MG TABLET 650 MG: at 09:28

## 2024-09-17 RX ADMIN — Medication 1 EACH: at 00:57

## 2024-09-17 RX ADMIN — CLOPIDOGREL BISULFATE 75 MG: 75 TABLET ORAL at 09:28

## 2024-09-17 RX ADMIN — LOSARTAN POTASSIUM 100 MG: 50 TABLET, FILM COATED ORAL at 09:29

## 2024-09-17 RX ADMIN — ASPIRIN 81 MG: 81 TABLET, CHEWABLE ORAL at 09:29

## 2024-09-17 RX ADMIN — DIPHENHYDRAMINE HYDROCHLORIDE 25 MG: 25 CAPSULE ORAL at 09:29

## 2024-09-17 RX ADMIN — CARVEDILOL 25 MG: 25 TABLET, FILM COATED ORAL at 09:29

## 2024-09-17 RX ADMIN — ACETAMINOPHEN 650 MG: 325 TABLET ORAL at 03:51

## 2024-09-18 ENCOUNTER — TRANSITIONAL CARE MANAGEMENT TELEPHONE ENCOUNTER (OUTPATIENT)
Dept: CALL CENTER | Facility: HOSPITAL | Age: 54
End: 2024-09-18
Payer: MEDICARE

## 2024-09-19 ENCOUNTER — OFFICE VISIT (OUTPATIENT)
Dept: CARDIOLOGY | Facility: CLINIC | Age: 54
End: 2024-09-19
Payer: MEDICARE

## 2024-09-19 VITALS
HEIGHT: 62 IN | DIASTOLIC BLOOD PRESSURE: 82 MMHG | BODY MASS INDEX: 41.96 KG/M2 | HEART RATE: 94 BPM | WEIGHT: 228 LBS | SYSTOLIC BLOOD PRESSURE: 136 MMHG

## 2024-09-19 DIAGNOSIS — I10 HYPERTENSION, ESSENTIAL: Primary | ICD-10-CM

## 2024-09-19 DIAGNOSIS — G45.9 TRANSIENT ISCHEMIC ATTACK (TIA): ICD-10-CM

## 2024-09-19 DIAGNOSIS — E78.2 HYPERLIPEMIA, MIXED: ICD-10-CM

## 2024-09-19 PROCEDURE — 1160F RVW MEDS BY RX/DR IN RCRD: CPT | Performed by: SPECIALIST

## 2024-09-19 PROCEDURE — 99214 OFFICE O/P EST MOD 30 MIN: CPT | Performed by: SPECIALIST

## 2024-09-19 PROCEDURE — 3079F DIAST BP 80-89 MM HG: CPT | Performed by: SPECIALIST

## 2024-09-19 PROCEDURE — 3075F SYST BP GE 130 - 139MM HG: CPT | Performed by: SPECIALIST

## 2024-09-19 PROCEDURE — 1159F MED LIST DOCD IN RCRD: CPT | Performed by: SPECIALIST

## 2024-09-25 ENCOUNTER — PATIENT OUTREACH (OUTPATIENT)
Dept: CASE MANAGEMENT | Facility: OTHER | Age: 54
End: 2024-09-25
Payer: MEDICARE

## 2024-09-25 ENCOUNTER — HOSPITAL ENCOUNTER (OUTPATIENT)
Dept: CARDIOLOGY | Facility: HOSPITAL | Age: 54
Discharge: HOME | End: 2024-09-25
Payer: MEDICARE

## 2024-09-25 ENCOUNTER — OFFICE VISIT (OUTPATIENT)
Dept: FAMILY MEDICINE CLINIC | Facility: CLINIC | Age: 54
End: 2024-09-25
Payer: MEDICARE

## 2024-09-25 ENCOUNTER — OFFICE VISIT (OUTPATIENT)
Dept: VASCULAR SURGERY | Facility: HOSPITAL | Age: 54
End: 2024-09-25
Payer: MEDICARE

## 2024-09-25 VITALS
SYSTOLIC BLOOD PRESSURE: 138 MMHG | TEMPERATURE: 98.5 F | BODY MASS INDEX: 42.4 KG/M2 | HEIGHT: 62 IN | OXYGEN SATURATION: 98 % | HEART RATE: 87 BPM | WEIGHT: 230.4 LBS | DIASTOLIC BLOOD PRESSURE: 84 MMHG

## 2024-09-25 VITALS
RESPIRATION RATE: 18 BRPM | HEART RATE: 87 BPM | OXYGEN SATURATION: 98 % | DIASTOLIC BLOOD PRESSURE: 84 MMHG | SYSTOLIC BLOOD PRESSURE: 138 MMHG | TEMPERATURE: 98.5 F

## 2024-09-25 DIAGNOSIS — G45.9 TRANSIENT ISCHEMIC ATTACK (TIA): Primary | ICD-10-CM

## 2024-09-25 DIAGNOSIS — G45.9 TIA (TRANSIENT ISCHEMIC ATTACK): Primary | ICD-10-CM

## 2024-09-25 DIAGNOSIS — I10 PRIMARY HYPERTENSION: ICD-10-CM

## 2024-09-25 DIAGNOSIS — N18.4 CKD (CHRONIC KIDNEY DISEASE) STAGE 4, GFR 15-29 ML/MIN: Primary | ICD-10-CM

## 2024-09-25 DIAGNOSIS — N18.30 STAGE 3 CHRONIC KIDNEY DISEASE, UNSPECIFIED WHETHER STAGE 3A OR 3B CKD: ICD-10-CM

## 2024-09-25 DIAGNOSIS — N18.5 CKD (CHRONIC KIDNEY DISEASE) STAGE 5, GFR LESS THAN 15 ML/MIN: ICD-10-CM

## 2024-09-25 LAB
BH CV VAS DIALYSIS ARTERIAL ANASTOMOSIS DIAMETER: 0.6 CM
BH CV VAS DIALYSIS ARTERIAL ANASTOMOSIS EDV: 16 CM/SEC
BH CV VAS DIALYSIS ARTERIAL ANASTOMOSIS PSV: 33 CM/SEC
BH CV VAS DIALYSIS CONDUIT DIST DEPTH: 2.2 CM
BH CV VAS DIALYSIS CONDUIT DIST DIAMETER: 0.7 CM
BH CV VAS DIALYSIS CONDUIT DIST EDV: 93 CM/SEC
BH CV VAS DIALYSIS CONDUIT DIST PSV: 139 CM/SEC
BH CV VAS DIALYSIS CONDUIT MID DEPTH: 1.4 CM
BH CV VAS DIALYSIS CONDUIT MID DIAMETER: 0.8 CM
BH CV VAS DIALYSIS CONDUIT MID EDV: 68 CM/SEC
BH CV VAS DIALYSIS CONDUIT MID FLOW VOL: 2339 ML/MIN
BH CV VAS DIALYSIS CONDUIT MID PSV: 94 CM/SEC
BH CV VAS DIALYSIS CONDUIT MID/DIST EDV: 70 CM/SEC
BH CV VAS DIALYSIS CONDUIT MID/DIST FLOW VOL: 2074 ML/MIN
BH CV VAS DIALYSIS CONDUIT MID/DIST PSV: 103 CM/SEC
BH CV VAS DIALYSIS CONDUIT PROX DEPTH: 0.9 CM
BH CV VAS DIALYSIS CONDUIT PROX DIAMETER: 1.1 CM
BH CV VAS DIALYSIS CONDUIT PROX EDV: 87 CM/SEC
BH CV VAS DIALYSIS CONDUIT PROX PSV: 147 CM/SEC
BH CV VAS DIALYSIS CONDUIT PROX/MID DEPTH: 0.9 CM
BH CV VAS DIALYSIS CONDUIT PROX/MID DIAMETER: 0.8 CM
BH CV VAS DIALYSIS CONDUIT PROX/MID EDV: 48 CM/SEC
BH CV VAS DIALYSIS CONDUIT PROX/MID FLOW VOL: 1005 ML/MIN
BH CV VAS DIALYSIS CONDUIT PROX/MID PSV: 73 CM/SEC
BH CV VAS DIALYSIS PRE-INFLOW BRACHIAL EDV: 131 CM/SEC
BH CV VAS DIALYSIS PRE-INFLOW BRACHIAL PSV: 193 CM/SEC
BH CV VAS DIALYSIS VENOUS ANASTOMOSIS DIAMETER: 0.6 CM
BH CV VAS DIALYSIS VENOUS ANASTOMOSIS EDV: 219 CM/SEC
BH CV VAS DIALYSIS VENOUS ANASTOMOSIS PSV: 320 CM/SEC
BH CV VAS DIALYSIS VENOUS OUTFLOW SUBCLAVIAN DIAMETER: 1 CM
BH CV VAS DIALYSIS VENOUS OUTFLOW SUBCLAVIAN EDV: 83 CM/SEC
BH CV VAS DIALYSIS VENOUS OUTFLOW SUBCLAVIAN PSV: 163 CM/SEC

## 2024-09-25 PROCEDURE — 1126F AMNT PAIN NOTED NONE PRSNT: CPT | Performed by: NURSE PRACTITIONER

## 2024-09-25 PROCEDURE — 3075F SYST BP GE 130 - 139MM HG: CPT | Performed by: NURSE PRACTITIONER

## 2024-09-25 PROCEDURE — 93990 DOPPLER FLOW TESTING: CPT | Performed by: SURGERY

## 2024-09-25 PROCEDURE — 99495 TRANSJ CARE MGMT MOD F2F 14D: CPT | Performed by: NURSE PRACTITIONER

## 2024-09-25 PROCEDURE — 3079F DIAST BP 80-89 MM HG: CPT | Performed by: NURSE PRACTITIONER

## 2024-09-25 PROCEDURE — 93990 DOPPLER FLOW TESTING: CPT

## 2024-09-25 PROCEDURE — 1111F DSCHRG MED/CURRENT MED MERGE: CPT | Performed by: NURSE PRACTITIONER

## 2024-09-27 ENCOUNTER — READMISSION MANAGEMENT (OUTPATIENT)
Dept: CALL CENTER | Facility: HOSPITAL | Age: 54
End: 2024-09-27
Payer: MEDICARE

## 2024-09-28 DIAGNOSIS — I10 PRIMARY HYPERTENSION: ICD-10-CM

## 2024-09-29 LAB
QT INTERVAL: 345 MS
QTC INTERVAL: 422 MS

## 2024-09-30 ENCOUNTER — OFFICE VISIT (OUTPATIENT)
Dept: FAMILY MEDICINE CLINIC | Facility: CLINIC | Age: 54
End: 2024-09-30
Payer: MEDICARE

## 2024-09-30 VITALS
BODY MASS INDEX: 41.96 KG/M2 | HEIGHT: 62 IN | TEMPERATURE: 97.8 F | DIASTOLIC BLOOD PRESSURE: 79 MMHG | HEART RATE: 97 BPM | SYSTOLIC BLOOD PRESSURE: 144 MMHG | OXYGEN SATURATION: 96 % | WEIGHT: 228 LBS

## 2024-09-30 DIAGNOSIS — R10.32 LEFT LOWER QUADRANT ABDOMINAL PAIN: Primary | ICD-10-CM

## 2024-09-30 LAB
BILIRUB BLD-MCNC: NEGATIVE MG/DL
CLARITY, POC: ABNORMAL
COLOR UR: YELLOW
EXPIRATION DATE: ABNORMAL
GLUCOSE UR STRIP-MCNC: NEGATIVE MG/DL
KETONES UR QL: NEGATIVE
LEUKOCYTE EST, POC: ABNORMAL
Lab: ABNORMAL
NITRITE UR-MCNC: NEGATIVE MG/ML
PH UR: 5.5 [PH] (ref 5–8)
PROT UR STRIP-MCNC: ABNORMAL MG/DL
RBC # UR STRIP: ABNORMAL /UL
SP GR UR: 1.01 (ref 1–1.03)
UROBILINOGEN UR QL: ABNORMAL

## 2024-09-30 PROCEDURE — 87086 URINE CULTURE/COLONY COUNT: CPT | Performed by: FAMILY MEDICINE

## 2024-09-30 PROCEDURE — 99213 OFFICE O/P EST LOW 20 MIN: CPT | Performed by: FAMILY MEDICINE

## 2024-09-30 PROCEDURE — 81003 URINALYSIS AUTO W/O SCOPE: CPT | Performed by: FAMILY MEDICINE

## 2024-09-30 PROCEDURE — 1126F AMNT PAIN NOTED NONE PRSNT: CPT | Performed by: FAMILY MEDICINE

## 2024-09-30 PROCEDURE — 3077F SYST BP >= 140 MM HG: CPT | Performed by: FAMILY MEDICINE

## 2024-09-30 PROCEDURE — 87186 SC STD MICRODIL/AGAR DIL: CPT | Performed by: FAMILY MEDICINE

## 2024-09-30 PROCEDURE — 87077 CULTURE AEROBIC IDENTIFY: CPT | Performed by: FAMILY MEDICINE

## 2024-09-30 PROCEDURE — 3078F DIAST BP <80 MM HG: CPT | Performed by: FAMILY MEDICINE

## 2024-09-30 RX ORDER — CLONIDINE HYDROCHLORIDE 0.2 MG/1
0.2 TABLET ORAL 3 TIMES DAILY
Qty: 270 TABLET | Refills: 0 | Status: SHIPPED | OUTPATIENT
Start: 2024-09-30

## 2024-09-30 NOTE — ASSESSMENT & PLAN NOTE
Her pain is rather nonspecific.  She does have some mild tenderness.  Will recheck her urine is CBC and get a CT of her abdomen pelvis to evaluate further.

## 2024-09-30 NOTE — PROGRESS NOTES
Chief Complaint   Patient presents with   • Abdominal Pain        Subjective     Katarzyna Norris  has a past medical history of Anxiety, Arthritis, Asthma, CKD (chronic kidney disease), Elevated cholesterol, Gastritis, GERD (gastroesophageal reflux disease), Gout (02/23/2021), Hiatal hernia, Hiatal hernia, Hypertension, Lactose intolerance, and Transplant, organ.    Abdominal pain-she complains of some lower mid abdominal pain now for at least the last 3 weeks.  She was initially seen in the urgent care for some dysuria.  Her initial UA was suspicious was given an antibiotic but her culture was negative.  Despite this her symptoms have persisted.  She denies any constipation diarrhea.  She denies any bloody or melanotic stools.  She states her discomfort is a little worse with any prolonged sitting and feels a little better with physical activity.      PHQ-2 Depression Screening  Little interest or pleasure in doing things?     Feeling down, depressed, or hopeless?     PHQ-2 Total Score     PHQ-9 Depression Screening  Little interest or pleasure in doing things?     Feeling down, depressed, or hopeless?     Trouble falling or staying asleep, or sleeping too much?     Feeling tired or having little energy?     Poor appetite or overeating?     Feeling bad about yourself - or that you are a failure or have let yourself or your family down?     Trouble concentrating on things, such as reading the newspaper or watching television?     Moving or speaking so slowly that other people could have noticed? Or the opposite - being so fidgety or restless that you have been moving around a lot more than usual?     Thoughts that you would be better off dead, or of hurting yourself in some way?     PHQ-9 Total Score     If you checked off any problems, how difficult have these problems made it for you to do your work, take care of things at home, or get along with other people?       Allergies   Allergen Reactions   • Amlodipine  "Shortness Of Breath   • Clindamycin/Lincomycin Swelling     FACIAL SWELLING     • Contrast Dye (Echo Or Unknown Ct/Mr) Palpitations     \"BLOOD PRESSURE GOES GISSELLE HIGH\", \"HEART RACES\"   • Diltiazem Hcl Anxiety   • Zofran [Ondansetron] Confusion   • Augmentin [Amoxicillin-Pot Clavulanate] Nausea And Vomiting     Beta lactam allergy details  Antibiotic reaction: nausea  Age at reaction: adult  Dose to reaction time: days  Reason for antibiotic: unknown  Epinephrine required for reaction?: unknown  Tolerated antibiotics: unknown      • Doxycycline Nausea And Vomiting       Prior to Admission medications    Medication Sig Start Date End Date Taking? Authorizing Provider   acetaminophen (TYLENOL) 325 MG tablet Take 2 tablets by mouth Every 6 (Six) Hours As Needed.   Yes Provider, MD Al   allopurinol (ZYLOPRIM) 300 MG tablet Take 1 tablet by mouth Daily. 9/16/24  Yes Donny Staton DO   aspirin 81 MG chewable tablet Chew 1 tablet Daily for 30 days. 9/17/24 10/17/24 Yes Donny Staton DO   atorvastatin (LIPITOR) 20 MG tablet Take 1 tablet by mouth Every Night. 7/24/24  Yes Brian Ellis APRN   carvedilol (COREG) 25 MG tablet Take 1 tablet by mouth 2 (Two) Times a Day With Meals. 7/24/24  Yes Brian Ellis APRN   cloNIDine (CATAPRES) 0.2 MG tablet TAKE 1 TABLET BY MOUTH THREE TIMES DAILY 9/30/24  Yes Brian Ellis APRN   clopidogrel (PLAVIX) 75 MG tablet Take 1 tablet by mouth Daily for 20 days. 9/17/24 10/7/24 Yes Donny Staton DO   diphenhydrAMINE (BENADRYL) 25 mg capsule Take 1 capsule by mouth Every 6 (Six) Hours As Needed for Itching.   Yes Provider, MD Al   fluticasone (FLONASE) 50 MCG/ACT nasal spray 2 sprays into the nostril(s) as directed by provider Daily. 2 sprays each nostril daily 7/24/24  Yes Brian Ellis APRN   losartan (COZAAR) 100 MG tablet Take 1 tablet by mouth Daily. 4/24/24  Yes Brian Ellis APRN   polyethylene glycol (MIRALAX) " 17 g packet Take 17 g by mouth Daily. 5/17/24  Yes Masoud Mazariegos MD   sodium bicarbonate 650 MG tablet Take 1 tablet by mouth 2 (Two) Times a Day. 5/14/24 5/14/25 Yes Provider, MD Al   cloNIDine (CATAPRES) 0.2 MG tablet Take 1 tablet by mouth Daily. 9/16/24 9/30/24  Donny Staton DO        Patient Active Problem List   Diagnosis   • Gout   • Anxiety   • Hypertension   • Primary osteoarthritis of left knee   • Anemia   • Impaired fasting glucose   • Hyperlipidemia   • Aftercare following right knee joint replacement [id number]/19/22   • Osteoarthritis of left knee   • Status post replacement of knee joint   • Cyst of ovary   • Asthma   • Nephritic syndrome   • Class 3 severe obesity due to excess calories with serious comorbidity and body mass index (BMI) of 40.0 to 44.9 in adult   • Stage 3 chronic kidney disease   • Vitamin D deficiency   • Elevated ferritin   • Aftercare following surgery of left total knee arthroplasty, 12/22/2021   • Lateral cutaneous femoral nerve of thigh compression or syndrome, right   • Primary localized osteoarthritis of right knee   • Allergic rhinitis   • Hiatal hernia   • Painful thyroid   • Migraine with aura and without status migrainosus, not intractable   • External hemorrhoid   • Screening mammogram for breast cancer   • Acute non-recurrent sinusitis   • Panic attack   • Hyponatremia   • Joint pain   • Muscle cramps   • Eustachian tube disorder, bilateral   • Right knee pain   • Left knee pain   • Blurred vision, bilateral   • CKD (chronic kidney disease) stage 5, GFR less than 15 ml/min   • Encounter for screening for malignant neoplasm of colon   • Dermatitis   • Encounter for Medicare annual wellness exam   • Palpitation   • Incisional hernia, without obstruction or gangrene   • Umbilical hernia without obstruction and without gangrene   • Transient ischemic attack (TIA)   • TIA (transient ischemic attack)   • Left lower quadrant abdominal pain        Past Surgical  History:   Procedure Laterality Date   • ARTERIOVENOUS FISTULA/SHUNT SURGERY Left 2024    Procedure: 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;  Surgeon: Yaniv Dominguez MD;  Location: [facility] MAIN OR;  Service: Vascular;  Laterality: Left;   • BONY PELVIS SURGERY      Plate   •  SECTION   and    • CHOLECYSTECTOMY     • COLONOSCOPY     • COLONOSCOPY     • COLONOSCOPY     • COLONOSCOPY N/A 2024    Procedure: COLONOSCOPY;  Surgeon: Aston Arroyo MD;  Location: [facility] ENDOSCOPY;  Service: Gastroenterology;  Laterality: N/A;  HEMORRHOIDS   • ENDOSCOPY     • ENDOSCOPY N/A 2023    Procedure: ESOPHAGOGASTRODUODENOSCOPY with biopsies;  Surgeon: Tam Bdaillo MD;  Location: [facility] ENDOSCOPY;  Service: General;  Laterality: N/A;  hiatal hernia   • JOINT REPLACEMENT     • TOTAL KNEE ARTHROPLASTY Left 2021    Procedure: TOTAL KNEE ARTHROPLASTY;  Surgeon: Marco Nelson MD;  Location: [facility] MAIN OR;  Service: Orthopedics;  Laterality: Left;   • TOTAL KNEE ARTHROPLASTY Right 10/19/2022    Procedure: RIGHT TOTAL KNEE ARTHROPLASTY - NO SHELBY;  Surgeon: Marco Nelson MD;  Location: [facility] MAIN OR;  Service: Orthopedics;  Laterality: Right;   • TUBAL ABDOMINAL LIGATION         Social History     Socioeconomic History   • Marital status:    Tobacco Use   • Smoking status: Former     Current packs/day: 0.00     Types: Cigarettes     Quit date:      Years since quittin.7     Passive exposure: Never   • Smokeless tobacco: Never   Vaping Use   • Vaping status: Never Used   Substance and Sexual Activity   • Alcohol use: Never   • Drug use: Never   • Sexual activity: Defer       Family History   Problem Relation Age of Onset   • Diabetes Mother    • Throat cancer Father 73   • Hypertension Sister    • Hypertension Sister    • Hypertension Sister    • Diabetes Brother    • Hypertension Brother    • Stroke Other    • Diabetes Other   "  • Malig Hyperthermia Neg Hx    • Colon cancer Neg Hx        Family history, surgical history, past medical history, Allergies and meds reviewed with patient today and updated in [facility] EMR.     ROS:  Review of Systems   Constitutional:  Negative for chills and fever.   Gastrointestinal:  Positive for abdominal pain. Negative for constipation, diarrhea, nausea and vomiting.   Genitourinary:  Positive for dysuria. Negative for difficulty urinating, frequency and hematuria.       OBJECTIVE:  Vitals:    09/30/24 1156   BP: 144/79   BP Location: Right arm   Patient Position: Sitting   Pulse: 97   Temp: 97.8 °F (36.6 °C)   SpO2: 96%   Weight: 103 kg (228 lb)   Height: 157.5 cm (62\")     No results found.   Body mass index is 41.7 kg/m².  No LMP recorded. (Menstrual status: Tubal ligation).    The ASCVD Risk score (Flor HERNANDEZ, et al., 2019) failed to calculate for the following reasons:    The valid total cholesterol range is 130 to 320 mg/dL     Physical Exam  Vitals and nursing note reviewed.   Constitutional:       General: She is not in acute distress.     Appearance: Normal appearance. She is obese.   HENT:      Head: Normocephalic.   Cardiovascular:      Rate and Rhythm: Normal rate and regular rhythm.      Heart sounds: Normal heart sounds. No murmur heard.  Pulmonary:      Effort: Pulmonary effort is normal.      Breath sounds: Normal breath sounds. No wheezing or rhonchi.   Abdominal:      General: Abdomen is flat. Bowel sounds are normal.      Palpations: Abdomen is soft. There is no mass.      Tenderness: There is abdominal tenderness in the left lower quadrant.   Neurological:      Mental Status: She is alert.       Procedures    Admission on 09/15/2024, Discharged on 09/17/2024   Component Date Value Ref Range Status   • QT Interval 09/15/2024 345  ms Final   • QTC Interval 09/15/2024 422  ms Final   • Glucose 09/15/2024 104 (H)  70 - 99 mg/dL Final    Serial Number: [id number]:  [id number]   • Glucose " 09/15/2024 102 (H)  65 - 99 mg/dL Final   • BUN 09/15/2024 53 (H)  6 - 20 mg/dL Final   • Creatinine 09/15/2024 3.30 (H)  0.57 - 1.00 mg/dL Final   • Sodium 09/15/2024 135 (L)  136 - 145 mmol/L Final   • Potassium 09/15/2024 4.7  3.5 - 5.2 mmol/L Final   • Chloride 09/15/2024 101  98 - 107 mmol/L Final   • CO2 09/15/2024 20.6 (L)  22.0 - 29.0 mmol/L Final   • Calcium 09/15/2024 11.7 (H)  8.6 - 10.5 mg/dL Final   • Total Protein 09/15/2024 7.1  6.0 - 8.5 g/dL Final   • Albumin 09/15/2024 4.3  3.5 - 5.2 g/dL Final   • ALT (SGPT) 09/15/2024 14  1 - 33 U/L Final   • AST (SGOT) 09/15/2024 15  1 - 32 U/L Final   • Alkaline Phosphatase 09/15/2024 154 (H)  39 - 117 U/L Final   • Total Bilirubin 09/15/2024 0.6  0.0 - 1.2 mg/dL Final   • Globulin 09/15/2024 2.8  gm/dL Final   • A/G Ratio 09/15/2024 1.5  g/dL Final   • BUN/Creatinine Ratio 09/15/2024 16.1  7.0 - 25.0 Final   • Anion Gap 09/15/2024 13.4  5.0 - 15.0 mmol/L Final   • eGFR 09/15/2024 15.9 (L)  >60.0 mL/min/1.73 Final   • Protime 09/15/2024 13.9  11.8 - 14.9 Seconds Final   • INR 09/15/2024 1.05  0.86 - 1.15 Final   • PTT 09/15/2024 29.0  24.2 - 34.2 seconds Final   • HS Troponin T 09/15/2024 31 (H)  <14 ng/L Final   • Color, UA 09/15/2024 Yellow  Yellow, Straw Final   • Appearance, UA 09/15/2024 Clear  Clear Final   • pH, UA 09/15/2024 5.5  5.0 - 8.0 Final   • Specific Gravity, UA 09/15/2024 1.006  1.005 - 1.030 Final   • Glucose, UA 09/15/2024 Negative  Negative Final   • Ketones, UA 09/15/2024 Negative  Negative Final   • Bilirubin, UA 09/15/2024 Negative  Negative Final   • Blood, UA 09/15/2024 Negative  Negative Final   • Protein, UA 09/15/2024 30 mg/dL (1+) (A)  Negative Final   • Leuk Esterase, UA 09/15/2024 Trace (A)  Negative Final   • Nitrite, UA 09/15/2024 Negative  Negative Final   • Urobilinogen, UA 09/15/2024 0.2 E.U./dL  0.2 - 1.0 E.U./dL Final   • ABO Type 09/15/2024 O   Final   • RH type 09/15/2024 Positive   Final   • Antibody Screen 09/15/2024  Negative   Final   • T&S Expiration Date 09/15/2024 9/22/2024 11:59:00 PM   Final   • Extra Tube 09/15/2024 Hold for add-ons.   Final    Auto resulted.   • Extra Tube 09/15/2024 hold for add-on   Final    Auto resulted   • Extra Tube 09/15/2024 Hold for add-ons.   Final    Auto resulted.   • Extra Tube 09/15/2024 Hold for add-ons.   Final    Auto resulted   • WBC 09/15/2024 8.64  3.40 - 10.80 10*3/mm3 Final   • RBC 09/15/2024 3.62 (L)  3.77 - 5.28 10*6/mm3 Final   • Hemoglobin 09/15/2024 10.9 (L)  12.0 - 15.9 g/dL Final   • Hematocrit 09/15/2024 35.1  34.0 - 46.6 % Final   • MCV 09/15/2024 97.0  79.0 - 97.0 fL Final   • MCH 09/15/2024 30.1  26.6 - 33.0 pg Final   • MCHC 09/15/2024 31.1 (L)  31.5 - 35.7 g/dL Final   • RDW 09/15/2024 15.2  12.3 - 15.4 % Final   • RDW-SD 09/15/2024 53.9  37.0 - 54.0 fl Final   • MPV 09/15/2024 9.6  6.0 - 12.0 fL Final   • Platelets 09/15/2024 199  140 - 450 10*3/mm3 Final   • Neutrophil % 09/15/2024 67.8  42.7 - 76.0 % Final   • Lymphocyte % 09/15/2024 22.7  19.6 - 45.3 % Final   • Monocyte % 09/15/2024 5.6  5.0 - 12.0 % Final   • Eosinophil % 09/15/2024 2.8  0.3 - 6.2 % Final   • Basophil % 09/15/2024 0.8  0.0 - 1.5 % Final   • Immature Grans % 09/15/2024 0.3  0.0 - 0.5 % Final   • Neutrophils, Absolute 09/15/2024 5.86  1.70 - 7.00 10*3/mm3 Final   • Lymphocytes, Absolute 09/15/2024 1.96  0.70 - 3.10 10*3/mm3 Final   • Monocytes, Absolute 09/15/2024 0.48  0.10 - 0.90 10*3/mm3 Final   • Eosinophils, Absolute 09/15/2024 0.24  0.00 - 0.40 10*3/mm3 Final   • Basophils, Absolute 09/15/2024 0.07  0.00 - 0.20 10*3/mm3 Final   • Immature Grans, Absolute 09/15/2024 0.03  0.00 - 0.05 10*3/mm3 Final   • nRBC 09/15/2024 0.0  0.0 - 0.2 /100 WBC Final   • ABO Type 09/15/2024 O   Final   • RH type 09/15/2024 Positive   Final   • RBC, UA 09/15/2024 0-2  None Seen, 0-2 /HPF Final   • WBC, UA 09/15/2024 3-5 (A)  None Seen, 0-2 /HPF Final   • Bacteria, UA 09/15/2024 None Seen  None Seen /HPF Final   •  Squamous Epithelial Cells, UA 09/15/2024 3-6 (A)  None Seen, 0-2 /HPF Final   • Hyaline Casts, UA 09/15/2024 None Seen  None Seen /LPF Final   • Methodology 09/15/2024 Manual Light Microscopy   Final   • LVIDd 09/16/2024 3.7  cm Final   • LVIDs 09/16/2024 2.10  cm Final   • IVSd 09/16/2024 1.20  cm Final   • LVPWd 09/16/2024 1.50  cm Final   • IVS/LVPW 09/16/2024 0.80  cm Final   • LV Sys Vol (BSA corrected) 09/16/2024 8.0  cm2 Final   • LV Muhammad Vol (BSA corrected) 09/16/2024 52.2  cm2 Final   • LVOT diam 09/16/2024 2.00  cm Final   • EDV(MOD-sp2) 09/16/2024 62.0  ml Final   • EDV(MOD-sp4) 09/16/2024 105.3  ml Final   • ESV(MOD-sp2) 09/16/2024 17.6  ml Final   • ESV(MOD-sp4) 09/16/2024 16.1  ml Final   • SV(MOD-sp2) 09/16/2024 44.4  ml Final   • SV(MOD-sp4) 09/16/2024 89.2  ml Final   • SVi(MOD-SP2) 09/16/2024 22.0  ml/m2 Final   • SVi(MOD-SP4) 09/16/2024 44.2  ml/m2 Final   • EF(MOD-sp2) 09/16/2024 71.6  % Final   • EF(MOD-sp4) 09/16/2024 84.7  % Final   • MV E max allan 09/16/2024 70.2  cm/sec Final   • MV A max allan 09/16/2024 94.8  cm/sec Final   • MV E/A 09/16/2024 0.74   Final   • Med Peak E' Allan 09/16/2024 9.9  cm/sec Final   • Lat Peak E' Allan 09/16/2024 11.8  cm/sec Final   • TR max allan 09/16/2024 222.3  cm/sec Final   • Avg E/e' ratio 09/16/2024 6.47   Final   • TAPSE (>1.6) 09/16/2024 2.5  cm Final   • RV S' 09/16/2024 20.7  cm/sec Final   • LA dimension (2D)  09/16/2024 3.8  cm Final   • LV V1 max 09/16/2024 80.0  cm/sec Final   • LV V1 max PG 09/16/2024 2.6  mmHg Final   • LV V1 mean PG 09/16/2024 1.60  mmHg Final   • LV V1 VTI 09/16/2024 18.5  cm Final   • Ao pk allan 09/16/2024 151.5  cm/sec Final   • Ao max PG 09/16/2024 9.2  mmHg Final   • Ao mean PG 09/16/2024 4.6  mmHg Final   • Ao V2 VTI 09/16/2024 26.6  cm Final   • MV max PG 09/16/2024 8.5  mmHg Final   • MV mean PG 09/16/2024 3.7  mmHg Final   • MV V2 VTI 09/16/2024 23.3  cm Final   • TR max PG 09/16/2024 20.3  mmHg Final   • RV V1 max PG 09/16/2024  4.8  mmHg Final   • RV V1 max 09/16/2024 110.0  cm/sec Final   • RV V1 VTI 09/16/2024 20.0  cm Final   • PA V2 max 09/16/2024 150.0  cm/sec Final   • PA acc time 09/16/2024 0.06  sec Final   • Ao root diam 09/16/2024 2.8  cm Final   • Ascending aorta 09/16/2024 3.8  cm Final   • RVSP(TR) 09/16/2024 25  mmHg Final   • RAP systole 09/16/2024 5  mmHg Final   • Hemoglobin A1C 09/16/2024 5.60  4.80 - 5.60 % Final   • Total Cholesterol 09/16/2024 106  0 - 200 mg/dL Final   • Triglycerides 09/16/2024 151 (H)  0 - 150 mg/dL Final   • HDL Cholesterol 09/16/2024 32 (L)  40 - 60 mg/dL Final   • LDL Cholesterol  09/16/2024 48  0 - 100 mg/dL Final   • VLDL Cholesterol 09/16/2024 26  5 - 40 mg/dL Final   • LDL/HDL Ratio 09/16/2024 1.37   Final   • Glucose 09/15/2024 106 (H)  70 - 99 mg/dL Final    Serial Number: [id number]:  [id number]   • Glucose 09/16/2024 133 (H)  70 - 99 mg/dL Final    Serial Number: [id number]:  [id number]   • WBC 09/17/2024 7.85  3.40 - 10.80 10*3/mm3 Final   • RBC 09/17/2024 3.48 (L)  3.77 - 5.28 10*6/mm3 Final   • Hemoglobin 09/17/2024 10.8 (L)  12.0 - 15.9 g/dL Final   • Hematocrit 09/17/2024 33.8 (L)  34.0 - 46.6 % Final   • MCV 09/17/2024 97.1 (H)  79.0 - 97.0 fL Final   • MCH 09/17/2024 31.0  26.6 - 33.0 pg Final   • MCHC 09/17/2024 32.0  31.5 - 35.7 g/dL Final   • RDW 09/17/2024 15.4  12.3 - 15.4 % Final   • RDW-SD 09/17/2024 53.7  37.0 - 54.0 fl Final   • MPV 09/17/2024 8.9  6.0 - 12.0 fL Final   • Platelets 09/17/2024 175  140 - 450 10*3/mm3 Final   • Glucose 09/17/2024 99  65 - 99 mg/dL Final   • BUN 09/17/2024 58 (H)  6 - 20 mg/dL Final   • Creatinine 09/17/2024 3.43 (H)  0.57 - 1.00 mg/dL Final   • Sodium 09/17/2024 137  136 - 145 mmol/L Final   • Potassium 09/17/2024 4.4  3.5 - 5.2 mmol/L Final   • Chloride 09/17/2024 105  98 - 107 mmol/L Final   • CO2 09/17/2024 17.8 (L)  22.0 - 29.0 mmol/L Final   • Calcium 09/17/2024 11.6 (H)  8.6 - 10.5 mg/dL Final   • BUN/Creatinine Ratio  09/17/2024 16.9  7.0 - 25.0 Final   • Anion Gap 09/17/2024 14.2  5.0 - 15.0 mmol/L Final   • eGFR 09/17/2024 15.2 (L)  >60.0 mL/min/1.73 Final   Admission on 09/10/2024, Discharged on 09/10/2024   Component Date Value Ref Range Status   • Color 09/10/2024 Yellow   Final   • Clarity, UA 09/10/2024 Slightly Cloudy (A)   Final   • Glucose, UA 09/10/2024 Negative  mg/dL Final   • Bilirubin 09/10/2024 Negative   Final   • Ketones, UA 09/10/2024 Negative   Final   • Specific Gravity  09/10/2024 1.010  1.005 - 1.030 Final   • Blood, UA 09/10/2024 Negative   Final   • pH, Urine 09/10/2024 5.5  5.0 - 8.0 Final   • Protein, POC 09/10/2024 Negative  mg/dL Final   • Urobilinogen, UA 09/10/2024 0.2 E.U./dL   Final   • Nitrite, UA 09/10/2024 Negative   Final   • Leukocytes 09/10/2024 Small (1+) (A)   Final   • Urine Culture 09/10/2024 50,000 CFU/mL Normal Urogenital Nina   Final       ASSESSMENT/ PLAN:    Diagnoses and all orders for this visit:    1. Left lower quadrant abdominal pain (Primary)  Assessment & Plan:  Her pain is rather nonspecific.  She does have some mild tenderness.  Will recheck her urine is CBC and get a CT of her abdomen pelvis to evaluate further.    Orders:  -     CBC Auto Differential  -     POCT urinalysis dipstick, automated  -     CT Abdomen Pelvis Without Contrast; Future               Orders Placed Today:     No orders of the defined types were placed in this encounter.       Management Plan:     An After Visit Summary was printed and given to the patient at discharge.    Follow-up: Return for f/u after imaging.    Harley Santana DO 9/30/2024 12:28 EDT  This note was electronically signed.

## 2024-10-01 NOTE — PROGRESS NOTES
ACUTE VISIT     Patient Name: Katarzyna Norris  : 1970   MRN: [id number]     Chief Complaint:    Chief Complaint   Patient presents with    Abdominal Pain       History of Present Illness: Katarzyna Norris is a 54 y.o. female who is here today for abdominal pain x 3+ weeks     Patient saw Dr. Santana 24.  Abdominal pain-she complains of some lower mid abdominal pain now for at least the last 3 weeks.  She was initially seen in the urgent care for some dysuria.  Her initial UA was suspicious was given an antibiotic but her culture was negative.  Despite this her symptoms have persisted.  She denies any constipation diarrhea.  She denies any bloody or melanotic stools.  She states her discomfort is a little worse with any prolonged sitting and feels a little better with physical activity.   . Left lower quadrant abdominal pain (Primary)  Assessment & Plan:  Her pain is rather nonspecific.  She does have some mild tenderness.  Will recheck her urine is CBC and get a CT of her abdomen pelvis to evaluate further.     Orders:  -     CBC Auto Differential  -     POCT urinalysis dipstick, automated  -     CT Abdomen Pelvis Without Contrast; 10/11/24 @ [facility].    Patient still having abdominal pain. She has a UTI and Macrobid was sent to there pharmacy this morning.  Urine culture        Citrobacter freundii     DELMA     Cefepime <=1 ug/ml Susceptible     Ceftazidime 16 ug/ml Resistant     Ceftriaxone 8 ug/ml Resistant     Gentamicin <=1 ug/ml Susceptible     Levofloxacin <=0.12 ug/ml Susceptible     Nitrofurantoin <=16 ug/ml Susceptible     Piperacillin + Tazobactam <=4 ug/ml Susceptible     Trimethoprim + Sulfamethoxazole <=20 ug/ml Susceptible             Also c/o constipation recently     Subjective      Review of Systems:   Review of Systems   Constitutional:  Negative for fever.   Respiratory:  Negative for cough.    Cardiovascular:  Negative for chest pain.   Gastrointestinal:  Positive for abdominal  pain and constipation. Negative for diarrhea, nausea and vomiting.   Genitourinary:  Positive for dysuria, frequency and urgency.        Past Medical History:   Past Medical History:   Diagnosis Date    Anxiety     Arthritis     GILBERT KNEES    Asthma     SEASONAL ALLERGIES- NO INHALERS    CKD (chronic kidney disease)     Stage 5, Follows with [facility]- NO CURRENT DIALYSIS    Elevated cholesterol     Gastritis     GERD (gastroesophageal reflux disease)     Gout 2021    Hiatal hernia     Hiatal hernia     Hypertension     Lactose intolerance     Transplant, organ     ON KIDNEY TRANSPLANT LIST       Past Surgical History:   Past Surgical History:   Procedure Laterality Date    ARTERIOVENOUS FISTULA/SHUNT SURGERY Left 2024    Procedure: 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;  Surgeon: Yaniv Dominguez MD;  Location: [facility] MAIN OR;  Service: Vascular;  Laterality: Left;    BONY PELVIS SURGERY      Plate     SECTION   and     CHOLECYSTECTOMY      COLONOSCOPY      COLONOSCOPY      COLONOSCOPY      COLONOSCOPY N/A 2024    Procedure: COLONOSCOPY;  Surgeon: Aston Arroyo MD;  Location: [facility] ENDOSCOPY;  Service: Gastroenterology;  Laterality: N/A;  HEMORRHOIDS    ENDOSCOPY      ENDOSCOPY N/A 2023    Procedure: ESOPHAGOGASTRODUODENOSCOPY with biopsies;  Surgeon: Tam Badillo MD;  Location: [facility] ENDOSCOPY;  Service: General;  Laterality: N/A;  hiatal hernia    JOINT REPLACEMENT      TOTAL KNEE ARTHROPLASTY Left 2021    Procedure: TOTAL KNEE ARTHROPLASTY;  Surgeon: Marco Nelson MD;  Location: [facility] OR;  Service: Orthopedics;  Laterality: Left;    TOTAL KNEE ARTHROPLASTY Right 10/19/2022    Procedure: RIGHT TOTAL KNEE ARTHROPLASTY - NO SHELBY;  Surgeon: Marco Nelson MD;  Location: [facility] MAIN OR;  Service: Orthopedics;  Laterality: Right;    TUBAL ABDOMINAL LIGATION         Family History:   Family History   Problem  Relation Age of Onset    Diabetes Mother     Throat cancer Father 73    Hypertension Sister     Hypertension Sister     Hypertension Sister     Diabetes Brother     Hypertension Brother     Stroke Other     Diabetes Other     Malig Hyperthermia Neg Hx     Colon cancer Neg Hx        Social History:   Social History     Socioeconomic History    Marital status:    Tobacco Use    Smoking status: Former     Current packs/day: 0.00     Types: Cigarettes     Quit date:      Years since quittin.7     Passive exposure: Never    Smokeless tobacco: Never   Vaping Use    Vaping status: Never Used   Substance and Sexual Activity    Alcohol use: Never    Drug use: Never    Sexual activity: Defer       Medications:     Current Outpatient Medications:     acetaminophen (TYLENOL) 325 MG tablet, Take 2 tablets by mouth Every 6 (Six) Hours As Needed., Disp: , Rfl:     allopurinol (ZYLOPRIM) 300 MG tablet, Take 1 tablet by mouth Daily., Disp: 180 tablet, Rfl: 1    aspirin 81 MG chewable tablet, Chew 1 tablet Daily for 30 days., Disp: 30 tablet, Rfl: 0    atorvastatin (LIPITOR) 20 MG tablet, Take 1 tablet by mouth Every Night., Disp: 90 tablet, Rfl: 1    carvedilol (COREG) 25 MG tablet, Take 1 tablet by mouth 2 (Two) Times a Day With Meals., Disp: 180 tablet, Rfl: 1    cloNIDine (CATAPRES) 0.2 MG tablet, TAKE 1 TABLET BY MOUTH THREE TIMES DAILY, Disp: 270 tablet, Rfl: 0    clopidogrel (PLAVIX) 75 MG tablet, Take 1 tablet by mouth Daily for 20 days., Disp: 20 tablet, Rfl: 0    diphenhydrAMINE (BENADRYL) 25 mg capsule, Take 1 capsule by mouth Every 6 (Six) Hours As Needed for Itching., Disp: , Rfl:     fluticasone (FLONASE) 50 MCG/ACT nasal spray, 2 sprays into the nostril(s) as directed by provider Daily. 2 sprays each nostril daily, Disp: 18.2 mL, Rfl: 1    losartan (COZAAR) 100 MG tablet, Take 1 tablet by mouth Daily., Disp: 90 tablet, Rfl: 1    nitrofurantoin, macrocrystal-monohydrate, (Macrobid) 100 MG capsule, Take 1  "capsule by mouth 2 (Two) Times a Day., Disp: 14 capsule, Rfl: 0    polyethylene glycol (MIRALAX) 17 g packet, Take 17 g by mouth Daily., Disp: 14 each, Rfl: 0    sodium bicarbonate 650 MG tablet, Take 1 tablet by mouth 2 (Two) Times a Day., Disp: , Rfl:     Allergies:   Allergies   Allergen Reactions    Amlodipine Shortness Of Breath    Clindamycin/Lincomycin Swelling     FACIAL SWELLING      Contrast Dye (Echo Or Unknown Ct/Mr) Palpitations     \"BLOOD PRESSURE GOES GISSELLE HIGH\", \"HEART RACES\"    Diltiazem Hcl Anxiety    Zofran [Ondansetron] Confusion    Augmentin [Amoxicillin-Pot Clavulanate] Nausea And Vomiting     Beta lactam allergy details  Antibiotic reaction: nausea  Age at reaction: adult  Dose to reaction time: days  Reason for antibiotic: unknown  Epinephrine required for reaction?: unknown  Tolerated antibiotics: unknown       Doxycycline Nausea And Vomiting         Objective     Physical Exam:  Vital Signs:   Vitals:    10/02/24 0945   BP: 123/73   Pulse: 85   Temp: 97.7 °F (36.5 °C)   SpO2: 95%   Weight: 98.4 kg (217 lb)   Height: 157.5 cm (62\")     Body mass index is 39.69 kg/m².     Physical Exam  Cardiovascular:      Rate and Rhythm: Normal rate and regular rhythm.      Heart sounds: Normal heart sounds. No murmur heard.  Pulmonary:      Effort: Pulmonary effort is normal.      Breath sounds: Normal breath sounds.   Abdominal:      General: Bowel sounds are normal.      Palpations: Abdomen is soft.      Tenderness: There is abdominal tenderness. There is left CVA tenderness. There is no right CVA tenderness.      Comments: LLQ and suprapubic area tender to palpation   Skin:     General: Skin is warm and dry.   Neurological:      Mental Status: She is alert.             Assessment / Plan      Assessment/Plan:   Diagnoses and all orders for this visit:    1. Left lower quadrant abdominal pain (Primary)    2. Acute cystitis with hematuria    3. Constipation, unspecified constipation type       Left lower " quadrant pain acute cystitis with hematuria positive urine culture Macrobid has been sent to the pharmacy CT abdomen pelvis is scheduled recommend increasing water intake avoid caffeine artificial sweeteners patient willing constipation recommend consistent use of MiraLAX increasing water and fiber in diet active 30 minutes daily      Follow Up:   Return if symptoms worsen or fail to improve.    Frandy Ellis, APRN      Please note that portions of this note were completed with a voice recognition program.

## 2024-10-02 ENCOUNTER — OFFICE VISIT (OUTPATIENT)
Dept: FAMILY MEDICINE CLINIC | Facility: CLINIC | Age: 54
End: 2024-10-02
Payer: MEDICARE

## 2024-10-02 VITALS
DIASTOLIC BLOOD PRESSURE: 73 MMHG | HEART RATE: 85 BPM | SYSTOLIC BLOOD PRESSURE: 123 MMHG | OXYGEN SATURATION: 95 % | TEMPERATURE: 97.7 F | BODY MASS INDEX: 39.93 KG/M2 | HEIGHT: 62 IN | WEIGHT: 217 LBS

## 2024-10-02 DIAGNOSIS — K59.00 CONSTIPATION, UNSPECIFIED CONSTIPATION TYPE: ICD-10-CM

## 2024-10-02 DIAGNOSIS — N30.01 ACUTE CYSTITIS WITH HEMATURIA: ICD-10-CM

## 2024-10-02 DIAGNOSIS — R10.32 LEFT LOWER QUADRANT ABDOMINAL PAIN: Primary | ICD-10-CM

## 2024-10-02 LAB — BACTERIA SPEC AEROBE CULT: ABNORMAL

## 2024-10-02 PROCEDURE — 3074F SYST BP LT 130 MM HG: CPT | Performed by: NURSE PRACTITIONER

## 2024-10-02 PROCEDURE — 3078F DIAST BP <80 MM HG: CPT | Performed by: NURSE PRACTITIONER

## 2024-10-02 PROCEDURE — 99213 OFFICE O/P EST LOW 20 MIN: CPT | Performed by: NURSE PRACTITIONER

## 2024-10-02 PROCEDURE — 1126F AMNT PAIN NOTED NONE PRSNT: CPT | Performed by: NURSE PRACTITIONER

## 2024-10-02 RX ORDER — NITROFURANTOIN 25; 75 MG/1; MG/1
100 CAPSULE ORAL 2 TIMES DAILY
Qty: 14 CAPSULE | Refills: 0 | Status: SHIPPED | OUTPATIENT
Start: 2024-10-02

## 2024-10-07 ENCOUNTER — TELEPHONE (OUTPATIENT)
Dept: CARDIOLOGY | Facility: CLINIC | Age: 54
End: 2024-10-07

## 2024-10-07 NOTE — TELEPHONE ENCOUNTER
Caller: Katarzyna Norris    Relationship: Self    Best call back number: [phone]     What is the best time to reach you: ANYTIME    Who are you requesting to speak with (clinical staff, provider,  specific staff member): ANYONE    Do you know the name of the person who called:     What was the call regarding: PATIENT CALLED IN ASKING TO CANCEL HER HOLTER MONITOR AND TO NOT RESCHEDULE AT THIS TIME.     Is it okay if the provider responds through [employer]: NO, CALL IF NECESSARY.

## 2024-10-08 ENCOUNTER — PATIENT OUTREACH (OUTPATIENT)
Dept: CASE MANAGEMENT | Facility: OTHER | Age: 54
End: 2024-10-08
Payer: MEDICARE

## 2024-10-08 DIAGNOSIS — N18.30 STAGE 3 CHRONIC KIDNEY DISEASE, UNSPECIFIED WHETHER STAGE 3A OR 3B CKD: ICD-10-CM

## 2024-10-08 DIAGNOSIS — I10 PRIMARY HYPERTENSION: ICD-10-CM

## 2024-10-08 DIAGNOSIS — G45.9 TIA (TRANSIENT ISCHEMIC ATTACK): Primary | ICD-10-CM

## 2024-10-08 DIAGNOSIS — R00.2 PALPITATIONS: ICD-10-CM

## 2024-10-08 NOTE — OUTREACH NOTE
Patient Outreach    Follow up PCP visit on 10/2/24 for UTI. She is taking abx as ordered and increasing water intake. No medication changes.     She is followed by Cardiology Dr Aldrich for hypertension and tia. Reports Htn controlled at last ov on 9/19/24.    Followed by Nephrology Dr Carvalho and [facility]ty [facility] for Stage 5 chronic kidney disease. Left dialysis fistula placed by Vascular Dr Dominguez. Does not receive dialysis at present time. Follow up appts are scheduled.     Neurology evaluation scheduled 11/21/24.     PCP follow up 10/22/24.    Names and Relationships of Patient/Support Persons: Contact: Katarzyna Norris; Relationship: Self -         Education Documentation  No documentation found.        Angela NATION  Ambulatory Case Management    10/8/2024, 09:35 EDT

## 2024-10-10 ENCOUNTER — DOCUMENTATION (OUTPATIENT)
Dept: PHYSICAL THERAPY | Facility: CLINIC | Age: 54
End: 2024-10-10
Payer: MEDICARE

## 2024-10-10 NOTE — PROGRESS NOTES
Outpatient Physical Therapy  [address] [facility] [address] [place], [place] [address]      Discharge Summary  Discharge Summary from Physical Therapy Report      Dates  PT visit: 12/20/23  Number of Visits: 1       Goals  Plan Goals:    1. The patient has limited ROM.                       LTG 1: 12 weeks:  The patient will demonstrate 75° of B cervical spine rotation in order to adequately monitor blind spots while driving and improve ability to perform activities of daily living.                          STATUS:  New  STG 1a: 6 weeks:  The patient will demonstrate 65° of B cervical spine rotation in order to adequately monitor blind spots while driving and improve ability to perform activities of daily living.                                      STATUS:  New              TREATMENT:  Manual therapy, therapeutic exercise, home exercise instruction, cervical traction, and modalities as needed to include: moist heat, electrical stimulation, and ultrasound.                2. The patient has complaints of pain.              LTG 2: 12 weeks:  The patient will report 4/10 pain at its worst in order to more easily tolerate activities of daily living and improve sleep quality.                          STATUS:  New              STG 2a: 6 weeks:  The patient will report 6/10 pain at its worst.                          STATUS:  New              TREATMENT: Manual therapy, therapeutic exercise, home exercise instruction, cervical traction, and modalities as needed to include: moist heat, electrical stimulation, and ultrasound.        3. Carrying, Moving, and Handling Objects Functional Limitation                               LTG 3: 12 weeks:  The patient will demonstrate 10% limitation by achieving a score of 5 on the Neck Disability Index.                          STATUS:  New              STG 3a: 6 weeks:  The patient will demonstrate 30% limitation by achieving a score of 15 on the Neck Disability Index.                             STATUS:  New              TREATMENT:  Manual therapy, therapeutic exercise, home exercise instruction, and modalities as needed to include: moist heat, electrical stimulation, and ultrasound    Discharge Plan: Continue with current home exercise program as instructed    Date of Discharge 10/10/2024      Electronically signed:   Joya Turcios PTA  Physical Therapist Assistant  [facility] [place] License #: [id number]

## 2024-10-15 ENCOUNTER — APPOINTMENT (OUTPATIENT)
Dept: CT IMAGING | Facility: HOSPITAL | Age: 54
End: 2024-10-15
Payer: MEDICARE

## 2024-10-15 ENCOUNTER — HOSPITAL ENCOUNTER (OUTPATIENT)
Facility: HOSPITAL | Age: 54
Setting detail: OBSERVATION
Discharge: HOME | End: 2024-10-16
Attending: EMERGENCY MEDICINE | Admitting: INTERNAL MEDICINE
Payer: MEDICARE

## 2024-10-15 DIAGNOSIS — R20.2 PARESTHESIA OF RIGHT ARM: ICD-10-CM

## 2024-10-15 DIAGNOSIS — R53.1 WEAKNESS: Primary | ICD-10-CM

## 2024-10-15 DIAGNOSIS — R26.2 DIFFICULTY IN WALKING: ICD-10-CM

## 2024-10-15 DIAGNOSIS — R41.89 ALTERATION IN COGNITION: ICD-10-CM

## 2024-10-15 PROBLEM — N18.30 STAGE 3 CHRONIC KIDNEY DISEASE: Status: RESOLVED | Noted: 2021-11-11 | Resolved: 2024-10-15

## 2024-10-15 LAB
ALBUMIN SERPL-MCNC: 4 G/DL (ref 3.5–5.2)
ALBUMIN/GLOB SERPL: 1.4 G/DL
ALP SERPL-CCNC: 138 U/L (ref 39–117)
ALT SERPL W P-5'-P-CCNC: 16 U/L (ref 1–33)
ANION GAP SERPL CALCULATED.3IONS-SCNC: 15.3 MMOL/L (ref 5–15)
AST SERPL-CCNC: 21 U/L (ref 1–32)
BASOPHILS # BLD AUTO: 0.05 10*3/MM3 (ref 0–0.2)
BASOPHILS NFR BLD AUTO: 0.6 % (ref 0–1.5)
BILIRUB SERPL-MCNC: 0.6 MG/DL (ref 0–1.2)
BUN SERPL-MCNC: 50 MG/DL (ref 6–20)
BUN/CREAT SERPL: 17.1 (ref 7–25)
CALCIUM SPEC-SCNC: 10.9 MG/DL (ref 8.6–10.5)
CHLORIDE SERPL-SCNC: 99 MMOL/L (ref 98–107)
CO2 SERPL-SCNC: 18.7 MMOL/L (ref 22–29)
CREAT SERPL-MCNC: 2.92 MG/DL (ref 0.57–1)
DEPRECATED RDW RBC AUTO: 51.4 FL (ref 37–54)
EGFRCR SERPLBLD CKD-EPI 2021: 18.3 ML/MIN/1.73
EOSINOPHIL # BLD AUTO: 0.23 10*3/MM3 (ref 0–0.4)
EOSINOPHIL NFR BLD AUTO: 2.7 % (ref 0.3–6.2)
ERYTHROCYTE [DISTWIDTH] IN BLOOD BY AUTOMATED COUNT: 15 % (ref 12.3–15.4)
GLOBULIN UR ELPH-MCNC: 2.8 GM/DL
GLUCOSE SERPL-MCNC: 101 MG/DL (ref 65–99)
HCT VFR BLD AUTO: 31.8 % (ref 34–46.6)
HGB BLD-MCNC: 10.5 G/DL (ref 12–15.9)
HOLD SPECIMEN: NORMAL
HOLD SPECIMEN: NORMAL
IMM GRANULOCYTES # BLD AUTO: 0.04 10*3/MM3 (ref 0–0.05)
IMM GRANULOCYTES NFR BLD AUTO: 0.5 % (ref 0–0.5)
LYMPHOCYTES # BLD AUTO: 1.16 10*3/MM3 (ref 0.7–3.1)
LYMPHOCYTES NFR BLD AUTO: 13.6 % (ref 19.6–45.3)
MCH RBC QN AUTO: 31.1 PG (ref 26.6–33)
MCHC RBC AUTO-ENTMCNC: 33 G/DL (ref 31.5–35.7)
MCV RBC AUTO: 94.1 FL (ref 79–97)
MONOCYTES # BLD AUTO: 0.41 10*3/MM3 (ref 0.1–0.9)
MONOCYTES NFR BLD AUTO: 4.8 % (ref 5–12)
NEUTROPHILS NFR BLD AUTO: 6.65 10*3/MM3 (ref 1.7–7)
NEUTROPHILS NFR BLD AUTO: 77.8 % (ref 42.7–76)
NRBC BLD AUTO-RTO: 0 /100 WBC (ref 0–0.2)
PLATELET # BLD AUTO: 180 10*3/MM3 (ref 140–450)
PMV BLD AUTO: 9.5 FL (ref 6–12)
POTASSIUM SERPL-SCNC: 5 MMOL/L (ref 3.5–5.2)
PROT SERPL-MCNC: 6.8 G/DL (ref 6–8.5)
RBC # BLD AUTO: 3.38 10*6/MM3 (ref 3.77–5.28)
SODIUM SERPL-SCNC: 133 MMOL/L (ref 136–145)
WBC NRBC COR # BLD AUTO: 8.54 10*3/MM3 (ref 3.4–10.8)
WHOLE BLOOD HOLD COAG: NORMAL
WHOLE BLOOD HOLD SPECIMEN: NORMAL

## 2024-10-15 PROCEDURE — 99223 1ST HOSP IP/OBS HIGH 75: CPT | Performed by: INTERNAL MEDICINE

## 2024-10-15 PROCEDURE — 70450 CT HEAD/BRAIN W/O DYE: CPT

## 2024-10-15 PROCEDURE — 80053 COMPREHEN METABOLIC PANEL: CPT

## 2024-10-15 PROCEDURE — 99285 EMERGENCY DEPT VISIT HI MDM: CPT

## 2024-10-15 PROCEDURE — G0378 HOSPITAL OBSERVATION PER HR: HCPCS

## 2024-10-15 PROCEDURE — 36415 COLL VENOUS BLD VENIPUNCTURE: CPT

## 2024-10-15 PROCEDURE — 99214 OFFICE O/P EST MOD 30 MIN: CPT | Performed by: PSYCHIATRY & NEUROLOGY

## 2024-10-15 PROCEDURE — 85025 COMPLETE CBC W/AUTO DIFF WBC: CPT

## 2024-10-15 RX ORDER — DIPHENHYDRAMINE HCL 25 MG
25 CAPSULE ORAL EVERY 6 HOURS PRN
Status: DISCONTINUED | OUTPATIENT
Start: 2024-10-15 | End: 2024-10-16 | Stop reason: HOSPADM

## 2024-10-15 RX ORDER — ASPIRIN 325 MG
325 TABLET ORAL DAILY
Status: DISCONTINUED | OUTPATIENT
Start: 2024-10-15 | End: 2024-10-16

## 2024-10-15 RX ORDER — SODIUM CHLORIDE 0.9 % (FLUSH) 0.9 %
10 SYRINGE (ML) INJECTION EVERY 12 HOURS SCHEDULED
Status: DISCONTINUED | OUTPATIENT
Start: 2024-10-15 | End: 2024-10-16 | Stop reason: HOSPADM

## 2024-10-15 RX ORDER — CLOPIDOGREL BISULFATE 75 MG/1
75 TABLET ORAL ONCE
Status: COMPLETED | OUTPATIENT
Start: 2024-10-15 | End: 2024-10-15

## 2024-10-15 RX ORDER — ALLOPURINOL 300 MG/1
150 TABLET ORAL NIGHTLY
Status: DISCONTINUED | OUTPATIENT
Start: 2024-10-15 | End: 2024-10-16 | Stop reason: HOSPADM

## 2024-10-15 RX ORDER — ATORVASTATIN CALCIUM 40 MG/1
80 TABLET, FILM COATED ORAL NIGHTLY
Status: DISCONTINUED | OUTPATIENT
Start: 2024-10-15 | End: 2024-10-16 | Stop reason: HOSPADM

## 2024-10-15 RX ORDER — SODIUM CHLORIDE 0.9 % (FLUSH) 0.9 %
10 SYRINGE (ML) INJECTION AS NEEDED
Status: DISCONTINUED | OUTPATIENT
Start: 2024-10-15 | End: 2024-10-16 | Stop reason: HOSPADM

## 2024-10-15 RX ORDER — SODIUM BICARBONATE 650 MG/1
650 TABLET ORAL 2 TIMES DAILY
Status: DISCONTINUED | OUTPATIENT
Start: 2024-10-15 | End: 2024-10-16 | Stop reason: HOSPADM

## 2024-10-15 RX ORDER — CARVEDILOL 25 MG/1
25 TABLET ORAL 2 TIMES DAILY WITH MEALS
Status: DISCONTINUED | OUTPATIENT
Start: 2024-10-15 | End: 2024-10-16 | Stop reason: HOSPADM

## 2024-10-15 RX ORDER — ASPIRIN 81 MG/1
81 TABLET ORAL ONCE
Status: DISCONTINUED | OUTPATIENT
Start: 2024-10-15 | End: 2024-10-15

## 2024-10-15 RX ORDER — CLONIDINE HYDROCHLORIDE 0.2 MG/1
0.2 TABLET ORAL NIGHTLY
COMMUNITY

## 2024-10-15 RX ORDER — ALBUTEROL SULFATE 90 UG/1
2 INHALANT RESPIRATORY (INHALATION) EVERY 4 HOURS PRN
COMMUNITY

## 2024-10-15 RX ORDER — ALLOPURINOL 300 MG/1
150 TABLET ORAL NIGHTLY
COMMUNITY

## 2024-10-15 RX ORDER — ASPIRIN 300 MG/1
300 SUPPOSITORY RECTAL DAILY
Status: DISCONTINUED | OUTPATIENT
Start: 2024-10-15 | End: 2024-10-16

## 2024-10-15 RX ORDER — CEFDINIR 300 MG/1
300 CAPSULE ORAL
Status: DISCONTINUED | OUTPATIENT
Start: 2024-10-15 | End: 2024-10-16 | Stop reason: HOSPADM

## 2024-10-15 RX ORDER — IPRATROPIUM BROMIDE AND ALBUTEROL SULFATE 2.5; .5 MG/3ML; MG/3ML
3 SOLUTION RESPIRATORY (INHALATION) EVERY 4 HOURS PRN
Status: DISCONTINUED | OUTPATIENT
Start: 2024-10-15 | End: 2024-10-16 | Stop reason: HOSPADM

## 2024-10-15 RX ORDER — CLONIDINE HYDROCHLORIDE 0.1 MG/1
0.2 TABLET ORAL NIGHTLY
Status: DISCONTINUED | OUTPATIENT
Start: 2024-10-15 | End: 2024-10-16 | Stop reason: HOSPADM

## 2024-10-15 RX ORDER — POLYETHYLENE GLYCOL 3350 17 G/17G
17 POWDER, FOR SOLUTION ORAL DAILY
Status: DISCONTINUED | OUTPATIENT
Start: 2024-10-16 | End: 2024-10-16 | Stop reason: HOSPADM

## 2024-10-15 RX ORDER — LANOLIN ALCOHOL/MO/W.PET/CERES
1000 CREAM (GRAM) TOPICAL DAILY
COMMUNITY

## 2024-10-15 RX ORDER — ACETAMINOPHEN 500 MG
1000 TABLET ORAL ONCE
Status: COMPLETED | OUTPATIENT
Start: 2024-10-15 | End: 2024-10-15

## 2024-10-15 RX ADMIN — ACETAMINOPHEN 1000 MG: 500 TABLET ORAL at 14:43

## 2024-10-15 RX ADMIN — SODIUM BICARBONATE 650 MG TABLET 650 MG: at 21:49

## 2024-10-15 RX ADMIN — CARVEDILOL 25 MG: 25 TABLET, FILM COATED ORAL at 21:49

## 2024-10-15 RX ADMIN — ALLOPURINOL 150 MG: 300 TABLET ORAL at 21:49

## 2024-10-15 RX ADMIN — CLONIDINE HYDROCHLORIDE 0.2 MG: 0.1 TABLET ORAL at 21:49

## 2024-10-15 RX ADMIN — CLOPIDOGREL BISULFATE 75 MG: 75 TABLET ORAL at 16:32

## 2024-10-15 RX ADMIN — CEFDINIR 300 MG: 300 CAPSULE ORAL at 21:49

## 2024-10-15 NOTE — CASE MANAGEMENT/SOCIAL WORK
Discharge Planning Assessment   [facility]     Patient Name: Katarzyna Norris  MRN: [id number]  Today's Date: 10/15/2024    Admit Date: 10/15/2024        Discharge Needs Assessment       Row Name 10/15/24 1646       Living Environment    People in Home child(janna), adult;spouse    Name(s) of People in Home , Jarvis and 2 sons, Jarvis Lomeli and Joshua    Current Living Arrangements home    Potentially Unsafe Housing Conditions none    In the past 12 months has the electric, gas, oil, or water company threatened to shut off services in your home? No    Primary Care Provided by self    Provides Primary Care For no one    Family Caregiver if Needed child(janna), adult;spouse    Family Caregiver Names , Jarvis and 2 sons    Quality of Family Relationships helpful;involved;supportive    Able to Return to Prior Arrangements yes       Resource/Environmental Concerns    Resource/Environmental Concerns none    Transportation Concerns none       Transportation Needs    In the past 12 months, has lack of transportation kept you from medical appointments or from getting medications? no    In the past 12 months, has lack of transportation kept you from meetings, work, or from getting things needed for daily living? No       Food Insecurity    Within the past 12 months, you worried that your food would run out before you got the money to buy more. Never true    Within the past 12 months, the food you bought just didn't last and you didn't have money to get more. Never true       Transition Planning    Patient/Family Anticipates Transition to home    Patient/Family Anticipated Services at Transition none    Transportation Anticipated family or friend will provide       Discharge Needs Assessment    Readmission Within the Last 30 Days no previous admission in last 30 days    Current Outpatient/Agency/Support Group clinic(s)    Equipment Currently Used at Home none    Concerns to be Addressed no discharge needs identified    Do you  want help finding or keeping work or a job? I do not need or want help    Do you want help with school or training? For example, starting or completing job training or getting a high school diploma, GED or equivalent No    Anticipated Changes Related to Illness none    Equipment Needed After Discharge none                   Discharge Plan    No documentation.                 Continued Care and Services - Admitted Since 10/15/2024    No active coordination exists for this encounter.       Selected Continued Care - Episodes Includes continued care and service providers with selected services from the active episodes listed below      High Risk Care Management Episode start date: 9/25/2024   There are no active outsourced providers for this episode.                    Demographic Summary       Row Name 10/15/24 1645       General Information    Admission Type observation    Arrived From home    Referral Source emergency department    Reason for Consult discharge planning    Preferred Language English       Contact Information    Permission Granted to Share Info With ;, insurance    Contact Information Obtained for ;, insurance                   Functional Status       Row Name 10/15/24 1645       Functional Status    Usual Activity Tolerance good    Current Activity Tolerance good       Physical Activity    On average, how many days per week do you engage in moderate to strenuous exercise (like a brisk walk)? 7 days    On average, how many minutes do you engage in exercise at this level? 120 min    Number of minutes of exercise per week 840       Assessment of Health Literacy    How often do you have someone help you read hospital materials? Never    How often do you have problems learning about your medical condition because of difficulty understanding written information? Never    How often do you have a problem understanding what is told to you about your medical  condition? Never    How confident are you filling out medical forms by yourself? Quite a bit    Health Literacy Good       Functional Status, IADL    Medications independent    Meal Preparation independent    Housekeeping independent    Laundry independent    Shopping independent    If for any reason you need help with day-to-day activities such as bathing, preparing meals, shopping, managing finances, etc., do you get the help you need? I don't need any help       Mental Status    General Appearance WDL WDL       Mental Status Summary    Recent Changes in Mental Status/Cognitive Functioning no changes       Employment/    Employment Status disabled    Current or Previous Occupation not applicable                   Psychosocial    No documentation.                  Abuse/Neglect       Row Name 10/15/24 1646       Personal Safety    Feels Unsafe at Home or Work/School no    Feels Threatened by Someone no    Does Anyone Try to Keep You From Having Contact with Others or Doing Things Outside Your Home? no    Physical Signs of Abuse Present no                   Legal       Row Name 10/15/24 1645       Financial Resource Strain    How hard is it for you to pay for the very basics like food, housing, medical care, and heating? Not hard       Financial/Legal    Source of Income disability    Financial/Environmental Concerns none    Application for Public Assistance not applied       Legal    Criminal Activity/Legal Involvement none                   Substance Abuse       Row Name 10/15/24 1649       Substance Use    Substance Use Status never used                   Patient Forms    No documentation.                 SW met with patient and  at discharge. Pt reports that she lives at home with her , Jarvis and her two adult sons, Jarvis Allred And Joshua. Pt is established with a cardiologist and Kidney specialist. Pt reports that she exercises about 2 hours every day to try to lost 40 more pounds to qualify for  a liver transplant. Pt sees Inga Ellis for her PCP and uses [facility] in [place]. No needs reported at this time.    NADIA Jeff

## 2024-10-15 NOTE — H&P
[facility]  Date: 10/15/2024   Patient Name: Katarzyna Norris  : 1970  MRN: [id number]  Primary Care Physician:  Brian Ellis APRN  Date of admission: 10/15/2024    Subjective   Subjective     Chief Complaint: Right sided numbness    HPI:    Katarzyna Norris is a 54 y.o. female CKD stage IV-V working to get on transplant list, hypertension, dyslipidemia, recent TIA, obesity BMI 41, and gout who presents emergency department with right sided numbness on her head and down her arm    Patient states that she initially started noticing the symptoms last night.  Then this morning when she woke up she started doing her exercises and things started to improve throughout the day and eventually resolved.  However she was told to come to the ER due to concern for possible stroke.    In the emergency department the patient's vital signs are as follows: Temperature is 97.5, pulse 82, respiratory 16, pressure 142/94, 100% on room air.  CBC shows a white blood cell count of 8.54 and a hemoglobin of 10.5 CMP shows a creatinine of 2.92, bicarb of 18.7, anion gap of 15.3.  Calcium is 10.9.  Sodium is 133.  CT of the head shows no abnormalities.  Patient was in the hospital for workup for stroke    All systems reviewed abnormalities noted    Personal History     Past Medical History:  End-stage renal disease versus stage V kidney disease on transplant list  Hypertension  Dyslipidemia  TIA  Obesity with BMI of 41  Gout    Past Surgical History:  AV fistula  Colonoscopy  Endoscopy  Joint replacement  Tubal abdominal ligation    Family History:   Diabetes  Hypertension    Social History:   Former smoker.  No alcohol    Home Medications:  acetaminophen, allopurinol, aspirin, atorvastatin, carvedilol, cloNIDine, diphenhydrAMINE, fluticasone, losartan, nitrofurantoin (macrocrystal-monohydrate), polyethylene glycol, and sodium bicarbonate    Allergies:  Allergies   Allergen  "Reactions    Amlodipine Shortness Of Breath    Clindamycin/Lincomycin Swelling     FACIAL SWELLING      Contrast Dye (Echo Or Unknown Ct/Mr) Palpitations     \"BLOOD PRESSURE GOES GISSELLE HIGH\", \"HEART RACES\"    Diltiazem Hcl Anxiety    Zofran [Ondansetron] Confusion    Augmentin [Amoxicillin-Pot Clavulanate] Nausea And Vomiting     Beta lactam allergy details  Antibiotic reaction: nausea  Age at reaction: adult  Dose to reaction time: days  Reason for antibiotic: unknown  Epinephrine required for reaction?: unknown  Tolerated antibiotics: unknown       Doxycycline Nausea And Vomiting           Objective   Objective     Vitals:   Temp:  [97.5 °F (36.4 °C)-98 °F (36.7 °C)] 97.5 °F (36.4 °C)  Heart Rate:  [] 82  Resp:  [16-18] 16  BP: (142-165)/(76-96) 142/94    Physical Exam    Constitutional: Awake, alert, no acute distress   Eyes: Pupils equal, sclerae anicteric, no conjunctival injection   HENT: NCAT, mucous membranes moist.  Pain with palpation over frontal sinus   Neck: Supple, no thyromegaly, no lymphadenopathy, trachea midline   Respiratory: Clear to auscultation bilaterally, nonlabored respirations    Cardiovascular: RRR, no murmurs, rubs, or gallops, palpable pedal pulses bilaterally   Gastrointestinal: Positive bowel sounds, soft, nontender, nondistended   Musculoskeletal: No bilateral ankle edema, no clubbing or cyanosis to extremities   Psychiatric: Appropriate affect, cooperative   Neurologic: Oriented x 3, strength symmetric in all extremities, Cranial Nerves grossly intact to confrontation, speech clear   Skin: No rashes     Result Review    Result Review:  I have personally reviewed the results from the time of this admission to 10/15/2024 15:47 EDT and agree with these findings:  Imaging and labs reviewed      Assessment & Plan   Assessment / Plan     Assessment/Plan:   TIA versus symptoms from cervical spine compression  Acute sinusitis  CKD stage IV-V    Plan:  -- Admit to hospitalist service  -- " Consult neurology  -- I will complete the workup for stroke with MRI of the brain  -- MRA of the head and neck as this was not done on her last admission  -- Will not repeat echocardiogram as this was already done 4 weeks  -- Lipid panel and A1c  -- Atorvastatin and aspirin  -- I will not order MRI of the cervical spine if the stroke is negative then this is most likely a presentation of  cervical spinal compression and that can be done as an outpatient  --Cefdinir for acute sinusitis      VTE Prophylaxis:  SCDs        CODE STATUS:     Full code    Admission Status:  I believe this patient meets admission status.    Electronically signed by Avtar Garza MD, 10/15/24, 3:47 PM EDT.

## 2024-10-15 NOTE — ED PROVIDER NOTES
"Time: 1:27 PM EDT  Date of encounter:  10/15/2024  Independent Historian/Clinical History and Information was obtained by:   Patient and Family    History is limited by: N/A    Chief Complaint   Patient presents with    Headache     All began a month ago with right facial numbness, toungue numbness, arm and left leg. Was seen by a neurologist and placed on plavix. Is out of her plavix and the numbness is slightly worse. Since this initally happened the numbness has been present and never gone away.     Numbness         History of Present Illness:  Patient is a 54 y.o. year old female who presents to the emergency department for evaluation of right facial numbness, tongue numbness, right arm numbness.  Initial symptoms started about a month ago. She was sent home with plavix. States that her symptoms did get better with plavix. She has been out of plavix for about 2 weeks. Symptoms started again yesterday and has gotten worse today. While waiting in the room, patient states that the numbness on her right arm is better. She is now just having right sided facial numbness and a radiating headache from left to right.  While at bedside, patient reports that she her ongoing weakness in her face arm have almost completely resolved and numbness as well.  Patient denies any head injuries.      Patient Care Team  Primary Care Provider: Brian Ellis APRN    Past Medical History:     Allergies   Allergen Reactions    Amlodipine Shortness Of Breath    Clindamycin/Lincomycin Swelling     FACIAL SWELLING      Contrast Dye (Echo Or Unknown Ct/Mr) Palpitations     \"BLOOD PRESSURE GOES GISSELLE HIGH\", \"HEART RACES\"    Diltiazem Hcl Anxiety    Zofran [Ondansetron] Confusion    Augmentin [Amoxicillin-Pot Clavulanate] Nausea And Vomiting     Beta lactam allergy details  Antibiotic reaction: nausea  Age at reaction: adult  Dose to reaction time: days  Reason for antibiotic: unknown  Epinephrine required for reaction?: " unknown  Tolerated antibiotics: unknown       Doxycycline Nausea And Vomiting     Past Medical History:   Diagnosis Date    Anxiety     Arthritis     GILBERT KNEES    Asthma     SEASONAL ALLERGIES- NO INHALERS    CKD (chronic kidney disease)     Stage 5, Follows with [facility]- NO CURRENT DIALYSIS    Elevated cholesterol     Gastritis     GERD (gastroesophageal reflux disease)     Gout 2021    Hiatal hernia     Hiatal hernia     Hypertension     Lactose intolerance     Transplant, organ     ON KIDNEY TRANSPLANT LIST     Past Surgical History:   Procedure Laterality Date    ARTERIOVENOUS FISTULA/SHUNT SURGERY Left 2024    Procedure: 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;  Surgeon: Yaniv Dominguez MD;  Location: [facility] MAIN OR;  Service: Vascular;  Laterality: Left;    BONY PELVIS SURGERY      Plate     SECTION   and     CHOLECYSTECTOMY      COLONOSCOPY      COLONOSCOPY      COLONOSCOPY      COLONOSCOPY N/A 2024    Procedure: COLONOSCOPY;  Surgeon: Aston Arroyo MD;  Location: [facility] ENDOSCOPY;  Service: Gastroenterology;  Laterality: N/A;  HEMORRHOIDS    ENDOSCOPY      ENDOSCOPY N/A 2023    Procedure: ESOPHAGOGASTRODUODENOSCOPY with biopsies;  Surgeon: Tam Badillo MD;  Location: [facility] ENDOSCOPY;  Service: General;  Laterality: N/A;  hiatal hernia    JOINT REPLACEMENT      TOTAL KNEE ARTHROPLASTY Left 2021    Procedure: TOTAL KNEE ARTHROPLASTY;  Surgeon: Marco Nelson MD;  Location: [facility] MAIN OR;  Service: Orthopedics;  Laterality: Left;    TOTAL KNEE ARTHROPLASTY Right 10/19/2022    Procedure: RIGHT TOTAL KNEE ARTHROPLASTY - NO SHELBY;  Surgeon: Marco Nelson MD;  Location: [facility] MAIN OR;  Service: Orthopedics;  Laterality: Right;    TUBAL ABDOMINAL LIGATION       Family History   Problem Relation Age of Onset    Diabetes Mother     Throat cancer Father 73    Hypertension Sister     Hypertension Sister      Hypertension Sister     Diabetes Brother     Hypertension Brother     Stroke Other     Diabetes Other     Malig Hyperthermia Neg Hx     Colon cancer Neg Hx        Home Medications:  Prior to Admission medications    Medication Sig Start Date End Date Taking? Authorizing Provider   acetaminophen (TYLENOL) 325 MG tablet Take 2 tablets by mouth Every 6 (Six) Hours As Needed.    Al Ceballos MD   allopurinol (ZYLOPRIM) 300 MG tablet Take 1 tablet by mouth Daily. 9/16/24   Donny Staton DO   aspirin 81 MG chewable tablet Chew 1 tablet Daily for 30 days. 9/17/24 10/17/24  Donny Staton DO   atorvastatin (LIPITOR) 20 MG tablet Take 1 tablet by mouth Every Night. 7/24/24   Brian Ellis APRN   carvedilol (COREG) 25 MG tablet Take 1 tablet by mouth 2 (Two) Times a Day With Meals. 7/24/24   Brian Ellis APRN   cloNIDine (CATAPRES) 0.2 MG tablet Take 1 tablet by mouth.    Al Ceballos MD   diphenhydrAMINE (BENADRYL) 25 mg capsule Take 1 capsule by mouth Every 6 (Six) Hours As Needed for Itching.    Al Ceballos MD   fluticasone (FLONASE) 50 MCG/ACT nasal spray 2 sprays into the nostril(s) as directed by provider Daily. 2 sprays each nostril daily 7/24/24   Brian Ellis APRN   losartan (COZAAR) 100 MG tablet Take 1 tablet by mouth Daily. 4/24/24   Brian Ellis APRN   nitrofurantoin, macrocrystal-monohydrate, (Macrobid) 100 MG capsule Take 1 capsule by mouth 2 (Two) Times a Day. 10/2/24   Harley Santana DO   polyethylene glycol (MIRALAX) 17 g packet Take 17 g by mouth Daily. 5/17/24   Masoud Mazariegos MD   sodium bicarbonate 650 MG tablet Take 1 tablet by mouth 2 (Two) Times a Day. 5/14/24 5/14/25  Al Ceballos MD   cloNIDine (CATAPRES) 0.2 MG tablet TAKE 1 TABLET BY MOUTH THREE TIMES DAILY 9/30/24 10/15/24  Brian Ellis APRN        Social History:   Social History     Tobacco Use    Smoking status: Former     Current  "packs/day: 0.00     Types: Cigarettes     Quit date:      Years since quittin.7     Passive exposure: Never    Smokeless tobacco: Never   Vaping Use    Vaping status: Never Used   Substance Use Topics    Alcohol use: Never    Drug use: Never         Review of Systems:  Review of Systems   Constitutional:  Negative for chills and fever.   HENT:  Negative for congestion, rhinorrhea and sore throat.    Eyes:  Negative for pain and visual disturbance.   Respiratory:  Negative for apnea, cough, chest tightness and shortness of breath.    Cardiovascular:  Negative for chest pain and palpitations.   Gastrointestinal:  Negative for abdominal pain, diarrhea, nausea and vomiting.   Genitourinary:  Negative for difficulty urinating and dysuria.   Musculoskeletal:  Negative for joint swelling and myalgias.   Skin:  Negative for color change.   Neurological:  Positive for weakness, numbness and headaches. Negative for seizures, syncope and speech difficulty.   Psychiatric/Behavioral: Negative.     All other systems reviewed and are negative.       Physical Exam:  /94 (BP Location: Right arm, Patient Position: Lying)   Pulse 82   Temp 97.5 °F (36.4 °C) (Oral)   Resp 16   Ht 157.5 cm (62\")   Wt 102 kg (224 lb 13.9 oz)   SpO2 100%   BMI 41.13 kg/m²         Physical Exam  Vitals and nursing note reviewed.   Constitutional:       General: She is not in acute distress.     Appearance: Normal appearance. She is not toxic-appearing.   HENT:      Head: Normocephalic and atraumatic.      Jaw: There is normal jaw occlusion.   Eyes:      General: Lids are normal.      Extraocular Movements: Extraocular movements intact.      Conjunctiva/sclera: Conjunctivae normal.      Pupils: Pupils are equal, round, and reactive to light.   Cardiovascular:      Rate and Rhythm: Normal rate and regular rhythm.      Pulses: Normal pulses.      Heart sounds: Normal heart sounds.   Pulmonary:      Effort: Pulmonary effort is normal. No " respiratory distress.      Breath sounds: Normal breath sounds. No wheezing or rhonchi.   Abdominal:      General: Abdomen is flat.      Palpations: Abdomen is soft.      Tenderness: There is no abdominal tenderness. There is no guarding or rebound.   Musculoskeletal:         General: Normal range of motion.      Cervical back: Normal range of motion and neck supple.      Right lower leg: No edema.      Left lower leg: No edema.   Skin:     General: Skin is warm and dry.   Neurological:      Mental Status: She is alert and oriented to person, place, and time. Mental status is at baseline.      Cranial Nerves: No cranial nerve deficit.      Motor: No weakness.      Coordination: Coordination normal.      Comments: No focal deficits appreciated on physical exam.  Cranial nerves II-XII intact.   strength 5 out of 5.  Full bilateral upper and lower extremity range of motion.  Sensation intact both upper and lower extremities.  Alert and oriented x 4.  No dysarthria.   Psychiatric:         Mood and Affect: Mood normal.         Behavior: Behavior normal.                    Procedures:  Procedures      Medical Decision Making:      Comorbidities that affect care:    Chronic Kidney Disease, Hypertension    External Notes reviewed:    Previous Admission Note: Previous admission note on 9/15/2024 reviewed.  Patient was seen for right-sided weakness, right facial numbness, and word finding difficulty.  Patient did not receive tPA.  Patient was discharged on Plavix and diagnosed with TIA.      The following orders were placed and all results were independently analyzed by me:  Orders Placed This Encounter   Procedures    CT Head Without Contrast    Comprehensive Metabolic Panel    [place] Draw    CBC Auto Differential    Inpatient Neurology Consult General    Inpatient Hospitalist Consult    CBC & Differential    Green Top (Gel)    Lavender Top    Gold Top - SST    Light Blue Top       Medications Given in the Emergency  Department:  Medications   clopidogrel (PLAVIX) tablet 75 mg (has no administration in time range)   aspirin EC tablet 81 mg (has no administration in time range)   acetaminophen (TYLENOL) tablet 1,000 mg (1,000 mg Oral Given 10/15/24 1443)        ED Course:    The patient was initially evaluated in the triage area where orders were placed. The patient was later dispositioned by Keith Collins PA-C.      The patient was advised to stay for completion of workup which includes but is not limited to communication of labs and radiological results, reassessment and plan. The patient was advised that leaving prior to disposition by a provider could result in critical findings that are not communicated to the patient.     ED Course as of 10/15/24 1603   Tue Oct 15, 2024   1331 PROVIDER IN TRIAGE  Patient was evaluated by Jarvis muhammad PA-C. Orders were placed and awaiting final results and disposition.   [MV]   1434 CBC reviewed.  Mild anemia present.  No evidence of leukocytosis. [CB]   1434 CMP reviewed.  Elevated BUN and creatinine with known history of CKD stage V.  Patient not currently on dialysis. [CB]   1438 CT head reviewed.  No evidence of acute findings.  Evidence of right maxillary sinusitis [CB]   1533 Spoke with Dr. Streeter with teleneuro.  Specialist recommended starting patient on aspirin 81 mg and Plavix.  He also recommended further imaging with MRI head and C-spine and inpatient admission for observation with 24-hour telemetry monitoring with known history of A-fib.  Recs appreciated orders placed. [CB]      ED Course User Index  [CB] Keith Collins PA-C  [MV] Jarvis Kent PA       Labs:    Lab Results (last 24 hours)       Procedure Component Value Units Date/Time    CBC & Differential [[id number]]  (Abnormal) Collected: 10/15/24 1109    Specimen: Blood from Hand, Right Updated: 10/15/24 1137    Narrative:      The following orders were created for panel order CBC & Differential.  Procedure                                Abnormality         Status                     ---------                               -----------         ------                     CBC Auto Differential[[id number]]        Abnormal            Final result                 Please view results for these tests on the individual orders.    Comprehensive Metabolic Panel [[id number]]  (Abnormal) Collected: 10/15/24 1109    Specimen: Blood from Hand, Right Updated: 10/15/24 1158     Glucose 101 mg/dL      BUN 50 mg/dL      Creatinine 2.92 mg/dL      Sodium 133 mmol/L      Potassium 5.0 mmol/L      Comment: Slight hemolysis detected by analyzer. Result may be falsely elevated.        Chloride 99 mmol/L      CO2 18.7 mmol/L      Calcium 10.9 mg/dL      Total Protein 6.8 g/dL      Albumin 4.0 g/dL      ALT (SGPT) 16 U/L      AST (SGOT) 21 U/L      Alkaline Phosphatase 138 U/L      Total Bilirubin 0.6 mg/dL      Globulin 2.8 gm/dL      A/G Ratio 1.4 g/dL      BUN/Creatinine Ratio 17.1     Anion Gap 15.3 mmol/L      eGFR 18.3 mL/min/1.73     Narrative:      GFR Normal >60  Chronic Kidney Disease <60  Kidney Failure <15      CBC Auto Differential [[id number]]  (Abnormal) Collected: 10/15/24 1109    Specimen: Blood from Hand, Right Updated: 10/15/24 1137     WBC 8.54 10*3/mm3      RBC 3.38 10*6/mm3      Hemoglobin 10.5 g/dL      Hematocrit 31.8 %      MCV 94.1 fL      MCH 31.1 pg      MCHC 33.0 g/dL      RDW 15.0 %      RDW-SD 51.4 fl      MPV 9.5 fL      Platelets 180 10*3/mm3      Neutrophil % 77.8 %      Lymphocyte % 13.6 %      Monocyte % 4.8 %      Eosinophil % 2.7 %      Basophil % 0.6 %      Immature Grans % 0.5 %      Neutrophils, Absolute 6.65 10*3/mm3      Lymphocytes, Absolute 1.16 10*3/mm3      Monocytes, Absolute 0.41 10*3/mm3      Eosinophils, Absolute 0.23 10*3/mm3      Basophils, Absolute 0.05 10*3/mm3      Immature Grans, Absolute 0.04 10*3/mm3      nRBC 0.0 /100 WBC              Imaging:    CT Head Without Contrast    Result Date:  10/15/2024  CT HEAD WO CONTRAST Date of Exam: 10/15/2024 2:05 PM EDT Indication: headache, facial numbness, extremity numbness. Comparison: None available. Technique: Axial CT images were obtained of the head without contrast administration.  Reconstructed coronal and sagittal images were also obtained. Automated exposure control and iterative construction methods were used. Findings: No focal brain lesion, mass or intracranial hemorrhage is identified. The ventricles are normal in size and configuration. A small air-fluid level in the right maxillary sinus is noted. No focal calvarial abnormality is seen.     Impression: 1.No acute intracranial abnormality. 2.Air-fluid level in the right maxillary sinus. Correlate clinically for the possibility of acute sinusitis. Electronically Signed: Malik Arthur MD  10/15/2024 2:34 PM EDT  Workstation ID: [id number]       Differential Diagnosis and Discussion:      Stroke: Differential diagnosis in this patient with signs of possible ischemic stroke include TIA or ischemic stroke, hemorrhagic stroke, hypoglycemic episode, toxic or metabolic encephalopathy, paresthesias.  Weakness: Based on the patient's history, signs, and symptoms, the diffential diagnosis includes but is not limited to meningitis, stroke, sepsis, subarachnoid hemorrhage, intracranial bleeding, encephalitis, acute uti, dehydration, MS, myasthenia gravis, Guillan Partridge, migraine variant, neuromuscular disorders vertigo, electrolyte imbalance, and metabolic disorders.    All labs were reviewed and interpreted by me.    [facility]                   Patient Care Considerations:    SEPSIS was considered but is NOT present in the emergency department as SIRS criteria is not present.      Consultants/Shared Management Plan:    Hospitalist: I have discussed the case with Dr. Garza who agrees to accept the patient for admission.    Social Determinants of Health:    Patient has presented with family members who are responsible,  reliable and will ensure follow up care.      Disposition and Care Coordination:    Admit:   Through independent evaluation of the patient's history, physical, and imperical data, the patient meets criteria for inpatient admission to the hospital.        Final diagnoses:   None        ED Disposition       ED Disposition   Decision to Admit    Condition   --    Comment   --               This medical record created using voice recognition software.             Keith Collins PA-C  10/15/24 5417

## 2024-10-15 NOTE — CONSULTS
[facility]  [facility] Tele[facility]urology Consult Services    Stat Consult    Patient Name:   Katarzyna Norris  YOB: 1970  Identification Number:   MRN - [id number]  Date of Service:   10/15/2024 14:43:51    Diagnosis:        I63.81 - Cerebrovascular accident (CVA) due to occlusion of small artery        R20.2 - Paresthesia of skin        R51.9 - Headache, unspecified    Impression  HEadache, neck pain and right sided numbness  Minor stroke or TIA is possible but low likelihood given reoccurence without a known stroke. She has risk factors that need control  Another possibility would be cervical spine disease  Her head pain could be from sinusitis seen on head CT      Recommendations:  Our recommendations are outlined below.    Diagnostic Studies :  MRI head without contrast  Please order  MRA head without contrast  Please order  MRA neck without contrast (due to renal function)   MRI cervical spine without contrast    Laboratory Studies :  Lipid panel  Please order  Hemoglobin A1c  Please order  TSH  Please order    Antithrombotic Medication :  Initiate dual antiplatelet therapy with Aspirin 81 mg daily and Clopidogrel 75 mg daily  Please order  Statins for LDL goal less than 70    Anticoagulant Medication :  May need anticoagulation if A Fib found    Nursing Recommendations :  Neuro checks q4 hrs x 24 hrs and then per shift  Continue with Telemetry    Consultations :  Recommend Speech therapy if failed dysphagia screenPhysical therapy/Occupational therapy    DVT Prophylaxis :  Lovenox or LMW Heparin    No tpA or thrombectomy as symptoms mild and non disabling    Disposition :  Neurology will follow    Miscellaneous :  Echo BP control with goal under 130/80 Check urine toxicology PRior records do not show A Fib. If history if found, then she will need full anticoagulation instead of antiplatelets Sugar control, weight loss discussed Management of sinusitis per  primary      ----------------------------------------------------------------------------------------------------        Metrics:  [facility] Notification Time: 10/15/2024 14:41:48  Stamp Time: 10/15/2024 14:43:51  Callback Response Time: 10/15/2024 14:46:21    Primary Provider Notified of Diagnostic Impression and Management Plan on: 10/15/2024 15:34:05      CT HEAD:  As Per Radiologist CT Head Showed No Acute Hemorrhage or Acute Core Infarct  Reviewed  Normal      Imaging  CT head is normal. Also reviewed brain MRI from 9/15, no stroke, small vessel disease and no microbleeds    Labs  Reviewed      ----------------------------------------------------------------------------------------------------    Chief Complaint:  Headache and right numbness    History of Present Illness:  Patient is a 54 year old Female.  53 yo female, seen 9/15 for a possible TIA, who comes in today with recrudescence of the same symptoms, head pressure, and right sided numbnes. Last known normal yesterday at midnight. Her numbness is improving and affects right face arm and leg.       Past Medical History:       Hypertension       Diabetes Mellitus       Atrial Fibrillation       Stroke       There is no history of Hyperlipidemia       There is no history of Coronary Artery Disease       There is no history of Seizures       There is no history of Migraine Headaches       There is no history of Dementia/MCI  Other PMH:  Mentions possibly A Fib but cannot find in the notes   Prediabetes    Medications:    No Anticoagulant use   No Antiplatelet use  Reviewed EMR for current medications  Other Medications Pertinent To Assessment Include: Out of Plavix for 10 days. Was waiting to see a neuro then symptoms came back    Allergies:   Reviewed    Social History:  Patient Is:   Smoking: Former  Alcohol Use: No  Drug Use: No    Family History:    There is no family history of premature cerebrovascular disease pertinent to this  consultation    ROS :  14 Points Review of Systems was performed and was negative except mentioned in HPI.    Past Surgical History:  There Is No Surgical History Contributory To Today’s Visit       Examination:  BP(142//94), Pulse(82), Blood Glucose(101)  1A: Level of Consciousness - Alert; keenly responsive + 0  1B: Ask Month and Age - Both Questions Right + 0  1C: Blink Eyes & Squeeze Hands - Performs Both Tasks + 0  2: Test Horizontal Extraocular Movements - Normal + 0  3: Test Visual Fields - No Visual Loss + 0  4: Test Facial Palsy (Use Grimace if Obtunded) - Normal symmetry + 0  5A: Test Left Arm Motor Drift - No Drift for 10 Seconds + 0  5B: Test Right Arm Motor Drift - No Drift for 10 Seconds + 0  6A: Test Left Leg Motor Drift - No Drift for 5 Seconds + 0  6B: Test Right Leg Motor Drift - No Drift for 5 Seconds + 0  7: Test Limb Ataxia (FNF/Heel-Shin) - No Ataxia + 0  8: Test Sensation - Mild-Moderate Loss: Less Sharp/More Dull + 1  9: Test Language/Aphasia - Normal; No aphasia + 0  10: Test Dysarthria - Normal + 0  11: Test Extinction/Inattention - No abnormality + 0    NIHSS Score: 1    Spoke with : SARAH Coello        This consult was conducted in real time using interactive audio and video technology. Patient was informed of the technology being used for this visit and agreed to proceed. Patient located in hospital and provider located at home/office setting.    Patient is being evaluated for possible acute neurologic impairment and high probability of imminent or life - threatening deterioration.I spent total of 45 minutes providing care to this patient, including time for face to face visit via telemedicine, review of medical records, imaging studies and discussion of findings with providers, the patient and / or family.      Dr Rojas Streeter      [facility]  For Inpatient follow-up with [facility] physician please call [facility] [phone]. This is not an outpatient service. Post  hospital discharge, please contact hospital directly.    Please do not communicate with [facility] physicians via secure chat. If you have any questions, Please contact [facility].  Please call or reconsult our service if there are any clinical or diagnostic changes.

## 2024-10-15 NOTE — ED NOTES
Rn spoke with Keith Collins PA-C, and he agreed to allow the patient to take her home aspirin that she has with her.

## 2024-10-16 ENCOUNTER — APPOINTMENT (OUTPATIENT)
Dept: MRI IMAGING | Facility: HOSPITAL | Age: 54
End: 2024-10-16
Payer: MEDICARE

## 2024-10-16 ENCOUNTER — READMISSION MANAGEMENT (OUTPATIENT)
Dept: CALL CENTER | Facility: HOSPITAL | Age: 54
End: 2024-10-16
Payer: MEDICARE

## 2024-10-16 VITALS
BODY MASS INDEX: 41.38 KG/M2 | TEMPERATURE: 97.3 F | HEART RATE: 86 BPM | SYSTOLIC BLOOD PRESSURE: 134 MMHG | WEIGHT: 224.87 LBS | OXYGEN SATURATION: 100 % | RESPIRATION RATE: 18 BRPM | DIASTOLIC BLOOD PRESSURE: 76 MMHG | HEIGHT: 62 IN

## 2024-10-16 LAB
ALBUMIN SERPL-MCNC: 3.6 G/DL (ref 3.5–5.2)
ALBUMIN/GLOB SERPL: 1.4 G/DL
ALP SERPL-CCNC: 130 U/L (ref 39–117)
ALT SERPL W P-5'-P-CCNC: 15 U/L (ref 1–33)
ANION GAP SERPL CALCULATED.3IONS-SCNC: 11.3 MMOL/L (ref 5–15)
AST SERPL-CCNC: 16 U/L (ref 1–32)
BASOPHILS # BLD AUTO: 0.06 10*3/MM3 (ref 0–0.2)
BASOPHILS NFR BLD AUTO: 0.7 % (ref 0–1.5)
BILIRUB SERPL-MCNC: 0.5 MG/DL (ref 0–1.2)
BUN SERPL-MCNC: 54 MG/DL (ref 6–20)
BUN/CREAT SERPL: 17.3 (ref 7–25)
CALCIUM SPEC-SCNC: 10.8 MG/DL (ref 8.6–10.5)
CHLORIDE SERPL-SCNC: 100 MMOL/L (ref 98–107)
CHOLEST SERPL-MCNC: 90 MG/DL (ref 0–200)
CO2 SERPL-SCNC: 21.7 MMOL/L (ref 22–29)
CREAT SERPL-MCNC: 3.12 MG/DL (ref 0.57–1)
DEPRECATED RDW RBC AUTO: 52.6 FL (ref 37–54)
EGFRCR SERPLBLD CKD-EPI 2021: 16.9 ML/MIN/1.73
EOSINOPHIL # BLD AUTO: 0.26 10*3/MM3 (ref 0–0.4)
EOSINOPHIL NFR BLD AUTO: 3 % (ref 0.3–6.2)
ERYTHROCYTE [DISTWIDTH] IN BLOOD BY AUTOMATED COUNT: 15.2 % (ref 12.3–15.4)
GLOBULIN UR ELPH-MCNC: 2.5 GM/DL
GLUCOSE SERPL-MCNC: 118 MG/DL (ref 65–99)
HBA1C MFR BLD: 5.2 % (ref 4.8–5.6)
HCT VFR BLD AUTO: 29.4 % (ref 34–46.6)
HDLC SERPL-MCNC: 31 MG/DL (ref 40–60)
HGB BLD-MCNC: 9.6 G/DL (ref 12–15.9)
IMM GRANULOCYTES # BLD AUTO: 0.05 10*3/MM3 (ref 0–0.05)
IMM GRANULOCYTES NFR BLD AUTO: 0.6 % (ref 0–0.5)
LDLC SERPL CALC-MCNC: 39 MG/DL (ref 0–100)
LDLC/HDLC SERPL: 1.23 {RATIO}
LYMPHOCYTES # BLD AUTO: 1.34 10*3/MM3 (ref 0.7–3.1)
LYMPHOCYTES NFR BLD AUTO: 15.4 % (ref 19.6–45.3)
MAGNESIUM SERPL-MCNC: 1.8 MG/DL (ref 1.6–2.6)
MCH RBC QN AUTO: 31.1 PG (ref 26.6–33)
MCHC RBC AUTO-ENTMCNC: 32.7 G/DL (ref 31.5–35.7)
MCV RBC AUTO: 95.1 FL (ref 79–97)
MONOCYTES # BLD AUTO: 0.46 10*3/MM3 (ref 0.1–0.9)
MONOCYTES NFR BLD AUTO: 5.3 % (ref 5–12)
NEUTROPHILS NFR BLD AUTO: 6.51 10*3/MM3 (ref 1.7–7)
NEUTROPHILS NFR BLD AUTO: 75 % (ref 42.7–76)
NRBC BLD AUTO-RTO: 0 /100 WBC (ref 0–0.2)
PLATELET # BLD AUTO: 154 10*3/MM3 (ref 140–450)
PMV BLD AUTO: 8.7 FL (ref 6–12)
POTASSIUM SERPL-SCNC: 4.5 MMOL/L (ref 3.5–5.2)
PROT SERPL-MCNC: 6.1 G/DL (ref 6–8.5)
RBC # BLD AUTO: 3.09 10*6/MM3 (ref 3.77–5.28)
SODIUM SERPL-SCNC: 133 MMOL/L (ref 136–145)
TRIGL SERPL-MCNC: 105 MG/DL (ref 0–150)
VLDLC SERPL-MCNC: 20 MG/DL (ref 5–40)
WBC NRBC COR # BLD AUTO: 8.68 10*3/MM3 (ref 3.4–10.8)

## 2024-10-16 PROCEDURE — G0378 HOSPITAL OBSERVATION PER HR: HCPCS

## 2024-10-16 PROCEDURE — 83735 ASSAY OF MAGNESIUM: CPT | Performed by: INTERNAL MEDICINE

## 2024-10-16 PROCEDURE — 70544 MR ANGIOGRAPHY HEAD W/O DYE: CPT

## 2024-10-16 PROCEDURE — 97165 OT EVAL LOW COMPLEX 30 MIN: CPT

## 2024-10-16 PROCEDURE — 99214 OFFICE O/P EST MOD 30 MIN: CPT | Performed by: PSYCHIATRY & NEUROLOGY

## 2024-10-16 PROCEDURE — 92523 SPEECH SOUND LANG COMPREHEN: CPT

## 2024-10-16 PROCEDURE — 70547 MR ANGIOGRAPHY NECK W/O DYE: CPT

## 2024-10-16 PROCEDURE — 80061 LIPID PANEL: CPT | Performed by: INTERNAL MEDICINE

## 2024-10-16 PROCEDURE — 85025 COMPLETE CBC W/AUTO DIFF WBC: CPT | Performed by: INTERNAL MEDICINE

## 2024-10-16 PROCEDURE — 70551 MRI BRAIN STEM W/O DYE: CPT

## 2024-10-16 PROCEDURE — 80053 COMPREHEN METABOLIC PANEL: CPT | Performed by: INTERNAL MEDICINE

## 2024-10-16 PROCEDURE — 83036 HEMOGLOBIN GLYCOSYLATED A1C: CPT | Performed by: INTERNAL MEDICINE

## 2024-10-16 PROCEDURE — 99239 HOSP IP/OBS DSCHRG MGMT >30: CPT | Performed by: INTERNAL MEDICINE

## 2024-10-16 PROCEDURE — 97161 PT EVAL LOW COMPLEX 20 MIN: CPT

## 2024-10-16 RX ORDER — CEFDINIR 300 MG/1
300 CAPSULE ORAL
Qty: 6 CAPSULE | Refills: 0 | Status: SHIPPED | OUTPATIENT
Start: 2024-10-16 | End: 2024-10-22

## 2024-10-16 RX ORDER — LOSARTAN POTASSIUM 100 MG/1
50 TABLET ORAL 2 TIMES DAILY
Start: 2024-10-16

## 2024-10-16 RX ORDER — ASPIRIN 81 MG/1
81 TABLET ORAL DAILY
Status: DISCONTINUED | OUTPATIENT
Start: 2024-10-17 | End: 2024-10-16 | Stop reason: HOSPADM

## 2024-10-16 RX ORDER — CLOPIDOGREL BISULFATE 75 MG/1
75 TABLET ORAL DAILY
Qty: 30 TABLET | Refills: 2 | Status: SHIPPED | OUTPATIENT
Start: 2024-10-16 | End: 2025-01-14

## 2024-10-16 RX ORDER — ASPIRIN 81 MG/1
81 TABLET ORAL DAILY
Status: DISCONTINUED | OUTPATIENT
Start: 2024-10-16 | End: 2024-10-16

## 2024-10-16 RX ADMIN — ASPIRIN 325 MG: 325 TABLET ORAL at 09:18

## 2024-10-16 RX ADMIN — SODIUM BICARBONATE 650 MG TABLET 650 MG: at 09:18

## 2024-10-16 RX ADMIN — CEFDINIR 300 MG: 300 CAPSULE ORAL at 10:47

## 2024-10-16 RX ADMIN — CARVEDILOL 25 MG: 25 TABLET, FILM COATED ORAL at 09:19

## 2024-10-16 NOTE — THERAPY EVALUATION
Acute Care - Physical Therapy Initial Evaluation and Discharge  CHRISTIAN Valerio     Patient Name: Katarzyna Norris  : 1970  MRN: [id number]  Today's Date: 10/16/2024      Visit Dx:     ICD-10-CM ICD-9-CM   1. Weakness  R53.1 780.79   2. Paresthesia of right arm  R20.2 782.0   3. Difficulty in walking  R26.2 719.7     Patient Active Problem List   Diagnosis    Gout    Anxiety    Hypertension    Primary osteoarthritis of left knee    Anemia    Impaired fasting glucose    Hyperlipidemia    Aftercare following right knee joint replacement [id number]    Osteoarthritis of left knee    Status post replacement of knee joint    Cyst of ovary    Asthma    Nephritic syndrome    Class 3 severe obesity due to excess calories with serious comorbidity and body mass index (BMI) of 40.0 to 44.9 in adult    Vitamin D deficiency    Elevated ferritin    Aftercare following surgery of left total knee arthroplasty, 2021    Lateral cutaneous femoral nerve of thigh compression or syndrome, right    Primary localized osteoarthritis of right knee    Allergic rhinitis    Hiatal hernia    Painful thyroid    Migraine with aura and without status migrainosus, not intractable    External hemorrhoid    Screening mammogram for breast cancer    Acute non-recurrent sinusitis    Panic attack    Hyponatremia    Joint pain    Muscle cramps    Eustachian tube disorder, bilateral    Right knee pain    Left knee pain    Blurred vision, bilateral    CKD (chronic kidney disease) stage 5, GFR less than 15 ml/min    Encounter for screening for malignant neoplasm of colon    Dermatitis    Encounter for Medicare annual wellness exam    Palpitation    Incisional hernia, without obstruction or gangrene    Umbilical hernia without obstruction and without gangrene    Transient ischemic attack (TIA)    TIA (transient ischemic attack)    Left lower quadrant abdominal pain    Acute cystitis with hematuria     Past Medical History:   Diagnosis Date     Anxiety     Arthritis     GILBERT KNEES    Asthma     SEASONAL ALLERGIES- NO INHALERS    CKD (chronic kidney disease)     Stage 5, Follows with [facility]- NO CURRENT DIALYSIS    Elevated cholesterol     Gastritis     GERD (gastroesophageal reflux disease)     Gout 2021    Hiatal hernia     Hiatal hernia     Hypertension     Lactose intolerance     Transplant, organ     ON KIDNEY TRANSPLANT LIST     Past Surgical History:   Procedure Laterality Date    ARTERIOVENOUS FISTULA/SHUNT SURGERY Left 2024    Procedure: 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;  Surgeon: Yaniv Dominguez MD;  Location: [facility] MAIN OR;  Service: Vascular;  Laterality: Left;    BONY PELVIS SURGERY      Plate     SECTION   and     CHOLECYSTECTOMY      COLONOSCOPY      COLONOSCOPY      COLONOSCOPY      COLONOSCOPY N/A 2024    Procedure: COLONOSCOPY;  Surgeon: Aston Arroyo MD;  Location: [facility] ENDOSCOPY;  Service: Gastroenterology;  Laterality: N/A;  HEMORRHOIDS    ENDOSCOPY      ENDOSCOPY N/A 2023    Procedure: ESOPHAGOGASTRODUODENOSCOPY with biopsies;  Surgeon: Tam Badillo MD;  Location: [facility] ENDOSCOPY;  Service: General;  Laterality: N/A;  hiatal hernia    JOINT REPLACEMENT      TOTAL KNEE ARTHROPLASTY Left 2021    Procedure: TOTAL KNEE ARTHROPLASTY;  Surgeon: Marco Nelson MD;  Location: [facility] MAIN OR;  Service: Orthopedics;  Laterality: Left;    TOTAL KNEE ARTHROPLASTY Right 10/19/2022    Procedure: RIGHT TOTAL KNEE ARTHROPLASTY - NO SHELBY;  Surgeon: Marco Nelson MD;  Location: [facility] MAIN OR;  Service: Orthopedics;  Laterality: Right;    TUBAL ABDOMINAL LIGATION       PT Assessment (Last 12 Hours)       PT Evaluation and Treatment       Row Name 10/16/24 1011          Physical Therapy Time and Intention    Subjective Information complains of;weakness;fatigue (P)   -KL     Document Type evaluation (P)   -KL     Mode of Treatment individual  therapy;physical therapy (P)   -KL     Total Minutes, Physical Therapy 23 (P)   -KL     Patient Effort good (P)   -KL     Symptoms Noted During/After Treatment fatigue (P)   -KL       Row Name 10/16/24 1011          General Information    Patient Profile Reviewed yes (P)   -KL     Patient Observations alert;cooperative;agree to therapy (P)   -KL     Prior Level of Function independent: (P)   -KL     Equipment Currently Used at Home none (P)   -KL     Existing Precautions/Restrictions no known precautions/restrictions (P)   -KL     Risks Reviewed patient: (P)   -KL     Benefits Reviewed patient: (P)   -KL     Barriers to Rehab none identified (P)   -KL       Row Name 10/16/24 1011          Previous Level of Function/Home Environm    Bathing, Previous Functional Level independent (P)   -KL     Grooming, Previous Functional Level independent (P)   -KL     Dressing, Previous Functional Level independent (P)   -KL     Eating/Feeding, Previous Functional Level independent (P)   -KL     Toileting, Previous Functional Level independent (P)   -KL     BADLs, Previous Functional Level independent (P)   -KL     IADLs, Previous Functional Level independent (P)   -KL     Bed Mobility, Previous Functional Level independent (P)   -KL     Transfers, Previous Functional Level independent (P)   -KL     Household Ambulation, Previous Functional Level independent (P)   -KL     Stairs, Previous Functional Level independent (P)   -KL     Community Ambulation, Previous Functional Level independent (P)   -KL       Row Name 10/16/24 1011          Living Environment    Current Living Arrangements other (see comments) (P)   Mobile home  -KL     People in Home spouse;child(janna), dependent (P)   -KL     Primary Care Provided by self (P)   -KL       Row Name 10/16/24 1011          Home Use of Assistive/Adaptive Equipment    Equipment Currently Used at Home none (P)   -KL       Row Name 10/16/24 1011          Range of Motion (ROM)    Range of  Motion ROM is WNL (P)   -       Row Name 10/16/24 1011          Strength (Manual Muscle Testing)    Strength (Manual Muscle Testing) other (see comments) (P)   Pt scored 5/5 for bilateral hip flex, knee flex, knee ext and ankle DF MMTs  -       Row Name 10/16/24 1011          Bed Mobility    Bed Mobility bed mobility (all) activities (P)   -     All Activities, Harvey (Bed Mobility) independent (P)   -       Row Name 10/16/24 1011          Transfers    Transfers sit-stand transfer;stand-sit transfer (P)   -     Maintains Weight-bearing Status (Transfers) able to maintain (P)   -       Row Name 10/16/24 1011          Sit-Stand Transfer    Sit-Stand Harvey (Transfers) independent (P)   -     Assistive Device (Sit-Stand Transfers) walker, front-wheeled (P)   -       Row Name 10/16/24 1011          Stand-Sit Transfer    Stand-Sit Harvey (Transfers) independent (P)   -     Assistive Device (Stand-Sit Transfers) walker, front-wheeled (P)   -       Row Name 10/16/24 1011          Gait/Stairs (Locomotion)    Gait/Stairs Locomotion gait/ambulation independence (P)   -     Harvey Level (Gait) independent (P)   -     Assistive Device (Gait) walker, front-wheeled (P)   -     Patient was able to Ambulate yes (P)   -     Distance in Feet (Gait) 225 (P)   -KL     Pattern (Gait) 2-point (P)   -       Row Name 10/16/24 1011          Safety Issues/Impairments Affecting Functional Mobility    Impairments Affecting Function (Mobility) balance;coordination;motor control;pain (P)   -       Row Name 10/16/24 1011          Balance    Balance Assessment standing dynamic balance (P)   -     Dynamic Standing Balance independent (P)   -     Position/Device Used, Standing Balance walker, front-wheeled (P)   -     Balance Interventions standing (P)   -       Row Name 10/16/24 1011          Plan of Care Review    Plan of Care Reviewed With patient (P)   -     Progress improving (P)    -     Outcome Evaluation Pt presents to treatment with complaints of fatigue and weakness in BLE. Was able to ambulate with minimal difficulty and no complaints of fatigue. Recommend discharge to home at this time. (P)   -       Row Name 10/16/24 1011          Positioning and Restraints    Pre-Treatment Position sitting in chair/recliner (P)   -KL     Post Treatment Position chair (P)   -KL     In Chair reclined (P)   -       Row Name 10/16/24 1011          Therapy Assessment/Plan (PT)    Rehab Potential (PT) good (P)   -     Criteria for Skilled Interventions Met (PT) no (P)   -     Problem List (PT) problems related to;balance;cognition;coordination;mobility;range of motion (ROM);strength;pain;postural control (P)   -       Row Name 10/16/24 1011          Therapy Plan Review/Discharge Plan (PT)    Therapy Plan Review (PT) evaluation/treatment results reviewed (P)   -               User Key  (r) = Recorded By, (t) = Taken By, (c) = Cosigned By      Initials Name Provider Type     Orlin Marquez, PT Student PT Student                    Physical Therapy Education       Title: PT OT SLP Therapies (Done)       Topic: Physical Therapy (Done)       Point: Mobility training (Done)       Learning Progress Summary            Patient Acceptance, E,TB, VU by  at 10/16/2024 1017                      Point: Home exercise program (Done)       Learning Progress Summary            Patient Acceptance, E,TB, VU by  at 10/16/2024 1017                      Point: Body mechanics (Done)       Learning Progress Summary            Patient Acceptance, E,TB, VU by  at 10/16/2024 1017                      Point: Precautions (Done)       Learning Progress Summary            Patient Acceptance, E,TB, VU by  at 10/16/2024 1017                                      User Key       Initials Effective Dates Name Provider Type [facility] 08/27/24 -  Orlin Marquez, PT Student PT Student PT                  PT  Recommendation and Plan  Anticipated Discharge Disposition (PT): (P) home  Plan of Care Reviewed With: (P) patient  Progress: (P) improving  Outcome Evaluation: (P) Pt presents to treatment with complaints of fatigue and weakness in BLE. Was able to ambulate with minimal difficulty and no complaints of fatigue. Recommend discharge to home at this time.   Outcome Measures       Row Name 10/16/24 1000             How much help from another person do you currently need...    Turning from your back to your side while in flat bed without using bedrails? 4 (P)   -KL      Moving from lying on back to sitting on the side of a flat bed without bedrails? 4 (P)   -KL      Moving to and from a bed to a chair (including a wheelchair)? 4 (P)   -KL      Standing up from a chair using your arms (e.g., wheelchair, bedside chair)? 4 (P)   -KL      Climbing 3-5 steps with a railing? 4 (P)   -KL      To walk in hospital room? 4 (P)   -KL      AM-PAC 6 Clicks Score (PT) 24 (P)   -KL         Functional Assessment    Outcome Measure Options AM-PAC 6 Clicks Basic Mobility (PT) (P)   -KL                User Key  (r) = Recorded By, (t) = Taken By, (c) = Cosigned By      Initials Name Provider Type    Orlin Yuan, PT Student PT Student                     Time Calculation:    PT Charges       Row Name 10/16/24 1011             Time Calculation    PT Received On 10/16/24 (P)   -KL         Time Calculation- PT    Total Timed Code Minutes- PT 23 minute(s) (P)   -KL         Untimed Charges    PT Eval/Re-eval Minutes 23 (P)   -KL         Total Minutes    Untimed Charges Total Minutes 23 (P)   -KL       Total Minutes 23 (P)   -KL                User Key  (r) = Recorded By, (t) = Taken By, (c) = Cosigned By      Initials Name Provider Type    Orlin Yuan, PT Student PT Student                      PT G-Codes  Outcome Measure Options: (P) AM-PAC 6 Clicks Basic Mobility (PT)  AM-PAC 6 Clicks Score (PT): (P) 24    Orlin Marquez PT  Student  10/16/2024

## 2024-10-16 NOTE — THERAPY EVALUATION
Patient Name: Katarzyna Norris  : 1970    MRN: [id number]                              Today's Date: 10/16/2024       Admit Date: 10/15/2024    Visit Dx:     ICD-10-CM ICD-9-CM   1. Weakness  R53.1 780.79   2. Paresthesia of right arm  R20.2 782.0   3. Difficulty in walking  R26.2 719.7   4. Alteration in cognition  R41.89 799.59     Patient Active Problem List   Diagnosis    Gout    Anxiety    Hypertension    Primary osteoarthritis of left knee    Anemia    Impaired fasting glucose    Hyperlipidemia    Aftercare following right knee joint replacement [id number]    Osteoarthritis of left knee    Status post replacement of knee joint    Cyst of ovary    Asthma    Nephritic syndrome    Class 3 severe obesity due to excess calories with serious comorbidity and body mass index (BMI) of 40.0 to 44.9 in adult    Vitamin D deficiency    Elevated ferritin    Aftercare following surgery of left total knee arthroplasty, 2021    Lateral cutaneous femoral nerve of thigh compression or syndrome, right    Primary localized osteoarthritis of right knee    Allergic rhinitis    Hiatal hernia    Painful thyroid    Migraine with aura and without status migrainosus, not intractable    External hemorrhoid    Screening mammogram for breast cancer    Acute non-recurrent sinusitis    Panic attack    Hyponatremia    Joint pain    Muscle cramps    Eustachian tube disorder, bilateral    Right knee pain    Left knee pain    Blurred vision, bilateral    CKD (chronic kidney disease) stage 5, GFR less than 15 ml/min    Encounter for screening for malignant neoplasm of colon    Dermatitis    Encounter for Medicare annual wellness exam    Palpitation    Incisional hernia, without obstruction or gangrene    Umbilical hernia without obstruction and without gangrene    Transient ischemic attack (TIA)    TIA (transient ischemic attack)    Left lower quadrant abdominal pain    Acute cystitis with hematuria     Past Medical History:    Diagnosis Date    Anxiety     Arthritis     GILBERT KNEES    Asthma     SEASONAL ALLERGIES- NO INHALERS    CKD (chronic kidney disease)     Stage 5, Follows with [facility]- NO CURRENT DIALYSIS    Elevated cholesterol     Gastritis     GERD (gastroesophageal reflux disease)     Gout 2021    Hiatal hernia     Hiatal hernia     Hypertension     Lactose intolerance     Transplant, organ     ON KIDNEY TRANSPLANT LIST     Past Surgical History:   Procedure Laterality Date    ARTERIOVENOUS FISTULA/SHUNT SURGERY Left 2024    Procedure: 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;  Surgeon: Yaniv Dominguez MD;  Location: [facility] MAIN OR;  Service: Vascular;  Laterality: Left;    BONY PELVIS SURGERY      Plate     SECTION   and     CHOLECYSTECTOMY      COLONOSCOPY      COLONOSCOPY      COLONOSCOPY      COLONOSCOPY N/A 2024    Procedure: COLONOSCOPY;  Surgeon: Aston Arroyo MD;  Location: [facility] ENDOSCOPY;  Service: Gastroenterology;  Laterality: N/A;  HEMORRHOIDS    ENDOSCOPY      ENDOSCOPY N/A 2023    Procedure: ESOPHAGOGASTRODUODENOSCOPY with biopsies;  Surgeon: Tam Badillo MD;  Location: [facility] ENDOSCOPY;  Service: General;  Laterality: N/A;  hiatal hernia    JOINT REPLACEMENT      TOTAL KNEE ARTHROPLASTY Left 2021    Procedure: TOTAL KNEE ARTHROPLASTY;  Surgeon: Marco Nelson MD;  Location: [facility] MAIN OR;  Service: Orthopedics;  Laterality: Left;    TOTAL KNEE ARTHROPLASTY Right 10/19/2022    Procedure: RIGHT TOTAL KNEE ARTHROPLASTY - NO SHELBY;  Surgeon: Marco Nelson MD;  Location: [facility] MAIN OR;  Service: Orthopedics;  Laterality: Right;    TUBAL ABDOMINAL LIGATION        General Information       Row Name 10/16/24 1052          OT Time and Intention    Document Type evaluation (P)   -AJ     Mode of Treatment individual therapy;occupational therapy (P)   -AJ       Row Name 10/16/24 1059          General Information     Patient Profile Reviewed yes (P)   -AJ     Prior Level of Function independent:;ADL's;all household mobility;community mobility  Patient is independent in all ADLs and does not use a device for mobility. Patient stands in walk-in shower, no falls and no home O2.  -ES     Existing Precautions/Restrictions no known precautions/restrictions (P)   -HARVEY     Barriers to Rehab none identified (P)   -       Row Name 10/16/24 1052          Occupational Profile    Reason for Services/Referral (Occupational Profile) Patient is a 54 yr old F admitted to [facility] on 10/15/24 for TIA rule out. Patient PMH non-significant. Occupational Therapy evaluation and treatment ordered d/t recent decline in ADLs/transfer ability and discharge recommendations. No previous OT services for current condition. (P)   -       Row Name 10/16/24 1052          Living Environment    People in Home child(janna), dependent;spouse (P)   -       Row Name 10/16/24 1052          Home Main Entrance    Number of Stairs, Main Entrance three (P)   -       Row Name 10/16/24 1052          Stairs Within Home, Primary    Stairs, Within Home, Primary porch to front door (P)   -AJ     Number of Stairs, Within Home, Primary none (P)   -       Row Name 10/16/24 1052          Cognition    Orientation Status (Cognition) oriented x 4 (P)   -               User Key  (r) = Recorded By, (t) = Taken By, (c) = Cosigned By      Initials Name Provider Type    Meri Waters, OTR/L, CSRS Occupational Therapist    Cherise Clark, OT Student OT Student                     Mobility/ADL's       Row Name 10/16/24 1058          Bed Mobility    Comment, (Bed Mobility) Patient was seated in chair at start of session. (P)   -HARVEY       Row Name 10/16/24 1058          Transfers    Transfers sit-stand transfer;stand-sit transfer (P)   -       Row Name 10/16/24 1058          Sit-Stand Transfer    Sit-Stand [place] (Transfers) independent (P)   -HARVEY      Assistive Device (Sit-Stand Transfers) other (see comments) (P)   No AD  -       Row Name 10/16/24 1058          Stand-Sit Transfer    Stand-Sit [place] (Transfers) independent (P)   -     Assistive Device (Stand-Sit Transfers) other (see comments) (P)   No AD  -       Row Name 10/16/24 1058          Functional Mobility    Functional Mobility- Ind. Level independent (P)   -     Functional Mobility- Device other (see comments) (P)   No AD  -     Functional Mobility- Comment Patient completed functional mobility within room. (P)   -       Row Name 10/16/24 1058          Activities of Daily Living    BADL Assessment/Intervention bathing;upper body dressing;lower body dressing;grooming;feeding;toileting (P)   -       Row Name 10/16/24 1058          Bathing Assessment/Intervention    [place] Level (Bathing) bathing skills;independent (P)   -       Row Name 10/16/24 1058          Upper Body Dressing Assessment/Training    [place] Level (Upper Body Dressing) upper body dressing skills;independent (P)   -       Row Name 10/16/24 1058          Lower Body Dressing Assessment/Training    [place] Level (Lower Body Dressing) lower body dressing skills;independent (P)   -       Row Name 10/16/24 1058          Grooming Assessment/Training    [place] Level (Grooming) grooming skills;independent (P)   -       Row Name 10/16/24 1058          Self-Feeding Assessment/Training    [place] Level (Feeding) feeding skills;independent (P)   -       Row Name 10/16/24 1058          Toileting Assessment/Training    [place] Level (Toileting) toileting skills;independent (P)   -               User Key  (r) = Recorded By, (t) = Taken By, (c) = Cosigned By      Initials Name Provider Type    Cherise Clark OT Student OT Student                   Obj/Interventions       Row Name 10/16/24 1100          Sensory Assessment (Somatosensory)    Sensory Assessment (Somatosensory)  sensation intact (P)   -     Sensory Assessment Performed BUE and BLE light touch sensory assessment, sensation intact. (P)   -       Row Name 10/16/24 1100          Vision Assessment/Intervention    Visual Impairment/Limitations corrective lenses full-time;blurry vision  -     Vision Assessment Comment Patient reports wearing glasses full-time but did not have them during assessment, stated vision was slightly blurry. No new visual impairments observed. (P)   -       Row Name 10/16/24 1100          Range of Motion Comprehensive    General Range of Motion no range of motion deficits identified (P)   -       Row Name 10/16/24 1100          Strength Comprehensive (MMT)    General Manual Muscle Testing (MMT) Assessment no strength deficits identified (P)   -     Comment, General Manual Muscle Testing (MMT) Assessment RUE 4/5, LUE 4-/5, patient reports weakness from previous diagnosis prior to hospitilization (P)   -       Row Name 10/16/24 1100          Balance    Balance Assessment sitting dynamic balance;standing dynamic balance (P)   -     Dynamic Sitting Balance independent (P)   -AJ     Position, Sitting Balance sitting in chair (P)   -     Dynamic Standing Balance independent (P)   -     Position/Device Used, Standing Balance other (see comments) (P)   No AD  -               User Key  (r) = Recorded By, (t) = Taken By, (c) = Cosigned By      Initials Name Provider Type    Meri Waters, OTR/L, CSRS Occupational Therapist    Cherise Clark, OT Student OT Student                   Goals/Plan    No documentation.                  Clinical Impression       Row Name 10/16/24 1107          Pain Assessment    Pretreatment Pain Rating 0/10 - no pain (P)   -     Posttreatment Pain Rating 0/10 - no pain (P)   -       Row Name 10/16/24 1107          Plan of Care Review    Plan of Care Reviewed With patient (P)   -     Outcome Evaluation Occupational Evaluation completed this date.  Patient presents at baseline, with no observed impairments inhibiting her overall function. No indication for skilled OT intervention in acute care setting OT will sign off.  -ES       Row Name 10/16/24 1107          Therapy Assessment/Plan (OT)    Criteria for Skilled Therapeutic Interventions Met (OT) no problems identified which require skilled intervention  -ES     Therapy Frequency (OT) evaluation only  -ES       Row Name 10/16/24 1107          Therapy Plan Review/Discharge Plan (OT)    Anticipated Discharge Disposition (OT) home (P)   -AJ       Row Name 10/16/24 1107          Positioning and Restraints    Pre-Treatment Position sitting in chair/recliner (P)   -AJ     Post Treatment Position chair (P)   -AJ     In Chair reclined;call light within reach;encouraged to call for assist (P)   -               User Key  (r) = Recorded By, (t) = Taken By, (c) = Cosigned By      Initials Name Provider Type    Meri Waters, OTR/L, CSRS Occupational Therapist    Cherise Clark, OT Student OT Student                   Outcome Measures       Row Name 10/16/24 1109          How much help from another is currently needed...    Putting on and taking off regular lower body clothing? 4 (P)   -AJ     Bathing (including washing, rinsing, and drying) 4 (P)   -AJ     Toileting (which includes using toilet bed pan or urinal) 4 (P)   -AJ     Putting on and taking off regular upper body clothing 4 (P)   -AJ     Taking care of personal grooming (such as brushing teeth) 4 (P)   -AJ     Eating meals 4 (P)   -AJ     AM-PAC 6 Clicks Score (OT) 24 (P)   -AJ       Row Name 10/16/24 1000 10/16/24 0705       How much help from another person do you currently need...    Turning from your back to your side while in flat bed without using bedrails? 4  -BO (r) KL (t) BO (c) 4  -HW    Moving from lying on back to sitting on the side of a flat bed without bedrails? 4  -BO (r) KL (t) BO (c) 4  -HW    Moving to and from a bed to a  chair (including a wheelchair)? 4  -BO (r) KL (t) BO (c) 4  -HW    Standing up from a chair using your arms (e.g., wheelchair, bedside chair)? 4  -BO (r) KL (t) BO (c) 4  -HW    Climbing 3-5 steps with a railing? 4  -BO (r) KL (t) BO (c) 4  -HW    To walk in hospital room? 4  -BO (r) KL (t) BO (c) 4  -HW    AM-PAC 6 Clicks Score (PT) 24  -BO (r) KL (t) 24  -HW    Highest Level of Mobility Goal 8 --> Walked 250 feet or more  -BO (r) KL (t) 8 --> Walked 250 feet or more  -HW      Row Name 10/16/24 1109 10/16/24 1000       Functional Assessment    Outcome Measure Options AM-PAC 6 Clicks Daily Activity (OT) (P)   -AJ AM-PAC 6 Clicks Basic Mobility (PT)  -BO (r) KL (t) BO (c)              User Key  (r) = Recorded By, (t) = Taken By, (c) = Cosigned By      Initials Name Provider Type    John Sawyer, PT Physical Therapist    Graciela Rivas, RN Registered Nurse    Orlin Yuan, PT Student PT Student    Cherise Clark, OT Student OT Student                    Occupational Therapy Education       Title: PT OT SLP Therapies (Resolved)       Topic: Occupational Therapy (Resolved)       Point: ADL training (Resolved)       Description:   Instruct learner(s) on proper safety adaptation and remediation techniques during self care or transfers.   Instruct in proper use of assistive devices.                  Learner Progress:  Not documented in this visit.              Point: Home exercise program (Resolved)       Description:   Instruct learner(s) on appropriate technique for monitoring, assisting and/or progressing therapeutic exercises/activities.                  Learner Progress:  Not documented in this visit.              Point: Precautions (Resolved)       Description:   Instruct learner(s) on prescribed precautions during self-care and functional transfers.                  Learner Progress:  Not documented in this visit.              Point: Body mechanics (Resolved)       Description:   Instruct  learner(s) on proper positioning and spine alignment during self-care, functional mobility activities and/or exercises.                  Learner Progress:  Not documented in this visit.                                  OT Recommendation and Plan  Therapy Frequency (OT): evaluation only  Plan of Care Review  Outcome Evaluation: Occupational Evaluation completed this date. Patient presents at baseline, with no observed impairments inhibiting her overall function. No indication for skilled OT intervention in acute care setting OT will sign off.     Time Calculation:         Time Calculation- OT       Row Name 10/16/24 1111             Time Calculation- OT    OT Received On 10/16/24 (P)   -AJ         Untimed Charges    OT Eval/Re-eval Minutes 31  -ES         Total Minutes    Untimed Charges Total Minutes 31  -ES       Total Minutes 31  -ES                User Key  (r) = Recorded By, (t) = Taken By, (c) = Cosigned By      Initials Name Provider Type    Meri Waters, OTR/L, CSRS Occupational Therapist    Cherise Clark, OT Student OT Student                  Therapy Charges for Today       Code Description Service Date Service Provider Modifiers Qty    [id number]  OT EVAL LOW COMPLEXITY 3 10/16/2024 Meri Zheng OTR/L, CSRS GO 1                 YAQUELIN Anders/L, CSRS  10/16/2024

## 2024-10-16 NOTE — PROGRESS NOTES
LOS: 0 days     Chief Complaint: Right-sided numbness       SUBJECTIVE:    History taken from: patient chart RN brief discussions with Dr. Taylor    Interval History: Katarzyna Norris was admitted on 10/15/2024     She has been here before with some numbness    Very nonspecific    Her symptoms are almost gone    She was on aspirin and Plavix and then she went on monotherapy and this happened so she wants to go back on aspirin 81 mg and Plavix 75 mg    She was on Lipitor 80    Her MRI was reported as unremarkable but they may be very small paramidline pontine area infarct    So we will go with DAPT    Modification of stroke risk factors:   - Blood pressure should be less than 130/80 outpatient, HbA1c less than 6.5, LDL less than 70; b12>500 and smoking cessation if applicable. We would be grateful if the primary team / primary care physician keep a close watch on this above targets.  - Stroke education  - Follow up with neurologist of choice          LDL 39  BUN 54  Creatinine 1.32  CT head unremarkable            As per admitting,  Katarzyna Norris is a 54 y.o. female CKD stage IV-V working to get on transplant list, hypertension, dyslipidemia, recent TIA, obesity BMI 41, and gout who presents emergency department with right sided numbness on her head and down her arm     Patient states that she initially started noticing the symptoms last night.  Then this morning when she woke up she started doing her exercises and things started to improve throughout the day and eventually resolved.  However she was told to come to the ER due to concern for possible stroke.     In the emergency department the patient's vital signs are as follows: Temperature is 97.5, pulse 82, respiratory 16, pressure 142/94, 100% on room air.  CBC shows a white blood cell count of 8.54 and a hemoglobin of 10.5 CMP shows a creatinine of 2.92, bicarb of 18.7, anion gap of 15.3.  Calcium is 10.9.  Sodium is 133.  CT of the head shows no  abnormalities.  Patient was in the hospital for workup for stroke     All systems reviewed abnormalities noted        [facility] Tele[facility]urology Consult [facility]     Stat Consult     Patient Name:   Katarzyna Norris  YOB: 1970  Identification Number:   MRN - [id number]  Date of Service:   10/15/2024 14:43:51     Diagnosis:        I63.81 - Cerebrovascular accident (CVA) due to occlusion of small artery        R20.2 - Paresthesia of skin        R51.9 - Headache, unspecified     Impression  HEadache, neck pain and right sided numbness  Minor stroke or TIA is possible but low likelihood given reoccurence without a known stroke. She has risk factors that need control  Another possibility would be cervical spine disease  Her head pain could be from sinusitis seen on head CT        Recommendations:  Our recommendations are outlined below.     Diagnostic Studies :  MRI head without contrast  Please order  MRA head without contrast  Please order  MRA neck without contrast (due to renal function)   MRI cervical spine without contrast     Laboratory Studies :  Lipid panel  Please order  Hemoglobin A1c  Please order  TSH  Please order     Antithrombotic Medication :  Initiate dual antiplatelet therapy with Aspirin 81 mg daily and Clopidogrel 75 mg daily  Please order  Statins for LDL goal less than 70     Anticoagulant Medication :  May need anticoagulation if A Fib found     Nursing Recommendations :  Neuro checks q4 hrs x 24 hrs and then per shift  Continue with Telemetry     Consultations :  Recommend Speech therapy if failed dysphagia screenPhysical therapy/Occupational therapy     DVT Prophylaxis :  Lovenox or LMW Heparin     No tpA or thrombectomy as symptoms mild and non disabling     Disposition :  Neurology will follow     Miscellaneous :  Echo BP control with goal under 130/80 Check urine toxicology PRior records do not show A Fib. If history if found, then she will need full  anticoagulation instead of antiplatelets Sugar control, weight loss discussed Management of sinusitis per primary        ----------------------------------------------------------------------------------------------------           Metrics:  [facility] Notification Time: 10/15/2024 14:41:48  Stamp Time: 10/15/2024 14:43:51  Callback Response Time: 10/15/2024 14:46:21     Primary Provider Notified of Diagnostic Impression and Management Plan on: 10/15/2024 15:34:05        CT HEAD:  As Per Radiologist CT Head Showed No Acute Hemorrhage or Acute Core Infarct  Reviewed  Normal        Imaging  CT head is normal. Also reviewed brain MRI from 9/15, no stroke, small vessel disease and no microbleeds     Labs  Reviewed        ----------------------------------------------------------------------------------------------------     Chief Complaint:  Headache and right numbness     History of Present Illness:  Patient is a 54 year old Female.  55 yo female, seen 9/15 for a possible TIA, who comes in today with recrudescence of the same symptoms, head pressure, and right sided numbnes. Last known normal yesterday at midnight. Her numbness is improving and affects right face arm and leg.        Past Medical History:       Hypertension       Diabetes Mellitus       Atrial Fibrillation       Stroke       There is no history of Hyperlipidemia       There is no history of Coronary Artery Disease       There is no history of Seizures       There is no history of Migraine Headaches       There is no history of Dementia/MCI  Other PMH:  Mentions possibly A Fib but cannot find in the notes   Prediabetes     Medications:     No Anticoagulant use   No Antiplatelet use  Reviewed EMR for current medications  Other Medications Pertinent To Assessment Include: Out of Plavix for 10 days. Was waiting to see a neuro then symptoms came back     Allergies:   Reviewed     Social History:  Patient Is:   Smoking: Former  Alcohol Use:  No  Drug Use: No     Family History:     There is no family history of premature cerebrovascular disease pertinent to this consultation     ROS :  14 Points Review of Systems was performed and was negative except mentioned in HPI.     Past Surgical History:  There Is No Surgical History Contributory To Today’s Visit        Examination:  BP(142//94), Pulse(82), Blood Glucose(101)  1A: Level of Consciousness - Alert; keenly responsive + 0  1B: Ask Month and Age - Both Questions Right + 0  1C: Blink Eyes & Squeeze Hands - Performs Both Tasks + 0  2: Test Horizontal Extraocular Movements - Normal + 0  3: Test Visual Fields - No Visual Loss + 0  4: Test Facial Palsy (Use Grimace if Obtunded) - Normal symmetry + 0  5A: Test Left Arm Motor Drift - No Drift for 10 Seconds + 0  5B: Test Right Arm Motor Drift - No Drift for 10 Seconds + 0  6A: Test Left Leg Motor Drift - No Drift for 5 Seconds + 0  6B: Test Right Leg Motor Drift - No Drift for 5 Seconds + 0  7: Test Limb Ataxia (FNF/Heel-Shin) - No Ataxia + 0  8: Test Sensation - Mild-Moderate Loss: Less Sharp/More Dull + 1  9: Test Language/Aphasia - Normal; No aphasia + 0  10: Test Dysarthria - Normal + 0  11: Test Extinction/Inattention - No abnormality + 0     NIHSS Score: 1     Spoke with : SARAH Coello           This consult was conducted in real time using interactive audio and video technology. Patient was informed of the technology being used for this visit and agreed to proceed. Patient located in hospital and provider located at home/office setting.     Patient is being evaluated for possible acute neurologic impairment and high probability of imminent or life - threatening deterioration.I spent total of 45 minutes providing care to this patient, including time for face to face visit via telemedicine, review of medical records, imaging studies and discussion of findings with providers, the patient and / or family.        Dr Rojas Streeter         - Portions of  the above HPI were copied from previous encounters and edited as appropriate.    Patient Complaints: Not much      Review of Systems   No fever chills rigors or sweats  No weight issues  No sleep problems  HEENT:  No speech problem, vision changes, facial asymmetry or pain, or neck problem  Chest: No chest pain, clubbing, cyanosis, orthopnea palpitations  Pulmonary:  No shortness of air, cough or expectoration  Abdomen:  No swelling/tension, constipation,diarrhea or pain  No genitourinary symptoms  Extremity problems as discussed  No back problem  No psychotic issues  Neurologic issues as discussed  No hematologic, dermatologic or endocrine problems        Pertinent PMH:  has a past medical history of Anxiety, Arthritis, Asthma, CKD (chronic kidney disease), Elevated cholesterol, Gastritis, GERD (gastroesophageal reflux disease), Gout (02/23/2021), Hiatal hernia, Hiatal hernia, Hypertension, Lactose intolerance, and Transplant, organ.   ________________________________________________     OBJECTIVE:  Patient is resting comfortably in bedside chair in no acute distress      The patient is awake and alert and oriented x3  Normal speech  Cranial nerve examination demonstrate:  Full fields of vision to confrontation  Pupils are round, reactive to light and accommodation and size of about 3 mm  No ptosis or nystagmus  Funduscopic examination was not successful  Eye movements are conjugate     Sensation on the face and scalp are normal  Muscles of mastication are normal and symmetric  Muscles of  facial expression are normal and symmetric  Hearing is intact bilaterally  Head turning and shoulder shrugs were unremarkable  Tongue was midline  I could not visualize  oropharynx or uvula     Motor examination:  Normal bulk, tone and strength was 5/5  No fasciculations     Sensory examination:  Intact for soft touch, pain   Romberg was not evaluated     Reflexes:  0/4     Coordination:  Normal finger-to-nose to finger, rapid  alternating movements and toe to finger     Gait:  Deferred     Toe signs:  Mute      NIH stroke scale  NIHSS:    Level Of Consciousness 0  0=Alert; keenly responsive 1=Not alert, but arousable by minor stimulation 2=Not alert, requires repeated stimulation 3=Responds only with reflex movements    LOC Questions to Month and age 0  0=Answers both questions correctly 1=Answers one question correctly 2=Answers neither question correctly    LOC Commands      -Open/Close eyes 0    -Open/close  0   0=Performs both tasks correctly 1=Performs one task correctly 2=Performs neighter task correctly     Best Gaze 0  0=Normal 1=Partial gaze palsy 2=Forced deviation, or total gaze paresis    Visual 0  0=No visual loss 1=Partial hemianopia 2=Complete hemianopia 3=Bilateral hemianopia (blind including cortical blindness)    Facial Palsy 0  0=Normal symmetrical movement 1=Minor paralysis (asymmetry) 2=Partial paralysis (lower facde) 3=Complete paralysis (upper and lower face)    Motor: Left Arm-0  Left leg-0; Right Arm-0 Right Leg-0  0=No drift, limb holds posture for full 10 seconds 1=Drift, limb holds posture, no drift to bed 2=Some antigravity effort, cannot maintain posture, drifts to bed 3=No effort against gravity, limb falls 4=No movement    Limb Ataxia 0  0=Absent 1=Present in one limb 2=Present in two limbs    Sensory 0   0=Normal 1=Mild to moderate sensory loss 2=Severe to total sensory loss    Best Language 0   0=No aphasia, normal 1=Mild to moderate aphasia 2=Severe Aphasia (very few words correct or understood)3=Multe, global aphasia    Dysarthria 0  0=Normal 1=Mild to moderate 2=Severe, unintelligible or mute/anarthric    Extinction/Neglect 0   0=No abnormality 1=Extinction to bilateral simultaneous stimulation 2=Profound neglect    Total  __0          NIH Stroke Scale  Interval: baseline  1a. Level of Consciousness: 0-->Alert, keenly responsive  1b. LOC Questions: 0-->Answers both questions correctly  1c. LOC  Commands: 0-->Performs both tasks correctly  2. Best Gaze: 0-->Normal  3. Visual: 0-->No visual loss  4. Facial Palsy: 0-->Normal symmetrical movements  5a. Motor Arm, Left: 0-->No drift, limb holds 90 (or 45) degrees for full 10 secs  5b. Motor Arm, Right: 0-->No drift, limb holds 90 (or 45) degrees for full 10 secs  6a. Motor Leg, Left: 0-->No drift, leg holds 30 degree position for full 5 secs  6b. Motor Leg, Right: 0-->No drift, leg holds 30 degree position for full 5 secs  7. Limb Ataxia: 0-->Absent  8. Sensory: 0-->Normal, no sensory loss  9. Best Language: 0-->No aphasia, normal  10. Dysarthria: 0-->Normal  11. Extinction and Inattention (formerly Neglect): 0-->No abnormality  Total (NIH Stroke Scale): 0         ________________________________________________   RESULTS REVIEW    VITAL SIGNS:  Temp:  [97.3 °F (36.3 °C)-98 °F (36.7 °C)] 97.7 °F (36.5 °C)  Heart Rate:  [] 83  Resp:  [16-18] 16  BP: (106-165)/(65-96) 106/65    LABS:       Lab 10/16/24  0441 10/15/24  1109   WBC 8.68 8.54   HEMOGLOBIN 9.6* 10.5*   HEMATOCRIT 29.4* 31.8*   PLATELETS 154 180   NEUTROS ABS 6.51 6.65   IMMATURE GRANS (ABS) 0.05 0.04   LYMPHS ABS 1.34 1.16   MONOS ABS 0.46 0.41   EOS ABS 0.26 0.23   MCV 95.1 94.1         Lab 10/16/24  0441 10/15/24  1109   SODIUM 133* 133*   POTASSIUM 4.5 5.0   CHLORIDE 100 99   CO2 21.7* 18.7*   ANION GAP 11.3 15.3*   BUN 54* 50*   CREATININE 3.12* 2.92*   EGFR 16.9* 18.3*   GLUCOSE 118* 101*   CALCIUM 10.8* 10.9*   MAGNESIUM 1.8  --          Lab 10/16/24  0441 10/15/24  1109   TOTAL PROTEIN 6.1 6.8   ALBUMIN 3.6 4.0   GLOBULIN 2.5 2.8   ALT (SGPT) 15 16   AST (SGOT) 16 21   BILIRUBIN 0.5 0.6   ALK PHOS 130* 138*             Lab 10/16/24  0441   CHOLESTEROL 90   LDL CHOL 39   HDL CHOL 31*   TRIGLYCERIDES 105             UA          9/10/2024    10:08 9/15/2024    15:09 9/30/2024    12:37   Urinalysis   Squamous Epithelial Cells, UA  3-6     Specific Gravity, UA  1.006     Ketones, UA Negative   Negative  Negative    Blood, UA  Negative     Leukocytes, UA Small (1+)  Trace  Large (3+)    Nitrite, UA  Negative     RBC, UA  0-2     WBC, UA  3-5     Bacteria, UA  None Seen         Lab Results   Component Value Date    TSH 1.870 07/24/2024    LDL 39 10/16/2024    HGBA1C 5.60 09/16/2024    [id number] >2,000 (H) 04/12/2024         IMAGING STUDIES:  CT Head Without Contrast    Result Date: 10/15/2024  Impression: 1.No acute intracranial abnormality. 2.Air-fluid level in the right maxillary sinus. Correlate clinically for the possibility of acute sinusitis. Electronically Signed: Malik Arthur MD  10/15/2024 2:34 PM EDT  Workstation ID: [id number]     I reviewed the patient's new clinical results.    ________________________________________________      PROBLEM LIST:    TIA (transient ischemic attack)        ASSESSMENT/PLAN:  Possibility of TIA versus very small infarct nonhemorrhagic      She has been thoroughly worked up    We will recommend DAPT  Continue Lipitor 80    Modification of stroke risk factors:   - Blood pressure should be less than 130/80 outpatient, HbA1c less than 6.5, LDL less than 70; b12>500 and smoking cessation if applicable. We would be grateful if the primary team / primary care physician keep a close watch on this above targets.  - Stroke education  - Follow up with neurologist of choice      No indication for anticoagulation or changing anything else at this time    **Please refer to previous notes for further details and recommendations.     I discussed the patient's findings and my recommendations with patient, nursing staff, and primary care team    Eliseo Moody MD  10/16/24  08:43 EDT

## 2024-10-16 NOTE — PLAN OF CARE
Goal Outcome Evaluation:  Plan of Care Reviewed With: patient        Progress: no change  Outcome Evaluation: Patient new admit this shift for TIA/stroke w/u. NIH 0. No c/o pain or discomfort. VSS.

## 2024-10-16 NOTE — SIGNIFICANT NOTE
10/16/24 0826   OTHER   Discipline speech language pathologist   Rehab Time/Intention   Session Not Performed patient unavailable for evaluation  (In MRI)   Recommendations   SLP - Next Appointment 10/16/24     Patient was getting MRI done when SLP attempted to see her. SLP will try to see her once she returns.

## 2024-10-16 NOTE — THERAPY EVALUATION
Acute Care - Speech Language Pathology Initial Evaluation  [facility] Valerio     Patient Name: Katarzyna Norris  : 1970  MRN: [id number]  Today's Date: 10/16/2024               Admit Date: 10/15/2024     Visit Dx:    ICD-10-CM ICD-9-CM   1. Weakness  R53.1 780.79   2. Paresthesia of right arm  R20.2 782.0   3. Difficulty in walking  R26.2 719.7   4. Alteration in cognition  R41.89 799.59     Patient Active Problem List   Diagnosis    Gout    Anxiety    Hypertension    Primary osteoarthritis of left knee    Anemia    Impaired fasting glucose    Hyperlipidemia    Aftercare following right knee joint replacement oexylng21/19/22    Osteoarthritis of left knee    Status post replacement of knee joint    Cyst of ovary    Asthma    Nephritic syndrome    Class 3 severe obesity due to excess calories with serious comorbidity and body mass index (BMI) of 40.0 to 44.9 in adult    Vitamin D deficiency    Elevated ferritin    Aftercare following surgery of left total knee arthroplasty, 2021    Lateral cutaneous femoral nerve of thigh compression or syndrome, right    Primary localized osteoarthritis of right knee    Allergic rhinitis    Hiatal hernia    Painful thyroid    Migraine with aura and without status migrainosus, not intractable    External hemorrhoid    Screening mammogram for breast cancer    Acute non-recurrent sinusitis    Panic attack    Hyponatremia    Joint pain    Muscle cramps    Eustachian tube disorder, bilateral    Right knee pain    Left knee pain    Blurred vision, bilateral    CKD (chronic kidney disease) stage 5, GFR less than 15 ml/min    Encounter for screening for malignant neoplasm of colon    Dermatitis    Encounter for Medicare annual wellness exam    Palpitation    Incisional hernia, without obstruction or gangrene    Umbilical hernia without obstruction and without gangrene    Transient ischemic attack (TIA)    TIA (transient ischemic attack)    Left lower quadrant abdominal pain    Acute  cystitis with hematuria     Past Medical History:   Diagnosis Date    Anxiety     Arthritis     GILBERT KNEES    Asthma     SEASONAL ALLERGIES- NO INHALERS    CKD (chronic kidney disease)     Stage 5, Follows with [facility]- NO CURRENT DIALYSIS    Elevated cholesterol     Gastritis     GERD (gastroesophageal reflux disease)     Gout 2021    Hiatal hernia     Hiatal hernia     Hypertension     Lactose intolerance     Transplant, organ     ON KIDNEY TRANSPLANT LIST     Past Surgical History:   Procedure Laterality Date    ARTERIOVENOUS FISTULA/SHUNT SURGERY Left 2024    Procedure: 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;  Surgeon: Yaniv Dominguez MD;  Location: [facility] MAIN OR;  Service: Vascular;  Laterality: Left;    BONY PELVIS SURGERY      Plate     SECTION   and     CHOLECYSTECTOMY      COLONOSCOPY      COLONOSCOPY      COLONOSCOPY      COLONOSCOPY N/A 2024    Procedure: COLONOSCOPY;  Surgeon: Aston Arroyo MD;  Location: [facility] ENDOSCOPY;  Service: Gastroenterology;  Laterality: N/A;  HEMORRHOIDS    ENDOSCOPY      ENDOSCOPY N/A 2023    Procedure: ESOPHAGOGASTRODUODENOSCOPY with biopsies;  Surgeon: Tam Badillo MD;  Location: [facility] ENDOSCOPY;  Service: General;  Laterality: N/A;  hiatal hernia    JOINT REPLACEMENT      TOTAL KNEE ARTHROPLASTY Left 2021    Procedure: TOTAL KNEE ARTHROPLASTY;  Surgeon: Marco Nelson MD;  Location: [facility] MAIN OR;  Service: Orthopedics;  Laterality: Left;    TOTAL KNEE ARTHROPLASTY Right 10/19/2022    Procedure: RIGHT TOTAL KNEE ARTHROPLASTY - NO SHELBY;  Surgeon: Marco Nelson MD;  Location: [facility] MAIN OR;  Service: Orthopedics;  Laterality: Right;    TUBAL ABDOMINAL LIGATION         SLP Recommendation and Plan      INPATIENT SPEECH/LANGUAGE/VOICE EVALUATION        DATE OF SERVICE: 10/16/2024    REHAB DIAGNOSIS: alteration in cognition    ONSET DATE: 10/14/2024    REFERRING PHYSICIAN:  "Avtar Garza MD    PAIN SCALE: Patient indicated that she was not having any pain at time of evaluation.    PRECAUTIONS/CONTRAINDICATIONS:  stroke precautions    SUSPECTED ABUSE/NEGLECT/EXPLOITATION:  No    PRIOR FUNCTION:  within normal limits    PATIENT GOALS/EXPECTATIONS:  To determine type and severity of current deficits.     HISTORY:  Patient is a pleasant 54 year old female that presented to the ED with right facial weakness, tongue numbness, and right arm numbness. Patient was previously hospitalized for these symptoms about a month ago. She was reportedly sent home with Plavix which she reported was helpful. Patient has a hx of CKD stage IV-V, HTN, dyslipidemia, recent TIA, and gout. Patient's recent MRI reported, \"chronic and senescent changes of the brain including small linear chronic infarct in the left cerebellum.\"     CURRENT DIET LEVEL:  Level 7 regular solids and level 0 thin liquids.    OBJECTIVE:  BEHAVIORAL OBSERVATIONS:  Patient was pleasant and cooperative. Patient followed directions well and was appropriate during conversation with SLP. Patient appeared to be attentive but did have a moment where she grabbed her head and stated that she had a \"head rush\" after taking her pills.     ORAL MOTOR EXAM:  within normal limits.    TEST ADMINISTERED: [employer] Mental Status Exam (UMS) and informal language evaluation.    The patient completed the [employer] Mental Status Exam (SLUMS) and received a score of 16/30 (1-19 = dementia). Patient had increased difficulty with delayed recall tasks including recalling 5 items after a 5 minute delay (recalled two) and answering questions about a paragraph length story (answered one question correctly). Patient reported that she is expecting to be discharged today and that her decreased performance was due to having a \"head rush\" after taking her pills. Patient reported that she feels like she is back to her prior level of functioning. " "    Patient's language appears to be within normal limits as she did well in divergent naming task, had appropriate conversation with SLP, and denied difficulty with understanding others, expressing thoughts, reading, and writing.    Based on these results, the patient does not require follow up speech services at this time. Patient reported that she will keep an eye on the memory deficit but that she thinks it is mostly related to her \"head rush\" during the evaluation. She reports being back at her prior level of functioning.     Pt/responsible party agrees with plan of care and has been informed of all alternatives, risks and benefits.                                                                      EDUCATION  The patient has been educated in the following areas:     Cognitive Impairment Communication Impairment.                        Time Calculation:      Time Calculation- SLP       Row Name 10/16/24 1046 10/16/24 0826          Time Calculation- SLP    SLP Start Time 0930  -AW --     SLP Stop Time 1030  -AW --     SLP Time Calculation (min) 60 min  -AW --     SLP - Next Appointment -- 10/16/24  -AW        Untimed Charges    26837-XP Eval Speech and Production w/ Language Minutes 60  -AW --        Total Minutes    Untimed Charges Total Minutes 60  -AW --      Total Minutes 60  -AW --               User Key  (r) = Recorded By, (t) = Taken By, (c) = Cosigned By      Initials Name Provider Type    AW Renetta Shaw SLP Speech and Language Pathologist                    Therapy Charges for Today       Code Description Service Date Service Provider Modifiers Qty    71586439340 HC ST EVAL SPEECH AND PROD W LANG  4 10/16/2024 Renetta Shaw SLP GN 1                       DORCAS Becerra  10/16/2024  "

## 2024-10-16 NOTE — OUTREACH NOTE
Prep Survey      Flowsheet Row Responses   Religion [facility] patient discharged from? [facility]   Is LACE score < 7 ? No   Eligibility [facility]   Date of Admission 10/15/24   Date of Discharge 10/16/24   Discharge Disposition Home or Self Care   Discharge diagnosis TIA (transient ischemic attack)   Does the patient have one of the following disease processes/diagnoses(primary or secondary)? Stroke   Does the patient have Home health ordered? No   Is there a DME ordered? No   Prep survey completed? Yes            Becky [facility] - Registered Nurse

## 2024-10-16 NOTE — PLAN OF CARE
Goal Outcome Evaluation:              Outcome Evaluation: Occupational Evaluation completed this date. Patient presents at baseline, with no observed impairments inhibiting her overall function. No indication for skilled OT intervention in acute care setting OT will sign off.

## 2024-10-16 NOTE — DISCHARGE SUMMARY
[facility]         HOSPITALIST  DISCHARGE SUMMARY    Patient Name: Katarzyna Norris  : 1970  MRN: [id number]    Date of Admission: 10/15/2024  Date of Discharge:  10/16/24  Primary Care Physician: Brian Ellis APRN    Consults       Date and Time Order Name Status Description    10/15/2024  6:58 PM Inpatient Neurology Consult Stroke      10/15/2024  3:34 PM Inpatient Hospitalist Consult      10/15/2024  2:39 PM Inpatient Neurology Consult General Completed             Active and Resolved Hospital Problems:  Cerebrovascular accident (CVA) due to occlusion of small artery   Right-sided paresthesias  Dyslipidemia  Hypertension  History of TIA  CKD stage V  Anemia chronic kidney disease   Morbid obesity    Hospital Course     Hospital Course:  Katarzyna Norris is a 54 y.o. female with hypertension, CKD stage 5 who presented with right-sided numbness.  She has been discharged from hospital last month for similar presentation.  Diagnosed with TIA and discharged on 21 days of DAPT and then monotherapy with aspirin which she was compliant with.  On presentation vital signs stable.  CT head unremarkable. Seen by neurology.  MRI brain repeated, neurology felt there may have been a small pontine area infarct.  MR angiogram head and neck unremarkable.  Had just had echo so this was not repeated.  Her symptoms resolved.  Blood pressure was well-controlled.  LDL was 39, continued on statin therapy.  No evidence of diabetes.  Neurology recommended continuing her on aspirin and Plavix.  Otherwise cleared for discharge, follow-up with neurology and PCP scheduled.      Day of Discharge     Vital Signs:  Temp:  [97.3 °F (36.3 °C)-98 °F (36.7 °C)] 97.7 °F (36.5 °C)  Heart Rate:  [] 94  Resp:  [16-18] 16  BP: (106-165)/(65-96) 130/76  Physical Exam:   GENERAL: conversant and nontoxic.  HEART: RRR. No edema  LUNGS: nonlabored  ABDOMEN: Soft, nondistended  NEUROLOGIC: Alert, CN intact,  "speech clear    Discharge Details        Discharge Medications        New Medications        Instructions Start Date   cefdinir 300 MG capsule  Commonly known as: OMNICEF   300 mg, Oral, Every 24 Hours Scheduled      clopidogrel 75 MG tablet  Commonly known as: Plavix   75 mg, Oral, Daily             Continue These Medications        Instructions Start Date   acetaminophen 325 MG tablet  Commonly known as: TYLENOL   650 mg, Every 6 Hours PRN      albuterol sulfate  (90 Base) MCG/ACT inhaler  Commonly known as: PROVENTIL HFA;VENTOLIN HFA;PROAIR HFA   2 puffs, Every 4 Hours PRN      allopurinol 300 MG tablet  Commonly known as: ZYLOPRIM   300 mg, Oral, Daily      allopurinol 300 MG tablet  Commonly known as: ZYLOPRIM   150 mg, Nightly      aspirin 81 MG chewable tablet   81 mg, Oral, Daily      atorvastatin 20 MG tablet  Commonly known as: LIPITOR   20 mg, Oral, Nightly      carvedilol 25 MG tablet  Commonly known as: COREG   25 mg, Oral, 2 Times Daily With Meals      cloNIDine 0.2 MG tablet  Commonly known as: CATAPRES   0.2 mg, Nightly      diphenhydrAMINE 25 mg capsule  Commonly known as: BENADRYL   25 mg, Every 6 Hours PRN      losartan 100 MG tablet  Commonly known as: COZAAR   50 mg, Oral, 2 Times Daily      polyethylene glycol 17 g packet  Commonly known as: MIRALAX   17 g, Oral, Daily      sodium bicarbonate 650 MG tablet   650 mg, 2 Times Daily      vitamin B-12 1000 MCG tablet  Commonly known as: CYANOCOBALAMIN   1,000 mcg, Daily      ZINC PO   1 tablet, Daily             Stop These Medications      nitrofurantoin (macrocrystal-monohydrate) 100 MG capsule  Commonly known as: Macrobid              Allergies   Allergen Reactions    Amlodipine Shortness Of Breath    Clindamycin/Lincomycin Swelling     FACIAL SWELLING      Contrast Dye (Echo Or Unknown Ct/Mr) Palpitations     \"BLOOD PRESSURE GOES GISSELLE HIGH\", \"HEART RACES\"    Diltiazem Hcl Anxiety    Zofran [Ondansetron] Confusion    Augmentin " [Amoxicillin-Pot Clavulanate] Nausea And Vomiting     Beta lactam allergy details  Antibiotic reaction: nausea  Age at reaction: adult  Dose to reaction time: days  Reason for antibiotic: unknown  Epinephrine required for reaction?: unknown  Tolerated antibiotics: unknown       Doxycycline Nausea And Vomiting       Discharge Disposition:  Home or Self Care    Diet:  Hospital:  Diet Order   Procedures    Diet: Cardiac; Healthy Heart (2-3 Na+); Fluid Consistency: Thin (IDDSI 0)       Discharge Activity:   Activity Instructions       Activity as Tolerated              CODE STATUS:  Code Status and Medical Interventions: CPR (Attempt to Resuscitate); Full Support   Ordered at: 10/15/24 1640     Level Of Support Discussed With:    Patient     Code Status (Patient has no pulse and is not breathing):    CPR (Attempt to Resuscitate)     Medical Interventions (Patient has pulse or is breathing):    Full Support         Future Appointments   Date Time Provider Department Center   10/23/2024  8:45 AM Brian Ellis APRN [facility]   11/21/2024  7:45 AM Masoud Gaspar MD [facility] [facility] [facility]   12/30/2024  9:30 AM Yaniv Dominguez MD [facility] [place] [facility]   3/21/2025 11:15 AM Bobo Aldrich MD [facility] CD [place] [facility]   5/15/2025  3:15 PM Brian Ellis APRN [facility]       Additional Instructions for the Follow-ups that You Need to Schedule       Discharge Follow-up with PCP   As directed       Currently Documented PCP:    Brian Ellis APRN    PCP Phone Number:    [phone]     Follow Up Details: on 10/23/2024                Pertinent  and/or Most Recent Results     PROCEDURES:   NONE    LAB RESULTS:      Lab 10/16/24  0441 10/15/24  1109   WBC 8.68 8.54   HEMOGLOBIN 9.6* 10.5*   HEMATOCRIT 29.4* 31.8*   PLATELETS 154 180   NEUTROS ABS 6.51 6.65   IMMATURE GRANS (ABS) 0.05 0.04   LYMPHS ABS 1.34 1.16   MONOS ABS 0.46 0.41   EOS ABS 0.26 0.23   MCV 95.1 94.1         Lab 10/16/24  0441  10/15/24  1109   SODIUM 133* 133*   POTASSIUM 4.5 5.0   CHLORIDE 100 99   CO2 21.7* 18.7*   ANION GAP 11.3 15.3*   BUN 54* 50*   CREATININE 3.12* 2.92*   EGFR 16.9* 18.3*   GLUCOSE 118* 101*   CALCIUM 10.8* 10.9*   MAGNESIUM 1.8  --    HEMOGLOBIN A1C 5.20  --          Lab 10/16/24  0441 10/15/24  1109   TOTAL PROTEIN 6.1 6.8   ALBUMIN 3.6 4.0   GLOBULIN 2.5 2.8   ALT (SGPT) 15 16   AST (SGOT) 16 21   BILIRUBIN 0.5 0.6   ALK PHOS 130* 138*             Lab 10/16/24  0441   CHOLESTEROL 90   LDL CHOL 39   HDL CHOL 31*   TRIGLYCERIDES 105             Brief Urine Lab Results  (Last result in the past 365 days)        Color   Clarity   Blood   Leuk Est   Nitrite   Protein   CREAT   Urine HCG        09/30/24 1237 Yellow   Cloudy   Small   Large (3+)   Negative   30 mg/dL                 Microbiology Results (last 10 days)       ** No results found for the last 240 hours. **            MRI Brain Without Contrast    Result Date: 10/16/2024  Impression: No acute intracranial findings. No acute infarct. Chronic and senescent changes of the brain including small linear chronic infarct in the left cerebellum and scattered white matter hyperintensities which are nonspecific and can be seen with chronic small vessel ischemic change. Electronically Signed: Joshua Workman MD  10/16/2024 8:50 AM EDT  Workstation ID: [id number]    MRI Angiogram Head Without Contrast    Result Date: 10/16/2024  Impression: 1.No acute abnormality is identified within the large arteries of the head or neck. 2.No significant stenosis of the bilateral internal carotid arteries. Electronically Signed: Samuel Leiva MD  10/16/2024 8:43 AM EDT  Workstation ID: [id number]    MRI Angiogram Neck Without Contrast    Result Date: 10/16/2024  Impression: 1.No acute abnormality is identified within the large arteries of the head or neck. 2.No significant stenosis of the bilateral internal carotid arteries. Electronically Signed: Samuel Leiva MD  10/16/2024 8:43 AM  EDT  Workstation ID: [id number]    CT Head Without Contrast    Result Date: 10/15/2024  Impression: 1.No acute intracranial abnormality. 2.Air-fluid level in the right maxillary sinus. Correlate clinically for the possibility of acute sinusitis. Electronically Signed: Malik Arthur MD  10/15/2024 2:34 PM EDT  Workstation ID: [id number]      Results for orders placed in visit on 06/24/24    Duplex Hemodialysis Access CAR    Interpretation Summary    The left sided autogenous arteriovenous (AV) fistula is patent without significant defect.   The arterial inflow vessel is patent without significant defect.  The arterial anastomosis is patent without significant defect.  The venous outflow vessel is patent without significant defect.    Left sided flow volumes are adequate.    There is a patent 3-4 mm diameter branch at the mid fistula.    No significant change from study dated 6/24/2024.      Results for orders placed in visit on 06/24/24    Duplex Hemodialysis Access CAR    Interpretation Summary    The left sided autogenous arteriovenous (AV) fistula is patent without significant defect.   The arterial inflow vessel is patent without significant defect.  The arterial anastomosis is patent without significant defect.  The venous outflow vessel is patent without significant defect.    Left sided flow volumes are adequate.    There is a patent 3-4 mm diameter branch at the mid fistula.    No significant change from study dated 6/24/2024.      Results for orders placed during the hospital encounter of 09/15/24    Adult Transthoracic Echo Complete W/ Cont if Necessary Per Protocol (With Agitated Saline)    Interpretation Summary    Left ventricular systolic function is normal. Left ventricular ejection fraction appears to be 61 - 65%.    Left ventricular wall thickness is consistent with mild concentric hypertrophy.    Left ventricular diastolic function is consistent with (grade I) impaired relaxation.    There are no  hemodynamically significant valvular abnormalities.    Estimated right ventricular systolic pressure from tricuspid regurgitation is normal (<35 mmHg).      Labs Pending at Discharge:      Time spent on Discharge including face to face service: >30 minutes    Electronically signed by Donny Staton DO, 10/16/24, 3:32 PM EDT.

## 2024-10-16 NOTE — CONSULTS
Consult received per Stroke Protocol. Patient without a noted DM diagnosis, with a current HbA1c of 5.2%, and an estimated average glucose of 103 mg/dl. Blood glucose values have remained WDL, with a low of 101 mg/dl, and a high of 108 mg/dl.

## 2024-10-16 NOTE — PLAN OF CARE
Goal Outcome Evaluation:  Plan of Care Reviewed With: (P) patient        Progress: (P) improving  Outcome Evaluation: (P) Pt presents to treatment with complaints of fatigue and weakness in BLE. Was able to ambulate with minimal difficulty and no complaints of fatigue. Recommend discharge to home at this time.    Anticipated Discharge Disposition (PT): (P) home

## 2024-10-17 ENCOUNTER — TRANSITIONAL CARE MANAGEMENT TELEPHONE ENCOUNTER (OUTPATIENT)
Dept: CALL CENTER | Facility: HOSPITAL | Age: 54
End: 2024-10-17
Payer: MEDICARE

## 2024-10-17 NOTE — OUTREACH NOTE
Call Center TCM Note      Flowsheet Row Responses   [facility] patient discharged from? [facility]   Does the patient have one of the following disease processes/diagnoses(primary or secondary)? Stroke   TCM attempt successful? Yes   Call start time 1041   Call end time 1045   Discharge diagnosis TIA (transient ischemic attack)   Meds reviewed with patient/caregiver? Yes   Is the patient having any side effects they believe may be caused by any medication additions or changes? No   Does the patient have all medications ordered at discharge? Yes   Is the patient taking all medications as directed (includes completed medication regime)? Yes   Comments HOSP DC FU appt 10/22/24 1115 am   Does the patient have an appointment with their PCP within 7-14 days of discharge? Yes   Has home health visited the patient within 72 hours of discharge? N/A   Psychosocial issues? No   Does the patient require any assistance with activities of daily living such as eating, bathing, dressing, walking, etc.? No   Does the patient have any residual symptoms from stroke/TIA? No   Did the patient receive a copy of their discharge instructions? Yes   Nursing interventions Reviewed instructions with patient   What is the patient's perception of their health status since discharge? Improving   Nursing interventions Nurse provided patient education   Is the patient/caregiver able to teach back the risk factors for a stroke? History of TIAs, High Cholesterol, High blood pressure-goal below 120/80   Is the patient/caregiver able to teach back signs and symptoms related to disease process for when to call PCP? Yes   Is the patient/caregiver able to teach back signs and symptoms related to disease process for when to call 911? Yes   Is the patient/caregiver able to teach back the hierarchy of who to call/visit for symptoms/problems? PCP, Specialist, Home health nurse, Urgent Care, ED, 911 Yes   Is the patient able to teach back FAST for Stroke? F  ace: Look for an uneven smile, A rm: Check if one arm is weak, S peech: Listen for slurred speech, E yes: Check for vision loss, B alance: Watch for sudden loss of balance, T alonso: Call 9-1-1 right away   TCM call completed? Yes   Wrap up additional comments pt reports she is doing better. No needs.   Call end time 1045            Karina Francis RN    10/17/2024, 10:45 EDT

## 2024-10-18 ENCOUNTER — PATIENT OUTREACH (OUTPATIENT)
Dept: CASE MANAGEMENT | Facility: OTHER | Age: 54
End: 2024-10-18
Payer: MEDICARE

## 2024-10-18 DIAGNOSIS — N18.30 STAGE 3 CHRONIC KIDNEY DISEASE, UNSPECIFIED WHETHER STAGE 3A OR 3B CKD: ICD-10-CM

## 2024-10-18 DIAGNOSIS — G45.9 TIA (TRANSIENT ISCHEMIC ATTACK): Primary | ICD-10-CM

## 2024-10-18 DIAGNOSIS — I10 PRIMARY HYPERTENSION: ICD-10-CM

## 2024-10-18 NOTE — OUTREACH NOTE
Patient Outreach    Recent hospital discharge dx TIA. PCP f/u scheduled 10/22/24. Instructed to bring medication bottles to this visit. Patient education on managing htn and TIA reviewed.     Names and Relationships of Patient/Support Persons:  -         Education Documentation  No documentation found.        Angela NATION  Ambulatory Case Management    10/18/2024, 10:06 EDT

## 2024-10-18 NOTE — PROGRESS NOTES
Transitional Care Follow Up Visit     Patient Name: Katarzyna Norris  : 1970   MRN: [id number]     Chief Complaint:    Chief Complaint   Patient presents with    Hospital Follow Up Visit    Hypertension    Chronic Kidney Disease       History of Present Illness: Katarzyna Norris is a 54 y.o. female who presents today for transitional care management visit.  The patient was contacted by our office within 48 hours after the discharge of the patient via telephone to coordinate their follow up care and needs.       Date of Admission: 10/15/2024  Date of Discharge:  10/16/24    Katarzyna Norris is a 54 y.o. female with hypertension, CKD stage 5 who presented with right-sided numbness.  She has been discharged from hospital last month for similar presentation.  Diagnosed with TIA and discharged on 21 days of DAPT and then monotherapy with aspirin which she was compliant with.  On presentation vital signs stable.  CT head unremarkable.     Neurology follow up 24.  She says she feels much better, she states the Plavix helped a lot.   No new issues or complications.     Pt c/o neck pain, states she is doing exercises provided by physical therapy    Narrative & Impression   PROCEDURE:XR SPINE CERVICAL 3 VW 10/13/2023      FINDINGS:          No abnormality is seen at the craniocervical junction.     Mild degenerative spurring is seen between the anterior arch of C1 and the dens.     Vertebral body heights are maintained.  No fractures or subluxations are seen.     Mild narrowing of the C4-5 disc space is seen, with mild spurring.     Mild narrowing of the C5-6 disc space is seen, with moderate spurring.     No abnormality is seen on the odontoid view.     IMPRESSION:                 Cervical spine series demonstrating multi level degenerative change.             Pt c/o pelvic pressure, urinary frequency and continued left lower quadrant pain CT of the abdomen pelvis was previously ordered but not  scheduled  Also c/o constipation, previously taken miralax     Subjective      Review of Systems:   Review of Systems   Constitutional:  Negative for fever.   Respiratory:  Negative for cough.    Cardiovascular:  Negative for chest pain.   Gastrointestinal:  Positive for abdominal pain and constipation. Negative for nausea and vomiting.   Genitourinary:  Positive for frequency and urgency. Negative for dysuria.   Musculoskeletal:  Positive for neck pain.   Neurological:  Negative for dizziness, syncope and numbness.       I reviewed and discussed the details of that call along with the discharge summary, hospital problems, inpatient lab results, inpatient diagnostic studies, and consultation reports with Katarzyna Norris.     Current outpatient and discharge medications have been reconciled for the patient.      10/16/2024     6:29 PM   Date of TCM Phone Call   [facility]   Date of Admission 10/15/2024   Date of Discharge 10/16/2024   Discharge Disposition Home or Self Care       Medications:     Current Outpatient Medications:     acetaminophen (TYLENOL) 325 MG tablet, Take 2 tablets by mouth Every 6 (Six) Hours As Needed for Mild Pain, Headache or Fever., Disp: , Rfl:     albuterol sulfate  (90 Base) MCG/ACT inhaler, Inhale 2 puffs Every 4 (Four) Hours As Needed for Wheezing or Shortness of Air., Disp: , Rfl:     allopurinol (ZYLOPRIM) 300 MG tablet, Take 1 tablet by mouth Daily. (Patient taking differently: Take 1 tablet by mouth Daily. Pt also takes 1/2 tab (150mg) hs), Disp: 180 tablet, Rfl: 1    allopurinol (ZYLOPRIM) 300 MG tablet, Take 0.5 tablets by mouth Every Night. Pt also takes 300mg qd, Disp: , Rfl:     atorvastatin (LIPITOR) 20 MG tablet, Take 1 tablet by mouth Every Night., Disp: 90 tablet, Rfl: 1    carvedilol (COREG) 25 MG tablet, Take 1 tablet by mouth 2 (Two) Times a Day With Meals., Disp: 180 tablet, Rfl: 1    cefdinir (OMNICEF) 300 MG capsule, Take 1 capsule by mouth  "Daily for 6 doses. Indications: Upper Respiratory Tract Infection, Acute maxillary sinusitis, Disp: 6 capsule, Rfl: 0    cloNIDine (CATAPRES) 0.2 MG tablet, Take 1 tablet by mouth Every Night., Disp: , Rfl:     clopidogrel (Plavix) 75 MG tablet, Take 1 tablet by mouth Daily for 90 days., Disp: 30 tablet, Rfl: 2    diphenhydrAMINE (BENADRYL) 25 mg capsule, Take 1 capsule by mouth Every 6 (Six) Hours As Needed for Itching or Allergies., Disp: , Rfl:     losartan (COZAAR) 100 MG tablet, Take 0.5 tablets by mouth 2 (Two) Times a Day., Disp: , Rfl:     Multiple Vitamins-Minerals (ZINC PO), Take 1 tablet by mouth Daily., Disp: , Rfl:     polyethylene glycol (MIRALAX) 17 g packet, Take 17 g by mouth Daily., Disp: 100 each, Rfl: 3    sodium bicarbonate 650 MG tablet, Take 1 tablet by mouth 2 (Two) Times a Day., Disp: , Rfl:     vitamin B-12 (CYANOCOBALAMIN) 1000 MCG tablet, Take 1 tablet by mouth Daily., Disp: , Rfl:     cyclobenzaprine (FLEXERIL) 10 MG tablet, Take 1 tablet by mouth 3 (Three) Times a Day As Needed for Muscle Spasms., Disp: 90 tablet, Rfl: 1    Diclofenac Sodium (VOLTAREN) 1 % gel gel, Apply 4 g topically to the appropriate area as directed 4 (Four) Times a Day As Needed (joint pain)., Disp: 350 g, Rfl: 1    Allergies:   Allergies   Allergen Reactions    Amlodipine Shortness Of Breath    Clindamycin/Lincomycin Swelling     FACIAL SWELLING      Contrast Dye (Echo Or Unknown Ct/Mr) Palpitations     \"BLOOD PRESSURE GOES GISSELLE HIGH\", \"HEART RACES\"    Diltiazem Hcl Anxiety    Zofran [Ondansetron] Confusion    Augmentin [Amoxicillin-Pot Clavulanate] Nausea And Vomiting     Beta lactam allergy details  Antibiotic reaction: nausea  Age at reaction: adult  Dose to reaction time: days  Reason for antibiotic: unknown  Epinephrine required for reaction?: unknown  Tolerated antibiotics: unknown       Doxycycline Nausea And Vomiting       Hospital Course Information:  Risk for Readmission (LACE) Score: 10 (10/16/2024  6:00 " "AM)       Course During Hospital Stay:        CT head unremarkable. Seen by neurology.  MRI brain repeated, neurology felt there may have been a small pontine area infarct.  MR angiogram head and neck unremarkable.  Had just had echo so this was not repeated.  Her symptoms resolved.  Blood pressure was well-controlled.  LDL was 39, continued on statin therapy.  No evidence of diabetes.  Neurology recommended continuing her on aspirin and Plavix.  Otherwise cleared for discharge, follow-up with neurology and PCP scheduled.       PMH, PSH, Care Team, Medications including OTC/CAM and Allergies reviewed and updated as appropriate.     Objective     Physical Exam:  Vital Signs:   Vitals:    10/22/24 1048   BP: 127/86   Pulse: 80   Temp: 98.6 °F (37 °C)   SpO2: 99%   Weight: 103 kg (227 lb)   Height: 157.5 cm (62\")     Body mass index is 41.52 kg/m².     Physical Exam  Eyes:      Conjunctiva/sclera: Conjunctivae normal.   Neck:      Vascular: No carotid bruit.     Cardiovascular:      Rate and Rhythm: Normal rate and regular rhythm.      Heart sounds: Normal heart sounds. No murmur heard.  Pulmonary:      Effort: Pulmonary effort is normal.      Breath sounds: Normal breath sounds.   Abdominal:      General: Bowel sounds are normal.      Palpations: Abdomen is soft.   Musculoskeletal:      Cervical back: Spinous process tenderness and muscular tenderness present.      Right lower leg: No edema.      Left lower leg: No edema.   Skin:     General: Skin is warm and dry.   Neurological:      Mental Status: She is alert and oriented to person, place, and time.   Psychiatric:         Mood and Affect: Mood normal.         Behavior: Behavior normal.         Assessment / Plan      Assessment/Plan:   Diagnoses and all orders for this visit:    1. Cerebrovascular accident (CVA) due to occlusion of small artery (Primary)    2. Primary hypertension    3. Mixed hyperlipidemia    4. CKD (chronic kidney disease) stage 5, GFR less than " "15 ml/min  -     POC Urinalysis Dipstick, Automated    5. Urinary frequency    6. Urinary urgency    7. Abnormal urinalysis  -     Urine Culture - Urine, Urine, Clean Catch; Future  -     Urine Culture - Urine, Urine, Clean Catch    8. Constipation, unspecified constipation type  -     XR Abdomen Flat & Upright; Future    9. LLQ abdominal pain  -     XR Abdomen Flat & Upright; Future    10. Neck pain    Other orders  -     Diclofenac Sodium (VOLTAREN) 1 % gel gel; Apply 4 g topically to the appropriate area as directed 4 (Four) Times a Day As Needed (joint pain).  Dispense: 350 g; Refill: 1  -     cyclobenzaprine (FLEXERIL) 10 MG tablet; Take 1 tablet by mouth 3 (Three) Times a Day As Needed for Muscle Spasms.  Dispense: 90 tablet; Refill: 1  -     polyethylene glycol (MIRALAX) 17 g packet; Take 17 g by mouth Daily.  Dispense: 100 each; Refill: 3             CVA due to occlusion of small artery currently on Plavix and aspirin follow-up with neurology as scheduled  Hypertension currently controlled  Hyperlipidemia LDL is below goal on current statin dose Lipitor 20 mg at nighttime denies myalgias liver enzymes normal  Chronic kidney disease stage V avoid all NSAIDs follow-up with nephrology as scheduled  Urinary urgency frequency abnormal urinalysis not convincing of urinary tract infection will send for culture  Constipation with left lower quadrant pain will obtain abdomen flat and upright resume MiraLAX 1 capful daily increasing water and fiber in diet will call with x-ray results and further recommendations  Neck pain unable to take NSAIDs will treat with Flexeril diclofenac gel reviewed cervical spine x-ray with patient if symptoms persist would refer to physical therapy and pain management      Follow Up:   Return in about 6 months (around 4/22/2025).      Caleb Torres, MARICRUZ    \"Please note that portions of this note were completed with a voice recognition program.\"      *Note: 91183 is for high " complexity patients with a face to face visit within 7 days of discharge.  06037 is for high complexity patients with a face to face on days 8-14 post discharge or medium complexity with face to face visit within 14 days post discharge.

## 2024-10-22 ENCOUNTER — OFFICE VISIT (OUTPATIENT)
Dept: FAMILY MEDICINE CLINIC | Facility: CLINIC | Age: 54
End: 2024-10-22
Payer: MEDICARE

## 2024-10-22 VITALS
OXYGEN SATURATION: 99 % | SYSTOLIC BLOOD PRESSURE: 127 MMHG | BODY MASS INDEX: 41.77 KG/M2 | TEMPERATURE: 98.6 F | HEIGHT: 62 IN | WEIGHT: 227 LBS | DIASTOLIC BLOOD PRESSURE: 86 MMHG | HEART RATE: 80 BPM

## 2024-10-22 DIAGNOSIS — K59.00 CONSTIPATION, UNSPECIFIED CONSTIPATION TYPE: ICD-10-CM

## 2024-10-22 DIAGNOSIS — R82.90 ABNORMAL URINALYSIS: ICD-10-CM

## 2024-10-22 DIAGNOSIS — E78.2 MIXED HYPERLIPIDEMIA: ICD-10-CM

## 2024-10-22 DIAGNOSIS — R10.32 LLQ ABDOMINAL PAIN: ICD-10-CM

## 2024-10-22 DIAGNOSIS — N18.5 CKD (CHRONIC KIDNEY DISEASE) STAGE 5, GFR LESS THAN 15 ML/MIN: ICD-10-CM

## 2024-10-22 DIAGNOSIS — R39.15 URINARY URGENCY: ICD-10-CM

## 2024-10-22 DIAGNOSIS — I63.81 CEREBROVASCULAR ACCIDENT (CVA) DUE TO OCCLUSION OF SMALL ARTERY: Primary | ICD-10-CM

## 2024-10-22 DIAGNOSIS — R35.0 URINARY FREQUENCY: ICD-10-CM

## 2024-10-22 DIAGNOSIS — I10 PRIMARY HYPERTENSION: ICD-10-CM

## 2024-10-22 DIAGNOSIS — M54.2 NECK PAIN: ICD-10-CM

## 2024-10-22 LAB
BILIRUB BLD-MCNC: NEGATIVE MG/DL
CLARITY, POC: ABNORMAL
COLOR UR: YELLOW
EXPIRATION DATE: ABNORMAL
GLUCOSE UR STRIP-MCNC: NEGATIVE MG/DL
KETONES UR QL: NEGATIVE
LEUKOCYTE EST, POC: ABNORMAL
Lab: ABNORMAL
NITRITE UR-MCNC: NEGATIVE MG/ML
PH UR: 5.5 [PH] (ref 5–8)
PROT UR STRIP-MCNC: NEGATIVE MG/DL
RBC # UR STRIP: ABNORMAL /UL
SP GR UR: 1.01 (ref 1–1.03)
UROBILINOGEN UR QL: ABNORMAL

## 2024-10-22 PROCEDURE — 99214 OFFICE O/P EST MOD 30 MIN: CPT | Performed by: NURSE PRACTITIONER

## 2024-10-22 PROCEDURE — 87086 URINE CULTURE/COLONY COUNT: CPT | Performed by: NURSE PRACTITIONER

## 2024-10-22 PROCEDURE — 87077 CULTURE AEROBIC IDENTIFY: CPT | Performed by: NURSE PRACTITIONER

## 2024-10-22 PROCEDURE — 3074F SYST BP LT 130 MM HG: CPT | Performed by: NURSE PRACTITIONER

## 2024-10-22 PROCEDURE — 1111F DSCHRG MED/CURRENT MED MERGE: CPT | Performed by: NURSE PRACTITIONER

## 2024-10-22 PROCEDURE — 3079F DIAST BP 80-89 MM HG: CPT | Performed by: NURSE PRACTITIONER

## 2024-10-22 PROCEDURE — 81003 URINALYSIS AUTO W/O SCOPE: CPT | Performed by: NURSE PRACTITIONER

## 2024-10-22 PROCEDURE — 1126F AMNT PAIN NOTED NONE PRSNT: CPT | Performed by: NURSE PRACTITIONER

## 2024-10-22 PROCEDURE — 87186 SC STD MICRODIL/AGAR DIL: CPT | Performed by: NURSE PRACTITIONER

## 2024-10-22 RX ORDER — POLYETHYLENE GLYCOL 3350 17 G/17G
17 POWDER, FOR SOLUTION ORAL DAILY
Qty: 100 EACH | Refills: 3 | Status: SHIPPED | OUTPATIENT
Start: 2024-10-22

## 2024-10-22 RX ORDER — CYCLOBENZAPRINE HCL 10 MG
10 TABLET ORAL 3 TIMES DAILY PRN
Qty: 90 TABLET | Refills: 1 | Status: SHIPPED | OUTPATIENT
Start: 2024-10-22

## 2024-10-23 RX ORDER — SULFAMETHOXAZOLE/TRIMETHOPRIM 800-160 MG
1 TABLET ORAL 2 TIMES DAILY
Qty: 20 TABLET | Refills: 0 | Status: SHIPPED | OUTPATIENT
Start: 2024-10-23

## 2024-10-24 LAB — BACTERIA SPEC AEROBE CULT: ABNORMAL

## 2024-11-01 ENCOUNTER — TELEPHONE (OUTPATIENT)
Dept: FAMILY MEDICINE CLINIC | Facility: CLINIC | Age: 54
End: 2024-11-01
Payer: MEDICARE

## 2024-11-01 DIAGNOSIS — R39.15 URINARY URGENCY: Primary | ICD-10-CM

## 2024-11-01 RX ORDER — NITROFURANTOIN 25; 75 MG/1; MG/1
100 CAPSULE ORAL 2 TIMES DAILY
Qty: 14 CAPSULE | Refills: 0 | Status: SHIPPED | OUTPATIENT
Start: 2024-11-01

## 2024-11-01 NOTE — TELEPHONE ENCOUNTER
Caller: Katarzyna Norris    Relationship: Self    Best call back number: [phone]     Which medication are you concerned about: SULFAMETHOXAZOLE-TRIMETHOPRIM (BACTRIM DS) 800-160 MG TABLET    Who prescribed you this medication: SHEILA NORIEGA    When did you start taking this medication: HAS NOT BEEN ABLE TO TAKE THE MEDICATION YET.    What are your concerns: PATIENT STATES THE TABLETS ARE TOO BIG FOR HER TO SWALLOW.    PATIENT STATES THAT THE MEDICATION SHE WAS TAKING PREVIOUSLY WORKED WELL, SHE JUST DIDN'T HAVE ENOUGH. PATIENT DOESN'T KNOW THE NAME OF MEDICATION BUT IS REQUESTING THAT BE CALLED IN INSTEAD.

## 2024-11-13 ENCOUNTER — PATIENT OUTREACH (OUTPATIENT)
Dept: CASE MANAGEMENT | Facility: OTHER | Age: 54
End: 2024-11-13
Payer: MEDICARE

## 2024-11-13 NOTE — OUTREACH NOTE
Patient Outreach    Reminded of up coming Neurology appt on 11/21/24 with Dr Gaspar. Reports she is taking all medications as ordered.     11/1/24 was started on Macrobid 100 mg bid for UTI. This medication has been completed.     Pt declines to get abd xray that was ordered during 10/22/24 visit. Reports abd pain resolved when placed on macrobid for uti. Reports bowel movements normal. No c/o constipation.     Blood pressure reported as controlled at PCP visit.         Names and Relationships of Patient/Support Persons: Contact: Katarzyna Norris; Relationship:   Contact: Katarzyna Norris; Relationship: Self -         Education Documentation  Unresolved/Worsening Symptoms, taught by Angela Rivers RN at 11/13/2024  3:26 PM.  Learner: Patient  Readiness: Acceptance  Method: Explanation  Response: Verbalizes Understanding          Angela NATION  Ambulatory Case Management    11/13/2024, 15:26 EST

## 2024-11-21 ENCOUNTER — PATIENT ROUNDING (BHMG ONLY) (OUTPATIENT)
Dept: NEUROLOGY | Facility: CLINIC | Age: 54
End: 2024-11-21
Payer: MEDICARE

## 2024-11-21 ENCOUNTER — OFFICE VISIT (OUTPATIENT)
Dept: NEUROLOGY | Facility: CLINIC | Age: 54
End: 2024-11-21
Payer: MEDICARE

## 2024-11-21 VITALS
HEIGHT: 62 IN | WEIGHT: 226.1 LBS | SYSTOLIC BLOOD PRESSURE: 145 MMHG | BODY MASS INDEX: 41.61 KG/M2 | HEART RATE: 90 BPM | DIASTOLIC BLOOD PRESSURE: 81 MMHG

## 2024-11-21 DIAGNOSIS — G45.9 TIA (TRANSIENT ISCHEMIC ATTACK): ICD-10-CM

## 2024-11-21 DIAGNOSIS — R73.03 PREDIABETES: Primary | ICD-10-CM

## 2024-11-21 DIAGNOSIS — G47.33 OBSTRUCTIVE SLEEP APNEA: ICD-10-CM

## 2024-11-21 RX ORDER — ASPIRIN 81 MG/1
81 TABLET ORAL DAILY
COMMUNITY

## 2024-11-21 NOTE — ASSESSMENT & PLAN NOTE
She is to stop taking Plavix and continue taking aspirin.  I discussed with her that aspirin and Plavix together has an increased risk of hemorrhage.  She needs to address the risk factors for having strokes which she has to lose weight control her hypertension and diabetes.  She is to be followed up by diabetic coordinator.    I spent a considerable amount of time advising her on caloric counting as well as ideal body weight.  Thank you for letting me participate in her care.

## 2024-11-21 NOTE — PROGRESS NOTES
"Chief Complaint  Neurologic Problem ([facility] ED- TIA EVAL)    Subjective          Katarzyna Norris is a 54 y.o. female who presents to [facility]  History of Present Illness  54-year-old woman evaluated for an event that occurred last month.  She states that she got right-sided numbness that lasted for 2 hours twice.  She went to the emergency room and a workup was performed which is unremarkable.  She states that she has lost 30 pounds.  She has a history of prediabetes, hypertension and chronic kidney disease.  She states that she is already lost 30 pounds.    She is also complaining of tiredness in the morning when she wakes up at several times a week as well as daytime somnolence.  She is also complaining of headache when she wakes up in the morning.    Objective   Vital Signs:   /81 (BP Location: Right arm, Patient Position: Sitting, Cuff Size: Large Adult)   Pulse 90   Ht 157.5 cm (62.01\")   Wt 103 kg (226 lb 1.6 oz)   BMI 41.34 kg/m²     Physical Exam   Noted, fluent, phasic, follows commands well.  Optic is unremarkable visual fields are full, EMs full directions gaze, facial strength is full, soft palate elevation and tongue are normal.  There is no weakness of the upper or lower extremities.  Reflexes are absent.  Cerebellar testing is intact.  Station gait is unremarkable.  Heart is regular and rhythm normal in rate.     Assessment and Plan  Diagnoses and all orders for this visit:    1. Prediabetes (Primary)  -     Ambulatory Referral to [facility] [place]    2. TIA (transient ischemic attack)  Assessment & Plan:  She is to stop taking Plavix and continue taking aspirin.  I discussed with her that aspirin and Plavix together has an increased risk of hemorrhage.  She needs to address the risk factors for having strokes which she has to lose weight control her hypertension and diabetes.  She is to be followed up by diabetic coordinator.    I " spent a considerable amount of time advising her on caloric counting as well as ideal body weight.  Thank you for letting me participate in her care.    Orders:  -     Ambulatory Referral to [facility] [place]    3. Obstructive sleep apnea  Assessment & Plan:  I will refer to sleep medicine to evaluate her for sleep apnea syndrome.    Orders:  -     Ambulatory Referral to Sleep Medicine         Total time spent with the patient and coordinating patient care was 45 minutes.    Follow Up  No follow-ups on file.  Patient was given instructions and counseling regarding her condition or for health maintenance advice. Please see specific information pulled into the AVS if appropriate.

## 2024-11-25 NOTE — PROGRESS NOTES
ACUTE VISIT     Patient Name: Katarzyna Norris  : 1970   MRN: [id number]     Chief Complaint:    Chief Complaint   Patient presents with    Dysuria       History of Present Illness: Katarzyna Norris is a 54 y.o. female who is here today for dysuria    PAP negative 2024     off and on since    She was treated with Bactrim in Oct then macrobid  Feels some better but painful urination is not gone    Urine Culture >100,000 CFU/mL Citrobacter freundii Abnormal      This organism may develop resistance during prolonged therapy with 3rd generation cephalosporins (e.g. ceftriaxone) as a result of de-repression of AmpC B-lactamase.  Ceftriaxone may be a reasonable treatment option for uncomplicated cystitis or other lower severity infections when susceptibility is demonstrated.      Colonization of the urinary tract without infection is common. Treatment is discouraged unless the patient is symptomatic, pregnant, or undergoing an invasive urologic procedure.        Resulting Agency: [facility]     Susceptibility     Citrobacter freundii     DELMA     Cefepime <=1 ug/ml Susceptible     Ceftazidime >=64 ug/ml Resistant     Ceftriaxone 16 ug/ml Resistant     Gentamicin <=1 ug/ml Susceptible     Levofloxacin <=0.12 ug/ml Susceptible     Nitrofurantoin <=16 ug/ml Susceptible     Piperacillin + Tazobactam 16 ug/ml Susceptible dose dependent     Trimethoprim + Sulfamethoxazole <=20 ug/ml Susceptible                           Subjective      Review of Systems:   Review of Systems   Constitutional:  Negative for fever.   Respiratory:  Negative for cough.    Cardiovascular:  Negative for chest pain.   Gastrointestinal:  Negative for abdominal pain, constipation, diarrhea, nausea and vomiting.   Genitourinary:  Positive for dysuria, frequency and urgency.        Past Medical History:   Past Medical History:   Diagnosis Date    Anxiety     Arthritis     GILBERT KNEES    Asthma     SEASONAL ALLERGIES- NO INHALERS     CKD (chronic kidney disease)     Stage 5, Follows with [facility]- NO CURRENT DIALYSIS    Elevated cholesterol     Gastritis     GERD (gastroesophageal reflux disease)     Gout 2021    Hiatal hernia     Hiatal hernia     Hypertension     Lactose intolerance     Transplant, organ     ON KIDNEY TRANSPLANT LIST       Past Surgical History:   Past Surgical History:   Procedure Laterality Date    ARTERIOVENOUS FISTULA/SHUNT SURGERY Left 2024    Procedure: 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;  Surgeon: Yaniv Dominguez MD;  Location: [facility] MAIN OR;  Service: Vascular;  Laterality: Left;    BONY PELVIS SURGERY      Plate     SECTION   and     CHOLECYSTECTOMY      COLONOSCOPY      COLONOSCOPY      COLONOSCOPY      COLONOSCOPY N/A 2024    Procedure: COLONOSCOPY;  Surgeon: Aston Arroyo MD;  Location: [facility] ENDOSCOPY;  Service: Gastroenterology;  Laterality: N/A;  HEMORRHOIDS    ENDOSCOPY      ENDOSCOPY N/A 2023    Procedure: ESOPHAGOGASTRODUODENOSCOPY with biopsies;  Surgeon: Tam Badillo MD;  Location: [facility] ENDOSCOPY;  Service: General;  Laterality: N/A;  hiatal hernia    JOINT REPLACEMENT      TOTAL KNEE ARTHROPLASTY Left 2021    Procedure: TOTAL KNEE ARTHROPLASTY;  Surgeon: Marco Nelson MD;  Location: [facility] MAIN OR;  Service: Orthopedics;  Laterality: Left;    TOTAL KNEE ARTHROPLASTY Right 10/19/2022    Procedure: RIGHT TOTAL KNEE ARTHROPLASTY - NO SHELBY;  Surgeon: Marco Nelson MD;  Location: [facility] MAIN OR;  Service: Orthopedics;  Laterality: Right;    TUBAL ABDOMINAL LIGATION         Family History:   Family History   Problem Relation Age of Onset    Diabetes Mother     Throat cancer Father 73    Hypertension Sister     Hypertension Sister     Hypertension Sister     Diabetes Brother     Hypertension Brother     Stroke Other     Diabetes Other     Malig Hyperthermia Neg Hx     Colon cancer Neg Hx        Social  History:   Social History     Socioeconomic History    Marital status:    Tobacco Use    Smoking status: Former     Current packs/day: 0.00     Types: Cigarettes     Quit date:      Years since quittin.9     Passive exposure: Never    Smokeless tobacco: Never   Vaping Use    Vaping status: Never Used   Substance and Sexual Activity    Alcohol use: Never    Drug use: Never    Sexual activity: Defer       Medications:     Current Outpatient Medications:     acetaminophen (TYLENOL) 325 MG tablet, Take 2 tablets by mouth Every 6 (Six) Hours As Needed for Mild Pain, Headache or Fever., Disp: , Rfl:     albuterol sulfate  (90 Base) MCG/ACT inhaler, Inhale 2 puffs Every 4 (Four) Hours As Needed for Wheezing or Shortness of Air., Disp: , Rfl:     allopurinol (ZYLOPRIM) 300 MG tablet, Take 0.5 tablets by mouth Every Night. Pt also takes 300mg qd, Disp: , Rfl:     aspirin 81 MG EC tablet, Take 1 tablet by mouth Daily., Disp: , Rfl:     atorvastatin (LIPITOR) 20 MG tablet, Take 1 tablet by mouth Every Night., Disp: 90 tablet, Rfl: 1    carvedilol (COREG) 25 MG tablet, Take 1 tablet by mouth 2 (Two) Times a Day With Meals., Disp: 180 tablet, Rfl: 1    cloNIDine (CATAPRES) 0.2 MG tablet, Take 1 tablet by mouth Every Night., Disp: , Rfl:     clopidogrel (Plavix) 75 MG tablet, Take 1 tablet by mouth Daily for 90 days., Disp: 30 tablet, Rfl: 2    cyclobenzaprine (FLEXERIL) 10 MG tablet, Take 1 tablet by mouth 3 (Three) Times a Day As Needed for Muscle Spasms., Disp: 90 tablet, Rfl: 1    Diclofenac Sodium (VOLTAREN) 1 % gel gel, Apply 4 g topically to the appropriate area as directed 4 (Four) Times a Day As Needed (joint pain)., Disp: 350 g, Rfl: 1    diphenhydrAMINE (BENADRYL) 25 mg capsule, Take 1 capsule by mouth Every 6 (Six) Hours As Needed for Itching or Allergies., Disp: , Rfl:     losartan (COZAAR) 100 MG tablet, Take 0.5 tablets by mouth 2 (Two) Times a Day., Disp: , Rfl:     Multiple  "Vitamins-Minerals (ZINC PO), Take 1 tablet by mouth Daily., Disp: , Rfl:     polyethylene glycol (MIRALAX) 17 g packet, Take 17 g by mouth Daily., Disp: 100 each, Rfl: 3    sodium bicarbonate 650 MG tablet, Take 1 tablet by mouth 2 (Two) Times a Day., Disp: , Rfl:     vitamin B-12 (CYANOCOBALAMIN) 1000 MCG tablet, Take 1 tablet by mouth Daily., Disp: , Rfl:     Estrogens Conjugated (Premarin) 0.625 MG/GM vaginal cream, 1 GM QHS X2 WEEKS THEN 2X PER WEEK, Disp: 30 g, Rfl: 2    levoFLOXacin (LEVAQUIN) 500 MG tablet, Take 1 tablet by mouth Daily., Disp: 7 tablet, Rfl: 0    Allergies:   Allergies   Allergen Reactions    Amlodipine Shortness Of Breath    Clindamycin/Lincomycin Swelling     FACIAL SWELLING      Contrast Dye (Echo Or Unknown Ct/Mr) Palpitations     \"BLOOD PRESSURE GOES GISSELLE HIGH\", \"HEART RACES\"    Diltiazem Hcl Anxiety    Zofran [Ondansetron] Confusion    Augmentin [Amoxicillin-Pot Clavulanate] Nausea And Vomiting     Beta lactam allergy details  Antibiotic reaction: nausea  Age at reaction: adult  Dose to reaction time: days  Reason for antibiotic: unknown  Epinephrine required for reaction?: unknown  Tolerated antibiotics: unknown       Doxycycline Nausea And Vomiting         Objective     Physical Exam:  Vital Signs:   Vitals:    11/26/24 1444   BP: 126/79   Pulse: 92   Temp: 99 °F (37.2 °C)   SpO2: 97%   Weight: 102 kg (225 lb)   Height: 157.5 cm (62.01\")     Body mass index is 41.14 kg/m².     Physical Exam  Cardiovascular:      Rate and Rhythm: Normal rate and regular rhythm.      Heart sounds: Normal heart sounds. No murmur heard.  Pulmonary:      Effort: Pulmonary effort is normal.      Breath sounds: Normal breath sounds.   Abdominal:      General: Bowel sounds are normal.      Palpations: Abdomen is soft.      Tenderness: There is no right CVA tenderness or left CVA tenderness.   Musculoskeletal:      Right lower leg: No edema.      Left lower leg: No edema.   Skin:     General: Skin is warm and " dry.   Neurological:      Mental Status: She is alert.   Psychiatric:         Mood and Affect: Mood normal.         Behavior: Behavior normal.             Assessment / Plan      Assessment/Plan:   Diagnoses and all orders for this visit:    1. Dysuria (Primary)  -     POC Urinalysis Dipstick, Automated  -     Urine Culture - Urine, Urine, Clean Catch; Future  -     Urine Culture - Urine, Urine, Clean Catch    2. Recurrent UTI    Other orders  -     Estrogens Conjugated (Premarin) 0.625 MG/GM vaginal cream; 1 GM QHS X2 WEEKS THEN 2X PER WEEK  Dispense: 30 g; Refill: 2  -     levoFLOXacin (LEVAQUIN) 500 MG tablet; Take 1 tablet by mouth Daily.  Dispense: 7 tablet; Refill: 0           Dysuria UA in office positive for nitrates will send for urine culture review of most recent urine culture results with recurrent urinary tract infections will start Premarin vaginal cream start Levaquin recommend increase fluids avoid holding urine's avoid irritants such as caffeine and artificial sweeteners will call with urine culture results and further recommendations    Follow Up:   Return if symptoms worsen or fail to improve.    MARICRUZ Ball      Please note that portions of this note were completed with a voice recognition program.

## 2024-11-26 ENCOUNTER — OFFICE VISIT (OUTPATIENT)
Dept: FAMILY MEDICINE CLINIC | Facility: CLINIC | Age: 54
End: 2024-11-26
Payer: MEDICARE

## 2024-11-26 VITALS
HEIGHT: 62 IN | SYSTOLIC BLOOD PRESSURE: 126 MMHG | DIASTOLIC BLOOD PRESSURE: 79 MMHG | HEART RATE: 92 BPM | OXYGEN SATURATION: 97 % | TEMPERATURE: 99 F | WEIGHT: 225 LBS | BODY MASS INDEX: 41.41 KG/M2

## 2024-11-26 DIAGNOSIS — R30.0 DYSURIA: Primary | ICD-10-CM

## 2024-11-26 DIAGNOSIS — N39.0 RECURRENT UTI: ICD-10-CM

## 2024-11-26 LAB
BILIRUB BLD-MCNC: NEGATIVE MG/DL
CLARITY, POC: CLEAR
COLOR UR: YELLOW
EXPIRATION DATE: ABNORMAL
GLUCOSE UR STRIP-MCNC: NEGATIVE MG/DL
KETONES UR QL: NEGATIVE
LEUKOCYTE EST, POC: ABNORMAL
Lab: ABNORMAL
NITRITE UR-MCNC: POSITIVE MG/ML
PH UR: 5.5 [PH] (ref 5–8)
PROT UR STRIP-MCNC: ABNORMAL MG/DL
RBC # UR STRIP: ABNORMAL /UL
SP GR UR: 1.01 (ref 1–1.03)
UROBILINOGEN UR QL: NORMAL

## 2024-11-26 PROCEDURE — 1126F AMNT PAIN NOTED NONE PRSNT: CPT | Performed by: NURSE PRACTITIONER

## 2024-11-26 PROCEDURE — 87077 CULTURE AEROBIC IDENTIFY: CPT | Performed by: NURSE PRACTITIONER

## 2024-11-26 PROCEDURE — 99213 OFFICE O/P EST LOW 20 MIN: CPT | Performed by: NURSE PRACTITIONER

## 2024-11-26 PROCEDURE — 3078F DIAST BP <80 MM HG: CPT | Performed by: NURSE PRACTITIONER

## 2024-11-26 PROCEDURE — 87186 SC STD MICRODIL/AGAR DIL: CPT | Performed by: NURSE PRACTITIONER

## 2024-11-26 PROCEDURE — 87086 URINE CULTURE/COLONY COUNT: CPT | Performed by: NURSE PRACTITIONER

## 2024-11-26 PROCEDURE — 3074F SYST BP LT 130 MM HG: CPT | Performed by: NURSE PRACTITIONER

## 2024-11-26 PROCEDURE — 81003 URINALYSIS AUTO W/O SCOPE: CPT | Performed by: NURSE PRACTITIONER

## 2024-11-26 RX ORDER — CONJUGATED ESTROGENS 0.62 MG/G
CREAM VAGINAL
Qty: 30 G | Refills: 2 | Status: SHIPPED | OUTPATIENT
Start: 2024-11-26

## 2024-11-26 RX ORDER — LEVOFLOXACIN 500 MG/1
500 TABLET, FILM COATED ORAL DAILY
Qty: 7 TABLET | Refills: 0 | Status: SHIPPED | OUTPATIENT
Start: 2024-11-26

## 2024-11-28 LAB — BACTERIA SPEC AEROBE CULT: ABNORMAL

## 2024-12-08 DIAGNOSIS — I10 PRIMARY HYPERTENSION: Primary | ICD-10-CM

## 2024-12-09 RX ORDER — LOSARTAN POTASSIUM 100 MG/1
100 TABLET ORAL DAILY
Qty: 90 TABLET | Refills: 0 | Status: SHIPPED | OUTPATIENT
Start: 2024-12-09

## 2024-12-19 ENCOUNTER — PATIENT OUTREACH (OUTPATIENT)
Dept: CASE MANAGEMENT | Facility: OTHER | Age: 54
End: 2024-12-19
Payer: MEDICARE

## 2024-12-19 ENCOUNTER — TELEPHONE (OUTPATIENT)
Dept: CASE MANAGEMENT | Facility: OTHER | Age: 54
End: 2024-12-19
Payer: MEDICARE

## 2024-12-19 DIAGNOSIS — I10 PRIMARY HYPERTENSION: ICD-10-CM

## 2024-12-19 DIAGNOSIS — N18.30 STAGE 3 CHRONIC KIDNEY DISEASE, UNSPECIFIED WHETHER STAGE 3A OR 3B CKD: ICD-10-CM

## 2024-12-19 DIAGNOSIS — G45.9 TIA (TRANSIENT ISCHEMIC ATTACK): Primary | ICD-10-CM

## 2024-12-19 NOTE — OUTREACH NOTE
Patient Outreach    Katarzyna is keeping all scheduled appointments. Blood pressure at PCP visit 126/79. Had Neurology follow up on 11/21/24. Plavix was discontinued and instructed to continue taking ASA 81 mg daily. Labs up to date. No needs voiced.     Noted that Neurology had placed referral to Sleep Clinic. Pt declined.  Diabetic Management referral placed with following respond from their office:    After having this reviewed by one of the providers we wouldn't be able to schedule. It looks like the A1C actually dropped from a 6 to a 5.2 in a matter of a few months. The A1C is lower than what we would see for. We could schedule her with our dietitian for prediabetes but not with the provider. Thanks.    No needs identified. She will be discharged from [facility].         Names and Relationships of Patient/Support Persons: Contact: Katarzyna Norris; Relationship: Self -         Education Documentation  No documentation found.        Angela NATION  Ambulatory Case Management    12/19/2024, 14:30 EST

## 2024-12-23 PROCEDURE — 87086 URINE CULTURE/COLONY COUNT: CPT | Performed by: PHYSICIAN ASSISTANT

## 2024-12-26 ENCOUNTER — TELEPHONE (OUTPATIENT)
Dept: NEUROLOGY | Facility: CLINIC | Age: 54
End: 2024-12-26
Payer: MEDICARE

## 2024-12-26 NOTE — TELEPHONE ENCOUNTER
"Dr. Gaspar placed a referral for diabetes management which has been returned stating \"After having this reviewed by one of the providers we wouldn't be able to schedule. It looks like the A1C actually dropped from a 6 to a 5.2 in a matter of a few months. The A1C is lower than what we would see for. We could schedule her with our dietitian for prediabetes but not with the provider. Thanks.\"   "

## 2024-12-27 ENCOUNTER — LAB (OUTPATIENT)
Facility: HOSPITAL | Age: 54
End: 2024-12-27
Payer: MEDICARE

## 2024-12-27 ENCOUNTER — TRANSCRIBE ORDERS (OUTPATIENT)
Dept: ADMINISTRATIVE | Facility: HOSPITAL | Age: 54
End: 2024-12-27
Payer: MEDICARE

## 2024-12-27 DIAGNOSIS — I10 HYPERTENSION, UNSPECIFIED TYPE: ICD-10-CM

## 2024-12-27 DIAGNOSIS — E87.20 ACIDOSIS: ICD-10-CM

## 2024-12-27 DIAGNOSIS — E78.2 MIXED HYPERLIPIDEMIA: ICD-10-CM

## 2024-12-27 DIAGNOSIS — N18.5 CHRONIC KIDNEY DISEASE, STAGE V: ICD-10-CM

## 2024-12-27 DIAGNOSIS — D64.9 ANEMIA, UNSPECIFIED TYPE: ICD-10-CM

## 2024-12-27 DIAGNOSIS — Z13.29 SCREENING FOR THYROID DISORDER: ICD-10-CM

## 2024-12-27 DIAGNOSIS — E87.20 ACIDOSIS, UNSPECIFIED: ICD-10-CM

## 2024-12-27 DIAGNOSIS — N39.0 URINARY TRACT INFECTION WITH HEMATURIA, SITE UNSPECIFIED: ICD-10-CM

## 2024-12-27 DIAGNOSIS — N18.5 CHRONIC KIDNEY DISEASE, STAGE V: Primary | ICD-10-CM

## 2024-12-27 DIAGNOSIS — I10 PRIMARY HYPERTENSION: ICD-10-CM

## 2024-12-27 DIAGNOSIS — R79.89 ELEVATED VITAMIN B12 LEVEL: ICD-10-CM

## 2024-12-27 DIAGNOSIS — E83.39 OTHER DISORDERS OF PHOSPHORUS METABOLISM: ICD-10-CM

## 2024-12-27 DIAGNOSIS — M10.9 GOUT, UNSPECIFIED CAUSE, UNSPECIFIED CHRONICITY, UNSPECIFIED SITE: ICD-10-CM

## 2024-12-27 DIAGNOSIS — E79.0 HYPERURICEMIA: ICD-10-CM

## 2024-12-27 DIAGNOSIS — R31.9 URINARY TRACT INFECTION WITH HEMATURIA, SITE UNSPECIFIED: ICD-10-CM

## 2024-12-27 DIAGNOSIS — E83.52 HYPERCALCEMIA: ICD-10-CM

## 2024-12-27 DIAGNOSIS — R73.01 IMPAIRED FASTING GLUCOSE: ICD-10-CM

## 2024-12-27 DIAGNOSIS — E55.9 VITAMIN D DEFICIENCY: ICD-10-CM

## 2024-12-27 DIAGNOSIS — R60.0 LOCALIZED EDEMA: ICD-10-CM

## 2024-12-27 DIAGNOSIS — E66.01 MORBID (SEVERE) OBESITY DUE TO EXCESS CALORIES: ICD-10-CM

## 2024-12-27 DIAGNOSIS — D63.1 ANEMIA IN CHRONIC KIDNEY DISEASE (CODE): ICD-10-CM

## 2024-12-27 LAB
25(OH)D3 SERPL-MCNC: 45.2 NG/ML (ref 30–100)
ALBUMIN SERPL-MCNC: 3.6 G/DL (ref 3.5–5.2)
ALBUMIN/GLOB SERPL: 1.2 G/DL
ALP SERPL-CCNC: 122 U/L (ref 39–117)
ALT SERPL W P-5'-P-CCNC: 19 U/L (ref 1–33)
ANION GAP SERPL CALCULATED.3IONS-SCNC: 13.6 MMOL/L (ref 5–15)
AST SERPL-CCNC: 19 U/L (ref 1–32)
BASOPHILS # BLD AUTO: 0.01 10*3/MM3 (ref 0–0.2)
BASOPHILS NFR BLD AUTO: 0.2 % (ref 0–1.5)
BILIRUB SERPL-MCNC: 0.3 MG/DL (ref 0–1.2)
BUN SERPL-MCNC: 63 MG/DL (ref 6–20)
BUN/CREAT SERPL: 19.6 (ref 7–25)
CALCIUM SPEC-SCNC: 9.7 MG/DL (ref 8.6–10.5)
CHLORIDE SERPL-SCNC: 106 MMOL/L (ref 98–107)
CHOLEST SERPL-MCNC: 88 MG/DL (ref 0–200)
CO2 SERPL-SCNC: 17.4 MMOL/L (ref 22–29)
CREAT SERPL-MCNC: 3.22 MG/DL (ref 0.57–1)
DEPRECATED RDW RBC AUTO: 47 FL (ref 37–54)
EGFRCR SERPLBLD CKD-EPI 2021: 16.3 ML/MIN/1.73
EOSINOPHIL # BLD AUTO: 0.11 10*3/MM3 (ref 0–0.4)
EOSINOPHIL NFR BLD AUTO: 2.1 % (ref 0.3–6.2)
ERYTHROCYTE [DISTWIDTH] IN BLOOD BY AUTOMATED COUNT: 13.9 % (ref 12.3–15.4)
FERRITIN SERPL-MCNC: 434 NG/ML (ref 13–150)
GLOBULIN UR ELPH-MCNC: 2.9 GM/DL
GLUCOSE SERPL-MCNC: 103 MG/DL (ref 65–99)
HBA1C MFR BLD: 5.6 % (ref 4.8–5.6)
HCT VFR BLD AUTO: 30.1 % (ref 34–46.6)
HDLC SERPL-MCNC: 31 MG/DL (ref 40–60)
HGB BLD-MCNC: 10.2 G/DL (ref 12–15.9)
IMM GRANULOCYTES # BLD AUTO: 0.02 10*3/MM3 (ref 0–0.05)
IMM GRANULOCYTES NFR BLD AUTO: 0.4 % (ref 0–0.5)
IRON 24H UR-MRATE: 54 MCG/DL (ref 37–145)
IRON SATN MFR SERPL: 22 % (ref 20–50)
LDLC SERPL CALC-MCNC: 39 MG/DL (ref 0–100)
LDLC/HDLC SERPL: 1.27 {RATIO}
LYMPHOCYTES # BLD AUTO: 1.59 10*3/MM3 (ref 0.7–3.1)
LYMPHOCYTES NFR BLD AUTO: 30.9 % (ref 19.6–45.3)
MCH RBC QN AUTO: 32.1 PG (ref 26.6–33)
MCHC RBC AUTO-ENTMCNC: 33.9 G/DL (ref 31.5–35.7)
MCV RBC AUTO: 94.7 FL (ref 79–97)
MONOCYTES # BLD AUTO: 0.31 10*3/MM3 (ref 0.1–0.9)
MONOCYTES NFR BLD AUTO: 6 % (ref 5–12)
NEUTROPHILS NFR BLD AUTO: 3.1 10*3/MM3 (ref 1.7–7)
NEUTROPHILS NFR BLD AUTO: 60.4 % (ref 42.7–76)
NRBC BLD AUTO-RTO: 0 /100 WBC (ref 0–0.2)
PHOSPHATE SERPL-MCNC: 4.2 MG/DL (ref 2.5–4.5)
PLATELET # BLD AUTO: 112 10*3/MM3 (ref 140–450)
PMV BLD AUTO: 9.9 FL (ref 6–12)
POTASSIUM SERPL-SCNC: 5.3 MMOL/L (ref 3.5–5.2)
PROT SERPL-MCNC: 6.5 G/DL (ref 6–8.5)
PTH-INTACT SERPL-MCNC: 30.4 PG/ML (ref 15–65)
RBC # BLD AUTO: 3.18 10*6/MM3 (ref 3.77–5.28)
SODIUM SERPL-SCNC: 137 MMOL/L (ref 136–145)
TIBC SERPL-MCNC: 244 MCG/DL (ref 298–536)
TRANSFERRIN SERPL-MCNC: 164 MG/DL (ref 200–360)
TRIGL SERPL-MCNC: 88 MG/DL (ref 0–150)
TSH SERPL DL<=0.05 MIU/L-ACNC: 1.6 UIU/ML (ref 0.27–4.2)
VIT B12 BLD-MCNC: >2000 PG/ML (ref 211–946)
VLDLC SERPL-MCNC: 18 MG/DL (ref 5–40)
WBC NRBC COR # BLD AUTO: 5.14 10*3/MM3 (ref 3.4–10.8)

## 2024-12-27 PROCEDURE — 84100 ASSAY OF PHOSPHORUS: CPT

## 2024-12-27 PROCEDURE — 82607 VITAMIN B-12: CPT

## 2024-12-27 PROCEDURE — 83036 HEMOGLOBIN GLYCOSYLATED A1C: CPT

## 2024-12-27 PROCEDURE — 83970 ASSAY OF PARATHORMONE: CPT

## 2024-12-27 PROCEDURE — 84443 ASSAY THYROID STIM HORMONE: CPT

## 2024-12-27 PROCEDURE — 36415 COLL VENOUS BLD VENIPUNCTURE: CPT

## 2024-12-27 PROCEDURE — 85025 COMPLETE CBC W/AUTO DIFF WBC: CPT

## 2024-12-27 PROCEDURE — 82652 VIT D 1 25-DIHYDROXY: CPT

## 2024-12-27 PROCEDURE — 82306 VITAMIN D 25 HYDROXY: CPT

## 2024-12-27 PROCEDURE — 80053 COMPREHEN METABOLIC PANEL: CPT

## 2024-12-27 PROCEDURE — 80061 LIPID PANEL: CPT

## 2024-12-27 PROCEDURE — 82784 ASSAY IGA/IGD/IGG/IGM EACH: CPT

## 2024-12-27 PROCEDURE — 84466 ASSAY OF TRANSFERRIN: CPT

## 2024-12-27 PROCEDURE — 84165 PROTEIN E-PHORESIS SERUM: CPT

## 2024-12-27 PROCEDURE — 82728 ASSAY OF FERRITIN: CPT

## 2024-12-27 PROCEDURE — 83540 ASSAY OF IRON: CPT

## 2024-12-27 PROCEDURE — 86334 IMMUNOFIX E-PHORESIS SERUM: CPT

## 2024-12-31 LAB
1,25(OH)2D SERPL-MCNC: 21.6 PG/ML (ref 24.8–81.5)
ALBUMIN SERPL ELPH-MCNC: 3.4 G/DL (ref 2.9–4.4)
ALBUMIN/GLOB SERPL: 1.4 {RATIO} (ref 0.7–1.7)
ALPHA1 GLOB SERPL ELPH-MCNC: 0.3 G/DL (ref 0–0.4)
ALPHA2 GLOB SERPL ELPH-MCNC: 0.9 G/DL (ref 0.4–1)
B-GLOBULIN SERPL ELPH-MCNC: 0.7 G/DL (ref 0.7–1.3)
GAMMA GLOB SERPL ELPH-MCNC: 0.7 G/DL (ref 0.4–1.8)
GLOBULIN SER-MCNC: 2.5 G/DL (ref 2.2–3.9)
IGA SERPL-MCNC: 59 MG/DL (ref 87–352)
IGG SERPL-MCNC: 736 MG/DL (ref 586–1602)
IGM SERPL-MCNC: 20 MG/DL (ref 26–217)
INTERPRETATION SERPL IEP-IMP: ABNORMAL
LABORATORY COMMENT REPORT: ABNORMAL
M PROTEIN SERPL ELPH-MCNC: ABNORMAL G/DL
PROT SERPL-MCNC: 5.9 G/DL (ref 6–8.5)

## 2025-01-02 DIAGNOSIS — E87.5 SERUM POTASSIUM ELEVATED: Primary | ICD-10-CM

## 2025-01-07 RX ORDER — CLONIDINE HYDROCHLORIDE 0.2 MG/1
0.2 TABLET ORAL 3 TIMES DAILY
Qty: 270 TABLET | Refills: 0 | Status: SHIPPED | OUTPATIENT
Start: 2025-01-07

## 2025-01-15 ENCOUNTER — OFFICE VISIT (OUTPATIENT)
Dept: VASCULAR SURGERY | Facility: HOSPITAL | Age: 55
End: 2025-01-15
Payer: MEDICARE

## 2025-01-15 VITALS
HEART RATE: 87 BPM | OXYGEN SATURATION: 92 % | WEIGHT: 222 LBS | HEIGHT: 62 IN | TEMPERATURE: 97.5 F | DIASTOLIC BLOOD PRESSURE: 85 MMHG | SYSTOLIC BLOOD PRESSURE: 135 MMHG | BODY MASS INDEX: 40.85 KG/M2 | RESPIRATION RATE: 18 BRPM

## 2025-01-15 DIAGNOSIS — N18.4 CKD (CHRONIC KIDNEY DISEASE) STAGE 4, GFR 15-29 ML/MIN: Primary | ICD-10-CM

## 2025-01-15 NOTE — PROGRESS NOTES
[facility]   Follow up Office    Patient Name: Katarzyna Norris  : 1970  MRN: [id number]  Primary Care Physician:  Brian Ellis APRN      Subjective   Subjective     HPI:    Katarzyna Norris is a 54 y.o. female here for follow-up after creation of her left brachiocephalic arteriovenous fistula 2024.  Doing well.  No new complaints.  She has lost a significant amount of weight in preparation for possible renal transplant.  She is getting a kidney from her sister.  No new complaints.      Objective     Vitals:   Temp:  [97.5 °F (36.4 °C)] 97.5 °F (36.4 °C)  Heart Rate:  [87] 87  Resp:  [18] 18  BP: (135)/(85) 135/85    Physical Exam      General: Alert, no acute distress.  HEENT: PERRLA  Abdomen: Benign  Extremities: Symmetric.  Left arm fistula: Satisfactory caliber and relatively easily palpable to about the mid arm.  Neuro: No gross deficits    Assessment & Plan   Assessment / Plan     Diagnoses and all orders for this visit:    1. CKD (chronic kidney disease) stage 4, GFR 15-29 ml/min (Primary)       Assessment/Plan:   Mrs. Norris's left arm fistula appears to be well-developed.  She may not need a transposition at all and given that she may receive a kidney I will continue to hold off on any further intervention.  Follow-up for a recheck in 6 months.        Electronically signed by Yaniv Dominguez MD, 01/15/25, 1:59 PM EST.

## 2025-01-27 ENCOUNTER — TELEPHONE (OUTPATIENT)
Dept: FAMILY MEDICINE CLINIC | Facility: CLINIC | Age: 55
End: 2025-01-27

## 2025-01-27 RX ORDER — ATORVASTATIN CALCIUM 20 MG/1
20 TABLET, FILM COATED ORAL NIGHTLY
Qty: 90 TABLET | Refills: 1 | Status: SHIPPED | OUTPATIENT
Start: 2025-01-27

## 2025-01-27 NOTE — TELEPHONE ENCOUNTER
Caller: Katarzyna Norris    Relationship: Self    Best call back number:     [phone]      Requested Prescriptions:   Requested Prescriptions     Pending Prescriptions Disp Refills    atorvastatin (LIPITOR) 20 MG tablet 90 tablet 1     Sig: Take 1 tablet by mouth Every Night.        Pharmacy where request should be sent: [facility] [address] [place] [address] - [phone] [facility] [phone]     Last office visit with prescribing clinician: 11/26/2024   Last telemedicine visit with prescribing clinician: Visit date not found     Does the patient have less than a 3 day supply:  [] Yes  [x] No    Would you like a call back once the refill request has been completed: [] Yes [x] No    If the office needs to give you a call back, can they leave a voicemail: [] Yes [x] No    Ngozi Hudson Rep   01/27/25 09:22 EST

## 2025-01-27 NOTE — TELEPHONE ENCOUNTER
Caller: Katarzyna Norris    Relationship: Self    Best call back number:     [phone]        What orders are you requesting (i.e. lab or imaging): MAMMOGRAM    In what timeframe would the patient need to come in: ANNUAL MAMMOGRAM DUE    Where will you receive your lab/imaging services: [facility]     Additional notes: PLEASE CONTACT PATIENT ONCE ORDERED AND READY TO BE SCHEDULED.

## 2025-02-04 ENCOUNTER — TELEPHONE (OUTPATIENT)
Dept: NEUROLOGY | Facility: CLINIC | Age: 55
End: 2025-02-04
Payer: MEDICARE

## 2025-02-14 ENCOUNTER — LAB (OUTPATIENT)
Facility: HOSPITAL | Age: 55
End: 2025-02-14
Payer: MEDICARE

## 2025-02-14 ENCOUNTER — TRANSCRIBE ORDERS (OUTPATIENT)
Dept: ADMINISTRATIVE | Facility: HOSPITAL | Age: 55
End: 2025-02-14
Payer: MEDICARE

## 2025-02-14 DIAGNOSIS — D63.1 ANEMIA IN END-STAGE RENAL DISEASE: ICD-10-CM

## 2025-02-14 DIAGNOSIS — R60.0 LOCALIZED EDEMA: ICD-10-CM

## 2025-02-14 DIAGNOSIS — N18.5 CHRONIC KIDNEY DISEASE, STAGE V: ICD-10-CM

## 2025-02-14 DIAGNOSIS — E87.20 ACIDOSIS: ICD-10-CM

## 2025-02-14 DIAGNOSIS — N18.5 CHRONIC KIDNEY DISEASE, STAGE V: Primary | ICD-10-CM

## 2025-02-14 DIAGNOSIS — E66.01 MORBID OBESITY: ICD-10-CM

## 2025-02-14 DIAGNOSIS — E83.39 HYPOPHOSPHATURIA: ICD-10-CM

## 2025-02-14 DIAGNOSIS — I10 ESSENTIAL HYPERTENSION, MALIGNANT: ICD-10-CM

## 2025-02-14 DIAGNOSIS — E55.9 VITAMIN D DEFICIENCY: ICD-10-CM

## 2025-02-14 DIAGNOSIS — N18.6 ANEMIA IN END-STAGE RENAL DISEASE: ICD-10-CM

## 2025-02-14 DIAGNOSIS — M10.9 GOUT, UNSPECIFIED CAUSE, UNSPECIFIED CHRONICITY, UNSPECIFIED SITE: ICD-10-CM

## 2025-02-14 DIAGNOSIS — E83.52 HYPERCALCEMIA: ICD-10-CM

## 2025-02-14 DIAGNOSIS — N39.0 URINARY TRACT INFECTION WITHOUT HEMATURIA, SITE UNSPECIFIED: ICD-10-CM

## 2025-02-14 LAB
ANION GAP SERPL CALCULATED.3IONS-SCNC: 12.7 MMOL/L (ref 5–15)
BUN SERPL-MCNC: 71 MG/DL (ref 6–20)
BUN/CREAT SERPL: 19.2 (ref 7–25)
CALCIUM SPEC-SCNC: 10.1 MG/DL (ref 8.6–10.5)
CHLORIDE SERPL-SCNC: 104 MMOL/L (ref 98–107)
CO2 SERPL-SCNC: 18.3 MMOL/L (ref 22–29)
CREAT SERPL-MCNC: 3.7 MG/DL (ref 0.57–1)
EGFRCR SERPLBLD CKD-EPI 2021: 13.8 ML/MIN/1.73
GLUCOSE SERPL-MCNC: 88 MG/DL (ref 65–99)
POTASSIUM SERPL-SCNC: 4.9 MMOL/L (ref 3.5–5.2)
SODIUM SERPL-SCNC: 135 MMOL/L (ref 136–145)

## 2025-02-14 PROCEDURE — 36415 COLL VENOUS BLD VENIPUNCTURE: CPT

## 2025-02-14 PROCEDURE — 80048 BASIC METABOLIC PNL TOTAL CA: CPT

## 2025-03-03 ENCOUNTER — OFFICE VISIT (OUTPATIENT)
Age: 55
End: 2025-03-03
Payer: MEDICARE

## 2025-03-03 VITALS
HEIGHT: 62 IN | SYSTOLIC BLOOD PRESSURE: 153 MMHG | TEMPERATURE: 97.9 F | OXYGEN SATURATION: 98 % | RESPIRATION RATE: 18 BRPM | WEIGHT: 222 LBS | DIASTOLIC BLOOD PRESSURE: 84 MMHG | BODY MASS INDEX: 40.85 KG/M2 | HEART RATE: 84 BPM

## 2025-03-03 DIAGNOSIS — Z98.890 S/P ARTERIOVENOUS (AV) FISTULA CREATION: ICD-10-CM

## 2025-03-03 DIAGNOSIS — I97.622 POSTPROCEDURAL SEROMA OF A CIRCULATORY SYSTEM ORGAN OR STRUCTURE FOLLOWING OTHER PROCEDURE: ICD-10-CM

## 2025-03-03 DIAGNOSIS — M79.602 PAIN OF LEFT UPPER EXTREMITY: ICD-10-CM

## 2025-03-03 DIAGNOSIS — N18.4 CKD (CHRONIC KIDNEY DISEASE) STAGE 4, GFR 15-29 ML/MIN: Primary | ICD-10-CM

## 2025-03-03 PROCEDURE — 1160F RVW MEDS BY RX/DR IN RCRD: CPT | Performed by: SURGERY

## 2025-03-03 PROCEDURE — 3077F SYST BP >= 140 MM HG: CPT | Performed by: SURGERY

## 2025-03-03 PROCEDURE — 1159F MED LIST DOCD IN RCRD: CPT | Performed by: SURGERY

## 2025-03-03 PROCEDURE — 99213 OFFICE O/P EST LOW 20 MIN: CPT | Performed by: SURGERY

## 2025-03-03 PROCEDURE — 3079F DIAST BP 80-89 MM HG: CPT | Performed by: SURGERY

## 2025-03-03 NOTE — PROGRESS NOTES
[facility]   Follow up Office    Patient Name: Katarzyna Norris  : 1970  MRN: [id number]  Primary Care Physician:  Brian Ellis APRN      Subjective   Subjective     HPI:    Katarzyna Norris is a 54 y.o. female who underwent creation of a left brachiocephalic arteriovenous fistula 2024.  Here for follow-up to check on her arm.  She says that she is having some numbness in the fingers of the left hand, usually the fourth and fifth fingers at times.  No pain.      Objective     Vitals:   Temp:  [97.9 °F (36.6 °C)] 97.9 °F (36.6 °C)  Heart Rate:  [84] 84  Resp:  [18] 18  BP: (153)/(84) 153/84    Physical Exam      General: Alert, no acute distress.  HEENT: PERRLA  Abdomen: Benign  Extremities: Symmetric.  Left arm fistula: Strong bruit and thrill.  The fistula appears to be close to a centimeter in diameter.  Neuro: No gross deficits    Assessment & Plan   Assessment / Plan     Diagnoses and all orders for this visit:    1. CKD (chronic kidney disease) stage 4, GFR 15-29 ml/min (Primary)  -     Duplex Hemodialysis Access CAR  -     Duplex Upper Extremity Art / Grafts - Left CAR; Future    2. S/P arteriovenous (AV) fistula creation  -     Duplex Hemodialysis Access CAR  -     Duplex Upper Extremity Art / Grafts - Left CAR; Future    3. Pain of left upper extremity    4. Postprocedural seroma of a circulatory system organ or structure following other procedure  -     Duplex Upper Extremity Art / Grafts - Left CAR; Future       Assessment/Plan:   Ms. Norris appears to be experiencing some steal syndrome.  This appears relatively mild at this time.  I am recommending for her to continue exercising her left hand.  We will obtain a duplex of the fistula as well as of the arterial structures of the left upper extremity.  She will follow-up with me after the above.        Electronically signed by Yaniv Dominguez MD, 25, 8:52 AM EST.

## 2025-03-19 NOTE — PROGRESS NOTES
[facility]  Cardiology progress Note    Patient Name: Katarzyna Norris  : 1970    CHIEF COMPLAINT  Hypertension        Subjective   Subjective     HISTORY OF PRESENT ILLNESS    Katarzyna Norris is a 54 y.o. female with history of hypertension.  No chest pain    REVIEW OF SYSTEMS    Constitutional:    No fever, no weight loss  Skin:     No rash  Otolaryngeal:    No difficulty swallowing  Cardiovascular: See HPI.  Pulmonary:    No cough, no sputum production    Personal History     Social History:    reports that she quit smoking about 12 years ago. Her smoking use included cigarettes. She has never been exposed to tobacco smoke. She has never used smokeless tobacco. She reports that she does not drink alcohol and does not use drugs.    Home Medications:  Current Outpatient Medications on File Prior to Visit   Medication Sig    acetaminophen (TYLENOL) 325 MG tablet Take 2 tablets by mouth Every 6 (Six) Hours As Needed for Mild Pain, Headache or Fever.    albuterol sulfate  (90 Base) MCG/ACT inhaler Inhale 2 puffs Every 4 (Four) Hours As Needed for Wheezing or Shortness of Air.    allopurinol (ZYLOPRIM) 300 MG tablet Take 0.5 tablets by mouth Every Night. Pt also takes 300mg qd    aspirin 81 MG EC tablet Take 1 tablet by mouth Daily.    atorvastatin (LIPITOR) 20 MG tablet Take 1 tablet by mouth Every Night.    carvedilol (COREG) 25 MG tablet Take 1 tablet by mouth 2 (Two) Times a Day With Meals.    cloNIDine (CATAPRES) 0.2 MG tablet TAKE 1 TABLET BY MOUTH THREE TIMES DAILY    cyclobenzaprine (FLEXERIL) 10 MG tablet Take 1 tablet by mouth 3 (Three) Times a Day As Needed for Muscle Spasms.    Diclofenac Sodium (VOLTAREN) 1 % gel gel Apply 4 g topically to the appropriate area as directed 4 (Four) Times a Day As Needed (joint pain).    diphenhydrAMINE (BENADRYL) 25 mg capsule Take 1 capsule by mouth Every 6 (Six) Hours As Needed for Itching or Allergies.    Estrogens Conjugated (Premarin)  "0.625 MG/GM vaginal cream 1 GM QHS X2 WEEKS THEN 2X PER WEEK    levoFLOXacin (LEVAQUIN) 500 MG tablet Take 1 tablet by mouth Daily.    losartan (COZAAR) 100 MG tablet Take 1 tablet by mouth once daily    Multiple Vitamins-Minerals (ZINC PO) Take 1 tablet by mouth Daily.    polyethylene glycol (MIRALAX) 17 g packet Take 17 g by mouth Daily.    sodium bicarbonate 650 MG tablet Take 1 tablet by mouth 2 (Two) Times a Day.    vitamin B-12 (CYANOCOBALAMIN) 1000 MCG tablet Take 1 tablet by mouth Daily.     No current facility-administered medications on file prior to visit.       Past Medical History:   Diagnosis Date    Anxiety     Arthritis     GILBERT KNEES    Asthma     SEASONAL ALLERGIES- NO INHALERS    CKD (chronic kidney disease)     Stage 5, Follows with [facility]- NO CURRENT DIALYSIS    Elevated cholesterol     Gastritis     GERD (gastroesophageal reflux disease)     Gout 02/23/2021    Hiatal hernia     Hiatal hernia     Hypertension     Lactose intolerance     Transplant, organ     ON KIDNEY TRANSPLANT LIST       Allergies:  Allergies   Allergen Reactions    Amlodipine Shortness Of Breath    Clindamycin/Lincomycin Swelling     FACIAL SWELLING      Contrast Dye (Echo Or Unknown Ct/Mr) Palpitations     \"BLOOD PRESSURE GOES GISSELLE HIGH\", \"HEART RACES\"    Diltiazem Hcl Anxiety    Zofran [Ondansetron] Confusion    Augmentin [Amoxicillin-Pot Clavulanate] Nausea And Vomiting     Beta lactam allergy details  Antibiotic reaction: nausea  Age at reaction: adult  Dose to reaction time: days  Reason for antibiotic: unknown  Epinephrine required for reaction?: unknown  Tolerated antibiotics: unknown       Doxycycline Nausea And Vomiting       Objective    Objective       Vitals:   Heart Rate:  [82-83] 82  BP: (132-148)/(73-74) 132/74  Body mass index is 42.25 kg/m².     PHYSICAL EXAM:    General Appearance:   well developed  well nourished  HENT:   oropharynx moist  lips not cyanotic  Neck:  thyroid not " enlarged  supple  Respiratory:  no respiratory distress  normal breath sounds  no rales  Cardiovascular:  no jugular venous distention  regular rhythm  apical impulse normal  S1 normal, S2 normal  no S3, no S4   no murmur  no rub, no thrill  carotid pulses normal; no bruit  pedal pulses normal  lower extremity edema: none    Skin:   warm, dry  Psychiatric:  judgement and insight appropriate  normal mood and affect        Result Review:  I have personally reviewed the available results from  [x]  Laboratory  [x]  EKG  [x]  Cardiology  [x]  Medications  [x]  Old records  []  Other:     Procedures  Lab Results   Component Value Date    CHOL 88 12/27/2024    CHOL 90 10/16/2024    CHOL 106 09/16/2024     Lab Results   Component Value Date    TRIG 88 12/27/2024    TRIG 105 10/16/2024    TRIG 151 (H) 09/16/2024     Lab Results   Component Value Date    HDL 31 (L) 12/27/2024    HDL 31 (L) 10/16/2024    HDL 32 (L) 09/16/2024     Lab Results   Component Value Date    LDL 39 12/27/2024    LDL 39 10/16/2024    LDL 48 09/16/2024     Lab Results   Component Value Date    VLDL 18 12/27/2024    VLDL 20 10/16/2024    VLDL 26 09/16/2024     Results for orders placed during the hospital encounter of 09/15/24    Adult Transthoracic Echo Complete W/ Cont if Necessary Per Protocol (With Agitated Saline)    Interpretation Summary    Left ventricular systolic function is normal. Left ventricular ejection fraction appears to be 61 - 65%.    Left ventricular wall thickness is consistent with mild concentric hypertrophy.    Left ventricular diastolic function is consistent with (grade I) impaired relaxation.    There are no hemodynamically significant valvular abnormalities.    Estimated right ventricular systolic pressure from tricuspid regurgitation is normal (<35 mmHg).     Impression/Plan:   1.  Essential hypertension controlled: Continue losartan 100 mg once a day.  Continue carvedilol 25 mg twice a day.  Continue clonidine 0.2 mg 3  times a day.  Monitor blood pressure regularly.  2.  Mixed hyperlipidemia: Continue Lipitor 20 mg once a day.  Monitor lipid and hepatic profile.              Bobo Aldrich MD   03/21/25   11:42 EDT

## 2025-03-21 ENCOUNTER — OFFICE VISIT (OUTPATIENT)
Dept: CARDIOLOGY | Facility: CLINIC | Age: 55
End: 2025-03-21
Payer: MEDICARE

## 2025-03-21 VITALS
DIASTOLIC BLOOD PRESSURE: 74 MMHG | SYSTOLIC BLOOD PRESSURE: 132 MMHG | BODY MASS INDEX: 42.51 KG/M2 | WEIGHT: 231 LBS | HEIGHT: 62 IN | HEART RATE: 82 BPM

## 2025-03-21 DIAGNOSIS — E78.2 HYPERLIPEMIA, MIXED: Primary | ICD-10-CM

## 2025-03-21 DIAGNOSIS — I10 HYPERTENSION, ESSENTIAL: ICD-10-CM

## 2025-03-21 DIAGNOSIS — I25.10 CORONARY ARTERY DISEASE INVOLVING NATIVE CORONARY ARTERY OF NATIVE HEART WITHOUT ANGINA PECTORIS: ICD-10-CM

## 2025-03-21 PROCEDURE — 1160F RVW MEDS BY RX/DR IN RCRD: CPT | Performed by: SPECIALIST

## 2025-03-21 PROCEDURE — 1159F MED LIST DOCD IN RCRD: CPT | Performed by: SPECIALIST

## 2025-03-21 PROCEDURE — 3078F DIAST BP <80 MM HG: CPT | Performed by: SPECIALIST

## 2025-03-21 PROCEDURE — 3075F SYST BP GE 130 - 139MM HG: CPT | Performed by: SPECIALIST

## 2025-03-21 PROCEDURE — 99214 OFFICE O/P EST MOD 30 MIN: CPT | Performed by: SPECIALIST

## 2025-04-10 ENCOUNTER — RESULTS FOLLOW-UP (OUTPATIENT)
Dept: CARDIOLOGY | Facility: CLINIC | Age: 55
End: 2025-04-10
Payer: MEDICARE

## 2025-04-10 ENCOUNTER — HOSPITAL ENCOUNTER (OUTPATIENT)
Facility: HOSPITAL | Age: 55
Discharge: HOME | End: 2025-04-10
Admitting: SPECIALIST
Payer: MEDICARE

## 2025-04-10 DIAGNOSIS — I25.10 CORONARY ARTERY DISEASE INVOLVING NATIVE CORONARY ARTERY OF NATIVE HEART WITHOUT ANGINA PECTORIS: ICD-10-CM

## 2025-04-10 LAB
BH CV IMMEDIATE POST RECOVERY TECH DATA SYMPTOMS: NORMAL
BH CV IMMEDIATE POST TECH DATA BLOOD PRESSURE: NORMAL MMHG
BH CV IMMEDIATE POST TECH DATA HEART RATE: 104 BPM
BH CV IMMEDIATE POST TECH DATA OXYGEN SATS: 94 %
BH CV REST NUCLEAR ISOTOPE DOSE: 9.4 MCI
BH CV SIX MINUTE RECOVERY TECH DATA BLOOD PRESSURE: NORMAL
BH CV SIX MINUTE RECOVERY TECH DATA HEART RATE: 95 BPM
BH CV SIX MINUTE RECOVERY TECH DATA OXYGEN SATURATION: 98 %
BH CV SIX MINUTE RECOVERY TECH DATA SYMPTOMS: NORMAL
BH CV STRESS BP STAGE 1: NORMAL
BH CV STRESS COMMENTS STAGE 1: NORMAL
BH CV STRESS DOSE REGADENOSON STAGE 1: 0.4
BH CV STRESS DURATION MIN STAGE 1: 0
BH CV STRESS DURATION SEC STAGE 1: 10
BH CV STRESS HR STAGE 1: 101
BH CV STRESS NUCLEAR ISOTOPE DOSE: 34.4 MCI
BH CV STRESS O2 STAGE 1: 99
BH CV STRESS PROTOCOL 1: NORMAL
BH CV STRESS RECOVERY BP: NORMAL MMHG
BH CV STRESS RECOVERY HR: 95 BPM
BH CV STRESS RECOVERY O2: 98 %
BH CV STRESS STAGE 1: 1
BH CV THREE MINUTE POST TECH DATA BLOOD PRESSURE: NORMAL MMHG
BH CV THREE MINUTE POST TECH DATA HEART RATE: 98 BPM
BH CV THREE MINUTE POST TECH DATA OXYGEN SATURATION: 98 %
MAXIMAL PREDICTED HEART RATE: 164 BPM
PERCENT MAX PREDICTED HR: 65.24 %
SPECT HRT GATED+EF W RNC IV: 72 %
STRESS BASELINE BP: NORMAL MMHG
STRESS BASELINE HR: 82 BPM
STRESS O2 SAT REST: 98 %
STRESS PERCENT HR: 77 %
STRESS POST O2 SAT PEAK: 98 %
STRESS POST PEAK BP: NORMAL MMHG
STRESS POST PEAK HR: 107 BPM
STRESS TARGET HR: 139 BPM

## 2025-04-10 PROCEDURE — 34310000005 TECHNETIUM TETROFOSMIN KIT: Performed by: SPECIALIST

## 2025-04-10 PROCEDURE — 78452 HT MUSCLE IMAGE SPECT MULT: CPT

## 2025-04-10 PROCEDURE — 93017 CV STRESS TEST TRACING ONLY: CPT

## 2025-04-10 PROCEDURE — A9502 TC99M TETROFOSMIN: HCPCS | Performed by: SPECIALIST

## 2025-04-10 PROCEDURE — 25010000002 REGADENOSON 0.4 MG/5ML SOLUTION: Performed by: SPECIALIST

## 2025-04-10 RX ORDER — REGADENOSON 0.08 MG/ML
0.4 INJECTION, SOLUTION INTRAVENOUS
Status: COMPLETED | OUTPATIENT
Start: 2025-04-10 | End: 2025-04-10

## 2025-04-10 RX ADMIN — REGADENOSON 0.4 MG: 0.08 INJECTION, SOLUTION INTRAVENOUS at 08:56

## 2025-04-10 RX ADMIN — TETROFOSMIN 1 DOSE: 1.38 INJECTION, POWDER, LYOPHILIZED, FOR SOLUTION INTRAVENOUS at 07:00

## 2025-04-10 RX ADMIN — TETROFOSMIN 1 DOSE: 1.38 INJECTION, POWDER, LYOPHILIZED, FOR SOLUTION INTRAVENOUS at 08:56

## 2025-04-14 NOTE — PROGRESS NOTES
Follow Up Office Visit      Patient Name: Katarzyna Norris  : 1970   MRN: [id number]     Chief Complaint:  CKD, HTN, anemia, IFG, hyperlipidemia, asthma, allergic rhinitis, migraine.      History of Present Illness: Katarzyna Norris is a 54 y.o. female who is here today to follow up for CKD, HTN, anemia, IFG, hyperlipidemia, asthma, allergic rhinitis, migraine.  Review labs      Mammogram-2024  Pap-2024  Labs- 2024  Colonoscopy-2009, cologuard 2021. Has active order for cologuard.    Patient states she had 4 migraines last month     Pt reports her sister plans to donate her kidney to her fu is scheduled in  with the transplant team  Surgery, [facility]  Provider  Healthcare  Source Organization         Also follow-up cardiology per progress note Bobo Aldrich MD   25  Adult Transthoracic Echo Complete W/ Cont if Necessary Per Protocol (With Agitated Saline)     Interpretation Summary    Left ventricular systolic function is normal. Left ventricular ejection fraction appears to be 61 - 65%.    Left ventricular wall thickness is consistent with mild concentric hypertrophy.    Left ventricular diastolic function is consistent with (grade I) impaired relaxation.    There are no hemodynamically significant valvular abnormalities.    Estimated right ventricular systolic pressure from tricuspid regurgitation is normal (<35 mmHg).     Impression/Plan:   1.  Essential hypertension controlled: Continue losartan 100 mg once a day.  Continue carvedilol 25 mg twice a day.  Continue clonidine 0.2 mg 3 times a day.  Monitor blood pressure regularly.  2.  Mixed hyperlipidemia: Continue Lipitor 20 mg once a day.  Monitor lipid and hepatic profile.      Erika Jimenez APRN to Naomi Turcios M  Stress test shows no sign of blockage in coronary vessels. Follow up as scheduled. Notify office of any new/worsening cardiac symptoms        Follow-up nephrology per  progress note 1/30/2025  Assessment and plan:  - CKD stage IV-V with bland UA and mild proteinuria secondary to recurrent pyelonephritis in the past, HTN, previous SUZANNE and NSAIDs use. 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 Renal function remains stable. No uremic symptoms. Attended TOPS and chose peritoneal dialysis when needed. AVF placed May 7, 2024. Good thrill/bruit. She is working with transplant as well. Seen by Dr. Martinez, she has multiple abdominal wall hernias that will need to be repaired possibly at the same time as PD catheter placement. Continue to avoid nephrotoxins such as NSAIDs.  - Hyperkalemia, mildly elevated today. on ARB, monitor. Discussed low potassium diet. Will recheck   - Hypertension. Blood pressure is controlled, no longer low. Monitor at home. Seeing cardiology.  - Hypovitaminosis D, not currently on treatment due to hypercalcemia. Monitor periodically.  - Hypercalcemia, recurrent. Etiology is uncertain. I asked her to avoid Tums, stop zinc and B12. vitamin D level /PTH normal and immunofixation with no monoclonality. Previously her PTH was suppressed. 24-hour urine calcium was low. This argues against primary hyperparathyroidism. Chest x-ray was unremarkable.  - Obesity: Weight loss is in progress.  - Hyperuricemia/gout, on allopurinol. Monitor uric acid level periodically.   --Metabolic acidosis, increase sodium bicarbonate 650mg to three times/day. Recheck next visit.  - Edema, controlled. May need to small dose loop diuretics soon.  - Anemia, with iron deficiency receiving oral iron, may need IV iron, monitor for CHUCK needs.  - Hyperphosphatemia, controlled, continue low phosphorus diet.         Subjective      Review of Systems:   Review of Systems   Constitutional:  Negative for fever.   HENT:  Negative for ear pain and sore throat.    Respiratory:  Negative for cough.    Cardiovascular:  Negative for chest pain.   Gastrointestinal:   Negative for abdominal pain.   Genitourinary:  Negative for dysuria.   Musculoskeletal:  Negative for myalgias.        Past Medical History:   Past Medical History:   Diagnosis Date    Anxiety     Arthritis     GILBERT KNEES    Asthma     SEASONAL ALLERGIES- NO INHALERS    CKD (chronic kidney disease)     Stage 5, Follows with [facility]- NO CURRENT DIALYSIS    Elevated cholesterol     Gastritis     GERD (gastroesophageal reflux disease)     Gout 2021    Hiatal hernia     Hiatal hernia     Hypertension     Lactose intolerance     Transplant, organ     ON KIDNEY TRANSPLANT LIST       Past Surgical History:   Past Surgical History:   Procedure Laterality Date    ARTERIOVENOUS FISTULA/SHUNT SURGERY Left 2024    Procedure: 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;  Surgeon: Yaniv Dominguez MD;  Location: [facility] MAIN OR;  Service: Vascular;  Laterality: Left;    BONY PELVIS SURGERY      Plate     SECTION   and     CHOLECYSTECTOMY      COLONOSCOPY      COLONOSCOPY      COLONOSCOPY      COLONOSCOPY N/A 2024    Procedure: COLONOSCOPY;  Surgeon: Aston Arroyo MD;  Location: [facility] ENDOSCOPY;  Service: Gastroenterology;  Laterality: N/A;  HEMORRHOIDS    ENDOSCOPY      ENDOSCOPY N/A 2023    Procedure: ESOPHAGOGASTRODUODENOSCOPY with biopsies;  Surgeon: Tam Badillo MD;  Location: [facility] ENDOSCOPY;  Service: General;  Laterality: N/A;  hiatal hernia    JOINT REPLACEMENT      TOTAL KNEE ARTHROPLASTY Left 2021    Procedure: TOTAL KNEE ARTHROPLASTY;  Surgeon: Marco Nelson MD;  Location: [facility] MAIN OR;  Service: Orthopedics;  Laterality: Left;    TOTAL KNEE ARTHROPLASTY Right 10/19/2022    Procedure: RIGHT TOTAL KNEE ARTHROPLASTY - NO SHELBY;  Surgeon: Marco Nelson MD;  Location: [facility] MAIN OR;  Service: Orthopedics;  Laterality: Right;    TUBAL ABDOMINAL LIGATION         Family History:   Family History   Problem Relation Age of Onset     Diabetes Mother     Throat cancer Father 73    Hypertension Sister     Hypertension Sister     Hypertension Sister     Diabetes Brother     Hypertension Brother     Stroke Other     Diabetes Other     Malig Hyperthermia Neg Hx     Colon cancer Neg Hx        Social History:   Social History     Socioeconomic History    Marital status:    Tobacco Use    Smoking status: Former     Current packs/day: 0.00     Average packs/day: 1 pack/day for 1 year (1.0 ttl pk-yrs)     Types: Cigarettes     Start date:      Quit date:      Years since quittin.3     Passive exposure: Never    Smokeless tobacco: Never   Vaping Use    Vaping status: Never Used   Substance and Sexual Activity    Alcohol use: Never    Drug use: Never    Sexual activity: Defer       Medications:     Current Outpatient Medications:     acetaminophen (TYLENOL) 325 MG tablet, Take 2 tablets by mouth Every 6 (Six) Hours As Needed for Mild Pain, Headache or Fever., Disp: , Rfl:     albuterol sulfate  (90 Base) MCG/ACT inhaler, Inhale 2 puffs Every 4 (Four) Hours As Needed for Wheezing or Shortness of Air., Disp: , Rfl:     allopurinol (ZYLOPRIM) 300 MG tablet, Take 0.5 tablets by mouth Every Night. Pt also takes 300mg qd, Disp: , Rfl:     aspirin 81 MG EC tablet, Take 1 tablet by mouth Daily., Disp: , Rfl:     atorvastatin (LIPITOR) 20 MG tablet, Take 1 tablet by mouth Every Night., Disp: 90 tablet, Rfl: 1    carvedilol (COREG) 25 MG tablet, Take 1 tablet by mouth 2 (Two) Times a Day With Meals., Disp: 180 tablet, Rfl: 1    cloNIDine (CATAPRES) 0.2 MG tablet, Take 1 tablet by mouth 3 (Three) Times a Day., Disp: 270 tablet, Rfl: 1    diphenhydrAMINE (BENADRYL) 25 mg capsule, Take 1 capsule by mouth Every 6 (Six) Hours As Needed for Itching or Allergies., Disp: , Rfl:     losartan (COZAAR) 100 MG tablet, Take 1 tablet by mouth Daily., Disp: 90 tablet, Rfl: 1    Multiple Vitamins-Minerals (ZINC PO), Take 1 tablet by mouth Daily., Disp:  ", Rfl:     polyethylene glycol (MIRALAX) 17 g packet, Take 17 g by mouth Daily., Disp: 100 each, Rfl: 3    sodium bicarbonate 650 MG tablet, Take 1 tablet by mouth 2 (Two) Times a Day., Disp: , Rfl:     vitamin B-12 (CYANOCOBALAMIN) 1000 MCG tablet, Take 1 tablet by mouth Daily., Disp: , Rfl:     Allergies:   Allergies   Allergen Reactions    Amlodipine Shortness Of Breath    Clindamycin/Lincomycin Swelling     FACIAL SWELLING      Contrast Dye (Echo Or Unknown Ct/Mr) Palpitations     \"BLOOD PRESSURE GOES GISSELLE HIGH\", \"HEART RACES\"    Diltiazem Hcl Anxiety    Zofran [Ondansetron] Confusion    Augmentin [Amoxicillin-Pot Clavulanate] Nausea And Vomiting     Beta lactam allergy details  Antibiotic reaction: nausea  Age at reaction: adult  Dose to reaction time: days  Reason for antibiotic: unknown  Epinephrine required for reaction?: unknown  Tolerated antibiotics: unknown       Doxycycline Nausea And Vomiting           Objective     Physical Exam:  Vital Signs:   Vitals:    04/22/25 1010 04/22/25 1034   BP: 144/82 127/69   Pulse: 89    Temp: 98.3 °F (36.8 °C)    SpO2: 100%    Weight: 105 kg (232 lb 4.8 oz)    Height: 157.5 cm (62\")    PainSc: 2       Body mass index is 42.49 kg/m².   Class 3 Severe Obesity (BMI >=40). Obesity-related health conditions include the following: hypertension and dyslipidemias. Obesity is unchanged. BMI is is above average; BMI management plan is completed. We discussed portion control and increasing exercise.       Physical Exam  HENT:      Right Ear: Tympanic membrane normal.      Left Ear: Tympanic membrane normal.      Nose: Nose normal.      Mouth/Throat:      Mouth: Mucous membranes are moist.   Eyes:      Conjunctiva/sclera: Conjunctivae normal.   Neck:      Thyroid: No thyroid tenderness.      Vascular: No carotid bruit.   Cardiovascular:      Rate and Rhythm: Normal rate and regular rhythm.      Heart sounds: Normal heart sounds. No murmur heard.  Pulmonary:      Effort: Pulmonary " effort is normal.      Breath sounds: Normal breath sounds.   Abdominal:      General: Bowel sounds are normal.      Palpations: Abdomen is soft.   Musculoskeletal:      Right lower leg: No edema.      Left lower leg: No edema.   Skin:     General: Skin is warm and dry.   Neurological:      Mental Status: She is alert.   Psychiatric:         Mood and Affect: Mood normal.         Behavior: Behavior normal.             Assessment / Plan      Assessment/Plan:   Diagnoses and all orders for this visit:    1. Primary hypertension (Primary)  -     CBC Auto Differential  -     Cancel: Comprehensive Metabolic Panel  -     losartan (COZAAR) 100 MG tablet; Take 1 tablet by mouth Daily.  Dispense: 90 tablet; Refill: 1    2. Mixed hyperlipidemia  -     Lipid Panel    3. Palpitation    4. CKD (chronic kidney disease) stage 5, GFR less than 15 ml/min  -     Comprehensive metabolic panel    5. Impaired fasting glucose  -     Hemoglobin A1c  -     Urinalysis With Culture If Indicated - Urine, Clean Catch    6. TIA (transient ischemic attack)    7. Gout, unspecified cause, unspecified chronicity, unspecified site  -     Uric Acid    8. Mild intermittent asthma without complication    9. Constipation, unspecified constipation type    10. Screening mammogram for breast cancer  -     Mammo Screening Digital Tomosynthesis Bilateral With CAD; Future    11. Vitamin D deficiency  -     Vitamin D,25-Hydroxy    12. Postmenopausal  -     DEXA Bone Density Axial; Future    13. Screening for thyroid disorder  -     TSH    Other orders  -     atorvastatin (LIPITOR) 20 MG tablet; Take 1 tablet by mouth Every Night.  Dispense: 90 tablet; Refill: 1  -     carvedilol (COREG) 25 MG tablet; Take 1 tablet by mouth 2 (Two) Times a Day With Meals.  Dispense: 180 tablet; Refill: 1  -     cloNIDine (CATAPRES) 0.2 MG tablet; Take 1 tablet by mouth 3 (Three) Times a Day.  Dispense: 270 tablet; Refill: 1  -     polyethylene glycol (MIRALAX) 17 g packet; Take  "17 g by mouth Daily.  Dispense: 100 each; Refill: 3       Hypertension currently controlled Coreg and clonidine losartan reviewed cardiology note with patient  Hyperlipidemia will obtain lipid panel and CMP monitor current statin dose Lipitor 20 mg at nighttime denies myalgias  Palpitations currently rate controlled with Coreg  Impaired fasting glucose will obtain hemoglobin A1c monitor  History of TIA currently on aspirin  Gout no recent flares patient reports a few months ago she had a flare after eating a large number of reasons  Mild intermittent asthma no wheezing or shortness of breath albuterol as needed  Constipation controlled with MiraLAX do recommend increasing water and fiber in diet exercise 30 minutes daily  Will provide order for screening mammogram denies lumps mass tenderness blood or discharge from nipple  Vitamin D deficiency will obtain labs to monitor  Postmenopausal obtain bone density to monitor for osteoporosis  Chronic kidney disease stage V reviewed nephrology note with patient follow-up with transplant team as scheduled in June at       Follow Up:   Return in about 6 months (around 10/22/2025).    MARICRUZ Ball    \"Please note that portions of this note were completed with a voice recognition program.\"     "

## 2025-04-22 ENCOUNTER — OFFICE VISIT (OUTPATIENT)
Dept: FAMILY MEDICINE CLINIC | Facility: CLINIC | Age: 55
End: 2025-04-22
Payer: MEDICARE

## 2025-04-22 VITALS
SYSTOLIC BLOOD PRESSURE: 127 MMHG | HEIGHT: 62 IN | TEMPERATURE: 98.3 F | OXYGEN SATURATION: 100 % | WEIGHT: 232.3 LBS | HEART RATE: 89 BPM | DIASTOLIC BLOOD PRESSURE: 69 MMHG | BODY MASS INDEX: 42.75 KG/M2

## 2025-04-22 DIAGNOSIS — J45.20 MILD INTERMITTENT ASTHMA WITHOUT COMPLICATION: ICD-10-CM

## 2025-04-22 DIAGNOSIS — Z12.31 SCREENING MAMMOGRAM FOR BREAST CANCER: ICD-10-CM

## 2025-04-22 DIAGNOSIS — Z78.0 POSTMENOPAUSAL: ICD-10-CM

## 2025-04-22 DIAGNOSIS — R00.2 PALPITATION: ICD-10-CM

## 2025-04-22 DIAGNOSIS — N18.5 CKD (CHRONIC KIDNEY DISEASE) STAGE 5, GFR LESS THAN 15 ML/MIN: ICD-10-CM

## 2025-04-22 DIAGNOSIS — K59.00 CONSTIPATION, UNSPECIFIED CONSTIPATION TYPE: ICD-10-CM

## 2025-04-22 DIAGNOSIS — M10.9 GOUT, UNSPECIFIED CAUSE, UNSPECIFIED CHRONICITY, UNSPECIFIED SITE: ICD-10-CM

## 2025-04-22 DIAGNOSIS — I10 PRIMARY HYPERTENSION: Primary | ICD-10-CM

## 2025-04-22 DIAGNOSIS — G45.9 TIA (TRANSIENT ISCHEMIC ATTACK): ICD-10-CM

## 2025-04-22 DIAGNOSIS — R73.01 IMPAIRED FASTING GLUCOSE: ICD-10-CM

## 2025-04-22 DIAGNOSIS — E78.2 MIXED HYPERLIPIDEMIA: ICD-10-CM

## 2025-04-22 DIAGNOSIS — E55.9 VITAMIN D DEFICIENCY: ICD-10-CM

## 2025-04-22 DIAGNOSIS — Z13.29 SCREENING FOR THYROID DISORDER: ICD-10-CM

## 2025-04-22 LAB
25(OH)D3 SERPL-MCNC: 50.5 NG/ML (ref 30–100)
ALBUMIN SERPL-MCNC: 4 G/DL (ref 3.5–5.2)
ALBUMIN/GLOB SERPL: 1.4 G/DL
ALP SERPL-CCNC: 121 U/L (ref 39–117)
ALT SERPL W P-5'-P-CCNC: 20 U/L (ref 1–33)
ANION GAP SERPL CALCULATED.3IONS-SCNC: 14.9 MMOL/L (ref 5–15)
AST SERPL-CCNC: 18 U/L (ref 1–32)
BACTERIA UR QL AUTO: ABNORMAL /HPF
BASOPHILS # BLD AUTO: 0.05 10*3/MM3 (ref 0–0.2)
BASOPHILS NFR BLD AUTO: 0.9 % (ref 0–1.5)
BILIRUB SERPL-MCNC: 0.5 MG/DL (ref 0–1.2)
BILIRUB UR QL STRIP: NEGATIVE
BUN SERPL-MCNC: 66 MG/DL (ref 6–20)
BUN/CREAT SERPL: 20 (ref 7–25)
CALCIUM SPEC-SCNC: 10.7 MG/DL (ref 8.6–10.5)
CHLORIDE SERPL-SCNC: 102 MMOL/L (ref 98–107)
CHOLEST SERPL-MCNC: 98 MG/DL (ref 0–200)
CLARITY UR: CLEAR
CO2 SERPL-SCNC: 19.1 MMOL/L (ref 22–29)
COLOR UR: YELLOW
CREAT SERPL-MCNC: 3.3 MG/DL (ref 0.57–1)
DEPRECATED RDW RBC AUTO: 49.3 FL (ref 37–54)
EGFRCR SERPLBLD CKD-EPI 2021: 15.8 ML/MIN/1.73
EOSINOPHIL # BLD AUTO: 0.26 10*3/MM3 (ref 0–0.4)
EOSINOPHIL NFR BLD AUTO: 4.9 % (ref 0.3–6.2)
ERYTHROCYTE [DISTWIDTH] IN BLOOD BY AUTOMATED COUNT: 14.3 % (ref 12.3–15.4)
GLOBULIN UR ELPH-MCNC: 2.8 GM/DL
GLUCOSE SERPL-MCNC: 95 MG/DL (ref 65–99)
GLUCOSE UR STRIP-MCNC: NEGATIVE MG/DL
HBA1C MFR BLD: 5.3 % (ref 4.8–5.6)
HCT VFR BLD AUTO: 30.8 % (ref 34–46.6)
HDLC SERPL-MCNC: 34 MG/DL (ref 40–60)
HGB BLD-MCNC: 10.5 G/DL (ref 12–15.9)
HGB UR QL STRIP.AUTO: NEGATIVE
HOLD SPECIMEN: NORMAL
HYALINE CASTS UR QL AUTO: ABNORMAL /LPF
IMM GRANULOCYTES # BLD AUTO: 0.02 10*3/MM3 (ref 0–0.05)
IMM GRANULOCYTES NFR BLD AUTO: 0.4 % (ref 0–0.5)
KETONES UR QL STRIP: NEGATIVE
LDLC SERPL CALC-MCNC: 50 MG/DL (ref 0–100)
LDLC/HDLC SERPL: 1.52 {RATIO}
LEUKOCYTE ESTERASE UR QL STRIP.AUTO: ABNORMAL
LYMPHOCYTES # BLD AUTO: 1.32 10*3/MM3 (ref 0.7–3.1)
LYMPHOCYTES NFR BLD AUTO: 25 % (ref 19.6–45.3)
MCH RBC QN AUTO: 32.5 PG (ref 26.6–33)
MCHC RBC AUTO-ENTMCNC: 34.1 G/DL (ref 31.5–35.7)
MCV RBC AUTO: 95.4 FL (ref 79–97)
MONOCYTES # BLD AUTO: 0.35 10*3/MM3 (ref 0.1–0.9)
MONOCYTES NFR BLD AUTO: 6.6 % (ref 5–12)
NEUTROPHILS NFR BLD AUTO: 3.29 10*3/MM3 (ref 1.7–7)
NEUTROPHILS NFR BLD AUTO: 62.2 % (ref 42.7–76)
NITRITE UR QL STRIP: NEGATIVE
NRBC BLD AUTO-RTO: 0 /100 WBC (ref 0–0.2)
PH UR STRIP.AUTO: 6.5 [PH] (ref 5–8)
PLATELET # BLD AUTO: 147 10*3/MM3 (ref 140–450)
PMV BLD AUTO: 9.1 FL (ref 6–12)
POTASSIUM SERPL-SCNC: 5.5 MMOL/L (ref 3.5–5.2)
PROT SERPL-MCNC: 6.8 G/DL (ref 6–8.5)
PROT UR QL STRIP: ABNORMAL
RBC # BLD AUTO: 3.23 10*6/MM3 (ref 3.77–5.28)
RBC # UR STRIP: ABNORMAL /HPF
REF LAB TEST METHOD: ABNORMAL
SODIUM SERPL-SCNC: 136 MMOL/L (ref 136–145)
SP GR UR STRIP: 1.01 (ref 1–1.03)
SQUAMOUS #/AREA URNS HPF: ABNORMAL /HPF
TRIGL SERPL-MCNC: 62 MG/DL (ref 0–150)
TSH SERPL DL<=0.05 MIU/L-ACNC: 2.18 UIU/ML (ref 0.27–4.2)
URATE SERPL-MCNC: 2.6 MG/DL (ref 2.4–5.7)
UROBILINOGEN UR QL STRIP: ABNORMAL
VLDLC SERPL-MCNC: 14 MG/DL (ref 5–40)
WBC # UR STRIP: ABNORMAL /HPF
WBC NRBC COR # BLD AUTO: 5.29 10*3/MM3 (ref 3.4–10.8)

## 2025-04-22 PROCEDURE — 82306 VITAMIN D 25 HYDROXY: CPT | Performed by: NURSE PRACTITIONER

## 2025-04-22 PROCEDURE — 87086 URINE CULTURE/COLONY COUNT: CPT | Performed by: NURSE PRACTITIONER

## 2025-04-22 PROCEDURE — 81001 URINALYSIS AUTO W/SCOPE: CPT | Performed by: NURSE PRACTITIONER

## 2025-04-22 PROCEDURE — 83036 HEMOGLOBIN GLYCOSYLATED A1C: CPT | Performed by: NURSE PRACTITIONER

## 2025-04-22 PROCEDURE — 80053 COMPREHEN METABOLIC PANEL: CPT | Performed by: NURSE PRACTITIONER

## 2025-04-22 PROCEDURE — 80061 LIPID PANEL: CPT | Performed by: NURSE PRACTITIONER

## 2025-04-22 PROCEDURE — 84550 ASSAY OF BLOOD/URIC ACID: CPT | Performed by: NURSE PRACTITIONER

## 2025-04-22 PROCEDURE — 84443 ASSAY THYROID STIM HORMONE: CPT | Performed by: NURSE PRACTITIONER

## 2025-04-22 PROCEDURE — 85025 COMPLETE CBC W/AUTO DIFF WBC: CPT | Performed by: NURSE PRACTITIONER

## 2025-04-22 RX ORDER — CARVEDILOL 25 MG/1
25 TABLET ORAL 2 TIMES DAILY WITH MEALS
Qty: 180 TABLET | Refills: 1 | Status: SHIPPED | OUTPATIENT
Start: 2025-04-22

## 2025-04-22 RX ORDER — LOSARTAN POTASSIUM 100 MG/1
100 TABLET ORAL DAILY
Qty: 90 TABLET | Refills: 1 | Status: SHIPPED | OUTPATIENT
Start: 2025-04-22

## 2025-04-22 RX ORDER — CLONIDINE HYDROCHLORIDE 0.2 MG/1
0.2 TABLET ORAL 3 TIMES DAILY
Qty: 270 TABLET | Refills: 1 | Status: SHIPPED | OUTPATIENT
Start: 2025-04-22

## 2025-04-22 RX ORDER — ATORVASTATIN CALCIUM 20 MG/1
20 TABLET, FILM COATED ORAL NIGHTLY
Qty: 90 TABLET | Refills: 1 | Status: SHIPPED | OUTPATIENT
Start: 2025-04-22

## 2025-04-22 RX ORDER — POLYETHYLENE GLYCOL 3350 17 G/17G
17 POWDER, FOR SOLUTION ORAL DAILY
Qty: 100 EACH | Refills: 3 | Status: SHIPPED | OUTPATIENT
Start: 2025-04-22

## 2025-04-23 ENCOUNTER — HOSPITAL ENCOUNTER (OUTPATIENT)
Dept: CARDIOLOGY | Facility: HOSPITAL | Age: 55
Discharge: HOME | End: 2025-04-23
Payer: MEDICARE

## 2025-04-23 ENCOUNTER — OFFICE VISIT (OUTPATIENT)
Age: 55
End: 2025-04-23
Payer: MEDICARE

## 2025-04-23 VITALS
RESPIRATION RATE: 18 BRPM | SYSTOLIC BLOOD PRESSURE: 144 MMHG | HEART RATE: 84 BPM | DIASTOLIC BLOOD PRESSURE: 86 MMHG | HEIGHT: 62 IN | BODY MASS INDEX: 42.69 KG/M2 | WEIGHT: 232 LBS | OXYGEN SATURATION: 98 %

## 2025-04-23 DIAGNOSIS — N18.4 CKD (CHRONIC KIDNEY DISEASE) STAGE 4, GFR 15-29 ML/MIN: ICD-10-CM

## 2025-04-23 DIAGNOSIS — I97.622 POSTPROCEDURAL SEROMA OF A CIRCULATORY SYSTEM ORGAN OR STRUCTURE FOLLOWING OTHER PROCEDURE: ICD-10-CM

## 2025-04-23 DIAGNOSIS — Z98.890 S/P ARTERIOVENOUS (AV) FISTULA CREATION: ICD-10-CM

## 2025-04-23 DIAGNOSIS — N18.4 CKD (CHRONIC KIDNEY DISEASE) STAGE 4, GFR 15-29 ML/MIN: Primary | ICD-10-CM

## 2025-04-23 DIAGNOSIS — M79.602 PAIN OF LEFT UPPER EXTREMITY: ICD-10-CM

## 2025-04-23 LAB
BACTERIA SPEC AEROBE CULT: NO GROWTH
BH CV UPPER ARTERIAL RIGHT WBI RATIO: 1
BH CV UPPER DUPLEX LEFT AXILLARY ART EDV: 246 CM/S
BH CV UPPER DUPLEX LEFT AXILLARY ART PSV: 312 CM/S
BH CV UPPER DUPLEX LEFT BRACHIAL ART EDV: 140 CM/S
BH CV UPPER DUPLEX LEFT BRACHIAL ART PSV: 230 CM/S
BH CV UPPER DUPLEX LEFT RADIAL ART EDV: 0 CM/S
BH CV UPPER DUPLEX LEFT RADIAL ART PSV: 42 CM/S
BH CV UPPER DUPLEX LEFT SUBCLAVIAN  ART PSV: 195 CM/S
BH CV UPPER DUPLEX LEFT SUBCLAVIAN ART EDV: 102 CM/S
BH CV UPPER DUPLEX LEFT ULNAR ART EDV: 0 CM/S
BH CV UPPER DUPLEX LEFT ULNAR ARTERY PSV: 36 CM/S
BH CV VAS DIALYSIS ARTERIAL ANASTOMOSIS DIAMETER: 0.7 CM
BH CV VAS DIALYSIS ARTERIAL ANASTOMOSIS EDV: 169 CM/SEC
BH CV VAS DIALYSIS ARTERIAL ANASTOMOSIS PSV: 268 CM/SEC
BH CV VAS DIALYSIS CONDUIT DIST DEPTH: 2.3 CM
BH CV VAS DIALYSIS CONDUIT DIST DIAMETER: 1.1 CM
BH CV VAS DIALYSIS CONDUIT DIST EDV: 203 CM/SEC
BH CV VAS DIALYSIS CONDUIT DIST PSV: 160 CM/SEC
BH CV VAS DIALYSIS CONDUIT MID DEPTH: 1.4 CM
BH CV VAS DIALYSIS CONDUIT MID DIAMETER: 1 CM
BH CV VAS DIALYSIS CONDUIT MID FLOW VOL: 2340 ML/MIN
BH CV VAS DIALYSIS CONDUIT MID/DIST EDV: 103 CM/SEC
BH CV VAS DIALYSIS CONDUIT MID/DIST FLOW VOL: 1629 ML/MIN
BH CV VAS DIALYSIS CONDUIT MID/DIST PSV: 154 CM/SEC
BH CV VAS DIALYSIS CONDUIT PROX DEPTH: 2 CM
BH CV VAS DIALYSIS CONDUIT PROX DIAMETER: 0.4 CM
BH CV VAS DIALYSIS CONDUIT PROX EDV: 445 CM/SEC
BH CV VAS DIALYSIS CONDUIT PROX PSV: 610 CM/SEC
BH CV VAS DIALYSIS CONDUIT PROX/MID DEPTH: 0.7 CM
BH CV VAS DIALYSIS CONDUIT PROX/MID DIAMETER: 1.1 CM
BH CV VAS DIALYSIS CONDUIT PROX/MID FLOW VOL: 700 ML/MIN
BH CV VAS DIALYSIS PRE-INFLOW BRACHIAL DIAMETER: 0.7 CM
BH CV VAS DIALYSIS PRE-INFLOW BRACHIAL EDV: 136 CM/SEC
BH CV VAS DIALYSIS PRE-INFLOW BRACHIAL FLOW VOL: 1930 ML/MIN
BH CV VAS DIALYSIS PRE-INFLOW BRACHIAL PSV: 203 CM/SEC
BH CV VAS DIALYSIS VENOUS OUTFLOW SUBCL-CEPHALIC JX DIAMETER: 0.8 CM
BH CV VAS DIALYSIS VENOUS OUTFLOW SUBCL-CEPHALIC JX EDV: 187 CM/SEC
BH CV VAS DIALYSIS VENOUS OUTFLOW SUBCL-CEPHALIC JX PSV: 279 CM/SEC
BH CV VAS DIALYSIS VENOUS OUTFLOW SUBCLAVIAN DIAMETER: 1.2 CM
BH CV VAS DIALYSIS VENOUS OUTFLOW SUBCLAVIAN EDV: 122 CM/SEC
BH CV VAS DIALYSIS VENOUS OUTFLOW SUBCLAVIAN PSV: 178 CM/SEC
UPPER ARTERIAL RIGHT ARM BRACHIAL SYS MAX: NORMAL
UPPER ARTERIAL RIGHT ARM RADIAL SYS MAX: 140
UPPER ARTERIAL RIGHT ARM ULNAR SYS MAX: 144

## 2025-04-23 PROCEDURE — 93990 DOPPLER FLOW TESTING: CPT

## 2025-04-23 PROCEDURE — 93931 UPPER EXTREMITY STUDY: CPT

## 2025-04-23 NOTE — PROGRESS NOTES
[facility]     Progress Note    Patient Name: Katarzyna Norris  : 1970  MRN: [id number]  Primary Care Physician:  Brian Ellis APRN  Date of admission: (Not on file)  Chief Complaint:    Chief Complaint   Patient presents with    Follow-up    Hemodialysis Access     Patient is here as a follow up with a fistula duplex and an upper extremity duplex.        Subjective   Subjective     Katarzyna Norris is a 54 y.o. female presents for follow-up with a hemodialysis duplex performed today. She explains she has pain in the upper arm both at the bottom and top of the inside of the upper arm. Her arm is going numb when she tries to sleep, which is more positional. Fingers are going numb off and on throughout the day and night. If she lays her forearm on a surface causes numbness in the forearm and fingers.  Palpable thrill and bruit, +2 pedal radial pulse, well-healed surgical incision and no edema.    Objective   Objective     Vitals:   Heart Rate:  [84] 84  Resp:  [18] 18  BP: (144-157)/(83-86) 144/86        Physical Exam   General:alert, no acute distress.     Extremities:left arm; well healed surgical incision. Excellent thrill and bruit.   Neuro:no gross deficit.     Result Review    Result Review:  I have personally reviewed the results from the time of this admission to 2025 14:40 EDT and agree with these findings:  []  Laboratory  []  Microbiology  []  Radiology  []  EKG/Telemetry   []  Cardiology/Vascular   []  Pathology  []  Old records  []  Other:    Most notable findings include: Proximal has a increased velocity of 610 cm/s with a diastolic of 445 cm/s.  It is noted significant stenosis and narrowing at the arterial anastomosis.    Assessment & Plan   Assessment / Plan     Assessment/Plan:  Diagnoses and all orders for this visit:    1. CKD (chronic kidney disease) stage 4, GFR 15-29 ml/min (Primary)    2. S/P arteriovenous (AV) fistula creation    3. Pain of left upper  extremity    Ms. Norris had a brachial cephalic arteriovenous fistula creation in the left arm on 5/7/2024 performed Dr. Dominguez.  She is not currently on hemodialysis and undergoing evaluations for possible kidney transplant.  She has continued to complain of symptoms somewhat consistent with steal syndrome and the duplex performed today shows evidence of stenosis/narrowing of the proximal arterial anastomosis. I will review with Dr. Dominguez and follow up accordingly.     I have answered all of her questions and she is in agreement with the plan at this time.  Thank you for allowing me to participate in your patient's care.      Active Hospital Problems:  There are no active hospital problems to display for this patient.          Electronically signed by MARICRUZ Palma, 04/23/25, 2:32 PM EDT.

## 2025-04-29 ENCOUNTER — TELEPHONE (OUTPATIENT)
Age: 55
End: 2025-04-29
Payer: MEDICARE

## 2025-04-29 NOTE — TELEPHONE ENCOUNTER
Dr. Dominguez has asked if she has had any physical therapy? I had reviewed her records and did not see any referrals or recommendations for physical therapy.  I spoke with her today and she confirmed that she has had no physical therapy. She has voiced concern of how physical therapy would be able to help with the numbness and shooting pain in her 3rd and 4th digit. I will speak with Dr. Dominguez tomorrow afternoon and will advise accordingly. She was in agreement with the plan.

## 2025-05-02 ENCOUNTER — OFFICE VISIT (OUTPATIENT)
Age: 55
End: 2025-05-02
Payer: MEDICARE

## 2025-05-02 VITALS
RESPIRATION RATE: 18 BRPM | HEIGHT: 62 IN | DIASTOLIC BLOOD PRESSURE: 94 MMHG | BODY MASS INDEX: 42.69 KG/M2 | SYSTOLIC BLOOD PRESSURE: 136 MMHG | HEART RATE: 82 BPM | WEIGHT: 232 LBS | TEMPERATURE: 97.9 F

## 2025-05-02 DIAGNOSIS — N18.4 CKD (CHRONIC KIDNEY DISEASE) STAGE 4, GFR 15-29 ML/MIN: Primary | ICD-10-CM

## 2025-05-02 NOTE — PROGRESS NOTES
[facility]   Follow up Office    Patient Name: Katarzyna Norris  : 1970  MRN: [id number]  Primary Care Physician:  Brian Ellis APRN      Subjective   Subjective     HPI:    Katarzyna Norris is a 54 y.o. female who underwent creation of a left arm fistula 2024.  Initially did well and had no complaints or problems but then subsequently started having some numbness of fingers then some numbness of the forearm and most recently she cannot keep her arm along her body because it bothers her and there is sensitivity numbness along the inside of her upper arm.  She indicates that none of this was present prior to her fistula creation.      Objective     Vitals:   Temp:  [97.9 °F (36.6 °C)] 97.9 °F (36.6 °C)  Heart Rate:  [82] 82  Resp:  [18] 18  BP: (136)/(94) 136/94    Physical Exam      General: Alert, no acute distress.  HEENT: PERRLA  Abdomen: Benign  Extremities: Symmetric.  Left hand: Warm, pink, well-perfused.  Left AV fistula: Excellent thrill.  Neuro: No gross deficits      Assessment & Plan   Assessment / Plan     Diagnoses and all orders for this visit:    1. CKD (chronic kidney disease) stage 4, GFR 15-29 ml/min (Primary)  -     Case Request; Standing  -     ceFAZolin (ANCEF) 2,000 mg in sodium chloride 0.9 % 100 mL IVPB  -     Case Request    Other orders  -     Follow Anesthesia Guidelines / Protocol; Future  -     Follow Anesthesia Guidelines / Protocol; Standing  -     Provide NPO Instructions to Patient; Future  -     Chlorhexidine Skin Prep; Future  -     CBC & Differential; Standing  -     Basic Metabolic Panel; Standing       Assessment/Plan:   Katarzyna continues to have problems with her fistula due to neurologic symptoms which continue to progress.  It is unclear if the symptoms are directly related to the fistula but I would advise that we ligate it.  She was under the impression that something would have to be removed, but I advised her that nothing was implanted  and by ligating the fistula and stopping the blood flow through it we would anticipate blood flow to her hand to return to baseline.  I have discussed with her in detail the mechanics of the procedure, the indications, benefits, risks, alternatives, as well as potential complications to include but not limited to infection, bleeding, hematoma, transfusion, reoperation, failure of her symptoms to completely resolve.  She appears to understand and desires to proceed.        Electronically signed by Yaniv Dominguez MD, 05/02/25, 12:13 PM EDT.

## 2025-05-09 NOTE — PRE-PROCEDURE INSTRUCTIONS
PATIENT INSTRUCTED TO BE:    - NOTHING TO EAT AFTER MIDNIGHT OR CHEW, EXCEPT CAN HAVE SIPS OF WATER WITH MEDICATIONS OR CLEAR LIQUIDS 2 HOURS PRIOR TO SURGERY ARRIVAL TIME , NO MORE THAN 8 OZ. (NOTHING RED)     - TO HOLD ALL VITAMINS, SUPPLEMENTS, NSAIDS FOR ONE WEEK PRIOR TO THEIR SURGICAL PROCEDURE    - DO NOT TAKE ______________________ 7 DAYS PRIOR TO PROCEDURE PER ANESTHESIA RECOMMENDATIONS/INSTRUCTIONS     - INSTRUCTED PT TO USE SURGICAL SOAP 1 TIME THE NIGHT PRIOR TO SURGERY ___________ OR THE AM OF SURGERY _____________   USE THE SOAP FROM NECK TO TOES, AVOID THEIR FACE, HAIR, AND PRIVATE PARTS. IF USE THE SOAP THE NIGHT PRIOR TO SURGERY, CHANGE BED LINENS AND NO PETS IN THE BED.     INSTRUCTED NO LOTIONS, JEWELRY, PIERCINGS,  NAIL POLISH, OR DEODORANT DAY OF SURGERY    - IF DIABETIC, CHECK BLOOD GLUCOSE IF LESS THAN 70 OR HAVING SYMPTOMS CALL THE PREOP AREA FOR INSTRUCTIONS ON AM OF SURGERY ([phone]  -INSTRUCTED TO TAKE THE FOLLOWING MEDICATIONS THE DAY OF SURGERY WITH SIPS OF WATER:   ALBUTEROL INHALER, ASPIRIN 81, COREG    LAST DOSE LOSARTAN/SODIUM BICARB. 5/12/25 PM    - DO NOT BRING ANY MEDICATIONS WITH YOU TO THE HOSPITAL THE DAY OF SURGERY, EXCEPT IF USE INHALERS. BRING INHALERS DAY OF SURGERY       - BRING CPAP OR BIPAP TO THE HOSPITAL ONLY IF YOU ARE SPENDING THE NIGHT    - DO NOT SMOKE OR VAPE 24 HOURS PRIOR TO PROCEDURE PER ANESTHESIA REQUEST     -MAKE SURE YOU HAVE A RIDE HOME OR SOMEONE TO STAY WITH YOU THE DAY OF THE PROCEDURE AFTER YOU GO HOME     - FOLLOW ANY OTHER INSTRUCTIONS GIVEN TO YOU BY YOUR SURGEON'S OFFICE.     - DAY OF SURGERY ____________,[facility] ( [address]), YOU CAN  PARK OR SELF PARK. ENTER THE PAVILION THRU MAIN ENTRANCE, TAKE ELEVATORS TO THE FIRST FLOOR, CHECK IN AT THE DESK FOR REGISTRATION/ SURGERY.   - YOU WILL RECEIVE A PHONE CALL THE DAY PRIOR TO SURGERY BETWEEN 1PM AND 4 PM WITH ARRIVAL TIME, IF YOUR SURGERY IS ON A MONDAY  YOU WILL RECEIVE A CALL THE FRIDAY PRIOR TO SURGERY DATE    - BRING CASH OR CREDIT CARD FOR COPAYMENT OF MEDICATIONS AFTER SURGERY IF YOU USE THE HOSPITAL PHARMACY (MEDS TO BED)    - PREADMISSION TESTING NURSE [phone] IF HAVE ANY QUESTIONS     -PATIENT PROVIDED THE NUMBER FOR PREOP SURGICAL DEPT IF HAD QUESTIONS AFTER HOURS PRIOR TO SURGERY ([phone] OR  INFORMED PT IF NO ANSWER, LEAVE A MESSAGE AND SOMEONE WILL RETURN THEIR CALL       PATIENT VERBALIZED UNDERSTANDING

## 2025-05-12 PROCEDURE — S0260 H&P FOR SURGERY: HCPCS | Performed by: SURGERY

## 2025-05-12 NOTE — H&P
[facility]   HISTORY AND PHYSICAL    Patient Name: Katarzyna Norris  : 1970  MRN: [id number]  Primary Care Physician:  Brian Ellis APRN  Date of admission: (Not on file)    Subjective   Subjective     Chief Complaint: Left upper extremity numbness following left arm fistula creation    HPI:    Katarzyna Norris is a 54 y.o. female with worsening symptoms of left upper extremity numbness after left arm fistula creation 2024.  At this time for ligation.    Review of Systems    Non contributory except for the History of Present Illness    Personal History     Past Medical History:   Diagnosis Date    Anxiety     Arthritis     GILBERT KNEES    Asthma     SEASONAL ALLERGIES- NO INHALERS    CKD (chronic kidney disease)     Stage 5, Follows with [facility]- NO CURRENT DIALYSIS    CRF (chronic renal failure)     ONLY IN PREPARATION FOR DIALYSIS, NO CURRENT DIALYSIS    Elevated cholesterol     Gastritis     GERD (gastroesophageal reflux disease)     Gout 2021    Hiatal hernia     Hiatal hernia     Hypertension     Lactose intolerance     Numbness and tingling in left hand     R/T CURRENT FISTULA    Transplant, organ     ON KIDNEY TRANSPLANT LIST       Past Surgical History:   Procedure Laterality Date    ARTERIOVENOUS FISTULA/SHUNT SURGERY Left 2024    Procedure: 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;  Surgeon: Yaniv Dominguez MD;  Location: [facility] MAIN OR;  Service: Vascular;  Laterality: Left;    BONY PELVIS SURGERY      Plate     SECTION   and     CHOLECYSTECTOMY      COLONOSCOPY      COLONOSCOPY      COLONOSCOPY      COLONOSCOPY N/A 2024    Procedure: COLONOSCOPY;  Surgeon: Aston Arroyo MD;  Location: [facility] ENDOSCOPY;  Service: Gastroenterology;  Laterality: N/A;  HEMORRHOIDS    ENDOSCOPY      ENDOSCOPY N/A 2023    Procedure: ESOPHAGOGASTRODUODENOSCOPY with biopsies;  Surgeon: Tam Badillo MD;  Location: [facility]  ENDOSCOPY;  Service: General;  Laterality: N/A;  hiatal hernia    JOINT REPLACEMENT      TOTAL KNEE ARTHROPLASTY Left 12/22/2021    Procedure: TOTAL KNEE ARTHROPLASTY;  Surgeon: Marco Nelson MD;  Location: [facility] MA[facility] OR;  Service: Orthopedics;  Laterality: Left;    TOTAL KNEE ARTHROPLASTY Right 10/19/2022    Procedure: RIGHT TOTAL KNEE ARTHROPLASTY - NO SHELBY;  Surgeon: Marco Nelson MD;  Location: [facility] MAIN OR;  Service: Orthopedics;  Laterality: Right;    TUBAL ABDOMINAL LIGATION  1989       Family History: family history includes Diabetes in her brother, mother, and another family member; Hypertension in her brother, sister, sister, and sister; Stroke in an other family member; Throat cancer (age of onset: 73) in her father. Otherwise pertinent FHx was reviewed and not pertinent to current issue.    Social History:  reports that she quit smoking about 12 years ago. Her smoking use included cigarettes. She started smoking about 13 years ago. She has a 1 pack-year smoking history. She has never been exposed to tobacco smoke. She has never used smokeless tobacco. She reports that she does not drink alcohol and does not use drugs.    Home Medications:  No current facility-administered medications on file prior to encounter.     Current Outpatient Medications on File Prior to Encounter   Medication Sig    acetaminophen (TYLENOL) 325 MG tablet Take 2 tablets by mouth Every 6 (Six) Hours As Needed for Mild Pain, Headache or Fever.    albuterol sulfate  (90 Base) MCG/ACT inhaler Inhale 2 puffs Every 4 (Four) Hours As Needed for Wheezing or Shortness of Air.    allopurinol (ZYLOPRIM) 300 MG tablet Take 0.5 tablets by mouth Every Night. Pt also takes 300mg qd    aspirin 81 MG EC tablet Take 1 tablet by mouth Daily.    atorvastatin (LIPITOR) 20 MG tablet Take 1 tablet by mouth Every Night.    carvedilol (COREG) 25 MG tablet Take 1 tablet by mouth 2 (Two) Times a Day With Meals.    cloNIDine  "(CATAPRES) 0.2 MG tablet Take 1 tablet by mouth 3 (Three) Times a Day. (Patient taking differently: Take 1 tablet by mouth Every Night.)    diphenhydrAMINE (BENADRYL) 25 mg capsule Take 1 capsule by mouth Every 6 (Six) Hours As Needed for Itching or Allergies.    losartan (COZAAR) 100 MG tablet Take 1 tablet by mouth Daily. (Patient taking differently: Take 0.5 tablets by mouth 2 (Two) Times a Day.)    Multiple Vitamins-Minerals (ZINC PO) Take 1 tablet by mouth Daily.    polyethylene glycol (MIRALAX) 17 g packet Take 17 g by mouth Daily.    sodium bicarbonate 650 MG tablet Take 1 tablet by mouth 2 (Two) Times a Day.    vitamin B-12 (CYANOCOBALAMIN) 1000 MCG tablet Take 1 tablet by mouth Daily.          Allergies:  Allergies   Allergen Reactions    Amlodipine Shortness Of Breath    Clindamycin/Lincomycin Swelling     FACIAL SWELLING      Contrast Dye (Echo Or Unknown Ct/Mr) Palpitations     \"BLOOD PRESSURE GOES GISSELLE HIGH\", \"HEART RACES\"  CONTRAINDICATED R/T KIDNEY FAILURE    Diltiazem Hcl Anxiety    Zofran [Ondansetron] Confusion    Augmentin [Amoxicillin-Pot Clavulanate] Nausea And Vomiting     Beta lactam allergy details  Antibiotic reaction: nausea  Age at reaction: adult  Dose to reaction time: days  Reason for antibiotic: unknown  Epinephrine required for reaction?: unknown  Tolerated antibiotics: unknown       Doxycycline Nausea And Vomiting       Objective   Objective     Vitals:        Physical Exam    General: Awake, alert, NAD   Eyes:  JESSICA   Neck: Supple   Lungs: Clear   Heart: RRR   Abdomen: benign   Musculoskeletal:  normal motor tone, symmetric   Skin: warm, normal turgor   Neuro: strength 5/5 all extremities       Assessment & Plan   Assessment / Plan     Active Hospital Problems:  Active Hospital Problems    Diagnosis     **CKD (chronic kidney disease) stage 4, GFR 15-29 ml/min        There are no diagnoses linked to this encounter.    Assessment/plan:   Katarzyna continues to have problems with her " fistula due to neurologic symptoms which continue to progress.  It is unclear if the symptoms are directly related to the fistula but I would advise that we ligate it.  She was under the impression that something would have to be removed, but I advised her that nothing was implanted and by ligating the fistula and stopping the blood flow through it we would anticipate blood flow to her hand to return to baseline.  I have discussed with her in detail the mechanics of the procedure, the indications, benefits, risks, alternatives, as well as potential complications to include but not limited to infection, bleeding, hematoma, transfusion, reoperation, failure of her symptoms to completely resolve.  She appears to understand and desires to proceed.       Electronically signed by Yaniv Dominguez MD, 05/12/25, 4:25 PM EDT.

## 2025-05-13 ENCOUNTER — ANESTHESIA (OUTPATIENT)
Dept: PERIOP | Facility: HOSPITAL | Age: 55
End: 2025-05-13
Payer: MEDICARE

## 2025-05-13 ENCOUNTER — ANESTHESIA EVENT (OUTPATIENT)
Dept: PERIOP | Facility: HOSPITAL | Age: 55
End: 2025-05-13
Payer: MEDICARE

## 2025-05-13 ENCOUNTER — HOSPITAL ENCOUNTER (OUTPATIENT)
Facility: HOSPITAL | Age: 55
Setting detail: HOSPITAL OUTPATIENT SURGERY
Discharge: HOME | End: 2025-05-13
Attending: SURGERY | Admitting: SURGERY
Payer: MEDICARE

## 2025-05-13 VITALS
HEIGHT: 62 IN | RESPIRATION RATE: 16 BRPM | HEART RATE: 72 BPM | TEMPERATURE: 97.1 F | WEIGHT: 232.14 LBS | DIASTOLIC BLOOD PRESSURE: 69 MMHG | BODY MASS INDEX: 42.72 KG/M2 | SYSTOLIC BLOOD PRESSURE: 135 MMHG | OXYGEN SATURATION: 100 %

## 2025-05-13 DIAGNOSIS — N18.4 CKD (CHRONIC KIDNEY DISEASE) STAGE 4, GFR 15-29 ML/MIN: ICD-10-CM

## 2025-05-13 LAB
ANION GAP SERPL CALCULATED.3IONS-SCNC: 13.1 MMOL/L (ref 5–15)
BASOPHILS # BLD AUTO: 0.04 10*3/MM3 (ref 0–0.2)
BASOPHILS NFR BLD AUTO: 0.7 % (ref 0–1.5)
BUN SERPL-MCNC: 56 MG/DL (ref 6–20)
BUN/CREAT SERPL: 17.8 (ref 7–25)
CALCIUM SPEC-SCNC: 10.5 MG/DL (ref 8.6–10.5)
CHLORIDE SERPL-SCNC: 108 MMOL/L (ref 98–107)
CO2 SERPL-SCNC: 15.9 MMOL/L (ref 22–29)
CREAT SERPL-MCNC: 3.15 MG/DL (ref 0.57–1)
DEPRECATED RDW RBC AUTO: 53.2 FL (ref 37–54)
EGFRCR SERPLBLD CKD-EPI 2021: 16.7 ML/MIN/1.73
EOSINOPHIL # BLD AUTO: 0.23 10*3/MM3 (ref 0–0.4)
EOSINOPHIL NFR BLD AUTO: 4 % (ref 0.3–6.2)
ERYTHROCYTE [DISTWIDTH] IN BLOOD BY AUTOMATED COUNT: 14.6 % (ref 12.3–15.4)
GLUCOSE SERPL-MCNC: 88 MG/DL (ref 65–99)
HCT VFR BLD AUTO: 31.5 % (ref 34–46.6)
HGB BLD-MCNC: 10.1 G/DL (ref 12–15.9)
IMM GRANULOCYTES # BLD AUTO: 0.01 10*3/MM3 (ref 0–0.05)
IMM GRANULOCYTES NFR BLD AUTO: 0.2 % (ref 0–0.5)
LYMPHOCYTES # BLD AUTO: 1.61 10*3/MM3 (ref 0.7–3.1)
LYMPHOCYTES NFR BLD AUTO: 28.1 % (ref 19.6–45.3)
MCH RBC QN AUTO: 32.4 PG (ref 26.6–33)
MCHC RBC AUTO-ENTMCNC: 32.1 G/DL (ref 31.5–35.7)
MCV RBC AUTO: 101 FL (ref 79–97)
MONOCYTES # BLD AUTO: 0.42 10*3/MM3 (ref 0.1–0.9)
MONOCYTES NFR BLD AUTO: 7.3 % (ref 5–12)
NEUTROPHILS NFR BLD AUTO: 3.42 10*3/MM3 (ref 1.7–7)
NEUTROPHILS NFR BLD AUTO: 59.7 % (ref 42.7–76)
NRBC BLD AUTO-RTO: 0 /100 WBC (ref 0–0.2)
PLATELET # BLD AUTO: 121 10*3/MM3 (ref 140–450)
PMV BLD AUTO: 9 FL (ref 6–12)
POTASSIUM SERPL-SCNC: 5 MMOL/L (ref 3.5–5.2)
RBC # BLD AUTO: 3.12 10*6/MM3 (ref 3.77–5.28)
SODIUM SERPL-SCNC: 137 MMOL/L (ref 136–145)
WBC NRBC COR # BLD AUTO: 5.73 10*3/MM3 (ref 3.4–10.8)

## 2025-05-13 PROCEDURE — 25810000003 LACTATED RINGERS PER 1000 ML: Performed by: ANESTHESIOLOGY

## 2025-05-13 PROCEDURE — 85025 COMPLETE CBC W/AUTO DIFF WBC: CPT | Performed by: SURGERY

## 2025-05-13 PROCEDURE — 25010000002 CEFAZOLIN PER 500 MG: Performed by: SURGERY

## 2025-05-13 PROCEDURE — 25010000002 LIDOCAINE HCL (PF) 4 % SOLUTION: Performed by: ANESTHESIOLOGY

## 2025-05-13 PROCEDURE — 80048 BASIC METABOLIC PNL TOTAL CA: CPT | Performed by: SURGERY

## 2025-05-13 PROCEDURE — 25010000002 BUPIVACAINE (PF) 0.5 % SOLUTION 10 ML VIAL: Performed by: SURGERY

## 2025-05-13 PROCEDURE — 25010000002 VASOPRESSIN 20 UNIT/ML SOLUTION: Performed by: ANESTHESIOLOGY

## 2025-05-13 PROCEDURE — 25010000002 FENTANYL CITRATE (PF) 50 MCG/ML SOLUTION: Performed by: ANESTHESIOLOGY

## 2025-05-13 PROCEDURE — 25010000002 PROPOFOL 10 MG/ML EMULSION: Performed by: ANESTHESIOLOGY

## 2025-05-13 PROCEDURE — 25010000002 LIDOCAINE 1 % SOLUTION 20 ML VIAL: Performed by: SURGERY

## 2025-05-13 PROCEDURE — 37607 LIG/BANDING ANGIOACS AV FSTL: CPT | Performed by: SURGERY

## 2025-05-13 PROCEDURE — 25810000003 SODIUM CHLORIDE 0.9 % SOLUTION: Performed by: ANESTHESIOLOGY

## 2025-05-13 PROCEDURE — 25010000002 DEXAMETHASONE PER 1 MG: Performed by: ANESTHESIOLOGY

## 2025-05-13 PROCEDURE — 25010000002 MIDAZOLAM PER 1MG: Performed by: ANESTHESIOLOGY

## 2025-05-13 RX ORDER — OXYCODONE HYDROCHLORIDE 5 MG/1
5 TABLET ORAL
Refills: 0 | Status: DISCONTINUED | OUTPATIENT
Start: 2025-05-13 | End: 2025-05-13 | Stop reason: HOSPADM

## 2025-05-13 RX ORDER — VASOPRESSIN 20 [USP'U]/ML
INJECTION, SOLUTION INTRAVENOUS AS NEEDED
Status: DISCONTINUED | OUTPATIENT
Start: 2025-05-13 | End: 2025-05-13 | Stop reason: SURG

## 2025-05-13 RX ORDER — PROMETHAZINE HYDROCHLORIDE 25 MG/1
25 TABLET ORAL ONCE AS NEEDED
Status: DISCONTINUED | OUTPATIENT
Start: 2025-05-13 | End: 2025-05-13 | Stop reason: HOSPADM

## 2025-05-13 RX ORDER — HYDROCODONE BITARTRATE AND ACETAMINOPHEN 5; 325 MG/1; MG/1
1 TABLET ORAL EVERY 6 HOURS PRN
Qty: 12 TABLET | Refills: 0 | Status: SHIPPED | OUTPATIENT
Start: 2025-05-13

## 2025-05-13 RX ORDER — FENTANYL CITRATE 50 UG/ML
INJECTION, SOLUTION INTRAMUSCULAR; INTRAVENOUS AS NEEDED
Status: DISCONTINUED | OUTPATIENT
Start: 2025-05-13 | End: 2025-05-13 | Stop reason: SURG

## 2025-05-13 RX ORDER — DEXAMETHASONE SODIUM PHOSPHATE 4 MG/ML
INJECTION, SOLUTION INTRA-ARTICULAR; INTRALESIONAL; INTRAMUSCULAR; INTRAVENOUS; SOFT TISSUE AS NEEDED
Status: DISCONTINUED | OUTPATIENT
Start: 2025-05-13 | End: 2025-05-13 | Stop reason: SURG

## 2025-05-13 RX ORDER — SODIUM CHLORIDE, SODIUM LACTATE, POTASSIUM CHLORIDE, CALCIUM CHLORIDE 600; 310; 30; 20 MG/100ML; MG/100ML; MG/100ML; MG/100ML
9 INJECTION, SOLUTION INTRAVENOUS CONTINUOUS PRN
Status: DISCONTINUED | OUTPATIENT
Start: 2025-05-13 | End: 2025-05-13 | Stop reason: HOSPADM

## 2025-05-13 RX ORDER — ONDANSETRON 2 MG/ML
4 INJECTION INTRAMUSCULAR; INTRAVENOUS ONCE AS NEEDED
Status: DISCONTINUED | OUTPATIENT
Start: 2025-05-13 | End: 2025-05-13 | Stop reason: HOSPADM

## 2025-05-13 RX ORDER — MAGNESIUM HYDROXIDE 1200 MG/15ML
LIQUID ORAL AS NEEDED
Status: DISCONTINUED | OUTPATIENT
Start: 2025-05-13 | End: 2025-05-13 | Stop reason: HOSPADM

## 2025-05-13 RX ORDER — LIDOCAINE HYDROCHLORIDE 40 MG/ML
INJECTION, SOLUTION RETROBULBAR AS NEEDED
Status: DISCONTINUED | OUTPATIENT
Start: 2025-05-13 | End: 2025-05-13 | Stop reason: SURG

## 2025-05-13 RX ORDER — SODIUM CHLORIDE 9 MG/ML
INJECTION, SOLUTION INTRAVENOUS CONTINUOUS PRN
Status: DISCONTINUED | OUTPATIENT
Start: 2025-05-13 | End: 2025-05-13 | Stop reason: SURG

## 2025-05-13 RX ORDER — MIDAZOLAM HYDROCHLORIDE 2 MG/2ML
2 INJECTION, SOLUTION INTRAMUSCULAR; INTRAVENOUS ONCE
Status: COMPLETED | OUTPATIENT
Start: 2025-05-13 | End: 2025-05-13

## 2025-05-13 RX ORDER — BUPIVACAINE HCL/0.9 % NACL/PF 0.125 %
PLASTIC BAG, INJECTION (ML) EPIDURAL AS NEEDED
Status: DISCONTINUED | OUTPATIENT
Start: 2025-05-13 | End: 2025-05-13 | Stop reason: SURG

## 2025-05-13 RX ORDER — PROPOFOL 10 MG/ML
VIAL (ML) INTRAVENOUS AS NEEDED
Status: DISCONTINUED | OUTPATIENT
Start: 2025-05-13 | End: 2025-05-13 | Stop reason: SURG

## 2025-05-13 RX ORDER — PROMETHAZINE HYDROCHLORIDE 25 MG/1
25 SUPPOSITORY RECTAL ONCE AS NEEDED
Status: DISCONTINUED | OUTPATIENT
Start: 2025-05-13 | End: 2025-05-13 | Stop reason: HOSPADM

## 2025-05-13 RX ORDER — ACETAMINOPHEN 500 MG
1000 TABLET ORAL ONCE
Status: COMPLETED | OUTPATIENT
Start: 2025-05-13 | End: 2025-05-13

## 2025-05-13 RX ADMIN — LIDOCAINE HYDROCHLORIDE 40 MG: 40 INJECTION, SOLUTION RETROBULBAR; TOPICAL at 13:10

## 2025-05-13 RX ADMIN — Medication 100 MCG: at 13:21

## 2025-05-13 RX ADMIN — FENTANYL CITRATE 50 MCG: 50 INJECTION, SOLUTION INTRAMUSCULAR; INTRAVENOUS at 13:10

## 2025-05-13 RX ADMIN — PROPOFOL 150 MG: 10 INJECTION, EMULSION INTRAVENOUS at 13:10

## 2025-05-13 RX ADMIN — Medication 200 MCG: at 13:41

## 2025-05-13 RX ADMIN — Medication 100 MCG: at 13:35

## 2025-05-13 RX ADMIN — DEXAMETHASONE SODIUM PHOSPHATE 4 MG: 4 INJECTION, SOLUTION INTRAMUSCULAR; INTRAVENOUS at 13:14

## 2025-05-13 RX ADMIN — SODIUM CHLORIDE 2000 MG: 9 INJECTION, SOLUTION INTRAVENOUS at 13:14

## 2025-05-13 RX ADMIN — MIDAZOLAM HYDROCHLORIDE 2 MG: 1 INJECTION, SOLUTION INTRAMUSCULAR; INTRAVENOUS at 12:53

## 2025-05-13 RX ADMIN — ACETAMINOPHEN 1000 MG: 500 TABLET ORAL at 12:53

## 2025-05-13 RX ADMIN — VASOPRESSIN 1 UNITS: 20 INJECTION INTRAVENOUS at 13:44

## 2025-05-13 RX ADMIN — Medication 100 MCG: at 13:38

## 2025-05-13 RX ADMIN — Medication 200 MCG: at 13:31

## 2025-05-13 RX ADMIN — SODIUM CHLORIDE: 9 INJECTION, SOLUTION INTRAVENOUS at 13:04

## 2025-05-13 RX ADMIN — Medication 100 MCG: at 13:26

## 2025-05-13 RX ADMIN — Medication 100 MCG: at 13:28

## 2025-05-13 NOTE — ANESTHESIA PREPROCEDURE EVALUATION
Anesthesia Evaluation     Patient summary reviewed and Nursing notes reviewed   no history of anesthetic complications:   NPO Solid Status: > 8 hours  NPO Liquid Status: > 2 hours           Airway   Mallampati: II  TM distance: >3 FB  Neck ROM: full  No difficulty expected and Large neck circumference  Dental - normal exam     Pulmonary - normal exam   (+) a smoker Former, asthma,sleep apnea  Cardiovascular - normal exam  Exercise tolerance: good (4-7 METS)    (+) hypertension, hyperlipidemia    ROS comment: Regadenoson Stress Test with Myocardial Perfusion SPECT (Multi Study)    Patient Name: Katarzyna Norris  Patient MRN: [id number]  Patient : 1970 (54 y.o.)  Legal Sex: Female     Accession Number: [id number]  Date of Study: 4/10/25  Ordering Provider: Bobo Aldrich MD   Clinical Indications: Other Reasons (uncertain in most circumstances), High Suspicion of Cardiac Disease     Reading Physicians  Performing Staff  ECG [place], SPECT [place]han: Bobo Aldrich MD      Tech: Juliana Luna        [facility] PACS Images     Show images for Stress Test With Myocardial Perfusion One Day  Interpretation Summary       ·  Myocardial perfusion imaging indicates a normal myocardial perfusion study with no evidence of ischemia. Impressions are consistent with a low risk study.  ·  Left ventricular ejection fraction is hyperdynamic (Calculated EF > 70%).  ·  Findings consistent with a normal ECG stress test.            Neuro/Psych  (+) TIA, headaches, numbness, psychiatric history Anxiety  GI/Hepatic/Renal/Endo    (+) morbid obesity, hiatal hernia, GERD, renal disease- dialysis and ESRD    Musculoskeletal     (+) neck pain  Abdominal   (+) obese   Substance History - negative use     OB/GYN negative ob/gyn ROS         Other   arthritis,     ROS/Med Hx Other: PAT Nursing Notes unavailable.                     Anesthesia Plan    ASA 3     general     intravenous induction     Anesthetic plan, risks, benefits, and  alternatives have been provided, discussed and informed consent has been obtained with: patient.    Plan discussed with CRNA.        CODE STATUS:

## 2025-05-13 NOTE — ANESTHESIA POSTPROCEDURE EVALUATION
Patient: Katarzyna Norris    Procedure Summary       Date: 05/13/25 Room / Location: [facility] OR  / [facility] HYBRID OR    Anesthesia Start: 1304 Anesthesia Stop: 1400    Procedure: Ligation of left arm arteriovenous fistula / Tie off the left arm fistula (Left: Arm Upper) Diagnosis:       CKD (chronic kidney disease) stage 4, GFR 15-29 ml/min      (CKD (chronic kidney disease) stage 4, GFR 15-29 ml/min [N18.4])    Surgeons: Yaniv Dominguez MD Provider: Silke Navarrete MD    Anesthesia Type: general ASA Status: 3            Anesthesia Type: general    Vitals  Vitals Value Taken Time   /84 05/13/25 14:32   Temp 36.2 °C (97.2 °F) 05/13/25 14:30   Pulse 78 05/13/25 14:35   Resp 18 05/13/25 14:30   SpO2 100 % 05/13/25 14:35           Post Anesthesia Care and Evaluation    Patient location during evaluation: bedside  Patient participation: complete - patient participated  Level of consciousness: awake    Airway patency: patent  PONV Status: none  Cardiovascular status: acceptable  Respiratory status: acceptable  Hydration status: acceptable

## 2025-05-13 NOTE — DISCHARGE INSTRUCTIONS
May shower in 2 days.  Follow-up in the office in 2 weeks, call for appointment.  Resume home diet.  Resume home medications.  No lifting greater than 15 pounds for 2 weeks.     DISCHARGE INSTRUCTIONS  SURGICAL / AMBULATORY  PROCEDURES      For your surgery you had:  General anesthesia (you may have a sore throat for the first 24 hours)  You may experience dizziness, drowsiness, or light-headedness for several hours following surgery/procedure.  Do not stay alone today or tonight.  Limit your activity for 24 hours.  Resume your diet slowly.  Follow whatever special dietary instructions you may have been given by your doctor.  You should not drive or operate machinery, drink alcohol, or sign legally binding documents for 24 hours or while you are taking pain medication.  Last dose of pain medication was given at:  TYLENOL 1000 MG AT 12:53 PM  NOTIFY YOUR DOCTOR IF YOU EXPERIENCE ANY OF THE FOLLOWING:  Temperature greater than 101 degrees Fahrenheit  Shaking Chills  Redness or excessive drainage from incision  Nausea, vomiting and/or pain that is not controlled by prescribed medications  Increase in bleeding or bleeding that is excessive  Unable to urinate in 6 hours after surgery  If unable to reach your doctor, please go to the closest Emergency Room  .  You may shower 48 HOURS  ELEVATE LEFT ARM TO HELP PREVENT SWELLING. NO LIFTING PUSHING PULLING WITH LEFT ARM UNTIL SEEN BY MD  Medications per physician instructions as indicated on Discharge Medication Information Sheet.

## 2025-05-13 NOTE — OP NOTE
ARTERIOVENOUS FISTULA REPAIR  Procedure Report    Patient Name:  Katarzyna Norris  YOB: 1970    Date of Surgery:  5/13/2025     Indications: Steal syndrome    Pre-op Diagnosis:   CKD (chronic kidney disease) stage 4, GFR 15-29 ml/min [N18.4]       Post-Op Diagnosis Codes:     * CKD (chronic kidney disease) stage 4, GFR 15-29 ml/min [N18.4]    Procedure(s):  Ligation of left arm arteriovenous fistula    Staff:  Surgeon(s):  Yaniv Dominguez MD    Assistant: Jaqueline Marcus RN CSA    Anesthesia: General    Estimated Blood Loss: 5 mL    Implants:    Nothing was implanted during the procedure    Specimen: None       Findings: Following ligation of the left arm arteriovenous fistula the brachial artery Doppler signal distal to the point of ligation just next to the arteriovenous anastomosis increased from a monophasic to a triphasic waveform.    Complications: None    Description of Procedure: The patient was taken to the operating room and was placed in the supine position.  She was then induced into general anesthesia via LMA.  She was then positioned with her left arm extended on an armboard.  She was prepped and draped in the usual aseptic fashion from the wrist circumferentially all the way to the shoulder.  A stockinette was applied to the hand and forearm and was secured in place using Coban.  A timeout was then taken to confirm patient, procedure and laterality.  Local anesthesia was then administered at the projected site of incision.  An incision was then made in the distal left upper arm just proximal to the antecubital crease directly over the medial aspect of the old incision.  The subcutaneous tissue was traversed using electrocautery.  Blunt and sharp dissection were then used to dissect down to the fistula.  The fistula was identified and followed until the suture line to the artery was identified.  A 2-0 silk suture was then passed around the fistula.  Doppler examination was then performed  of the brachial artery with the fistula going revealing monophasic waveform.  The 2-0 silk suture around the fistula was then tightened and Doppler examination revealed a strong triphasic waveform.  It was felt tight was not applied and tightened around the fistula just above the arteriovenous anastomosis.  A second 2-0 silk suture was then passed around the fistula and tied immediately above the first one.  Doppler examination of the brachial artery distal to the point of the arteriovenous anastomosis was not repeated revealing a strong triphasic waveform.  The wound was then approximated using 3-0 Vicryl in a running fashion for approximation of the dermis and subcutaneous tissue followed by Dermabond.  The patient tolerated the procedure well.    Assistant: Jaqueline Marcus RN CSA  was responsible for performing the following activities: First assist and their skilled assistance was necessary for the success of this case.    Yaniv Dominguez MD     Date: 5/13/2025  Time: 13:48 EDT

## 2025-05-28 ENCOUNTER — OFFICE VISIT (OUTPATIENT)
Age: 55
End: 2025-05-28
Payer: MEDICARE

## 2025-05-28 VITALS
TEMPERATURE: 98.3 F | BODY MASS INDEX: 42.69 KG/M2 | OXYGEN SATURATION: 95 % | HEART RATE: 78 BPM | SYSTOLIC BLOOD PRESSURE: 155 MMHG | RESPIRATION RATE: 18 BRPM | DIASTOLIC BLOOD PRESSURE: 89 MMHG | WEIGHT: 232 LBS | HEIGHT: 62 IN

## 2025-05-28 DIAGNOSIS — T82.848A PAIN DUE TO VASCULAR PROSTHETIC DEVICES, IMPLANTS AND GRAFTS, INITIAL ENCOUNTER: ICD-10-CM

## 2025-05-28 DIAGNOSIS — N18.5 CKD (CHRONIC KIDNEY DISEASE) STAGE 5, GFR LESS THAN 15 ML/MIN: ICD-10-CM

## 2025-05-28 DIAGNOSIS — M79.602 PAIN OF LEFT UPPER EXTREMITY: Primary | ICD-10-CM

## 2025-05-28 NOTE — PROGRESS NOTES
[facility]   Follow up Office    Patient Name: Katarzyna Norris  : 1970  MRN: [id number]  Primary Care Physician:  Brian Ellis APRN      Subjective   Subjective     HPI:    Katarzyna Norris is a 54 y.o. female here for follow-up after ligation of left arm fistula 2025.  She says that she felt immediately better after ligation of the fistula.  There is only a little bit of tingling in the thumb and radial aspect of the palm mostly when she pronates her left arm and is holding something.      Objective     Vitals:   Temp:  [98.3 °F (36.8 °C)] 98.3 °F (36.8 °C)  Heart Rate:  [78] 78  Resp:  [18] 18  BP: (146-155)/(89-97) 155/89    Physical Exam      General: Alert, no acute distress  Wound: Healing well, no signs of infection.  Left hand: Warm, well-perfused    Assessment & Plan   Assessment / Plan     Diagnoses and all orders for this visit:    1. Pain of left upper extremity (Primary)  -     Duplex Upper Extremity Art / Grafts - Left CAR; Future    2. CKD (chronic kidney disease) stage 5, GFR less than 15 ml/min  -     Duplex Upper Extremity Art / Grafts - Left CAR; Future    3. Pain due to vascular prosthetic devices, implants and grafts, initial encounter  -     Duplex Upper Extremity Art / Grafts - Left CAR; Future       Assessment/Plan:   Satisfactory progress following ligation of left arm fistula.  Follow-up in 6 weeks with an arterial duplex of the left upper extremity.        Electronically signed by Yaniv Dominguez MD, 25, 10:26 AM EDT.

## 2025-06-10 ENCOUNTER — OFFICE VISIT (OUTPATIENT)
Dept: FAMILY MEDICINE CLINIC | Facility: CLINIC | Age: 55
End: 2025-06-10
Payer: MEDICARE

## 2025-06-10 VITALS
TEMPERATURE: 98.2 F | WEIGHT: 230 LBS | OXYGEN SATURATION: 99 % | SYSTOLIC BLOOD PRESSURE: 131 MMHG | BODY MASS INDEX: 42.33 KG/M2 | HEART RATE: 78 BPM | DIASTOLIC BLOOD PRESSURE: 88 MMHG | HEIGHT: 62 IN

## 2025-06-10 DIAGNOSIS — H65.93 BILATERAL SEROUS OTITIS MEDIA, UNSPECIFIED CHRONICITY: ICD-10-CM

## 2025-06-10 DIAGNOSIS — H69.93 EUSTACHIAN TUBE DYSFUNCTION, BILATERAL: ICD-10-CM

## 2025-06-10 DIAGNOSIS — J30.9 ALLERGIC RHINITIS, UNSPECIFIED SEASONALITY, UNSPECIFIED TRIGGER: ICD-10-CM

## 2025-06-10 DIAGNOSIS — B09 VIRAL RASH: Primary | ICD-10-CM

## 2025-06-10 RX ORDER — FAMOTIDINE 20 MG/1
20 TABLET, FILM COATED ORAL 2 TIMES DAILY
Qty: 60 TABLET | Refills: 0 | Status: SHIPPED | OUTPATIENT
Start: 2025-06-10

## 2025-06-10 RX ORDER — CETIRIZINE HYDROCHLORIDE 10 MG/1
10 TABLET ORAL NIGHTLY
Qty: 30 TABLET | Refills: 0 | Status: SHIPPED | OUTPATIENT
Start: 2025-06-10

## 2025-06-10 NOTE — PROGRESS NOTES
"Chief Complaint  Rash (Chest and arms and feet, started yesterday ) and Earache (Bilateral ears And throat pain)    Subjective      Katarzyna Norirs is a 54 y.o. female who presents to [facility]     History of Present Illness  The patient presents for evaluation of a rash.    She reports the onset of a rash yesterday evening, which she attributes to a recent illness. The rash was initially characterized by raised bumps but has since improved. She describes the rash as being on her chest and foot, with the latter being a new location for her. She also mentions a sensation of heat, which she associates with the development of the rash. She has been applying a steroid cream, previously prescribed for a similar condition, which has alleviated the itching and lightened the rash.    Additionally, she reports experiencing ear pain for the past few days, which she believes is related to her allergies. She has not been using any nasal sprays.She notes that allergy medication provides temporary relief from the pain, but the discomfort returns once the medication wears off.       Patient Care Team:  Brian Ellis APRN as PCP - General (Nurse Practitioner)  Tam Badillo MD as Consulting Physician (General Surgery)  Bobo Aldrich MD as Consulting Physician (Cardiology)  Yaniv Dominguez MD as Consulting Physician (Vascular Surgery)  Promise Carvalho MD as Consulting Physician (Nephrology)    Review of Systems      Objective   Vital Signs:   Vitals:    06/10/25 0851   BP: 131/88   BP Location: Left arm   Patient Position: Sitting   Cuff Size: Adult   Pulse: 78   Temp: 98.2 °F (36.8 °C)   TempSrc: Temporal   SpO2: 99%   Weight: 104 kg (230 lb)   Height: 157.5 cm (62.01\")     Body mass index is 42.06 kg/m².    Wt Readings from Last 3 Encounters:   06/10/25 104 kg (230 lb)   05/28/25 105 kg (232 lb)   05/13/25 105 kg (232 lb 2.3 oz)     BP Readings from Last 3 " Encounters:   06/10/25 131/88   05/28/25 155/89   05/13/25 135/69       Health Maintenance   Topic Date Due    TDAP/TD VACCINES (1 - Tdap) Never done    ZOSTER VACCINE (1 of 2) Never done    ANNUAL WELLNESS VISIT  06/27/2025    Pneumococcal Vaccine 50+ (1 of 2 - PCV) 10/19/2025 (Originally 9/26/1987)    COVID-19 Vaccine (1 - 2024-25 season) 10/22/2025 (Originally 9/1/2024)    INFLUENZA VACCINE  07/01/2025    LIPID PANEL  04/22/2026    MAMMOGRAM  06/27/2026    PAP SMEAR  05/14/2027    COLORECTAL CANCER SCREENING  05/30/2034    HEPATITIS C SCREENING  Completed       Physical Exam  Constitutional:       Appearance: Normal appearance.   HENT:      Head: Normocephalic and atraumatic.      Right Ear: Tympanic membrane normal.      Left Ear: Tympanic membrane normal.      Ears:      Comments: Bilateral serous fluid collection in the ears  Cardiovascular:      Rate and Rhythm: Normal rate and regular rhythm.      Pulses: Normal pulses.      Heart sounds: Normal heart sounds.   Pulmonary:      Effort: Pulmonary effort is normal.      Breath sounds: Normal breath sounds. No wheezing.   Skin:     Findings: Rash present.      Comments: Erythematous rash over the neck, anterior chest, bilateral arms and legs   Neurological:      General: No focal deficit present.      Mental Status: She is alert.   Psychiatric:         Mood and Affect: Mood normal.         Behavior: Behavior normal.         Physical Exam  Ears: Fluid in both ears, transparent on the left side and slightly opaque in one area on the right side. No redness observed.  Mouth/Throat: Mild erythema noted in the throat.  Respiratory: Clear to auscultation, no wheezing, rales or rhonchi  Skin: Rash noted on the chest and foot.       Result Review   The following data was reviewed by: June Bahena MD on 06/10/2025:  [x]  Tests & Results  []  Hospitalization/Emergency Department/Urgent Care  []  Internal/External Consultant Notes      Results          ASSESSMENT/PLAN  Diagnoses and all orders for this visit:    1. Viral rash (Primary)  -     cetirizine (zyrTEC) 10 MG tablet; Take 1 tablet by mouth Every Night.  Dispense: 30 tablet; Refill: 0  -     famotidine (Pepcid) 20 MG tablet; Take 1 tablet by mouth 2 (Two) Times a Day.  Dispense: 60 tablet; Refill: 0    2. Eustachian tube dysfunction, bilateral    3. Bilateral serous otitis media, unspecified chronicity    4. Allergic rhinitis, unspecified seasonality, unspecified trigger        Assessment & Plan  1. Rash.  - The rash is likely due to a viral infection, given the systemic symptoms including cough and congestion.   - She is advised to continue applying the steroid cream for a couple of weeks. Sun exposure should be avoided due to potential skin sensitivity and burning. If sun exposure is unavoidable, a thick layer of sunscreen should be applied every 2 to 3 hours, and the skin should be covered with clothing.  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 Ear pain.  - The ear pain is likely due to fullness rather than an infection, as there is no redness observed.  - Fluid buildup observed, with transparent fluid  - She is advised to use Flonase nasal spray consistently, with 2 sprays in each nostril, to alleviate the fluid buildup and reduce drainage through the eustachian tube.  - If she develops a fever, she should inform the office.         Katarzyna VALLEJO Norris  reports that she quit smoking about 12 years ago. Her smoking use included cigarettes. She started smoking about 13 years ago. She has a 1 pack-year smoking history. She has never been exposed to tobacco smoke. She has never used smokeless tobacco.     FOLLOW UP  Return if symptoms worsen or fail to improve.  Patient was given instructions and counseling regarding her condition or for health maintenance advice. Please see specific information pulled into the AVS if  appropriate.       June Bahena MD  06/10/25  09:30 EDT    Patient or patient representative verbalized consent for the use of Ambient Listening during the visit with  June Bahena MD for chart documentation. 6/10/2025  09:30 EDT

## 2025-06-30 ENCOUNTER — HOSPITAL ENCOUNTER (OUTPATIENT)
Dept: MAMMOGRAPHY | Facility: HOSPITAL | Age: 55
Discharge: HOME | End: 2025-06-30
Admitting: NURSE PRACTITIONER
Payer: MEDICARE

## 2025-06-30 ENCOUNTER — TELEPHONE (OUTPATIENT)
Dept: FAMILY MEDICINE CLINIC | Facility: CLINIC | Age: 55
End: 2025-06-30
Payer: MEDICARE

## 2025-06-30 DIAGNOSIS — Z12.31 SCREENING MAMMOGRAM FOR BREAST CANCER: ICD-10-CM

## 2025-06-30 PROCEDURE — 77067 SCR MAMMO BI INCL CAD: CPT

## 2025-06-30 PROCEDURE — 77063 BREAST TOMOSYNTHESIS BI: CPT

## 2025-06-30 NOTE — TELEPHONE ENCOUNTER
Hub staff attempted to follow warm transfer process and was unsuccessful     Caller: Katarzyna Norris    Relationship to patient: Self    Best call back number: [phone]    Patient is needing: PATIENT IS CALLING TO SCHEDULE A FOLLOW UP ON REOCCURRING RASHES WITH COLASANTI. T

## 2025-07-05 ENCOUNTER — HOSPITAL ENCOUNTER (EMERGENCY)
Facility: HOSPITAL | Age: 55
Discharge: HOME | End: 2025-07-05
Attending: EMERGENCY MEDICINE
Payer: MEDICARE

## 2025-07-05 ENCOUNTER — APPOINTMENT (OUTPATIENT)
Dept: GENERAL RADIOLOGY | Facility: HOSPITAL | Age: 55
End: 2025-07-05
Payer: MEDICARE

## 2025-07-05 VITALS
OXYGEN SATURATION: 98 % | BODY MASS INDEX: 43.45 KG/M2 | RESPIRATION RATE: 18 BRPM | HEART RATE: 103 BPM | SYSTOLIC BLOOD PRESSURE: 145 MMHG | HEIGHT: 62 IN | DIASTOLIC BLOOD PRESSURE: 67 MMHG | WEIGHT: 236.11 LBS | TEMPERATURE: 98.4 F

## 2025-07-05 DIAGNOSIS — S61.511A LACERATION OF RIGHT WRIST, INITIAL ENCOUNTER: Primary | ICD-10-CM

## 2025-07-05 PROCEDURE — 99283 EMERGENCY DEPT VISIT LOW MDM: CPT

## 2025-07-05 PROCEDURE — 73110 X-RAY EXAM OF WRIST: CPT

## 2025-07-05 PROCEDURE — 25010000002 LIDOCAINE 1% - EPINEPHRINE 1:100000 1 %-1:100000 SOLUTION

## 2025-07-05 RX ORDER — LIDOCAINE HYDROCHLORIDE AND EPINEPHRINE 10; 10 MG/ML; UG/ML
10 INJECTION, SOLUTION INFILTRATION; PERINEURAL ONCE
Status: COMPLETED | OUTPATIENT
Start: 2025-07-05 | End: 2025-07-05

## 2025-07-05 RX ADMIN — LIDOCAINE HYDROCHLORIDE,EPINEPHRINE BITARTRATE 10 ML: 10; .01 INJECTION, SOLUTION INFILTRATION; PERINEURAL at 10:25

## 2025-07-05 NOTE — DISCHARGE INSTRUCTIONS
You were evaluated in the emergency department today.  Your laceration was repaired with sutures, these need to be removed in 7 to 10 days.  Keep the area clean and dry.  You can take Tylenol or ibuprofen for any pain or swelling.  Apply ice packs as needed.  Rest with the arm above your heart.  Follow-up with your PCP.  Return to ER if you notice signs of infection which include redness extending up your arm, severe pain, drainage of pus, fever or any other symptoms that concern you.

## 2025-07-05 NOTE — ED PROVIDER NOTES
"SHARED VISIT ATTESTATION:    This visit was performed by myself and an APC.  I personally approved the management plan/medical decision making and take responsibility for the patient management.      SHARED VISIT NOTE:    Patient is 54 y.o. year old female that presents to the ED for evaluation of wrist laceration and pain after she cut it on some glass.        Physical Exam    ED Course:    Pulse 103   Temp 98.4 °F (36.9 °C) (Oral)   Resp 18   Ht 157.5 cm (62\")   Wt 107 kg (236 lb 1.8 oz)   LMP  (LMP Unknown) Comment: no periods  SpO2 98%   BMI 43.19 kg/m²       The following orders were placed and all results were independently analyzed by me:  Orders Placed This Encounter   Procedures    XR Wrist 3+ View Right       Medications Given in the Emergency Department:  Medications   lidocaine 1% - EPINEPHrine 1:[id number] (XYLOCAINE W/EPI) 1 %-1:[id number] injection 10 mL (10 mL Injection Given 7/5/25 1025)        ED Course:         Labs:    Lab Results (last 24 hours)       ** No results found for the last 24 hours. **             Imaging:    XR Wrist 3+ View Right  Result Date: 7/5/2025  XR WRIST 3+ VW RIGHT Date of Exam: 7/5/2025 9:26 AM EDT Indication: laceration patient cut wrist on glass. Pain. Comparison: None available. Findings: There is chondrocalcinosis. There is soft tissue swelling along the ulnar aspect of the wrist. The bone mineral density is normal. No fractures or dislocations. The imaging is limited as the wrist is not centered in the field-of-view. This limits evaluation of the carpal articulations and the carpometacarpal joint.     Impression: No fractures or dislocations or radiopaque foreign bodies. Soft tissue swelling along the ulnar aspect of the wrist. Chondrocalcinosis. Correlate for CPPD arthropathy. Electronically Signed: Carlita Verdugo MD  7/5/2025 9:30 AM EDT  Workstation ID: [id number]      MDM:    Procedures    X-ray were performed in the emergency department and all X-ray impressions were " independently interpreted by me.            X-rays negative for obvious foreign body but we will irrigate out and look for foreign bodies and then closed wound with sutures.         Selwyn Lopez MD  10:31 EDT  07/05/25         Selwyn Lopez MD  07/05/25 [id number]

## 2025-07-05 NOTE — ED PROVIDER NOTES
"Time: 10:18 AM EDT  Date of encounter:  7/5/2025  Independent Historian/Clinical History and Information was obtained by:   Patient    History is limited by: N/A    Chief Complaint: Laceration      History of Present Illness:  Patient is a 54 y.o. year old female who presents to the emergency department for evaluation of laceration.  Patient states she was taking the trash out and forgot that she had thrown away a piece of glass that was sitting at the top of the trash can.  Reached into the trash can and caught her right wrist on the piece of glass.  Sustained a laceration to the volar aspect of her wrist.  Is able to move the wrist in all directions.  Bleeding has been controlled with pressure.  She does not take blood thinners.      Patient Care Team  Primary Care Provider: Brian Ellis APRN    Past Medical History:     Allergies   Allergen Reactions    Amlodipine Shortness Of Breath    Clindamycin/Lincomycin Swelling     FACIAL SWELLING      Contrast Dye (Echo Or Unknown Ct/Mr) Palpitations     \"BLOOD PRESSURE GOES GISSELLE HIGH\", \"HEART RACES\"  CONTRAINDICATED R/T KIDNEY FAILURE    Diltiazem Hcl Anxiety    Zofran [Ondansetron] Confusion    Augmentin [Amoxicillin-Pot Clavulanate] Nausea And Vomiting     Beta lactam allergy details  Antibiotic reaction: nausea  Age at reaction: adult  Dose to reaction time: days  Reason for antibiotic: unknown  Epinephrine required for reaction?: unknown  Tolerated antibiotics: unknown       Doxycycline Nausea And Vomiting     Past Medical History:   Diagnosis Date    Anxiety     Arthritis     GILBERT KNEES    Asthma     SEASONAL ALLERGIES- NO INHALERS    CKD (chronic kidney disease)     Stage 5, Follows with [facility]- NO CURRENT DIALYSIS    CRF (chronic renal failure)     ONLY IN PREPARATION FOR DIALYSIS, NO CURRENT DIALYSIS    Elevated cholesterol     Gastritis     GERD (gastroesophageal reflux disease)     Gout 02/23/2021    Hiatal hernia     Hiatal hernia     Hypertension  "    Lactose intolerance     Numbness and tingling in left hand     R/T CURRENT FISTULA    Transplant, organ     ON KIDNEY TRANSPLANT LIST     Past Surgical History:   Procedure Laterality Date    ARTERIOVENOUS FISTULA/SHUNT SURGERY Left 2024    Procedure: 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;  Surgeon: Yaniv Dominguez MD;  Location: [facility] MAIN OR;  Service: Vascular;  Laterality: Left;    ARTERIOVENOUS FISTULA/SHUNT SURGERY Left 2025    Procedure: Ligation of left arm arteriovenous fistula / Tie off the left arm fistula;  Surgeon: Yaniv Dominguez MD;  Location: [facility] HYBRID OR;  Service: Vascular;  Laterality: Left;    BONY PELVIS SURGERY      Plate     SECTION   and     CHOLECYSTECTOMY      COLONOSCOPY      COLONOSCOPY      COLONOSCOPY      COLONOSCOPY N/A 2024    Procedure: COLONOSCOPY;  Surgeon: Aston Arroyo MD;  Location: [facility] ENDOSCOPY;  Service: Gastroenterology;  Laterality: N/A;  HEMORRHOIDS    ENDOSCOPY      ENDOSCOPY N/A 2023    Procedure: ESOPHAGOGASTRODUODENOSCOPY with biopsies;  Surgeon: Tam Badillo MD;  Location: [facility] ENDOSCOPY;  Service: General;  Laterality: N/A;  hiatal hernia    JOINT REPLACEMENT      TOTAL KNEE ARTHROPLASTY Left 2021    Procedure: TOTAL KNEE ARTHROPLASTY;  Surgeon: Marco Nelson MD;  Location: [facility] MAIN OR;  Service: Orthopedics;  Laterality: Left;    TOTAL KNEE ARTHROPLASTY Right 10/19/2022    Procedure: RIGHT TOTAL KNEE ARTHROPLASTY - NO SHELBY;  Surgeon: Marco Nelson MD;  Location: [facility] MAIN OR;  Service: Orthopedics;  Laterality: Right;    TUBAL ABDOMINAL LIGATION       Family History   Problem Relation Age of Onset    Diabetes Mother     Throat cancer Father 73    Hypertension Sister     Hypertension Sister     Hypertension Sister     Diabetes Brother     Hypertension Brother     Stroke Other     Diabetes Other     Malig Hyperthermia Neg Hx     Colon cancer  Neg Hx        Home Medications:  Prior to Admission medications    Medication Sig Start Date End Date Taking? Authorizing Provider   acetaminophen (TYLENOL) 325 MG tablet Take 2 tablets by mouth Every 6 (Six) Hours As Needed for Mild Pain, Headache or Fever.    Al Ceballos MD   albuterol sulfate  (90 Base) MCG/ACT inhaler Inhale 2 puffs Every 4 (Four) Hours As Needed for Wheezing or Shortness of Air.    Al Ceballos MD   allopurinol (ZYLOPRIM) 300 MG tablet Take 0.5 tablets by mouth Every Night. Pt also takes 300mg qd    Al Ceballos MD   aspirin 81 MG EC tablet Take 1 tablet by mouth Daily.    Al Ceballos MD   atorvastatin (LIPITOR) 20 MG tablet Take 1 tablet by mouth Every Night. 4/22/25   Brian Ellis APRN   carvedilol (COREG) 25 MG tablet Take 1 tablet by mouth 2 (Two) Times a Day With Meals. 4/22/25   Brian Ellis APRN   cetirizine (zyrTEC) 10 MG tablet Take 1 tablet by mouth Every Night. 6/10/25   June Bahena MD   cloNIDine (CATAPRES) 0.2 MG tablet Take 1 tablet by mouth 3 (Three) Times a Day.  Patient taking differently: Take 1 tablet by mouth Every Night. 4/22/25   Brian Ellis APRN   diphenhydrAMINE (BENADRYL) 25 mg capsule Take 1 capsule by mouth Every 6 (Six) Hours As Needed for Itching or Allergies.    Al Ceballos MD   famotidine (Pepcid) 20 MG tablet Take 1 tablet by mouth 2 (Two) Times a Day. 6/10/25   June Bahena MD   HYDROcodone-acetaminophen (NORCO) 5-325 MG per tablet Take 1 tablet by mouth Every 6 (Six) Hours As Needed for Severe Pain. 5/13/25   Yaniv Dominguez MD   losartan (COZAAR) 100 MG tablet Take 1 tablet by mouth Daily.  Patient taking differently: Take 0.5 tablets by mouth 2 (Two) Times a Day. 4/22/25   Brian Ellis APRN   Multiple Vitamins-Minerals (ZINC PO) Take 1 tablet by mouth Daily.    Al Ceballos MD   polyethylene glycol (MIRALAX) 17 g packet Take 17 g by  "mouth Daily. 25   Brian Ellis MARICRUZ   vitamin B-12 (CYANOCOBALAMIN) 1000 MCG tablet Take 1 tablet by mouth Daily.    Provider, Al, MD        Social History:   Social History     Tobacco Use    Smoking status: Former     Current packs/day: 0.00     Average packs/day: 1 pack/day for 1 year (1.0 ttl pk-yrs)     Types: Cigarettes     Start date:      Quit date:      Years since quittin.5     Passive exposure: Never    Smokeless tobacco: Never   Vaping Use    Vaping status: Never Used   Substance Use Topics    Alcohol use: Never    Drug use: Never         Review of Systems:  Review of Systems   Constitutional:  Negative for activity change, appetite change and fever.   HENT:  Negative for congestion, ear pain and sore throat.    Eyes:  Negative for photophobia and visual disturbance.   Respiratory:  Negative for cough.    Cardiovascular:  Negative for chest pain.   Gastrointestinal:  Negative for abdominal pain.   Endocrine: Negative for polyuria.   Genitourinary:  Negative for flank pain.   Musculoskeletal:  Negative for arthralgias and joint swelling.   Skin:  Positive for wound.   Allergic/Immunologic: Negative for immunocompromised state.   Neurological:  Negative for dizziness.   Hematological:  Negative for adenopathy.   Psychiatric/Behavioral:  Negative for agitation.         Physical Exam:  Pulse 103   Temp 98.4 °F (36.9 °C) (Oral)   Resp 18   Ht 157.5 cm (62\")   Wt 107 kg (236 lb 1.8 oz)   LMP  (LMP Unknown) Comment: no periods  SpO2 98%   BMI 43.19 kg/m²     Physical Exam  Vitals and nursing note reviewed.   Constitutional:       Appearance: Normal appearance. She is not ill-appearing.   HENT:      Head: Normocephalic.      Mouth/Throat:      Mouth: Mucous membranes are moist.   Eyes:      Pupils: Pupils are equal, round, and reactive to light.   Pulmonary:      Effort: Pulmonary effort is normal.   Abdominal:      General: There is no distension.   Musculoskeletal: "      Cervical back: Neck supple.   Skin:     General: Skin is warm and dry.      Comments: 2 cm laceration to volar aspect of right wrist.  Full range of motion at wrist, full range of motion of fingers.  Palpable radial pulse.  No active bleeding, not obviously contaminated.   Neurological:      General: No focal deficit present.      Mental Status: She is alert and oriented to person, place, and time.   Psychiatric:         Mood and Affect: Mood normal.         Behavior: Behavior normal.                  Medical Decision Making:      Comorbidities that affect care:    CKD, anemia, HTN, obesity    External Notes reviewed:    Previous Clinic Note: Seen in nephrology clinic 6/13      The following orders were placed and all results were independently analyzed by me:  Orders Placed This Encounter   Procedures    XR Wrist 3+ View Right       Medications Given in the Emergency Department:  Medications - No data to display     ED Course:         Labs:    Lab Results (last 24 hours)       ** No results found for the last 24 hours. **             Imaging:    XR Wrist 3+ View Right  Result Date: 7/5/2025  XR WRIST 3+ VW RIGHT Date of Exam: 7/5/2025 9:26 AM EDT Indication: laceration patient cut wrist on glass. Pain. Comparison: None available. Findings: There is chondrocalcinosis. There is soft tissue swelling along the ulnar aspect of the wrist. The bone mineral density is normal. No fractures or dislocations. The imaging is limited as the wrist is not centered in the field-of-view. This limits evaluation of the carpal articulations and the carpometacarpal joint.     Impression: No fractures or dislocations or radiopaque foreign bodies. Soft tissue swelling along the ulnar aspect of the wrist. Chondrocalcinosis. Correlate for CPPD arthropathy. Electronically Signed: Carlita Verdugo MD  7/5/2025 9:30 AM EDT  Workstation ID: [id number]        Differential Diagnosis and Discussion:    Laceration: Laceration was evaluated for  arterial injury, ligamentous damage, and other neurovascular injury.    PROCEDURES:    X-ray were performed in the emergency department and all X-ray impressions were independently interpreted by me.    No orders to display       Laceration Repair    Date/Time: 7/5/2025 11:31 AM    Performed by: Estefania Sterling PA-C  Authorized by: Selwyn Lopez MD    Consent:     Consent obtained:  Verbal    Consent given by:  Patient    Risks, benefits, and alternatives were discussed: yes    Universal protocol:     Procedure explained and questions answered to patient or proxy's satisfaction: yes      Relevant documents present and verified: yes      Test results available: yes      Imaging studies available: yes      Required blood products, implants, devices, and special equipment available: yes      Site/side marked: yes      Immediately prior to procedure, a time out was called: yes      Patient identity confirmed:  Verbally with patient, arm band, provided demographic data and hospital-assigned identification number  Laceration details:     Location:  Hand    Hand location:  R wrist    Length (cm):  2    Depth (mm):  3  Pre-procedure details:     Preparation:  Patient was prepped and draped in usual sterile fashion and imaging obtained to evaluate for foreign bodies  Exploration:     Hemostasis achieved with:  Direct pressure and epinephrine    Imaging obtained: x-ray      Imaging outcome: foreign body not noted      Wound exploration: wound explored through full range of motion and entire depth of wound visualized      Contaminated: no    Treatment:     Area cleansed with:  Povidone-iodine and saline    Amount of cleaning:  Extensive    Irrigation solution:  Sterile saline    Irrigation volume:  500  Skin repair:     Repair method:  Sutures    Suture size:  4-0    Suture material:  Nylon    Suture technique:  Simple interrupted    Number of sutures:  3  Approximation:     Approximation:  Close  Repair type:      Repair type:  Simple  Post-procedure details:     Dressing:  Sterile dressing    Procedure completion:  Tolerated well, no immediate complications      MDM  Number of Diagnoses or Management Options  Laceration of right wrist, initial encounter  Diagnosis management comments: This is a 54-year-old female presenting to the emergency department with a laceration from a piece of glass in the trash can.  Bleeding controlled with pressure.  No blood thinners.  Hemodynamically stable on arrival although did decline blood pressure measurement secondary to a fistula in the left arm for dialysis and the laceration on the right arm.  She is in no acute distress.  There is a 2 cm laceration to the volar aspect of the right wrist with controlled bleeding.  X-ray negative for retained foreign body or fracture.  Patient declines Tdap.  Laceration repaired, see procedure note for details.  Tolerated well, discharge instructions discussed.       Amount and/or Complexity of Data Reviewed  Tests in the radiology section of CPT®: reviewed and ordered    Risk of Complications, Morbidity, and/or Mortality  Presenting problems: low  Diagnostic procedures: low  Management options: low                       Patient Care Considerations:    SEPSIS was considered but is NOT present in the emergency department as SIRS criteria is not present. ANTIBIOTICS: I considered prescribing antibiotics as an outpatient however no bacterial focus of infection was found.      Consultants/Shared Management Plan:    SHARED VISIT: I have discussed the case with my supervising physician, Dr. Lopez who states clean wound well, agrees with plan. The substantive portion of the medical decision was made by the attesting physician who made or approve the management plan and will take responsibility for the patient.  Clinical findings were discussed and ultimate disposition was made in consult with supervising physician.    Social Determinants of Health:    Patient  is independent, reliable, and has access to care.       Disposition and Care Coordination:    Discharged: The patient is suitable and stable for discharge with no need for consideration of admission.    I have explained the patient´s condition, diagnoses and treatment plan based on the information available to me at this time. I have answered questions and addressed any concerns. The patient has a good  understanding of the patient´s diagnosis, condition, and treatment plan as can be expected at this point. The vital signs have been stable. The patient´s condition is stable and appropriate for discharge from the emergency department.      The patient will pursue further outpatient evaluation with the primary care physician or other designated or consulting physician as outlined in the discharge instructions. They are agreeable to this plan of care and follow-up instructions have been explained in detail. The patient has received these instructions in written format and has expressed an understanding of the discharge instructions. The patient is aware that any significant change in condition or worsening of symptoms should prompt an immediate return to this or the closest emergency department or call to 911.  I have explained discharge medications and the need for follow up with the patient/caretakers. This was also printed in the discharge instructions. Patient was discharged with the following medications and follow up:      Medication List        Changed      cloNIDine 0.2 MG tablet  Commonly known as: CATAPRES  Take 1 tablet by mouth 3 (Three) Times a Day.  What changed: when to take this     losartan 100 MG tablet  Commonly known as: COZAAR  Take 1 tablet by mouth Daily.  What changed:   how much to take  when to take this           No follow-up provider specified.     Final diagnoses:   None        ED Disposition       None            This medical record created using voice recognition software.              Estefania Sterling PA-C  07/05/25 1132

## 2025-07-08 ENCOUNTER — OFFICE VISIT (OUTPATIENT)
Dept: FAMILY MEDICINE CLINIC | Facility: CLINIC | Age: 55
End: 2025-07-08
Payer: MEDICARE

## 2025-07-08 VITALS
HEIGHT: 62 IN | HEART RATE: 84 BPM | WEIGHT: 235.6 LBS | OXYGEN SATURATION: 97 % | TEMPERATURE: 97.6 F | BODY MASS INDEX: 43.36 KG/M2

## 2025-07-08 DIAGNOSIS — H69.93 EUSTACHIAN TUBE DYSFUNCTION, BILATERAL: ICD-10-CM

## 2025-07-08 DIAGNOSIS — J30.9 ALLERGIC RHINITIS, UNSPECIFIED SEASONALITY, UNSPECIFIED TRIGGER: ICD-10-CM

## 2025-07-08 DIAGNOSIS — J01.00 ACUTE NON-RECURRENT MAXILLARY SINUSITIS: ICD-10-CM

## 2025-07-08 DIAGNOSIS — Z00.00 ENCOUNTER FOR MEDICARE ANNUAL WELLNESS EXAM: Primary | ICD-10-CM

## 2025-07-08 DIAGNOSIS — R21 RASH: ICD-10-CM

## 2025-07-08 PROCEDURE — 1170F FXNL STATUS ASSESSED: CPT | Performed by: NURSE PRACTITIONER

## 2025-07-08 PROCEDURE — 99213 OFFICE O/P EST LOW 20 MIN: CPT | Performed by: NURSE PRACTITIONER

## 2025-07-08 PROCEDURE — 1125F AMNT PAIN NOTED PAIN PRSNT: CPT | Performed by: NURSE PRACTITIONER

## 2025-07-08 PROCEDURE — G0439 PPPS, SUBSEQ VISIT: HCPCS | Performed by: NURSE PRACTITIONER

## 2025-07-08 RX ORDER — CETIRIZINE HYDROCHLORIDE 10 MG/1
10 TABLET ORAL NIGHTLY
Qty: 90 TABLET | Refills: 1 | Status: SHIPPED | OUTPATIENT
Start: 2025-07-08

## 2025-07-08 RX ORDER — CLOBETASOL PROPIONATE 0.5 MG/G
1 CREAM TOPICAL 2 TIMES DAILY
Qty: 60 G | Refills: 1 | Status: SHIPPED | OUTPATIENT
Start: 2025-07-08

## 2025-07-08 RX ORDER — CEFDINIR 300 MG/1
300 CAPSULE ORAL 2 TIMES DAILY
Qty: 20 CAPSULE | Refills: 0 | Status: SHIPPED | OUTPATIENT
Start: 2025-07-08 | End: 2025-07-18

## 2025-07-08 RX ORDER — FLUTICASONE PROPIONATE 50 MCG
2 SPRAY, SUSPENSION (ML) NASAL DAILY
Qty: 16 G | Refills: 1 | Status: SHIPPED | OUTPATIENT
Start: 2025-07-08

## 2025-07-08 NOTE — PROGRESS NOTES
Subjective   The ABCs of the Annual Wellness Visit  Medicare Wellness Visit      Katarzyna Norris is a 54 y.o. patient who presents for a Medicare Wellness Visit.    The following portions of the patient's history were reviewed and   updated as appropriate: allergies, current medications, past family history, past medical history, past social history, past surgical history, and problem list.    Compared to one year ago, the patient's physical   health is the same.  Compared to one year ago, the patient's mental   health is the same    Recent Hospitalizations:  This patient has had a [facility] admission record on file within the last 365 days.  Current Medical Providers:  Patient Care Team:  Brian Ellis APRN as PCP - General (Nurse Practitioner)  Tam Badillo MD as Consulting Physician (General Surgery)  Bobo Aldrich MD as Consulting Physician (Cardiology)  Yaniv Dominguez MD as Consulting Physician (Vascular Surgery)  Promise Carvalho MD as Consulting Physician (Nephrology)    Outpatient Medications Prior to Visit   Medication Sig Dispense Refill    acetaminophen (TYLENOL) 325 MG tablet Take 2 tablets by mouth Every 6 (Six) Hours As Needed for Mild Pain, Headache or Fever.      albuterol sulfate  (90 Base) MCG/ACT inhaler Inhale 2 puffs Every 4 (Four) Hours As Needed for Wheezing or Shortness of Air.      allopurinol (ZYLOPRIM) 300 MG tablet Take 0.5 tablets by mouth Every Night. Pt also takes 300mg qd      aspirin 81 MG EC tablet Take 1 tablet by mouth Daily.      atorvastatin (LIPITOR) 20 MG tablet Take 1 tablet by mouth Every Night. 90 tablet 1    carvedilol (COREG) 25 MG tablet Take 1 tablet by mouth 2 (Two) Times a Day With Meals. 180 tablet 1    cloNIDine (CATAPRES) 0.2 MG tablet Take 1 tablet by mouth 3 (Three) Times a Day. (Patient taking differently: Take 1 tablet by mouth Every Night.) 270 tablet 1    diphenhydrAMINE (BENADRYL) 25 mg capsule Take 1  capsule by mouth Every 6 (Six) Hours As Needed for Itching or Allergies.      famotidine (Pepcid) 20 MG tablet Take 1 tablet by mouth 2 (Two) Times a Day. 60 tablet 0    losartan (COZAAR) 100 MG tablet Take 1 tablet by mouth Daily. (Patient taking differently: Take 0.5 tablets by mouth 2 (Two) Times a Day.) 90 tablet 1    Multiple Vitamins-Minerals (ZINC PO) Take 1 tablet by mouth Daily.      polyethylene glycol (MIRALAX) 17 g packet Take 17 g by mouth Daily. 100 each 3    vitamin B-12 (CYANOCOBALAMIN) 1000 MCG tablet Take 1 tablet by mouth Daily.      cetirizine (zyrTEC) 10 MG tablet Take 1 tablet by mouth Every Night. 30 tablet 0    HYDROcodone-acetaminophen (NORCO) 5-325 MG per tablet Take 1 tablet by mouth Every 6 (Six) Hours As Needed for Severe Pain. (Patient not taking: Reported on 7/8/2025) 12 tablet 0     No facility-administered medications prior to visit.     Opioid medication/s are on active medication list.  and I have evaluated her active treatment plan and pain score trends (see table).  Vitals:    07/08/25 1152   PainSc: 2    PainLoc: Wrist     I have reviewed the chart for potential of high risk medication and harmful drug interactions in the elderly.        Aspirin is on active medication list. Aspirin use is indicated based on review of current medical condition/s. Pros and cons of this therapy have been discussed today. Benefits of this medication outweigh potential harm.  Patient has been encouraged to continue taking this medication.  .      Patient Active Problem List   Diagnosis    Gout    Anxiety    Hypertension    Primary osteoarthritis of left knee    Anemia    Impaired fasting glucose    Hyperlipidemia    Aftercare following right knee joint replacement [id number]    Osteoarthritis of left knee    Status post replacement of knee joint    Cyst of ovary    Asthma    Nephritic syndrome    Class 3 severe obesity due to excess calories with serious comorbidity and body mass index (BMI) of  "40.0 to 44.9 in adult    Recurrent UTI    Vitamin D deficiency    Elevated ferritin    Aftercare following surgery of left total knee arthroplasty, 12/22/2021    Rash    Lateral cutaneous femoral nerve of thigh compression or syndrome, right    Primary localized osteoarthritis of right knee    Allergic rhinitis    Hiatal hernia    Painful thyroid    Migraine with aura and without status migrainosus, not intractable    External hemorrhoid    Screening mammogram for breast cancer    Acute non-recurrent sinusitis    Panic attack    Hyponatremia    Joint pain    Muscle cramps    Eustachian tube dysfunction, bilateral    Right knee pain    Left knee pain    Blurred vision, bilateral    CKD (chronic kidney disease) stage 5, GFR less than 15 ml/min    Encounter for screening for malignant neoplasm of colon    Dermatitis    Encounter for Medicare annual wellness exam    Palpitation    Incisional hernia, without obstruction or gangrene    Umbilical hernia without obstruction and without gangrene    Transient ischemic attack (TIA)    TIA (transient ischemic attack)    Left lower quadrant abdominal pain    Acute cystitis with hematuria    Abnormal urinalysis    Neck pain    Urinary urgency    Urinary frequency    Constipation    LLQ abdominal pain    Prediabetes    Obstructive sleep apnea    Dysuria    Postmenopausal    CKD (chronic kidney disease) stage 4, GFR 15-29 ml/min     Advance Care Planning Advance Directive is not on file.          Objective   Vitals:    07/08/25 1152   Pulse: 84   Temp: 97.6 °F (36.4 °C)   SpO2: 97%   Weight: 107 kg (235 lb 9.6 oz)   Height: 157.5 cm (62\")   PainSc: 2    PainLoc: Wrist       Estimated body mass index is 43.09 kg/m² as calculated from the following:    Height as of this encounter: 157.5 cm (62\").    Weight as of this encounter: 107 kg (235 lb 9.6 oz).             Does the patient have evidence of cognitive impairment? No  Lab Results   Component Value Date    TRIG 62 04/22/2025    " HDL 34 (L) 2025    LDL 50 2025    VLDL 14 2025    HGBA1C 5.30 2025                                                                                                Health  Risk Assessment    Smoking Status:  Social History     Tobacco Use   Smoking Status Former    Current packs/day: 0.00    Average packs/day: 1 pack/day for 1 year (1.0 ttl pk-yrs)    Types: Cigarettes    Start date:     Quit date:     Years since quittin.5    Passive exposure: Never   Smokeless Tobacco Never     Alcohol Consumption:  Social History     Substance and Sexual Activity   Alcohol Use Never       Fall Risk Screen  STEADI Fall Risk Assessment was completed, and patient is at LOW risk for falls.Assessment completed on:2025    Depression Screening   Little interest or pleasure in doing things? Not at all   Feeling down, depressed, or hopeless? Not at all   PHQ-2 Total Score 0      Health Habits and Functional and Cognitive Screenin/8/2025    11:54 AM   Functional & Cognitive Status   Do you have difficulty preparing food and eating? Yes   Do you have difficulty bathing yourself, getting dressed or grooming yourself? Yes   Do you have difficulty using the toilet? Yes   Do you have difficulty moving around from place to place? Yes   Do you have trouble with steps or getting out of a bed or a chair? Yes   Current Diet Well Balanced Diet   Dental Exam Not up to date   Eye Exam Up to date   Exercise (times per week) 2 times per week   Current Exercises Include Walking;Yard Work   Do you need help using the phone?  No   Are you deaf or do you have serious difficulty hearing?  No   Do you need help to go to places out of walking distance? No   Do you need help shopping? No   Do you need help preparing meals?  No   Do you need help with housework?  No   Do you need help with laundry? No   Do you need help taking your medications? No   Do you need help managing money? No   Do you ever drive or ride in  a car without wearing a seat belt? No   Have you felt unusual fatigue (could be tiredness), stress, anger or loneliness in the last month? No   Who do you live with? Spouse   If you need help, do you have trouble finding someone available to you? No   Have you been bothered in the last four weeks by sexual problems? No   Do you have difficulty concentrating, remembering or making decisions? No           Age-appropriate Screening Schedule:  Refer to the list below for future screening recommendations based on patient's age, sex and/or medical conditions. Orders for these recommended tests are listed in the plan section. The patient has been provided with a written plan.    Health Maintenance List  Health Maintenance   Topic Date Due    TDAP/TD VACCINES (1 - Tdap) Never done    ZOSTER VACCINE (1 of 2) Never done    Pneumococcal Vaccine 50+ (1 of 2 - PCV) 10/19/2025 (Originally 9/26/1987)    COVID-19 Vaccine (1 - 2024-25 season) 10/22/2025 (Originally 9/1/2024)    INFLUENZA VACCINE  10/01/2025    LIPID PANEL  04/22/2026    ANNUAL WELLNESS VISIT  07/08/2026    PAP SMEAR  05/14/2027    MAMMOGRAM  06/30/2027    COLORECTAL CANCER SCREENING  05/30/2034    HEPATITIS C SCREENING  Completed                                                                                                                                                CMS Preventative Services Quick Reference  Risk Factors Identified During Encounter  Immunizations Discussed/Encouraged: Tdap and Shingrix    The above risks/problems have been discussed with the patient.  Pertinent information has been shared with the patient in the After Visit Summary.  An After Visit Summary and PPPS were made available to the patient.    Follow Up:  Next Medicare Wellness visit to be scheduled in 1 year.          Additional E&M Note during same encounter follows:  Patient has multiple medical problems which are significant and separately identifiable that require additional work  "above and beyond the Medicare Wellness Visit.      Chief Complaint  Medicare wellness exam    Katarzyna Norris is a 54 y.o. female who presents to [facility]     Also complain of a rash on right side of her face  Also c/o bilateral ear pain, states she is not using her flonase consistently, with sinus pressure, congestion right side worse than left x1 week    Follow up acute visit in clinic 6/10/2025  Assessment & Plan  1. Rash.  - The rash is likely due to a viral infection, given the systemic symptoms including cough and congestion.   - She is advised to continue applying the steroid cream for a couple of weeks. Sun exposure should be avoided due to potential skin sensitivity and burning. If sun exposure is unavoidable, a thick layer of sunscreen should be applied every 2 to 3 hours, and the skin should be covered with clothing.  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 Ear pain.  - The ear pain is likely due to fullness rather than an infection, as there is no redness observed.  - Fluid buildup observed, with transparent fluid  - She is advised to use Flonase nasal spray consistently, with 2 sprays in each nostril, to alleviate the fluid buildup and reduce drainage through the eustachian tube.        Objective   Vital Signs:   Vitals:    07/08/25 1152   Pulse: 84   Temp: 97.6 °F (36.4 °C)   SpO2: 97%   Weight: 107 kg (235 lb 9.6 oz)   Height: 157.5 cm (62\")   PainSc: 2    PainLoc: Wrist       Wt Readings from Last 3 Encounters:   07/08/25 107 kg (235 lb 9.6 oz)   07/05/25 107 kg (236 lb 1.8 oz)   06/10/25 104 kg (230 lb)     BP Readings from Last 3 Encounters:   07/05/25 145/67   06/10/25 131/88   05/28/25 155/89       Physical Exam  HENT:      Head: Normocephalic.      Right Ear: A middle ear effusion is present. Tympanic membrane is injected.      Left Ear: A middle ear effusion is present. Tympanic " membrane is injected.      Nose: Congestion and rhinorrhea present.      Right Turbinates: Enlarged and swollen.      Left Turbinates: Enlarged and swollen.      Right Sinus: Maxillary sinus tenderness present.      Mouth/Throat:      Mouth: Mucous membranes are moist.   Eyes:      Conjunctiva/sclera:      Right eye: Right conjunctiva is injected.      Left eye: Left conjunctiva is injected.   Cardiovascular:      Rate and Rhythm: Normal rate and regular rhythm.      Heart sounds: Normal heart sounds. No murmur heard.  Pulmonary:      Effort: Pulmonary effort is normal.      Breath sounds: Normal breath sounds.   Musculoskeletal:      Cervical back: No rigidity.      Right lower leg: No edema.      Left lower leg: No edema.   Lymphadenopathy:      Cervical: No cervical adenopathy.   Skin:     General: Skin is warm and dry.      Findings: Rash present.      Comments: Erythematous rash right cheek   Neurological:      Mental Status: She is alert and oriented to person, place, and time.   Psychiatric:         Mood and Affect: Mood normal.         Behavior: Behavior normal.         The following data was reviewed by MARICRUZ Blackwood on 07/08/2025      Assessment & Plan   Diagnoses and all orders for this visit:    1. Encounter for Medicare annual wellness exam (Primary)    2. Rash    3. Allergic rhinitis, unspecified seasonality, unspecified trigger  -     cetirizine (zyrTEC) 10 MG tablet; Take 1 tablet by mouth Every Night.  Dispense: 90 tablet; Refill: 1    4. Acute non-recurrent maxillary sinusitis    5. Eustachian tube dysfunction, bilateral    Other orders  -     cefdinir (OMNICEF) 300 MG capsule; Take 1 capsule by mouth 2 (Two) Times a Day for 10 days.  Dispense: 20 capsule; Refill: 0  -     fluticasone (FLONASE) 50 MCG/ACT nasal spray; Administer 2 sprays into the nostril(s) as directed by provider Daily. 2 sprays each nostril daily  Dispense: 16 g; Refill: 1  -     clobetasol propionate (TEMOVATE)  0.05 % cream; Apply 1 Application topically to the appropriate area as directed 2 (Two) Times a Day.  Dispense: 60 g; Refill: 1        Encounter for annual Medicare wellness exam immunizations screening reviewed  Rash will treat with clobetasol cream  Allergic rhinitis uncontrolled will start Flonase with patient tube disorder  Acute sinusitis treat with cefdinir allergies verified with patient          FOLLOW UP  Return in about 3 months (around 10/22/2025).  Patient was given instructions and counseling regarding her condition or for health maintenance advice. Please see specific information pulled into the AVS if appropriate.     Brian Ellis, MARICRUZ  07/08/25  14:21 EDT

## 2025-07-15 ENCOUNTER — CLINICAL SUPPORT (OUTPATIENT)
Dept: FAMILY MEDICINE CLINIC | Facility: CLINIC | Age: 55
End: 2025-07-15
Payer: MEDICARE

## 2025-07-15 DIAGNOSIS — S61.511A: Primary | ICD-10-CM

## 2025-07-16 ENCOUNTER — TRANSCRIBE ORDERS (OUTPATIENT)
Dept: ADMINISTRATIVE | Facility: HOSPITAL | Age: 55
End: 2025-07-16
Payer: MEDICARE

## 2025-07-16 ENCOUNTER — LAB (OUTPATIENT)
Facility: HOSPITAL | Age: 55
End: 2025-07-16
Payer: MEDICARE

## 2025-07-16 ENCOUNTER — OFFICE VISIT (OUTPATIENT)
Age: 55
End: 2025-07-16
Payer: MEDICARE

## 2025-07-16 ENCOUNTER — HOSPITAL ENCOUNTER (OUTPATIENT)
Dept: CARDIOLOGY | Facility: HOSPITAL | Age: 55
Discharge: HOME | End: 2025-07-16
Payer: MEDICARE

## 2025-07-16 VITALS
HEART RATE: 80 BPM | OXYGEN SATURATION: 100 % | RESPIRATION RATE: 18 BRPM | SYSTOLIC BLOOD PRESSURE: 139 MMHG | TEMPERATURE: 98.1 F | DIASTOLIC BLOOD PRESSURE: 98 MMHG

## 2025-07-16 DIAGNOSIS — M79.602 PAIN OF LEFT UPPER EXTREMITY: ICD-10-CM

## 2025-07-16 DIAGNOSIS — N18.5 CKD (CHRONIC KIDNEY DISEASE) STAGE 5, GFR LESS THAN 15 ML/MIN: ICD-10-CM

## 2025-07-16 DIAGNOSIS — N18.5 CHRONIC KIDNEY DISEASE, STAGE V: Primary | ICD-10-CM

## 2025-07-16 DIAGNOSIS — T82.848A PAIN DUE TO VASCULAR PROSTHETIC DEVICES, IMPLANTS AND GRAFTS, INITIAL ENCOUNTER: ICD-10-CM

## 2025-07-16 DIAGNOSIS — E55.9 VITAMIN D DEFICIENCY: ICD-10-CM

## 2025-07-16 DIAGNOSIS — M10.9 GOUT, UNSPECIFIED CAUSE, UNSPECIFIED CHRONICITY, UNSPECIFIED SITE: ICD-10-CM

## 2025-07-16 DIAGNOSIS — N18.5 CHRONIC KIDNEY DISEASE, STAGE V: ICD-10-CM

## 2025-07-16 DIAGNOSIS — N18.5 CKD (CHRONIC KIDNEY DISEASE) STAGE 5, GFR LESS THAN 15 ML/MIN: Primary | ICD-10-CM

## 2025-07-16 LAB
25(OH)D3 SERPL-MCNC: 40.9 NG/ML (ref 30–100)
ALBUMIN SERPL-MCNC: 4 G/DL (ref 3.5–5.2)
ANION GAP SERPL CALCULATED.3IONS-SCNC: 13 MMOL/L (ref 5–15)
BACTERIA UR QL AUTO: ABNORMAL /HPF
BASOPHILS # BLD AUTO: 0.06 10*3/MM3 (ref 0–0.2)
BASOPHILS NFR BLD AUTO: 1 % (ref 0–1.5)
BH CV UPPER DUPLEX LEFT AXILLARY ART EDV: 12.5 CM/S
BH CV UPPER DUPLEX LEFT AXILLARY ART PSV: 75.6 CM/S
BH CV UPPER DUPLEX LEFT BRACHIAL ART EDV: 14.7 CM/S
BH CV UPPER DUPLEX LEFT BRACHIAL ART PSV: 115.9 CM/S
BH CV UPPER DUPLEX LEFT RADIAL ART EDV: 0 CM/S
BH CV UPPER DUPLEX LEFT RADIAL ART PSV: 74.1 CM/S
BH CV UPPER DUPLEX LEFT SUBCLAVIAN  ART PSV: 125.1 CM/S
BH CV UPPER DUPLEX LEFT SUBCLAVIAN ART EDV: 11.6 CM/S
BH CV UPPER DUPLEX LEFT ULNAR ART EDV: 0 CM/S
BH CV UPPER DUPLEX LEFT ULNAR ARTERY PSV: 65.8 CM/S
BILIRUB UR QL STRIP: NEGATIVE
BUN SERPL-MCNC: 66 MG/DL (ref 6–20)
BUN/CREAT SERPL: 17.6 (ref 7–25)
CALCIUM SPEC-SCNC: 9.8 MG/DL (ref 8.6–10.5)
CHLORIDE SERPL-SCNC: 102 MMOL/L (ref 98–107)
CLARITY UR: CLEAR
CO2 SERPL-SCNC: 18 MMOL/L (ref 22–29)
COLOR UR: YELLOW
CREAT SERPL-MCNC: 3.74 MG/DL (ref 0.57–1)
CREAT UR-MCNC: 35.7 MG/DL
DEPRECATED RDW RBC AUTO: 51.5 FL (ref 37–54)
EGFRCR SERPLBLD CKD-EPI 2021: 13.6 ML/MIN/1.73
EOSINOPHIL # BLD AUTO: 0.28 10*3/MM3 (ref 0–0.4)
EOSINOPHIL NFR BLD AUTO: 4.5 % (ref 0.3–6.2)
ERYTHROCYTE [DISTWIDTH] IN BLOOD BY AUTOMATED COUNT: 14.5 % (ref 12.3–15.4)
GLUCOSE SERPL-MCNC: 91 MG/DL (ref 65–99)
GLUCOSE UR STRIP-MCNC: NEGATIVE MG/DL
HCT VFR BLD AUTO: 30.6 % (ref 34–46.6)
HGB BLD-MCNC: 10.2 G/DL (ref 12–15.9)
HGB UR QL STRIP.AUTO: NEGATIVE
HYALINE CASTS UR QL AUTO: ABNORMAL /LPF
IMM GRANULOCYTES # BLD AUTO: 0.02 10*3/MM3 (ref 0–0.05)
IMM GRANULOCYTES NFR BLD AUTO: 0.3 % (ref 0–0.5)
KETONES UR QL STRIP: NEGATIVE
LEUKOCYTE ESTERASE UR QL STRIP.AUTO: ABNORMAL
LYMPHOCYTES # BLD AUTO: 1.42 10*3/MM3 (ref 0.7–3.1)
LYMPHOCYTES NFR BLD AUTO: 23 % (ref 19.6–45.3)
MCH RBC QN AUTO: 32.8 PG (ref 26.6–33)
MCHC RBC AUTO-ENTMCNC: 33.3 G/DL (ref 31.5–35.7)
MCV RBC AUTO: 98.4 FL (ref 79–97)
MONOCYTES # BLD AUTO: 0.41 10*3/MM3 (ref 0.1–0.9)
MONOCYTES NFR BLD AUTO: 6.6 % (ref 5–12)
NEUTROPHILS NFR BLD AUTO: 3.99 10*3/MM3 (ref 1.7–7)
NEUTROPHILS NFR BLD AUTO: 64.6 % (ref 42.7–76)
NITRITE UR QL STRIP: NEGATIVE
NRBC BLD AUTO-RTO: 0 /100 WBC (ref 0–0.2)
PH UR STRIP.AUTO: 6.5 [PH] (ref 5–8)
PHOSPHATE SERPL-MCNC: 6.1 MG/DL (ref 2.5–4.5)
PLATELET # BLD AUTO: 132 10*3/MM3 (ref 140–450)
PMV BLD AUTO: 8.5 FL (ref 6–12)
POTASSIUM SERPL-SCNC: 6.1 MMOL/L (ref 3.5–5.2)
PROT ?TM UR-MCNC: 19 MG/DL
PROT UR QL STRIP: ABNORMAL
PROT/CREAT UR: 0.53 MG/G{CREAT}
PTH-INTACT SERPL-MCNC: 75.8 PG/ML (ref 15–65)
RBC # BLD AUTO: 3.11 10*6/MM3 (ref 3.77–5.28)
RBC # UR STRIP: ABNORMAL /HPF
REF LAB TEST METHOD: ABNORMAL
SODIUM SERPL-SCNC: 133 MMOL/L (ref 136–145)
SP GR UR STRIP: 1.01 (ref 1–1.03)
SQUAMOUS #/AREA URNS HPF: ABNORMAL /HPF
URATE SERPL-MCNC: 2.3 MG/DL (ref 2.4–5.7)
UROBILINOGEN UR QL STRIP: ABNORMAL
WBC # UR STRIP: ABNORMAL /HPF
WBC NRBC COR # BLD AUTO: 6.18 10*3/MM3 (ref 3.4–10.8)

## 2025-07-16 PROCEDURE — 82570 ASSAY OF URINE CREATININE: CPT

## 2025-07-16 PROCEDURE — 83970 ASSAY OF PARATHORMONE: CPT

## 2025-07-16 PROCEDURE — 81001 URINALYSIS AUTO W/SCOPE: CPT

## 2025-07-16 PROCEDURE — 36415 COLL VENOUS BLD VENIPUNCTURE: CPT

## 2025-07-16 PROCEDURE — 82306 VITAMIN D 25 HYDROXY: CPT

## 2025-07-16 PROCEDURE — 80069 RENAL FUNCTION PANEL: CPT

## 2025-07-16 PROCEDURE — 93931 UPPER EXTREMITY STUDY: CPT

## 2025-07-16 PROCEDURE — 84550 ASSAY OF BLOOD/URIC ACID: CPT

## 2025-07-16 PROCEDURE — 84156 ASSAY OF PROTEIN URINE: CPT

## 2025-07-16 PROCEDURE — 85025 COMPLETE CBC W/AUTO DIFF WBC: CPT

## 2025-07-16 NOTE — PROGRESS NOTES
[facility]   Follow up Office    Patient Name: Katarzyna Norris  : 1970  MRN: [id number]  Primary Care Physician:  Brian Ellis APRN      Subjective   Subjective     HPI:    Katarzyna Norris is a 54 y.o. female here for follow-up for renal insufficiency.  She had a left arm fistula created 2024 which had to be ligated 2025 after she developed worsening steal syndrome.  Here for follow-up.  Doing well.  Her hand is back to normal.  She has a couple of different spots in the arm that are a little sore.  She is in the process of getting a kidney from her sister.      Objective     Vitals:   Temp:  [98.1 °F (36.7 °C)] 98.1 °F (36.7 °C)  Heart Rate:  [80] 80  Resp:  [18] 18  BP: (139)/(98) 139/98    Physical Exam      General: Alert, no acute distress.  Extremities: Symmetric.    Diagnostic studies: A left upper extremity arterial duplex in the office today is normal.    Assessment & Plan   Assessment / Plan     Diagnoses and all orders for this visit:    1. CKD (chronic kidney disease) stage 5, GFR less than 15 ml/min (Primary)    2. Pain of left upper extremity       Assessment/Plan:   Recovered following ligation of left arm fistula due to steal syndrome.  Follow-up with us as needed.        Electronically signed by Yaniv Dominguez MD, 25, 9:40 AM EDT.

## 2025-07-23 ENCOUNTER — LAB (OUTPATIENT)
Facility: HOSPITAL | Age: 55
End: 2025-07-23
Payer: MEDICARE

## 2025-07-23 ENCOUNTER — TRANSCRIBE ORDERS (OUTPATIENT)
Dept: ADMINISTRATIVE | Facility: HOSPITAL | Age: 55
End: 2025-07-23
Payer: MEDICARE

## 2025-07-23 DIAGNOSIS — N18.5 CKD (CHRONIC KIDNEY DISEASE), STAGE V: ICD-10-CM

## 2025-07-23 DIAGNOSIS — N18.5 CKD (CHRONIC KIDNEY DISEASE), STAGE V: Primary | ICD-10-CM

## 2025-07-23 LAB
ANION GAP SERPL CALCULATED.3IONS-SCNC: 12 MMOL/L (ref 5–15)
BUN SERPL-MCNC: 66 MG/DL (ref 6–20)
BUN/CREAT SERPL: 17.6 (ref 7–25)
CALCIUM SPEC-SCNC: 10 MG/DL (ref 8.6–10.5)
CHLORIDE SERPL-SCNC: 105 MMOL/L (ref 98–107)
CO2 SERPL-SCNC: 17 MMOL/L (ref 22–29)
CREAT SERPL-MCNC: 3.75 MG/DL (ref 0.57–1)
EGFRCR SERPLBLD CKD-EPI 2021: 13.6 ML/MIN/1.73
GLUCOSE SERPL-MCNC: 97 MG/DL (ref 65–99)
POTASSIUM SERPL-SCNC: 5.2 MMOL/L (ref 3.5–5.2)
SODIUM SERPL-SCNC: 134 MMOL/L (ref 136–145)

## 2025-07-23 PROCEDURE — 36415 COLL VENOUS BLD VENIPUNCTURE: CPT

## 2025-07-23 PROCEDURE — 80048 BASIC METABOLIC PNL TOTAL CA: CPT

## (undated) DEVICE — PENCL E/S SMOKEEVAC W/TELESCP CANN

## (undated) DEVICE — SLV SCD LEG COMFORT KENDALLSCD MD REPROC

## (undated) DEVICE — GLV SURG SENSICARE PI ORTHO SZ8 LF STRL

## (undated) DEVICE — COVER,LIGHT HANDLE,FLX,1/PK: Brand: MEDLINE INDUSTRIES, INC.

## (undated) DEVICE — INTENDED FOR TISSUE SEPARATION, AND OTHER PROCEDURES THAT REQUIRE A SHARP SURGICAL BLADE TO PUNCTURE OR CUT.: Brand: BARD-PARKER ® CARBON RIB-BACK BLADES

## (undated) DEVICE — SUT VIC 2/0 CT1 36IN

## (undated) DEVICE — 3M™ STERI-DRAPE™ U-DRAPE 1015: Brand: STERI-DRAPE™

## (undated) DEVICE — STRYKER PERFORMANCE SERIES SAGITTAL BLADE: Brand: STRYKER PERFORMANCE SERIES

## (undated) DEVICE — Device: Brand: DEFENDO AIR/WATER/SUCTION AND BIOPSY VALVE

## (undated) DEVICE — APPL CHLORAPREP HI/LITE 26ML ORNG

## (undated) DEVICE — SOL IRRG H2O PL/BG 1000ML STRL

## (undated) DEVICE — CONN JET HYDRA H20 AUXILIARY DISP

## (undated) DEVICE — MAT FLR ABS W/BLU/LINER 56X72IN WHT

## (undated) DEVICE — SUT SILK 3/0 TIES 18IN A184H

## (undated) DEVICE — SLV SCD KN/LEN ADJ EXPRSS BLENDED MD 1P/U

## (undated) DEVICE — BG OR ZSUT SADDLE 20IN CLR STRL

## (undated) DEVICE — Device

## (undated) DEVICE — CVR LEG BOOTLEG F/R NOSKID 33IN

## (undated) DEVICE — ANTIBACTERIAL VIOLET BRAIDED (POLYGLACTIN 910), SYNTHETIC ABSORBABLE SURGICAL SUTURE: Brand: COATED VICRYL

## (undated) DEVICE — SUT SILK 2/0 TIES 18IN A185H

## (undated) DEVICE — ELECTRD BLD EDGE COAT 3IN

## (undated) DEVICE — TOWEL,OR,DSP,ST,BLUE,STD,4/PK,20PK/CS: Brand: MEDLINE

## (undated) DEVICE — TOTAL KNEE-LF: Brand: MEDLINE INDUSTRIES, INC.

## (undated) DEVICE — LINER SURG CANSTR SXN S/RIGD 1500CC

## (undated) DEVICE — FAN SPRAY KIT: Brand: PULSAVAC®

## (undated) DEVICE — SOL NS 500ML

## (undated) DEVICE — DISPOSABLE TOURNIQUET CUFF SINGLE BLADDER, SINGLE PORT AND QUICK CONNECT CONNECTOR: Brand: COLOR CUFF

## (undated) DEVICE — STERILE POLYISOPRENE POWDER-FREE SURGICAL GLOVES WITH EMOLLIENT COATING: Brand: PROTEXIS

## (undated) DEVICE — SUT VIC 3/0 SH 27IN J416H

## (undated) DEVICE — SOLIDIFIER LIQLOC PLS 1500CC BT

## (undated) DEVICE — PIN DRL NOHEAD TROC 3.2X75MM

## (undated) DEVICE — DRSNG PAD ABD 8X10IN STRL

## (undated) DEVICE — STERILE POLYISOPRENE POWDER-FREE SURGICAL GLOVES: Brand: PROTEXIS

## (undated) DEVICE — PROXIMATE RH ROTATING HEAD SKIN STAPLERS (35 WIDE) CONTAINS 35 STAINLESS STEEL STAPLES: Brand: PROXIMATE

## (undated) DEVICE — 3 BONE CEMENT MIXER: Brand: MIXEVAC

## (undated) DEVICE — GLV SURG SENSICARE PI ORTHO SZ6.5 LF STRL

## (undated) DEVICE — SOL IRR NACL 0.9PCT 3000ML

## (undated) DEVICE — GLV SURG SENSICARE SLT PF LF 8 STRL

## (undated) DEVICE — UNDERCAST PADDING: Brand: DEROYAL

## (undated) DEVICE — TBG PENCL TELESCP MEGADYNE SMOKE EVAC 10FT

## (undated) DEVICE — PEEL-AWAY TOGA, 2X LARGE: Brand: FLYTE

## (undated) DEVICE — SUT PROLN 7/0 BV1 D/A 24IN 8702H

## (undated) DEVICE — BLCK/BITE BLOX WO/DENTL/RIM W/STRAP 54F

## (undated) DEVICE — DISPOSABLE DISTAL ATTACHMENT: Brand: DISPOSABLE DISTAL ATTACHMENT

## (undated) DEVICE — NO-SCRATCH ™ SMALL WHITNEY CURETTE ™ IS A SINGLE-USE, PLASTIC CURETTE FOR QUICKLY APPLYING, MANIPULATING AND REMOVING BONE CEMENT DURING HIP AND KNEE REPLACEMENT SURGERY. THE PLASTIC IS SOFTER THAN STEEL INSTRUMENTS, REDUCING THE RISK OF DAMAGING THE PROSTHESIS WITH METAL INSTRUMENTS.  THE CURETTE’S 6MM TIP REMOVES EXCESS CEMENT FROM REPLACEMENT HIPS AND KNEES. EASY-TO-MANEUVER, THE SMALL BLUE CURETTE LETS YOU REMOVE CEMENT FROM ALL EDGES OF THE PROSTHESIS.NO-SCRATCH WHITNEY SMALL CURETTE FEATURES:SAFER THAN STEEL- MADE OF PLASTIC - STURDY YET SOFTER THAN SURGICAL STEEL.HANDIER- EACH TOOL HAS A MOLDED-IN THUMB INDENTATION INSTANTLY ORIENTING THE TOOL.- EASIER TO MANEUVER IN HARD TO SEE PLACES.- COLOR-CODED FOR EASY IDENTIFICATION.FASTER- COMES INDIVIDUALLY PACKAGED IN STERILE, PEEL OPEN POUCH, READY TO GO.- APPLIES, MANIPULATES, OR REMOVES CEMENT WITH FINGERTIP PRECISION.ECONOMICAL- THE COST OF A SINGLE REVISION DWARFS THE COST OF A SINGLE-USE CURETTE. - DISPOSABLE – THERE’S NO NEED TO WASTE TIME REMOVING HARDENED CEMENT OR RE-STERILIZING TOOLS.- LESS EXPENSIVE TO BUY AND INVENTORY - ORDER ONLY THE TOOL YOU USE.- PACKAGED 25 INDIVIDUALLY WRAPPED TOOLS TO A CARTON FOR CONVENIENT SHELF STORAGE.: Brand: WHITNEY NO-SCRATCH CURETTE (SMALL)

## (undated) DEVICE — SUT PROLN 6/0 C1 D/A 30IN 8706H

## (undated) DEVICE — GLV SURG SENSICARE PI ORTHO SZ7.5 LF STRL

## (undated) DEVICE — SINGLE-USE BIOPSY FORCEPS: Brand: RADIAL JAW 4

## (undated) DEVICE — BNDG ELAS MATRX V/CLS 6INX10YD LF

## (undated) DEVICE — NDL HYPO ECLPS SFTY 18G 1 1/2IN

## (undated) DEVICE — PULLOVER TOGA, 2X LARGE: Brand: FLYTE, SURGICOOL

## (undated) DEVICE — PENCL SMOKE/EVAC MEGADYNE TELESCP 10FT

## (undated) DEVICE — 450 ML BOTTLE OF 0.05% CHLORHEXIDINE GLUCONATE IN 99.95% STERILE WATER FOR IRRIGATION, USP AND APPLICATOR.: Brand: IRRISEPT ANTIMICROBIAL WOUND LAVAGE

## (undated) DEVICE — GAUZE,SPONGE,4"X4",16PLY,STRL,LF,10/TRAY: Brand: MEDLINE

## (undated) DEVICE — SYR LUERLOK 30CC

## (undated) DEVICE — BNDG COTN/ELAS FLEXMASTER DBL/LENGTH CLIP/CLS 6IN 11YD

## (undated) DEVICE — THE STERILE LIGHT HANDLE COVER IS USED WITH STERIS SURGICAL LIGHTING AND VISUALIZATION SYSTEMS.

## (undated) DEVICE — SCRW HEX PERSONA FML 2.5X25MM PK/2
Type: IMPLANTABLE DEVICE | Site: KNEE | Status: NON-FUNCTIONAL
Removed: 2022-10-19

## (undated) DEVICE — SOL IRR H2O BTL 1000ML STRL

## (undated) DEVICE — SUT MNCRYL PLS ANTIB UD 4/0 PS2 18IN

## (undated) DEVICE — BASIC SINGLE BASIN-LF: Brand: MEDLINE INDUSTRIES, INC.

## (undated) DEVICE — EXT STEM FEM/KN PERSONA TPR 14XPLS30MM
Type: IMPLANTABLE DEVICE | Site: KNEE | Status: NON-FUNCTIONAL
Removed: 2021-12-22

## (undated) DEVICE — STERILE PATIENT PROTECTIVE PAD FOR IMP® KNEE POSITIONERS & COHESIVE WRAP (10 / CASE): Brand: DE MAYO KNEE POSITIONER®

## (undated) DEVICE — ESMARK: Brand: DEROYAL

## (undated) DEVICE — ARM VASCULAR-LF: Brand: MEDLINE INDUSTRIES, INC.